# Patient Record
Sex: FEMALE | Race: BLACK OR AFRICAN AMERICAN | NOT HISPANIC OR LATINO | Employment: FULL TIME | URBAN - METROPOLITAN AREA
[De-identification: names, ages, dates, MRNs, and addresses within clinical notes are randomized per-mention and may not be internally consistent; named-entity substitution may affect disease eponyms.]

---

## 2017-01-09 ENCOUNTER — OFFICE VISIT (OUTPATIENT)
Dept: INTERNAL MEDICINE | Facility: CLINIC | Age: 57
End: 2017-01-09
Payer: COMMERCIAL

## 2017-01-09 VITALS
HEIGHT: 66 IN | TEMPERATURE: 98 F | BODY MASS INDEX: 33.24 KG/M2 | HEART RATE: 90 BPM | DIASTOLIC BLOOD PRESSURE: 84 MMHG | SYSTOLIC BLOOD PRESSURE: 140 MMHG | OXYGEN SATURATION: 99 % | WEIGHT: 206.81 LBS

## 2017-01-09 DIAGNOSIS — H10.9 CONJUNCTIVITIS OF RIGHT EYE, UNSPECIFIED CONJUNCTIVITIS TYPE: Primary | ICD-10-CM

## 2017-01-09 DIAGNOSIS — J06.9 UPPER RESPIRATORY TRACT INFECTION, UNSPECIFIED TYPE: ICD-10-CM

## 2017-01-09 PROCEDURE — 99999 PR PBB SHADOW E&M-EST. PATIENT-LVL III: CPT | Mod: PBBFAC,,, | Performed by: FAMILY MEDICINE

## 2017-01-09 PROCEDURE — 96372 THER/PROPH/DIAG INJ SC/IM: CPT | Mod: S$GLB,,, | Performed by: FAMILY MEDICINE

## 2017-01-09 PROCEDURE — 99214 OFFICE O/P EST MOD 30 MIN: CPT | Mod: 25,S$GLB,, | Performed by: FAMILY MEDICINE

## 2017-01-09 RX ORDER — FLUOROMETHOLONE 1 MG/ML
1 SUSPENSION/ DROPS OPHTHALMIC 4 TIMES DAILY
Qty: 5 ML | Refills: 1 | Status: SHIPPED | OUTPATIENT
Start: 2017-01-09 | End: 2017-01-19

## 2017-01-09 RX ORDER — METHYLPREDNISOLONE ACETATE 40 MG/ML
40 INJECTION, SUSPENSION INTRA-ARTICULAR; INTRALESIONAL; INTRAMUSCULAR; SOFT TISSUE
Status: COMPLETED | OUTPATIENT
Start: 2017-01-09 | End: 2017-01-09

## 2017-01-09 RX ORDER — MONTELUKAST SODIUM 10 MG/1
10 TABLET ORAL NIGHTLY
Qty: 30 TABLET | Refills: 3 | Status: SHIPPED | OUTPATIENT
Start: 2017-01-09 | End: 2017-02-09

## 2017-01-09 RX ADMIN — METHYLPREDNISOLONE ACETATE 40 MG: 40 INJECTION, SUSPENSION INTRA-ARTICULAR; INTRALESIONAL; INTRAMUSCULAR; SOFT TISSUE at 12:01

## 2017-01-09 NOTE — PROGRESS NOTES
Subjective:       Patient ID: Karla Arzola is a 56 y.o. female.    Chief Complaint: URI and Eye Problem    HPI Mrs. Arzola presents today for URI symptoms and possible pink eye.  Right eye was a little red on Friday. Itching on yesterday. Stuck together this morning. Pain/ache periodic. This has occurred before and she was given drops. She wears contacts.     URI symptoms off and on for months. Cough productive yellow mucus, congested, irritated throat. Headaches and sinus pressure.   Takes singulair. Tylenol severe cold.     Review of Systems   Constitutional: Negative for chills, fatigue and fever.   HENT: Positive for congestion, rhinorrhea and sneezing. Negative for ear discharge, ear pain, sinus pressure and sore throat.    Eyes: Positive for pain, redness and itching. Negative for photophobia and visual disturbance.   Neurological: Negative for dizziness and headaches.           Past Medical History   Diagnosis Date    Allergy     Hypertension     Obesity     Vitamin B 12 deficiency      Past Surgical History   Procedure Laterality Date    Tubal ligation      Right knee scope       Family History   Problem Relation Age of Onset    Diabetes Mother     Hypertension Mother     Stroke Mother     Hypertension Father     Hyperlipidemia Father     Kidney disease Father      Social History     Social History    Marital status:      Spouse name: N/A    Number of children: N/A    Years of education: N/A     Occupational History     [employer]     Social History Main Topics    Smoking status: Never Smoker    Smokeless tobacco: None    Alcohol use 0.0 oz/week     0 Standard drinks or equivalent per week      Comment: socially     Drug use: No    Sexual activity: Yes     Other Topics Concern    None     Social History Narrative       Objective:        Physical Exam   Constitutional: She appears well-nourished.   HENT:   Head: Normocephalic and atraumatic.   Eyes:        Cardiovascular: Normal rate, regular rhythm and normal heart sounds.    Pulmonary/Chest: Effort normal and breath sounds normal.   Nursing note and vitals reviewed.        Assessment/Plan:     Conjunctivitis of right eye, unspecified conjunctivitis type  -     fluorometholone 0.1% (FML) 0.1 % DrpS; Place 1 drop into the left eye 4 (four) times daily.  Dispense: 5 mL; Refill: 1    Upper respiratory tract infection, unspecified type  -     montelukast (SINGULAIR) 10 mg tablet; Take 1 tablet (10 mg total) by mouth every evening.  Dispense: 30 tablet; Refill: 3  -     methylPREDNISolone acetate injection 40 mg; Inject 1 mL (40 mg total) into the muscle one time.      Reviewed previous records and found drops that worked in the past.   Also refilled singulair.    Return if symptoms worsen or fail to improve.    Robina Momin MD  [facility]   Family Medicine

## 2017-01-09 NOTE — MR AVS SNAPSHOT
[facility]  [address]  [place] [address]  Phone: [phone]  Fax: [phone]                  Karla Arzola   2017 11:40 AM   Office Visit    Description:  Female : 1960   Provider:  Robina Momin MD   Department:  [facility]           Reason for Visit     URI     Eye Problem           Diagnoses this Visit        Comments    Conjunctivitis of right eye, unspecified conjunctivitis type    -  Primary     Upper respiratory tract infection, unspecified type                To Do List           Future Appointments        Provider Department Dept Phone    2017 2:00 PM Sai Homer, [facility] - Ophthalmology [phone]      Goals (5 Years of Data)     None       These Medications        Disp Refills Start End    fluorometholone 0.1% (FML) 0.1 % DrpS 5 mL 1 2017    Place 1 drop into the left eye 4 (four) times daily. - Left Eye    Pharmacy: [facility] [address] [place] [address] [place] - [address] [address] Ph #: [phone]       montelukast (SINGULAIR) 10 mg tablet 30 tablet 3 2017    Take 1 tablet (10 mg total) by mouth every evening. - Oral    Pharmacy: [address] [place] - [address] [address] Ph #: [phone]         [facility]     [facility] On Call Nurse Care Line -  Assistance  Registered nurses in the [facility] provide clinical advisement, health education, appointment booking, and other advisory services.  Call for this free service at [phone].             Medications           Message regarding Medications     Verify the changes and/or additions to your medication regime listed below are the same as discussed with your clinician today.  If any of these changes or additions are incorrect, please notify your healthcare provider.        START taking these NEW medications        Refills    fluorometholone 0.1% (FML) 0.1 % DrpS 1    Sig: Place 1 drop  "into the left eye 4 (four) times daily.    Class: Normal    Route: Left Eye    montelukast (SINGULAIR) 10 mg tablet 3    Sig: Take 1 tablet (10 mg total) by mouth every evening.    Class: Normal    Route: Oral      These medications were administered today        Dose Freq    methylPREDNISolone acetate injection 40 mg 40 mg [facility] 1 time    Sig: Inject 1 mL (40 mg total) into the muscle one time.    Class: Normal    Route: Intramuscular           Verify that the below list of medications is an accurate representation of the medications you are currently taking.  If none reported, the list may be blank. If incorrect, please contact your healthcare provider. Carry this list with you in case of emergency.           Current Medications     albuterol 90 mcg/actuation inhaler Inhale 2 puffs into the lungs every 6 (six) hours as needed for Wheezing.    amlodipine (NORVASC) 5 MG tablet Take 1 tablet (5 mg total) by mouth once daily.    montelukast (SINGULAIR) 10 mg tablet Take 1 tablet (10 mg total) by mouth every evening.    acyclovir (ZOVIRAX) 200 MG capsule TAKE ONE CAPSULE BY MOUTH ONCE DAILY    cetirizine (ZYRTEC) 10 MG tablet Take 10 mg by mouth once daily.    fluorometholone 0.1% (FML) 0.1 % DrpS Place 1 drop into the left eye 4 (four) times daily.    meloxicam (MOBIC) 15 MG tablet Take 1 tablet (15 mg total) by mouth once daily.           Clinical Reference Information           Vital Signs - Last Recorded  Most recent update: 1/9/2017 12:01 PM by Nichole Lora LPN    BP Pulse Temp Ht Wt SpO2    (!) 140/84 90 97.9 °F (36.6 °C) (Tympanic) 5' 6" (1.676 m) 93.8 kg (206 lb 12.7 oz) 99%    BMI                33.38 kg/m2          Blood Pressure          Most Recent Value    BP  (!)  140/84      Allergies as of 1/9/2017     No Known Allergies      Immunizations Administered on Date of Encounter - 1/9/2017     None      "

## 2017-01-13 ENCOUNTER — TELEPHONE (OUTPATIENT)
Dept: INTERNAL MEDICINE | Facility: CLINIC | Age: 57
End: 2017-01-13

## 2017-01-13 DIAGNOSIS — R05.9 COUGH: Primary | ICD-10-CM

## 2017-01-13 RX ORDER — HYDROCODONE POLISTIREX AND CHLORPHENIRAMINE POLISTIREX 10; 8 MG/5ML; MG/5ML
5 SUSPENSION, EXTENDED RELEASE ORAL EVERY 12 HOURS PRN
Qty: 115 ML | Refills: 0 | Status: SHIPPED | OUTPATIENT
Start: 2017-01-13 | End: 2017-02-09 | Stop reason: ALTCHOICE

## 2017-01-13 NOTE — TELEPHONE ENCOUNTER
Pt reports she is still coughing and she is using her inhaler. no fever. She wants to see if you will call her in something for the cough. She has taken in the past Tussionex, which seemed to help her but reports the tessalon pearles did not.   Will you send her in something?

## 2017-01-13 NOTE — TELEPHONE ENCOUNTER
----- Message from Angelina Gibbs sent at 1/13/2017  7:50 AM CST -----  Please call pt back at [phone] in regards to pt would like to get something for a cough.pt uses [facility] [address]pt does not want tussin fawad????

## 2017-01-16 ENCOUNTER — PATIENT MESSAGE (OUTPATIENT)
Dept: INTERNAL MEDICINE | Facility: CLINIC | Age: 57
End: 2017-01-16

## 2017-01-16 ENCOUNTER — HOSPITAL ENCOUNTER (OUTPATIENT)
Dept: RADIOLOGY | Facility: HOSPITAL | Age: 57
Discharge: HOME | End: 2017-01-16
Attending: FAMILY MEDICINE
Payer: COMMERCIAL

## 2017-01-16 DIAGNOSIS — R05.9 COUGH: ICD-10-CM

## 2017-01-16 DIAGNOSIS — R05.9 COUGH: Primary | ICD-10-CM

## 2017-01-16 PROCEDURE — 71020 XR CHEST PA AND LATERAL: CPT | Mod: TC

## 2017-01-16 PROCEDURE — 71020 XR CHEST PA AND LATERAL: CPT | Mod: 26,,, | Performed by: RADIOLOGY

## 2017-01-16 RX ORDER — AMOXICILLIN 500 MG/1
500 TABLET, FILM COATED ORAL EVERY 12 HOURS
Qty: 14 TABLET | Refills: 0 | Status: SHIPPED | OUTPATIENT
Start: 2017-01-16 | End: 2017-01-26

## 2017-01-17 ENCOUNTER — TELEPHONE (OUTPATIENT)
Dept: INTERNAL MEDICINE | Facility: CLINIC | Age: 57
End: 2017-01-17

## 2017-01-17 NOTE — TELEPHONE ENCOUNTER
----- Message from Zoya Phelps sent at 1/17/2017  9:14 AM CST -----  called  chest xray...[phone] or [phone]

## 2017-01-23 ENCOUNTER — PATIENT MESSAGE (OUTPATIENT)
Dept: INTERNAL MEDICINE | Facility: CLINIC | Age: 57
End: 2017-01-23

## 2017-01-23 DIAGNOSIS — E53.8 VITAMIN B 12 DEFICIENCY: Primary | ICD-10-CM

## 2017-01-25 ENCOUNTER — PATIENT MESSAGE (OUTPATIENT)
Dept: INTERNAL MEDICINE | Facility: CLINIC | Age: 57
End: 2017-01-25

## 2017-01-26 ENCOUNTER — LAB VISIT (OUTPATIENT)
Dept: LAB | Facility: HOSPITAL | Age: 57
End: 2017-01-26
Attending: INTERNAL MEDICINE
Payer: COMMERCIAL

## 2017-01-26 DIAGNOSIS — E53.8 VITAMIN B 12 DEFICIENCY: ICD-10-CM

## 2017-01-26 LAB — VIT B12 SERPL-MCNC: 333 PG/ML

## 2017-01-26 PROCEDURE — 82607 VITAMIN B-12: CPT

## 2017-01-26 PROCEDURE — 36415 COLL VENOUS BLD VENIPUNCTURE: CPT

## 2017-02-09 ENCOUNTER — HOSPITAL ENCOUNTER (OUTPATIENT)
Dept: RADIOLOGY | Facility: HOSPITAL | Age: 57
Discharge: HOME | End: 2017-02-09
Attending: FAMILY MEDICINE
Payer: COMMERCIAL

## 2017-02-09 ENCOUNTER — OFFICE VISIT (OUTPATIENT)
Dept: INTERNAL MEDICINE | Facility: CLINIC | Age: 57
End: 2017-02-09
Payer: COMMERCIAL

## 2017-02-09 VITALS
WEIGHT: 204.13 LBS | DIASTOLIC BLOOD PRESSURE: 78 MMHG | SYSTOLIC BLOOD PRESSURE: 134 MMHG | HEART RATE: 100 BPM | BODY MASS INDEX: 32.81 KG/M2 | HEIGHT: 66 IN | TEMPERATURE: 98 F | OXYGEN SATURATION: 99 %

## 2017-02-09 DIAGNOSIS — J40 BRONCHITIS: ICD-10-CM

## 2017-02-09 DIAGNOSIS — R05.9 COUGH: ICD-10-CM

## 2017-02-09 DIAGNOSIS — R05.9 COUGH: Primary | ICD-10-CM

## 2017-02-09 PROCEDURE — 71020 XR CHEST PA AND LATERAL: CPT | Mod: TC

## 2017-02-09 PROCEDURE — 99213 OFFICE O/P EST LOW 20 MIN: CPT | Mod: 25,S$GLB,, | Performed by: FAMILY MEDICINE

## 2017-02-09 PROCEDURE — 71020 XR CHEST PA AND LATERAL: CPT | Mod: 26,,, | Performed by: RADIOLOGY

## 2017-02-09 PROCEDURE — 99999 PR PBB SHADOW E&M-EST. PATIENT-LVL III: CPT | Mod: PBBFAC,,, | Performed by: FAMILY MEDICINE

## 2017-02-09 PROCEDURE — 96372 THER/PROPH/DIAG INJ SC/IM: CPT | Mod: S$GLB,,, | Performed by: FAMILY MEDICINE

## 2017-02-09 RX ORDER — METHYLPREDNISOLONE 4 MG/1
TABLET ORAL
Qty: 1 PACKAGE | Refills: 0 | Status: SHIPPED | OUTPATIENT
Start: 2017-02-09 | End: 2017-03-02

## 2017-02-09 RX ORDER — METHYLPREDNISOLONE ACETATE 40 MG/ML
40 INJECTION, SUSPENSION INTRA-ARTICULAR; INTRALESIONAL; INTRAMUSCULAR; SOFT TISSUE
Status: COMPLETED | OUTPATIENT
Start: 2017-02-09 | End: 2017-02-09

## 2017-02-09 RX ORDER — HYDROCODONE POLISTIREX AND CHLORPHENIRAMINE POLISTIREX 10; 8 MG/5ML; MG/5ML
5 SUSPENSION, EXTENDED RELEASE ORAL EVERY 12 HOURS PRN
Qty: 115 ML | Refills: 0 | Status: SHIPPED | OUTPATIENT
Start: 2017-02-09 | End: 2017-09-26

## 2017-02-09 RX ADMIN — METHYLPREDNISOLONE ACETATE 40 MG: 40 INJECTION, SUSPENSION INTRA-ARTICULAR; INTRALESIONAL; INTRAMUSCULAR; SOFT TISSUE at 01:02

## 2017-02-09 NOTE — PROGRESS NOTES
Subjective:       Patient ID: Karla Arzola is a 56 y.o. female.    Chief Complaint: URI and Cough    HPI Mrs. Arzola presents today for URI symptoms she was seen about a month ago for similar symptoms and felt that she got better. Her cough was almost gone and then this weekend coughing became productive with shortness of breath. She doesn't feel tessalon perles help and she has used both 100 and 200 mg. She takes singulair nightly. She has an inhaler but doesn't feel that that helps either.   Denies fever, chest pain, burning in the chest or mid abdomen.       Review of Systems   Constitutional: Positive for chills and fatigue. Negative for fever.   HENT: Positive for sore throat.    Respiratory: Positive for cough. Negative for wheezing.    Cardiovascular: Negative for chest pain and palpitations.   Gastrointestinal: Negative for constipation, diarrhea, nausea and vomiting.   Musculoskeletal: Negative for back pain.   Neurological: Negative for dizziness and headaches.         Objective:        Physical Exam   Constitutional: She is oriented to person, place, and time. She appears well-developed and well-nourished.   HENT:   Head: Normocephalic and atraumatic.   Right Ear: External ear normal.   Left Ear: External ear normal.   Nose: Nose normal.   Mouth/Throat: Oropharynx is clear and moist.   Eyes: EOM are normal. Pupils are equal, round, and reactive to light.   Cardiovascular: Normal rate, regular rhythm and normal heart sounds.    Pulmonary/Chest: Effort normal and breath sounds normal. No respiratory distress. She has no wheezes.   Neurological: She is alert and oriented to person, place, and time.   Skin: Skin is warm. No rash noted.   Psychiatric: She has a normal mood and affect. Her behavior is normal.   Nursing note and vitals reviewed.        Assessment/Plan:     Cough  -     X-Ray Chest PA And Lateral; Future; Expected date: 2/9/17  -     hydrocodone-chlorpheniramine (TUSSIONEX) 10-8 mg/5 mL  suspension; Take 5 mLs by mouth every 12 (twelve) hours as needed for Cough.  Dispense: 115 mL; Refill: 0    Bronchitis  -     methylPREDNISolone (MEDROL DOSEPACK) 4 mg tablet; use as directed  Dispense: 1 Package; Refill: 0  -     methylPREDNISolone acetate injection 40 mg; Inject 1 mL (40 mg total) into the muscle one time.    Previous chest xray negative. Repeating today.   Steroid injection helped 1 month ago will add a taper to start tomorrow.   Cough syrup given today.       No Follow-up on file.    Robina Momin MD  [facility]   Family Medicine

## 2017-02-09 NOTE — MR AVS SNAPSHOT
[facility]  [address]  [place] [address]  Phone: [phone]  Fax: [phone]                  Karla Arzola   2017 1:00 PM   Office Visit    Description:  Female : 1960   Provider:  Robina Momin MD   Department:  [facility]           Reason for Visit     URI     Cough           Diagnoses this Visit        Comments    Cough    -  Primary     Bronchitis                To Do List           Goals (5 Years of Data)     None       These Medications        Disp Refills Start End    hydrocodone-chlorpheniramine (TUSSIONEX) 10-8 mg/5 mL suspension 115 mL 0 2017     Take 5 mLs by mouth every 12 (twelve) hours as needed for Cough. - Oral    Pharmacy: [facility] [address] [place] [address] [place] - [address] [address] Ph #: [phone]       methylPREDNISolone (MEDROL DOSEPACK) 4 mg tablet 1 Package 0 2017 3/2/2017    use as directed    Pharmacy: [facility] [address] [place], [place] [address] - [address] [address] Ph #: [phone]         [facility]     [facility] On Call Nurse Care Line -  Assistance  Registered nurses in the [facility] provide clinical advisement, health education, appointment booking, and other advisory services.  Call for this free service at [phone].             Medications           Message regarding Medications     Verify the changes and/or additions to your medication regime listed below are the same as discussed with your clinician today.  If any of these changes or additions are incorrect, please notify your healthcare provider.        START taking these NEW medications        Refills    methylPREDNISolone (MEDROL DOSEPACK) 4 mg tablet 0    Sig: use as directed    Class: Normal      These medications were administered today        Dose Freq    methylPREDNISolone acetate injection 40 mg 40 mg [facility] 1 time    Sig: Inject 1 mL (40 mg total) into the muscle one time.    Class: Normal     "Route: Intramuscular      STOP taking these medications     meloxicam (MOBIC) 15 MG tablet Take 1 tablet (15 mg total) by mouth once daily.           Verify that the below list of medications is an accurate representation of the medications you are currently taking.  If none reported, the list may be blank. If incorrect, please contact your healthcare provider. Carry this list with you in case of emergency.           Current Medications     acyclovir (ZOVIRAX) 200 MG capsule TAKE ONE CAPSULE BY MOUTH ONCE DAILY    amlodipine (NORVASC) 5 MG tablet Take 1 tablet (5 mg total) by mouth once daily.    montelukast (SINGULAIR) 10 mg tablet Take 1 tablet (10 mg total) by mouth every evening.    cetirizine (ZYRTEC) 10 MG tablet Take 10 mg by mouth once daily.    hydrocodone-chlorpheniramine (TUSSIONEX) 10-8 mg/5 mL suspension Take 5 mLs by mouth every 12 (twelve) hours as needed for Cough.    methylPREDNISolone (MEDROL DOSEPACK) 4 mg tablet use as directed           Clinical Reference Information           Your Vitals Were     BP Pulse Temp Height    134/78 (BP Location: Left arm, Patient Position: Sitting, BP Method: Manual) 100 97.7 °F (36.5 °C) (Tympanic) 5' 6" (1.676 m)    Weight SpO2 BMI    92.6 kg (204 lb 2.3 oz) 99% 32.95 kg/m2      Blood Pressure          Most Recent Value    BP  134/78      Allergies as of 2/9/2017     No Known Allergies      Immunizations Administered on Date of Encounter - 2/9/2017     None      Orders Placed During Today's Visit     Future Labs/Procedures Expected by Expires    X-Ray Chest PA And Lateral  2/9/2017 2/9/2018      Language Assistance Services     ATTENTION: Language assistance services are available, free of charge. Please call [phone].      ATENCIÓN: Si habla español, tiene a khan disposición servicios gratuitos de asistencia lingüística. Llame al [phone].     CHÚ Ý: N?u b?n nói Ti?ng Vi?t, có các d?ch v? h? tr? ngôn ng? mi?n phí dành cho b?n. G?i s? [phone].   "       [facility] complies with applicable Federal civil rights laws and does not discriminate on the basis of race, color, national origin, age, disability, or sex.

## 2017-06-23 DIAGNOSIS — Z12.31 OTHER SCREENING MAMMOGRAM: ICD-10-CM

## 2017-09-17 ENCOUNTER — OFFICE VISIT (OUTPATIENT)
Dept: URGENT CARE | Facility: CLINIC | Age: 57
End: 2017-09-17
Payer: COMMERCIAL

## 2017-09-17 VITALS
OXYGEN SATURATION: 99 % | SYSTOLIC BLOOD PRESSURE: 169 MMHG | HEART RATE: 79 BPM | HEIGHT: 65 IN | DIASTOLIC BLOOD PRESSURE: 80 MMHG | TEMPERATURE: 98 F | BODY MASS INDEX: 36.26 KG/M2 | WEIGHT: 217.63 LBS

## 2017-09-17 DIAGNOSIS — H10.231 SEROUS CONJUNCTIVITIS OF RIGHT EYE: Primary | ICD-10-CM

## 2017-09-17 PROCEDURE — 3077F SYST BP >= 140 MM HG: CPT | Mod: S$GLB,,, | Performed by: NURSE PRACTITIONER

## 2017-09-17 PROCEDURE — 99213 OFFICE O/P EST LOW 20 MIN: CPT | Mod: S$GLB,,, | Performed by: NURSE PRACTITIONER

## 2017-09-17 PROCEDURE — 3008F BODY MASS INDEX DOCD: CPT | Mod: S$GLB,,, | Performed by: NURSE PRACTITIONER

## 2017-09-17 PROCEDURE — 3079F DIAST BP 80-89 MM HG: CPT | Mod: S$GLB,,, | Performed by: NURSE PRACTITIONER

## 2017-09-17 PROCEDURE — 99999 PR PBB SHADOW E&M-EST. PATIENT-LVL III: CPT | Mod: PBBFAC,,,

## 2017-09-17 RX ORDER — ERYTHROMYCIN 5 MG/G
OINTMENT OPHTHALMIC EVERY 8 HOURS
Qty: 3.5 G | Refills: 0 | Status: SHIPPED | OUTPATIENT
Start: 2017-09-17 | End: 2017-09-20 | Stop reason: ALTCHOICE

## 2017-09-17 NOTE — PATIENT INSTRUCTIONS
Conjunctivitis Caused by Infection     Wash hands often to help prevent spreading infection.     Infections are caused by viruses or germs (bacteria). Treatment includes keeping your eyes and hands clean. Your healthcare provider may prescribe eye drops, and tell you to stay home from work or school if youre contagious. Untreated infections can be serious. It's important to see your provider for a diagnosis.  Viral infections  A cold, flu, or other virus can spread to your eyes. This causes a watery discharge. Your eyes may burn or itch and get red. Your eyelids may also be puffy and sore.  Treatment  Most viral infections go away on their own. Artificial tears and warm compresses can relieve symptoms. Your provider may also prescribe eye drops. A viral infection can be very contagious and spreads quickly. To prevent this, wash your hands often. Use a separate tissue to wipe each eye. Dont touch your eyes or share bedding or towels.   Bacterial infections  Bacterial infections often occur in one eye. There may be a watery or a thick discharge from the eye. These infections can cause serious damage to your eye if not treated promptly.  Treatment  Your provider may prescribe eye drops or ointment to kill the bacteria. Warm compresses can help keep the eyelids clean. To keep the bacteria from spreading, wash your hands often. Use a separate tissue to wipe each eye. Dont touch your eyes or share bedding or towels.  Date Last Reviewed: 6/11/2015  © 5490-8468 [employer]. [address] [place] [address], [place] [address]. All rights reserved. This information is not intended as a substitute for professional medical care. Always follow your healthcare professional's instructions.

## 2017-09-17 NOTE — PROGRESS NOTES
"Subjective:       Patient ID: Karla Arzola is a 57 y.o. female.    Chief Complaint: No chief complaint on file.    Patient is presenting with c/o " possible pink eye". Patient reports 2 days of right eye pain, purulent drainage and red eye.        Review of Systems   Constitutional: Negative for fatigue and fever.   HENT: Positive for congestion. Negative for postnasal drip, rhinorrhea, sinus pain, sinus pressure, sneezing and sore throat.    Eyes: Positive for pain, discharge, redness and itching. Negative for photophobia and visual disturbance.   Respiratory: Negative.    Cardiovascular: Negative.    Gastrointestinal: Negative.    Endocrine: Negative.    Musculoskeletal: Negative.    Neurological: Negative.    Psychiatric/Behavioral: Negative.        Objective:      BP (!) 169/80   Pulse 79   Temp 98.2 °F (36.8 °C) (Tympanic)   Ht 5' 5" (1.651 m)   Wt 98.7 kg (217 lb 9.5 oz)   SpO2 99%   BMI 36.21 kg/m²   Physical Exam   Constitutional: She appears well-developed and well-nourished. She appears distressed.   HENT:   Head: Normocephalic.   Right Ear: External ear normal.   Left Ear: External ear normal.   Nose: Nose normal.   Mouth/Throat: Oropharynx is clear and moist.   Right eye erythema consistent with conjunctivitis.     Eyes: Right eye exhibits discharge. Left eye exhibits no discharge. No scleral icterus.   Right eye with scant purulent drainage.   Neck: Normal range of motion.   Cardiovascular: Normal rate, regular rhythm, normal heart sounds and intact distal pulses.    Pulmonary/Chest: Effort normal and breath sounds normal. No respiratory distress. She has no wheezes.   Musculoskeletal: Normal range of motion.   Skin: Capillary refill takes less than 2 seconds.   Psychiatric: She has a normal mood and affect. Her behavior is normal.       Assessment:       1. Serous conjunctivitis of right eye        Plan:       Serous conjunctivitis of right eye  -     erythromycin (ROMYCIN) ophthalmic " ointment; Place into the right eye every 8 (eight) hours. Apply 0.5 inch to affected eye  Dispense: 3.5 g; Refill: 0    Patient given work excuse for 2 days

## 2017-09-17 NOTE — LETTER
Endocrine/General Surgery  [address] [place] [address]  Phone: [phone]  Fax: [phone] September 17, 2017     Patient: Karla Arzola   YOB: 1960   Date of Visit: 9/17/2017       To whom it may concern,     Karla HARRIS has been under my care since 9/17/17.    She is clear to return to school/work on 9/20/17    If you have any questions or concerns, please don't hesitate to contact my office. Thank you for accomodating Karla HARRIS during this time of treatment.            Homa Mahoney NP    URGENT CARE, [facility]

## 2017-09-20 ENCOUNTER — OFFICE VISIT (OUTPATIENT)
Dept: OPHTHALMOLOGY | Facility: CLINIC | Age: 57
End: 2017-09-20
Payer: COMMERCIAL

## 2017-09-20 DIAGNOSIS — H20.00 ACUTE IRITIS, RIGHT EYE: Primary | ICD-10-CM

## 2017-09-20 DIAGNOSIS — H16.201 KERATOCONJUNCTIVITIS, RIGHT: ICD-10-CM

## 2017-09-20 PROCEDURE — 99999 PR PBB SHADOW E&M-EST. PATIENT-LVL I: CPT | Mod: PBBFAC,,, | Performed by: OPTOMETRIST

## 2017-09-20 PROCEDURE — 92002 INTRM OPH EXAM NEW PATIENT: CPT | Mod: S$GLB,,, | Performed by: OPTOMETRIST

## 2017-09-20 RX ORDER — PREDNISOLONE ACETATE 10 MG/ML
1 SUSPENSION/ DROPS OPHTHALMIC 3 TIMES DAILY
Qty: 1 BOTTLE | Refills: 0 | Status: SHIPPED | OUTPATIENT
Start: 2017-09-20 | End: 2017-10-02

## 2017-09-20 RX ORDER — POLYMYXIN B SULFATE AND TRIMETHOPRIM 1; 10000 MG/ML; [USP'U]/ML
1 SOLUTION OPHTHALMIC EVERY 4 HOURS
Qty: 1 BOTTLE | Refills: 0 | Status: SHIPPED | OUTPATIENT
Start: 2017-09-20 | End: 2017-10-02

## 2017-09-20 NOTE — PROGRESS NOTES
HPI     Eye Problem    Additional comments: Red, irritated OD           Comments   NP to DNL. Patient states she went to urgent care on Saturday and was   diagnosed with pink eye.   Pain Scale:  4  Onset:   Saturday  OD, OS, OU:   OD*  Discharge:   Yes  A.M. Matting:  Yes  Itch:   Yes  Redness:   Yes  Photophobia:   No  Foreign body sensation:   Yes  Deep pain:   No  Previous occurrence:   No  Drops:   Erythromycin TID OD       Last edited by Madeline Donahue, PCT on 9/20/2017  9:44 AM. (History)              Assessment /Plan     For exam results, see Encounter Report.    Acute iritis, right eye    Keratoconjunctivitis, right      -     polymyxin B sulf-trimethoprim (POLYTRIM) 10,000 unit- 1 mg/mL Drop; Place 1 drop into the right eye every 4 (four) hours.  Dispense: 1 Bottle; Refill: 0  -     prednisoLONE acetate (PRED FORTE) 1 % DrpS; Place 1 drop into the right eye 3 (three) times daily.  Dispense: 1 Bottle; Refill: 0    Likely due to cls overwear, changes lenses q 6 mo with EW  Infiltrate, no epi defect with iritis  D/c cls wear until completely healed  Start gtts as directed  RTC 2 days for f/u, PRN sooner if symptoms worsen  Discussed above and all questions were answered.

## 2017-09-26 ENCOUNTER — OFFICE VISIT (OUTPATIENT)
Dept: OPHTHALMOLOGY | Facility: CLINIC | Age: 57
End: 2017-09-26
Payer: COMMERCIAL

## 2017-09-26 DIAGNOSIS — H16.201 KERATOCONJUNCTIVITIS, RIGHT: ICD-10-CM

## 2017-09-26 DIAGNOSIS — H20.00 ACUTE IRITIS, RIGHT EYE: Primary | ICD-10-CM

## 2017-09-26 PROCEDURE — 99999 PR PBB SHADOW E&M-EST. PATIENT-LVL I: CPT | Mod: PBBFAC,,, | Performed by: OPTOMETRIST

## 2017-09-26 PROCEDURE — 92012 INTRM OPH EXAM EST PATIENT: CPT | Mod: S$GLB,,, | Performed by: OPTOMETRIST

## 2017-09-27 NOTE — PROGRESS NOTES
HPI     Follow-up    Additional comments: Iritis OD           Comments   Last seen by DNL on 9/20/17 for Acute Iritis OD. Patient here today for a   2 day follow up. Patient states since her last visit her eye is doing much   better. Patient denies any pain at this time. Patient using drops as   directed.       Last edited by Madeline Donahue, PCT on 9/26/2017  8:44 AM. (History)              Assessment /Plan     For exam results, see Encounter Report.    Acute iritis, right eye    Keratoconjunctivitis, right      Resolved nicely with treatment  No iritis, no conj injection and no K staining today  D/c polytrim  Continue pred forte bid x 1 week, then d/c    RTC for full eye exam at pt's convenience   No EW, change cls q 1 mo   Discussed above and all questions were answered.

## 2017-10-01 PROBLEM — Z12.31 SCREENING MAMMOGRAM, ENCOUNTER FOR: Status: ACTIVE | Noted: 2017-10-01

## 2017-10-01 PROBLEM — Z00.00 ANNUAL PHYSICAL EXAM: Status: ACTIVE | Noted: 2017-10-01

## 2017-10-01 PROBLEM — Z12.11 SCREEN FOR COLON CANCER: Status: ACTIVE | Noted: 2017-10-01

## 2017-10-02 ENCOUNTER — OFFICE VISIT (OUTPATIENT)
Dept: INTERNAL MEDICINE | Facility: CLINIC | Age: 57
End: 2017-10-02
Payer: COMMERCIAL

## 2017-10-02 VITALS
SYSTOLIC BLOOD PRESSURE: 130 MMHG | WEIGHT: 211 LBS | OXYGEN SATURATION: 97 % | HEART RATE: 68 BPM | DIASTOLIC BLOOD PRESSURE: 80 MMHG | TEMPERATURE: 97 F | HEIGHT: 65 IN | BODY MASS INDEX: 35.16 KG/M2

## 2017-10-02 DIAGNOSIS — A60.00 RECURRENT GENITAL HSV (HERPES SIMPLEX VIRUS) INFECTION: ICD-10-CM

## 2017-10-02 DIAGNOSIS — Z12.31 SCREENING MAMMOGRAM, ENCOUNTER FOR: ICD-10-CM

## 2017-10-02 DIAGNOSIS — Z00.00 ANNUAL PHYSICAL EXAM: ICD-10-CM

## 2017-10-02 DIAGNOSIS — Z12.11 SCREEN FOR COLON CANCER: ICD-10-CM

## 2017-10-02 DIAGNOSIS — I10 ESSENTIAL HYPERTENSION: ICD-10-CM

## 2017-10-02 PROCEDURE — 99213 OFFICE O/P EST LOW 20 MIN: CPT | Mod: 25,S$GLB,, | Performed by: FAMILY MEDICINE

## 2017-10-02 PROCEDURE — 3079F DIAST BP 80-89 MM HG: CPT | Mod: S$GLB,,, | Performed by: FAMILY MEDICINE

## 2017-10-02 PROCEDURE — 99999 PR PBB SHADOW E&M-EST. PATIENT-LVL III: CPT | Mod: PBBFAC,,, | Performed by: FAMILY MEDICINE

## 2017-10-02 PROCEDURE — 99396 PREV VISIT EST AGE 40-64: CPT | Mod: S$GLB,,, | Performed by: FAMILY MEDICINE

## 2017-10-02 PROCEDURE — 3008F BODY MASS INDEX DOCD: CPT | Mod: S$GLB,,, | Performed by: FAMILY MEDICINE

## 2017-10-02 PROCEDURE — 3075F SYST BP GE 130 - 139MM HG: CPT | Mod: S$GLB,,, | Performed by: FAMILY MEDICINE

## 2017-10-02 RX ORDER — AMLODIPINE BESYLATE 10 MG/1
10 TABLET ORAL DAILY
Qty: 30 TABLET | Refills: 5 | Status: SHIPPED | OUTPATIENT
Start: 2017-10-02 | End: 2018-10-17 | Stop reason: SDUPTHER

## 2017-10-02 RX ORDER — ACYCLOVIR 200 MG/1
200 CAPSULE ORAL DAILY
Qty: 30 CAPSULE | Refills: 5 | Status: SHIPPED | OUTPATIENT
Start: 2017-10-02 | End: 2018-11-15

## 2017-10-02 NOTE — ASSESSMENT & PLAN NOTE
Increase Norvasc 10  Digital HTN referral  Refilled medication  Screen for DM and CMP ordered  Lipid checked last year  Diet and weight loss discussed

## 2017-10-02 NOTE — PATIENT INSTRUCTIONS

## 2017-10-02 NOTE — PROGRESS NOTES
Karla Arzola  10/04/2017  [id number]    Sayda Perales DO  Patient Care Team:  Sayda Perales DO as PCP - General (Internal Medicine)  Has the patient seen any provider outside of the [facility] network since the last visit? (no). If yes, HIPPA forms completed and records requested.        Visit Type:Annual check up    Chief Complaint:  Chief Complaint   Patient presents with    Annual Exam     Blood Pressure Elevated    Medication Refill     Amlodipine & Acyclovir       History of Present Illness:  57 year old here for annual check up.    She has history of HTN, which is treated with Norvasc 5 mg. BP readings on previous visits have been marginal, not at goal. She denies CP or SOB.  She does check her BP at work, reports can get in 150 range.  She has been on Norvasc for a couple of years.     She has recently been seen by Optho with Iritis and has been on steroid drops.     She has history of recurrent genital HSV and is on daily Acyclovir for prevention/suppression.    She is due for MMG. Last Pap >3 years, done with [facility]. She is now going outside [facility] for GYN.    She has never had colon screen on file.            History:  Past Medical History:   Diagnosis Date    Allergy     Hypertension     Obesity     Vitamin B 12 deficiency      Past Surgical History:   Procedure Laterality Date    right knee scope      TUBAL LIGATION       Family History   Problem Relation Age of Onset    Diabetes Mother     Hypertension Mother     Stroke Mother     Hypertension Father     Hyperlipidemia Father     Kidney disease Father      Social History     Social History    Marital status:      Spouse name: N/A    Number of children: N/A    Years of education: N/A     Occupational History     [facility] [employer]     Social History Main Topics    Smoking status: Never Smoker    Smokeless tobacco: Never Used    Alcohol use 0.0 oz/week      Comment: socially     Drug use: No    Sexual  activity: Yes     Other Topics Concern    Not on file     Social History Narrative    No narrative on file     Patient Active Problem List   Diagnosis    HTN (hypertension)    Obesity    Recurrent genital HSV (herpes simplex virus) infection    Internal derangement of right knee    Chondromalacia of right knee    Annual physical exam    Screen for colon cancer    Screening mammogram, encounter for     Review of patient's allergies indicates:  No Known Allergies    The following were reviewed at this visit: active problem list, medication list, allergies, family history, social history, and health maintenance.    Medications:  Current Outpatient Prescriptions on File Prior to Visit   Medication Sig Dispense Refill    cetirizine (ZYRTEC) 10 MG tablet Take 10 mg by mouth once daily.       No current facility-administered medications on file prior to visit.        Medications have been reviewed and reconciled with patient at this visit.  Barriers to medications present (no)    Adverse reactions to current medications (no)    Over the counter medications reviewed (Yes ), and if needed added to active Medication list at this visit.     Exam:  Wt Readings from Last 3 Encounters:   10/02/17 95.7 kg (211 lb)   09/17/17 98.7 kg (217 lb 9.5 oz)   02/09/17 92.6 kg (204 lb 2.3 oz)     Temp Readings from Last 3 Encounters:   10/02/17 97 °F (36.1 °C) (Tympanic)   09/17/17 98.2 °F (36.8 °C) (Tympanic)   02/09/17 97.7 °F (36.5 °C) (Tympanic)     BP Readings from Last 3 Encounters:   10/02/17 130/80   09/17/17 (!) 169/80   02/09/17 134/78     Pulse Readings from Last 3 Encounters:   10/02/17 68   09/17/17 79   02/09/17 100     Body mass index is 35.11 kg/m².      Review of Systems   Constitutional: Negative.  Negative for chills and fever.   HENT: Negative.  Negative for congestion, sinus pain and sore throat.    Eyes: Negative for blurred vision and double vision.   Respiratory: Negative for cough, sputum production,  shortness of breath and wheezing.    Cardiovascular: Negative for chest pain, palpitations and leg swelling.   Gastrointestinal: Negative for abdominal pain, constipation, diarrhea, heartburn, nausea and vomiting.   Genitourinary: Negative.    Musculoskeletal: Negative.    Skin: Negative.  Negative for rash.   Neurological: Negative.  Negative for dizziness and headaches.   Endo/Heme/Allergies: Negative.  Negative for polydipsia. Does not bruise/bleed easily.   Psychiatric/Behavioral: Negative for depression and substance abuse.       Physical Exam   Constitutional: She is oriented to person, place, and time. She appears well-developed and well-nourished. No distress.   HENT:   Head: Normocephalic and atraumatic.   Right Ear: External ear normal.   Left Ear: External ear normal.   Nose: Nose normal.   Mouth/Throat: Oropharynx is clear and moist. No oropharyngeal exudate.   Eyes: Conjunctivae and EOM are normal. Pupils are equal, round, and reactive to light. Right eye exhibits no discharge. Left eye exhibits no discharge.   Neck: Normal range of motion. Neck supple. No thyromegaly present.   Cardiovascular: Normal rate, regular rhythm, normal heart sounds and intact distal pulses.    No murmur heard.  Pulmonary/Chest: Effort normal and breath sounds normal. No respiratory distress. She has no wheezes.   Abdominal: Soft. Bowel sounds are normal. She exhibits no distension and no mass. There is no tenderness.   Musculoskeletal: Normal range of motion. She exhibits no edema.   Lymphadenopathy:     She has no cervical adenopathy.   Neurological: She is alert and oriented to person, place, and time. No cranial nerve deficit.   Skin: Capillary refill takes less than 2 seconds. She is not diaphoretic.   Psychiatric: She has a normal mood and affect. Her behavior is normal. Judgment and thought content normal.   Nursing note and vitals reviewed.      Laboratory Reviewed ({Yes)  Lab Results   Component Value Date    WBC 6.73  10/04/2017    HGB 14.7 10/04/2017    HCT 46.2 10/04/2017     10/04/2017    CHOL 143 10/04/2017    TRIG 103 10/04/2017    HDL 49 10/04/2017    ALT 10 10/04/2017    AST 14 10/04/2017     10/04/2017    K 4.3 10/04/2017     10/04/2017    CREATININE 0.9 10/04/2017    BUN 11 10/04/2017    CO2 26 10/04/2017    TSH 0.774 02/01/2016    GLUF 99 08/08/2008    HGBA1C 5.1 04/19/2011       Assessment:  Diagnoses of Essential hypertension, Screening mammogram, encounter for, Recurrent genital HSV (herpes simplex virus) infection, Screen for colon cancer, and Annual physical exam were pertinent to this visit.     Plan  HTN (hypertension)  Increase Norvasc 10  Digital HTN referral  Refilled medication  Screen for DM and CMP ordered  Lipid checked last year  Diet and weight loss discussed       Screening mammogram, encounter for  Mammogram ordered       Recurrent genital HSV (herpes simplex virus) infection  On Acyclovir daily  No active issues  Pap due       Screen for colon cancer  Colon screen ordered    Annual physical exam  Check CBC  Check CMP  Lipid up to date  Flu vaccine recommended      Care Plan/Goals: Reviewed  (Yes)  Goals     None          Follow up: Return in about 4 weeks (around 10/30/2017) for Hypertension Recheck.    After visit summary was printed and given to patient upon discharge today.  Patient goals and care plan are included in After Visit Summary.

## 2017-10-04 ENCOUNTER — LAB VISIT (OUTPATIENT)
Dept: LAB | Facility: HOSPITAL | Age: 57
End: 2017-10-04
Attending: FAMILY MEDICINE
Payer: COMMERCIAL

## 2017-10-04 DIAGNOSIS — Z00.00 ANNUAL PHYSICAL EXAM: ICD-10-CM

## 2017-10-04 LAB
ALBUMIN SERPL BCP-MCNC: 3.3 G/DL
ALP SERPL-CCNC: 93 U/L
ALT SERPL W/O P-5'-P-CCNC: 10 U/L
ANION GAP SERPL CALC-SCNC: 9 MMOL/L
AST SERPL-CCNC: 14 U/L
BASOPHILS # BLD AUTO: 0.01 K/UL
BASOPHILS NFR BLD: 0.1 %
BILIRUB SERPL-MCNC: 0.7 MG/DL
BUN SERPL-MCNC: 11 MG/DL
CALCIUM SERPL-MCNC: 8.8 MG/DL
CHLORIDE SERPL-SCNC: 109 MMOL/L
CHOLEST SERPL-MCNC: 143 MG/DL
CHOLEST/HDLC SERPL: 2.9 {RATIO}
CO2 SERPL-SCNC: 26 MMOL/L
CREAT SERPL-MCNC: 0.9 MG/DL
DIFFERENTIAL METHOD: ABNORMAL
EOSINOPHIL # BLD AUTO: 0.2 K/UL
EOSINOPHIL NFR BLD: 2.7 %
ERYTHROCYTE [DISTWIDTH] IN BLOOD BY AUTOMATED COUNT: 13.8 %
EST. GFR  (AFRICAN AMERICAN): >60 ML/MIN/1.73 M^2
EST. GFR  (NON AFRICAN AMERICAN): >60 ML/MIN/1.73 M^2
GLUCOSE SERPL-MCNC: 80 MG/DL
HCT VFR BLD AUTO: 46.2 %
HDLC SERPL-MCNC: 49 MG/DL
HDLC SERPL: 34.3 %
HGB BLD-MCNC: 14.7 G/DL
LDLC SERPL CALC-MCNC: 73.4 MG/DL
LYMPHOCYTES # BLD AUTO: 2.5 K/UL
LYMPHOCYTES NFR BLD: 37.3 %
MCH RBC QN AUTO: 28.3 PG
MCHC RBC AUTO-ENTMCNC: 31.8 G/DL
MCV RBC AUTO: 89 FL
MONOCYTES # BLD AUTO: 0.5 K/UL
MONOCYTES NFR BLD: 7.6 %
NEUTROPHILS # BLD AUTO: 3.5 K/UL
NEUTROPHILS NFR BLD: 52.3 %
NONHDLC SERPL-MCNC: 94 MG/DL
PLATELET # BLD AUTO: 253 K/UL
PMV BLD AUTO: 12 FL
POTASSIUM SERPL-SCNC: 4.3 MMOL/L
PROT SERPL-MCNC: 6.8 G/DL
RBC # BLD AUTO: 5.2 M/UL
SODIUM SERPL-SCNC: 144 MMOL/L
TRIGL SERPL-MCNC: 103 MG/DL
WBC # BLD AUTO: 6.73 K/UL

## 2017-10-04 PROCEDURE — 36415 COLL VENOUS BLD VENIPUNCTURE: CPT | Mod: PO

## 2017-10-04 PROCEDURE — 80053 COMPREHEN METABOLIC PANEL: CPT

## 2017-10-04 PROCEDURE — 85025 COMPLETE CBC W/AUTO DIFF WBC: CPT

## 2017-10-04 PROCEDURE — 80061 LIPID PANEL: CPT

## 2017-11-02 ENCOUNTER — OFFICE VISIT (OUTPATIENT)
Dept: INTERNAL MEDICINE | Facility: CLINIC | Age: 57
End: 2017-11-02
Payer: COMMERCIAL

## 2017-11-02 VITALS
TEMPERATURE: 97 F | OXYGEN SATURATION: 99 % | BODY MASS INDEX: 35.16 KG/M2 | HEART RATE: 71 BPM | HEIGHT: 65 IN | WEIGHT: 211 LBS | DIASTOLIC BLOOD PRESSURE: 82 MMHG | SYSTOLIC BLOOD PRESSURE: 137 MMHG

## 2017-11-02 DIAGNOSIS — I10 ESSENTIAL HYPERTENSION: Primary | ICD-10-CM

## 2017-11-02 DIAGNOSIS — Z12.11 SCREEN FOR COLON CANCER: ICD-10-CM

## 2017-11-02 PROBLEM — Z00.00 ANNUAL PHYSICAL EXAM: Status: RESOLVED | Noted: 2017-10-01 | Resolved: 2017-11-02

## 2017-11-02 PROBLEM — Z12.31 SCREENING MAMMOGRAM, ENCOUNTER FOR: Status: RESOLVED | Noted: 2017-10-01 | Resolved: 2017-11-02

## 2017-11-02 PROCEDURE — 99213 OFFICE O/P EST LOW 20 MIN: CPT | Mod: S$GLB,,, | Performed by: FAMILY MEDICINE

## 2017-11-02 PROCEDURE — 99999 PR PBB SHADOW E&M-EST. PATIENT-LVL III: CPT | Mod: PBBFAC,,, | Performed by: FAMILY MEDICINE

## 2017-11-02 RX ORDER — MELOXICAM 7.5 MG/1
7.5 TABLET ORAL 2 TIMES DAILY
Qty: 60 TABLET | Refills: 2 | Status: SHIPPED | OUTPATIENT
Start: 2017-11-02 | End: 2018-05-02

## 2017-11-02 NOTE — PATIENT INSTRUCTIONS
Step-by-Step  Checking Your Blood Pressure    Date Last Reviewed: 4/27/2016  © 7740-7614 [employer] [place]. [address] [place] [address], [place] [address]. All rights reserved. This information is not intended as a substitute for professional medical care. Always follow your healthcare professional's instructions.

## 2017-11-02 NOTE — PROGRESS NOTES
Karla Arzola  11/02/2017  [id number]    Sayda Perales DO  Patient Care Team:  Sayda Perales DO as PCP - General (Internal Medicine)  Has the patient seen any provider outside of the [facility] network since the last visit? (no). If yes, HIPPA forms completed and records requested.        Visit Type:a scheduled routine follow-up visit    Chief Complaint:  Chief Complaint   Patient presents with    Follow-up     Blood Pressure    Cold     Since Sunday,Taking OTC Meds,Cough Is Worse At Night    Medication Refill     Meloxicam       History of Present Illness:  She has history of HTN, which is treated with Norvasc 5 mg. BP readings on previous visits have been marginal, not at goal. She denies CP or SOB.  She does check her BP at work, reports can get in 150 range.  She has been on Norvasc for a couple of years. We increased her Norvasc to 10 mg on last visit for better BP control. Since increase BP range 126-130/80. She is pleased.    She reports going to outside GYN for pap.     Last visit we referred for colon screen. It was not scheduled.    MMG schedule for today at [facility].          History:  Past Medical History:   Diagnosis Date    Allergy     Hypertension     Obesity     Vitamin B 12 deficiency      Past Surgical History:   Procedure Laterality Date    right knee scope      TUBAL LIGATION       Family History   Problem Relation Age of Onset    Diabetes Mother     Hypertension Mother     Stroke Mother     Hypertension Father     Hyperlipidemia Father     Kidney disease Father      Social History     Social History    Marital status:      Spouse name: N/A    Number of children: N/A    Years of education: N/A     Occupational History     [facility] [employer]     Social History Main Topics    Smoking status: Never Smoker    Smokeless tobacco: Never Used    Alcohol use 0.0 oz/week      Comment: socially     Drug use: No    Sexual activity: Yes     Other Topics Concern    Not  on file     Social History Narrative    No narrative on file     Patient Active Problem List   Diagnosis    HTN (hypertension)    Obesity    Recurrent genital HSV (herpes simplex virus) infection    Internal derangement of right knee    Chondromalacia of right knee    Screen for colon cancer     Review of patient's allergies indicates:  No Known Allergies    The following were reviewed at this visit: active problem list, medication list, allergies, family history, social history, and health maintenance.    Medications:  Current Outpatient Prescriptions on File Prior to Visit   Medication Sig Dispense Refill    acyclovir (ZOVIRAX) 200 MG capsule Take 1 capsule (200 mg total) by mouth once daily. 30 capsule 5    amlodipine (NORVASC) 10 MG tablet Take 1 tablet (10 mg total) by mouth once daily. 30 tablet 5    cetirizine (ZYRTEC) 10 MG tablet Take 10 mg by mouth once daily.       No current facility-administered medications on file prior to visit.        Medications have been reviewed and reconciled with patient at this visit.  Barriers to medications present (no)    Adverse reactions to current medications (no)    Over the counter medications reviewed (No ), and if needed added to active Medication list at this visit.     Exam:  Wt Readings from Last 3 Encounters:   11/02/17 95.7 kg (211 lb)   10/02/17 95.7 kg (211 lb)   09/17/17 98.7 kg (217 lb 9.5 oz)     Temp Readings from Last 3 Encounters:   11/02/17 96.8 °F (36 °C) (Tympanic)   10/02/17 97 °F (36.1 °C) (Tympanic)   09/17/17 98.2 °F (36.8 °C) (Tympanic)     BP Readings from Last 3 Encounters:   11/02/17 137/82   10/02/17 130/80   09/17/17 (!) 169/80     Pulse Readings from Last 3 Encounters:   11/02/17 71   10/02/17 68   09/17/17 79     Body mass index is 35.11 kg/m².      Review of Systems   Constitutional: Negative.  Negative for chills and fever.   HENT: Negative.  Negative for congestion, sinus pain and sore throat.    Eyes: Negative for blurred  vision and double vision.   Respiratory: Negative for cough, sputum production, shortness of breath and wheezing.    Cardiovascular: Negative for chest pain, palpitations and leg swelling.   Gastrointestinal: Negative for abdominal pain, constipation, diarrhea, heartburn, nausea and vomiting.   Genitourinary: Negative.    Musculoskeletal: Negative.    Skin: Negative.  Negative for rash.   Neurological: Negative.    Endo/Heme/Allergies: Negative.  Negative for polydipsia. Does not bruise/bleed easily.   Psychiatric/Behavioral: Negative for depression and substance abuse.       Physical Exam   Constitutional: She is oriented to person, place, and time. She appears well-developed and well-nourished. No distress.   HENT:   Head: Normocephalic and atraumatic.   Eyes: Conjunctivae and EOM are normal. Pupils are equal, round, and reactive to light. Right eye exhibits no discharge. Left eye exhibits no discharge.   Neck: Normal range of motion. Neck supple. No thyromegaly present.   Cardiovascular: Normal rate, regular rhythm, normal heart sounds and intact distal pulses.    No murmur heard.  Pulmonary/Chest: Effort normal and breath sounds normal. No respiratory distress. She has no wheezes.   Musculoskeletal: Normal range of motion. She exhibits no edema.   Neurological: She is alert and oriented to person, place, and time. No cranial nerve deficit.   Skin: Capillary refill takes less than 2 seconds. She is not diaphoretic.   Psychiatric: She has a normal mood and affect. Her behavior is normal. Judgment and thought content normal.   Nursing note and vitals reviewed.      Laboratory Reviewed ({Yes)  Lab Results   Component Value Date    WBC 6.73 10/04/2017    HGB 14.7 10/04/2017    HCT 46.2 10/04/2017     10/04/2017    CHOL 143 10/04/2017    TRIG 103 10/04/2017    HDL 49 10/04/2017    ALT 10 10/04/2017    AST 14 10/04/2017     10/04/2017    K 4.3 10/04/2017     10/04/2017    CREATININE 0.9 10/04/2017     BUN 11 10/04/2017    CO2 26 10/04/2017    TSH 0.774 02/01/2016    GLUF 99 08/08/2008    HGBA1C 5.1 04/19/2011       Assessment:  The primary encounter diagnosis was Essential hypertension. A diagnosis of Screen for colon cancer was also pertinent to this visit.     Plan     Essential hypertension    Bp improved with increase   Continue ambulatory readings.    Screen for colon cancer  -     Case request GI: COLONOSCOPY    Other orders  -     meloxicam (MOBIC) 7.5 MG tablet; Take 1 tablet (7.5 mg total) by mouth 2 (two) times daily.  Dispense: 60 tablet; Refill: 2        Care Plan/Goals: Reviewed  (Yes)  Goals     None          Follow up: No Follow-up on file.    After visit summary was printed and given to patient upon discharge today.  Patient goals and care plan are included in After Visit Summary.

## 2017-12-02 ENCOUNTER — HOSPITAL ENCOUNTER (OUTPATIENT)
Dept: RADIOLOGY | Facility: HOSPITAL | Age: 57
Discharge: HOME | End: 2017-12-02
Attending: FAMILY MEDICINE
Payer: COMMERCIAL

## 2017-12-02 DIAGNOSIS — Z12.31 SCREENING MAMMOGRAM, ENCOUNTER FOR: ICD-10-CM

## 2017-12-02 PROCEDURE — 77067 SCR MAMMO BI INCL CAD: CPT | Mod: 26,,, | Performed by: RADIOLOGY

## 2017-12-02 PROCEDURE — 77063 BREAST TOMOSYNTHESIS BI: CPT | Mod: 26,,, | Performed by: RADIOLOGY

## 2017-12-02 PROCEDURE — 77067 SCR MAMMO BI INCL CAD: CPT | Mod: TC,PO

## 2017-12-15 RX ORDER — SODIUM, POTASSIUM,MAG SULFATES 17.5-3.13G
SOLUTION, RECONSTITUTED, ORAL ORAL
Qty: 354 ML | Refills: 0 | Status: SHIPPED | OUTPATIENT
Start: 2017-12-15 | End: 2021-12-03

## 2018-01-08 ENCOUNTER — OFFICE VISIT (OUTPATIENT)
Dept: INTERNAL MEDICINE | Facility: CLINIC | Age: 58
End: 2018-01-08
Payer: COMMERCIAL

## 2018-01-08 VITALS
BODY MASS INDEX: 36.29 KG/M2 | DIASTOLIC BLOOD PRESSURE: 84 MMHG | HEART RATE: 88 BPM | RESPIRATION RATE: 20 BRPM | HEIGHT: 65 IN | SYSTOLIC BLOOD PRESSURE: 142 MMHG | WEIGHT: 217.81 LBS | OXYGEN SATURATION: 98 % | TEMPERATURE: 98 F

## 2018-01-08 DIAGNOSIS — F32.A ANXIETY AND DEPRESSION: ICD-10-CM

## 2018-01-08 DIAGNOSIS — H10.32 ACUTE CONJUNCTIVITIS OF LEFT EYE, UNSPECIFIED ACUTE CONJUNCTIVITIS TYPE: Primary | ICD-10-CM

## 2018-01-08 DIAGNOSIS — F41.9 ANXIETY AND DEPRESSION: ICD-10-CM

## 2018-01-08 PROCEDURE — 99999 PR PBB SHADOW E&M-EST. PATIENT-LVL III: CPT | Mod: PBBFAC,,, | Performed by: FAMILY MEDICINE

## 2018-01-08 PROCEDURE — 99213 OFFICE O/P EST LOW 20 MIN: CPT | Mod: S$GLB,,, | Performed by: FAMILY MEDICINE

## 2018-01-08 RX ORDER — OFLOXACIN 3 MG/ML
1 SOLUTION/ DROPS OPHTHALMIC 4 TIMES DAILY
Qty: 5 ML | Refills: 0 | Status: SHIPPED | OUTPATIENT
Start: 2018-01-08 | End: 2018-05-02

## 2018-01-08 RX ORDER — BUSPIRONE HYDROCHLORIDE 5 MG/1
5 TABLET ORAL 2 TIMES DAILY PRN
Qty: 60 TABLET | Refills: 1 | Status: SHIPPED | OUTPATIENT
Start: 2018-01-08 | End: 2019-04-12 | Stop reason: SDUPTHER

## 2018-01-08 RX ORDER — BUSPIRONE HYDROCHLORIDE 5 MG/1
5 TABLET ORAL 2 TIMES DAILY PRN
Qty: 60 TABLET | Refills: 1 | Status: SHIPPED | OUTPATIENT
Start: 2018-01-08 | End: 2018-01-08 | Stop reason: SDUPTHER

## 2018-01-08 NOTE — PROGRESS NOTES
Subjective:       Patient ID: Karla Arzola is a 57 y.o. female.    Chief Complaint: Conjunctivitis (To L eye. C/O redness, swelling and drainage starting yesterday. Friend was dx a few days ago. )    HPI Ms. Arzola presents today for pink eye and anxiety.   This experience with her eye is a different type of pain than when she had iritis and keratoconjunctivitis.   Exposed on Friday woke up this morning with right eye red, itching and discharge.     Feels stressed/overwhelmed   Has a lot going on right now.   Homeless since June and was staying with a friend then they both had to find a place to live she is staying in a hotel.   Car broke down  Had a house to move into today but they pushed it back to Friday.     Review of Systems   Constitutional: Negative for activity change, appetite change, fatigue and fever.   HENT: Negative for congestion.    Eyes: Positive for pain, discharge, redness and itching. Negative for photophobia.   Skin: Negative for color change and rash.   Psychiatric/Behavioral: Positive for agitation, decreased concentration, dysphoric mood and sleep disturbance. Negative for self-injury and suicidal ideas. The patient is nervous/anxious.         Objective:        Physical Exam   Constitutional: She appears well-developed and well-nourished.   HENT:   Head: Normocephalic and atraumatic.   Eyes:       Vitals reviewed.        Assessment/Plan:   Acute conjunctivitis of left eye, unspecified acute conjunctivitis type  -     ofloxacin (OCUFLOX) 0.3 % ophthalmic solution; Place 1 drop into the left eye 4 (four) times daily.  Dispense: 5 mL; Refill: 0  Patient to follow up via phone if not clearing in 24 hours will consider steroid drop     Anxiety and depression  -     busPIRone (BUSPAR) 5 MG Tab; Take 1 tablet (5 mg total) by mouth 2 (two) times daily as needed.  Dispense: 60 tablet; Refill: 1    Taking as needed may increase to 10 mg if needed please let provider know if make this  increase    Return if symptoms worsen or fail to improve.    Robina Momin MD  [facility]

## 2018-02-08 ENCOUNTER — OFFICE VISIT (OUTPATIENT)
Dept: OPHTHALMOLOGY | Facility: CLINIC | Age: 58
End: 2018-02-08
Payer: COMMERCIAL

## 2018-02-08 DIAGNOSIS — H52.4 PRESBYOPIA: ICD-10-CM

## 2018-02-08 DIAGNOSIS — H52.13 MYOPIA, BILATERAL: Primary | ICD-10-CM

## 2018-02-08 PROCEDURE — 92015 DETERMINE REFRACTIVE STATE: CPT | Mod: S$GLB,,, | Performed by: OPTOMETRIST

## 2018-02-08 PROCEDURE — 92014 COMPRE OPH EXAM EST PT 1/>: CPT | Mod: S$GLB,,, | Performed by: OPTOMETRIST

## 2018-02-08 PROCEDURE — 92310 CONTACT LENS FITTING OU: CPT | Mod: ,,, | Performed by: OPTOMETRIST

## 2018-02-08 NOTE — PROGRESS NOTES
HPI     Eye Exam    Additional comments: Yearly           Comments   Last seen by DNL on 9/26/17 for iritis OD. Patient here today for yearly   eye exam.  HPI    Any vision changes since last exam: No  Eye pain: No  Other ocular symptoms: No    Do you wear currently wear glasses or contacts? Glasses (broken) and CTL    Interested in contacts today? Yes    Do you plan on getting new glasses today? Yes         Last edited by Madeline Donahue, PCT on 2/8/2018  1:40 PM. (History)            Assessment /Plan     For exam results, see Encounter Report.    Myopia, bilateral    Presbyopia      Eyeglass Final Rx     Eyeglass Final Rx       Sphere Add    Right -5.25 +2.25    Left -5.00 +2.25    Type:  PAL    Expiration Date:  2/9/2019              Contact Lens Prescription (2/8/2018)        Brand Base Curve Diameter Sphere    Right Air Optix Colors 8.60 14.0 -5.00    Left Air Optix Colors 8.60 14.0 -2.75    Expiration Date:  2/9/2019    Replacement:  Monthly    Solutions:  OptiFree PureMoist    Wearing Schedule:  Daily wear        Dispensed trial contact lenses (clear) today. Patient is to wear lenses for 1 week. Ok to order supply if no problems. RTC PRN if any problems arise.    RTC 1 yr for undilated eye exam or PRN if any problems.   Discussed above and answered questions.

## 2018-04-19 ENCOUNTER — PATIENT OUTREACH (OUTPATIENT)
Dept: ADMINISTRATIVE | Facility: HOSPITAL | Age: 58
End: 2018-04-19

## 2018-05-02 ENCOUNTER — OFFICE VISIT (OUTPATIENT)
Dept: INTERNAL MEDICINE | Facility: CLINIC | Age: 58
End: 2018-05-02
Payer: COMMERCIAL

## 2018-05-02 VITALS
DIASTOLIC BLOOD PRESSURE: 78 MMHG | OXYGEN SATURATION: 98 % | BODY MASS INDEX: 35.96 KG/M2 | HEART RATE: 70 BPM | SYSTOLIC BLOOD PRESSURE: 114 MMHG | TEMPERATURE: 97 F | WEIGHT: 215.81 LBS | HEIGHT: 65 IN

## 2018-05-02 DIAGNOSIS — M25.562 ACUTE PAIN OF LEFT KNEE: ICD-10-CM

## 2018-05-02 DIAGNOSIS — M54.50 ACUTE RIGHT-SIDED LOW BACK PAIN WITHOUT SCIATICA: Primary | ICD-10-CM

## 2018-05-02 PROCEDURE — 99999 PR PBB SHADOW E&M-EST. PATIENT-LVL III: CPT | Mod: PBBFAC,,, | Performed by: FAMILY MEDICINE

## 2018-05-02 PROCEDURE — 96372 THER/PROPH/DIAG INJ SC/IM: CPT | Mod: S$GLB,,, | Performed by: FAMILY MEDICINE

## 2018-05-02 PROCEDURE — 3078F DIAST BP <80 MM HG: CPT | Mod: CPTII,S$GLB,, | Performed by: FAMILY MEDICINE

## 2018-05-02 PROCEDURE — 3074F SYST BP LT 130 MM HG: CPT | Mod: CPTII,S$GLB,, | Performed by: FAMILY MEDICINE

## 2018-05-02 PROCEDURE — 99213 OFFICE O/P EST LOW 20 MIN: CPT | Mod: 25,S$GLB,, | Performed by: FAMILY MEDICINE

## 2018-05-02 RX ORDER — PNV NO.95/FERROUS FUM/FOLIC AC 28MG-0.8MG
100 TABLET ORAL DAILY
COMMUNITY
End: 2023-03-02

## 2018-05-02 RX ORDER — NAPROXEN 500 MG/1
500 TABLET ORAL
COMMUNITY
Start: 2018-04-29 | End: 2018-05-02

## 2018-05-02 RX ORDER — KETOROLAC TROMETHAMINE 30 MG/ML
60 INJECTION, SOLUTION INTRAMUSCULAR; INTRAVENOUS
Status: COMPLETED | OUTPATIENT
Start: 2018-05-02 | End: 2018-05-02

## 2018-05-02 RX ORDER — CYCLOBENZAPRINE HCL 5 MG
5 TABLET ORAL NIGHTLY PRN
Qty: 20 TABLET | Refills: 0 | Status: SHIPPED | OUTPATIENT
Start: 2018-05-02 | End: 2018-06-10

## 2018-05-02 RX ORDER — BIOTIN 1 MG
1000 TABLET ORAL 3 TIMES DAILY
COMMUNITY
End: 2019-04-12

## 2018-05-02 RX ADMIN — KETOROLAC TROMETHAMINE 60 MG: 30 INJECTION, SOLUTION INTRAMUSCULAR; INTRAVENOUS at 09:05

## 2018-05-02 NOTE — LETTER
May 2, 2018      [facility]  [id number] [place] [id number]  Phone: [phone]  Fax: [phone]       Patient: Karla Arzola   YOB: 1960  Date of Visit: 05/02/2018      To Whom It May Concern:      DANIELLE Arzola  was at [facility] on 05/02/2018. She may return to work on 05/02/2018 with restrictions. 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 If you have any questions or concerns, or if I can be of further assistance, please do not hesitate to contact me.        Sincerely,      Robina Momin MD

## 2018-05-02 NOTE — PROGRESS NOTES
Subjective:       Patient ID: Karla Arzola is a 58 y.o. female.    Chief Complaint: Knee Pain (fall//urgent care follow up) and Back Pain    HPI Ms. Arzola presents today for urgent care follow up. She is having knee pain and back pain after a fall. She fell on this past Sunday. Off Monday and Tuesday.   She would like to go back to work today.     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 No LOC     HPI Comments: L knee pain and lower back pain after slip and fall. Denies saddle anesthesia, fecal/urinary incontinence, radicular pain, neck pain, head trauma, or LOC. Did not take am Norvasc, normally takes it after lunch.  Apply knee immobilizer to L knee; please dispense crutches   ketorolac (TORADOL) injection 60 mg (60 mg Intramuscular Given 4/29/18 0944)  NAPROXEN (NAPROSYN) 500 MG TABLET Take 1 tablet by mouth 2 (two) times daily with meals for 20 doses.     No acute findings on Xray of knee or back      Today she feels it is a little better.   She has been taking 800 mg of ibuprofen and this has been helping more with pain than the naproxen.    2/10 pain with the medication.   Back mainly aches at night on the right lower side and her bottom on both sides.   She has taken the immobilizer off to shower. The pain has not been worsened with bending to get in and out of the tub.     Swelling has gone down mostly with the knee. She would like to return to work.     Review of Systems   Constitutional: Negative.    Musculoskeletal: Positive for back pain, gait problem and joint swelling.        Objective:        Physical Exam   Constitutional: She is oriented to person, place, and time. She appears well-developed and well-nourished.   HENT:   Head: Normocephalic and atraumatic.   Musculoskeletal:        Back:    L Knee imobilizer in place  Patient ambulating with crutch  Standing a  lot during appointment due to back   Neurological: She is alert and oriented to person, place, and time.   Vitals reviewed.        Assessment/Plan:     Acute right-sided low back pain without sciatica  -     cyclobenzaprine (FLEXERIL) 5 MG tablet; Take 1 tablet (5 mg total) by mouth nightly as needed for Muscle spasms (back pain).  Dispense: 20 tablet; Refill: 0  -     ketorolac injection 60 mg; Inject 2 mLs (60 mg total) into the muscle one time.    Acute pain of left knee  -     ketorolac injection 60 mg; Inject 2 mLs (60 mg total) into the muscle one time.      Will send to orthopedics if not resolving in 2 weeks   Patient to wear brace or compression sleeve after taking immobilizer off.     Follow-up if symptoms worsen or fail to improve.    Robina Momin MD  [facility]   Family Medicine

## 2018-08-27 ENCOUNTER — OFFICE VISIT (OUTPATIENT)
Dept: INTERNAL MEDICINE | Facility: CLINIC | Age: 58
End: 2018-08-27
Payer: COMMERCIAL

## 2018-08-27 VITALS
WEIGHT: 215.38 LBS | BODY MASS INDEX: 35.88 KG/M2 | OXYGEN SATURATION: 99 % | TEMPERATURE: 97 F | HEIGHT: 65 IN | SYSTOLIC BLOOD PRESSURE: 142 MMHG | DIASTOLIC BLOOD PRESSURE: 82 MMHG | HEART RATE: 80 BPM

## 2018-08-27 DIAGNOSIS — R05.9 COUGH: ICD-10-CM

## 2018-08-27 DIAGNOSIS — R60.9 MUCOSAL EDEMA: ICD-10-CM

## 2018-08-27 DIAGNOSIS — J01.40 ACUTE NON-RECURRENT PANSINUSITIS: Primary | ICD-10-CM

## 2018-08-27 PROCEDURE — 3077F SYST BP >= 140 MM HG: CPT | Mod: CPTII,S$GLB,, | Performed by: FAMILY MEDICINE

## 2018-08-27 PROCEDURE — 99999 PR PBB SHADOW E&M-EST. PATIENT-LVL III: CPT | Mod: PBBFAC,,, | Performed by: FAMILY MEDICINE

## 2018-08-27 PROCEDURE — 3008F BODY MASS INDEX DOCD: CPT | Mod: CPTII,S$GLB,, | Performed by: FAMILY MEDICINE

## 2018-08-27 PROCEDURE — 3079F DIAST BP 80-89 MM HG: CPT | Mod: CPTII,S$GLB,, | Performed by: FAMILY MEDICINE

## 2018-08-27 PROCEDURE — 99214 OFFICE O/P EST MOD 30 MIN: CPT | Mod: 25,S$GLB,, | Performed by: FAMILY MEDICINE

## 2018-08-27 PROCEDURE — 96372 THER/PROPH/DIAG INJ SC/IM: CPT | Mod: S$GLB,,, | Performed by: FAMILY MEDICINE

## 2018-08-27 RX ORDER — METHYLPREDNISOLONE ACETATE 40 MG/ML
40 INJECTION, SUSPENSION INTRA-ARTICULAR; INTRALESIONAL; INTRAMUSCULAR; SOFT TISSUE
Status: COMPLETED | OUTPATIENT
Start: 2018-08-27 | End: 2018-08-27

## 2018-08-27 RX ORDER — HYDROCODONE POLISTIREX AND CHLORPHENIRAMINE POLISTIREX 10; 8 MG/5ML; MG/5ML
5 SUSPENSION, EXTENDED RELEASE ORAL NIGHTLY PRN
Qty: 50 ML | Refills: 0 | Status: SHIPPED | OUTPATIENT
Start: 2018-08-27 | End: 2020-03-23 | Stop reason: ALTCHOICE

## 2018-08-27 RX ORDER — AMOXICILLIN 500 MG/1
500 CAPSULE ORAL EVERY 12 HOURS
Qty: 14 CAPSULE | Refills: 0 | Status: SHIPPED | OUTPATIENT
Start: 2018-08-27 | End: 2018-09-03

## 2018-08-27 RX ADMIN — METHYLPREDNISOLONE ACETATE 40 MG: 40 INJECTION, SUSPENSION INTRA-ARTICULAR; INTRALESIONAL; INTRAMUSCULAR; SOFT TISSUE at 03:08

## 2018-08-27 NOTE — PROGRESS NOTES
Subjective:       Patient ID: Karla Arzola is a 58 y.o. female.    Chief Complaint: URI (back pain//4/10); Cough; and Sore Throat (6/10  head ache 4/10)    HPI Ms. Arzola presents today for URI symptoms with back pain, cough and sore throat. Symptoms started last Friday.  Sore throat has improved over the weekend.   Coughing  all night, back hurts the back pain has been 4/10.   Headache 6/10    Tylenol severe cold and sinus  She has Singulair but didn't take it this past week.     Review of Systems   Constitutional: Positive for fatigue.   HENT: Positive for congestion and sore throat.    Respiratory: Positive for cough.    Cardiovascular: Negative.    Gastrointestinal: Negative.    Musculoskeletal: Positive for back pain and myalgias.   Neurological: Positive for headaches.           Past Medical History:   Diagnosis Date    Allergy     Hypertension     Obesity     Vitamin B 12 deficiency      Objective:        Physical Exam   Constitutional: She is oriented to person, place, and time. She appears well-developed.   Ill appearing   HENT:   Head: Normocephalic and atraumatic.   Right Ear: External ear normal.   Left Ear: External ear normal.   Nose: Mucosal edema present. Right sinus exhibits maxillary sinus tenderness and frontal sinus tenderness. Left sinus exhibits maxillary sinus tenderness and frontal sinus tenderness.   Mouth/Throat: Posterior oropharyngeal erythema present.   Eyes: EOM are normal. Pupils are equal, round, and reactive to light.   Cardiovascular: Normal heart sounds.   Pulmonary/Chest: Breath sounds normal.   Neurological: She is alert and oriented to person, place, and time.   Vitals reviewed.      Assessment/Plan:     Acute non-recurrent pansinusitis  -     methylPREDNISolone acetate injection 40 mg; Inject 1 mL (40 mg total) into the muscle one time.  -     amoxicillin (AMOXIL) 500 MG capsule; Take 1 capsule (500 mg total) by mouth every 12 (twelve) hours. for 7 days  Dispense: 14  capsule; Refill: 0    Cough  -     hydrocodone-chlorpheniramine (TUSSIONEX) 10-8 mg/5 mL suspension; Take 5 mLs by mouth nightly as needed for Cough.  Dispense: 50 mL; Refill: 0    Mucosal edema  -     methylPREDNISolone acetate injection 40 mg; Inject 1 mL (40 mg total) into the muscle one time.          Follow-up if symptoms worsen or fail to improve.    Robina Momin MD  [facility]

## 2018-08-28 ENCOUNTER — DOCUMENTATION ONLY (OUTPATIENT)
Dept: ENDOSCOPY | Facility: HOSPITAL | Age: 58
End: 2018-08-28

## 2018-09-25 ENCOUNTER — PATIENT OUTREACH (OUTPATIENT)
Dept: ADMINISTRATIVE | Facility: HOSPITAL | Age: 58
End: 2018-09-25

## 2018-09-25 NOTE — PROGRESS NOTES
Called patient with reminder to schedule pap smear. Patient will schedule pap smear at [facility]. I will send MARIA G to the patient to acquire the results. Patient in agreement and vocalize understanding.

## 2018-10-17 DIAGNOSIS — I10 ESSENTIAL HYPERTENSION: ICD-10-CM

## 2018-10-17 DIAGNOSIS — A60.00 RECURRENT GENITAL HSV (HERPES SIMPLEX VIRUS) INFECTION: ICD-10-CM

## 2018-10-17 RX ORDER — AMLODIPINE BESYLATE 10 MG/1
10 TABLET ORAL DAILY
Qty: 30 TABLET | Refills: 5 | Status: SHIPPED | OUTPATIENT
Start: 2018-10-17 | End: 2019-10-28 | Stop reason: SDUPTHER

## 2018-10-17 RX ORDER — ACYCLOVIR 200 MG/1
200 CAPSULE ORAL DAILY
Qty: 30 CAPSULE | Refills: 5 | OUTPATIENT
Start: 2018-10-17

## 2018-11-15 DIAGNOSIS — A60.00 RECURRENT GENITAL HSV (HERPES SIMPLEX VIRUS) INFECTION: ICD-10-CM

## 2018-11-16 RX ORDER — ACYCLOVIR 200 MG/1
200 CAPSULE ORAL DAILY
Qty: 30 CAPSULE | Refills: 5 | Status: SHIPPED | OUTPATIENT
Start: 2018-11-16 | End: 2019-10-28 | Stop reason: SDUPTHER

## 2019-01-29 ENCOUNTER — OFFICE VISIT (OUTPATIENT)
Dept: INTERNAL MEDICINE | Facility: CLINIC | Age: 59
End: 2019-01-29
Payer: COMMERCIAL

## 2019-01-29 VITALS
HEART RATE: 92 BPM | WEIGHT: 216.5 LBS | TEMPERATURE: 97 F | SYSTOLIC BLOOD PRESSURE: 134 MMHG | HEIGHT: 65 IN | OXYGEN SATURATION: 90 % | BODY MASS INDEX: 36.07 KG/M2 | DIASTOLIC BLOOD PRESSURE: 86 MMHG

## 2019-01-29 DIAGNOSIS — J01.80 OTHER ACUTE SINUSITIS, RECURRENCE NOT SPECIFIED: Primary | ICD-10-CM

## 2019-01-29 DIAGNOSIS — H10.32 ACUTE BACTERIAL CONJUNCTIVITIS OF LEFT EYE: ICD-10-CM

## 2019-01-29 PROCEDURE — 3008F PR BODY MASS INDEX (BMI) DOCUMENTED: ICD-10-PCS | Mod: CPTII,S$GLB,, | Performed by: PHYSICIAN ASSISTANT

## 2019-01-29 PROCEDURE — 96372 PR INJECTION,THERAP/PROPH/DIAG2ST, IM OR SUBCUT: ICD-10-PCS | Mod: S$GLB,,, | Performed by: PHYSICIAN ASSISTANT

## 2019-01-29 PROCEDURE — 99999 PR PBB SHADOW E&M-EST. PATIENT-LVL IV: ICD-10-PCS | Mod: PBBFAC,,, | Performed by: PHYSICIAN ASSISTANT

## 2019-01-29 PROCEDURE — 3079F PR MOST RECENT DIASTOLIC BLOOD PRESSURE 80-89 MM HG: ICD-10-PCS | Mod: CPTII,S$GLB,, | Performed by: PHYSICIAN ASSISTANT

## 2019-01-29 PROCEDURE — 3008F BODY MASS INDEX DOCD: CPT | Mod: CPTII,S$GLB,, | Performed by: PHYSICIAN ASSISTANT

## 2019-01-29 PROCEDURE — 3079F DIAST BP 80-89 MM HG: CPT | Mod: CPTII,S$GLB,, | Performed by: PHYSICIAN ASSISTANT

## 2019-01-29 PROCEDURE — 99214 PR OFFICE/OUTPT VISIT, EST, LEVL IV, 30-39 MIN: ICD-10-PCS | Mod: 25,S$GLB,, | Performed by: PHYSICIAN ASSISTANT

## 2019-01-29 PROCEDURE — 3075F PR MOST RECENT SYSTOLIC BLOOD PRESS GE 130-139MM HG: ICD-10-PCS | Mod: CPTII,S$GLB,, | Performed by: PHYSICIAN ASSISTANT

## 2019-01-29 PROCEDURE — 99999 PR PBB SHADOW E&M-EST. PATIENT-LVL IV: CPT | Mod: PBBFAC,,, | Performed by: PHYSICIAN ASSISTANT

## 2019-01-29 PROCEDURE — 99214 OFFICE O/P EST MOD 30 MIN: CPT | Mod: 25,S$GLB,, | Performed by: PHYSICIAN ASSISTANT

## 2019-01-29 PROCEDURE — 96372 THER/PROPH/DIAG INJ SC/IM: CPT | Mod: S$GLB,,, | Performed by: PHYSICIAN ASSISTANT

## 2019-01-29 PROCEDURE — 3075F SYST BP GE 130 - 139MM HG: CPT | Mod: CPTII,S$GLB,, | Performed by: PHYSICIAN ASSISTANT

## 2019-01-29 RX ORDER — METHYLPREDNISOLONE ACETATE 80 MG/ML
80 INJECTION, SUSPENSION INTRA-ARTICULAR; INTRALESIONAL; INTRAMUSCULAR; SOFT TISSUE
Status: COMPLETED | OUTPATIENT
Start: 2019-01-29 | End: 2019-01-29

## 2019-01-29 RX ORDER — AMOXICILLIN AND CLAVULANATE POTASSIUM 875; 125 MG/1; MG/1
1 TABLET, FILM COATED ORAL 2 TIMES DAILY
Qty: 20 TABLET | Refills: 0 | Status: SHIPPED | OUTPATIENT
Start: 2019-01-29 | End: 2019-02-08

## 2019-01-29 RX ORDER — PROMETHAZINE HYDROCHLORIDE AND DEXTROMETHORPHAN HYDROBROMIDE 6.25; 15 MG/5ML; MG/5ML
5 SYRUP ORAL 3 TIMES DAILY PRN
Qty: 240 ML | Refills: 0 | Status: SHIPPED | OUTPATIENT
Start: 2019-01-29 | End: 2019-02-14

## 2019-01-29 RX ORDER — SULFACETAMIDE SODIUM 100 MG/ML
1 SOLUTION/ DROPS OPHTHALMIC
Qty: 15 ML | Refills: 0 | Status: SHIPPED | OUTPATIENT
Start: 2019-01-29 | End: 2019-02-18

## 2019-01-29 RX ADMIN — METHYLPREDNISOLONE ACETATE 80 MG: 80 INJECTION, SUSPENSION INTRA-ARTICULAR; INTRALESIONAL; INTRAMUSCULAR; SOFT TISSUE at 01:01

## 2019-01-29 NOTE — PROGRESS NOTES
Subjective:       Patient ID: Karla Arzola is a 58 y.o. female.    Chief Complaint: URI    Sinus Problem   This is a new problem. The current episode started 1 to 4 weeks ago. The problem has been gradually worsening since onset. There has been no fever. The pain is moderate. Associated symptoms include chills and congestion. Pertinent negatives include no shortness of breath. Past treatments include nothing.     Past Medical History:   Diagnosis Date    Allergy     Hypertension     Obesity     Vitamin B 12 deficiency        Review of Systems   Constitutional: Positive for activity change, chills and fatigue.   HENT: Positive for congestion.    Respiratory: Negative for chest tightness and shortness of breath.    Cardiovascular: Negative for chest pain.       Objective:      Physical Exam   Constitutional: She appears well-developed and well-nourished. No distress.   HENT:   Head: Normocephalic and atraumatic.   Right Ear: Tympanic membrane and ear canal normal.   Left Ear: Tympanic membrane and ear canal normal.   Nose: Mucosal edema and rhinorrhea present. Right sinus exhibits maxillary sinus tenderness. Left sinus exhibits maxillary sinus tenderness.   Mouth/Throat: Uvula is midline and mucous membranes are normal. Posterior oropharyngeal edema and posterior oropharyngeal erythema present. No tonsillar exudate.   Eyes: Left conjunctiva is injected.   Neck: Neck supple.   Cardiovascular: Normal rate and regular rhythm. Exam reveals no gallop and no friction rub.   No murmur heard.  Pulmonary/Chest: Effort normal and breath sounds normal. No stridor. No respiratory distress. She has no wheezes. She has no rales. She exhibits no tenderness.   Abdominal: Soft. She exhibits no distension.   Lymphadenopathy:     She has no cervical adenopathy.   Skin: She is not diaphoretic.   Nursing note and vitals reviewed.      Assessment:       1. Other acute sinusitis, recurrence not specified    2. Acute bacterial  conjunctivitis of left eye        Plan:       Other acute sinusitis, recurrence not specified  -     methylPREDNISolone acetate injection 80 mg  -     amoxicillin-clavulanate 875-125mg (AUGMENTIN) 875-125 mg per tablet; Take 1 tablet by mouth 2 (two) times daily. for 10 days  Dispense: 20 tablet; Refill: 0  -     promethazine-dextromethorphan (PROMETHAZINE-DM) 6.25-15 mg/5 mL Syrp; Take 5 mLs by mouth 3 (three) times daily as needed.  Dispense: 240 mL; Refill: 0    Acute bacterial conjunctivitis of left eye  -     sulfacetamide sodium 10% (BLEPH-10) 10 % ophthalmic solution; Place 1 drop into both eyes 6 (six) times daily. for 10 days  Dispense: 15 mL; Refill: 0

## 2019-01-30 ENCOUNTER — TELEPHONE (OUTPATIENT)
Dept: INTERNAL MEDICINE | Facility: CLINIC | Age: 59
End: 2019-01-30

## 2019-01-30 NOTE — TELEPHONE ENCOUNTER
----- Message from Lolly Ramirez sent at 1/30/2019  1:06 PM CST -----  Contact: self   Requesting a call back regarding extended excuse until 1/30/19.pt will stop by tomorrow to pickup.Please call back at [phone].      Thanks,  Lolly Ramirez

## 2019-02-01 ENCOUNTER — TELEPHONE (OUTPATIENT)
Dept: INTERNAL MEDICINE | Facility: CLINIC | Age: 59
End: 2019-02-01

## 2019-02-01 RX ORDER — ALBUTEROL SULFATE 90 UG/1
2 AEROSOL, METERED RESPIRATORY (INHALATION) EVERY 6 HOURS PRN
Qty: 18 G | Refills: 0 | Status: SHIPPED | OUTPATIENT
Start: 2019-02-01 | End: 2020-05-18 | Stop reason: SDUPTHER

## 2019-02-01 NOTE — TELEPHONE ENCOUNTER
----- Message from Linwood Adkins sent at 2/1/2019  7:31 AM CST -----  Contact: [phone]   Pt would like to consult with the nurse, personal.  Please call back at [phone].  Thx-AH

## 2019-02-01 NOTE — TELEPHONE ENCOUNTER
----- Message from Yue Bautista MA sent at 2/1/2019  1:51 PM CST -----  Can you send in an inhaler to help with her breathing. If it doesn't help she will go to the ER this weekend for follow up.    See orders

## 2019-02-07 ENCOUNTER — TELEPHONE (OUTPATIENT)
Dept: INTERNAL MEDICINE | Facility: CLINIC | Age: 59
End: 2019-02-07

## 2019-02-07 RX ORDER — FLUCONAZOLE 150 MG/1
150 TABLET ORAL ONCE
Qty: 1 TABLET | Refills: 1 | Status: SHIPPED | OUTPATIENT
Start: 2019-02-07 | End: 2019-03-09

## 2019-02-07 NOTE — TELEPHONE ENCOUNTER
----- Message from Lesia Chiu sent at 2/7/2019  8:51 AM CST -----  Contact: pt  1. What is the name of the medication you are requesting? diflucan  2. What is the dose?    3. How do you take the medication? Orally, topically, etc?   4. How often do you take this medication?   5. Do you need a 30 day or 90 day supply?    6. How many refills are you requesting?   7. What is your preferred pharmacy and location of the pharmacy?   [facility]  [place]        8. Who can we contact with further questions? [phone] (home)

## 2019-03-01 ENCOUNTER — OFFICE VISIT (OUTPATIENT)
Dept: OPHTHALMOLOGY | Facility: CLINIC | Age: 59
End: 2019-03-01
Payer: COMMERCIAL

## 2019-03-01 DIAGNOSIS — H52.13 MYOPIA, BILATERAL: Primary | ICD-10-CM

## 2019-03-01 DIAGNOSIS — H52.4 PRESBYOPIA: ICD-10-CM

## 2019-03-01 PROCEDURE — 92014 COMPRE OPH EXAM EST PT 1/>: CPT | Mod: S$GLB,,, | Performed by: OPTOMETRIST

## 2019-03-01 PROCEDURE — 99999 PR PBB SHADOW E&M-EST. PATIENT-LVL I: CPT | Mod: PBBFAC,,, | Performed by: OPTOMETRIST

## 2019-03-01 PROCEDURE — 92015 PR REFRACTION: ICD-10-PCS | Mod: S$GLB,,, | Performed by: OPTOMETRIST

## 2019-03-01 PROCEDURE — 92015 DETERMINE REFRACTIVE STATE: CPT | Mod: S$GLB,,, | Performed by: OPTOMETRIST

## 2019-03-01 PROCEDURE — 99999 PR PBB SHADOW E&M-EST. PATIENT-LVL I: ICD-10-PCS | Mod: PBBFAC,,, | Performed by: OPTOMETRIST

## 2019-03-01 PROCEDURE — 92014 PR EYE EXAM, EST PATIENT,COMPREHESV: ICD-10-PCS | Mod: S$GLB,,, | Performed by: OPTOMETRIST

## 2019-03-01 NOTE — PROGRESS NOTES
HPI     Pts last examw as 2/8/18 with DNL. 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   HPI    Any vision changes since last exam: no  Eye pain: no  Other ocular symptoms: no    Do you wear currently wear glasses or contacts? both    Interested in contacts today? yes    Do you plan on getting new glasses today? yes      Last edited by Farideh De La Paz MA on 3/1/2019  3:11 PM. (History)            Assessment /Plan     For exam results, see Encounter Report.    Myopia, bilateral    Presbyopia      Eyeglass Final Rx     Eyeglass Final Rx       Sphere Add    Right -5.00 +2.25    Left -5.00 +2.25    Expiration Date:  3/1/2020              Contact Lens Prescription (3/1/2019)        Brand Base Curve Diameter Sphere    Right Air Optix Colors 8.60 14.0 -4.75    Left Air Optix Colors 8.60 14.0 -2.75    Expiration Date:  3/1/2020    Replacement:  Monthly    Wearing Schedule:  Daily wear        Dispensed trial contact lenses today. Patient is to wear lenses for 1 week. Ok to order supply if no problems. RTC PRN if any problems arise.    RTC 1 yr for undilated eye exam or PRN if any problems.   Discussed above and answered questions.

## 2019-03-06 ENCOUNTER — TELEPHONE (OUTPATIENT)
Dept: INTERNAL MEDICINE | Facility: CLINIC | Age: 59
End: 2019-03-06

## 2019-03-06 NOTE — TELEPHONE ENCOUNTER
----- Message from Jaskaran Shabazz sent at 3/6/2019 11:55 AM CST -----  Contact: Pt  .Type:  Needs Medical Advice    Who Called: Pt  Symptoms (please be specific): Chills/ Coughing/ Shortness of Breath  How long has patient had these symptoms:  Several Days  Pharmacy name and phone #:    Would the patient rather a call back or a response via [facility]? Call back  Best Call Back Number: .[phone] (home)     Additional Information:  Pt would like to see if she can be squeezed in today

## 2019-03-06 NOTE — TELEPHONE ENCOUNTER
Returned call to patient. Patient requesting same day appointment with Dr Momin, patient informed there are no available appointments with Dr Momin for same day. Patient offered same day appointment with another provider or next day appointment with Dr Momin, patient declined and will keep scheduled appoint on 03/08/19. Patient advised to go to urgent care if symptoms get worse. Patient declined and stated she only wanted to see Dr Momin.

## 2019-03-13 ENCOUNTER — OFFICE VISIT (OUTPATIENT)
Dept: INTERNAL MEDICINE | Facility: CLINIC | Age: 59
End: 2019-03-13
Payer: COMMERCIAL

## 2019-03-13 ENCOUNTER — HOSPITAL ENCOUNTER (OUTPATIENT)
Dept: RADIOLOGY | Facility: HOSPITAL | Age: 59
Discharge: HOME | End: 2019-03-13
Attending: FAMILY MEDICINE
Payer: COMMERCIAL

## 2019-03-13 VITALS
HEART RATE: 86 BPM | TEMPERATURE: 98 F | OXYGEN SATURATION: 97 % | BODY MASS INDEX: 34.15 KG/M2 | SYSTOLIC BLOOD PRESSURE: 132 MMHG | WEIGHT: 212.5 LBS | DIASTOLIC BLOOD PRESSURE: 74 MMHG | HEIGHT: 66 IN

## 2019-03-13 DIAGNOSIS — R52 PAIN: Primary | ICD-10-CM

## 2019-03-13 DIAGNOSIS — R52 PAIN: ICD-10-CM

## 2019-03-13 DIAGNOSIS — M25.562 ACUTE PAIN OF LEFT KNEE: Primary | ICD-10-CM

## 2019-03-13 DIAGNOSIS — M79.672 LEFT FOOT PAIN: ICD-10-CM
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
## 2019-03-13 PROCEDURE — 73564 XR KNEE ORTHO LEFT WITH FLEXION: ICD-10-PCS | Mod: 26,LT,, | Performed by: RADIOLOGY

## 2019-03-13 PROCEDURE — 73562 XR KNEE ORTHO LEFT WITH FLEXION: ICD-10-PCS | Mod: 26,59,LT, | Performed by: RADIOLOGY

## 2019-03-13 PROCEDURE — 3008F BODY MASS INDEX DOCD: CPT | Mod: CPTII,S$GLB,, | Performed by: FAMILY MEDICINE

## 2019-03-13 PROCEDURE — 3078F PR MOST RECENT DIASTOLIC BLOOD PRESSURE < 80 MM HG: ICD-10-PCS | Mod: CPTII,S$GLB,, | Performed by: FAMILY MEDICINE

## 2019-03-13 PROCEDURE — 3075F SYST BP GE 130 - 139MM HG: CPT | Mod: CPTII,S$GLB,, | Performed by: FAMILY MEDICINE

## 2019-03-13 PROCEDURE — 99999 PR PBB SHADOW E&M-EST. PATIENT-LVL III: ICD-10-PCS | Mod: PBBFAC,,, | Performed by: FAMILY MEDICINE

## 2019-03-13 PROCEDURE — 3075F PR MOST RECENT SYSTOLIC BLOOD PRESS GE 130-139MM HG: ICD-10-PCS | Mod: CPTII,S$GLB,, | Performed by: FAMILY MEDICINE

## 2019-03-13 PROCEDURE — 3008F PR BODY MASS INDEX (BMI) DOCUMENTED: ICD-10-PCS | Mod: CPTII,S$GLB,, | Performed by: FAMILY MEDICINE

## 2019-03-13 PROCEDURE — 99214 OFFICE O/P EST MOD 30 MIN: CPT | Mod: S$GLB,,, | Performed by: FAMILY MEDICINE

## 2019-03-13 PROCEDURE — 73630 X-RAY EXAM OF FOOT: CPT | Mod: 26,LT,, | Performed by: RADIOLOGY

## 2019-03-13 PROCEDURE — 73630 XR FOOT COMPLETE 3 VIEW LEFT: ICD-10-PCS | Mod: 26,LT,, | Performed by: RADIOLOGY

## 2019-03-13 PROCEDURE — 73630 X-RAY EXAM OF FOOT: CPT | Mod: TC,LT

## 2019-03-13 PROCEDURE — 73562 X-RAY EXAM OF KNEE 3: CPT | Mod: TC,LT

## 2019-03-13 PROCEDURE — 99214 PR OFFICE/OUTPT VISIT, EST, LEVL IV, 30-39 MIN: ICD-10-PCS | Mod: S$GLB,,, | Performed by: FAMILY MEDICINE

## 2019-03-13 PROCEDURE — 73564 X-RAY EXAM KNEE 4 OR MORE: CPT | Mod: 26,LT,, | Performed by: RADIOLOGY

## 2019-03-13 PROCEDURE — 3078F DIAST BP <80 MM HG: CPT | Mod: CPTII,S$GLB,, | Performed by: FAMILY MEDICINE

## 2019-03-13 PROCEDURE — 99999 PR PBB SHADOW E&M-EST. PATIENT-LVL III: CPT | Mod: PBBFAC,,, | Performed by: FAMILY MEDICINE

## 2019-03-13 PROCEDURE — 73562 X-RAY EXAM OF KNEE 3: CPT | Mod: 26,59,LT, | Performed by: RADIOLOGY

## 2019-03-13 RX ORDER — MELOXICAM 7.5 MG/1
7.5 TABLET ORAL DAILY
Qty: 30 TABLET | Refills: 0 | Status: SHIPPED | OUTPATIENT
Start: 2019-03-13 | End: 2020-05-18 | Stop reason: SDUPTHER

## 2019-03-13 NOTE — PROGRESS NOTES
Subjective:     Patient ID: Karla Arzola is a 58 y.o. female.    Chief Complaint: Knee Pain (left); Foot Injury (left); and Fall    Patient is a 58-year-old female who presents clinic today after a fall yesterday complaining of left knee and left foot pain.    Fall:  -occurred yesterday  -tripped over toy wagon  -fell onto left knee and foot  -knee and foot were slightly swollen  -took Advil; not sure if it helped  -had surgery on right knee, but no prior surgery, injuries, or physical therapy        Past Medical History:   Diagnosis Date    Allergy     Hypertension     Obesity     Vitamin B 12 deficiency      Past Surgical History:   Procedure Laterality Date    right knee scope      TUBAL LIGATION       Family History   Problem Relation Age of Onset    Diabetes Mother     Hypertension Mother     Stroke Mother     Hypertension Father     Hyperlipidemia Father     Kidney disease Father      Social History     Socioeconomic History    Marital status:      Spouse name: Not on file    Number of children: Not on file    Years of education: Not on file    Highest education level: Not on file   Social Needs    Financial resource strain: Not on file    Food insecurity - worry: Not on file    Food insecurity - inability: Not on file    Transportation needs - medical: Not on file    Transportation needs - non-medical: Not on file   Occupational History     Employer: [employer]   Tobacco Use    Smoking status: Never Smoker    Smokeless tobacco: Never Used   Substance and Sexual Activity    Alcohol use: Yes     Alcohol/week: 0.0 oz     Comment: socially     Drug use: No    Sexual activity: Yes   Other Topics Concern    Not on file   Social History Narrative    Not on file     Medication List with Changes/Refills   New Medications    MELOXICAM (MOBIC) 7.5 MG TABLET    Take 1 tablet (7.5 mg total) by mouth once daily.   Current Medications    ACYCLOVIR (ZOVIRAX) 200 MG  "CAPSULE    Take 1 capsule (200 mg total) by mouth once daily.    ALBUTEROL (VENTOLIN HFA) 90 MCG/ACTUATION INHALER    Inhale 2 puffs into the lungs every 6 (six) hours as needed for Wheezing. Rescue    AMLODIPINE (NORVASC) 10 MG TABLET    Take 1 tablet (10 mg total) by mouth once daily.    BIOTIN 1 MG TABLET    Take 1,000 mcg by mouth 3 (three) times daily.    BUSPIRONE (BUSPAR) 5 MG TAB    Take 1 tablet (5 mg total) by mouth 2 (two) times daily as needed.    CYANOCOBALAMIN (VITAMIN B-12) 100 MCG TABLET    Take 100 mcg by mouth once daily.    HYDROCODONE-CHLORPHENIRAMINE (TUSSIONEX) 10-8 MG/5 ML SUSPENSION    Take 5 mLs by mouth nightly as needed for Cough.    MULTIVITAMIN CAPSULE    Take 1 capsule by mouth once daily.    SODIUM,POTASSIUM,MAG SULFATES (SUPREP BOWEL PREP KIT) 17.5-3.13-1.6 GRAM SOLR    As directed     Review of patient's allergies indicates:  No Known Allergies  Review of Systems   Constitutional: Negative for chills and fever.   Cardiovascular: Negative for leg swelling.   Gastrointestinal: Negative for nausea and vomiting.   Musculoskeletal: Positive for falls, joint pain and myalgias. Negative for back pain.   Skin: Negative for rash.   Neurological: Negative for tingling, sensory change, focal weakness and weakness.        Objective:   Body mass index is 34.83 kg/m².  Vitals:    03/13/19 0916   BP: 132/74   Pulse: 86   Temp: 98.1 °F (36.7 °C)   TempSrc: Tympanic   SpO2: 97%   Weight: 96.4 kg (212 lb 8.4 oz)   Height: 5' 5.5" (1.664 m)   PainSc:   7   PainLoc: Knee           General    Nursing note and vitals reviewed.  Constitutional: She is oriented to person, place, and time. She appears well-developed and well-nourished. No distress.   Eyes: Conjunctivae are normal. No scleral icterus.   Pulmonary/Chest: Effort normal.   Neurological: She is alert and oriented to person, place, and time.   Psychiatric: She has a normal mood and affect. Her behavior is normal. Judgment and thought content " normal.     General Musculoskeletal Exam   Gait: normal     Left Ankle/Foot Exam     Inspection  Erythema: present ( great toe PIP joint)  Bruising: Foot - present ( great toe PIP joint)  Effusion: Ankle - absent Foot - absent  Atrophy: Ankle - absent Foot - absent    Tenderness   The patient is tender to palpation of the great toe interphalangeal joint.    Range of Motion   The patient has normal left ankle ROM.   Ankle Joint  Dorsiflexion: normal Left ankle dorsiflexion: Pain with dorsiflexion.   Plantar flexion: normal     Subtalar Joint   Inversion: normal   Eversion: normal   Donahue Test:  normal  First MTP Joint: normal    Muscle Strength   The patient has normal left ankle strength.    Tests   Anterior drawer: negative  Varus tilt: negative    Other   Sensation: normal      Left Knee Exam     Inspection   Erythema: absent  Swelling: present (Anterior)  Effusion: absent  Deformity: absent  Bruising: absent    Tenderness   The patient tender to palpation of the patella, medial joint line and lateral joint line.    Range of Motion   The patient has normal left knee ROM.    Tests   Meniscus   Paulino:  Medial - negative Lateral - negative  Stability Lachman: normal (-1 to 2mm) PCL-Posterior Drawer: normal (0 to 2mm)  MCL - Valgus: normal (0 to 2mm)  LCL - Varus: normal (0 to 2mm)  Patella   Patellar apprehension: negative  Passive Patellar Tilt: neutral  Patellar Tracking: normal  Patellar Glide (Quadrants): Lateral - 1 Medial - 1    Other   Sensation: normal    Muscle Strength   Left Lower Extremity   Quadriceps:  5/5     EXAMINATION:  XR KNEE ORTHO LEFT WITH FLEXION    CLINICAL HISTORY:  . Pain, unspecified    TECHNIQUE:  AP standing of both knees, PA flexion standing views of both knees, and Merchant views of both knees were performed. A lateral of the left knee was also performed.    COMPARISON:  07/28/2015    FINDINGS:  There is no radiographic evidence of acute osseous, articular, or soft tissue  abnormality.  Mild joint space narrowing in the right medial tibiofemoral compartment is again noted and unchanged from prior.Joint spaces on the left appear preserved.      Impression       As above.  No acute findings.      Electronically signed by: Noah Jackson MD  Date: 03/13/2019  Time: 10:26          EXAMINATION:  XR FOOT COMPLETE 3 VIEW LEFT    CLINICAL HISTORY:  .  Pain, unspecified    TECHNIQUE:  AP, lateral and oblique views of the left foot were performed.    COMPARISON:  09/18/2013    FINDINGS:  There is no radiographic evidence of acute osseous, articular, or soft tissue abnormality.  Joint spaces are well preserved.  No erosive changes demonstrated. There are prominent dorsal and plantar surface calcaneal enthesophytes present.      Impression       As above.  No acute findings.      Electronically signed by: Noah Jackson MD  Date: 03/13/2019  Time: 10:24           Karla was seen today for knee pain, foot injury and fall.    Diagnoses and all orders for this visit:    Acute pain of left knee  -     meloxicam (MOBIC) 7.5 MG tablet; Take 1 tablet (7.5 mg total) by mouth once daily.    Left foot pain  -     meloxicam (MOBIC) 7.5 MG tablet; Take 1 tablet (7.5 mg total) by mouth once daily.    Fall, initial encounter  -     meloxicam (MOBIC) 7.5 MG tablet; Take 1 tablet (7.5 mg total) by mouth once daily.    -no obvious fractures or dislocation on imaging.  Likely suspect the patient suffered a contusion of her knee and left great toe.  Recommend Mobic and over-the-counter Tylenol as needed.  Advised patient to continue her normal daily activities.  Likely suspect that it will take 2-3 weeks for symptoms resolved.  At the patient follow up as needed if symptoms persist or worsen.  Recommended heat, elevation, and Ace wrap for compression.  -left foot and left knee Xray images were independently viewed and read by me showing no acute fractures or dislocations.  Minimal osteophyte formation and joint  space loss in left knee.  -Formal read by radiologist is as described above  -Discussed findings with patient  -Treatment options and alternatives were discussed with the patient. Patient expressed understanding. Patient was given the opportunity to ask questions and be an active participant in their medical care. Patient had no further questions or concerns at this time.   -Patient is an overall moderate risk for health complications from their medical conditions.

## 2019-04-12 ENCOUNTER — OFFICE VISIT (OUTPATIENT)
Dept: INTERNAL MEDICINE | Facility: CLINIC | Age: 59
End: 2019-04-12
Payer: COMMERCIAL

## 2019-04-12 ENCOUNTER — HOSPITAL ENCOUNTER (OUTPATIENT)
Dept: RADIOLOGY | Facility: HOSPITAL | Age: 59
Discharge: HOME | End: 2019-04-12
Attending: FAMILY MEDICINE
Payer: COMMERCIAL

## 2019-04-12 VITALS
DIASTOLIC BLOOD PRESSURE: 82 MMHG | HEIGHT: 66 IN | BODY MASS INDEX: 35.04 KG/M2 | HEART RATE: 53 BPM | WEIGHT: 218.06 LBS | RESPIRATION RATE: 18 BRPM | SYSTOLIC BLOOD PRESSURE: 124 MMHG | OXYGEN SATURATION: 98 % | TEMPERATURE: 98 F

## 2019-04-12 DIAGNOSIS — R05.3 PERSISTENT COUGH FOR 3 WEEKS OR LONGER: ICD-10-CM

## 2019-04-12 DIAGNOSIS — F41.9 ANXIETY AND DEPRESSION: ICD-10-CM

## 2019-04-12 DIAGNOSIS — F32.A ANXIETY AND DEPRESSION: ICD-10-CM

## 2019-04-12 DIAGNOSIS — J30.2 SEASONAL ALLERGIES: Primary | ICD-10-CM

## 2019-04-12 PROCEDURE — 99999 PR PBB SHADOW E&M-EST. PATIENT-LVL III: CPT | Mod: PBBFAC,,, | Performed by: FAMILY MEDICINE

## 2019-04-12 PROCEDURE — 99213 OFFICE O/P EST LOW 20 MIN: CPT | Mod: S$GLB,,, | Performed by: FAMILY MEDICINE

## 2019-04-12 PROCEDURE — 3079F DIAST BP 80-89 MM HG: CPT | Mod: CPTII,S$GLB,, | Performed by: FAMILY MEDICINE

## 2019-04-12 PROCEDURE — 3079F PR MOST RECENT DIASTOLIC BLOOD PRESSURE 80-89 MM HG: ICD-10-PCS | Mod: CPTII,S$GLB,, | Performed by: FAMILY MEDICINE

## 2019-04-12 PROCEDURE — 99213 PR OFFICE/OUTPT VISIT, EST, LEVL III, 20-29 MIN: ICD-10-PCS | Mod: S$GLB,,, | Performed by: FAMILY MEDICINE

## 2019-04-12 PROCEDURE — 3008F PR BODY MASS INDEX (BMI) DOCUMENTED: ICD-10-PCS | Mod: CPTII,S$GLB,, | Performed by: FAMILY MEDICINE

## 2019-04-12 PROCEDURE — 3074F PR MOST RECENT SYSTOLIC BLOOD PRESSURE < 130 MM HG: ICD-10-PCS | Mod: CPTII,S$GLB,, | Performed by: FAMILY MEDICINE

## 2019-04-12 PROCEDURE — 3074F SYST BP LT 130 MM HG: CPT | Mod: CPTII,S$GLB,, | Performed by: FAMILY MEDICINE

## 2019-04-12 PROCEDURE — 3008F BODY MASS INDEX DOCD: CPT | Mod: CPTII,S$GLB,, | Performed by: FAMILY MEDICINE

## 2019-04-12 PROCEDURE — 99999 PR PBB SHADOW E&M-EST. PATIENT-LVL III: ICD-10-PCS | Mod: PBBFAC,,, | Performed by: FAMILY MEDICINE

## 2019-04-12 PROCEDURE — 71046 X-RAY EXAM CHEST 2 VIEWS: CPT | Mod: TC

## 2019-04-12 PROCEDURE — 71046 XR CHEST PA AND LATERAL: ICD-10-PCS | Mod: 26,,, | Performed by: RADIOLOGY

## 2019-04-12 PROCEDURE — 71046 X-RAY EXAM CHEST 2 VIEWS: CPT | Mod: 26,,, | Performed by: RADIOLOGY

## 2019-04-12 RX ORDER — FLUTICASONE PROPIONATE 50 MCG
1 SPRAY, SUSPENSION (ML) NASAL DAILY
Qty: 16 G | Refills: 0 | Status: SHIPPED | OUTPATIENT
Start: 2019-04-12 | End: 2023-03-02 | Stop reason: SDUPTHER

## 2019-04-12 RX ORDER — MONTELUKAST SODIUM 10 MG/1
10 TABLET ORAL NIGHTLY
Qty: 30 TABLET | Refills: 0 | Status: SHIPPED | OUTPATIENT
Start: 2019-04-12 | End: 2019-05-12

## 2019-04-12 RX ORDER — BUSPIRONE HYDROCHLORIDE 5 MG/1
5 TABLET ORAL 2 TIMES DAILY PRN
Qty: 60 TABLET | Refills: 1 | Status: SHIPPED | OUTPATIENT
Start: 2019-04-12 | End: 2020-03-23 | Stop reason: ALTCHOICE

## 2019-04-12 RX ORDER — PROMETHAZINE HYDROCHLORIDE AND DEXTROMETHORPHAN HYDROBROMIDE 6.25; 15 MG/5ML; MG/5ML
5 SYRUP ORAL 3 TIMES DAILY PRN
Qty: 118 ML | Refills: 0 | Status: SHIPPED | OUTPATIENT
Start: 2019-04-12 | End: 2019-04-22

## 2019-04-12 NOTE — PROGRESS NOTES
Subjective:       Patient ID: Karla Arzola is a 58 y.o. female.    Chief Complaint: Cough    HPI Ms. Arzola presents today for cough.     Persistent productive cough  Seen in January treated for sinusitis. She was treated but cough did not entirely go away.   Productive Yellow and white mucus.   Keeps her up at night.     Review of Systems   Constitutional: Negative for activity change, appetite change, fatigue and fever.   HENT: Negative for congestion, ear pain and sinus pressure.    Eyes: Negative for pain and visual disturbance.   Respiratory: Positive for cough. Negative for chest tightness and wheezing.    Cardiovascular: Negative for chest pain, palpitations and leg swelling.   Gastrointestinal: Negative for abdominal distention, abdominal pain, constipation, diarrhea, nausea and vomiting.   Endocrine: Negative for polydipsia and polyuria.   Genitourinary: Negative for difficulty urinating, dyspareunia, dysuria, flank pain and hematuria.   Musculoskeletal: Negative for arthralgias and back pain.   Skin: Negative for rash.   Neurological: Negative for dizziness, tremors, syncope, weakness, numbness and headaches.   Psychiatric/Behavioral: Negative for agitation and confusion.        Past Medical History:   Diagnosis Date    Allergy     Hypertension     Obesity     Vitamin B 12 deficiency      Past Surgical History:   Procedure Laterality Date    right knee scope      TUBAL LIGATION       Objective:        Physical Exam   Constitutional: She is oriented to person, place, and time. She appears well-developed and well-nourished.   HENT:   Head: Normocephalic and atraumatic.   Right Ear: External ear normal.   Left Ear: External ear normal.   Nose: Mucosal edema present.   Mouth/Throat: Posterior oropharyngeal erythema present.   Pulmonary/Chest:   cough   Neurological: She is alert and oriented to person, place, and time.   Skin: Skin is warm.   Vitals reviewed.        Assessment/Plan:     Seasonal  allergies  -     montelukast (SINGULAIR) 10 mg tablet; Take 1 tablet (10 mg total) by mouth every evening.  Dispense: 30 tablet; Refill: 0  -     fluticasone (FLONASE) 50 mcg/actuation nasal spray; 1 spray (50 mcg total) by Each Nare route once daily.  Dispense: 16 g; Refill: 0    Persistent cough for 3 weeks or longer  -     X-Ray Chest PA And Lateral; Future; Expected date: 04/12/2019    Anxiety and depression  -     busPIRone (BUSPAR) 5 MG Tab; Take 1 tablet (5 mg total) by mouth 2 (two) times daily as needed.  Dispense: 60 tablet; Refill: 1    Other orders  -     promethazine-dextromethorphan (PROMETHAZINE-DM) 6.25-15 mg/5 mL Syrp; Take 5 mLs by mouth 3 (three) times daily as needed (cough).  Dispense: 118 mL; Refill: 0      Follow up in about 1 month (around 5/10/2019), or if symptoms worsen or fail to improve.    Robina Momin MD  [facility]   Family Medicine

## 2019-04-16 ENCOUNTER — TELEPHONE (OUTPATIENT)
Dept: INTERNAL MEDICINE | Facility: CLINIC | Age: 59
End: 2019-04-16

## 2019-04-16 ENCOUNTER — OFFICE VISIT (OUTPATIENT)
Dept: INTERNAL MEDICINE | Facility: CLINIC | Age: 59
End: 2019-04-16
Payer: COMMERCIAL

## 2019-04-16 VITALS
HEIGHT: 66 IN | BODY MASS INDEX: 34.72 KG/M2 | HEART RATE: 74 BPM | OXYGEN SATURATION: 99 % | WEIGHT: 216.06 LBS | TEMPERATURE: 98 F | SYSTOLIC BLOOD PRESSURE: 130 MMHG | DIASTOLIC BLOOD PRESSURE: 78 MMHG

## 2019-04-16 DIAGNOSIS — H10.32 ACUTE BACTERIAL CONJUNCTIVITIS OF LEFT EYE: Primary | ICD-10-CM

## 2019-04-16 DIAGNOSIS — Z12.11 SCREENING FOR MALIGNANT NEOPLASM OF COLON: ICD-10-CM

## 2019-04-16 PROCEDURE — 99999 PR PBB SHADOW E&M-EST. PATIENT-LVL IV: CPT | Mod: PBBFAC,,, | Performed by: PHYSICIAN ASSISTANT

## 2019-04-16 PROCEDURE — 3078F DIAST BP <80 MM HG: CPT | Mod: CPTII,S$GLB,, | Performed by: PHYSICIAN ASSISTANT

## 2019-04-16 PROCEDURE — 3008F BODY MASS INDEX DOCD: CPT | Mod: CPTII,S$GLB,, | Performed by: PHYSICIAN ASSISTANT

## 2019-04-16 PROCEDURE — 3075F SYST BP GE 130 - 139MM HG: CPT | Mod: CPTII,S$GLB,, | Performed by: PHYSICIAN ASSISTANT

## 2019-04-16 PROCEDURE — 3075F PR MOST RECENT SYSTOLIC BLOOD PRESS GE 130-139MM HG: ICD-10-PCS | Mod: CPTII,S$GLB,, | Performed by: PHYSICIAN ASSISTANT

## 2019-04-16 PROCEDURE — 99214 OFFICE O/P EST MOD 30 MIN: CPT | Mod: S$GLB,,, | Performed by: PHYSICIAN ASSISTANT

## 2019-04-16 PROCEDURE — 3078F PR MOST RECENT DIASTOLIC BLOOD PRESSURE < 80 MM HG: ICD-10-PCS | Mod: CPTII,S$GLB,, | Performed by: PHYSICIAN ASSISTANT

## 2019-04-16 PROCEDURE — 99999 PR PBB SHADOW E&M-EST. PATIENT-LVL IV: ICD-10-PCS | Mod: PBBFAC,,, | Performed by: PHYSICIAN ASSISTANT

## 2019-04-16 PROCEDURE — 3008F PR BODY MASS INDEX (BMI) DOCUMENTED: ICD-10-PCS | Mod: CPTII,S$GLB,, | Performed by: PHYSICIAN ASSISTANT

## 2019-04-16 PROCEDURE — 99214 PR OFFICE/OUTPT VISIT, EST, LEVL IV, 30-39 MIN: ICD-10-PCS | Mod: S$GLB,,, | Performed by: PHYSICIAN ASSISTANT

## 2019-04-16 RX ORDER — TOBRAMYCIN 3 MG/ML
1 SOLUTION/ DROPS OPHTHALMIC EVERY 4 HOURS
Qty: 5 ML | Refills: 0 | Status: SHIPPED | OUTPATIENT
Start: 2019-04-16 | End: 2019-05-02

## 2019-04-16 NOTE — TELEPHONE ENCOUNTER
Returned call to patient. Patient states she thinks she has pink eye and would like something sent to the pharmacy. Patient advised she needs to be seen to ensure she is being treated correctly. Patient assisted with scheduling same day appointment with Robinson Drake.

## 2019-04-16 NOTE — TELEPHONE ENCOUNTER
----- Message from Tracie Trivedi sent at 4/16/2019  7:21 AM CDT -----  Contact: pt  Please call pt @ [phone], pt states she will discuss with nurse.

## 2019-04-16 NOTE — PROGRESS NOTES
Subjective:       Patient ID: Karla Arzola is a 58 y.o. female.    Chief Complaint: Conjunctivitis (PCP - Momin)    Conjunctivitis   This is a new problem. The current episode started yesterday. The problem occurs constantly. The problem has been unchanged. Pertinent negatives include no abdominal pain, chest pain, chills, congestion, fatigue or fever. Exacerbated by: contact lens wearing. She has tried nothing for the symptoms.     Past Medical History:   Diagnosis Date    Allergy     Hypertension     Obesity     Vitamin B 12 deficiency        Review of Systems   Constitutional: Negative for chills, fatigue and fever.   HENT: Negative for congestion.    Eyes: Positive for pain, discharge, redness and itching. Negative for photophobia and visual disturbance.   Respiratory: Negative for chest tightness and shortness of breath.    Cardiovascular: Negative for chest pain.   Gastrointestinal: Negative for abdominal pain.       Objective:      Physical Exam   Constitutional: She appears well-developed and well-nourished. No distress.   HENT:   Head: Normocephalic and atraumatic.   Eyes: EOM and lids are normal. Right eye exhibits no chemosis, no discharge, no exudate and no hordeolum. No foreign body present in the right eye. Left eye exhibits discharge and exudate. Left eye exhibits no chemosis and no hordeolum. No foreign body present in the left eye. Right conjunctiva is not injected. Right conjunctiva has no hemorrhage. Left conjunctiva is injected. Left conjunctiva has no hemorrhage. No scleral icterus.   Cardiovascular: Normal rate and regular rhythm.   Pulmonary/Chest: Effort normal and breath sounds normal.   Skin: She is not diaphoretic.   Nursing note and vitals reviewed.      Assessment:       1. Acute bacterial conjunctivitis of left eye    2. Screening for malignant neoplasm of colon        Plan:       Acute bacterial conjunctivitis of left eye    Screening for malignant neoplasm of colon  -      Fecal Immunochemical Test (iFOBT); Future; Expected date: 04/16/2019    Other orders  -     tobramycin sulfate 0.3% (TOBREX) 0.3 % ophthalmic solution; Place 1 drop into the left eye every 4 (four) hours. for 10 days  Dispense: 1 Bottle; Refill: 0

## 2019-05-07 ENCOUNTER — TELEPHONE (OUTPATIENT)
Dept: OBSTETRICS AND GYNECOLOGY | Facility: CLINIC | Age: 59
End: 2019-05-07

## 2019-05-07 DIAGNOSIS — Z12.31 BREAST CANCER SCREENING BY MAMMOGRAM: Primary | ICD-10-CM

## 2019-09-04 ENCOUNTER — PATIENT OUTREACH (OUTPATIENT)
Dept: ADMINISTRATIVE | Facility: HOSPITAL | Age: 59
End: 2019-09-04

## 2019-10-08 ENCOUNTER — PATIENT OUTREACH (OUTPATIENT)
Dept: ADMINISTRATIVE | Facility: HOSPITAL | Age: 59
End: 2019-10-08

## 2019-10-28 ENCOUNTER — OFFICE VISIT (OUTPATIENT)
Dept: INTERNAL MEDICINE | Facility: CLINIC | Age: 59
End: 2019-10-28
Payer: COMMERCIAL

## 2019-10-28 ENCOUNTER — TELEPHONE (OUTPATIENT)
Dept: INTERNAL MEDICINE | Facility: CLINIC | Age: 59
End: 2019-10-28

## 2019-10-28 VITALS
SYSTOLIC BLOOD PRESSURE: 154 MMHG | DIASTOLIC BLOOD PRESSURE: 82 MMHG | HEIGHT: 66 IN | WEIGHT: 214.06 LBS | RESPIRATION RATE: 18 BRPM | BODY MASS INDEX: 34.4 KG/M2 | TEMPERATURE: 96 F | HEART RATE: 96 BPM | OXYGEN SATURATION: 100 %

## 2019-10-28 DIAGNOSIS — H10.31 ACUTE BACTERIAL CONJUNCTIVITIS OF RIGHT EYE: Primary | ICD-10-CM

## 2019-10-28 DIAGNOSIS — I10 ESSENTIAL HYPERTENSION: ICD-10-CM

## 2019-10-28 DIAGNOSIS — A60.00 RECURRENT GENITAL HSV (HERPES SIMPLEX VIRUS) INFECTION: ICD-10-CM

## 2019-10-28 DIAGNOSIS — E53.8 LOW SERUM VITAMIN B12: ICD-10-CM

## 2019-10-28 PROCEDURE — 99213 OFFICE O/P EST LOW 20 MIN: CPT | Mod: S$GLB,,, | Performed by: FAMILY MEDICINE

## 2019-10-28 PROCEDURE — 3008F BODY MASS INDEX DOCD: CPT | Mod: CPTII,S$GLB,, | Performed by: FAMILY MEDICINE

## 2019-10-28 PROCEDURE — 3079F PR MOST RECENT DIASTOLIC BLOOD PRESSURE 80-89 MM HG: ICD-10-PCS | Mod: CPTII,S$GLB,, | Performed by: FAMILY MEDICINE

## 2019-10-28 PROCEDURE — 99999 PR PBB SHADOW E&M-EST. PATIENT-LVL IV: CPT | Mod: PBBFAC,,, | Performed by: FAMILY MEDICINE

## 2019-10-28 PROCEDURE — 3077F PR MOST RECENT SYSTOLIC BLOOD PRESSURE >= 140 MM HG: ICD-10-PCS | Mod: CPTII,S$GLB,, | Performed by: FAMILY MEDICINE

## 2019-10-28 PROCEDURE — 99999 PR PBB SHADOW E&M-EST. PATIENT-LVL IV: ICD-10-PCS | Mod: PBBFAC,,, | Performed by: FAMILY MEDICINE

## 2019-10-28 PROCEDURE — 3077F SYST BP >= 140 MM HG: CPT | Mod: CPTII,S$GLB,, | Performed by: FAMILY MEDICINE

## 2019-10-28 PROCEDURE — 3008F PR BODY MASS INDEX (BMI) DOCUMENTED: ICD-10-PCS | Mod: CPTII,S$GLB,, | Performed by: FAMILY MEDICINE

## 2019-10-28 PROCEDURE — 3079F DIAST BP 80-89 MM HG: CPT | Mod: CPTII,S$GLB,, | Performed by: FAMILY MEDICINE

## 2019-10-28 PROCEDURE — 99213 PR OFFICE/OUTPT VISIT, EST, LEVL III, 20-29 MIN: ICD-10-PCS | Mod: S$GLB,,, | Performed by: FAMILY MEDICINE

## 2019-10-28 RX ORDER — AMLODIPINE BESYLATE 10 MG/1
10 TABLET ORAL DAILY
Qty: 30 TABLET | Refills: 5 | Status: SHIPPED | OUTPATIENT
Start: 2019-10-28 | End: 2020-05-18 | Stop reason: SDUPTHER

## 2019-10-28 RX ORDER — ACYCLOVIR 200 MG/1
200 CAPSULE ORAL DAILY
Qty: 30 CAPSULE | Refills: 5 | Status: SHIPPED | OUTPATIENT
Start: 2019-10-28 | End: 2020-05-18 | Stop reason: SDUPTHER

## 2019-10-28 NOTE — LETTER
October 28, 2019      [facility]  [id number] [place] [id number]  Phone: [phone]  Fax: [phone]       Patient: Karla Arzola   YOB: 1960  Date of Visit: 10/28/2019    To Whom It May Concern:    Huma Arzola  was at [facility] on 10/28/2019. She may return to work on 10/30/2019 pending resolution of symptoms with no restrictions. If you have any questions or concerns, or if I can be of further assistance, please do not hesitate to contact me.        Sincerely,    Robina Momin MD

## 2019-10-28 NOTE — PROGRESS NOTES
Subjective:       Patient ID: Karla Arzola is a 59 y.o. female.    Chief Complaint: Conjunctivitis    HPI Ms. Arzola presents today with pink eye.   Woke up this morning with gunk in eyes.   Itchy red eye (right)   Draining   She has tobramycin drops and at the beginning of the year was given Bleph-10     Review of Systems   Constitutional: Negative.    Eyes: Positive for discharge, redness and itching.   Respiratory: Negative.    Genitourinary: Negative.    Musculoskeletal: Negative.    Neurological: Negative.    Psychiatric/Behavioral: Negative.          Past Medical History:   Diagnosis Date    Allergy     Hypertension     Obesity     Vitamin B 12 deficiency        Objective:        Physical Exam   Constitutional: She appears well-developed and well-nourished.   HENT:   Head: Normocephalic and atraumatic.   Right Ear: External ear normal.   Left Ear: External ear normal.   Eyes:       Vitals reviewed.        Results for orders placed or performed in visit on 10/04/17   CBC auto differential   Result Value Ref Range    WBC 6.73 3.90 - 12.70 K/uL    RBC 5.20 4.00 - 5.40 M/uL    Hemoglobin 14.7 12.0 - 16.0 g/dL    Hematocrit 46.2 37.0 - 48.5 %    Mean Corpuscular Volume 89 82 - 98 fL    Mean Corpuscular Hemoglobin 28.3 27.0 - 31.0 pg    Mean Corpuscular Hemoglobin Conc 31.8 (L) 32.0 - 36.0 g/dL    RDW 13.8 11.5 - 14.5 %    Platelets 253 150 - 350 K/uL    MPV 12.0 9.2 - 12.9 fL    Gran # (ANC) 3.5 1.8 - 7.7 K/uL    Lymph # 2.5 1.0 - 4.8 K/uL    Mono # 0.5 0.3 - 1.0 K/uL    Eos # 0.2 0.0 - 0.5 K/uL    Baso # 0.01 0.00 - 0.20 K/uL    Gran% 52.3 38.0 - 73.0 %    Lymph% 37.3 18.0 - 48.0 %    Mono% 7.6 4.0 - 15.0 %    Eosinophil% 2.7 0.0 - 8.0 %    Basophil% 0.1 0.0 - 1.9 %    Differential Method Automated    Comprehensive metabolic panel   Result Value Ref Range    Sodium 144 136 - 145 mmol/L    Potassium 4.3 3.5 - 5.1 mmol/L    Chloride 109 95 - 110 mmol/L    CO2 26 23 - 29 mmol/L    Glucose 80 70 - 110 mg/dL     BUN, Bld 11 6 - 20 mg/dL    Creatinine 0.9 0.5 - 1.4 mg/dL    Calcium 8.8 8.7 - 10.5 mg/dL    Total Protein 6.8 6.0 - 8.4 g/dL    Albumin 3.3 (L) 3.5 - 5.2 g/dL    Total Bilirubin 0.7 0.1 - 1.0 mg/dL    Alkaline Phosphatase 93 55 - 135 U/L    AST 14 10 - 40 U/L    ALT 10 10 - 44 U/L    Anion Gap 9 8 - 16 mmol/L    eGFR if African American >60.0 >60 mL/min/1.73 m^2    eGFR if non African American >60.0 >60 mL/min/1.73 m^2   Lipid panel   Result Value Ref Range    Cholesterol 143 120 - 199 mg/dL    Triglycerides 103 30 - 150 mg/dL    HDL 49 40 - 75 mg/dL    LDL Cholesterol 73.4 63.0 - 159.0 mg/dL    Hdl/Cholesterol Ratio 34.3 20.0 - 50.0 %    Total Cholesterol/HDL Ratio 2.9 2.0 - 5.0    Non-HDL Cholesterol 94 mg/dL       Assessment/Plan:     Acute bacterial conjunctivitis of right eye  Restart Tobramycin drops previously prescribed. They  next month   Wash hands frequently   Off work for 48 hours     Essential hypertension-elevated today   -     amLODIPine (NORVASC) 10 MG tablet; Take 1 tablet (10 mg total) by mouth once daily.  Dispense: 30 tablet; Refill: 5  Follow up 2 weeks nurse visit     Recurrent genital HSV (herpes simplex virus) infection  -     acyclovir (ZOVIRAX) 200 MG capsule; Take 1 capsule (200 mg total) by mouth once daily.  Dispense: 30 capsule; Refill: 5    Low serum vitamin B12  -     Vitamin B12; Future; Expected date: 10/28/2019    Follow up in about 4 weeks (around 2019).    Robina Momin MD  [facility]   Family Medicine

## 2020-01-16 ENCOUNTER — PATIENT OUTREACH (OUTPATIENT)
Dept: ADMINISTRATIVE | Facility: HOSPITAL | Age: 60
End: 2020-01-16

## 2020-02-25 ENCOUNTER — TELEPHONE (OUTPATIENT)
Dept: INTERNAL MEDICINE | Facility: CLINIC | Age: 60
End: 2020-02-25

## 2020-02-25 NOTE — TELEPHONE ENCOUNTER
----- Message from Buster Castillo sent at 2/25/2020  8:31 AM CST -----  Contact: self [phone]  .Type:  Sooner Apoointment Request    Caller is requesting a sooner appointment.  Caller declined first available appointment listed below.  Caller will not accept being placed on the waitlist and is requesting a message be sent to doctor.  Name of Caller:Karla Arzola  When is the first available appointment?03/09/2020  Symptoms:anxiety  Would the patient rather a call back or a response via [facility]? Call back  Best Call Back Number:[phone]  Additional Information:

## 2020-03-04 ENCOUNTER — OFFICE VISIT (OUTPATIENT)
Dept: INTERNAL MEDICINE | Facility: CLINIC | Age: 60
End: 2020-03-04
Payer: COMMERCIAL

## 2020-03-04 VITALS
HEART RATE: 61 BPM | BODY MASS INDEX: 36.56 KG/M2 | OXYGEN SATURATION: 97 % | DIASTOLIC BLOOD PRESSURE: 92 MMHG | SYSTOLIC BLOOD PRESSURE: 150 MMHG | TEMPERATURE: 96 F | WEIGHT: 223.13 LBS

## 2020-03-04 DIAGNOSIS — F41.9 ANXIETY: ICD-10-CM

## 2020-03-04 DIAGNOSIS — F51.02 ADJUSTMENT INSOMNIA: ICD-10-CM

## 2020-03-04 DIAGNOSIS — F43.21 COMPLICATED GRIEVING: Primary | ICD-10-CM

## 2020-03-04 PROCEDURE — 99999 PR PBB SHADOW E&M-EST. PATIENT-LVL III: ICD-10-PCS | Mod: PBBFAC,,, | Performed by: FAMILY MEDICINE

## 2020-03-04 PROCEDURE — 3008F PR BODY MASS INDEX (BMI) DOCUMENTED: ICD-10-PCS | Mod: CPTII,S$GLB,, | Performed by: FAMILY MEDICINE

## 2020-03-04 PROCEDURE — 99999 PR PBB SHADOW E&M-EST. PATIENT-LVL III: CPT | Mod: PBBFAC,,, | Performed by: FAMILY MEDICINE

## 2020-03-04 PROCEDURE — 3008F BODY MASS INDEX DOCD: CPT | Mod: CPTII,S$GLB,, | Performed by: FAMILY MEDICINE

## 2020-03-04 PROCEDURE — 3080F PR MOST RECENT DIASTOLIC BLOOD PRESSURE >= 90 MM HG: ICD-10-PCS | Mod: CPTII,S$GLB,, | Performed by: FAMILY MEDICINE

## 2020-03-04 PROCEDURE — 99214 PR OFFICE/OUTPT VISIT, EST, LEVL IV, 30-39 MIN: ICD-10-PCS | Mod: S$GLB,,, | Performed by: FAMILY MEDICINE

## 2020-03-04 PROCEDURE — 3077F SYST BP >= 140 MM HG: CPT | Mod: CPTII,S$GLB,, | Performed by: FAMILY MEDICINE

## 2020-03-04 PROCEDURE — 3080F DIAST BP >= 90 MM HG: CPT | Mod: CPTII,S$GLB,, | Performed by: FAMILY MEDICINE

## 2020-03-04 PROCEDURE — 99214 OFFICE O/P EST MOD 30 MIN: CPT | Mod: S$GLB,,, | Performed by: FAMILY MEDICINE

## 2020-03-04 PROCEDURE — 3077F PR MOST RECENT SYSTOLIC BLOOD PRESSURE >= 140 MM HG: ICD-10-PCS | Mod: CPTII,S$GLB,, | Performed by: FAMILY MEDICINE

## 2020-03-04 RX ORDER — ALPRAZOLAM 0.25 MG/1
0.25 TABLET ORAL 3 TIMES DAILY PRN
Qty: 60 TABLET | Refills: 0 | Status: SHIPPED | OUTPATIENT
Start: 2020-03-04 | End: 2023-03-02

## 2020-03-04 RX ORDER — CITALOPRAM 10 MG/1
10 TABLET ORAL DAILY
Qty: 90 TABLET | Refills: 1 | Status: SHIPPED | OUTPATIENT
Start: 2020-03-04 | End: 2020-05-18 | Stop reason: SDUPTHER

## 2020-03-04 NOTE — PROGRESS NOTES
Subjective:       Patient ID: Karla Arzola is a 59 y.o. female.    Chief Complaint: Anxiety    HPI Ms. Arzola presents today for follow up anxiety.   Mom passed away.   She had a hip replacement and aspirated prior to being discharged.     Buspar she has doubled and it hasn't helped.       Review of Systems   Constitutional: Positive for activity change and appetite change. Negative for fatigue and fever.   HENT: Negative for congestion, ear pain and sinus pressure.    Eyes: Negative for pain and visual disturbance.   Respiratory: Negative for cough, chest tightness and wheezing.    Cardiovascular: Negative for chest pain, palpitations and leg swelling.   Gastrointestinal: Negative for abdominal distention, abdominal pain, constipation, diarrhea, nausea and vomiting.   Endocrine: Negative for polydipsia and polyuria.   Genitourinary: Negative for difficulty urinating, dyspareunia, dysuria, flank pain and hematuria.   Musculoskeletal: Negative for arthralgias and back pain.   Skin: Negative for rash.   Neurological: Negative for dizziness, tremors, syncope, weakness, numbness and headaches.   Psychiatric/Behavioral: Positive for decreased concentration, dysphoric mood and sleep disturbance. Negative for agitation and confusion. The patient is nervous/anxious.          Past Medical History:   Diagnosis Date    Allergy     Hypertension     Obesity     Vitamin B 12 deficiency      Past Surgical History:   Procedure Laterality Date    right knee scope      TUBAL LIGATION       Family History   Problem Relation Age of Onset    Diabetes Mother     Hypertension Mother     Stroke Mother     Hypertension Father     Hyperlipidemia Father     Kidney disease Father      Social History     Socioeconomic History    Marital status: Single     Spouse name: Not on file    Number of children: Not on file    Years of education: Not on file    Highest education level: Not on file   Occupational History      Employer: [employer]   Social Needs    Financial resource strain: Not on file    Food insecurity:     Worry: Not on file     Inability: Not on file    Transportation needs:     Medical: Not on file     Non-medical: Not on file   Tobacco Use    Smoking status: Never Smoker    Smokeless tobacco: Never Used   Substance and Sexual Activity    Alcohol use: Yes     Alcohol/week: 0.0 standard drinks     Comment: socially     Drug use: No    Sexual activity: Yes   Lifestyle    Physical activity:     Days per week: Not on file     Minutes per session: Not on file    Stress: Not on file   Relationships    Social connections:     Talks on phone: Not on file     Gets together: Not on file     Attends Protestant service: Not on file     Active member of club or organization: Not on file     Attends meetings of clubs or organizations: Not on file     Relationship status: Not on file   Other Topics Concern    Not on file   Social History Narrative    Not on file       Objective:        Physical Exam   Constitutional: She is oriented to person, place, and time. She appears well-developed and well-nourished.   HENT:   Head: Normocephalic and atraumatic.   Right Ear: External ear normal.   Left Ear: External ear normal.   Neurological: She is alert and oriented to person, place, and time.   Skin: Skin is warm.   Psychiatric: She has a normal mood and affect. Her behavior is normal.   Tearful today    Vitals reviewed.        Results for orders placed or performed in visit on 10/04/17   CBC auto differential   Result Value Ref Range    WBC 6.73 3.90 - 12.70 K/uL    RBC 5.20 4.00 - 5.40 M/uL    Hemoglobin 14.7 12.0 - 16.0 g/dL    Hematocrit 46.2 37.0 - 48.5 %    Mean Corpuscular Volume 89 82 - 98 fL    Mean Corpuscular Hemoglobin 28.3 27.0 - 31.0 pg    Mean Corpuscular Hemoglobin Conc 31.8 (L) 32.0 - 36.0 g/dL    RDW 13.8 11.5 - 14.5 %    Platelets 253 150 - 350 K/uL    MPV 12.0 9.2 - 12.9 fL    Gran # (ANC)  3.5 1.8 - 7.7 K/uL    Lymph # 2.5 1.0 - 4.8 K/uL    Mono # 0.5 0.3 - 1.0 K/uL    Eos # 0.2 0.0 - 0.5 K/uL    Baso # 0.01 0.00 - 0.20 K/uL    Gran% 52.3 38.0 - 73.0 %    Lymph% 37.3 18.0 - 48.0 %    Mono% 7.6 4.0 - 15.0 %    Eosinophil% 2.7 0.0 - 8.0 %    Basophil% 0.1 0.0 - 1.9 %    Differential Method Automated    Comprehensive metabolic panel   Result Value Ref Range    Sodium 144 136 - 145 mmol/L    Potassium 4.3 3.5 - 5.1 mmol/L    Chloride 109 95 - 110 mmol/L    CO2 26 23 - 29 mmol/L    Glucose 80 70 - 110 mg/dL    BUN, Bld 11 6 - 20 mg/dL    Creatinine 0.9 0.5 - 1.4 mg/dL    Calcium 8.8 8.7 - 10.5 mg/dL    Total Protein 6.8 6.0 - 8.4 g/dL    Albumin 3.3 (L) 3.5 - 5.2 g/dL    Total Bilirubin 0.7 0.1 - 1.0 mg/dL    Alkaline Phosphatase 93 55 - 135 U/L    AST 14 10 - 40 U/L    ALT 10 10 - 44 U/L    Anion Gap 9 8 - 16 mmol/L    eGFR if African American >60.0 >60 mL/min/1.73 m^2    eGFR if non African American >60.0 >60 mL/min/1.73 m^2   Lipid panel   Result Value Ref Range    Cholesterol 143 120 - 199 mg/dL    Triglycerides 103 30 - 150 mg/dL    HDL 49 40 - 75 mg/dL    LDL Cholesterol 73.4 63.0 - 159.0 mg/dL    Hdl/Cholesterol Ratio 34.3 20.0 - 50.0 %    Total Cholesterol/HDL Ratio 2.9 2.0 - 5.0    Non-HDL Cholesterol 94 mg/dL       Assessment/Plan:     Complicated grieving  -     citalopram (CELEXA) 10 MG tablet; Take 1 tablet (10 mg total) by mouth once daily.  Dispense: 90 tablet; Refill: 1  -     ALPRAZolam (XANAX) 0.25 MG tablet; Take 1 tablet (0.25 mg total) by mouth 3 (three) times daily as needed for Insomnia or Anxiety.  Dispense: 60 tablet; Refill: 0    Adjustment insomnia  -     citalopram (CELEXA) 10 MG tablet; Take 1 tablet (10 mg total) by mouth once daily.  Dispense: 90 tablet; Refill: 1  -     ALPRAZolam (XANAX) 0.25 MG tablet; Take 1 tablet (0.25 mg total) by mouth 3 (three) times daily as needed for Insomnia or Anxiety.  Dispense: 60 tablet; Refill: 0    Anxiety  -     citalopram (CELEXA) 10 MG  tablet; Take 1 tablet (10 mg total) by mouth once daily.  Dispense: 90 tablet; Refill: 1  -     ALPRAZolam (XANAX) 0.25 MG tablet; Take 1 tablet (0.25 mg total) by mouth 3 (three) times daily as needed for Insomnia or Anxiety.  Dispense: 60 tablet; Refill: 0      Will request 2 weeks short term disability     Follow up 2-3 weeks BP check    Robina Momin MD  [facility]   Family Medicine

## 2020-03-18 ENCOUNTER — TELEPHONE (OUTPATIENT)
Dept: INTERNAL MEDICINE | Facility: CLINIC | Age: 60
End: 2020-03-18

## 2020-03-18 NOTE — TELEPHONE ENCOUNTER
Patient states she is due to return to work on 03/25/2020. Patient is asking if she needs to be seen in the office before then for clearance to return.

## 2020-03-20 NOTE — TELEPHONE ENCOUNTER
Patient notified and has agreed to a video visit. Patient needs the appointment for Monday and only same day appointments are available. Advised the patient that  nurse will contact her on Monday to schedule appt.

## 2020-03-23 ENCOUNTER — OFFICE VISIT (OUTPATIENT)
Dept: INTERNAL MEDICINE | Facility: CLINIC | Age: 60
End: 2020-03-23
Payer: COMMERCIAL

## 2020-03-23 ENCOUNTER — PATIENT MESSAGE (OUTPATIENT)
Dept: INTERNAL MEDICINE | Facility: CLINIC | Age: 60
End: 2020-03-23

## 2020-03-23 ENCOUNTER — TELEPHONE (OUTPATIENT)
Dept: INTERNAL MEDICINE | Facility: CLINIC | Age: 60
End: 2020-03-23

## 2020-03-23 DIAGNOSIS — F51.02 ADJUSTMENT INSOMNIA: ICD-10-CM

## 2020-03-23 DIAGNOSIS — F43.21 COMPLICATED GRIEVING: Primary | ICD-10-CM

## 2020-03-23 PROCEDURE — 99213 PR OFFICE/OUTPT VISIT, EST, LEVL III, 20-29 MIN: ICD-10-PCS | Mod: 95,,, | Performed by: FAMILY MEDICINE

## 2020-03-23 PROCEDURE — 99213 OFFICE O/P EST LOW 20 MIN: CPT | Mod: 95,,, | Performed by: FAMILY MEDICINE

## 2020-03-23 NOTE — PROGRESS NOTES
The patient location is: [place] (home)  The chief complaint leading to consultation is: return to work (depression/insomnia)  Visit type: Virtual visit with synchronous audio and video  Time start 353  Time ended 357  Total time spent with patient: 4 minutes  Each patient to whom he or she provides medical services by telemedicine is:  (1) informed of the relationship between the physician and patient and the respective role of any other health care provider with respect to management of the patient; and (2) notified that he or she may decline to receive medical services by telemedicine and may withdraw from such care at any time.    Subjective:       Patient ID: Karla Arzola is a 59 y.o. female.    Chief Complaint: No chief complaint on file.    HPI Ms. Arzola presents to return to work. We completed short term FMLA after her mother  after a procedure. Her mother had done well with procedure but aspirated a day before she was to be discharged and passed a couple days later.   She was started on an SSRI and Xanax    The medication she feel is helping some    She feels like she is ready to go back to work.     Leave through 3/25th     Xanax she uses once a day sometimes she will take in the morning.     Review of Systems   Constitutional: Negative.    HENT: Negative.    Respiratory: Negative.    Neurological: Negative.    Psychiatric/Behavioral: Positive for decreased concentration, dysphoric mood and sleep disturbance (medication helps).           Past Medical History:   Diagnosis Date    Allergy     Hypertension     Obesity     Vitamin B 12 deficiency        Objective:        Physical Exam   Constitutional: She appears well-developed and well-nourished.   HENT:   Head: Atraumatic.   Vitals reviewed.        Assessment/Plan:     Complicated grieving    Adjustment insomnia      Lexapro helping some  Xanax as needed helping with insomnia   She feels she is ready to return to work     Follow up as  needed     Robina Momin MD  [facility]

## 2020-03-23 NOTE — TELEPHONE ENCOUNTER
----- Message from Cesar Iniguez sent at 3/23/2020 11:09 AM CDT -----  Contact: self  Requesting call back regarding questions about appt. Please call back at [phone].    Thanks,  Cesar Iniguez

## 2020-03-23 NOTE — LETTER
March 23, 2020      [facility]  [id number] [place] [id number]  Phone: [phone]  Fax: [phone]       Patient: Karla Arzola   YOB: 1960  Date of Visit: 03/23/2020    To Whom It May Concern:    Huma Arzola  was at [facility] on 03/23/2020 follow up from [facility] short leave. She may return to work on 03/23/2020 with no restrictions. If you have any questions or concerns, or if I can be of further assistance, please do not hesitate to contact me.        Sincerely,      Robina Momin MD

## 2020-04-21 DIAGNOSIS — Z01.84 ANTIBODY RESPONSE EXAMINATION: ICD-10-CM

## 2020-04-22 ENCOUNTER — LAB VISIT (OUTPATIENT)
Dept: LAB | Facility: HOSPITAL | Age: 60
End: 2020-04-22
Attending: INTERNAL MEDICINE
Payer: COMMERCIAL

## 2020-04-22 ENCOUNTER — PATIENT MESSAGE (OUTPATIENT)
Dept: ADMINISTRATIVE | Facility: OTHER | Age: 60
End: 2020-04-22

## 2020-04-22 DIAGNOSIS — Z01.84 ANTIBODY RESPONSE EXAMINATION: ICD-10-CM

## 2020-04-22 LAB — SARS-COV-2 IGG SERPL QL IA: NEGATIVE

## 2020-04-22 PROCEDURE — 36415 COLL VENOUS BLD VENIPUNCTURE: CPT

## 2020-04-22 PROCEDURE — 86769 SARS-COV-2 COVID-19 ANTIBODY: CPT

## 2020-05-18 ENCOUNTER — OFFICE VISIT (OUTPATIENT)
Dept: INTERNAL MEDICINE | Facility: CLINIC | Age: 60
End: 2020-05-18
Payer: COMMERCIAL

## 2020-05-18 ENCOUNTER — DOCUMENTATION ONLY (OUTPATIENT)
Dept: INTERNAL MEDICINE | Facility: CLINIC | Age: 60
End: 2020-05-18

## 2020-05-18 VITALS
WEIGHT: 227.06 LBS | DIASTOLIC BLOOD PRESSURE: 80 MMHG | RESPIRATION RATE: 18 BRPM | HEIGHT: 66 IN | HEART RATE: 75 BPM | OXYGEN SATURATION: 97 % | BODY MASS INDEX: 36.49 KG/M2 | TEMPERATURE: 98 F | SYSTOLIC BLOOD PRESSURE: 128 MMHG

## 2020-05-18 DIAGNOSIS — A60.00 RECURRENT GENITAL HSV (HERPES SIMPLEX VIRUS) INFECTION: ICD-10-CM

## 2020-05-18 DIAGNOSIS — F43.21 COMPLICATED GRIEVING: ICD-10-CM

## 2020-05-18 DIAGNOSIS — I10 ESSENTIAL HYPERTENSION: ICD-10-CM

## 2020-05-18 DIAGNOSIS — F51.02 ADJUSTMENT INSOMNIA: ICD-10-CM

## 2020-05-18 DIAGNOSIS — F41.9 ANXIETY: ICD-10-CM

## 2020-05-18 DIAGNOSIS — H10.33 ACUTE BACTERIAL CONJUNCTIVITIS OF BOTH EYES: Primary | ICD-10-CM

## 2020-05-18 DIAGNOSIS — M79.672 LEFT FOOT PAIN: ICD-10-CM

## 2020-05-18 DIAGNOSIS — M25.562 ACUTE PAIN OF LEFT KNEE: ICD-10-CM

## 2020-05-18 DIAGNOSIS — Z12.11 COLON CANCER SCREENING: ICD-10-CM

## 2020-05-18 PROCEDURE — 3008F PR BODY MASS INDEX (BMI) DOCUMENTED: ICD-10-PCS | Mod: CPTII,S$GLB,, | Performed by: FAMILY MEDICINE

## 2020-05-18 PROCEDURE — 3079F DIAST BP 80-89 MM HG: CPT | Mod: CPTII,S$GLB,, | Performed by: FAMILY MEDICINE

## 2020-05-18 PROCEDURE — 99214 OFFICE O/P EST MOD 30 MIN: CPT | Mod: S$GLB,,, | Performed by: FAMILY MEDICINE

## 2020-05-18 PROCEDURE — 99214 PR OFFICE/OUTPT VISIT, EST, LEVL IV, 30-39 MIN: ICD-10-PCS | Mod: S$GLB,,, | Performed by: FAMILY MEDICINE

## 2020-05-18 PROCEDURE — 3008F BODY MASS INDEX DOCD: CPT | Mod: CPTII,S$GLB,, | Performed by: FAMILY MEDICINE

## 2020-05-18 PROCEDURE — 3074F PR MOST RECENT SYSTOLIC BLOOD PRESSURE < 130 MM HG: ICD-10-PCS | Mod: CPTII,S$GLB,, | Performed by: FAMILY MEDICINE

## 2020-05-18 PROCEDURE — 99999 PR PBB SHADOW E&M-EST. PATIENT-LVL III: ICD-10-PCS | Mod: PBBFAC,,, | Performed by: FAMILY MEDICINE

## 2020-05-18 PROCEDURE — 99999 PR PBB SHADOW E&M-EST. PATIENT-LVL III: CPT | Mod: PBBFAC,,, | Performed by: FAMILY MEDICINE

## 2020-05-18 PROCEDURE — 3079F PR MOST RECENT DIASTOLIC BLOOD PRESSURE 80-89 MM HG: ICD-10-PCS | Mod: CPTII,S$GLB,, | Performed by: FAMILY MEDICINE

## 2020-05-18 PROCEDURE — 3074F SYST BP LT 130 MM HG: CPT | Mod: CPTII,S$GLB,, | Performed by: FAMILY MEDICINE

## 2020-05-18 RX ORDER — TOBRAMYCIN 3 MG/ML
1 SOLUTION/ DROPS OPHTHALMIC EVERY 4 HOURS
Qty: 5 ML | Refills: 0 | Status: SHIPPED | OUTPATIENT
Start: 2020-05-18 | End: 2020-06-01

## 2020-05-18 RX ORDER — ACYCLOVIR 200 MG/1
200 CAPSULE ORAL DAILY
Qty: 90 CAPSULE | Refills: 1 | Status: SHIPPED | OUTPATIENT
Start: 2020-05-18 | End: 2021-10-21 | Stop reason: SDUPTHER

## 2020-05-18 RX ORDER — ALBUTEROL SULFATE 90 UG/1
2 AEROSOL, METERED RESPIRATORY (INHALATION) EVERY 6 HOURS PRN
Qty: 18 G | Refills: 0 | Status: SHIPPED | OUTPATIENT
Start: 2020-05-18 | End: 2022-01-11 | Stop reason: SDUPTHER

## 2020-05-18 RX ORDER — AMLODIPINE BESYLATE 10 MG/1
10 TABLET ORAL DAILY
Qty: 90 TABLET | Refills: 1 | Status: SHIPPED | OUTPATIENT
Start: 2020-05-18 | End: 2021-06-22 | Stop reason: SDUPTHER

## 2020-05-18 RX ORDER — MELOXICAM 7.5 MG/1
7.5 TABLET ORAL DAILY
Qty: 90 TABLET | Refills: 0 | Status: SHIPPED | OUTPATIENT
Start: 2020-05-18 | End: 2021-12-01 | Stop reason: SDUPTHER

## 2020-05-18 RX ORDER — CITALOPRAM 10 MG/1
10 TABLET ORAL DAILY
Qty: 90 TABLET | Refills: 1 | Status: SHIPPED | OUTPATIENT
Start: 2020-05-18 | End: 2021-06-04 | Stop reason: SDUPTHER

## 2020-05-18 NOTE — PROGRESS NOTES
Subjective:       Patient ID: Karla Arzola is a 59 y.o. female.     Chief Complaint: Conjunctivitis     HPI Ms. Arzola presents today with pink eye. Last episode 10/2019     Woke up this morning with gunk in eyes.   Itchy red eye bilateral  Draining   Over night the right itched and the left started hurting and still is hurting  She has tobramycin drops and at the beginning of the year was given Bleph-10      Answers for HPI/ROS submitted by the patient on 5/18/2020   activity change: No  unexpected weight change: No  neck pain: No  hearing loss: No  rhinorrhea: No  trouble swallowing: No  eye discharge: Yes  visual disturbance: No  chest tightness: No  wheezing: No  chest pain: No  palpitations: No  blood in stool: No  constipation: No  vomiting: No  diarrhea: No  polyuria: No  difficulty urinating: No  hematuria: No  menstrual problem: No  dysuria: No  joint swelling: Yes  arthralgias: Yes  headaches: Yes  weakness: No  confusion: No  dysphoric mood: No      Review of Systems   Constitutional: Negative.    Eyes: Positive for discharge, redness and itching.   Respiratory: Negative.    Genitourinary: Negative.    Musculoskeletal: Negative.    Neurological: Negative.    Psychiatric/Behavioral: Negative.                Past Medical History:   Diagnosis Date    Allergy      Hypertension      Obesity      Vitamin B 12 deficiency           Objective:    Physical Exam   Constitutional: She appears well-developed and well-nourished.   HENT:   Head: Normocephalic and atraumatic.   Eyes:                Assessment/Plan:     Colon cancer screening  -     Fecal Immunochemical Test (iFOBT); Future; Expected date: 05/18/2020    Complicated grieving  -     citalopram (CELEXA) 10 MG tablet; Take 1 tablet (10 mg total) by mouth once daily.  Dispense: 90 tablet; Refill: 1    Adjustment insomnia  -     citalopram (CELEXA) 10 MG tablet; Take 1 tablet (10 mg total) by mouth once daily.  Dispense: 90 tablet; Refill:  1    Anxiety  -     citalopram (CELEXA) 10 MG tablet; Take 1 tablet (10 mg total) by mouth once daily.  Dispense: 90 tablet; Refill: 1    Essential hypertension  -     amLODIPine (NORVASC) 10 MG tablet; Take 1 tablet (10 mg total) by mouth once daily.  Dispense: 90 tablet; Refill: 1    Recurrent genital HSV (herpes simplex virus) infection  -     acyclovir (ZOVIRAX) 200 MG capsule; Take 1 capsule (200 mg total) by mouth once daily.  Dispense: 90 capsule; Refill: 1    Acute pain of left knee  -     meloxicam (MOBIC) 7.5 MG tablet; Take 1 tablet (7.5 mg total) by mouth once daily.  Dispense: 90 tablet; Refill: 0    Left foot pain  -     meloxicam (MOBIC) 7.5 MG tablet; Take 1 tablet (7.5 mg total) by mouth once daily.  Dispense: 90 tablet; Refill: 0    Fall, initial encounter  -     meloxicam (MOBIC) 7.5 MG tablet; Take 1 tablet (7.5 mg total) by mouth once daily.  Dispense: 90 tablet; Refill: 0    Acute bacterial conjunctivitis of both eyes  -     tobramycin sulfate 0.3% (TOBREX) 0.3 % ophthalmic solution; Place 1 drop into both eyes every 4 (four) hours.  Dispense: 5 mL; Refill: 0    Other orders  -     albuterol (VENTOLIN HFA) 90 mcg/actuation inhaler; Inhale 2 puffs into the lungs every 6 (six) hours as needed for Wheezing. Rescue  Dispense: 18 g; Refill: 0        Refilled medications today this is what the other diagnosis outside of conjunctivitis are from       Follow up as needed

## 2020-05-18 NOTE — LETTER
May 18, 2020      [facility]  [id number] [place] [id number]  Phone: [phone]  Fax: [phone]       Patient: Karla Arzola   YOB: 1960  Date of Visit: 05/18/2020    To Whom It May Concern:    Huma Arzola  was at [facility] on 05/18/2020. She may return to work on 05/21/2020 with no restrictions. If you have any questions or concerns, or if I can be of further assistance, please do not hesitate to contact me.    Sincerely,      Robina Momin MD

## 2020-06-01 ENCOUNTER — OFFICE VISIT (OUTPATIENT)
Dept: OPHTHALMOLOGY | Facility: CLINIC | Age: 60
End: 2020-06-01
Payer: COMMERCIAL

## 2020-06-01 DIAGNOSIS — H16.9 KERATITIS: Primary | ICD-10-CM

## 2020-06-01 PROCEDURE — 99999 PR PBB SHADOW E&M-EST. PATIENT-LVL I: CPT | Mod: PBBFAC,,, | Performed by: OPTOMETRIST

## 2020-06-01 PROCEDURE — 99999 PR PBB SHADOW E&M-EST. PATIENT-LVL I: ICD-10-PCS | Mod: PBBFAC,,, | Performed by: OPTOMETRIST

## 2020-06-01 PROCEDURE — 92012 INTRM OPH EXAM EST PATIENT: CPT | Mod: S$GLB,,, | Performed by: OPTOMETRIST

## 2020-06-01 PROCEDURE — 92012 PR EYE EXAM, EST PATIENT,INTERMED: ICD-10-PCS | Mod: S$GLB,,, | Performed by: OPTOMETRIST

## 2020-06-01 RX ORDER — NEOMYCIN SULFATE, POLYMYXIN B SULFATE AND DEXAMETHASONE 3.5; 10000; 1 MG/ML; [USP'U]/ML; MG/ML
1 SUSPENSION/ DROPS OPHTHALMIC 4 TIMES DAILY
Qty: 10 ML | Refills: 0 | Status: SHIPPED | OUTPATIENT
Start: 2020-06-01 | End: 2020-06-25

## 2020-06-01 NOTE — PROGRESS NOTES
HPI     Left eye red, draining, stuck together x 2 weeks.  Patient saw doctor Merrill x 2 weeks ago.  Left eye hurting, pain scale 3.  Was sensitive to light, itching and painful x 2 weeks.  Patient started back on the Tobramycin on Friday.    Last eye exam 03/01/2019 [facility].  Update glasses and contact lenses.  HX of Iritis right eye.    Last edited by Indira Fox on 6/1/2020  2:40 PM. (History)            Assessment /Plan     For exam results, see Encounter Report.    Keratitis  -     neomycin-polymyxin-dexamethasone (MAXITROL) 3.5mg/mL-10,000 unit/mL-0.1 % DrpS; Place 1 drop into both eyes 4 (four) times daily. for 10 days  Dispense: 10 mL; Refill: 0      RTC prn worsening symptoms or no resolution.

## 2020-07-31 ENCOUNTER — TELEPHONE (OUTPATIENT)
Dept: ADMINISTRATIVE | Facility: HOSPITAL | Age: 60
End: 2020-07-31

## 2020-07-31 DIAGNOSIS — Z12.39 BREAST CANCER SCREENING: ICD-10-CM

## 2020-08-26 ENCOUNTER — PATIENT OUTREACH (OUTPATIENT)
Dept: ADMINISTRATIVE | Facility: OTHER | Age: 60
End: 2020-08-26

## 2020-08-26 NOTE — PROGRESS NOTES
Health Maintenance Due   Topic Date Due    Colorectal Cancer Screening  04/18/1980    Shingles Vaccine (1 of 2) 04/18/2012    Cervical Cancer Screening  07/19/2016     Updates were requested from care everywhere.  Chart was reviewed for overdue Proactive [facility] Encounters (MYRA) topics (CRS, Breast Cancer Screening, Eye exam)  Health Maintenance has been updated.  LINKS immunization registry triggered.  Immunizations were reconciled.

## 2020-08-27 ENCOUNTER — HOSPITAL ENCOUNTER (OUTPATIENT)
Dept: RADIOLOGY | Facility: HOSPITAL | Age: 60
Discharge: HOME | End: 2020-08-27
Attending: FAMILY MEDICINE
Payer: COMMERCIAL

## 2020-08-27 VITALS — HEIGHT: 65 IN | WEIGHT: 227.06 LBS | BODY MASS INDEX: 37.83 KG/M2

## 2020-08-27 DIAGNOSIS — Z12.39 BREAST CANCER SCREENING: ICD-10-CM

## 2020-08-27 PROCEDURE — 77067 SCR MAMMO BI INCL CAD: CPT | Mod: 26,,, | Performed by: RADIOLOGY

## 2020-08-27 PROCEDURE — 77063 BREAST TOMOSYNTHESIS BI: CPT | Mod: 26,,, | Performed by: RADIOLOGY

## 2020-08-27 PROCEDURE — 77067 MAMMO DIGITAL SCREENING BILAT WITH TOMOSYNTHESIS_CAD: ICD-10-PCS | Mod: 26,,, | Performed by: RADIOLOGY

## 2020-08-27 PROCEDURE — 77067 SCR MAMMO BI INCL CAD: CPT | Mod: TC

## 2020-08-27 PROCEDURE — 77063 MAMMO DIGITAL SCREENING BILAT WITH TOMOSYNTHESIS_CAD: ICD-10-PCS | Mod: 26,,, | Performed by: RADIOLOGY

## 2020-08-28 ENCOUNTER — HOSPITAL ENCOUNTER (OUTPATIENT)
Dept: RADIOLOGY | Facility: HOSPITAL | Age: 60
Discharge: HOME | End: 2020-08-28
Attending: FAMILY MEDICINE
Payer: COMMERCIAL

## 2020-08-28 ENCOUNTER — OFFICE VISIT (OUTPATIENT)
Dept: OPHTHALMOLOGY | Facility: CLINIC | Age: 60
End: 2020-08-28
Payer: COMMERCIAL

## 2020-08-28 DIAGNOSIS — R92.8 ABNORMAL MAMMOGRAM: ICD-10-CM

## 2020-08-28 DIAGNOSIS — H52.4 MYOPIA WITH PRESBYOPIA, BILATERAL: Primary | ICD-10-CM

## 2020-08-28 DIAGNOSIS — H52.13 MYOPIA WITH PRESBYOPIA, BILATERAL: Primary | ICD-10-CM

## 2020-08-28 PROCEDURE — G0279 TOMOSYNTHESIS, MAMMO: HCPCS | Mod: 26,,, | Performed by: RADIOLOGY

## 2020-08-28 PROCEDURE — 92014 PR EYE EXAM, EST PATIENT,COMPREHESV: ICD-10-PCS | Mod: S$GLB,,, | Performed by: OPTOMETRIST

## 2020-08-28 PROCEDURE — G0279 MAMMO DIGITAL DIAGNOSTIC RIGHT WITH TOMOSYNTHESIS_CAD: ICD-10-PCS | Mod: 26,,, | Performed by: RADIOLOGY

## 2020-08-28 PROCEDURE — 76642 ULTRASOUND BREAST LIMITED: CPT | Mod: 26,RT,, | Performed by: RADIOLOGY

## 2020-08-28 PROCEDURE — 76642 US BREAST RIGHT LIMITED: ICD-10-PCS | Mod: 26,RT,, | Performed by: RADIOLOGY

## 2020-08-28 PROCEDURE — 77065 DX MAMMO INCL CAD UNI: CPT | Mod: TC

## 2020-08-28 PROCEDURE — 77065 MAMMO DIGITAL DIAGNOSTIC RIGHT WITH TOMOSYNTHESIS_CAD: ICD-10-PCS | Mod: 26,,, | Performed by: RADIOLOGY

## 2020-08-28 PROCEDURE — 92015 PR REFRACTION: ICD-10-PCS | Mod: S$GLB,,, | Performed by: OPTOMETRIST

## 2020-08-28 PROCEDURE — 92015 DETERMINE REFRACTIVE STATE: CPT | Mod: S$GLB,,, | Performed by: OPTOMETRIST

## 2020-08-28 PROCEDURE — 92014 COMPRE OPH EXAM EST PT 1/>: CPT | Mod: S$GLB,,, | Performed by: OPTOMETRIST

## 2020-08-28 PROCEDURE — 76642 ULTRASOUND BREAST LIMITED: CPT | Mod: TC,RT

## 2020-08-28 PROCEDURE — 99999 PR PBB SHADOW E&M-EST. PATIENT-LVL III: CPT | Mod: PBBFAC,,, | Performed by: OPTOMETRIST

## 2020-08-28 PROCEDURE — 77065 DX MAMMO INCL CAD UNI: CPT | Mod: 26,,, | Performed by: RADIOLOGY

## 2020-08-28 PROCEDURE — 99999 PR PBB SHADOW E&M-EST. PATIENT-LVL III: ICD-10-PCS | Mod: PBBFAC,,, | Performed by: OPTOMETRIST

## 2020-08-28 NOTE — PROGRESS NOTES
HPI     Eye Exam     Comments: Yearly              Comments     Last seen by TRF on 6/1/2020 for Keratitis   Previously seen by DNL on 3/1/19   Patient here today for yearly eye exam  HPI    Any vision changes since last exam: Yes c/o blurred vision   Eye pain: No  Other ocular symptoms: No    Do you wear currently wear glasses or contacts? CTL or Glasses    Interested in contacts today? Yes    Do you plan on getting new glasses today? Yes                Last edited by Madeline Donahue, PCT on 8/28/2020  8:53 AM. (History)              Assessment /Plan     For exam results, see Encounter Report.    Myopia with presbyopia, bilateral  Eyeglass Final Rx     Eyeglass Final Rx       Sphere Add    Right -4.50 +2.25    Left -5.00 +2.25    Expiration Date: 8/29/2021              Contact Lens Prescription (8/28/2020)        Brand Base Curve Diameter Sphere    Right Air Optix Colors 8.60 14.0 -4.25    Left Air Optix Colors 8.60 14.0 -2.75    Expiration Date: 8/29/2021    Replacement: Monthly    Wearing Schedule: Daily wear          Dispensed trial contact lenses today. Patient is to wear lenses for 1 week.   Ok to order supply if no problems. RTC PRN if any problems arise.    Otherwise, RTC 1 yr for dilated eye exam.  Discussed above and answered questions.

## 2020-09-15 ENCOUNTER — OFFICE VISIT (OUTPATIENT)
Dept: INTERNAL MEDICINE | Facility: CLINIC | Age: 60
End: 2020-09-15
Payer: COMMERCIAL

## 2020-09-15 VITALS
HEART RATE: 82 BPM | DIASTOLIC BLOOD PRESSURE: 88 MMHG | OXYGEN SATURATION: 100 % | TEMPERATURE: 99 F | BODY MASS INDEX: 37.06 KG/M2 | HEIGHT: 65 IN | SYSTOLIC BLOOD PRESSURE: 122 MMHG | WEIGHT: 222.44 LBS

## 2020-09-15 DIAGNOSIS — M79.662 PAIN AND SWELLING OF LOWER LEG, LEFT: Primary | ICD-10-CM

## 2020-09-15 DIAGNOSIS — M79.89 PAIN AND SWELLING OF LOWER LEG, LEFT: Primary | ICD-10-CM

## 2020-09-15 PROCEDURE — 3008F BODY MASS INDEX DOCD: CPT | Mod: CPTII,S$GLB,, | Performed by: FAMILY MEDICINE

## 2020-09-15 PROCEDURE — 3008F PR BODY MASS INDEX (BMI) DOCUMENTED: ICD-10-PCS | Mod: CPTII,S$GLB,, | Performed by: FAMILY MEDICINE

## 2020-09-15 PROCEDURE — 99999 PR PBB SHADOW E&M-EST. PATIENT-LVL III: ICD-10-PCS | Mod: PBBFAC,,, | Performed by: FAMILY MEDICINE

## 2020-09-15 PROCEDURE — 99999 PR PBB SHADOW E&M-EST. PATIENT-LVL III: CPT | Mod: PBBFAC,,, | Performed by: FAMILY MEDICINE

## 2020-09-15 PROCEDURE — 3079F PR MOST RECENT DIASTOLIC BLOOD PRESSURE 80-89 MM HG: ICD-10-PCS | Mod: CPTII,S$GLB,, | Performed by: FAMILY MEDICINE

## 2020-09-15 PROCEDURE — 99213 OFFICE O/P EST LOW 20 MIN: CPT | Mod: S$GLB,,, | Performed by: FAMILY MEDICINE

## 2020-09-15 PROCEDURE — 3079F DIAST BP 80-89 MM HG: CPT | Mod: CPTII,S$GLB,, | Performed by: FAMILY MEDICINE

## 2020-09-15 PROCEDURE — 3074F PR MOST RECENT SYSTOLIC BLOOD PRESSURE < 130 MM HG: ICD-10-PCS | Mod: CPTII,S$GLB,, | Performed by: FAMILY MEDICINE

## 2020-09-15 PROCEDURE — 99213 PR OFFICE/OUTPT VISIT, EST, LEVL III, 20-29 MIN: ICD-10-PCS | Mod: S$GLB,,, | Performed by: FAMILY MEDICINE

## 2020-09-15 PROCEDURE — 3074F SYST BP LT 130 MM HG: CPT | Mod: CPTII,S$GLB,, | Performed by: FAMILY MEDICINE

## 2020-09-15 NOTE — PROGRESS NOTES
Subjective:       Patient ID: Karla Arzola is a 60 y.o. female.    Chief Complaint: Edema (behind right knee)    HPI Ms. Arzola presents today for swelling and pain behind right knee.   A month or so  Pain mainly at the end of the day.     Notes an aching pain  Tylenol arthritis which helps a little     Review of Systems   Constitutional: Positive for activity change.   HENT: Negative.    Respiratory: Negative.    Cardiovascular: Negative.    Genitourinary: Negative.    Musculoskeletal: Positive for gait problem. Negative for myalgias and neck pain.   Psychiatric/Behavioral: Negative.            Past Medical History:   Diagnosis Date    Allergy     Hypertension     Obesity     Vitamin B 12 deficiency        Objective:        Physical Exam  Constitutional:       Appearance: Normal appearance.   Musculoskeletal:      Right lower leg: Edema present.      Comments: Swelling of the right popliteal fossa   Neurological:      Mental Status: She is alert and oriented to person, place, and time.   Psychiatric:         Mood and Affect: Mood normal.         Behavior: Behavior normal.           Results for orders placed or performed in visit on 04/22/20   SARS CoV-2 IgG   Result Value Ref Range    Antibody SARS CoV-2 Negative Negative       Assessment/Plan:     Pain and swelling of lower leg, left  -     US Extremity Non Vascular Limited Right; Future; Expected date: 09/15/2020    take Tylenol twice a day  Considering Dimas's cyst  Discussed possible treatment of glucocorticoid injection      Follow up as needed     Robina Momin MD  [facility]   Family Medicine

## 2020-09-24 ENCOUNTER — TELEPHONE (OUTPATIENT)
Dept: RADIOLOGY | Facility: HOSPITAL | Age: 60
End: 2020-09-24

## 2020-09-25 ENCOUNTER — HOSPITAL ENCOUNTER (OUTPATIENT)
Dept: RADIOLOGY | Facility: HOSPITAL | Age: 60
Discharge: HOME | End: 2020-09-25
Attending: FAMILY MEDICINE
Payer: COMMERCIAL

## 2020-09-25 DIAGNOSIS — M79.89 PAIN AND SWELLING OF LOWER LEG, LEFT: ICD-10-CM

## 2020-09-25 DIAGNOSIS — M79.662 PAIN AND SWELLING OF LOWER LEG, LEFT: ICD-10-CM

## 2020-09-25 PROCEDURE — 76882 US LMTD JT/FCL EVL NVASC XTR: CPT | Mod: TC,RT

## 2020-09-25 PROCEDURE — 76882 US LMTD JT/FCL EVL NVASC XTR: CPT | Mod: 26,RT,, | Performed by: RADIOLOGY

## 2020-09-25 PROCEDURE — 76882 US EXTREMITY NON VASCULAR LIMITED RIGHT: ICD-10-PCS | Mod: 26,RT,, | Performed by: RADIOLOGY

## 2020-09-28 ENCOUNTER — TELEPHONE (OUTPATIENT)
Dept: ORTHOPEDICS | Facility: CLINIC | Age: 60
End: 2020-09-28

## 2020-09-28 ENCOUNTER — PATIENT MESSAGE (OUTPATIENT)
Dept: INTERNAL MEDICINE | Facility: CLINIC | Age: 60
End: 2020-09-28

## 2020-09-28 DIAGNOSIS — M79.89 PAIN AND SWELLING OF LOWER LEG, LEFT: ICD-10-CM

## 2020-09-28 DIAGNOSIS — M79.662 PAIN AND SWELLING OF LOWER LEG, LEFT: ICD-10-CM

## 2020-09-28 DIAGNOSIS — M71.20 SYNOVIAL CYST OF POPLITEAL SPACE, UNSPECIFIED LATERALITY: Primary | ICD-10-CM

## 2020-09-29 ENCOUNTER — TELEPHONE (OUTPATIENT)
Dept: ORTHOPEDICS | Facility: CLINIC | Age: 60
End: 2020-09-29

## 2020-09-30 ENCOUNTER — TELEPHONE (OUTPATIENT)
Dept: ORTHOPEDICS | Facility: CLINIC | Age: 60
End: 2020-09-30

## 2020-10-01 DIAGNOSIS — M25.562 LEFT KNEE PAIN, UNSPECIFIED CHRONICITY: Primary | ICD-10-CM

## 2020-10-02 ENCOUNTER — OFFICE VISIT (OUTPATIENT)
Dept: ORTHOPEDICS | Facility: CLINIC | Age: 60
End: 2020-10-02
Payer: COMMERCIAL

## 2020-10-02 ENCOUNTER — HOSPITAL ENCOUNTER (OUTPATIENT)
Dept: RADIOLOGY | Facility: HOSPITAL | Age: 60
Discharge: HOME | End: 2020-10-02
Attending: STUDENT IN AN ORGANIZED HEALTH CARE EDUCATION/TRAINING PROGRAM
Payer: COMMERCIAL

## 2020-10-02 VITALS
WEIGHT: 222 LBS | BODY MASS INDEX: 36.99 KG/M2 | HEIGHT: 65 IN | SYSTOLIC BLOOD PRESSURE: 143 MMHG | HEART RATE: 65 BPM | DIASTOLIC BLOOD PRESSURE: 84 MMHG

## 2020-10-02 DIAGNOSIS — M25.562 LEFT KNEE PAIN, UNSPECIFIED CHRONICITY: ICD-10-CM

## 2020-10-02 DIAGNOSIS — M71.20 SYNOVIAL CYST OF POPLITEAL SPACE, UNSPECIFIED LATERALITY: ICD-10-CM

## 2020-10-02 DIAGNOSIS — M17.11 PRIMARY OSTEOARTHRITIS OF RIGHT KNEE: Primary | ICD-10-CM

## 2020-10-02 PROCEDURE — 73564 XR KNEE ORTHO RIGHT WITH FLEXION: ICD-10-PCS | Mod: 26,RT,, | Performed by: RADIOLOGY

## 2020-10-02 PROCEDURE — 3077F PR MOST RECENT SYSTOLIC BLOOD PRESSURE >= 140 MM HG: ICD-10-PCS | Mod: CPTII,S$GLB,, | Performed by: STUDENT IN AN ORGANIZED HEALTH CARE EDUCATION/TRAINING PROGRAM

## 2020-10-02 PROCEDURE — 3079F PR MOST RECENT DIASTOLIC BLOOD PRESSURE 80-89 MM HG: ICD-10-PCS | Mod: CPTII,S$GLB,, | Performed by: STUDENT IN AN ORGANIZED HEALTH CARE EDUCATION/TRAINING PROGRAM

## 2020-10-02 PROCEDURE — 3008F BODY MASS INDEX DOCD: CPT | Mod: CPTII,S$GLB,, | Performed by: STUDENT IN AN ORGANIZED HEALTH CARE EDUCATION/TRAINING PROGRAM

## 2020-10-02 PROCEDURE — 99999 PR PBB SHADOW E&M-EST. PATIENT-LVL IV: CPT | Mod: PBBFAC,,, | Performed by: STUDENT IN AN ORGANIZED HEALTH CARE EDUCATION/TRAINING PROGRAM

## 2020-10-02 PROCEDURE — 99203 OFFICE O/P NEW LOW 30 MIN: CPT | Mod: S$GLB,,, | Performed by: STUDENT IN AN ORGANIZED HEALTH CARE EDUCATION/TRAINING PROGRAM

## 2020-10-02 PROCEDURE — 73562 XR KNEE ORTHO RIGHT WITH FLEXION: ICD-10-PCS | Mod: 26,LT,, | Performed by: RADIOLOGY

## 2020-10-02 PROCEDURE — 73562 X-RAY EXAM OF KNEE 3: CPT | Mod: 26,LT,, | Performed by: RADIOLOGY

## 2020-10-02 PROCEDURE — 73562 X-RAY EXAM OF KNEE 3: CPT | Mod: TC,LT

## 2020-10-02 PROCEDURE — 99203 PR OFFICE/OUTPT VISIT, NEW, LEVL III, 30-44 MIN: ICD-10-PCS | Mod: S$GLB,,, | Performed by: STUDENT IN AN ORGANIZED HEALTH CARE EDUCATION/TRAINING PROGRAM

## 2020-10-02 PROCEDURE — 3008F PR BODY MASS INDEX (BMI) DOCUMENTED: ICD-10-PCS | Mod: CPTII,S$GLB,, | Performed by: STUDENT IN AN ORGANIZED HEALTH CARE EDUCATION/TRAINING PROGRAM

## 2020-10-02 PROCEDURE — 3077F SYST BP >= 140 MM HG: CPT | Mod: CPTII,S$GLB,, | Performed by: STUDENT IN AN ORGANIZED HEALTH CARE EDUCATION/TRAINING PROGRAM

## 2020-10-02 PROCEDURE — 73564 X-RAY EXAM KNEE 4 OR MORE: CPT | Mod: 26,RT,, | Performed by: RADIOLOGY

## 2020-10-02 PROCEDURE — 3079F DIAST BP 80-89 MM HG: CPT | Mod: CPTII,S$GLB,, | Performed by: STUDENT IN AN ORGANIZED HEALTH CARE EDUCATION/TRAINING PROGRAM

## 2020-10-02 PROCEDURE — 99999 PR PBB SHADOW E&M-EST. PATIENT-LVL IV: ICD-10-PCS | Mod: PBBFAC,,, | Performed by: STUDENT IN AN ORGANIZED HEALTH CARE EDUCATION/TRAINING PROGRAM

## 2020-10-02 RX ORDER — MELOXICAM 7.5 MG/1
7.5 TABLET ORAL DAILY
Qty: 30 TABLET | Refills: 0 | Status: SHIPPED | OUTPATIENT
Start: 2020-10-02 | End: 2020-11-11

## 2020-10-02 NOTE — PROGRESS NOTES
Orthopaedics Sports Medicine     Knee Initial Visit         10/2/2020  12:21 PM    Referring MD: Robina Momin MD    Chief Complaint   Patient presents with    Right Knee - Pain, Swelling    Left Knee - Pain, Swelling       History of Present Illness:   Karla Arzola is a 60 y.o. year old female patient presents with pain and dysfunction involving the RIGHT knee. Onset of the symptoms was 3 months ago. Inciting event:gradual onset. Current symptoms include pain at end of day or with impact activities. Pain is aggravated by prolonged standing, squatting, prolonged walking.  Evaluation to date: XR. Treatment to date: rest, activity modification, ice, NSAIDs, acetaminophen.     Briefly this is a pleasant 60-year-old woman who is here today with several months of right knee pain.  It is worse at the end of the day and after prolonged standing or walking.  She also has significant pain with squatting.  She reports that the pain is mostly posterior in the knee and she can feel a soft tissue bulge back there.  He is here today to discuss treatment options.      Past Medical History:   Past Medical History:   Diagnosis Date    Allergy     Hypertension     Obesity     Vitamin B 12 deficiency        Past Surgical History:   Past Surgical History:   Procedure Laterality Date    right knee scope      TUBAL LIGATION         Medications:  Patient's Medications   New Prescriptions    MELOXICAM (MOBIC) 7.5 MG TABLET    Take 1 tablet (7.5 mg total) by mouth once daily.   Previous Medications    ACYCLOVIR (ZOVIRAX) 200 MG CAPSULE    Take 1 capsule (200 mg total) by mouth once daily.    ALBUTEROL (VENTOLIN HFA) 90 MCG/ACTUATION INHALER    Inhale 2 puffs into the lungs every 6 (six) hours as needed for Wheezing. Rescue    ALPRAZOLAM (XANAX) 0.25 MG TABLET    Take 1 tablet (0.25 mg total) by mouth 3 (three) times daily as needed for Insomnia or Anxiety.    AMLODIPINE (NORVASC) 10 MG TABLET    Take 1 tablet (10 mg  "total) by mouth once daily.    CITALOPRAM (CELEXA) 10 MG TABLET    Take 1 tablet (10 mg total) by mouth once daily.    CYANOCOBALAMIN (VITAMIN B-12) 100 MCG TABLET    Take 100 mcg by mouth once daily.    FLUTICASONE (FLONASE) 50 MCG/ACTUATION NASAL SPRAY    1 spray (50 mcg total) by Each Nare route once daily.    MELOXICAM (MOBIC) 7.5 MG TABLET    Take 1 tablet (7.5 mg total) by mouth once daily.    MULTIVITAMIN CAPSULE    Take 1 capsule by mouth once daily.    SODIUM,POTASSIUM,MAG SULFATES (SUPREP BOWEL PREP KIT) 17.5-3.13-1.6 GRAM SOLR    As directed   Modified Medications    No medications on file   Discontinued Medications    No medications on file        Allergies: Review of patient's allergies indicates:  No Known Allergies    Social History:   [place]: [place]  occupation: nurse in GI at [employer]  alcohol use: She reports current alcohol use.  tobacco use: She reports that she has never smoked. She has never used smokeless tobacco.    Review of systems:  history of recent illness, fevers, shakes, or chills: no  history of cardiac problems or chest pain: no  history of of pulmonary problems or asthma: no  history of diabetes: no  history of prior DVT or clotting problems: no  history of sleep apnea: no    Physical Examination:  Estimated body mass index is 36.94 kg/m² as calculated from the following:    Height as of this encounter: 5' 5" (1.651 m).    Weight as of this encounter: 100.7 kg (222 lb).    Standing exam  stance: slight valgus alignment, no significant leg-length discrepancy  gait: antalgic      Knee      RIGHT  LEFT  Skin:     Intact   Intact  ROM:     0-100  0-130  Effusion:    trace   Neg  Medial joint line tenderness:  Neg   Neg  Lateral joint line tenderness:  +   Neg  Paulino:     +   Neg  Patella crepitus:   Neg   Neg  Patella tenderness:   +   Neg  Patella grind:      Neg   Neg  Lachman:    Neg   Neg  Pivot shift:    Neg   Neg  Valgus stress:    Neg   Neg  Varus stress:    Neg   " Neg  Posterior drawer:   Neg   Neg  N-V               intact  intact  Hip:    nml    nml   Lower extremity edema: Negative negative    Neurovascular exam  - motor function grossly intact bilaterally to hip flexion, knee extension and flexion, ankle dorsiflexion and plantarflexion  - sensation intact to light touch bilaterally to femoral, tibial, tibial and peroneal distributions  - symmetrical pedal pulses    Imaging:  X-ray Knee Ortho Right with Flexion  Narrative: EXAMINATION:  XR KNEE ORTHO RIGHT WITH FLEXION    CLINICAL HISTORY:  Pain in left knee    TECHNIQUE:  AP standing as well as PA flexion standing and Merchant views of both knees were performed.  A lateral view of the right knee is also performed.    COMPARISON:  Knee radiographs March 13, 2019    FINDINGS:  Mild right knee medial compartment joint space narrowing is noted.  Small osteophytes are seen along the margin of the right knee lateral compartment.  Patellofemoral compartment of the right knee is unremarkable.  No right knee fracture.  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  There is no right knee effusion.    Limited evaluation of the left knee is unremarkable.  Impression: No acute abnormality.  Mild right knee medial and lateral compartment osteoarthritis.    Electronically signed by: Atif Ribeiro  Date:    10/02/2020  Time:    11:56       Impression:  60 y.o. female with     ICD-10-CM ICD-9-CM    1. Synovial cyst of popliteal space, unspecified laterality  M71.20 727.51 Ambulatory referral/consult to Orthopedics   2. Pain and swelling of lower leg, left  M79.662 729.5 Ambulatory referral/consult to Orthopedics    M79.89 729.81        Plan:  Discussed treatment options with her today and I outlined a treatment plan.  She does have some medial joint space narrowing on the right as well as some small lateral osteophytes.  She has a history of previous  right knee meniscectomy.  She is starting to get some early degenerative changes in the knee and her posterior swelling is most likely a result of knee joint swelling in general.  The swelling is most likely a result of a flare of her progressing knee arthritis.  We decided today that she will do a trial of meloxicam to be taken once daily for 1 month.  If this is not alleviate her symptoms and we will proceed with MRI of the knee as she does have some findings consistent with posterior root tear.  Follow up 1 month        Larry Adams MD

## 2020-10-02 NOTE — LETTER
October 2, 2020      Robina Momin MD  [id number] [place] [address] [address] [place] [address]           [facility]  [address] [facility]  [place] [address]  Phone: [phone]  Fax: [phone]          Patient: Karla Arzola   MR Number: [id number]   YOB: 1960   Date of Visit: 10/2/2020       Dear Dr. Robina Momin:    Thank you for referring Karla Arzola to me for evaluation. Attached you will find relevant portions of my assessment and plan of care.    If you have questions, please do not hesitate to call me. I look forward to following Karla Arzola along with you.    Sincerely,    Larry Adams MD    Enclosure  CC:  No Recipients    If you would like to receive this communication electronically, please contact [email or web address] or [phone] to request more information on [facility] access.    For providers and/or their staff who would like to refer a patient to [facility], please contact us through our one-stop-shop provider referral line, [facility], at [phone].    If you feel you have received this communication in error or would no longer like to receive these types of communications, please e-mail [email or web address]

## 2020-10-03 DIAGNOSIS — Z12.11 SPECIAL SCREENING FOR MALIGNANT NEOPLASM OF COLON: Primary | ICD-10-CM

## 2020-10-05 ENCOUNTER — PATIENT MESSAGE (OUTPATIENT)
Dept: ADMINISTRATIVE | Facility: HOSPITAL | Age: 60
End: 2020-10-05

## 2020-10-05 ENCOUNTER — PATIENT OUTREACH (OUTPATIENT)
Dept: ADMINISTRATIVE | Facility: HOSPITAL | Age: 60
End: 2020-10-05

## 2020-10-06 ENCOUNTER — PATIENT MESSAGE (OUTPATIENT)
Dept: ADMINISTRATIVE | Facility: HOSPITAL | Age: 60
End: 2020-10-06

## 2020-11-04 ENCOUNTER — PATIENT OUTREACH (OUTPATIENT)
Dept: ADMINISTRATIVE | Facility: OTHER | Age: 60
End: 2020-11-04

## 2020-11-06 ENCOUNTER — TELEPHONE (OUTPATIENT)
Dept: ORTHOPEDICS | Facility: CLINIC | Age: 60
End: 2020-11-06

## 2020-11-06 ENCOUNTER — OFFICE VISIT (OUTPATIENT)
Dept: ORTHOPEDICS | Facility: CLINIC | Age: 60
End: 2020-11-06
Payer: COMMERCIAL

## 2020-11-06 VITALS
BODY MASS INDEX: 36.99 KG/M2 | HEIGHT: 65 IN | WEIGHT: 222 LBS | SYSTOLIC BLOOD PRESSURE: 136 MMHG | DIASTOLIC BLOOD PRESSURE: 77 MMHG | HEART RATE: 92 BPM

## 2020-11-06 DIAGNOSIS — S83.241A ACUTE MEDIAL MENISCUS TEAR, RIGHT, INITIAL ENCOUNTER: ICD-10-CM

## 2020-11-06 DIAGNOSIS — S83.241D ACUTE MEDIAL MENISCUS TEAR OF RIGHT KNEE, SUBSEQUENT ENCOUNTER: Primary | ICD-10-CM

## 2020-11-06 DIAGNOSIS — M17.11 PRIMARY OSTEOARTHRITIS OF RIGHT KNEE: Primary | ICD-10-CM

## 2020-11-06 PROCEDURE — 3075F SYST BP GE 130 - 139MM HG: CPT | Mod: CPTII,S$GLB,, | Performed by: STUDENT IN AN ORGANIZED HEALTH CARE EDUCATION/TRAINING PROGRAM

## 2020-11-06 PROCEDURE — 99213 PR OFFICE/OUTPT VISIT, EST, LEVL III, 20-29 MIN: ICD-10-PCS | Mod: S$GLB,,, | Performed by: STUDENT IN AN ORGANIZED HEALTH CARE EDUCATION/TRAINING PROGRAM

## 2020-11-06 PROCEDURE — 99999 PR PBB SHADOW E&M-EST. PATIENT-LVL IV: ICD-10-PCS | Mod: PBBFAC,,, | Performed by: STUDENT IN AN ORGANIZED HEALTH CARE EDUCATION/TRAINING PROGRAM

## 2020-11-06 PROCEDURE — 3078F PR MOST RECENT DIASTOLIC BLOOD PRESSURE < 80 MM HG: ICD-10-PCS | Mod: CPTII,S$GLB,, | Performed by: STUDENT IN AN ORGANIZED HEALTH CARE EDUCATION/TRAINING PROGRAM

## 2020-11-06 PROCEDURE — 3078F DIAST BP <80 MM HG: CPT | Mod: CPTII,S$GLB,, | Performed by: STUDENT IN AN ORGANIZED HEALTH CARE EDUCATION/TRAINING PROGRAM

## 2020-11-06 PROCEDURE — 3008F PR BODY MASS INDEX (BMI) DOCUMENTED: ICD-10-PCS | Mod: CPTII,S$GLB,, | Performed by: STUDENT IN AN ORGANIZED HEALTH CARE EDUCATION/TRAINING PROGRAM

## 2020-11-06 PROCEDURE — 99213 OFFICE O/P EST LOW 20 MIN: CPT | Mod: S$GLB,,, | Performed by: STUDENT IN AN ORGANIZED HEALTH CARE EDUCATION/TRAINING PROGRAM

## 2020-11-06 PROCEDURE — 99999 PR PBB SHADOW E&M-EST. PATIENT-LVL IV: CPT | Mod: PBBFAC,,, | Performed by: STUDENT IN AN ORGANIZED HEALTH CARE EDUCATION/TRAINING PROGRAM

## 2020-11-06 PROCEDURE — 3075F PR MOST RECENT SYSTOLIC BLOOD PRESS GE 130-139MM HG: ICD-10-PCS | Mod: CPTII,S$GLB,, | Performed by: STUDENT IN AN ORGANIZED HEALTH CARE EDUCATION/TRAINING PROGRAM

## 2020-11-06 PROCEDURE — 3008F BODY MASS INDEX DOCD: CPT | Mod: CPTII,S$GLB,, | Performed by: STUDENT IN AN ORGANIZED HEALTH CARE EDUCATION/TRAINING PROGRAM

## 2020-11-06 NOTE — PROGRESS NOTES
Orthopaedic Follow-Up Visit    Last Appointment: 10/2/2020  Diagnosis: Right knee pain  Prior Procedure: none    Karla Arzola is a 60 y.o. female who is here for f/u evaluation of right knee pain. The patient was last seen here by me on 10/2 at which point we decided to do a trial of meloxicam prior to considering further treatment options. The patient returns today reporting that the symptoms have persisted despite the daily anti-inflammatories and is interested in proceeding with additional treatment options.     To review her history, this is a pleasant 60-year-old woman who has had several months of right knee pain.  It is worse at the end of the day and after prolonged standing or walking.  She also has significant pain with squatting.  She reports that the pain is mostly posterior in the knee and she can feel a soft tissue bulge back there.      Patient's medications, allergies, past medical, surgical, social and family histories were reviewed and updated as appropriate.    Review of Systems   All systems reviewed were negative.  Specifically, the patient denies fever, chills, weight loss, chest pain, shortness of breath, or dyspnea on exertion.      Past Medical History:   Diagnosis Date    Allergy     Hypertension     Obesity     Vitamin B 12 deficiency        Past Surgical History:   Procedure Laterality Date    right knee scope      TUBAL LIGATION         Patient's Medications   New Prescriptions    No medications on file   Previous Medications    ACYCLOVIR (ZOVIRAX) 200 MG CAPSULE    Take 1 capsule (200 mg total) by mouth once daily.    ALBUTEROL (VENTOLIN HFA) 90 MCG/ACTUATION INHALER    Inhale 2 puffs into the lungs every 6 (six) hours as needed for Wheezing. Rescue    ALPRAZOLAM (XANAX) 0.25 MG TABLET    Take 1 tablet (0.25 mg total) by mouth 3 (three) times daily as needed for Insomnia or Anxiety.    AMLODIPINE (NORVASC) 10 MG TABLET    Take 1 tablet (10 mg total) by mouth once daily.     CITALOPRAM (CELEXA) 10 MG TABLET    Take 1 tablet (10 mg total) by mouth once daily.    CYANOCOBALAMIN (VITAMIN B-12) 100 MCG TABLET    Take 100 mcg by mouth once daily.    FLUTICASONE (FLONASE) 50 MCG/ACTUATION NASAL SPRAY    1 spray (50 mcg total) by Each Nare route once daily.    MELOXICAM (MOBIC) 7.5 MG TABLET    Take 1 tablet (7.5 mg total) by mouth once daily.    MELOXICAM (MOBIC) 7.5 MG TABLET    Take 1 tablet (7.5 mg total) by mouth once daily.    MULTIVITAMIN CAPSULE    Take 1 capsule by mouth once daily.    SODIUM,POTASSIUM,MAG SULFATES (SUPREP BOWEL PREP KIT) 17.5-3.13-1.6 GRAM SOLR    As directed   Modified Medications    No medications on file   Discontinued Medications    No medications on file       Family History   Problem Relation Age of Onset    Diabetes Mother     Hypertension Mother     Stroke Mother     Hypertension Father     Hyperlipidemia Father     Kidney disease Father        Review of patient's allergies indicates:  No Known Allergies      Objective:      Physical Exam  Patient is alert and oriented, no distress. Skin is intact. Neuro is normal with no focal motor or sensory findings.    Knee      RIGHT  LEFT  Skin:     Intact   Intact  ROM:     0-100  0-130  Effusion:    ++   Neg  Medial joint line tenderness:  ++   Neg  Lateral joint line tenderness:  Neg   Neg  Paulino:     ++   Neg  Patella crepitus:   Neg   Neg  Patella tenderness:   +   Neg  Patella grind:      Neg   Neg  Lachman:    Neg   Neg  Pivot shift:    Neg   Neg  Valgus stress:    Neg   Neg  Varus stress:    Neg   Neg  Posterior drawer:   Neg   Neg  N-V               intact  intact  Hip:    nml    nml   Lower extremity edema: Negative negative      Imaging:    X-ray Knee Ortho Right with Flexion  Narrative: EXAMINATION:  XR KNEE ORTHO RIGHT WITH FLEXION    CLINICAL HISTORY:  Pain in left knee    TECHNIQUE:  AP standing as well as PA flexion standing and Merchant views of both knees were performed.  A lateral view of  the right knee is also performed.    COMPARISON:  Knee radiographs March 13, 2019    FINDINGS:  Mild right knee medial compartment joint space narrowing is noted.  Small osteophytes are seen along the margin of the right knee lateral compartment.  Patellofemoral compartment of the right knee is unremarkable.  No right knee fracture.  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  There is no right knee effusion.    Limited evaluation of the left knee is unremarkable.  Impression: No acute abnormality.  Mild right knee medial and lateral compartment osteoarthritis.    Electronically signed by: Atif Ribeiro  Date:    10/02/2020  Time:    11:56       Physician read: I agree with the above impression.    Assessment/Plan:   Karla Arzola is a 60 y.o. female with right knee pain and swelling, highly suspicious for meniscus tear    Plan:    1. Discussed treatment options with her today including operative and nonoperative treatments.  I suspect she has a meniscus tear based on her history, clinical exam findings, and x-ray images.   2. I recommended MRI to further evaluate intra-articular pathology, specifically her meniscus.  3. She has a more degenerative type tear that we can discuss corticosteroid injection versus debridement.  If she has a posterior root tear that will discuss operative fixation.  4. She will follow up after MRI.        Larry Adams MD

## 2020-11-18 ENCOUNTER — HOSPITAL ENCOUNTER (OUTPATIENT)
Dept: RADIOLOGY | Facility: HOSPITAL | Age: 60
Discharge: HOME | End: 2020-11-18
Attending: STUDENT IN AN ORGANIZED HEALTH CARE EDUCATION/TRAINING PROGRAM
Payer: COMMERCIAL

## 2020-11-18 DIAGNOSIS — S83.241A ACUTE MEDIAL MENISCUS TEAR, RIGHT, INITIAL ENCOUNTER: ICD-10-CM

## 2020-11-18 PROCEDURE — 73721 MRI KNEE WITHOUT CONTRAST RIGHT: ICD-10-PCS | Mod: 26,RT,, | Performed by: RADIOLOGY

## 2020-11-18 PROCEDURE — 73721 MRI JNT OF LWR EXTRE W/O DYE: CPT | Mod: TC,RT

## 2020-11-18 PROCEDURE — 73721 MRI JNT OF LWR EXTRE W/O DYE: CPT | Mod: 26,RT,, | Performed by: RADIOLOGY

## 2020-11-27 ENCOUNTER — OFFICE VISIT (OUTPATIENT)
Dept: ORTHOPEDICS | Facility: CLINIC | Age: 60
End: 2020-11-27
Payer: COMMERCIAL

## 2020-11-27 VITALS
HEART RATE: 81 BPM | WEIGHT: 222 LBS | BODY MASS INDEX: 36.99 KG/M2 | DIASTOLIC BLOOD PRESSURE: 74 MMHG | SYSTOLIC BLOOD PRESSURE: 138 MMHG | HEIGHT: 65 IN

## 2020-11-27 DIAGNOSIS — M17.11 PRIMARY OSTEOARTHRITIS OF RIGHT KNEE: Primary | ICD-10-CM

## 2020-11-27 PROCEDURE — 1125F PR PAIN SEVERITY QUANTIFIED, PAIN PRESENT: ICD-10-PCS | Mod: S$GLB,,, | Performed by: STUDENT IN AN ORGANIZED HEALTH CARE EDUCATION/TRAINING PROGRAM

## 2020-11-27 PROCEDURE — 99213 OFFICE O/P EST LOW 20 MIN: CPT | Mod: S$GLB,,, | Performed by: STUDENT IN AN ORGANIZED HEALTH CARE EDUCATION/TRAINING PROGRAM

## 2020-11-27 PROCEDURE — 3008F PR BODY MASS INDEX (BMI) DOCUMENTED: ICD-10-PCS | Mod: CPTII,S$GLB,, | Performed by: STUDENT IN AN ORGANIZED HEALTH CARE EDUCATION/TRAINING PROGRAM

## 2020-11-27 PROCEDURE — 3075F PR MOST RECENT SYSTOLIC BLOOD PRESS GE 130-139MM HG: ICD-10-PCS | Mod: CPTII,S$GLB,, | Performed by: STUDENT IN AN ORGANIZED HEALTH CARE EDUCATION/TRAINING PROGRAM

## 2020-11-27 PROCEDURE — 3075F SYST BP GE 130 - 139MM HG: CPT | Mod: CPTII,S$GLB,, | Performed by: STUDENT IN AN ORGANIZED HEALTH CARE EDUCATION/TRAINING PROGRAM

## 2020-11-27 PROCEDURE — 3078F PR MOST RECENT DIASTOLIC BLOOD PRESSURE < 80 MM HG: ICD-10-PCS | Mod: CPTII,S$GLB,, | Performed by: STUDENT IN AN ORGANIZED HEALTH CARE EDUCATION/TRAINING PROGRAM

## 2020-11-27 PROCEDURE — 99213 PR OFFICE/OUTPT VISIT, EST, LEVL III, 20-29 MIN: ICD-10-PCS | Mod: S$GLB,,, | Performed by: STUDENT IN AN ORGANIZED HEALTH CARE EDUCATION/TRAINING PROGRAM

## 2020-11-27 PROCEDURE — 99999 PR PBB SHADOW E&M-EST. PATIENT-LVL III: CPT | Mod: PBBFAC,,, | Performed by: STUDENT IN AN ORGANIZED HEALTH CARE EDUCATION/TRAINING PROGRAM

## 2020-11-27 PROCEDURE — 1125F AMNT PAIN NOTED PAIN PRSNT: CPT | Mod: S$GLB,,, | Performed by: STUDENT IN AN ORGANIZED HEALTH CARE EDUCATION/TRAINING PROGRAM

## 2020-11-27 PROCEDURE — 3078F DIAST BP <80 MM HG: CPT | Mod: CPTII,S$GLB,, | Performed by: STUDENT IN AN ORGANIZED HEALTH CARE EDUCATION/TRAINING PROGRAM

## 2020-11-27 PROCEDURE — 3008F BODY MASS INDEX DOCD: CPT | Mod: CPTII,S$GLB,, | Performed by: STUDENT IN AN ORGANIZED HEALTH CARE EDUCATION/TRAINING PROGRAM

## 2020-11-27 PROCEDURE — 99999 PR PBB SHADOW E&M-EST. PATIENT-LVL III: ICD-10-PCS | Mod: PBBFAC,,, | Performed by: STUDENT IN AN ORGANIZED HEALTH CARE EDUCATION/TRAINING PROGRAM

## 2020-11-27 NOTE — PROGRESS NOTES
Orthopaedic Follow-Up Visit    Last Appointment: 11/6/2020  Diagnosis: right knee chondromalacia, possible meniscus tear  Prior Procedure: none    Karla Arzola is a 60 y.o. female who is here for f/u evaluation of right knee pain. The patient was last seen here by me on 11/6/2020 at which point we decided to send her for MRI prior to considering further treatment options. The patient returns today reporting that the symptoms have persisted and is interested in proceeding with additional treatment options.     To review her history, this is a pleasant 60-year-old woman who has had several months of right knee pain.  It is worse at the end of the day and after prolonged standing or walking.  She also has significant pain with squatting.  She reports that the pain is mostly posterior in the knee and she can feel a soft tissue bulge back there.     Patient's medications, allergies, past medical, surgical, social and family histories were reviewed and updated as appropriate.    Review of Systems   All systems reviewed were negative.  Specifically, the patient denies fever, chills, weight loss, chest pain, shortness of breath, or dyspnea on exertion.      Past Medical History:   Diagnosis Date    Allergy     Hypertension     Obesity     Vitamin B 12 deficiency        Past Surgical History:   Procedure Laterality Date    right knee scope      TUBAL LIGATION         Patient's Medications   New Prescriptions    No medications on file   Previous Medications    ACYCLOVIR (ZOVIRAX) 200 MG CAPSULE    Take 1 capsule (200 mg total) by mouth once daily.    ALBUTEROL (VENTOLIN HFA) 90 MCG/ACTUATION INHALER    Inhale 2 puffs into the lungs every 6 (six) hours as needed for Wheezing. Rescue    ALPRAZOLAM (XANAX) 0.25 MG TABLET    Take 1 tablet (0.25 mg total) by mouth 3 (three) times daily as needed for Insomnia or Anxiety.    AMLODIPINE (NORVASC) 10 MG TABLET    Take 1 tablet (10 mg total) by mouth once daily.     CITALOPRAM (CELEXA) 10 MG TABLET    Take 1 tablet (10 mg total) by mouth once daily.    CYANOCOBALAMIN (VITAMIN B-12) 100 MCG TABLET    Take 100 mcg by mouth once daily.    FLUTICASONE (FLONASE) 50 MCG/ACTUATION NASAL SPRAY    1 spray (50 mcg total) by Each Nare route once daily.    MELOXICAM (MOBIC) 7.5 MG TABLET    Take 1 tablet (7.5 mg total) by mouth once daily.    MULTIVITAMIN CAPSULE    Take 1 capsule by mouth once daily.    SODIUM,POTASSIUM,MAG SULFATES (SUPREP BOWEL PREP KIT) 17.5-3.13-1.6 GRAM SOLR    As directed   Modified Medications    No medications on file   Discontinued Medications    No medications on file       Family History   Problem Relation Age of Onset    Diabetes Mother     Hypertension Mother     Stroke Mother     Hypertension Father     Hyperlipidemia Father     Kidney disease Father        Review of patient's allergies indicates:  No Known Allergies      Objective:      Physical Exam  Patient is alert and oriented, no distress. Skin is intact. Neuro is normal with no focal motor or sensory findings.    Knee      RIGHT  LEFT  Skin:     Intact   Intact  ROM:     0-130  0-130  Effusion:    Neg   Neg  Medial joint line tenderness:  +   Neg  Lateral joint line tenderness:  Neg   Neg  Paulino:     Neg   Neg  Patella crepitus:   Neg   Neg  Patella tenderness:   +   Neg  Patella grind:      Neg   Neg  Lachman:    Neg   Neg  Pivot shift:    Neg   Neg  Valgus stress:    Neg   Neg  Varus stress:    Neg   Neg  Posterior drawer:   Neg   Neg  N-V               intact  intact  Hip:    nml    nml   Lower extremity edema: Negative negative      Imaging:    MRI Knee Without Contrast Right  Narrative: EXAMINATION:  MRI KNEE WITHOUT CONTRAST RIGHT    CLINICAL HISTORY:  right knee pain;Other tear of medial meniscus, current injury, right knee, initial encounter    TECHNIQUE:  Multiplanar, multisequence images were preformed of the right knee.    COMPARISON:  10/02/2020, MRI dated  02/13/2008    FINDINGS:  Menisci:  There is mild surface irregularity seen at the free edge of the posterior horn of the medial meniscus, concerning for small free edge tear.  The lateral meniscus appears intact.    Ligaments:  ACL, PCL, MCL, and LCL complex are intact.    Tendons:  Extensor mechanism is maintained.    Cartilage:    Patellofemoral: There is high-grade partial-thickness fissuring seen throughout the patellar cartilage as well as the medial trochlear cartilage.    Medial tibiofemoral: Partial-thickness fissuring noted throughout the central and posterior weight-bearing portions of the medial femoral condyle, likely high-grade.  There is also some full-thickness fissuring in the far posterior portion of the medial femoral condyle.    Lateral tibiofemoral: There is some low-grade partial-thickness fissuring seen in the central weight-bearing portion of the lateral femoral condyle with more high-grade fissuring in the underlying lateral tibial plateau.    Bone: There is a predominately T2 hyperintense well-circumscribed lesion in the posterior aspect of the proximal tibial metaphysis near midline which measures approximately 2.2 x 1.9 x 1.4 cm.  Lesion correlates with an area of suspected chondroid matrix seen on prior radiographs and is favored to represent a benign chondroid lesion such as enchondroma.  Lesion has minimally increased in size as compared to prior MRI performed February 2008 previously measuring 1.9 x 1.6 x 1.1 cm.  No appreciable cortical thinning, endosteal scalloping, or cortical breakthrough.  No evidence of fracture or marrow replacement process.    Miscellaneous: There is a small joint effusion present.  There is fluid distention of the semimembranosus/tibial collateral ligament bursa, in keeping with bursitis.  Impression: 1. Suspect small free edge tear at the posterior horn of the medial meniscus.  2. Tricompartmental articular cartilage loss as above.  3. Findings suggesting  semimembranosus/tibial collateral ligament bursitis  4. T2 hyperintense lesion in the proximal tibia measuring up to 2.2 cm, favored to represent a benign chondroid lesion such as enchondroma.  Finding demonstrates minimal increased in size as compared to prior MRI performed in 2008    Electronically signed by: Noah Jackson MD  Date:    11/19/2020  Time:    09:08       Physician read: I agree with the above impression.    Assessment/Plan:   Karla Arzola is a 60 y.o. female with right knee tricompartmental cartilage loss    Plan:    1. S diagnosis and treatment options with her today.  2. She has tricompartmental cartilage loss with intermittent areas of fissuring.  I do not see any definite meniscus tear with displaced fragment.  There is a subtle free edge irregularity.  3. Discussed that overall this MRI is consistent with degenerative changes.  I recommend exhausting all nonoperative treatments.  I do not think that arthroscopy with debridement would give her predictable results.  4. Offered her a corticosteroid injection in the knee today.  She would like to hold off on this for now.  She will call us in the future if she decides to move forward with this.  5. Follow up as needed        Larry Adams MD

## 2020-12-10 ENCOUNTER — PATIENT OUTREACH (OUTPATIENT)
Dept: ADMINISTRATIVE | Facility: HOSPITAL | Age: 60
End: 2020-12-10

## 2020-12-11 ENCOUNTER — PATIENT MESSAGE (OUTPATIENT)
Dept: OTHER | Facility: OTHER | Age: 60
End: 2020-12-11

## 2020-12-29 ENCOUNTER — PATIENT MESSAGE (OUTPATIENT)
Dept: INTERNAL MEDICINE | Facility: CLINIC | Age: 60
End: 2020-12-29

## 2020-12-29 ENCOUNTER — OFFICE VISIT (OUTPATIENT)
Dept: INTERNAL MEDICINE | Facility: CLINIC | Age: 60
End: 2020-12-29
Payer: COMMERCIAL

## 2020-12-29 DIAGNOSIS — J40 BRONCHITIS: Primary | ICD-10-CM

## 2020-12-29 DIAGNOSIS — R06.2 WHEEZING: ICD-10-CM

## 2020-12-29 PROCEDURE — 99442 PR PHYSICIAN TELEPHONE EVALUATION 11-20 MIN: CPT | Mod: 95,,, | Performed by: FAMILY MEDICINE

## 2020-12-29 PROCEDURE — 99442 PR PHYSICIAN TELEPHONE EVALUATION 11-20 MIN: ICD-10-PCS | Mod: 95,,, | Performed by: FAMILY MEDICINE

## 2020-12-29 RX ORDER — PREDNISONE 20 MG/1
TABLET ORAL
Qty: 14 TABLET | Refills: 0 | Status: SHIPPED | OUTPATIENT
Start: 2020-12-29 | End: 2021-12-03

## 2020-12-29 RX ORDER — DOXYCYCLINE 100 MG/1
100 CAPSULE ORAL 2 TIMES DAILY
Qty: 14 CAPSULE | Refills: 0 | Status: SHIPPED | OUTPATIENT
Start: 2020-12-29 | End: 2021-01-05

## 2020-12-29 NOTE — LETTER
December 29, 2020      [facility]  [address] [place] [address]  Phone: [phone]  Fax: [phone]       Patient: Karla Arzola   YOB: 1960  Date of Visit: 12/29/2020    To Whom It May Concern:    Huma Arzola  was at [facility] on 12/29/2020. She may return to work on 1/4/2021 with no restrictions. If you have any questions or concerns, or if I can be of further assistance, please do not hesitate to contact me.        Sincerely,      Robina Momin MD

## 2020-12-29 NOTE — PROGRESS NOTES
Established Patient - Audio Only Telehealth Visit     The patient location is: [place]   The chief complaint leading to consultation is: cough, wheezing, shortness of breath  Visit type: Virtual visit with audio only (telephone)  Total time spent with patient: 12 minutes       The reason for the audio only service rather than synchronous audio and video virtual visit was related to technical difficulties or patient preference/necessity.     Each patient to whom I provide medical services by telemedicine is:  (1) informed of the relationship between the physician and patient and the respective role of any other health care provider with respect to management of the patient; and (2) notified that they may decline to receive medical services by telemedicine and may withdraw from such care at any time. Patient verbally consented to receive this service via voice-only telephone call.       HPI: Ms. Arzola presents today via audio only visit for bronchitis symptoms.    feeling bad since Tuesday of last week  Stuffy nose  Coughing   Can't get a good breath and inhaler is not helping  No energy to get up  Coughing up mucus  No appetite  No fever     Making self drink     Symptoms worse since Thursday    She gets bronchitis but it hasn't been this bad in a few years     Stay at home-no exposure to covid per patient      Assessment and plan:  Pt sounds congested   Coughing   Based on her history will treat for bronchitis  Discussed Xray will wait a few days   Discussed COVID will quarantine and wait on swab    Sent Combivant inhaler to replace albuterol for now  Prednisone titrated over 7 days  Doxycycline     Follow up next week or sooner if needed                         This service was not originating from a related E/M service provided within the previous 7 days nor will  to an E/M service or procedure within the next 24 hours or my soonest available appointment.  Prevailing standard of care was able to be met in  this audio-only visit.

## 2021-01-04 ENCOUNTER — HOSPITAL ENCOUNTER (OUTPATIENT)
Dept: RADIOLOGY | Facility: HOSPITAL | Age: 61
Discharge: HOME | End: 2021-01-04
Attending: FAMILY MEDICINE
Payer: COMMERCIAL

## 2021-01-04 ENCOUNTER — TELEPHONE (OUTPATIENT)
Dept: INTERNAL MEDICINE | Facility: CLINIC | Age: 61
End: 2021-01-04

## 2021-01-04 ENCOUNTER — PATIENT MESSAGE (OUTPATIENT)
Dept: INTERNAL MEDICINE | Facility: CLINIC | Age: 61
End: 2021-01-04

## 2021-01-04 DIAGNOSIS — R06.02 SHORTNESS OF BREATH: ICD-10-CM

## 2021-01-04 DIAGNOSIS — R06.02 SHORTNESS OF BREATH: Primary | ICD-10-CM

## 2021-01-04 DIAGNOSIS — R05.9 COUGH: ICD-10-CM

## 2021-01-04 PROCEDURE — 71046 XR CHEST PA AND LATERAL: ICD-10-PCS | Mod: 26,,, | Performed by: RADIOLOGY

## 2021-01-04 PROCEDURE — 71046 X-RAY EXAM CHEST 2 VIEWS: CPT | Mod: TC

## 2021-01-04 PROCEDURE — 71046 X-RAY EXAM CHEST 2 VIEWS: CPT | Mod: 26,,, | Performed by: RADIOLOGY

## 2021-01-05 ENCOUNTER — PATIENT MESSAGE (OUTPATIENT)
Dept: INTERNAL MEDICINE | Facility: CLINIC | Age: 61
End: 2021-01-05

## 2021-01-05 DIAGNOSIS — Z01.84 ANTIBODY RESPONSE EXAM: Primary | ICD-10-CM

## 2021-01-06 ENCOUNTER — LAB VISIT (OUTPATIENT)
Dept: LAB | Facility: HOSPITAL | Age: 61
End: 2021-01-06
Attending: FAMILY MEDICINE
Payer: COMMERCIAL

## 2021-01-06 DIAGNOSIS — Z01.84 ANTIBODY RESPONSE EXAM: ICD-10-CM

## 2021-01-06 PROCEDURE — 86769 SARS-COV-2 COVID-19 ANTIBODY: CPT

## 2021-01-06 PROCEDURE — 36415 COLL VENOUS BLD VENIPUNCTURE: CPT

## 2021-01-07 LAB — SARS-COV-2 IGG SERPLBLD QL IA.RAPID: POSITIVE

## 2021-01-08 ENCOUNTER — PATIENT MESSAGE (OUTPATIENT)
Dept: INTERNAL MEDICINE | Facility: CLINIC | Age: 61
End: 2021-01-08

## 2021-01-11 ENCOUNTER — IMMUNIZATION (OUTPATIENT)
Dept: INTERNAL MEDICINE | Facility: CLINIC | Age: 61
End: 2021-01-11
Payer: COMMERCIAL

## 2021-01-11 DIAGNOSIS — Z23 NEED FOR VACCINATION: ICD-10-CM

## 2021-01-11 PROCEDURE — 0001A COVID-19, MRNA, LNP-S, PF, 30 MCG/0.3 ML DOSE VACCINE: CPT | Mod: CV19,S$GLB,, | Performed by: FAMILY MEDICINE

## 2021-01-11 PROCEDURE — 0001A COVID-19, MRNA, LNP-S, PF, 30 MCG/0.3 ML DOSE VACCINE: ICD-10-PCS | Mod: CV19,S$GLB,, | Performed by: FAMILY MEDICINE

## 2021-01-11 PROCEDURE — 91300 COVID-19, MRNA, LNP-S, PF, 30 MCG/0.3 ML DOSE VACCINE: CPT | Mod: S$GLB,,, | Performed by: FAMILY MEDICINE

## 2021-01-11 PROCEDURE — 91300 COVID-19, MRNA, LNP-S, PF, 30 MCG/0.3 ML DOSE VACCINE: ICD-10-PCS | Mod: S$GLB,,, | Performed by: FAMILY MEDICINE

## 2021-01-12 ENCOUNTER — PATIENT MESSAGE (OUTPATIENT)
Dept: INTERNAL MEDICINE | Facility: CLINIC | Age: 61
End: 2021-01-12

## 2021-01-12 DIAGNOSIS — R06.02 SHORTNESS OF BREATH: Primary | ICD-10-CM

## 2021-01-12 DIAGNOSIS — R76.8 COVID-19 VIRUS IGG ANTIBODY DETECTED: ICD-10-CM

## 2021-01-13 ENCOUNTER — PATIENT MESSAGE (OUTPATIENT)
Dept: INTERNAL MEDICINE | Facility: CLINIC | Age: 61
End: 2021-01-13

## 2021-02-01 ENCOUNTER — IMMUNIZATION (OUTPATIENT)
Dept: INTERNAL MEDICINE | Facility: CLINIC | Age: 61
End: 2021-02-01
Payer: COMMERCIAL

## 2021-02-01 DIAGNOSIS — Z23 NEED FOR VACCINATION: Primary | ICD-10-CM

## 2021-02-01 PROCEDURE — 91300 COVID-19, MRNA, LNP-S, PF, 30 MCG/0.3 ML DOSE VACCINE: CPT | Mod: PBBFAC | Performed by: FAMILY MEDICINE

## 2021-02-01 PROCEDURE — 0002A COVID-19, MRNA, LNP-S, PF, 30 MCG/0.3 ML DOSE VACCINE: CPT | Mod: PBBFAC | Performed by: FAMILY MEDICINE

## 2021-02-03 ENCOUNTER — OFFICE VISIT (OUTPATIENT)
Dept: DERMATOLOGY | Facility: CLINIC | Age: 61
End: 2021-02-03
Payer: COMMERCIAL

## 2021-02-03 DIAGNOSIS — L81.1 MELASMA: Primary | ICD-10-CM

## 2021-02-03 DIAGNOSIS — L81.8 IDIOPATHIC GUTTATE HYPOMELANOSIS: ICD-10-CM

## 2021-02-03 PROCEDURE — 99999 PR PBB SHADOW E&M-EST. PATIENT-LVL III: CPT | Mod: PBBFAC,,, | Performed by: STUDENT IN AN ORGANIZED HEALTH CARE EDUCATION/TRAINING PROGRAM

## 2021-02-03 PROCEDURE — 99203 PR OFFICE/OUTPT VISIT, NEW, LEVL III, 30-44 MIN: ICD-10-PCS | Mod: S$GLB,,, | Performed by: STUDENT IN AN ORGANIZED HEALTH CARE EDUCATION/TRAINING PROGRAM

## 2021-02-03 PROCEDURE — 1126F PR PAIN SEVERITY QUANTIFIED, NO PAIN PRESENT: ICD-10-PCS | Mod: S$GLB,,, | Performed by: STUDENT IN AN ORGANIZED HEALTH CARE EDUCATION/TRAINING PROGRAM

## 2021-02-03 PROCEDURE — 99203 OFFICE O/P NEW LOW 30 MIN: CPT | Mod: S$GLB,,, | Performed by: STUDENT IN AN ORGANIZED HEALTH CARE EDUCATION/TRAINING PROGRAM

## 2021-02-03 PROCEDURE — 1126F AMNT PAIN NOTED NONE PRSNT: CPT | Mod: S$GLB,,, | Performed by: STUDENT IN AN ORGANIZED HEALTH CARE EDUCATION/TRAINING PROGRAM

## 2021-02-03 PROCEDURE — 99999 PR PBB SHADOW E&M-EST. PATIENT-LVL III: ICD-10-PCS | Mod: PBBFAC,,, | Performed by: STUDENT IN AN ORGANIZED HEALTH CARE EDUCATION/TRAINING PROGRAM

## 2021-02-26 ENCOUNTER — TELEPHONE (OUTPATIENT)
Dept: RADIOLOGY | Facility: HOSPITAL | Age: 61
End: 2021-02-26

## 2021-03-01 ENCOUNTER — HOSPITAL ENCOUNTER (OUTPATIENT)
Dept: RADIOLOGY | Facility: HOSPITAL | Age: 61
Discharge: HOME | End: 2021-03-01
Attending: FAMILY MEDICINE
Payer: COMMERCIAL

## 2021-03-01 DIAGNOSIS — R92.8 ABNORMAL MAMMOGRAM: ICD-10-CM

## 2021-03-01 PROCEDURE — G0279 TOMOSYNTHESIS, MAMMO: HCPCS | Mod: 26,,, | Performed by: RADIOLOGY

## 2021-03-01 PROCEDURE — G0279 MAMMO DIGITAL DIAGNOSTIC RIGHT WITH TOMOSYNTHESIS_CAD: ICD-10-PCS | Mod: 26,,, | Performed by: RADIOLOGY

## 2021-03-01 PROCEDURE — 77065 DX MAMMO INCL CAD UNI: CPT | Mod: 26,RT,, | Performed by: RADIOLOGY

## 2021-03-01 PROCEDURE — 77061 BREAST TOMOSYNTHESIS UNI: CPT | Mod: TC,RT

## 2021-03-01 PROCEDURE — 77065 MAMMO DIGITAL DIAGNOSTIC RIGHT WITH TOMOSYNTHESIS_CAD: ICD-10-PCS | Mod: 26,RT,, | Performed by: RADIOLOGY

## 2021-03-15 ENCOUNTER — PATIENT MESSAGE (OUTPATIENT)
Dept: INTERNAL MEDICINE | Facility: CLINIC | Age: 61
End: 2021-03-15

## 2021-03-16 ENCOUNTER — PATIENT MESSAGE (OUTPATIENT)
Dept: INTERNAL MEDICINE | Facility: CLINIC | Age: 61
End: 2021-03-16

## 2021-04-05 ENCOUNTER — PATIENT MESSAGE (OUTPATIENT)
Dept: DERMATOLOGY | Facility: CLINIC | Age: 61
End: 2021-04-05

## 2021-04-06 ENCOUNTER — PATIENT OUTREACH (OUTPATIENT)
Dept: ADMINISTRATIVE | Facility: OTHER | Age: 61
End: 2021-04-06

## 2021-04-06 ENCOUNTER — PATIENT MESSAGE (OUTPATIENT)
Dept: DERMATOLOGY | Facility: CLINIC | Age: 61
End: 2021-04-06

## 2021-05-24 ENCOUNTER — PATIENT OUTREACH (OUTPATIENT)
Dept: ADMINISTRATIVE | Facility: OTHER | Age: 61
End: 2021-05-24

## 2021-06-04 DIAGNOSIS — F41.9 ANXIETY: ICD-10-CM

## 2021-06-04 DIAGNOSIS — F43.21 COMPLICATED GRIEVING: ICD-10-CM

## 2021-06-04 DIAGNOSIS — F51.02 ADJUSTMENT INSOMNIA: ICD-10-CM

## 2021-06-05 RX ORDER — CITALOPRAM 10 MG/1
10 TABLET ORAL DAILY
Qty: 90 TABLET | Refills: 1 | Status: SHIPPED | OUTPATIENT
Start: 2021-06-05 | End: 2021-12-01

## 2021-06-22 DIAGNOSIS — I10 ESSENTIAL HYPERTENSION: ICD-10-CM

## 2021-06-22 RX ORDER — AMLODIPINE BESYLATE 10 MG/1
10 TABLET ORAL DAILY
Qty: 90 TABLET | Refills: 1 | Status: SHIPPED | OUTPATIENT
Start: 2021-06-22 | End: 2022-04-01 | Stop reason: SDUPTHER

## 2021-09-09 ENCOUNTER — PATIENT MESSAGE (OUTPATIENT)
Dept: INTERNAL MEDICINE | Facility: CLINIC | Age: 61
End: 2021-09-09

## 2021-09-09 RX ORDER — BUSPIRONE HYDROCHLORIDE 7.5 MG/1
7.5 TABLET ORAL 3 TIMES DAILY PRN
Qty: 90 TABLET | Refills: 0 | Status: SHIPPED | OUTPATIENT
Start: 2021-09-09 | End: 2022-07-29

## 2021-09-21 ENCOUNTER — PATIENT MESSAGE (OUTPATIENT)
Dept: PHARMACY | Facility: CLINIC | Age: 61
End: 2021-09-21

## 2021-09-22 ENCOUNTER — HOSPITAL ENCOUNTER (OUTPATIENT)
Dept: RADIOLOGY | Facility: HOSPITAL | Age: 61
Discharge: HOME | End: 2021-09-22
Attending: FAMILY MEDICINE
Payer: COMMERCIAL

## 2021-09-22 DIAGNOSIS — R92.8 ABNORMAL MAMMOGRAM: ICD-10-CM

## 2021-09-29 ENCOUNTER — HOSPITAL ENCOUNTER (OUTPATIENT)
Dept: RADIOLOGY | Facility: HOSPITAL | Age: 61
Discharge: HOME | End: 2021-09-29
Attending: FAMILY MEDICINE
Payer: COMMERCIAL

## 2021-09-29 PROCEDURE — 76642 US BREAST RIGHT LIMITED: ICD-10-PCS | Mod: 26,RT,, | Performed by: RADIOLOGY

## 2021-09-29 PROCEDURE — 77066 MAMMO DIGITAL DIAGNOSTIC BILAT WITH TOMO: ICD-10-PCS | Mod: 26,,, | Performed by: RADIOLOGY

## 2021-09-29 PROCEDURE — 77062 BREAST TOMOSYNTHESIS BI: CPT | Mod: TC

## 2021-09-29 PROCEDURE — 76642 ULTRASOUND BREAST LIMITED: CPT | Mod: 26,RT,, | Performed by: RADIOLOGY

## 2021-09-29 PROCEDURE — 77066 DX MAMMO INCL CAD BI: CPT | Mod: 26,,, | Performed by: RADIOLOGY

## 2021-09-29 PROCEDURE — 76642 ULTRASOUND BREAST LIMITED: CPT | Mod: TC,RT

## 2021-09-29 PROCEDURE — G0279 MAMMO DIGITAL DIAGNOSTIC BILAT WITH TOMO: ICD-10-PCS | Mod: 26,,, | Performed by: RADIOLOGY

## 2021-09-29 PROCEDURE — G0279 TOMOSYNTHESIS, MAMMO: HCPCS | Mod: 26,,, | Performed by: RADIOLOGY

## 2021-09-30 ENCOUNTER — TELEPHONE (OUTPATIENT)
Dept: RADIOLOGY | Facility: HOSPITAL | Age: 61
End: 2021-09-30

## 2021-09-30 ENCOUNTER — PATIENT MESSAGE (OUTPATIENT)
Dept: RADIOLOGY | Facility: HOSPITAL | Age: 61
End: 2021-09-30

## 2021-10-08 ENCOUNTER — HOSPITAL ENCOUNTER (OUTPATIENT)
Dept: RADIOLOGY | Facility: HOSPITAL | Age: 61
Discharge: HOME | End: 2021-10-08
Attending: FAMILY MEDICINE
Payer: COMMERCIAL

## 2021-10-08 DIAGNOSIS — R92.8 ABNORMAL MAMMOGRAM: Primary | ICD-10-CM

## 2021-10-08 PROCEDURE — 19081 MAMMO BREAST STEREOTACTIC BREAST BIOPSY RIGHT: ICD-10-PCS | Mod: RT,,, | Performed by: RADIOLOGY

## 2021-10-08 PROCEDURE — 88305 TISSUE EXAM BY PATHOLOGIST: CPT | Performed by: PATHOLOGY

## 2021-10-08 PROCEDURE — 25000003 PHARM REV CODE 250: Performed by: FAMILY MEDICINE

## 2021-10-08 PROCEDURE — 77065 DX MAMMO INCL CAD UNI: CPT | Mod: 26,RT,, | Performed by: RADIOLOGY

## 2021-10-08 PROCEDURE — 27200939 MAMMO BREAST STEREOTACTIC BREAST BIOPSY RIGHT

## 2021-10-08 PROCEDURE — 77065 MAMMO DIGITAL DIAGNOSTIC RIGHT: ICD-10-PCS | Mod: 26,RT,, | Performed by: RADIOLOGY

## 2021-10-08 PROCEDURE — 88305 TISSUE EXAM BY PATHOLOGIST: CPT | Mod: 26,,, | Performed by: PATHOLOGY

## 2021-10-08 PROCEDURE — 19081 BX BREAST 1ST LESION STRTCTC: CPT | Mod: RT

## 2021-10-08 PROCEDURE — 77065 DX MAMMO INCL CAD UNI: CPT | Mod: TC,RT

## 2021-10-08 PROCEDURE — 88305 TISSUE EXAM BY PATHOLOGIST: ICD-10-PCS | Mod: 26,,, | Performed by: PATHOLOGY

## 2021-10-08 PROCEDURE — 19081 BX BREAST 1ST LESION STRTCTC: CPT | Mod: RT,,, | Performed by: RADIOLOGY

## 2021-10-08 RX ORDER — LIDOCAINE HYDROCHLORIDE AND EPINEPHRINE 10; 10 MG/ML; UG/ML
20 INJECTION, SOLUTION INFILTRATION; PERINEURAL ONCE
Status: COMPLETED | OUTPATIENT
Start: 2021-10-08 | End: 2021-10-08

## 2021-10-08 RX ORDER — LIDOCAINE HYDROCHLORIDE 10 MG/ML
2 INJECTION, SOLUTION EPIDURAL; INFILTRATION; INTRACAUDAL; PERINEURAL ONCE
Status: COMPLETED | OUTPATIENT
Start: 2021-10-08 | End: 2021-10-08

## 2021-10-08 RX ADMIN — LIDOCAINE HYDROCHLORIDE 2 ML: 10 INJECTION, SOLUTION EPIDURAL; INFILTRATION; INTRACAUDAL; PERINEURAL at 01:10

## 2021-10-08 RX ADMIN — LIDOCAINE HYDROCHLORIDE AND EPINEPHRINE 20 ML: 10; 10 INJECTION, SOLUTION INFILTRATION; PERINEURAL at 01:10

## 2021-10-12 ENCOUNTER — TELEPHONE (OUTPATIENT)
Dept: SURGERY | Facility: CLINIC | Age: 61
End: 2021-10-12

## 2021-10-12 LAB
FINAL PATHOLOGIC DIAGNOSIS: NORMAL
GROSS: NORMAL
Lab: NORMAL

## 2021-10-13 ENCOUNTER — TELEPHONE (OUTPATIENT)
Dept: SURGERY | Facility: CLINIC | Age: 61
End: 2021-10-13

## 2021-10-14 ENCOUNTER — PATIENT MESSAGE (OUTPATIENT)
Dept: INTERNAL MEDICINE | Facility: CLINIC | Age: 61
End: 2021-10-14
Payer: COMMERCIAL

## 2021-10-21 DIAGNOSIS — A60.00 RECURRENT GENITAL HSV (HERPES SIMPLEX VIRUS) INFECTION: ICD-10-CM

## 2021-10-21 RX ORDER — ACYCLOVIR 200 MG/1
200 CAPSULE ORAL DAILY
Qty: 90 CAPSULE | Refills: 1 | Status: SHIPPED | OUTPATIENT
Start: 2021-10-21 | End: 2022-06-30 | Stop reason: SDUPTHER

## 2021-10-27 ENCOUNTER — PATIENT OUTREACH (OUTPATIENT)
Dept: ADMINISTRATIVE | Facility: OTHER | Age: 61
End: 2021-10-27
Payer: COMMERCIAL

## 2021-10-29 ENCOUNTER — OFFICE VISIT (OUTPATIENT)
Dept: DERMATOLOGY | Facility: CLINIC | Age: 61
End: 2021-10-29
Payer: COMMERCIAL

## 2021-10-29 DIAGNOSIS — L70.0 ACNE VULGARIS: Primary | ICD-10-CM

## 2021-10-29 DIAGNOSIS — L81.1 MELASMA: ICD-10-CM

## 2021-10-29 PROCEDURE — 99999 PR PBB SHADOW E&M-EST. PATIENT-LVL I: ICD-10-PCS | Mod: PBBFAC,,, | Performed by: STUDENT IN AN ORGANIZED HEALTH CARE EDUCATION/TRAINING PROGRAM

## 2021-10-29 PROCEDURE — 99214 PR OFFICE/OUTPT VISIT, EST, LEVL IV, 30-39 MIN: ICD-10-PCS | Mod: S$GLB,,, | Performed by: STUDENT IN AN ORGANIZED HEALTH CARE EDUCATION/TRAINING PROGRAM

## 2021-10-29 PROCEDURE — 1159F MED LIST DOCD IN RCRD: CPT | Mod: CPTII,S$GLB,, | Performed by: STUDENT IN AN ORGANIZED HEALTH CARE EDUCATION/TRAINING PROGRAM

## 2021-10-29 PROCEDURE — 1160F RVW MEDS BY RX/DR IN RCRD: CPT | Mod: CPTII,S$GLB,, | Performed by: STUDENT IN AN ORGANIZED HEALTH CARE EDUCATION/TRAINING PROGRAM

## 2021-10-29 PROCEDURE — 1159F PR MEDICATION LIST DOCUMENTED IN MEDICAL RECORD: ICD-10-PCS | Mod: CPTII,S$GLB,, | Performed by: STUDENT IN AN ORGANIZED HEALTH CARE EDUCATION/TRAINING PROGRAM

## 2021-10-29 PROCEDURE — 99999 PR PBB SHADOW E&M-EST. PATIENT-LVL I: CPT | Mod: PBBFAC,,, | Performed by: STUDENT IN AN ORGANIZED HEALTH CARE EDUCATION/TRAINING PROGRAM

## 2021-10-29 PROCEDURE — 1160F PR REVIEW ALL MEDS BY PRESCRIBER/CLIN PHARMACIST DOCUMENTED: ICD-10-PCS | Mod: CPTII,S$GLB,, | Performed by: STUDENT IN AN ORGANIZED HEALTH CARE EDUCATION/TRAINING PROGRAM

## 2021-10-29 PROCEDURE — 99214 OFFICE O/P EST MOD 30 MIN: CPT | Mod: S$GLB,,, | Performed by: STUDENT IN AN ORGANIZED HEALTH CARE EDUCATION/TRAINING PROGRAM

## 2021-10-29 RX ORDER — SPIRONOLACTONE 100 MG/1
100 TABLET, FILM COATED ORAL DAILY
Qty: 30 TABLET | Refills: 2 | Status: SHIPPED | OUTPATIENT
Start: 2021-10-29 | End: 2022-03-16 | Stop reason: SDUPTHER

## 2021-10-31 ENCOUNTER — CLINICAL SUPPORT (OUTPATIENT)
Dept: URGENT CARE | Facility: CLINIC | Age: 61
End: 2021-10-31
Payer: COMMERCIAL

## 2021-10-31 DIAGNOSIS — Z91.89 AT INCREASED RISK OF EXPOSURE TO COVID-19 VIRUS: Primary | ICD-10-CM

## 2021-10-31 LAB
CTP QC/QA: YES
SARS-COV-2 RDRP RESP QL NAA+PROBE: NEGATIVE

## 2021-10-31 PROCEDURE — U0002 COVID-19 LAB TEST NON-CDC: HCPCS | Mod: QW,S$GLB,, | Performed by: EMERGENCY MEDICINE

## 2021-10-31 PROCEDURE — U0002: ICD-10-PCS | Mod: QW,S$GLB,, | Performed by: EMERGENCY MEDICINE

## 2021-11-15 ENCOUNTER — TELEPHONE (OUTPATIENT)
Dept: OPHTHALMOLOGY | Facility: CLINIC | Age: 61
End: 2021-11-15
Payer: COMMERCIAL

## 2021-11-18 ENCOUNTER — PATIENT OUTREACH (OUTPATIENT)
Dept: ADMINISTRATIVE | Facility: HOSPITAL | Age: 61
End: 2021-11-18
Payer: COMMERCIAL

## 2021-11-23 ENCOUNTER — PATIENT MESSAGE (OUTPATIENT)
Dept: INTERNAL MEDICINE | Facility: CLINIC | Age: 61
End: 2021-11-23
Payer: COMMERCIAL

## 2021-12-01 ENCOUNTER — HOSPITAL ENCOUNTER (OUTPATIENT)
Dept: RADIOLOGY | Facility: HOSPITAL | Age: 61
Discharge: HOME | End: 2021-12-01
Attending: FAMILY MEDICINE
Payer: COMMERCIAL

## 2021-12-01 ENCOUNTER — OFFICE VISIT (OUTPATIENT)
Dept: INTERNAL MEDICINE | Facility: CLINIC | Age: 61
End: 2021-12-01
Payer: COMMERCIAL

## 2021-12-01 VITALS
RESPIRATION RATE: 18 BRPM | SYSTOLIC BLOOD PRESSURE: 124 MMHG | HEART RATE: 108 BPM | TEMPERATURE: 98 F | HEIGHT: 65 IN | BODY MASS INDEX: 40.26 KG/M2 | WEIGHT: 241.63 LBS | OXYGEN SATURATION: 97 % | DIASTOLIC BLOOD PRESSURE: 76 MMHG

## 2021-12-01 DIAGNOSIS — E53.8 LOW SERUM VITAMIN B12: ICD-10-CM

## 2021-12-01 DIAGNOSIS — M25.531 RIGHT WRIST PAIN: ICD-10-CM

## 2021-12-01 DIAGNOSIS — M25.562 ACUTE PAIN OF LEFT KNEE: ICD-10-CM

## 2021-12-01 DIAGNOSIS — F41.8 DEPRESSION WITH ANXIETY: ICD-10-CM

## 2021-12-01 DIAGNOSIS — Z12.11 COLON CANCER SCREENING: Primary | ICD-10-CM

## 2021-12-01 DIAGNOSIS — Z23 NEED FOR SHINGLES VACCINE: ICD-10-CM

## 2021-12-01 DIAGNOSIS — F41.9 ANXIETY: ICD-10-CM

## 2021-12-01 DIAGNOSIS — R53.83 FATIGUE, UNSPECIFIED TYPE: ICD-10-CM

## 2021-12-01 DIAGNOSIS — Z00.00 ROUTINE PHYSICAL EXAMINATION: ICD-10-CM

## 2021-12-01 DIAGNOSIS — M79.672 LEFT FOOT PAIN: ICD-10-CM

## 2021-12-01 PROCEDURE — 90750 ZOSTER RECOMBINANT VACCINE: ICD-10-PCS | Mod: S$GLB,,, | Performed by: FAMILY MEDICINE

## 2021-12-01 PROCEDURE — 73110 X-RAY EXAM OF WRIST: CPT | Mod: 26,RT,, | Performed by: RADIOLOGY

## 2021-12-01 PROCEDURE — 90750 HZV VACC RECOMBINANT IM: CPT | Mod: S$GLB,,, | Performed by: FAMILY MEDICINE

## 2021-12-01 PROCEDURE — 99999 PR PBB SHADOW E&M-EST. PATIENT-LVL IV: ICD-10-PCS | Mod: PBBFAC,,, | Performed by: FAMILY MEDICINE

## 2021-12-01 PROCEDURE — 99999 PR PBB SHADOW E&M-EST. PATIENT-LVL IV: CPT | Mod: PBBFAC,,, | Performed by: FAMILY MEDICINE

## 2021-12-01 PROCEDURE — 90471 IMMUNIZATION ADMIN: CPT | Mod: S$GLB,,, | Performed by: FAMILY MEDICINE

## 2021-12-01 PROCEDURE — 99396 PR PREVENTIVE VISIT,EST,40-64: ICD-10-PCS | Mod: 25,S$GLB,, | Performed by: FAMILY MEDICINE

## 2021-12-01 PROCEDURE — 73110 XR WRIST COMPLETE 3 VIEWS RIGHT: ICD-10-PCS | Mod: 26,RT,, | Performed by: RADIOLOGY

## 2021-12-01 PROCEDURE — 73110 X-RAY EXAM OF WRIST: CPT | Mod: TC,RT

## 2021-12-01 PROCEDURE — 90471 ZOSTER RECOMBINANT VACCINE: ICD-10-PCS | Mod: S$GLB,,, | Performed by: FAMILY MEDICINE

## 2021-12-01 PROCEDURE — 99396 PREV VISIT EST AGE 40-64: CPT | Mod: 25,S$GLB,, | Performed by: FAMILY MEDICINE

## 2021-12-01 RX ORDER — PHENTERMINE HYDROCHLORIDE 37.5 MG/1
37.5 TABLET ORAL
Qty: 30 TABLET | Refills: 1 | Status: SHIPPED | OUTPATIENT
Start: 2021-12-01 | End: 2022-02-09

## 2021-12-01 RX ORDER — FUROSEMIDE 20 MG/1
20 TABLET ORAL DAILY PRN
Qty: 60 TABLET | Refills: 1 | Status: SHIPPED | OUTPATIENT
Start: 2021-12-01 | End: 2022-10-27 | Stop reason: SDUPTHER

## 2021-12-01 RX ORDER — MELOXICAM 7.5 MG/1
7.5 TABLET ORAL DAILY
Qty: 90 TABLET | Refills: 0 | Status: SHIPPED | OUTPATIENT
Start: 2021-12-01 | End: 2022-04-01 | Stop reason: SDUPTHER

## 2021-12-01 RX ORDER — CITALOPRAM 20 MG/1
20 TABLET, FILM COATED ORAL DAILY
Qty: 90 TABLET | Refills: 1 | Status: SHIPPED | OUTPATIENT
Start: 2021-12-01 | End: 2022-09-20 | Stop reason: SDUPTHER

## 2021-12-02 ENCOUNTER — LAB VISIT (OUTPATIENT)
Dept: LAB | Facility: HOSPITAL | Age: 61
End: 2021-12-02
Attending: FAMILY MEDICINE
Payer: COMMERCIAL

## 2021-12-02 ENCOUNTER — PATIENT OUTREACH (OUTPATIENT)
Dept: ADMINISTRATIVE | Facility: OTHER | Age: 61
End: 2021-12-02
Payer: COMMERCIAL

## 2021-12-02 DIAGNOSIS — E53.8 LOW SERUM VITAMIN B12: ICD-10-CM

## 2021-12-02 DIAGNOSIS — M25.531 RIGHT WRIST PAIN: ICD-10-CM

## 2021-12-02 DIAGNOSIS — Z00.00 ROUTINE PHYSICAL EXAMINATION: ICD-10-CM

## 2021-12-02 DIAGNOSIS — R53.83 FATIGUE, UNSPECIFIED TYPE: ICD-10-CM

## 2021-12-02 LAB
25(OH)D3+25(OH)D2 SERPL-MCNC: 17 NG/ML (ref 30–96)
ALBUMIN SERPL BCP-MCNC: 3.6 G/DL (ref 3.5–5.2)
ALP SERPL-CCNC: 92 U/L (ref 55–135)
ALT SERPL W/O P-5'-P-CCNC: 12 U/L (ref 10–44)
ANION GAP SERPL CALC-SCNC: 8 MMOL/L (ref 8–16)
AST SERPL-CCNC: 14 U/L (ref 10–40)
BASOPHILS # BLD AUTO: 0.02 K/UL (ref 0–0.2)
BASOPHILS NFR BLD: 0.2 % (ref 0–1.9)
BILIRUB SERPL-MCNC: 0.8 MG/DL (ref 0.1–1)
BUN SERPL-MCNC: 14 MG/DL (ref 6–20)
CALCIUM SERPL-MCNC: 9 MG/DL (ref 8.7–10.5)
CHLORIDE SERPL-SCNC: 106 MMOL/L (ref 95–110)
CHOLEST SERPL-MCNC: 177 MG/DL (ref 120–199)
CHOLEST/HDLC SERPL: 3.8 {RATIO} (ref 2–5)
CO2 SERPL-SCNC: 26 MMOL/L (ref 23–29)
CREAT SERPL-MCNC: 0.9 MG/DL (ref 0.5–1.4)
DIFFERENTIAL METHOD: ABNORMAL
EOSINOPHIL # BLD AUTO: 0.1 K/UL (ref 0–0.5)
EOSINOPHIL NFR BLD: 0.7 % (ref 0–8)
ERYTHROCYTE [DISTWIDTH] IN BLOOD BY AUTOMATED COUNT: 14.5 % (ref 11.5–14.5)
EST. GFR  (AFRICAN AMERICAN): >60 ML/MIN/1.73 M^2
EST. GFR  (NON AFRICAN AMERICAN): >60 ML/MIN/1.73 M^2
GLUCOSE SERPL-MCNC: 101 MG/DL (ref 70–110)
HCT VFR BLD AUTO: 44.9 % (ref 37–48.5)
HDLC SERPL-MCNC: 46 MG/DL (ref 40–75)
HDLC SERPL: 26 % (ref 20–50)
HGB BLD-MCNC: 13.9 G/DL (ref 12–16)
IMM GRANULOCYTES # BLD AUTO: 0.03 K/UL (ref 0–0.04)
IMM GRANULOCYTES NFR BLD AUTO: 0.3 % (ref 0–0.5)
IRON SERPL-MCNC: 48 UG/DL (ref 30–160)
LDLC SERPL CALC-MCNC: 104.2 MG/DL (ref 63–159)
LYMPHOCYTES # BLD AUTO: 2 K/UL (ref 1–4.8)
LYMPHOCYTES NFR BLD: 22.6 % (ref 18–48)
MCH RBC QN AUTO: 28.7 PG (ref 27–31)
MCHC RBC AUTO-ENTMCNC: 31 G/DL (ref 32–36)
MCV RBC AUTO: 93 FL (ref 82–98)
MONOCYTES # BLD AUTO: 0.6 K/UL (ref 0.3–1)
MONOCYTES NFR BLD: 6.4 % (ref 4–15)
NEUTROPHILS # BLD AUTO: 6.1 K/UL (ref 1.8–7.7)
NEUTROPHILS NFR BLD: 69.8 % (ref 38–73)
NONHDLC SERPL-MCNC: 131 MG/DL
NRBC BLD-RTO: 0 /100 WBC
PLATELET # BLD AUTO: 257 K/UL (ref 150–450)
PMV BLD AUTO: 11.7 FL (ref 9.2–12.9)
POTASSIUM SERPL-SCNC: 4.3 MMOL/L (ref 3.5–5.1)
PROT SERPL-MCNC: 6.7 G/DL (ref 6–8.4)
RBC # BLD AUTO: 4.84 M/UL (ref 4–5.4)
SATURATED IRON: 15 % (ref 20–50)
SODIUM SERPL-SCNC: 140 MMOL/L (ref 136–145)
TOTAL IRON BINDING CAPACITY: 323 UG/DL (ref 250–450)
TRANSFERRIN SERPL-MCNC: 218 MG/DL (ref 200–375)
TRIGL SERPL-MCNC: 134 MG/DL (ref 30–150)
TSH SERPL DL<=0.005 MIU/L-ACNC: 1.16 UIU/ML (ref 0.4–4)
URATE SERPL-MCNC: 6.7 MG/DL (ref 2.4–5.7)
VIT B12 SERPL-MCNC: 259 PG/ML (ref 210–950)
WBC # BLD AUTO: 8.73 K/UL (ref 3.9–12.7)

## 2021-12-02 PROCEDURE — 84466 ASSAY OF TRANSFERRIN: CPT | Performed by: FAMILY MEDICINE

## 2021-12-02 PROCEDURE — 80053 COMPREHEN METABOLIC PANEL: CPT | Performed by: FAMILY MEDICINE

## 2021-12-02 PROCEDURE — 82306 VITAMIN D 25 HYDROXY: CPT | Performed by: FAMILY MEDICINE

## 2021-12-02 PROCEDURE — 80061 LIPID PANEL: CPT | Performed by: FAMILY MEDICINE

## 2021-12-02 PROCEDURE — 82607 VITAMIN B-12: CPT | Performed by: FAMILY MEDICINE

## 2021-12-02 PROCEDURE — 84550 ASSAY OF BLOOD/URIC ACID: CPT | Performed by: FAMILY MEDICINE

## 2021-12-02 PROCEDURE — 36415 COLL VENOUS BLD VENIPUNCTURE: CPT | Performed by: FAMILY MEDICINE

## 2021-12-02 PROCEDURE — 84443 ASSAY THYROID STIM HORMONE: CPT | Performed by: FAMILY MEDICINE

## 2021-12-02 PROCEDURE — 85025 COMPLETE CBC W/AUTO DIFF WBC: CPT | Performed by: FAMILY MEDICINE

## 2021-12-03 ENCOUNTER — PATIENT MESSAGE (OUTPATIENT)
Dept: INTERNAL MEDICINE | Facility: CLINIC | Age: 61
End: 2021-12-03
Payer: COMMERCIAL

## 2021-12-03 RX ORDER — INDOMETHACIN 50 MG/1
50 CAPSULE ORAL 3 TIMES DAILY
Qty: 30 CAPSULE | Refills: 0 | Status: SHIPPED | OUTPATIENT
Start: 2021-12-03 | End: 2022-12-04

## 2021-12-07 ENCOUNTER — OFFICE VISIT (OUTPATIENT)
Dept: OPHTHALMOLOGY | Facility: CLINIC | Age: 61
End: 2021-12-07
Payer: COMMERCIAL

## 2021-12-07 DIAGNOSIS — H52.13 MYOPIA WITH PRESBYOPIA, BILATERAL: Primary | ICD-10-CM

## 2021-12-07 DIAGNOSIS — H52.4 MYOPIA WITH PRESBYOPIA, BILATERAL: Primary | ICD-10-CM

## 2021-12-07 PROCEDURE — 99999 PR PBB SHADOW E&M-EST. PATIENT-LVL II: CPT | Mod: PBBFAC,,, | Performed by: OPTOMETRIST

## 2021-12-07 PROCEDURE — 92014 PR EYE EXAM, EST PATIENT,COMPREHESV: ICD-10-PCS | Mod: S$GLB,,, | Performed by: OPTOMETRIST

## 2021-12-07 PROCEDURE — 99999 PR PBB SHADOW E&M-EST. PATIENT-LVL II: ICD-10-PCS | Mod: PBBFAC,,, | Performed by: OPTOMETRIST

## 2021-12-07 PROCEDURE — 92014 COMPRE OPH EXAM EST PT 1/>: CPT | Mod: S$GLB,,, | Performed by: OPTOMETRIST

## 2021-12-07 PROCEDURE — 92015 DETERMINE REFRACTIVE STATE: CPT | Mod: S$GLB,,, | Performed by: OPTOMETRIST

## 2021-12-07 PROCEDURE — 92015 PR REFRACTION: ICD-10-PCS | Mod: S$GLB,,, | Performed by: OPTOMETRIST

## 2021-12-13 ENCOUNTER — PATIENT MESSAGE (OUTPATIENT)
Dept: PHARMACY | Facility: CLINIC | Age: 61
End: 2021-12-13
Payer: COMMERCIAL

## 2021-12-27 DIAGNOSIS — R05.9 COUGH: Primary | ICD-10-CM

## 2022-01-11 ENCOUNTER — PATIENT MESSAGE (OUTPATIENT)
Dept: INTERNAL MEDICINE | Facility: CLINIC | Age: 62
End: 2022-01-11
Payer: COMMERCIAL

## 2022-01-11 NOTE — TELEPHONE ENCOUNTER
No new care gaps identified.  Powered by [employer]. Reference number: [id number].   1/11/2022 3:16:09 PM CST

## 2022-01-12 RX ORDER — ALBUTEROL SULFATE 90 UG/1
2 AEROSOL, METERED RESPIRATORY (INHALATION) EVERY 6 HOURS PRN
Qty: 18 G | Refills: 0 | Status: SHIPPED | OUTPATIENT
Start: 2022-01-12 | End: 2023-06-22 | Stop reason: SDUPTHER

## 2022-01-14 ENCOUNTER — IMMUNIZATION (OUTPATIENT)
Dept: PRIMARY CARE CLINIC | Facility: CLINIC | Age: 62
End: 2022-01-14
Payer: COMMERCIAL

## 2022-01-14 DIAGNOSIS — Z23 NEED FOR VACCINATION: Primary | ICD-10-CM

## 2022-01-14 PROCEDURE — 0004A COVID-19, MRNA, LNP-S, PF, 30 MCG/0.3 ML DOSE VACCINE: CPT | Mod: CV19,PBBFAC | Performed by: FAMILY MEDICINE

## 2022-02-02 ENCOUNTER — PATIENT OUTREACH (OUTPATIENT)
Dept: ADMINISTRATIVE | Facility: OTHER | Age: 62
End: 2022-02-02
Payer: COMMERCIAL

## 2022-02-03 NOTE — PROGRESS NOTES
Health Maintenance Due   Topic Date Due    Colorectal Cancer Screening  Never done    Cervical Cancer Screening  07/19/2016    TETANUS VACCINE  04/14/2021    Shingles Vaccine (2 of 2) 01/26/2022     Updates were requested from care everywhere.  Chart was reviewed for overdue Proactive [facility] Encounters (MYRA) topics (CRS, Breast Cancer Screening, Eye exam)  Health Maintenance has been updated.  LINKS immunization registry triggered.  Immunizations were reconciled.

## 2022-02-04 ENCOUNTER — OFFICE VISIT (OUTPATIENT)
Dept: ORTHOPEDICS | Facility: CLINIC | Age: 62
End: 2022-02-04
Payer: COMMERCIAL

## 2022-02-04 VITALS — SYSTOLIC BLOOD PRESSURE: 150 MMHG | HEART RATE: 114 BPM | DIASTOLIC BLOOD PRESSURE: 89 MMHG

## 2022-02-04 DIAGNOSIS — M65.4 RADIAL STYLOID TENOSYNOVITIS (DE QUERVAIN): Primary | ICD-10-CM

## 2022-02-04 PROCEDURE — 3077F SYST BP >= 140 MM HG: CPT | Mod: CPTII,S$GLB,, | Performed by: ORTHOPAEDIC SURGERY

## 2022-02-04 PROCEDURE — 20550 TENDON SHEATH: ICD-10-PCS | Mod: RT,S$GLB,, | Performed by: ORTHOPAEDIC SURGERY

## 2022-02-04 PROCEDURE — 99999 PR PBB SHADOW E&M-EST. PATIENT-LVL III: CPT | Mod: PBBFAC,,, | Performed by: ORTHOPAEDIC SURGERY

## 2022-02-04 PROCEDURE — 3079F PR MOST RECENT DIASTOLIC BLOOD PRESSURE 80-89 MM HG: ICD-10-PCS | Mod: CPTII,S$GLB,, | Performed by: ORTHOPAEDIC SURGERY

## 2022-02-04 PROCEDURE — 99203 OFFICE O/P NEW LOW 30 MIN: CPT | Mod: 25,S$GLB,, | Performed by: ORTHOPAEDIC SURGERY

## 2022-02-04 PROCEDURE — 1160F PR REVIEW ALL MEDS BY PRESCRIBER/CLIN PHARMACIST DOCUMENTED: ICD-10-PCS | Mod: CPTII,S$GLB,, | Performed by: ORTHOPAEDIC SURGERY

## 2022-02-04 PROCEDURE — 1159F MED LIST DOCD IN RCRD: CPT | Mod: CPTII,S$GLB,, | Performed by: ORTHOPAEDIC SURGERY

## 2022-02-04 PROCEDURE — 3077F PR MOST RECENT SYSTOLIC BLOOD PRESSURE >= 140 MM HG: ICD-10-PCS | Mod: CPTII,S$GLB,, | Performed by: ORTHOPAEDIC SURGERY

## 2022-02-04 PROCEDURE — 3079F DIAST BP 80-89 MM HG: CPT | Mod: CPTII,S$GLB,, | Performed by: ORTHOPAEDIC SURGERY

## 2022-02-04 PROCEDURE — 99203 PR OFFICE/OUTPT VISIT, NEW, LEVL III, 30-44 MIN: ICD-10-PCS | Mod: 25,S$GLB,, | Performed by: ORTHOPAEDIC SURGERY

## 2022-02-04 PROCEDURE — 20550 NJX 1 TENDON SHEATH/LIGAMENT: CPT | Mod: RT,S$GLB,, | Performed by: ORTHOPAEDIC SURGERY

## 2022-02-04 PROCEDURE — 1160F RVW MEDS BY RX/DR IN RCRD: CPT | Mod: CPTII,S$GLB,, | Performed by: ORTHOPAEDIC SURGERY

## 2022-02-04 PROCEDURE — 1159F PR MEDICATION LIST DOCUMENTED IN MEDICAL RECORD: ICD-10-PCS | Mod: CPTII,S$GLB,, | Performed by: ORTHOPAEDIC SURGERY

## 2022-02-04 PROCEDURE — 99999 PR PBB SHADOW E&M-EST. PATIENT-LVL III: ICD-10-PCS | Mod: PBBFAC,,, | Performed by: ORTHOPAEDIC SURGERY

## 2022-02-04 RX ORDER — TRIAMCINOLONE ACETONIDE 40 MG/ML
40 INJECTION, SUSPENSION INTRA-ARTICULAR; INTRAMUSCULAR
Status: DISCONTINUED | OUTPATIENT
Start: 2022-02-04 | End: 2022-02-04 | Stop reason: HOSPADM

## 2022-02-04 RX ADMIN — TRIAMCINOLONE ACETONIDE 40 MG: 40 INJECTION, SUSPENSION INTRA-ARTICULAR; INTRAMUSCULAR at 07:02

## 2022-02-04 NOTE — PROCEDURES
Tendon Sheath    Date/Time: 2/4/2022 7:45 AM  Performed by: Stan Alexis MD  Authorized by: Stan Aleixs MD     Consent Done?:  Yes (Verbal)  Indications:  Pain  Timeout: prior to procedure the correct patient, procedure, and site was verified    Prep: patient was prepped and draped in usual sterile fashion      Local anesthesia used?: Yes    Local anesthetic:  Lidocaine 2% without epinephrine  Anesthetic total (ml):  0.5    Location:  Wrist  Site:  R first doral compartment  Ultrasonic guidance for needle placement?: No    Needle size:  25 G  Approach:  Radial  Medications:  40 mg triamcinolone acetonide 40 mg/mL

## 2022-02-04 NOTE — PROGRESS NOTES
Subjective:     Patient ID: Karla Arzola is a 61 y.o. female.    Chief Complaint: Pain of the Right Hand and Pain of the Right Wrist      HPI:  The patient is a 61-year-old female who has a several month history of pain about the right wrist 1st dorsal extensor tendon compartment of insidious onset.  She has not tried splinting or injection.    Past Medical History:   Diagnosis Date    Allergy     Anxiety     Hypertension     Obesity     Vitamin B 12 deficiency      Past Surgical History:   Procedure Laterality Date    right knee scope      TUBAL LIGATION       Family History   Problem Relation Age of Onset    Diabetes Mother     Hypertension Mother     Stroke Mother     Hypertension Father     Hyperlipidemia Father     Kidney disease Father      Social History     Socioeconomic History    Marital status: Single   Occupational History     Employer: [employer]   Tobacco Use    Smoking status: Never Smoker    Smokeless tobacco: Never Used   Substance and Sexual Activity    Alcohol use: Yes     Alcohol/week: 0.0 standard drinks     Comment: socially     Drug use: No    Sexual activity: Yes   Other Topics Concern    Are you pregnant or think you may be? No    Breast-feeding No     Medication List with Changes/Refills   Current Medications    ACYCLOVIR (ZOVIRAX) 200 MG CAPSULE    Take 1 capsule (200 mg total) by mouth once daily.    ALBUTEROL (VENTOLIN HFA) 90 MCG/ACTUATION INHALER    Inhale 2 puffs into the lungs every 6 (six) hours as needed for Wheezing. Rescue    ALPRAZOLAM (XANAX) 0.25 MG TABLET    Take 1 tablet (0.25 mg total) by mouth 3 (three) times daily as needed for Insomnia or Anxiety.    AMLODIPINE (NORVASC) 10 MG TABLET    Take 1 tablet (10 mg total) by mouth once daily.    BUSPIRONE (BUSPAR) 7.5 MG TABLET    Take 1 tablet (7.5 mg total) by mouth 3 (three) times daily as needed (anxiety).    CITALOPRAM (CELEXA) 20 MG TABLET    Take 1 tablet (20 mg total) by mouth  once daily.    CYANOCOBALAMIN (VITAMIN B-12) 100 MCG TABLET    Take 100 mcg by mouth once daily.    FLUTICASONE (FLONASE) 50 MCG/ACTUATION NASAL SPRAY    1 spray (50 mcg total) by Each Nare route once daily.    FUROSEMIDE (LASIX) 20 MG TABLET    Take 1 tablet (20 mg total) by mouth daily as needed (swelling).    IBUPROFEN (ADVIL,MOTRIN) 800 MG TABLET    Take 1 tablet (800 mg total) by mouth every 6 to 8 hours as needed for pain    INDOMETHACIN (INDOCIN) 50 MG CAPSULE    Take 1 capsule (50 mg total) by mouth 3 (three) times daily.    IPRATROPIUM-ALBUTEROL (COMBIVENT)  MCG/ACTUATION INHALER    Inhale 1 puff into the lungs every 6 (six) hours as needed for Wheezing or Shortness of Breath. Rescue    MELOXICAM (MOBIC) 7.5 MG TABLET    Take 1 tablet (7.5 mg total) by mouth once daily.    MULTIVITAMIN CAPSULE    Take 1 capsule by mouth once daily.    PHENTERMINE (ADIPEX-P) 37.5 MG TABLET    Take 1 tablet (37.5 mg total) by mouth before breakfast.    SPIRONOLACTONE (ALDACTONE) 100 MG TABLET    Take 1 tablet (100 mg total) by mouth once daily.     Review of patient's allergies indicates:  No Known Allergies  Review of Systems   Constitutional: Negative for malaise/fatigue.   HENT: Negative for hearing loss.    Eyes: Negative for double vision and visual disturbance.   Cardiovascular: Negative for chest pain.   Respiratory: Negative for shortness of breath.    Endocrine: Negative for cold intolerance.   Hematologic/Lymphatic: Does not bruise/bleed easily.   Skin: Negative for poor wound healing and suspicious lesions.   Musculoskeletal: Positive for joint pain. Negative for gout and joint swelling.   Gastrointestinal: Negative for nausea and vomiting.   Genitourinary: Negative for dysuria.   Neurological: Negative for numbness, paresthesias and sensory change.   Psychiatric/Behavioral: Negative for depression, memory loss and substance abuse. The patient is not nervous/anxious.    Allergic/Immunologic: Negative for  persistent infections.       Objective:   There is no height or weight on file to calculate BMI.  Vitals:    02/04/22 0757   BP: (!) 150/89   Pulse: (!) 114                General    Constitutional: She is oriented to person, place, and time. She appears well-developed and well-nourished. No distress.   HENT:   Head: Normocephalic.   Eyes: EOM are normal.   Pulmonary/Chest: Effort normal.   Neurological: She is oriented to person, place, and time.   Psychiatric: She has a normal mood and affect.             Right Hand/Wrist Exam     Inspection   Scars: Wrist - absent Hand -  absent  Effusion: Wrist - absent Hand -  absent    Pain   Wrist - The patient exhibits pain of the extensory musculature.    Tests   Finkelstein's test: positive      Other     Neuorologic Exam    Median Distribution: normal  Ulnar Distribution: normal  Radial Distribution: normal    Comments:  The patient has tenderness 1st dorsal extensor tendon compartment and a positive Finkelstein test.  There are no motor or sensory deficits.          Vascular Exam       Capillary Refill  Right Hand: normal capillary refill      Relevant imaging results reviewed and interpreted by me, discussed with the patient and / or family today radiographs right wrist were normal  Assessment:     Encounter Diagnosis   Name Primary?    Radial styloid tenosynovitis (de quervain) Yes    Right wrist    Plan:       The patient injected right 1st dorsal extensor tendon compartment with 0.5 cc of Kenalog and 0.5 cc of 2% plain lidocaine.  She was given a thumb spica splint.  She will return in 3 weeks if not improved.              Disclaimer: This note was prepared using a voice recognition system and is likely to have sound alike errors within the text.

## 2022-02-07 ENCOUNTER — OFFICE VISIT (OUTPATIENT)
Dept: OPHTHALMOLOGY | Facility: CLINIC | Age: 62
End: 2022-02-07
Payer: COMMERCIAL

## 2022-02-07 DIAGNOSIS — H20.00 ACUTE ANTERIOR UVEITIS OF LEFT EYE: Primary | ICD-10-CM

## 2022-02-07 PROCEDURE — 1160F PR REVIEW ALL MEDS BY PRESCRIBER/CLIN PHARMACIST DOCUMENTED: ICD-10-PCS | Mod: CPTII,S$GLB,, | Performed by: OPTOMETRIST

## 2022-02-07 PROCEDURE — 92014 PR EYE EXAM, EST PATIENT,COMPREHESV: ICD-10-PCS | Mod: S$GLB,,, | Performed by: OPTOMETRIST

## 2022-02-07 PROCEDURE — 1160F RVW MEDS BY RX/DR IN RCRD: CPT | Mod: CPTII,S$GLB,, | Performed by: OPTOMETRIST

## 2022-02-07 PROCEDURE — 99999 PR PBB SHADOW E&M-EST. PATIENT-LVL III: ICD-10-PCS | Mod: PBBFAC,,, | Performed by: OPTOMETRIST

## 2022-02-07 PROCEDURE — 99999 PR PBB SHADOW E&M-EST. PATIENT-LVL III: CPT | Mod: PBBFAC,,, | Performed by: OPTOMETRIST

## 2022-02-07 PROCEDURE — 92014 COMPRE OPH EXAM EST PT 1/>: CPT | Mod: S$GLB,,, | Performed by: OPTOMETRIST

## 2022-02-07 PROCEDURE — 1159F MED LIST DOCD IN RCRD: CPT | Mod: CPTII,S$GLB,, | Performed by: OPTOMETRIST

## 2022-02-07 PROCEDURE — 1159F PR MEDICATION LIST DOCUMENTED IN MEDICAL RECORD: ICD-10-PCS | Mod: CPTII,S$GLB,, | Performed by: OPTOMETRIST

## 2022-02-07 RX ORDER — PREDNISOLONE ACETATE 10 MG/ML
1 SUSPENSION/ DROPS OPHTHALMIC 4 TIMES DAILY
Qty: 10 ML | Refills: 0 | Status: SHIPPED | OUTPATIENT
Start: 2022-02-07 | End: 2022-02-08

## 2022-02-08 ENCOUNTER — PATIENT MESSAGE (OUTPATIENT)
Dept: OPHTHALMOLOGY | Facility: CLINIC | Age: 62
End: 2022-02-08
Payer: COMMERCIAL

## 2022-02-08 RX ORDER — DIFLUPREDNATE OPHTHALMIC 0.5 MG/ML
1 EMULSION OPHTHALMIC 4 TIMES DAILY
Qty: 5 ML | Refills: 0 | Status: SHIPPED | OUTPATIENT
Start: 2022-02-08 | End: 2022-03-12

## 2022-02-10 ENCOUNTER — HOSPITAL ENCOUNTER (OUTPATIENT)
Dept: RADIOLOGY | Facility: HOSPITAL | Age: 62
Discharge: HOME | End: 2022-02-10
Attending: PODIATRIST
Payer: COMMERCIAL

## 2022-02-10 ENCOUNTER — OFFICE VISIT (OUTPATIENT)
Dept: PODIATRY | Facility: CLINIC | Age: 62
End: 2022-02-10
Payer: COMMERCIAL

## 2022-02-10 VITALS
SYSTOLIC BLOOD PRESSURE: 125 MMHG | WEIGHT: 241 LBS | HEART RATE: 85 BPM | DIASTOLIC BLOOD PRESSURE: 81 MMHG | HEIGHT: 65 IN | BODY MASS INDEX: 40.15 KG/M2

## 2022-02-10 DIAGNOSIS — M79.674 TOE PAIN, BILATERAL: Primary | ICD-10-CM

## 2022-02-10 DIAGNOSIS — M79.675 TOE PAIN, BILATERAL: ICD-10-CM

## 2022-02-10 DIAGNOSIS — L60.9 DISEASE OF NAIL: ICD-10-CM

## 2022-02-10 DIAGNOSIS — M79.674 TOE PAIN, BILATERAL: ICD-10-CM

## 2022-02-10 DIAGNOSIS — Z12.12 SCREENING FOR COLORECTAL CANCER: Primary | ICD-10-CM

## 2022-02-10 DIAGNOSIS — M79.675 TOE PAIN, BILATERAL: Primary | ICD-10-CM

## 2022-02-10 DIAGNOSIS — Z12.11 SCREENING FOR COLORECTAL CANCER: Primary | ICD-10-CM

## 2022-02-10 PROCEDURE — 1159F MED LIST DOCD IN RCRD: CPT | Mod: CPTII,S$GLB,, | Performed by: PODIATRIST

## 2022-02-10 PROCEDURE — 99999 PR PBB SHADOW E&M-EST. PATIENT-LVL IV: CPT | Mod: PBBFAC,,, | Performed by: PODIATRIST

## 2022-02-10 PROCEDURE — 1160F PR REVIEW ALL MEDS BY PRESCRIBER/CLIN PHARMACIST DOCUMENTED: ICD-10-PCS | Mod: CPTII,S$GLB,, | Performed by: PODIATRIST

## 2022-02-10 PROCEDURE — 73660 XR TOE 2 OR MORE VIEWS LEFT: ICD-10-PCS | Mod: 26,LT,, | Performed by: RADIOLOGY

## 2022-02-10 PROCEDURE — 73660 X-RAY EXAM OF TOE(S): CPT | Mod: 26,RT,, | Performed by: RADIOLOGY

## 2022-02-10 PROCEDURE — 3008F PR BODY MASS INDEX (BMI) DOCUMENTED: ICD-10-PCS | Mod: CPTII,S$GLB,, | Performed by: PODIATRIST

## 2022-02-10 PROCEDURE — 3074F SYST BP LT 130 MM HG: CPT | Mod: CPTII,S$GLB,, | Performed by: PODIATRIST

## 2022-02-10 PROCEDURE — 73660 X-RAY EXAM OF TOE(S): CPT | Mod: 26,LT,, | Performed by: RADIOLOGY

## 2022-02-10 PROCEDURE — 1159F PR MEDICATION LIST DOCUMENTED IN MEDICAL RECORD: ICD-10-PCS | Mod: CPTII,S$GLB,, | Performed by: PODIATRIST

## 2022-02-10 PROCEDURE — 3079F PR MOST RECENT DIASTOLIC BLOOD PRESSURE 80-89 MM HG: ICD-10-PCS | Mod: CPTII,S$GLB,, | Performed by: PODIATRIST

## 2022-02-10 PROCEDURE — 3008F BODY MASS INDEX DOCD: CPT | Mod: CPTII,S$GLB,, | Performed by: PODIATRIST

## 2022-02-10 PROCEDURE — 87106 FUNGI IDENTIFICATION YEAST: CPT | Performed by: PODIATRIST

## 2022-02-10 PROCEDURE — 87101 SKIN FUNGI CULTURE: CPT | Performed by: PODIATRIST

## 2022-02-10 PROCEDURE — 73660 X-RAY EXAM OF TOE(S): CPT | Mod: TC,LT

## 2022-02-10 PROCEDURE — 99203 PR OFFICE/OUTPT VISIT, NEW, LEVL III, 30-44 MIN: ICD-10-PCS | Mod: S$GLB,,, | Performed by: PODIATRIST

## 2022-02-10 PROCEDURE — 99999 PR PBB SHADOW E&M-EST. PATIENT-LVL IV: ICD-10-PCS | Mod: PBBFAC,,, | Performed by: PODIATRIST

## 2022-02-10 PROCEDURE — 3079F DIAST BP 80-89 MM HG: CPT | Mod: CPTII,S$GLB,, | Performed by: PODIATRIST

## 2022-02-10 PROCEDURE — 1160F RVW MEDS BY RX/DR IN RCRD: CPT | Mod: CPTII,S$GLB,, | Performed by: PODIATRIST

## 2022-02-10 PROCEDURE — 3074F PR MOST RECENT SYSTOLIC BLOOD PRESSURE < 130 MM HG: ICD-10-PCS | Mod: CPTII,S$GLB,, | Performed by: PODIATRIST

## 2022-02-10 PROCEDURE — 73660 X-RAY EXAM OF TOE(S): CPT | Mod: TC,RT

## 2022-02-10 PROCEDURE — 99203 OFFICE O/P NEW LOW 30 MIN: CPT | Mod: S$GLB,,, | Performed by: PODIATRIST

## 2022-02-10 NOTE — PROGRESS NOTES
Subjective:     Patient ID: Karla Arzola is a 61 y.o. female.    Chief Complaint: Toe Pain (C/o bilateral hallux soreness at touch, wears tennis shoe with socks, non-diabetic pt, last seen on 12/01/21 with PCP Dr. Robina Momin)    Karla is a 61 y.o. female who presents to the clinic complaining of thick and discolored toenails on both feet. Patient admits pain to the toenail arare when pressed on, L>R. Patient denies any injury to the toes. Karla is inquiring about treatment options. Patient has no other pedal complaints at this time.     Patient Active Problem List   Diagnosis    HTN (hypertension)    Obesity    Recurrent genital HSV (herpes simplex virus) infection    Internal derangement of right knee    Chondromalacia of right knee    Screen for colon cancer       Medication List with Changes/Refills   Current Medications    ACYCLOVIR (ZOVIRAX) 200 MG CAPSULE    Take 1 capsule (200 mg total) by mouth once daily.    ALBUTEROL (VENTOLIN HFA) 90 MCG/ACTUATION INHALER    Inhale 2 puffs into the lungs every 6 (six) hours as needed for Wheezing. Rescue    ALPRAZOLAM (XANAX) 0.25 MG TABLET    Take 1 tablet (0.25 mg total) by mouth 3 (three) times daily as needed for Insomnia or Anxiety.    AMLODIPINE (NORVASC) 10 MG TABLET    Take 1 tablet (10 mg total) by mouth once daily.    BUSPIRONE (BUSPAR) 7.5 MG TABLET    Take 1 tablet (7.5 mg total) by mouth 3 (three) times daily as needed (anxiety).    CITALOPRAM (CELEXA) 20 MG TABLET    Take 1 tablet (20 mg total) by mouth once daily.    CYANOCOBALAMIN (VITAMIN B-12) 100 MCG TABLET    Take 100 mcg by mouth once daily.    DIFLUPREDNATE (DUREZOL) 0.05 % DROP OPHTHALMIC SOLUTION    Place 1 drop into the left eye 4 (four) times daily. for 10 days    FLUTICASONE (FLONASE) 50 MCG/ACTUATION NASAL SPRAY    1 spray (50 mcg total) by Each Nare route once daily.    FUROSEMIDE (LASIX) 20 MG TABLET    Take 1 tablet (20 mg total) by mouth daily as needed (swelling).     "IBUPROFEN (ADVIL,MOTRIN) 800 MG TABLET    Take 1 tablet (800 mg total) by mouth every 6 to 8 hours as needed for pain    INDOMETHACIN (INDOCIN) 50 MG CAPSULE    Take 1 capsule (50 mg total) by mouth 3 (three) times daily.    IPRATROPIUM-ALBUTEROL (COMBIVENT)  MCG/ACTUATION INHALER    Inhale 1 puff into the lungs every 6 (six) hours as needed for Wheezing or Shortness of Breath. Rescue    MELOXICAM (MOBIC) 7.5 MG TABLET    Take 1 tablet (7.5 mg total) by mouth once daily.    MULTIVITAMIN CAPSULE    Take 1 capsule by mouth once daily.    SPIRONOLACTONE (ALDACTONE) 100 MG TABLET    Take 1 tablet (100 mg total) by mouth once daily.       Review of patient's allergies indicates:  No Known Allergies    Past Surgical History:   Procedure Laterality Date    MOUTH SURGERY      right knee scope      TUBAL LIGATION         Family History   Problem Relation Age of Onset    Diabetes Mother     Hypertension Mother     Stroke Mother     Hypertension Father     Hyperlipidemia Father     Kidney disease Father        Social History     Socioeconomic History    Marital status: Single   Occupational History     Employer: [employer]   Tobacco Use    Smoking status: Never Smoker    Smokeless tobacco: Never Used   Substance and Sexual Activity    Alcohol use: Yes     Alcohol/week: 0.0 standard drinks     Comment: socially     Drug use: No    Sexual activity: Yes   Other Topics Concern    Are you pregnant or think you may be? No    Breast-feeding No       Vitals:    02/10/22 0942   BP: 125/81   Pulse: 85   Weight: 109.3 kg (241 lb)   Height: 5' 5" (1.651 m)       Review of Systems   Constitutional: Negative for chills and fever.   Respiratory: Negative for shortness of breath.    Cardiovascular: Negative for chest pain, palpitations, orthopnea, claudication and leg swelling.   Gastrointestinal: Negative for diarrhea, nausea and vomiting.   Musculoskeletal: Negative for joint pain.   Skin: Negative for " rash.   Neurological: Negative for dizziness, tingling, sensory change, focal weakness and weakness.   Psychiatric/Behavioral: Negative.              Objective:      PHYSICAL EXAM: Apperance: Alert and orient in no distress,well developed, and with good attention to grooming and body habits.   Patient presents ambulating in  LOWER EXTREMITY EXAM:  VASCULAR: Dorsalis pedis pulses 2/4 bilateral and Posterior Tibial pulses 2/4 bilateral. Capillary fill time <4 seconds bilateral. No edema observed bilateral. Varicosities absent bilateral. Skin temperature of the lower extremities is warm to warm, proximal to distal. Hair growth WNL bilateral.  DERMATOLOGICAL: No skin rashes, subcutaneous nodules, lesions, or ulcers observed bilateral. Nails 1 bilateral thickened, and discolored with subungual debris. Webspaces 1,2,3,4 bilateral clean, dry and without evidence of break in skin integrity.  NEUROLOGICAL: Light touch, sharp-dull, proprioception all present and equal bilaterally.     MUSCULOSKELETAL: Muscle strength is 5/5 for foot inverters, everters, plantarflexors, and dorsiflexors. Muscle tone is normal. Positive pain on palpation of dorsal left hallux nail.         Assessment:       Encounter Diagnoses   Name Primary?    Toe pain, bilateral Yes    Disease of nail          Plan:   Toe pain, bilateral  -     X-Ray Toe 2 or More Views Right; Future; Expected date: 02/10/2022  -     X-Ray Toe 2 or More Views Left; Future; Expected date: 02/10/2022    Disease of nail  -     Fungal culture , skin, hair, or nails      I counseled the patient on her conditions, regarding findings of my examination, my impressions, and usual treatment plan.   Patient instructed to spray all shoes with Lysol disinfectant spray and let dry before wearing. Patient instructed to wash all socks in hot water and bleach.  Discuss treatment options for nail fungus.  I explained that fungus lives in a warm dark moist environment and therefore patient  should make every attempt to keep feet clean and dry.  We discussed drying feet thoroughly after shower particularly between the toes and then applying powder between the toes and in the shoes.    With patient's permission, nail clippings obtained for fungal nail culture.   For fungal toenails I prescribed topical medication to be used daily for up to a year.  We also discussed oral Lamisil but I did not recommend it as a first line of treatment since it is an internal medicine that may potentially have side effects, including liver problems.   Ordered bilateral toe x-rays to rule out bony exostosis.   Patient to return pending nail culture results.             Jimbo Montes DPM  [facility]

## 2022-03-15 LAB — FUNGUS BLD CULT: ABNORMAL

## 2022-03-16 ENCOUNTER — PATIENT MESSAGE (OUTPATIENT)
Dept: INTERNAL MEDICINE | Facility: CLINIC | Age: 62
End: 2022-03-16
Payer: COMMERCIAL

## 2022-03-16 ENCOUNTER — PATIENT MESSAGE (OUTPATIENT)
Dept: PODIATRY | Facility: CLINIC | Age: 62
End: 2022-03-16
Payer: COMMERCIAL

## 2022-03-16 DIAGNOSIS — B35.1 ONYCHOMYCOSIS OF TOENAIL: Primary | ICD-10-CM

## 2022-03-16 DIAGNOSIS — L70.0 ACNE VULGARIS: ICD-10-CM

## 2022-03-16 RX ORDER — CICLOPIROX 80 MG/ML
SOLUTION TOPICAL
Qty: 6.6 ML | Refills: 2 | Status: SHIPPED | OUTPATIENT
Start: 2022-03-16

## 2022-03-16 RX ORDER — SPIRONOLACTONE 100 MG/1
100 TABLET, FILM COATED ORAL DAILY
Qty: 30 TABLET | Refills: 2 | Status: SHIPPED | OUTPATIENT
Start: 2022-03-16 | End: 2023-04-21

## 2022-03-30 ENCOUNTER — PATIENT OUTREACH (OUTPATIENT)
Dept: ADMINISTRATIVE | Facility: OTHER | Age: 62
End: 2022-03-30
Payer: COMMERCIAL

## 2022-04-12 ENCOUNTER — PATIENT MESSAGE (OUTPATIENT)
Dept: INTERNAL MEDICINE | Facility: CLINIC | Age: 62
End: 2022-04-12
Payer: COMMERCIAL

## 2022-04-18 ENCOUNTER — TELEPHONE (OUTPATIENT)
Dept: ADMINISTRATIVE | Facility: HOSPITAL | Age: 62
End: 2022-04-18
Payer: COMMERCIAL

## 2022-04-18 DIAGNOSIS — Z12.11 COLON CANCER SCREENING: Primary | ICD-10-CM

## 2022-05-02 ENCOUNTER — PATIENT MESSAGE (OUTPATIENT)
Dept: ADMINISTRATIVE | Facility: HOSPITAL | Age: 62
End: 2022-05-02
Payer: COMMERCIAL

## 2022-05-02 ENCOUNTER — OFFICE VISIT (OUTPATIENT)
Dept: DERMATOLOGY | Facility: CLINIC | Age: 62
End: 2022-05-02
Payer: COMMERCIAL

## 2022-05-02 DIAGNOSIS — L71.0 PERIORAL DERMATITIS: Primary | ICD-10-CM

## 2022-05-02 PROCEDURE — 1160F RVW MEDS BY RX/DR IN RCRD: CPT | Mod: CPTII,95,, | Performed by: STUDENT IN AN ORGANIZED HEALTH CARE EDUCATION/TRAINING PROGRAM

## 2022-05-02 PROCEDURE — 1159F MED LIST DOCD IN RCRD: CPT | Mod: CPTII,95,, | Performed by: STUDENT IN AN ORGANIZED HEALTH CARE EDUCATION/TRAINING PROGRAM

## 2022-05-02 PROCEDURE — 99213 PR OFFICE/OUTPT VISIT, EST, LEVL III, 20-29 MIN: ICD-10-PCS | Mod: 95,,, | Performed by: STUDENT IN AN ORGANIZED HEALTH CARE EDUCATION/TRAINING PROGRAM

## 2022-05-02 PROCEDURE — 99213 OFFICE O/P EST LOW 20 MIN: CPT | Mod: 95,,, | Performed by: STUDENT IN AN ORGANIZED HEALTH CARE EDUCATION/TRAINING PROGRAM

## 2022-05-02 PROCEDURE — 1160F PR REVIEW ALL MEDS BY PRESCRIBER/CLIN PHARMACIST DOCUMENTED: ICD-10-PCS | Mod: CPTII,95,, | Performed by: STUDENT IN AN ORGANIZED HEALTH CARE EDUCATION/TRAINING PROGRAM

## 2022-05-02 PROCEDURE — 1159F PR MEDICATION LIST DOCUMENTED IN MEDICAL RECORD: ICD-10-PCS | Mod: CPTII,95,, | Performed by: STUDENT IN AN ORGANIZED HEALTH CARE EDUCATION/TRAINING PROGRAM

## 2022-05-02 RX ORDER — AZELAIC ACID 0.15 G/G
1 AEROSOL, FOAM TOPICAL 2 TIMES DAILY
Qty: 50 G | Refills: 2 | Status: SHIPPED | OUTPATIENT
Start: 2022-05-02 | End: 2024-02-02

## 2022-05-02 RX ORDER — DOXYCYCLINE 100 MG/1
TABLET ORAL
Qty: 30 TABLET | Refills: 2 | Status: SHIPPED | OUTPATIENT
Start: 2022-05-02 | End: 2022-11-16 | Stop reason: SDUPTHER

## 2022-05-02 NOTE — PROGRESS NOTES
Patient Information  Name: Karla Arzola  : 1960  MRN: [id number]     Referring Physician:  Dr. Resendiz ref. provider found   Primary Care Physician:  Dr. Robina Momin MD   Date of Visit: 2022      Subjective:       Karla Arzola is a 62 y.o. female who presents for rash    HPI  The patient location is: [place], [place]  The chief complaint leading to consultation is: rash    Visit type: audiovisual    Face to Face time with patient: 10 min  12 minutes of total time spent on the encounter, which includes face to face time and non-face to face time preparing to see the patient (eg, review of tests), Obtaining and/or reviewing separately obtained history, Documenting clinical information in the electronic or other health record, Independently interpreting results (not separately reported) and communicating results to the patient/family/caregiver, or Care coordination (not separately reported).     Each patient to whom he or she provides medical services by telemedicine is:  (1) informed of the relationship between the physician and patient and the respective role of any other health care provider with respect to management of the patient; and (2) notified that he or she may decline to receive medical services by telemedicine and may withdraw from such care at any time.    Notes:   Patient with new complaint of lesion(s)  Location: face  Duration: few weeks  Symptoms: raised tiny bumps  Relieving factors/Previous treatments: none    Patient previously used triple lightening cream but has skin discontinued it 4 weeks ago. Rash is slightly itchy as well.    Patient was last seen:Visit date not found     Prior notes by myself reviewed.   Clinical documentation obtained by nursing staff reviewed.    Review of Systems   Skin: Positive for itching and rash.        Objective:    Physical Exam   Constitutional: She appears well-developed and well-nourished. No distress.   Neurological: She is alert and  oriented to person, place, and time. She is not disoriented.   Psychiatric: She has a normal mood and affect.   Skin:   Areas Examined (abnormalities noted in diagram):   Head / Face Inspection Performed              Diagram Legend     Erythematous scaling macule/papule c/w actinic keratosis       Vascular papule c/w angioma      Pigmented verrucoid papule/plaque c/w seborrheic keratosis      Yellow umbilicated papule c/w sebaceous hyperplasia      Irregularly shaped tan macule c/w lentigo     1-2 mm smooth white papules consistent with Milia      Movable subcutaneous cyst with punctum c/w epidermal inclusion cyst      Subcutaneous movable cyst c/w pilar cyst      Firm pink to brown papule c/w dermatofibroma      Pedunculated fleshy papule(s) c/w skin tag(s)      Evenly pigmented macule c/w junctional nevus     Mildly variegated pigmented, slightly irregular-bordered macule c/w mildly atypical nevus      Flesh colored to evenly pigmented papule c/w intradermal nevus       Pink pearly papule/plaque c/w basal cell carcinoma      Erythematous hyperkeratotic cursted plaque c/w SCC      Surgical scar with no sign of skin cancer recurrence      Open and closed comedones      Inflammatory papules and pustules      Verrucoid papule consistent consistent with wart     Erythematous eczematous patches and plaques     Dystrophic onycholytic nail with subungual debris c/w onychomycosis     Umbilicated papule    Erythematous-base heme-crusted tan verrucoid plaque consistent with inflamed seborrheic keratosis     Erythematous Silvery Scaling Plaque c/w Psoriasis     See annotation          [] Data reviewed  [] Independent review of test  [] Management discussed with another provider    Assessment / Plan:        Perioral dermatitis  -     doxycycline monohydrate 100 mg Tab; Take 1 po qday with food and not within 1 hour prior to lying down  Dispense: 30 tablet; Refill: 2  -     azelaic acid (FINACEA) 15 % Foam; Apply 1 application  topically 2 (two) times daily.  Dispense: 50 g; Refill: 2  Discussed benefits and risks of doxycyline therapy including but not limited to GI discomfort, esophageal irritation/ulceration, and increased sun sensitivity. Patient was counseled to take medicine with meals and at least 1 hour before lying down.              LOS NUMBER AND COMPLEXITY OF PROBLEMS    COMPLEXITY OF DATA RISK TOTAL TIME (m)   93750  [id number] [] 1 self-limited or minor problem [x] Minimal to none [] No treatment recommended or patient to monitor 15-29  10-19   30402  36927 Low  [] 2 or > self limited or minor problems  [] 1 stable chronic illness  [x] 1 acute, uncomplicated illness or injury Limited (2)  [] Prior external notes from each unique source  [] Review result of each unique test  [] Order each unique test []  Low  OTC medications, minor skin biopsy 30-44  20-29   95906  56936 Moderate  []  1 or > chronic illness with progression, exacerbation or SE of treatment  []  2 or more stable chronic illnesses  []  1 acute illness with systemic symptoms  []  1 acute complicated injury  []  1 undiagnosed new problem with uncertain prognosis Moderate (1/3 below)  []  3 or more data items        *Now includes assessment requiring independent historian  []  Independent interpretation of a test  []  Discuss management/test with another provider Moderate  [x]  Prescription drug mgmt  []  Minor surgery with risk discussed  []  Mgmt limited by social determinates 45-59  30-39   89558  54798 High  []  1 or more chronic illness with severe exacerbation, progression or SE of treatment  []  1 acute or chronic illness/injury that poses a threat to life or bodily function Extensive (2/3 below)  []  3 or more data items        *Now includes assessment requiring independent historian.  []  Independent interpretation of a test  []  Discuss management/test with another provider High  []  Major surgery with risk discussed  []  Drug therapy requiring intensive  monitoring for toxicity  []  Hospitalization  []  Decision for DNR 60-74  40-54      No follow-ups on file.    Elayne Garcia MD, FAAD  [facility]

## 2022-05-05 ENCOUNTER — PATIENT MESSAGE (OUTPATIENT)
Dept: OPHTHALMOLOGY | Facility: CLINIC | Age: 62
End: 2022-05-05

## 2022-05-05 ENCOUNTER — PATIENT OUTREACH (OUTPATIENT)
Dept: ADMINISTRATIVE | Facility: OTHER | Age: 62
End: 2022-05-05
Payer: COMMERCIAL

## 2022-05-05 NOTE — PROGRESS NOTES
Health Maintenance Due   Topic Date Due    Colorectal Cancer Screening  Never done    Cervical Cancer Screening  07/19/2016    TETANUS VACCINE  04/14/2021    Shingles Vaccine (2 of 2) 01/26/2022    COVID-19 Vaccine (4 - Booster for Pfizer series) 05/14/2022     Updates were requested from care everywhere.  Chart was reviewed for overdue Proactive [facility] ([facility]) topics (CRS, Breast Cancer Screening, Eye exam)  Health Maintenance has been updated.  LINKS immunization registry triggered.  Immunizations were reconciled.

## 2022-05-06 ENCOUNTER — OFFICE VISIT (OUTPATIENT)
Dept: OPHTHALMOLOGY | Facility: CLINIC | Age: 62
End: 2022-05-06
Payer: COMMERCIAL

## 2022-05-06 DIAGNOSIS — H20.00 ACUTE ANTERIOR UVEITIS OF BOTH EYES: Primary | ICD-10-CM

## 2022-05-06 PROCEDURE — 92012 INTRM OPH EXAM EST PATIENT: CPT | Mod: S$GLB,,, | Performed by: OPTOMETRIST

## 2022-05-06 PROCEDURE — 99999 PR PBB SHADOW E&M-EST. PATIENT-LVL III: ICD-10-PCS | Mod: PBBFAC,,, | Performed by: OPTOMETRIST

## 2022-05-06 PROCEDURE — 99999 PR PBB SHADOW E&M-EST. PATIENT-LVL III: CPT | Mod: PBBFAC,,, | Performed by: OPTOMETRIST

## 2022-05-06 PROCEDURE — 1159F MED LIST DOCD IN RCRD: CPT | Mod: CPTII,S$GLB,, | Performed by: OPTOMETRIST

## 2022-05-06 PROCEDURE — 92012 PR EYE EXAM, EST PATIENT,INTERMED: ICD-10-PCS | Mod: S$GLB,,, | Performed by: OPTOMETRIST

## 2022-05-06 PROCEDURE — 1159F PR MEDICATION LIST DOCUMENTED IN MEDICAL RECORD: ICD-10-PCS | Mod: CPTII,S$GLB,, | Performed by: OPTOMETRIST

## 2022-05-06 PROCEDURE — 1160F PR REVIEW ALL MEDS BY PRESCRIBER/CLIN PHARMACIST DOCUMENTED: ICD-10-PCS | Mod: CPTII,S$GLB,, | Performed by: OPTOMETRIST

## 2022-05-06 PROCEDURE — 1160F RVW MEDS BY RX/DR IN RCRD: CPT | Mod: CPTII,S$GLB,, | Performed by: OPTOMETRIST

## 2022-05-06 RX ORDER — PREDNISOLONE ACETATE 10 MG/ML
1 SUSPENSION/ DROPS OPHTHALMIC 4 TIMES DAILY
Qty: 5 ML | Refills: 0 | Status: SHIPPED | OUTPATIENT
Start: 2022-05-06 | End: 2022-06-20

## 2022-05-06 NOTE — PROGRESS NOTES
HPI     Eye Problem     Comments: Pain Scale:  4  Onset:   OD 5/2/22 OS 5/6/22  OD, OS, OU:   OU  Discharge:   yes, yellow OD only  A.M. Matting:  yes first couple of days   Itch:   yes  Redness:   yes  Photophobia:   yes, OD only   Foreign body sensation:   no  Deep pain:   no  Previous occurrence:   yes, saw [facility] for it  Drops:   Last RX from [facility]   OD started draining Monday, draining with yellow stuff in corners, itching   and ache at first but if she turned a certain way she can feel pain           Last edited by Loretta Adkins MA on 5/6/2022  8:31 AM. (History)            Assessment /Plan     For exam results, see Encounter Report.    Acute anterior uveitis of both eyes  -     Ambulatory referral/consult to Rheumatology; Future; Expected date: 05/13/2022  -     prednisoLONE acetate (PRED FORTE) 1 % DrpS; Place 1 drop into both eyes 4 (four) times daily.  Dispense: 5 mL; Refill: 0    This is her second known episode of iritis, this time bilateral  Will get Rheumatology consult    Start Pred forte qid OU x 1 week until next visit as instructed      RTC 1 week for follow up or PRN with any worsening  Discussed above and all questions were answered.

## 2022-05-13 ENCOUNTER — OFFICE VISIT (OUTPATIENT)
Dept: OPHTHALMOLOGY | Facility: CLINIC | Age: 62
End: 2022-05-13
Payer: COMMERCIAL

## 2022-05-13 DIAGNOSIS — H20.00 ACUTE ANTERIOR UVEITIS OF BOTH EYES: Primary | ICD-10-CM

## 2022-05-13 PROCEDURE — 92012 PR EYE EXAM, EST PATIENT,INTERMED: ICD-10-PCS | Mod: S$GLB,,, | Performed by: OPTOMETRIST

## 2022-05-13 PROCEDURE — 1159F MED LIST DOCD IN RCRD: CPT | Mod: CPTII,S$GLB,, | Performed by: OPTOMETRIST

## 2022-05-13 PROCEDURE — 1160F PR REVIEW ALL MEDS BY PRESCRIBER/CLIN PHARMACIST DOCUMENTED: ICD-10-PCS | Mod: CPTII,S$GLB,, | Performed by: OPTOMETRIST

## 2022-05-13 PROCEDURE — 99999 PR PBB SHADOW E&M-EST. PATIENT-LVL II: ICD-10-PCS | Mod: PBBFAC,,, | Performed by: OPTOMETRIST

## 2022-05-13 PROCEDURE — 92012 INTRM OPH EXAM EST PATIENT: CPT | Mod: S$GLB,,, | Performed by: OPTOMETRIST

## 2022-05-13 PROCEDURE — 1160F RVW MEDS BY RX/DR IN RCRD: CPT | Mod: CPTII,S$GLB,, | Performed by: OPTOMETRIST

## 2022-05-13 PROCEDURE — 1159F PR MEDICATION LIST DOCUMENTED IN MEDICAL RECORD: ICD-10-PCS | Mod: CPTII,S$GLB,, | Performed by: OPTOMETRIST

## 2022-05-13 PROCEDURE — 99999 PR PBB SHADOW E&M-EST. PATIENT-LVL II: CPT | Mod: PBBFAC,,, | Performed by: OPTOMETRIST

## 2022-05-13 NOTE — PROGRESS NOTES
HPI     Eye Problem     Comments: Pt states VA is stable, no pain or occular issues. Pt compliant   with pred forte qid OU, pt states symptoms have improved.          Last edited by Loretta Adkins MA on 5/13/2022  8:03 AM. (History)            Assessment /Plan     For exam results, see Encounter Report.    Acute anterior uveitis of both eyes    A/c clear today OU  Taper Pred bid OU x 1 week, then qd OU x 1 week, then stop  Pt scheduled to see rheumatology next month      RTC PRN if symptoms worsen or not resolved x 1 week  Discussed above and all questions were answered.

## 2022-06-07 ENCOUNTER — OFFICE VISIT (OUTPATIENT)
Dept: ORTHOPEDICS | Facility: CLINIC | Age: 62
End: 2022-06-07
Payer: COMMERCIAL

## 2022-06-07 VITALS — HEIGHT: 65 IN | BODY MASS INDEX: 40.15 KG/M2 | WEIGHT: 241 LBS

## 2022-06-07 DIAGNOSIS — M65.4 RADIAL STYLOID TENOSYNOVITIS (DE QUERVAIN): Primary | ICD-10-CM

## 2022-06-07 PROCEDURE — 20550 NJX 1 TENDON SHEATH/LIGAMENT: CPT | Mod: RT,S$GLB,,

## 2022-06-07 PROCEDURE — 3008F PR BODY MASS INDEX (BMI) DOCUMENTED: ICD-10-PCS | Mod: CPTII,S$GLB,, | Performed by: ORTHOPAEDIC SURGERY

## 2022-06-07 PROCEDURE — 99213 OFFICE O/P EST LOW 20 MIN: CPT | Mod: 25,S$GLB,, | Performed by: ORTHOPAEDIC SURGERY

## 2022-06-07 PROCEDURE — 99999 PR PBB SHADOW E&M-EST. PATIENT-LVL III: CPT | Mod: PBBFAC,,, | Performed by: ORTHOPAEDIC SURGERY

## 2022-06-07 PROCEDURE — 99213 PR OFFICE/OUTPT VISIT, EST, LEVL III, 20-29 MIN: ICD-10-PCS | Mod: 25,S$GLB,, | Performed by: ORTHOPAEDIC SURGERY

## 2022-06-07 PROCEDURE — 1159F PR MEDICATION LIST DOCUMENTED IN MEDICAL RECORD: ICD-10-PCS | Mod: CPTII,S$GLB,, | Performed by: ORTHOPAEDIC SURGERY

## 2022-06-07 PROCEDURE — 99999 PR PBB SHADOW E&M-EST. PATIENT-LVL III: ICD-10-PCS | Mod: PBBFAC,,, | Performed by: ORTHOPAEDIC SURGERY

## 2022-06-07 PROCEDURE — 1159F MED LIST DOCD IN RCRD: CPT | Mod: CPTII,S$GLB,, | Performed by: ORTHOPAEDIC SURGERY

## 2022-06-07 PROCEDURE — 20550 PR INJECT TENDON SHEATH/LIGAMENT: ICD-10-PCS | Mod: RT,S$GLB,,

## 2022-06-07 PROCEDURE — 3008F BODY MASS INDEX DOCD: CPT | Mod: CPTII,S$GLB,, | Performed by: ORTHOPAEDIC SURGERY

## 2022-06-07 RX ORDER — TRIAMCINOLONE ACETONIDE 40 MG/ML
40 INJECTION, SUSPENSION INTRA-ARTICULAR; INTRAMUSCULAR
Status: DISCONTINUED | OUTPATIENT
Start: 2022-06-07 | End: 2022-06-07 | Stop reason: HOSPADM

## 2022-06-07 RX ADMIN — TRIAMCINOLONE ACETONIDE 40 MG: 40 INJECTION, SUSPENSION INTRA-ARTICULAR; INTRAMUSCULAR at 10:06

## 2022-06-07 NOTE — PROCEDURES
Tendon Sheath    Date/Time: 6/7/2022 10:30 AM  Performed by: Nicole Arnett PA-C  Authorized by: Nicole Arnett PA-C     Consent Done?:  Yes (Verbal)  Indications:  Pain  Timeout: prior to procedure the correct patient, procedure, and site was verified    Prep: patient was prepped and draped in usual sterile fashion      Local anesthesia used?: Yes    Local anesthetic:  Lidocaine 2% without epinephrine  Anesthetic total (ml):  0.5    Location: R first dorsal compartment.  Ultrasonic guidance for needle placement?: No    Needle size:  25 G  Approach:  Radial  Medications:  40 mg triamcinolone acetonide 40 mg/mL  Patient tolerance:  Patient tolerated the procedure well with no immediate complications

## 2022-06-07 NOTE — PROGRESS NOTES
Subjective:     Patient ID: Karla Arzola is a 62 y.o. female.    Chief Complaint: Pain of the Right Wrist    HPI: Ms. Arzola is a 62 year old female with a history of right wrist de quervain's returning today with complaints of right wrist tenderness. She received injection at her last visit on 2/4/22 and that has given her relief until about 2-3 weeks ago. She requests reinjection today. Her pain is at a 5/10. She has been compliant with her thumb spica brace. Denies fever, chills, n/v/d/c.    2/4/22: The patient is a 61-year-old female who has a several month history of pain about the right wrist 1st dorsal extensor tendon compartment of insidious onset.  She has not tried splinting or injection.    Past Medical History:   Diagnosis Date    Allergy     Anxiety     Hypertension     Obesity     Vitamin B 12 deficiency      Past Surgical History:   Procedure Laterality Date    MOUTH SURGERY      right knee scope      TUBAL LIGATION       Family History   Problem Relation Age of Onset    Diabetes Mother     Hypertension Mother     Stroke Mother     Hypertension Father     Hyperlipidemia Father     Kidney disease Father      Social History     Socioeconomic History    Marital status: Single   Occupational History     Employer: [employer]   Tobacco Use    Smoking status: Never Smoker    Smokeless tobacco: Never Used   Substance and Sexual Activity    Alcohol use: Yes     Alcohol/week: 0.0 standard drinks     Comment: socially     Drug use: No    Sexual activity: Yes   Other Topics Concern    Are you pregnant or think you may be? No    Breast-feeding No     Social Determinants of Health     Financial Resource Strain: Unknown    Difficulty of Paying Living Expenses: Patient refused   Food Insecurity: Unknown    Worried About Running Out of Food in the Last Year: Patient refused    Ran Out of Food in the Last Year: Patient refused   Transportation Needs: Unknown    Lack of  Transportation (Medical): Patient refused    Lack of Transportation (Non-Medical): Patient refused   Physical Activity: Unknown    Days of Exercise per Week: Patient refused   Stress: Unknown    Feeling of Stress : Patient refused   Housing Stability: High Risk    Unable to Pay for Housing in the Last Year: Yes    Unstable Housing in the Last Year: Patient refused     Medication List with Changes/Refills   Current Medications    ACYCLOVIR (ZOVIRAX) 200 MG CAPSULE    Take 1 capsule (200 mg total) by mouth once daily.    ALBUTEROL (VENTOLIN HFA) 90 MCG/ACTUATION INHALER    Inhale 2 puffs into the lungs every 6 (six) hours as needed for Wheezing. Rescue    ALPRAZOLAM (XANAX) 0.25 MG TABLET    Take 1 tablet (0.25 mg total) by mouth 3 (three) times daily as needed for Insomnia or Anxiety.    AMLODIPINE (NORVASC) 10 MG TABLET    Take 1 tablet (10 mg total) by mouth once daily.    AZELAIC ACID (FINACEA) 15 % FOAM    Apply 1 application topically 2 (two) times daily.    BUSPIRONE (BUSPAR) 7.5 MG TABLET    Take 1 tablet (7.5 mg total) by mouth 3 (three) times daily as needed (anxiety).    CICLOPIROX (PENLAC) 8 % SOLN    Apply to nails once daily    CITALOPRAM (CELEXA) 20 MG TABLET    Take 1 tablet (20 mg total) by mouth once daily.    CYANOCOBALAMIN (VITAMIN B-12) 100 MCG TABLET    Take 100 mcg by mouth once daily.    DOXYCYCLINE MONOHYDRATE 100 MG TAB    Take 1 tablet by mouth every day with food and not within 1 hour prior to lying down    FLUTICASONE (FLONASE) 50 MCG/ACTUATION NASAL SPRAY    1 spray (50 mcg total) by Each Nare route once daily.    FUROSEMIDE (LASIX) 20 MG TABLET    Take 1 tablet (20 mg total) by mouth daily as needed (swelling).    IBUPROFEN (ADVIL,MOTRIN) 800 MG TABLET    Take 1 tablet (800 mg total) by mouth every 6 to 8 hours as needed for pain    INDOMETHACIN (INDOCIN) 50 MG CAPSULE    Take 1 capsule (50 mg total) by mouth 3 (three) times daily.    IPRATROPIUM-ALBUTEROL (COMBIVENT)   MCG/ACTUATION INHALER    Inhale 1 puff into the lungs every 6 (six) hours as needed for Wheezing or Shortness of Breath. Rescue    MELOXICAM (MOBIC) 7.5 MG TABLET    Take 1 tablet (7.5 mg total) by mouth once daily.    MULTIVITAMIN CAPSULE    Take 1 capsule by mouth once daily.    PREDNISOLONE ACETATE (PRED FORTE) 1 % DRPS    Place 1 drop into both eyes 4 (four) times daily.    SPIRONOLACTONE (ALDACTONE) 100 MG TABLET    Take 1 tablet (100 mg total) by mouth once daily.     Review of patient's allergies indicates:  No Known Allergies  Review of Systems   Constitutional: Negative for malaise/fatigue.   HENT: Negative for hearing loss.    Eyes: Negative for double vision and visual disturbance.   Cardiovascular: Negative for chest pain.   Respiratory: Negative for shortness of breath.    Endocrine: Negative for cold intolerance.   Hematologic/Lymphatic: Does not bruise/bleed easily.   Skin: Negative for poor wound healing and suspicious lesions.   Musculoskeletal: Positive for joint pain. Negative for gout and joint swelling.   Gastrointestinal: Negative for nausea and vomiting.   Genitourinary: Negative for dysuria.   Neurological: Negative for numbness, paresthesias and sensory change.   Psychiatric/Behavioral: Negative for depression, memory loss and substance abuse. The patient is not nervous/anxious.    Allergic/Immunologic: Negative for persistent infections.       Objective:   Body mass index is 40.1 kg/m².  There were no vitals filed for this visit.     General    Constitutional: She is oriented to person, place, and time. She appears well-developed and well-nourished. No distress.   HENT:   Head: Normocephalic.   Eyes: EOM are normal.   Pulmonary/Chest: Effort normal.   Neurological: She is oriented to person, place, and time.   Psychiatric: She has a normal mood and affect.             Right Hand/Wrist Exam     Inspection   Scars: Wrist - absent Hand -  absent  Effusion: Wrist - absent Hand -  absent    Pain    Wrist - The patient exhibits pain of the extensory musculature.    Tests   Finkelstein's test: positive      Other     Neuorologic Exam    Median Distribution: normal  Ulnar Distribution: normal  Radial Distribution: normal    Comments:  The patient has tenderness 1st dorsal extensor tendon compartment and a positive Finkelstein test.  There are no motor or sensory deficits.          Vascular Exam       Capillary Refill  Right Hand: normal capillary refill            No radiographs obtained today.  Assessment:     Encounter Diagnosis   Name Primary?    Radial styloid tenosynovitis (de quervain) Yes    Right wrist    Plan:     -Right wrist 1st dorsal compartment injection given today.  -Instructed to continue thumb spica brace as needed.  -RTC PRN.    PROCEDURE:  I have explained the risks, benefits, and alternatives of the procedure in detail.  The patient voices understanding, gives consent, and all questions have been answered.  Pause for timeout. A sterile prep of the skin performed, then the right 1st dorsal compartment is injected from the radial approach using a 25 gauge needle with a combination of 0.5 cc 2% plain lidocaine and 0.5 cc kenalog.  The patient is cautioned and immediate relief of pain is secondary to the local anesthetic and will be temporary.  After the anesthetic wears off there may be a increase in pain that may last for a few hours or a few days and they should use ice to help alleviate this flare up of pain. Patient tolerated the procedure well.       Nicole Arnett PA-C   [facility]         Disclaimer: This note was prepared using a voice recognition system and is likely to have sound alike errors within the text.

## 2022-06-16 ENCOUNTER — HOSPITAL ENCOUNTER (OUTPATIENT)
Dept: RADIOLOGY | Facility: HOSPITAL | Age: 62
Discharge: HOME | End: 2022-06-16
Attending: PHYSICIAN ASSISTANT
Payer: COMMERCIAL

## 2022-06-16 ENCOUNTER — OFFICE VISIT (OUTPATIENT)
Dept: RHEUMATOLOGY | Facility: CLINIC | Age: 62
End: 2022-06-16
Payer: COMMERCIAL

## 2022-06-16 VITALS
SYSTOLIC BLOOD PRESSURE: 133 MMHG | WEIGHT: 227.31 LBS | BODY MASS INDEX: 37.87 KG/M2 | DIASTOLIC BLOOD PRESSURE: 84 MMHG | HEIGHT: 65 IN | HEART RATE: 75 BPM

## 2022-06-16 DIAGNOSIS — M54.50 LUMBOSACRAL PAIN: ICD-10-CM

## 2022-06-16 DIAGNOSIS — H20.00 ACUTE ANTERIOR UVEITIS OF BOTH EYES: ICD-10-CM

## 2022-06-16 DIAGNOSIS — R06.09 DYSPNEA ON EXERTION: ICD-10-CM

## 2022-06-16 DIAGNOSIS — R06.09 DYSPNEA ON EXERTION: Primary | ICD-10-CM

## 2022-06-16 PROCEDURE — 1160F RVW MEDS BY RX/DR IN RCRD: CPT | Mod: CPTII,S$GLB,, | Performed by: PHYSICIAN ASSISTANT

## 2022-06-16 PROCEDURE — 72202 XR SACROILIAC JOINTS COMPLETE: ICD-10-PCS | Mod: 26,,, | Performed by: RADIOLOGY

## 2022-06-16 PROCEDURE — 71046 XR CHEST PA AND LATERAL: ICD-10-PCS | Mod: 26,,, | Performed by: RADIOLOGY

## 2022-06-16 PROCEDURE — 3075F SYST BP GE 130 - 139MM HG: CPT | Mod: CPTII,S$GLB,, | Performed by: PHYSICIAN ASSISTANT

## 2022-06-16 PROCEDURE — 3079F PR MOST RECENT DIASTOLIC BLOOD PRESSURE 80-89 MM HG: ICD-10-PCS | Mod: CPTII,S$GLB,, | Performed by: PHYSICIAN ASSISTANT

## 2022-06-16 PROCEDURE — 99204 PR OFFICE/OUTPT VISIT, NEW, LEVL IV, 45-59 MIN: ICD-10-PCS | Mod: S$GLB,,, | Performed by: PHYSICIAN ASSISTANT

## 2022-06-16 PROCEDURE — 72202 X-RAY EXAM SI JOINTS 3/> VWS: CPT | Mod: 26,,, | Performed by: RADIOLOGY

## 2022-06-16 PROCEDURE — 71046 X-RAY EXAM CHEST 2 VIEWS: CPT | Mod: 26,,, | Performed by: RADIOLOGY

## 2022-06-16 PROCEDURE — 1160F PR REVIEW ALL MEDS BY PRESCRIBER/CLIN PHARMACIST DOCUMENTED: ICD-10-PCS | Mod: CPTII,S$GLB,, | Performed by: PHYSICIAN ASSISTANT

## 2022-06-16 PROCEDURE — 3008F PR BODY MASS INDEX (BMI) DOCUMENTED: ICD-10-PCS | Mod: CPTII,S$GLB,, | Performed by: PHYSICIAN ASSISTANT

## 2022-06-16 PROCEDURE — 99204 OFFICE O/P NEW MOD 45 MIN: CPT | Mod: S$GLB,,, | Performed by: PHYSICIAN ASSISTANT

## 2022-06-16 PROCEDURE — 3075F PR MOST RECENT SYSTOLIC BLOOD PRESS GE 130-139MM HG: ICD-10-PCS | Mod: CPTII,S$GLB,, | Performed by: PHYSICIAN ASSISTANT

## 2022-06-16 PROCEDURE — 3008F BODY MASS INDEX DOCD: CPT | Mod: CPTII,S$GLB,, | Performed by: PHYSICIAN ASSISTANT

## 2022-06-16 PROCEDURE — 3079F DIAST BP 80-89 MM HG: CPT | Mod: CPTII,S$GLB,, | Performed by: PHYSICIAN ASSISTANT

## 2022-06-16 PROCEDURE — 72202 X-RAY EXAM SI JOINTS 3/> VWS: CPT | Mod: TC

## 2022-06-16 PROCEDURE — 1159F PR MEDICATION LIST DOCUMENTED IN MEDICAL RECORD: ICD-10-PCS | Mod: CPTII,S$GLB,, | Performed by: PHYSICIAN ASSISTANT

## 2022-06-16 PROCEDURE — 71046 X-RAY EXAM CHEST 2 VIEWS: CPT | Mod: TC

## 2022-06-16 PROCEDURE — 99999 PR PBB SHADOW E&M-EST. PATIENT-LVL V: ICD-10-PCS | Mod: PBBFAC,,, | Performed by: PHYSICIAN ASSISTANT

## 2022-06-16 PROCEDURE — 1159F MED LIST DOCD IN RCRD: CPT | Mod: CPTII,S$GLB,, | Performed by: PHYSICIAN ASSISTANT

## 2022-06-16 PROCEDURE — 99999 PR PBB SHADOW E&M-EST. PATIENT-LVL V: CPT | Mod: PBBFAC,,, | Performed by: PHYSICIAN ASSISTANT

## 2022-06-16 NOTE — PROGRESS NOTES
Subjective:      Patient ID: Karla Arzola is a 62 y.o. female.    Chief Complaint: Uveitis      HPI   Karla Arzola  is a 62 y.o. female who is new to the department.  She was referred by Ophthalmology.  She has history of uveitis.  Apparently in the last 6-12 months she has had 3 episodes of uveitis.  She had 1 in 2021. That involved 1 eye.  She had another episode in February 2022 involving 1 eye.  Most recent episode involved both eyes.  She is followed by Ophthalmology here.  She has successfully tapered off her prednisone Forte drops.  Complains of minimal eye pain.    She denies recent history of infections, unexplained fevers.  She complains of perioral dermatitis which is currently treated by Dr. Garcia.  She also has history of sciatica.  She has no pain today.  She gets pain in the lumbosacral region.  She describes mostly mechanical back pain although she does have mild inflammatory component as well.  Recently treated for de Quervain tenosynovitis.  She also has some pain in both shoulders right greater than left.    She endorses morning stiffness lasting 30 minutes or more.  She denies migratory pain as well as tender swollen joints.  She has dyspnea on exertion of walking more than about 5-10 minutes.  She has discussed this with primary care.  That symptoms started post COVID.  She had COVID 2020. She was referred to pulmonology.  Unfortunately she was not able to keep that appointment but has not been able to reschedule, either.  Sxs stable.    Patient denies fevers, chills, photophobia, eye pain, chest pain, hematuria, blood in the stool, rash, sicca symptoms, raynauds, finger ulcerations.  Rheumatologic systems otherwise negative.    Previous Treatment:   · Pred Forte drops eyes      Current Outpatient Medications:     acyclovir (ZOVIRAX) 200 MG capsule, Take 1 capsule (200 mg total) by mouth once daily., Disp: 90 capsule, Rfl: 1    albuterol (VENTOLIN HFA) 90 mcg/actuation inhaler,  Inhale 2 puffs into the lungs every 6 (six) hours as needed for Wheezing. Rescue, Disp: 18 g, Rfl: 0    amLODIPine (NORVASC) 10 MG tablet, Take 1 tablet (10 mg total) by mouth once daily., Disp: 90 tablet, Rfl: 1    azelaic acid (FINACEA) 15 % Foam, Apply 1 application topically 2 (two) times daily., Disp: 50 g, Rfl: 2    busPIRone (BUSPAR) 7.5 MG tablet, Take 1 tablet (7.5 mg total) by mouth 3 (three) times daily as needed (anxiety)., Disp: 90 tablet, Rfl: 0    ciclopirox (PENLAC) 8 % Soln, Apply to nails once daily, Disp: 6.6 mL, Rfl: 2    citalopram (CELEXA) 20 MG tablet, Take 1 tablet (20 mg total) by mouth once daily., Disp: 90 tablet, Rfl: 1    cyanocobalamin (VITAMIN B-12) 100 MCG tablet, Take 100 mcg by mouth once daily., Disp: , Rfl:     doxycycline monohydrate 100 mg Tab, Take 1 tablet by mouth every day with food and not within 1 hour prior to lying down, Disp: 30 tablet, Rfl: 2    fluticasone (FLONASE) 50 mcg/actuation nasal spray, 1 spray (50 mcg total) by Each Nare route once daily., Disp: 16 g, Rfl: 0    furosemide (LASIX) 20 MG tablet, Take 1 tablet (20 mg total) by mouth daily as needed (swelling)., Disp: 60 tablet, Rfl: 1    ibuprofen (ADVIL,MOTRIN) 800 MG tablet, Take 1 tablet (800 mg total) by mouth every 6 to 8 hours as needed for pain, Disp: 20 tablet, Rfl: 0    indomethacin (INDOCIN) 50 MG capsule, Take 1 capsule (50 mg total) by mouth 3 (three) times daily., Disp: 30 capsule, Rfl: 0    ipratropium-albuteroL (COMBIVENT)  mcg/actuation inhaler, Inhale 1 puff into the lungs every 6 (six) hours as needed for Wheezing or Shortness of Breath. Rescue, Disp: 4 g, Rfl: 0    meloxicam (MOBIC) 7.5 MG tablet, Take 1 tablet (7.5 mg total) by mouth once daily., Disp: 90 tablet, Rfl: 0    multivitamin capsule, Take 1 capsule by mouth once daily., Disp: , Rfl:     prednisoLONE acetate (PRED FORTE) 1 % DrpS, Place 1 drop into both eyes 4 (four) times daily., Disp: 5 mL, Rfl: 0     ALPRAZolam (XANAX) 0.25 MG tablet, Take 1 tablet (0.25 mg total) by mouth 3 (three) times daily as needed for Insomnia or Anxiety., Disp: 60 tablet, Rfl: 0    spironolactone (ALDACTONE) 100 MG tablet, Take 1 tablet (100 mg total) by mouth once daily., Disp: 30 tablet, Rfl: 2    Past Medical History:   Diagnosis Date    Allergy     Anxiety     Hypertension     Obesity     Vitamin B 12 deficiency      Family History   Problem Relation Age of Onset    Diabetes Mother     Hypertension Mother     Stroke Mother     Hypertension Father     Hyperlipidemia Father     Kidney disease Father      Social History     Socioeconomic History    Marital status: Single   Occupational History     Employer: [employer]   Tobacco Use    Smoking status: Never Smoker    Smokeless tobacco: Never Used   Substance and Sexual Activity    Alcohol use: Yes     Alcohol/week: 0.0 standard drinks     Comment: socially     Drug use: No    Sexual activity: Yes   Other Topics Concern    Are you pregnant or think you may be? No    Breast-feeding No     Social Determinants of Health     Financial Resource Strain: Unknown    Difficulty of Paying Living Expenses: Patient refused   Food Insecurity: Unknown    Worried About Running Out of Food in the Last Year: Patient refused    Ran Out of Food in the Last Year: Patient refused   Transportation Needs: Unknown    Lack of Transportation (Medical): Patient refused    Lack of Transportation (Non-Medical): Patient refused   Physical Activity: Unknown    Days of Exercise per Week: Patient refused   Stress: Unknown    Feeling of Stress : Patient refused   Housing Stability: High Risk    Unable to Pay for Housing in the Last Year: Yes    Unstable Housing in the Last Year: Patient refused     Review of patient's allergies indicates:  No Known Allergies    Objective:   /84 (BP Location: Left arm, Patient Position: Sitting, BP Method: Medium (Automatic))   Pulse 75    "Ht 5' 5" (1.651 m)   Wt 103.1 kg (227 lb 4.7 oz)   LMP 06/11/2015   BMI 37.82 kg/m²   Immunization History   Administered Date(s) Administered    COVID-19, MRNA, LN-S, PF (Pfizer) (Purple Cap) 01/11/2021, 02/01/2021, 01/14/2022    Influenza 10/12/2007, 10/10/2008, 09/24/2009, 10/29/2010    Influenza - Quadrivalent 10/07/2015, 09/30/2016    Influenza - Quadrivalent - PF *Preferred* (6 months and older) 09/28/2017, 09/27/2018, 09/25/2019, 11/17/2020, 09/23/2021    Influenza - Trivalent (ADULT) 10/12/2007, 10/10/2008, 09/24/2009, 10/29/2010    Influenza A (H1N1) 2009 Monovalent - IM 10/27/2009    Tdap 11/01/2007, 04/14/2011    Zoster Recombinant 12/01/2021       Physical Exam   Constitutional: She is oriented to person, place, and time. No distress.   HENT:   Head: Normocephalic and atraumatic.   Pulmonary/Chest: Effort normal.   Abdominal: She exhibits no distension.   Musculoskeletal:         General: No swelling or tenderness. Normal range of motion.      Cervical back: Normal range of motion.      Right knee: No effusion.      Left knee: No effusion.   Lymphadenopathy:     She has no cervical adenopathy.   Neurological: She is alert and oriented to person, place, and time.   Skin: Skin is warm and dry. No rash noted.   Psychiatric: Mood normal.   Nursing note and vitals reviewed.      Right Side Rheumatological Exam     Examination finds the 1st PIP, 1st MCP, 2nd PIP, 2nd MCP, 3rd PIP, 3rd MCP, 4th PIP, 4th MCP, 5th PIP and 5th MCP normal.    Shoulder Exam   Tenderness Location: no tenderness    Range of Motion   The patient has normal right shoulder ROM.    Knee Exam   Patellofemoral Crepitus: negative  Effusion: negative  Warmth: negative    Elbow/Wrist Exam   Tenderness Location: no elbow tenderness and no wrist tenderness    Range of Motion   Elbow   The patient has normal right elbow ROM.    Range of Motion   Wrist   The patient has normal right wrist ROM.    Left Side Rheumatological Exam "     Examination finds the 1st PIP, 1st MCP, 2nd PIP, 2nd MCP, 3rd PIP, 3rd MCP, 4th PIP, 4th MCP, 5th PIP and 5th MCP normal.    Shoulder Exam   Tenderness Location: no tenderness    Range of Motion   The patient has normal left shoulder ROM.    Knee Exam     Patellofemoral Crepitus: negative  Effusion: negative  Warmth: negative    Elbow/Wrist Exam   Tenderness Location: no elbow tenderness and no wrist tenderness    Range of Motion   Elbow   The patient has normal left elbow ROM.    Range of Motion   Wrist   The patient has normal left wrist ROM.      No synovitis/enthesitis  TTP Bilat SI joints    No results found for this or any previous visit (from the past 672 hour(s)).      No results found for: TBGOLDPLUS   Lab Results   Component Value Date    HEPAIGM Negative 10/27/2009    HEPBIGM Negative 10/27/2009    HEPCAB Negative 10/27/2009        Assessment:     1. Dyspnea on exertion    2. Acute anterior uveitis of both eyes    3. Lumbosacral pain            Plan:     Karla was seen today for uveitis.    Diagnoses and all orders for this visit:    Dyspnea on exertion  -     X-Ray Chest PA And Lateral; Future  -     Ambulatory referral/consult to Pulmonology; Future    Acute anterior uveitis of both eyes  -     Ambulatory referral/consult to Rheumatology  -     CBC Auto Differential; Future  -     Comprehensive Metabolic Panel; Future  -     DALI Screen w/Reflex; Future  -     Rheumatoid Factor; Future  -     Cyclic Citrullinated Peptide Antibody, IgG; Future  -     Sedimentation rate; Future  -     C-Reactive Protein; Future  -     HLA B27 Antigen; Future  -     X-Ray Chest PA And Lateral; Future  -     Ambulatory referral/consult to Pulmonology; Future  -     X-Ray Sacroiliac Joints Complete; Future    Lumbosacral pain  -     X-Ray Sacroiliac Joints Complete; Future         · Anterior uveitis - has had multiple episodes  · Labs today to include RF, DALI, CCP, HLA B27, ESR, CRP  · Chest x-ray  · SI joint  x-rays  · Shortness of breath -   · recommend pulmonology referral  · Consider CT scan chest  · Return to clinic:  1 week to review labs, and imaging    Follow up in about 1 week (around 6/23/2022).    The patient understands, chooses and consents to this plan and accepts all   the risks which include but are not limited to the risks mentioned above.     Disclaimer: This note was prepared using a voice recognition system and is likely to have sound alike errors within the text.

## 2022-06-20 ENCOUNTER — OFFICE VISIT (OUTPATIENT)
Dept: OPHTHALMOLOGY | Facility: CLINIC | Age: 62
End: 2022-06-20
Payer: COMMERCIAL

## 2022-06-20 ENCOUNTER — PATIENT MESSAGE (OUTPATIENT)
Dept: OPHTHALMOLOGY | Facility: CLINIC | Age: 62
End: 2022-06-20

## 2022-06-20 ENCOUNTER — OFFICE VISIT (OUTPATIENT)
Dept: RHEUMATOLOGY | Facility: CLINIC | Age: 62
End: 2022-06-20
Payer: COMMERCIAL

## 2022-06-20 VITALS
TEMPERATURE: 98 F | HEART RATE: 74 BPM | HEIGHT: 65 IN | BODY MASS INDEX: 38.24 KG/M2 | RESPIRATION RATE: 18 BRPM | DIASTOLIC BLOOD PRESSURE: 81 MMHG | WEIGHT: 229.5 LBS | SYSTOLIC BLOOD PRESSURE: 146 MMHG

## 2022-06-20 DIAGNOSIS — M54.50 LUMBOSACRAL PAIN: ICD-10-CM

## 2022-06-20 DIAGNOSIS — H20.00 ACUTE ANTERIOR UVEITIS OF BOTH EYES: Primary | ICD-10-CM

## 2022-06-20 DIAGNOSIS — H10.32 ACUTE BACTERIAL CONJUNCTIVITIS OF LEFT EYE: Primary | ICD-10-CM

## 2022-06-20 DIAGNOSIS — R76.8 POSITIVE ANA (ANTINUCLEAR ANTIBODY): ICD-10-CM

## 2022-06-20 PROCEDURE — 1160F PR REVIEW ALL MEDS BY PRESCRIBER/CLIN PHARMACIST DOCUMENTED: ICD-10-PCS | Mod: CPTII,S$GLB,, | Performed by: OPTOMETRIST

## 2022-06-20 PROCEDURE — 3077F PR MOST RECENT SYSTOLIC BLOOD PRESSURE >= 140 MM HG: ICD-10-PCS | Mod: CPTII,S$GLB,, | Performed by: PHYSICIAN ASSISTANT

## 2022-06-20 PROCEDURE — 1159F PR MEDICATION LIST DOCUMENTED IN MEDICAL RECORD: ICD-10-PCS | Mod: CPTII,S$GLB,, | Performed by: OPTOMETRIST

## 2022-06-20 PROCEDURE — 1160F RVW MEDS BY RX/DR IN RCRD: CPT | Mod: CPTII,S$GLB,, | Performed by: PHYSICIAN ASSISTANT

## 2022-06-20 PROCEDURE — 99999 PR PBB SHADOW E&M-EST. PATIENT-LVL III: CPT | Mod: PBBFAC,,, | Performed by: OPTOMETRIST

## 2022-06-20 PROCEDURE — 1160F PR REVIEW ALL MEDS BY PRESCRIBER/CLIN PHARMACIST DOCUMENTED: ICD-10-PCS | Mod: CPTII,S$GLB,, | Performed by: PHYSICIAN ASSISTANT

## 2022-06-20 PROCEDURE — 1160F RVW MEDS BY RX/DR IN RCRD: CPT | Mod: CPTII,S$GLB,, | Performed by: OPTOMETRIST

## 2022-06-20 PROCEDURE — 99214 PR OFFICE/OUTPT VISIT, EST, LEVL IV, 30-39 MIN: ICD-10-PCS | Mod: S$GLB,,, | Performed by: PHYSICIAN ASSISTANT

## 2022-06-20 PROCEDURE — 3077F SYST BP >= 140 MM HG: CPT | Mod: CPTII,S$GLB,, | Performed by: PHYSICIAN ASSISTANT

## 2022-06-20 PROCEDURE — 99999 PR PBB SHADOW E&M-EST. PATIENT-LVL IV: ICD-10-PCS | Mod: PBBFAC,,, | Performed by: PHYSICIAN ASSISTANT

## 2022-06-20 PROCEDURE — 92012 PR EYE EXAM, EST PATIENT,INTERMED: ICD-10-PCS | Mod: S$GLB,,, | Performed by: OPTOMETRIST

## 2022-06-20 PROCEDURE — 1159F MED LIST DOCD IN RCRD: CPT | Mod: CPTII,S$GLB,, | Performed by: PHYSICIAN ASSISTANT

## 2022-06-20 PROCEDURE — 3079F DIAST BP 80-89 MM HG: CPT | Mod: CPTII,S$GLB,, | Performed by: PHYSICIAN ASSISTANT

## 2022-06-20 PROCEDURE — 3008F PR BODY MASS INDEX (BMI) DOCUMENTED: ICD-10-PCS | Mod: CPTII,S$GLB,, | Performed by: PHYSICIAN ASSISTANT

## 2022-06-20 PROCEDURE — 92012 INTRM OPH EXAM EST PATIENT: CPT | Mod: S$GLB,,, | Performed by: OPTOMETRIST

## 2022-06-20 PROCEDURE — 99214 OFFICE O/P EST MOD 30 MIN: CPT | Mod: S$GLB,,, | Performed by: PHYSICIAN ASSISTANT

## 2022-06-20 PROCEDURE — 3079F PR MOST RECENT DIASTOLIC BLOOD PRESSURE 80-89 MM HG: ICD-10-PCS | Mod: CPTII,S$GLB,, | Performed by: PHYSICIAN ASSISTANT

## 2022-06-20 PROCEDURE — 1159F MED LIST DOCD IN RCRD: CPT | Mod: CPTII,S$GLB,, | Performed by: OPTOMETRIST

## 2022-06-20 PROCEDURE — 3008F BODY MASS INDEX DOCD: CPT | Mod: CPTII,S$GLB,, | Performed by: PHYSICIAN ASSISTANT

## 2022-06-20 PROCEDURE — 99999 PR PBB SHADOW E&M-EST. PATIENT-LVL III: ICD-10-PCS | Mod: PBBFAC,,, | Performed by: OPTOMETRIST

## 2022-06-20 PROCEDURE — 1159F PR MEDICATION LIST DOCUMENTED IN MEDICAL RECORD: ICD-10-PCS | Mod: CPTII,S$GLB,, | Performed by: PHYSICIAN ASSISTANT

## 2022-06-20 PROCEDURE — 99999 PR PBB SHADOW E&M-EST. PATIENT-LVL IV: CPT | Mod: PBBFAC,,, | Performed by: PHYSICIAN ASSISTANT

## 2022-06-20 RX ORDER — NEOMYCIN SULFATE, POLYMYXIN B SULFATE AND DEXAMETHASONE 3.5; 10000; 1 MG/ML; [USP'U]/ML; MG/ML
1 SUSPENSION/ DROPS OPHTHALMIC 4 TIMES DAILY
Qty: 5 ML | Refills: 0 | Status: SHIPPED | OUTPATIENT
Start: 2022-06-20 | End: 2022-09-29

## 2022-06-20 RX ORDER — PREDNISONE 5 MG/1
TABLET ORAL
Qty: 40 TABLET | Refills: 0 | Status: SHIPPED | OUTPATIENT
Start: 2022-06-20 | End: 2022-07-07

## 2022-06-20 NOTE — PROGRESS NOTES
HPI     Conjunctivitis     Comments: Pain Scale:  6  Onset:   Yesterday  OD, OS, OU:   OS  Discharge:   yes yellow  A.M. Matting:  yes  Itch:   yes  Redness:   yes  Photophobia:   yes  Foreign body sensation:   no  Deep pain:   no  Previous occurrence:   yes  Drops:   none            Last edited by Loretta Adkins MA on 6/20/2022  1:13 PM. (History)            Assessment /Plan     For exam results, see Encounter Report.    Acute bacterial conjunctivitis of left eye    Other orders  -     neomycin-polymyxin-dexamethasone (MAXITROL) 3.5mg/mL-10,000 unit/mL-0.1 % DrpS; Place 1 drop into the left eye 4 (four) times daily.  Dispense: 5 mL; Refill: 0    Start Maxitrol OS QID    Discussed today's findings with patient and/or patient's family. Start drops as directed. RTC as scheduled for next appointment or PRN if symptoms worsen or persist x 1 week. All questions were answered.        RTC 1 week for follow up and IOP check or PRN for worsening symptoms  Discussed above and all questions were answered.

## 2022-06-20 NOTE — PROGRESS NOTES
Subjective:      Patient ID: Karla Arzola is a 62 y.o. female.    Chief Complaint: uveitis      HPI   Karla Arzola  is a 62 y.o. female who is here for f/u. She was initially referred by Ophthalmology c h/o recurrent uveitis.  Had labs and comes to discuss.  Now w eye pain left eye and associated erythema.      She denies recent history of infections, unexplained fevers.  She complains of perioral dermatitis which is currently treated by Dr. Garcia.  She also has history of sciatica.  She has no pain today.  She gets pain in the lumbosacral region.  She describes mostly mechanical back pain although she does have mild inflammatory component as well.  Recently treated for de Quervain tenosynovitis.  She also has some pain in both shoulders right greater than left.    She endorses morning stiffness lasting 30 minutes or more.  She denies migratory pain as well as tender swollen joints.  She has dyspnea on exertion of walking more than about 5-10 minutes.  She has discussed this with primary care.  That symptoms started post COVID.  She had COVID 2020. She was referred to pulmonology.  Unfortunately she was not able to keep that appointment but has not been able to reschedule, either.  Sxs stable.    Patient denies fevers, chills, photophobia, eye pain, chest pain, hematuria, blood in the stool, rash, sicca symptoms, raynauds, finger ulcerations.  Rheumatologic systems otherwise negative.    Serologies/Labs:  · (+) DALI 1:160 homogenous, profile pending  · Neg RF, CCP  · HLA B 27 pending  Previous Treatment:   · Pred Forte drops eyes      Current Outpatient Medications:     acyclovir (ZOVIRAX) 200 MG capsule, Take 1 capsule (200 mg total) by mouth once daily., Disp: 90 capsule, Rfl: 1    albuterol (VENTOLIN HFA) 90 mcg/actuation inhaler, Inhale 2 puffs into the lungs every 6 (six) hours as needed for Wheezing. Rescue, Disp: 18 g, Rfl: 0    amLODIPine (NORVASC) 10 MG tablet, Take 1 tablet (10 mg total)  by mouth once daily., Disp: 90 tablet, Rfl: 1    azelaic acid (FINACEA) 15 % Foam, Apply 1 application topically 2 (two) times daily., Disp: 50 g, Rfl: 2    busPIRone (BUSPAR) 7.5 MG tablet, Take 1 tablet (7.5 mg total) by mouth 3 (three) times daily as needed (anxiety)., Disp: 90 tablet, Rfl: 0    ciclopirox (PENLAC) 8 % Soln, Apply to nails once daily, Disp: 6.6 mL, Rfl: 2    citalopram (CELEXA) 20 MG tablet, Take 1 tablet (20 mg total) by mouth once daily., Disp: 90 tablet, Rfl: 1    cyanocobalamin (VITAMIN B-12) 100 MCG tablet, Take 100 mcg by mouth once daily., Disp: , Rfl:     doxycycline monohydrate 100 mg Tab, Take 1 tablet by mouth every day with food and not within 1 hour prior to lying down, Disp: 30 tablet, Rfl: 2    fluticasone (FLONASE) 50 mcg/actuation nasal spray, 1 spray (50 mcg total) by Each Nare route once daily., Disp: 16 g, Rfl: 0    furosemide (LASIX) 20 MG tablet, Take 1 tablet (20 mg total) by mouth daily as needed (swelling)., Disp: 60 tablet, Rfl: 1    ibuprofen (ADVIL,MOTRIN) 800 MG tablet, Take 1 tablet (800 mg total) by mouth every 6 to 8 hours as needed for pain, Disp: 20 tablet, Rfl: 0    indomethacin (INDOCIN) 50 MG capsule, Take 1 capsule (50 mg total) by mouth 3 (three) times daily., Disp: 30 capsule, Rfl: 0    ipratropium-albuteroL (COMBIVENT)  mcg/actuation inhaler, Inhale 1 puff into the lungs every 6 (six) hours as needed for Wheezing or Shortness of Breath. Rescue, Disp: 4 g, Rfl: 0    meloxicam (MOBIC) 7.5 MG tablet, Take 1 tablet (7.5 mg total) by mouth once daily., Disp: 90 tablet, Rfl: 0    multivitamin capsule, Take 1 capsule by mouth once daily., Disp: , Rfl:     prednisoLONE acetate (PRED FORTE) 1 % DrpS, Place 1 drop into both eyes 4 (four) times daily., Disp: 5 mL, Rfl: 0    ALPRAZolam (XANAX) 0.25 MG tablet, Take 1 tablet (0.25 mg total) by mouth 3 (three) times daily as needed for Insomnia or Anxiety., Disp: 60 tablet, Rfl: 0    predniSONE  (DELTASONE) 5 MG tablet, Take 4 tablets (20 mg total) by mouth once daily for 4 days, THEN 3 tablets (15 mg total) once daily for 4 days, THEN 2 tablets (10 mg total) once daily for 4 days, THEN 1 tablet (5 mg total) once daily for 4 days., Disp: 40 tablet, Rfl: 0    spironolactone (ALDACTONE) 100 MG tablet, Take 1 tablet (100 mg total) by mouth once daily., Disp: 30 tablet, Rfl: 2    Past Medical History:   Diagnosis Date    Allergy     Anxiety     Hypertension     Obesity     Vitamin B 12 deficiency      Family History   Problem Relation Age of Onset    Diabetes Mother     Hypertension Mother     Stroke Mother     Hypertension Father     Hyperlipidemia Father     Kidney disease Father      Social History     Socioeconomic History    Marital status: Single   Occupational History     Employer: [employer]   Tobacco Use    Smoking status: Never Smoker    Smokeless tobacco: Never Used   Substance and Sexual Activity    Alcohol use: Yes     Alcohol/week: 0.0 standard drinks     Comment: socially     Drug use: No    Sexual activity: Yes   Other Topics Concern    Are you pregnant or think you may be? No    Breast-feeding No     Social Determinants of Health     Financial Resource Strain: Unknown    Difficulty of Paying Living Expenses: Patient refused   Food Insecurity: Unknown    Worried About Running Out of Food in the Last Year: Patient refused    Ran Out of Food in the Last Year: Patient refused   Transportation Needs: Unknown    Lack of Transportation (Medical): Patient refused    Lack of Transportation (Non-Medical): Patient refused   Physical Activity: Unknown    Days of Exercise per Week: Patient refused   Stress: Unknown    Feeling of Stress : Patient refused   Housing Stability: High Risk    Unable to Pay for Housing in the Last Year: Yes    Unstable Housing in the Last Year: Patient refused     Review of patient's allergies indicates:  No Known Allergies    Objective:  "  BP (!) 146/81 (BP Location: Left arm, Patient Position: Sitting, BP Method: Medium (Automatic))   Pulse 74   Temp 98.3 °F (36.8 °C) (Temporal)   Resp 18   Ht 5' 5" (1.651 m)   Wt 104.1 kg (229 lb 8 oz)   LMP 06/11/2015   BMI 38.19 kg/m²   Immunization History   Administered Date(s) Administered    COVID-19, MRNA, LN-S, PF (Pfizer) (Purple Cap) 01/11/2021, 02/01/2021, 01/14/2022    Influenza 10/12/2007, 10/10/2008, 09/24/2009, 10/29/2010    Influenza - Quadrivalent 10/07/2015, 09/30/2016    Influenza - Quadrivalent - PF *Preferred* (6 months and older) 09/28/2017, 09/27/2018, 09/25/2019, 11/17/2020, 09/23/2021    Influenza - Trivalent (ADULT) 10/12/2007, 10/10/2008, 09/24/2009, 10/29/2010    Influenza A (H1N1) 2009 Monovalent - IM 10/27/2009    Tdap 11/01/2007, 04/14/2011    Zoster Recombinant 12/01/2021       Physical Exam   Constitutional: She is oriented to person, place, and time. No distress.   HENT:   Head: Normocephalic and atraumatic.   Pulmonary/Chest: Effort normal.   Abdominal: She exhibits no distension.   Musculoskeletal:         General: No swelling or tenderness. Normal range of motion.      Cervical back: Normal range of motion.      Right knee: No effusion.      Left knee: No effusion.   Lymphadenopathy:     She has no cervical adenopathy.   Neurological: She is alert and oriented to person, place, and time.   Skin: Skin is warm and dry. No rash noted.   Psychiatric: Mood normal.   Nursing note and vitals reviewed.      Right Side Rheumatological Exam     Examination finds the 1st PIP, 1st MCP, 2nd PIP, 2nd MCP, 3rd PIP, 3rd MCP, 4th PIP, 4th MCP, 5th PIP and 5th MCP normal.    Shoulder Exam   Tenderness Location: no tenderness    Range of Motion   The patient has normal right shoulder ROM.    Knee Exam   Patellofemoral Crepitus: negative  Effusion: negative  Warmth: negative    Elbow/Wrist Exam   Tenderness Location: no elbow tenderness and no wrist tenderness    Range of Motion "   Elbow   The patient has normal right elbow ROM.    Range of Motion   Wrist   The patient has normal right wrist ROM.    Left Side Rheumatological Exam     Examination finds the 1st PIP, 1st MCP, 2nd PIP, 2nd MCP, 3rd PIP, 3rd MCP, 4th PIP, 4th MCP, 5th PIP and 5th MCP normal.    Shoulder Exam   Tenderness Location: no tenderness    Range of Motion   The patient has normal left shoulder ROM.    Knee Exam     Patellofemoral Crepitus: negative  Effusion: negative  Warmth: negative    Elbow/Wrist Exam   Tenderness Location: no elbow tenderness and no wrist tenderness    Range of Motion   Elbow   The patient has normal left elbow ROM.    Range of Motion   Wrist   The patient has normal left wrist ROM.      No synovitis/enthesitis  TTP Bilat SI joints  Schlera erythema left eye      Recent Results (from the past 672 hour(s))   CBC Auto Differential    Collection Time: 06/16/22 10:00 AM   Result Value Ref Range    WBC 7.59 3.90 - 12.70 K/uL    RBC 4.99 4.00 - 5.40 M/uL    Hemoglobin 14.5 12.0 - 16.0 g/dL    Hematocrit 46.7 37.0 - 48.5 %    MCV 94 82 - 98 fL    MCH 29.1 27.0 - 31.0 pg    MCHC 31.0 (L) 32.0 - 36.0 g/dL    RDW 15.2 (H) 11.5 - 14.5 %    Platelets 273 150 - 450 K/uL    MPV 10.5 9.2 - 12.9 fL    Immature Granulocytes 0.3 0.0 - 0.5 %    Gran # (ANC) 4.4 1.8 - 7.7 K/uL    Immature Grans (Abs) 0.02 0.00 - 0.04 K/uL    Lymph # 2.4 1.0 - 4.8 K/uL    Mono # 0.6 0.3 - 1.0 K/uL    Eos # 0.1 0.0 - 0.5 K/uL    Baso # 0.02 0.00 - 0.20 K/uL    nRBC 0 0 /100 WBC    Gran % 58.4 38.0 - 73.0 %    Lymph % 31.6 18.0 - 48.0 %    Mono % 7.6 4.0 - 15.0 %    Eosinophil % 1.8 0.0 - 8.0 %    Basophil % 0.3 0.0 - 1.9 %    Differential Method Automated    Comprehensive Metabolic Panel    Collection Time: 06/16/22 10:00 AM   Result Value Ref Range    Sodium 141 136 - 145 mmol/L    Potassium 4.1 3.5 - 5.1 mmol/L    Chloride 104 95 - 110 mmol/L    CO2 29 23 - 29 mmol/L    Glucose 73 70 - 110 mg/dL    BUN 19 6 - 20 mg/dL    Creatinine 1.0  0.5 - 1.4 mg/dL    Calcium 9.3 8.7 - 10.5 mg/dL    Total Protein 6.7 6.0 - 8.4 g/dL    Albumin 3.6 3.5 - 5.2 g/dL    Total Bilirubin 0.7 0.1 - 1.0 mg/dL    Alkaline Phosphatase 96 55 - 135 U/L    AST 12 10 - 40 U/L    ALT 15 10 - 44 U/L    Anion Gap 8 8 - 16 mmol/L    eGFR if African American >60 >60 mL/min/1.73 m^2    eGFR if non African American >60 >60 mL/min/1.73 m^2   DALI Screen w/Reflex    Collection Time: 06/16/22 10:00 AM   Result Value Ref Range    DALI Screen Positive (A) Negative <1:80   Rheumatoid Factor    Collection Time: 06/16/22 10:00 AM   Result Value Ref Range    Rheumatoid Factor <13.0 0.0 - 15.0 IU/mL   Cyclic Citrullinated Peptide Antibody, IgG    Collection Time: 06/16/22 10:00 AM   Result Value Ref Range    CCP Antibodies <0.5 <5.0 U/mL   Sedimentation rate    Collection Time: 06/16/22 10:00 AM   Result Value Ref Range    Sed Rate 14 0 - 36 mm/Hr   C-Reactive Protein    Collection Time: 06/16/22 10:00 AM   Result Value Ref Range    CRP 12.7 (H) 0.0 - 8.2 mg/L   DALI Pattern 1    Collection Time: 06/16/22 10:00 AM   Result Value Ref Range    DALI PATTERN 1 Homogeneous    DALI Titer 1    Collection Time: 06/16/22 10:00 AM   Result Value Ref Range    DALI Titer 1 1:160          No results found for: TBGOLDPLUS   Lab Results   Component Value Date    HEPAIGM Negative 10/27/2009    HEPBIGM Negative 10/27/2009    HEPCAB Negative 10/27/2009        Assessment:     1. Acute anterior uveitis of both eyes    2. Positive DALI (antinuclear antibody)    3. Lumbosacral pain            Plan:     Karla was seen today for uveitis.    Diagnoses and all orders for this visit:    Acute anterior uveitis of both eyes  -     predniSONE (DELTASONE) 5 MG tablet; Take 4 tablets (20 mg total) by mouth once daily for 4 days, THEN 3 tablets (15 mg total) once daily for 4 days, THEN 2 tablets (10 mg total) once daily for 4 days, THEN 1 tablet (5 mg total) once daily for 4 days.    Positive DALI (antinuclear antibody)  -      predniSONE (DELTASONE) 5 MG tablet; Take 4 tablets (20 mg total) by mouth once daily for 4 days, THEN 3 tablets (15 mg total) once daily for 4 days, THEN 2 tablets (10 mg total) once daily for 4 days, THEN 1 tablet (5 mg total) once daily for 4 days.    Lumbosacral pain  -     predniSONE (DELTASONE) 5 MG tablet; Take 4 tablets (20 mg total) by mouth once daily for 4 days, THEN 3 tablets (15 mg total) once daily for 4 days, THEN 2 tablets (10 mg total) once daily for 4 days, THEN 1 tablet (5 mg total) once daily for 4 days.         · Anterior uveitis - has had multiple episodes  · HLA B27 pending  · (+) DALI 1:160  · Prednisone taper  · Consider MTX pending final labs and response to steroid taper  · Shortness of breath -   · Again recommend pulmonology eval - ref submitted by PCP  · Return to clinic:     Follow up in about 3 months (around 9/20/2022).    The patient understands, chooses and consents to this plan and accepts all   the risks which include but are not limited to the risks mentioned above.     Disclaimer: This note was prepared using a voice recognition system and is likely to have sound alike errors within the text.

## 2022-06-21 ENCOUNTER — TELEPHONE (OUTPATIENT)
Dept: PREADMISSION TESTING | Facility: HOSPITAL | Age: 62
End: 2022-06-21
Payer: COMMERCIAL

## 2022-06-21 ENCOUNTER — HOSPITAL ENCOUNTER (OUTPATIENT)
Dept: PREADMISSION TESTING | Facility: HOSPITAL | Age: 62
Discharge: HOME | End: 2022-06-21
Attending: FAMILY MEDICINE
Payer: COMMERCIAL

## 2022-06-21 DIAGNOSIS — Z12.11 COLON CANCER SCREENING: ICD-10-CM

## 2022-06-21 NOTE — TELEPHONE ENCOUNTER
Patient refused to schedule colonoscopy, stated she was going to do the FIT test. Advised pt to contact Dr. Conway office, pt verbalized understanding. PAT call appt canceled.  
This document is complete and the patient is ready for discharge.

## 2022-06-23 ENCOUNTER — TELEPHONE (OUTPATIENT)
Dept: RHEUMATOLOGY | Facility: CLINIC | Age: 62
End: 2022-06-23
Payer: COMMERCIAL

## 2022-06-23 ENCOUNTER — PATIENT MESSAGE (OUTPATIENT)
Dept: RHEUMATOLOGY | Facility: CLINIC | Age: 62
End: 2022-06-23
Payer: COMMERCIAL

## 2022-06-23 DIAGNOSIS — R76.8 POSITIVE ANA (ANTINUCLEAR ANTIBODY): Primary | ICD-10-CM

## 2022-06-23 NOTE — TELEPHONE ENCOUNTER
----- Message from Taylor Russo PA-C sent at 6/22/2022  3:10 PM CDT -----  Reg 4 in one mos and appt to f/u w me cpl days later.  VV ok if pt prefers.

## 2022-06-30 ENCOUNTER — OFFICE VISIT (OUTPATIENT)
Dept: INTERNAL MEDICINE | Facility: CLINIC | Age: 62
End: 2022-06-30
Payer: COMMERCIAL

## 2022-06-30 ENCOUNTER — PATIENT MESSAGE (OUTPATIENT)
Dept: INTERNAL MEDICINE | Facility: CLINIC | Age: 62
End: 2022-06-30

## 2022-06-30 VITALS — SYSTOLIC BLOOD PRESSURE: 139 MMHG | DIASTOLIC BLOOD PRESSURE: 88 MMHG

## 2022-06-30 DIAGNOSIS — I10 PRIMARY HYPERTENSION: Primary | ICD-10-CM

## 2022-06-30 DIAGNOSIS — A60.00 RECURRENT GENITAL HSV (HERPES SIMPLEX VIRUS) INFECTION: ICD-10-CM

## 2022-06-30 PROCEDURE — 3075F PR MOST RECENT SYSTOLIC BLOOD PRESS GE 130-139MM HG: ICD-10-PCS | Mod: CPTII,95,, | Performed by: FAMILY MEDICINE

## 2022-06-30 PROCEDURE — 3079F DIAST BP 80-89 MM HG: CPT | Mod: CPTII,95,, | Performed by: FAMILY MEDICINE

## 2022-06-30 PROCEDURE — 3075F SYST BP GE 130 - 139MM HG: CPT | Mod: CPTII,95,, | Performed by: FAMILY MEDICINE

## 2022-06-30 PROCEDURE — 99213 PR OFFICE/OUTPT VISIT, EST, LEVL III, 20-29 MIN: ICD-10-PCS | Mod: 95,,, | Performed by: FAMILY MEDICINE

## 2022-06-30 PROCEDURE — 3079F PR MOST RECENT DIASTOLIC BLOOD PRESSURE 80-89 MM HG: ICD-10-PCS | Mod: CPTII,95,, | Performed by: FAMILY MEDICINE

## 2022-06-30 PROCEDURE — 99213 OFFICE O/P EST LOW 20 MIN: CPT | Mod: 95,,, | Performed by: FAMILY MEDICINE

## 2022-06-30 RX ORDER — ACYCLOVIR 200 MG/1
200 CAPSULE ORAL DAILY
Qty: 90 CAPSULE | Refills: 1 | Status: SHIPPED | OUTPATIENT
Start: 2022-06-30 | End: 2023-05-06 | Stop reason: SDUPTHER

## 2022-06-30 RX ORDER — PHENTERMINE HYDROCHLORIDE 37.5 MG/1
37.5 TABLET ORAL
Qty: 30 TABLET | Refills: 0 | Status: SHIPPED | OUTPATIENT
Start: 2022-06-30 | End: 2022-08-28

## 2022-06-30 NOTE — PROGRESS NOTES
The patient location is: [place] ([facility])  The chief complaint leading to consultation is: follow up     Visit type: audiovisual    Face to Face time with patient: 17 minutes  17 minutes of total time spent on the encounter, which includes face to face time and non-face to face time preparing to see the patient (eg, review of tests), Obtaining and/or reviewing separately obtained history, Documenting clinical information in the electronic or other health record, Independently interpreting results (not separately reported) and communicating results to the patient/family/caregiver, or Care coordination (not separately reported).       Each patient to whom he or she provides medical services by telemedicine is:  (1) informed of the relationship between the physician and patient and the respective role of any other health care provider with respect to management of the patient; and (2) notified that he or she may decline to receive medical services by telemedicine and may withdraw from such care at any time.    Subjective:       Patient ID: Karla Arzola is a 62 y.o. female.    Chief Complaint: No chief complaint on file.    HPI Ms. Arzola presents today via telemedicine to review labs and she had concerns for lupus    Had fungus on toe  Rash on face   She got an eye appt for uveitis     Referred to Rheumatology by the eye doctor   Currently on steroids     Getting steroid injection for wrist     Back pain   Xray normal    Review of Systems   Constitutional: Negative for activity change and unexpected weight change.   HENT: Negative for hearing loss, rhinorrhea and trouble swallowing.    Eyes: Positive for discharge and visual disturbance.   Respiratory: Negative for chest tightness and wheezing.    Cardiovascular: Positive for palpitations. Negative for chest pain.   Gastrointestinal: Negative for blood in stool, constipation, diarrhea and vomiting.   Endocrine: Negative for polydipsia and polyuria.    Genitourinary: Negative for difficulty urinating, dysuria, hematuria and menstrual problem.   Musculoskeletal: Positive for arthralgias and joint swelling. Negative for neck pain.   Neurological: Negative for weakness and headaches.   Psychiatric/Behavioral: Positive for dysphoric mood. Negative for confusion.           Past Medical History:   Diagnosis Date    Allergy     Anxiety     Hypertension     Obesity     Vitamin B 12 deficiency      Past Surgical History:   Procedure Laterality Date    MOUTH SURGERY      right knee scope      TUBAL LIGATION       Family History   Problem Relation Age of Onset    Diabetes Mother     Hypertension Mother     Stroke Mother     Hypertension Father     Hyperlipidemia Father     Kidney disease Father      Social History     Socioeconomic History    Marital status: Single   Occupational History     Employer: [employer]   Tobacco Use    Smoking status: Never Smoker    Smokeless tobacco: Never Used   Substance and Sexual Activity    Alcohol use: Yes     Alcohol/week: 0.0 standard drinks     Comment: socially     Drug use: No    Sexual activity: Yes   Other Topics Concern    Are you pregnant or think you may be? No    Breast-feeding No     Social Determinants of Health     Financial Resource Strain: Unknown    Difficulty of Paying Living Expenses: Patient refused   Food Insecurity: Unknown    Worried About Running Out of Food in the Last Year: Patient refused    Ran Out of Food in the Last Year: Patient refused   Transportation Needs: Unknown    Lack of Transportation (Medical): Patient refused    Lack of Transportation (Non-Medical): Patient refused   Physical Activity: Inactive    Days of Exercise per Week: 0 days    Minutes of Exercise per Session: 0 min   Stress: Stress Concern Present    Feeling of Stress : To some extent   Housing Stability: High Risk    Unable to Pay for Housing in the Last Year: Yes    Unstable Housing in the  Last Year: Patient refused       Objective:        Physical Exam  Vitals reviewed.   Constitutional:       Appearance: Normal appearance.   HENT:      Head: Normocephalic and atraumatic.   Neurological:      Mental Status: She is alert and oriented to person, place, and time.   Psychiatric:         Behavior: Behavior normal.           Results for orders placed or performed in visit on 06/16/22   CBC Auto Differential   Result Value Ref Range    WBC 7.59 3.90 - 12.70 K/uL    RBC 4.99 4.00 - 5.40 M/uL    Hemoglobin 14.5 12.0 - 16.0 g/dL    Hematocrit 46.7 37.0 - 48.5 %    MCV 94 82 - 98 fL    MCH 29.1 27.0 - 31.0 pg    MCHC 31.0 (L) 32.0 - 36.0 g/dL    RDW 15.2 (H) 11.5 - 14.5 %    Platelets 273 150 - 450 K/uL    MPV 10.5 9.2 - 12.9 fL    Immature Granulocytes 0.3 0.0 - 0.5 %    Gran # (ANC) 4.4 1.8 - 7.7 K/uL    Immature Grans (Abs) 0.02 0.00 - 0.04 K/uL    Lymph # 2.4 1.0 - 4.8 K/uL    Mono # 0.6 0.3 - 1.0 K/uL    Eos # 0.1 0.0 - 0.5 K/uL    Baso # 0.02 0.00 - 0.20 K/uL    nRBC 0 0 /100 WBC    Gran % 58.4 38.0 - 73.0 %    Lymph % 31.6 18.0 - 48.0 %    Mono % 7.6 4.0 - 15.0 %    Eosinophil % 1.8 0.0 - 8.0 %    Basophil % 0.3 0.0 - 1.9 %    Differential Method Automated    Comprehensive Metabolic Panel   Result Value Ref Range    Sodium 141 136 - 145 mmol/L    Potassium 4.1 3.5 - 5.1 mmol/L    Chloride 104 95 - 110 mmol/L    CO2 29 23 - 29 mmol/L    Glucose 73 70 - 110 mg/dL    BUN 19 6 - 20 mg/dL    Creatinine 1.0 0.5 - 1.4 mg/dL    Calcium 9.3 8.7 - 10.5 mg/dL    Total Protein 6.7 6.0 - 8.4 g/dL    Albumin 3.6 3.5 - 5.2 g/dL    Total Bilirubin 0.7 0.1 - 1.0 mg/dL    Alkaline Phosphatase 96 55 - 135 U/L    AST 12 10 - 40 U/L    ALT 15 10 - 44 U/L    Anion Gap 8 8 - 16 mmol/L    eGFR if African American >60 >60 mL/min/1.73 m^2    eGFR if non African American >60 >60 mL/min/1.73 m^2   DALI Screen w/Reflex   Result Value Ref Range    DALI Screen Positive (A) Negative <1:80   Rheumatoid Factor   Result Value Ref Range     Rheumatoid Factor <13.0 0.0 - 15.0 IU/mL   Cyclic Citrullinated Peptide Antibody, IgG   Result Value Ref Range    CCP Antibodies <0.5 <5.0 U/mL   Sedimentation rate   Result Value Ref Range    Sed Rate 14 0 - 36 mm/Hr   C-Reactive Protein   Result Value Ref Range    CRP 12.7 (H) 0.0 - 8.2 mg/L   DALI Pattern 1   Result Value Ref Range    DALI PATTERN 1 Homogeneous    DALI Profile   Result Value Ref Range    Anti Sm Antibody 0.06 0.00 - 0.99 Ratio    Anti-Sm Interpretation Negative Negative    Anti-SSA Antibody 0.05 0.00 - 0.99 Ratio    Anti-SSA Interpretation Negative Negative    Anti-SSB Antibody 0.05 0.00 - 0.99 Ratio    Anti-SSB Interpretation Negative Negative    ds DNA Ab Negative 1:10 Negative 1:10    Anti Sm/RNP Antibody 0.08 0.00 - 0.99 Ratio    Anti-Sm/RNP Interpretation Negative Negative   DALI Titer 1   Result Value Ref Range    DALI Titer 1 1:160        Assessment/Plan:       Primary hypertension-recent normal blood pressure     Recurrent genital HSV (herpes simplex virus) infection  -     acyclovir (ZOVIRAX) 200 MG capsule; Take 1 capsule (200 mg total) by mouth once daily.  Dispense: 90 capsule; Refill: 1    Other orders  -     phentermine (ADIPEX-P) 37.5 mg tablet; Take 1 tablet (37.5 mg total) by mouth before breakfast.  Dispense: 30 tablet; Refill: 0    Pt to schedule colonoscopy  I mentioned small concern to rule out crohn's         Follow up as needed    Robina Momin MD  [facility]

## 2022-07-05 ENCOUNTER — PATIENT MESSAGE (OUTPATIENT)
Dept: RHEUMATOLOGY | Facility: CLINIC | Age: 62
End: 2022-07-05
Payer: COMMERCIAL

## 2022-07-06 ENCOUNTER — PATIENT MESSAGE (OUTPATIENT)
Dept: DERMATOLOGY | Facility: CLINIC | Age: 62
End: 2022-07-06
Payer: COMMERCIAL

## 2022-07-11 ENCOUNTER — PATIENT OUTREACH (OUTPATIENT)
Dept: ADMINISTRATIVE | Facility: HOSPITAL | Age: 62
End: 2022-07-11
Payer: COMMERCIAL

## 2022-07-11 ENCOUNTER — PATIENT MESSAGE (OUTPATIENT)
Dept: INTERNAL MEDICINE | Facility: CLINIC | Age: 62
End: 2022-07-11
Payer: COMMERCIAL

## 2022-07-11 DIAGNOSIS — S99.922A INJURY OF LEFT FOOT, INITIAL ENCOUNTER: Primary | ICD-10-CM

## 2022-07-11 NOTE — PROGRESS NOTES
Cervical No outreach in June: Called and spoke with pt in regards to overdue pap smear. Offered appointment on 07/19/2022 at [facility] with Hayes Garza at 8:15 am.

## 2022-07-12 ENCOUNTER — PATIENT MESSAGE (OUTPATIENT)
Dept: INTERNAL MEDICINE | Facility: CLINIC | Age: 62
End: 2022-07-12
Payer: COMMERCIAL

## 2022-07-12 ENCOUNTER — TELEPHONE (OUTPATIENT)
Dept: INTERNAL MEDICINE | Facility: CLINIC | Age: 62
End: 2022-07-12
Payer: COMMERCIAL

## 2022-07-12 NOTE — TELEPHONE ENCOUNTER
Called and spoke with patient. Advised that message has been sent to provider. We are waiting on a response. I told her we would try to call her back since her mychart is acting up.

## 2022-07-12 NOTE — TELEPHONE ENCOUNTER
----- Message from Ninfa Rudd sent at 7/12/2022  9:59 AM CDT -----  Regarding: xray  Contact: patient  Patient states that her patient portal is not working, please call her back at [phone]  or ext [phone]

## 2022-07-14 ENCOUNTER — HOSPITAL ENCOUNTER (OUTPATIENT)
Dept: RADIOLOGY | Facility: HOSPITAL | Age: 62
Discharge: HOME | End: 2022-07-14
Attending: FAMILY MEDICINE
Payer: COMMERCIAL

## 2022-07-14 ENCOUNTER — PATIENT MESSAGE (OUTPATIENT)
Dept: INTERNAL MEDICINE | Facility: CLINIC | Age: 62
End: 2022-07-14
Payer: COMMERCIAL

## 2022-07-14 DIAGNOSIS — S99.922A INJURY OF LEFT FOOT, INITIAL ENCOUNTER: ICD-10-CM

## 2022-07-14 PROCEDURE — 73630 XR FOOT COMPLETE 3 VIEW LEFT: ICD-10-PCS | Mod: 26,LT,, | Performed by: RADIOLOGY

## 2022-07-14 PROCEDURE — 73630 X-RAY EXAM OF FOOT: CPT | Mod: TC,LT

## 2022-07-14 PROCEDURE — 73630 X-RAY EXAM OF FOOT: CPT | Mod: 26,LT,, | Performed by: RADIOLOGY

## 2022-07-21 ENCOUNTER — LAB VISIT (OUTPATIENT)
Dept: LAB | Facility: HOSPITAL | Age: 62
End: 2022-07-21
Attending: PHYSICIAN ASSISTANT
Payer: COMMERCIAL

## 2022-07-21 DIAGNOSIS — R76.8 POSITIVE ANA (ANTINUCLEAR ANTIBODY): ICD-10-CM

## 2022-07-21 LAB
ALBUMIN SERPL BCP-MCNC: 3.5 G/DL (ref 3.5–5.2)
ALP SERPL-CCNC: 85 U/L (ref 55–135)
ALT SERPL W/O P-5'-P-CCNC: 15 U/L (ref 10–44)
ANION GAP SERPL CALC-SCNC: 8 MMOL/L (ref 8–16)
AST SERPL-CCNC: 13 U/L (ref 10–40)
BASOPHILS # BLD AUTO: 0.01 K/UL (ref 0–0.2)
BASOPHILS NFR BLD: 0.1 % (ref 0–1.9)
BILIRUB SERPL-MCNC: 0.7 MG/DL (ref 0.1–1)
BUN SERPL-MCNC: 14 MG/DL (ref 6–20)
CALCIUM SERPL-MCNC: 9.3 MG/DL (ref 8.7–10.5)
CHLORIDE SERPL-SCNC: 103 MMOL/L (ref 95–110)
CO2 SERPL-SCNC: 28 MMOL/L (ref 23–29)
CREAT SERPL-MCNC: 1.2 MG/DL (ref 0.5–1.4)
CRP SERPL-MCNC: 16.6 MG/L (ref 0–8.2)
DIFFERENTIAL METHOD: ABNORMAL
EOSINOPHIL # BLD AUTO: 0.1 K/UL (ref 0–0.5)
EOSINOPHIL NFR BLD: 1.1 % (ref 0–8)
ERYTHROCYTE [DISTWIDTH] IN BLOOD BY AUTOMATED COUNT: 14.8 % (ref 11.5–14.5)
ERYTHROCYTE [SEDIMENTATION RATE] IN BLOOD BY PHOTOMETRIC METHOD: 27 MM/HR (ref 0–36)
EST. GFR  (AFRICAN AMERICAN): 57 ML/MIN/1.73 M^2
EST. GFR  (NON AFRICAN AMERICAN): 49 ML/MIN/1.73 M^2
GLUCOSE SERPL-MCNC: 123 MG/DL (ref 70–110)
HCT VFR BLD AUTO: 44.7 % (ref 37–48.5)
HGB BLD-MCNC: 14.1 G/DL (ref 12–16)
IMM GRANULOCYTES # BLD AUTO: 0.01 K/UL (ref 0–0.04)
IMM GRANULOCYTES NFR BLD AUTO: 0.1 % (ref 0–0.5)
LYMPHOCYTES # BLD AUTO: 2.7 K/UL (ref 1–4.8)
LYMPHOCYTES NFR BLD: 39 % (ref 18–48)
MCH RBC QN AUTO: 29.1 PG (ref 27–31)
MCHC RBC AUTO-ENTMCNC: 31.5 G/DL (ref 32–36)
MCV RBC AUTO: 92 FL (ref 82–98)
MONOCYTES # BLD AUTO: 0.5 K/UL (ref 0.3–1)
MONOCYTES NFR BLD: 6.7 % (ref 4–15)
NEUTROPHILS # BLD AUTO: 3.7 K/UL (ref 1.8–7.7)
NEUTROPHILS NFR BLD: 53 % (ref 38–73)
NRBC BLD-RTO: 0 /100 WBC
PLATELET # BLD AUTO: 249 K/UL (ref 150–450)
PMV BLD AUTO: 10.3 FL (ref 9.2–12.9)
POTASSIUM SERPL-SCNC: 4.2 MMOL/L (ref 3.5–5.1)
PROT SERPL-MCNC: 6.8 G/DL (ref 6–8.4)
RBC # BLD AUTO: 4.85 M/UL (ref 4–5.4)
SODIUM SERPL-SCNC: 139 MMOL/L (ref 136–145)
WBC # BLD AUTO: 6.97 K/UL (ref 3.9–12.7)

## 2022-07-21 PROCEDURE — 85652 RBC SED RATE AUTOMATED: CPT | Performed by: PHYSICIAN ASSISTANT

## 2022-07-21 PROCEDURE — 80053 COMPREHEN METABOLIC PANEL: CPT | Performed by: PHYSICIAN ASSISTANT

## 2022-07-21 PROCEDURE — 85025 COMPLETE CBC W/AUTO DIFF WBC: CPT | Performed by: PHYSICIAN ASSISTANT

## 2022-07-21 PROCEDURE — 36415 COLL VENOUS BLD VENIPUNCTURE: CPT | Performed by: PHYSICIAN ASSISTANT

## 2022-07-21 PROCEDURE — 86140 C-REACTIVE PROTEIN: CPT | Performed by: PHYSICIAN ASSISTANT

## 2022-07-28 ENCOUNTER — PATIENT MESSAGE (OUTPATIENT)
Dept: INTERNAL MEDICINE | Facility: CLINIC | Age: 62
End: 2022-07-28
Payer: COMMERCIAL

## 2022-07-29 ENCOUNTER — HOSPITAL ENCOUNTER (OUTPATIENT)
Dept: RADIOLOGY | Facility: HOSPITAL | Age: 62
Discharge: HOME | End: 2022-07-29
Attending: NURSE PRACTITIONER
Payer: COMMERCIAL

## 2022-07-29 ENCOUNTER — OFFICE VISIT (OUTPATIENT)
Dept: PULMONOLOGY | Facility: CLINIC | Age: 62
End: 2022-07-29
Payer: COMMERCIAL

## 2022-07-29 VITALS
DIASTOLIC BLOOD PRESSURE: 82 MMHG | BODY MASS INDEX: 37.63 KG/M2 | OXYGEN SATURATION: 94 % | HEART RATE: 108 BPM | RESPIRATION RATE: 19 BRPM | HEIGHT: 66 IN | WEIGHT: 234.13 LBS | SYSTOLIC BLOOD PRESSURE: 126 MMHG

## 2022-07-29 DIAGNOSIS — R40.0 DAYTIME SLEEPINESS: ICD-10-CM

## 2022-07-29 DIAGNOSIS — R53.83 FEELING TIRED: ICD-10-CM

## 2022-07-29 DIAGNOSIS — G47.30 SLEEP-DISORDERED BREATHING: ICD-10-CM

## 2022-07-29 DIAGNOSIS — R06.83 SNORING: ICD-10-CM

## 2022-07-29 DIAGNOSIS — R06.09 DYSPNEA ON EXERTION: ICD-10-CM

## 2022-07-29 DIAGNOSIS — R06.81 WITNESSED EPISODE OF APNEA: ICD-10-CM

## 2022-07-29 DIAGNOSIS — R06.09 DYSPNEA ON EXERTION: Primary | ICD-10-CM

## 2022-07-29 PROCEDURE — 71046 X-RAY EXAM CHEST 2 VIEWS: CPT | Mod: TC

## 2022-07-29 PROCEDURE — 3074F PR MOST RECENT SYSTOLIC BLOOD PRESSURE < 130 MM HG: ICD-10-PCS | Mod: CPTII,S$GLB,, | Performed by: NURSE PRACTITIONER

## 2022-07-29 PROCEDURE — 3079F PR MOST RECENT DIASTOLIC BLOOD PRESSURE 80-89 MM HG: ICD-10-PCS | Mod: CPTII,S$GLB,, | Performed by: NURSE PRACTITIONER

## 2022-07-29 PROCEDURE — 99203 OFFICE O/P NEW LOW 30 MIN: CPT | Mod: S$GLB,,, | Performed by: NURSE PRACTITIONER

## 2022-07-29 PROCEDURE — 1160F RVW MEDS BY RX/DR IN RCRD: CPT | Mod: CPTII,S$GLB,, | Performed by: NURSE PRACTITIONER

## 2022-07-29 PROCEDURE — 71046 XR CHEST PA AND LATERAL: ICD-10-PCS | Mod: 26,,, | Performed by: RADIOLOGY

## 2022-07-29 PROCEDURE — 3008F BODY MASS INDEX DOCD: CPT | Mod: CPTII,S$GLB,, | Performed by: NURSE PRACTITIONER

## 2022-07-29 PROCEDURE — 99999 PR PBB SHADOW E&M-EST. PATIENT-LVL V: ICD-10-PCS | Mod: PBBFAC,,, | Performed by: NURSE PRACTITIONER

## 2022-07-29 PROCEDURE — 1159F MED LIST DOCD IN RCRD: CPT | Mod: CPTII,S$GLB,, | Performed by: NURSE PRACTITIONER

## 2022-07-29 PROCEDURE — 71046 X-RAY EXAM CHEST 2 VIEWS: CPT | Mod: 26,,, | Performed by: RADIOLOGY

## 2022-07-29 PROCEDURE — 3008F PR BODY MASS INDEX (BMI) DOCUMENTED: ICD-10-PCS | Mod: CPTII,S$GLB,, | Performed by: NURSE PRACTITIONER

## 2022-07-29 PROCEDURE — 1160F PR REVIEW ALL MEDS BY PRESCRIBER/CLIN PHARMACIST DOCUMENTED: ICD-10-PCS | Mod: CPTII,S$GLB,, | Performed by: NURSE PRACTITIONER

## 2022-07-29 PROCEDURE — 1159F PR MEDICATION LIST DOCUMENTED IN MEDICAL RECORD: ICD-10-PCS | Mod: CPTII,S$GLB,, | Performed by: NURSE PRACTITIONER

## 2022-07-29 PROCEDURE — 99999 PR PBB SHADOW E&M-EST. PATIENT-LVL V: CPT | Mod: PBBFAC,,, | Performed by: NURSE PRACTITIONER

## 2022-07-29 PROCEDURE — 99203 PR OFFICE/OUTPT VISIT, NEW, LEVL III, 30-44 MIN: ICD-10-PCS | Mod: S$GLB,,, | Performed by: NURSE PRACTITIONER

## 2022-07-29 PROCEDURE — 3074F SYST BP LT 130 MM HG: CPT | Mod: CPTII,S$GLB,, | Performed by: NURSE PRACTITIONER

## 2022-07-29 PROCEDURE — 3079F DIAST BP 80-89 MM HG: CPT | Mod: CPTII,S$GLB,, | Performed by: NURSE PRACTITIONER

## 2022-07-29 NOTE — PROGRESS NOTES
Subjective:      Patient ID: Karla Arzola is a 62 y.o. female.    Patient Active Problem List   Diagnosis    HTN (hypertension)    Obesity    Recurrent genital HSV (herpes simplex virus) infection    Internal derangement of right knee    Chondromalacia of right knee    Screen for colon cancer       she has been referred by Taylor Russo,* for evaluation and management for   Chief Complaint   Patient presents with    Shortness of Breath       Chief Complaint: Shortness of Breath      HPI:  Karla Arzola is a 62 y.o. female presents to pulmonary clinic initial evaluation related to shortness of breath     History of Bronchitis once a year since about 2012.     December 2020 she feels she had Covid, did not test but had symptoms, lost taste, extreme fatigue, horrible cough, fever, chills never tested for Covid at time, but had antibodies when testing before taking Covid vaccination.     She was told bronchitis flare in dec with covid, recover from bronchitis within 3 months. Used Albuterol inhaler and combivent inhalers at time. Has not used since. Early months of 2021. No inhaler use in 2022.     Since December 2020 after Covid she has had shortness of breath with exertion.    Dyspnea Characteristics:   Exertional Dyspnea, walking from parking lot, walking from her car to her house. No shortness of breath at rest.    Dyspnea Duration:  Chronic  Dyspnea Severity:  ERNESTO 3  Dyspnea Timing:  exertional only   Dyspnea Contributing Factors:  severe obesity , gained 19 lbs from Nov 2020 to Dec 2021, has lost back from 241 to present weight 234 lbs  Dyspnea Associated Symptoms:  none. There is no cough, no wheezing, no hemoptysis, no sputum production, no weight loss, noted TB exposure, no asbestos exposure, no chest pain.         Modified Ernesto Dyspnea Scale      0  Nothing at all    0.5  Very, very slight (just noticeable)    1  Very slight    2  Slight    3  Moderate    4 Somewhat severe     5 Severe      6    7  Very severe    8    9   Very, very severe (almost maximal)  10  Maximal      Sleep Apnea  Patient has been observed snoring with apnea witnessed by boyfriend, frequent awakening 2-3 times a night with nocturia.   Patient reports non restful' sleep.  She reports morning headache about 1 once or twice a week   She denies day time napping.  She denies recent weight gain.  Cardiovascular risk factors: hypertension and obesity  Bed time is 0900    Wake time is 0500  Sleep onset is within 15 - 30 Minutes.  Sleep maintenance difficulties related to frequent night time awakening and non-restful sleep  Wake after sleep onset occurs two - three times a night.  Nocturia occurs two - three times a night,   Sleep aids : No  Dry mouth : Yes  Sleep walking: No  Sleep talking : No  Sleep eating:No  Vivid Dreams : No  Cataplexy : No    Greeley Sleepiness Scale   EPWORTH SLEEPINESS SCALE 7/29/2022   Sitting and reading 1   Watching TV 3   Sitting, inactive in a public place (e.g. a theatre or a meeting) 2   As a passenger in a car for an hour without a break 0   Lying down to rest in the afternoon when circumstances permit 2   Sitting and talking to someone 0   Sitting quietly after a lunch without alcohol 1   In a car, while stopped for a few minutes in traffic 0   Total score 9       Neck circumference is 14.8  Mallampati score 4    STOP - BANG Questionnaire:   1. Snoring : Do you snore loudly ?     YES  2. Tired : Do you often feel tired, fatigued, or sleepy during daytime?   YES  3. Observed: Has anyone observed you stop breathing during your sleep?     YES  4. Blood pressure : Do you have or are you being treated for high blood pressure?    YES  5. BMI :BMI more than 35 kg/m2?    YES  6. Age : Age over 50 yr old?    YES  7. Neck circumference: Neck circumference greater than 40 cm?   NO  8. Gender: Gender male?   NO    SCORE: 6    High risk of MIKEY: Yes 5 - 8  Intermediate risk of MIKEY: Yes 3 - 4  Low risk of  MIKEY: Yes 0 - 2      Previous Report Reviewed: lab reports, office notes and radiology reports     Past Medical History: The following portions of the patient's history were reviewed and updated as appropriate:   She  has a past surgical history that includes Tubal ligation; right knee scope; and Mouth surgery.  Her family history includes Diabetes in her mother; Hyperlipidemia in her father; Hypertension in her father and mother; Kidney disease in her father; Stroke in her mother.  She  reports that she has never smoked. She has never used smokeless tobacco. She reports current alcohol use. She reports that she does not use drugs.  She has a current medication list which includes the following prescription(s): acyclovir, amlodipine, finacea, ciclopirox, citalopram, cyanocobalamin, doxycycline monohydrate, fluticasone propionate, furosemide, ibuprofen, meloxicam, multivitamin, spironolactone, albuterol, alprazolam, buspirone, indomethacin, ipratropium-albuterol, neomycin-polymyxin-dexamethasone, and phentermine.  She has No Known Allergies..    Review of Systems   Constitutional: Negative for fever, chills, weight loss, weight gain, activity change, appetite change, fatigue and night sweats.   HENT: Negative for postnasal drip, rhinorrhea, sinus pressure, voice change and congestion.    Eyes: Negative for redness and itching.   Respiratory: Positive for apnea, snoring, shortness of breath and dyspnea on extertion. Negative for cough, sputum production, chest tightness, wheezing, orthopnea, asthma nighttime symptoms, use of rescue inhaler and somnolence.    Cardiovascular: Negative.  Negative for chest pain, palpitations and leg swelling.   Genitourinary: Negative for difficulty urinating and hematuria.   Endocrine: Negative for cold intolerance and heat intolerance.    Musculoskeletal: Negative for arthralgias, gait problem, joint swelling and myalgias.   Skin: Negative.    Gastrointestinal: Negative for nausea,  "vomiting, abdominal pain and acid reflux.   Neurological: Negative for dizziness, weakness, light-headedness and headaches.   Hematological: Negative for adenopathy. No excessive bruising.   All other systems reviewed and are negative.       Objective:   /82   Pulse 108   Resp 19   Ht 5' 6" (1.676 m)   Wt 106.2 kg (234 lb 2.1 oz)   LMP 06/11/2015   SpO2 (!) 94%   BMI 37.79 kg/m²   Physical Exam  Vitals and nursing note reviewed.   Constitutional:       General: She is awake. She is not in acute distress.     Appearance: Normal appearance. She is well-developed and well-groomed. She is obese. She is not ill-appearing or toxic-appearing.   HENT:      Head: Normocephalic.      Right Ear: External ear normal.      Left Ear: External ear normal.      Nose: Nose normal.      Mouth/Throat:      Pharynx: No oropharyngeal exudate.   Eyes:      Conjunctiva/sclera: Conjunctivae normal.   Cardiovascular:      Rate and Rhythm: Normal rate and regular rhythm.      Heart sounds: Normal heart sounds.   Pulmonary:      Effort: Pulmonary effort is normal.      Breath sounds: Normal breath sounds. No stridor.   Abdominal:      Palpations: Abdomen is soft.   Musculoskeletal:         General: Normal range of motion.      Cervical back: Normal range of motion and neck supple.   Lymphadenopathy:      Cervical: No cervical adenopathy.   Skin:     General: Skin is warm and dry.   Neurological:      Mental Status: She is alert and oriented to person, place, and time.   Psychiatric:         Behavior: Behavior normal. Behavior is cooperative.         Thought Content: Thought content normal.         Judgment: Judgment normal.       Personal Diagnostic Review    EXAMINATION:  XR CHEST PA AND LATERAL     CLINICAL HISTORY:  eval for sarcoidosis; Unspecified acute and subacute iridocyclitis (patient had lariat and name tag on for xray).      TECHNIQUE:  PA and lateral views of the chest were performed.     COMPARISON:  Chest radiograph " January 4, 2021     FINDINGS:  Multiple oval opacities overlie the neck soft tissues and right lung apex which are favored to represent objects external to the patient.  Lungs are clear without focal consolidation, edema, or mass.  No pneumothorax or pleural effusion.  Cardiomediastinal silhouette is within normal limits.  No acute osseous abnormality identified.     Impression:     As above.        Electronically signed by: Atif Ribeiro  Date:                                            06/16/2022  Time:                                           10:04    X-Ray Chest PA And Lateral  Narrative: EXAMINATION:  XR CHEST PA AND LATERAL    CLINICAL HISTORY:  Dyspnea, unspecified    TECHNIQUE:  PA and lateral views of the chest were performed.    COMPARISON:  Chest x-ray from June of this year    FINDINGS:  Lungs are main clear.  There is no effusion.  Cardiomediastinal silhouette is not enlarged.  Descending thoracic aorta is tortuous.  Trachea is midline.  Hilar structures appear normal.  Degenerative changes of the spine are noted.  Impression: As above    Electronically signed by: Evan Og MD  Date:    07/29/2022  Time:    11:24        Assessment:     1. Dyspnea on exertion    2. Sleep-disordered breathing    3. Snoring    4. Feeling tired    5. Daytime sleepiness    6. Witnessed episode of apnea      Orders Placed This Encounter   Procedures    X-Ray Chest PA And Lateral     Standing Status:   Future     Number of Occurrences:   1     Standing Expiration Date:   7/29/2023     Order Specific Question:   May the Radiologist modify the order per protocol to meet the clinical needs of the patient?     Answer:   Yes     Order Specific Question:   Release to patient     Answer:   Immediate    Complete PFT with bronchodilator     Standing Status:   Future     Standing Expiration Date:   7/29/2023     Order Specific Question:   Release to patient     Answer:   Immediate    Stress test, pulmonary     Standing  Status:   Future     Standing Expiration Date:   7/29/2023     Order Specific Question:   Reason for study     Answer:   Functional status     Order Specific Question:   Release to patient     Answer:   Immediate    PULM - Arterial Blood Gases--in addition to PFT only     Standing Status:   Future     Standing Expiration Date:   7/29/2023     Order Specific Question:   Release to patient     Answer:   Immediate    Polysomnogram (CPAP will be added if patient meets diagnostic criteria.)     Standing Status:   Future     Standing Expiration Date:   7/29/2023     Medication List with Changes/Refills   Current Medications    ACYCLOVIR (ZOVIRAX) 200 MG CAPSULE    Take 1 capsule (200 mg total) by mouth once daily.    ALBUTEROL (VENTOLIN HFA) 90 MCG/ACTUATION INHALER    Inhale 2 puffs into the lungs every 6 (six) hours as needed for Wheezing. Rescue    ALPRAZOLAM (XANAX) 0.25 MG TABLET    Take 1 tablet (0.25 mg total) by mouth 3 (three) times daily as needed for Insomnia or Anxiety.    AMLODIPINE (NORVASC) 10 MG TABLET    Take 1 tablet (10 mg total) by mouth once daily.    AZELAIC ACID (FINACEA) 15 % FOAM    Apply 1 application topically 2 (two) times daily.    BUSPIRONE (BUSPAR) 7.5 MG TABLET    Take 1 tablet (7.5 mg total) by mouth 3 (three) times daily as needed (anxiety).    CICLOPIROX (PENLAC) 8 % SOLN    Apply to nails once daily    CITALOPRAM (CELEXA) 20 MG TABLET    Take 1 tablet (20 mg total) by mouth once daily.    CYANOCOBALAMIN (VITAMIN B-12) 100 MCG TABLET    Take 100 mcg by mouth once daily.    DOXYCYCLINE MONOHYDRATE 100 MG TAB    Take 1 tablet by mouth every day with food and not within 1 hour prior to lying down    FLUTICASONE (FLONASE) 50 MCG/ACTUATION NASAL SPRAY    1 spray (50 mcg total) by Each Nare route once daily.    FUROSEMIDE (LASIX) 20 MG TABLET    Take 1 tablet (20 mg total) by mouth daily as needed (swelling).    IBUPROFEN (ADVIL,MOTRIN) 800 MG TABLET    Take 1 tablet (800 mg total) by mouth  every 6 to 8 hours as needed for pain    INDOMETHACIN (INDOCIN) 50 MG CAPSULE    Take 1 capsule (50 mg total) by mouth 3 (three) times daily.    IPRATROPIUM-ALBUTEROL (COMBIVENT)  MCG/ACTUATION INHALER    Inhale 1 puff into the lungs every 6 (six) hours as needed for Wheezing or Shortness of Breath. Rescue    MELOXICAM (MOBIC) 7.5 MG TABLET    Take 1 tablet (7.5 mg total) by mouth once daily.    MULTIVITAMIN CAPSULE    Take 1 capsule by mouth once daily.    NEOMYCIN-POLYMYXIN-DEXAMETHASONE (MAXITROL) 3.5MG/ML-10,000 UNIT/ML-0.1 % DRPS    Place 1 drop into the left eye 4 (four) times daily.    PHENTERMINE (ADIPEX-P) 37.5 MG TABLET    Take 1 tablet (37.5 mg total) by mouth before breakfast.    SPIRONOLACTONE (ALDACTONE) 100 MG TABLET    Take 1 tablet (100 mg total) by mouth once daily.     Plan:   Discussed diagnosis, its evaluation, treatment and usual course. All questions answered.  Problem List Items Addressed This Visit    None     Visit Diagnoses     Dyspnea on exertion    -  Primary    Relevant Orders    Complete PFT with bronchodilator    Stress test, pulmonary    PULM - Arterial Blood Gases--in addition to PFT only    X-Ray Chest PA And Lateral (Completed)    Sleep-disordered breathing        Relevant Orders    Polysomnogram (CPAP will be added if patient meets diagnostic criteria.)    Snoring        Relevant Orders    Polysomnogram (CPAP will be added if patient meets diagnostic criteria.)    Feeling tired        Relevant Orders    Polysomnogram (CPAP will be added if patient meets diagnostic criteria.)    Daytime sleepiness        Relevant Orders    Polysomnogram (CPAP will be added if patient meets diagnostic criteria.)    Witnessed episode of apnea        Relevant Orders    Polysomnogram (CPAP will be added if patient meets diagnostic criteria.)        Presents stable  C/o chronic shortness of breath exertional  Multifactoral, severe obesity, no regular exercise. Under work up Rheumatology  connective tissue disorder with +DALI, elevated CRP   At high risk obstructive sleep apnea proceed with PSG.    Has Albuterol inhaler and combivent inhalers on hand, trial of Albuterol inhaler or combivent 15 minutes before planned exertion.  Return for CPFT/ABG/6MWD futher evaluate shortness of breath on exertion.     I spent a total of 44 minutes on the day of the visit.  This includes face to face time and non-face to face time preparing to see the patient (eg, review of tests), obtaining and/or reviewing separately obtained history, documenting clinical information in the electronic or other health record, independently interpreting results and communicating results to the patient/family/caregiver, or care coordinator.      Follow up for Shortness of Breath, w/review cpft/pul stress/abg. , if MIKEY, begin PAP therapy then fu IDL.     Thank you for the opportunity to participate in the care of this patient.

## 2022-08-02 DIAGNOSIS — R76.8 POSITIVE ANA (ANTINUCLEAR ANTIBODY): Primary | ICD-10-CM

## 2022-08-02 RX ORDER — PREDNISONE 5 MG/1
TABLET ORAL
Qty: 40 TABLET | Refills: 0 | Status: SHIPPED | OUTPATIENT
Start: 2022-08-02 | End: 2022-08-19

## 2022-08-04 ENCOUNTER — PATIENT MESSAGE (OUTPATIENT)
Dept: OPTOMETRY | Facility: CLINIC | Age: 62
End: 2022-08-04
Payer: COMMERCIAL

## 2022-08-05 ENCOUNTER — PATIENT MESSAGE (OUTPATIENT)
Dept: RHEUMATOLOGY | Facility: CLINIC | Age: 62
End: 2022-08-05
Payer: COMMERCIAL

## 2022-08-11 DIAGNOSIS — M25.562 LEFT KNEE PAIN, UNSPECIFIED CHRONICITY: Primary | ICD-10-CM

## 2022-08-12 ENCOUNTER — OFFICE VISIT (OUTPATIENT)
Dept: ORTHOPEDICS | Facility: CLINIC | Age: 62
End: 2022-08-12
Payer: COMMERCIAL

## 2022-08-12 ENCOUNTER — HOSPITAL ENCOUNTER (OUTPATIENT)
Dept: RADIOLOGY | Facility: HOSPITAL | Age: 62
Discharge: HOME | End: 2022-08-12
Attending: STUDENT IN AN ORGANIZED HEALTH CARE EDUCATION/TRAINING PROGRAM
Payer: COMMERCIAL

## 2022-08-12 ENCOUNTER — PATIENT MESSAGE (OUTPATIENT)
Dept: ORTHOPEDICS | Facility: CLINIC | Age: 62
End: 2022-08-12

## 2022-08-12 VITALS — WEIGHT: 234 LBS | HEIGHT: 66 IN | BODY MASS INDEX: 37.61 KG/M2

## 2022-08-12 DIAGNOSIS — M22.2X2 PATELLOFEMORAL PAIN SYNDROME OF LEFT KNEE: Primary | ICD-10-CM

## 2022-08-12 DIAGNOSIS — M25.562 LEFT KNEE PAIN, UNSPECIFIED CHRONICITY: ICD-10-CM

## 2022-08-12 PROCEDURE — 20610 LARGE JOINT ASPIRATION/INJECTION: L KNEE: ICD-10-PCS | Mod: LT,S$GLB,, | Performed by: STUDENT IN AN ORGANIZED HEALTH CARE EDUCATION/TRAINING PROGRAM

## 2022-08-12 PROCEDURE — 99213 PR OFFICE/OUTPT VISIT, EST, LEVL III, 20-29 MIN: ICD-10-PCS | Mod: 25,S$GLB,, | Performed by: STUDENT IN AN ORGANIZED HEALTH CARE EDUCATION/TRAINING PROGRAM

## 2022-08-12 PROCEDURE — 73564 X-RAY EXAM KNEE 4 OR MORE: CPT | Mod: 26,LT,, | Performed by: RADIOLOGY

## 2022-08-12 PROCEDURE — 20610 DRAIN/INJ JOINT/BURSA W/O US: CPT | Mod: LT,S$GLB,, | Performed by: STUDENT IN AN ORGANIZED HEALTH CARE EDUCATION/TRAINING PROGRAM

## 2022-08-12 PROCEDURE — 3008F PR BODY MASS INDEX (BMI) DOCUMENTED: ICD-10-PCS | Mod: CPTII,S$GLB,, | Performed by: STUDENT IN AN ORGANIZED HEALTH CARE EDUCATION/TRAINING PROGRAM

## 2022-08-12 PROCEDURE — 73564 XR KNEE ORTHO LEFT WITH FLEXION: ICD-10-PCS | Mod: 26,LT,, | Performed by: RADIOLOGY

## 2022-08-12 PROCEDURE — 1159F MED LIST DOCD IN RCRD: CPT | Mod: CPTII,S$GLB,, | Performed by: STUDENT IN AN ORGANIZED HEALTH CARE EDUCATION/TRAINING PROGRAM

## 2022-08-12 PROCEDURE — 73562 X-RAY EXAM OF KNEE 3: CPT | Mod: TC,RT

## 2022-08-12 PROCEDURE — 1160F PR REVIEW ALL MEDS BY PRESCRIBER/CLIN PHARMACIST DOCUMENTED: ICD-10-PCS | Mod: CPTII,S$GLB,, | Performed by: STUDENT IN AN ORGANIZED HEALTH CARE EDUCATION/TRAINING PROGRAM

## 2022-08-12 PROCEDURE — 99213 OFFICE O/P EST LOW 20 MIN: CPT | Mod: 25,S$GLB,, | Performed by: STUDENT IN AN ORGANIZED HEALTH CARE EDUCATION/TRAINING PROGRAM

## 2022-08-12 PROCEDURE — 1159F PR MEDICATION LIST DOCUMENTED IN MEDICAL RECORD: ICD-10-PCS | Mod: CPTII,S$GLB,, | Performed by: STUDENT IN AN ORGANIZED HEALTH CARE EDUCATION/TRAINING PROGRAM

## 2022-08-12 PROCEDURE — 73562 XR KNEE ORTHO LEFT WITH FLEXION: ICD-10-PCS | Mod: 26,RT,, | Performed by: RADIOLOGY

## 2022-08-12 PROCEDURE — 73562 X-RAY EXAM OF KNEE 3: CPT | Mod: 26,RT,, | Performed by: RADIOLOGY

## 2022-08-12 PROCEDURE — 99999 PR PBB SHADOW E&M-EST. PATIENT-LVL IV: CPT | Mod: PBBFAC,,, | Performed by: STUDENT IN AN ORGANIZED HEALTH CARE EDUCATION/TRAINING PROGRAM

## 2022-08-12 PROCEDURE — 99999 PR PBB SHADOW E&M-EST. PATIENT-LVL IV: ICD-10-PCS | Mod: PBBFAC,,, | Performed by: STUDENT IN AN ORGANIZED HEALTH CARE EDUCATION/TRAINING PROGRAM

## 2022-08-12 PROCEDURE — 1160F RVW MEDS BY RX/DR IN RCRD: CPT | Mod: CPTII,S$GLB,, | Performed by: STUDENT IN AN ORGANIZED HEALTH CARE EDUCATION/TRAINING PROGRAM

## 2022-08-12 PROCEDURE — 3008F BODY MASS INDEX DOCD: CPT | Mod: CPTII,S$GLB,, | Performed by: STUDENT IN AN ORGANIZED HEALTH CARE EDUCATION/TRAINING PROGRAM

## 2022-08-12 RX ORDER — TRIAMCINOLONE ACETONIDE 40 MG/ML
40 INJECTION, SUSPENSION INTRA-ARTICULAR; INTRAMUSCULAR
Status: DISCONTINUED | OUTPATIENT
Start: 2022-08-12 | End: 2022-08-12 | Stop reason: HOSPADM

## 2022-08-12 RX ADMIN — TRIAMCINOLONE ACETONIDE 40 MG: 40 INJECTION, SUSPENSION INTRA-ARTICULAR; INTRAMUSCULAR at 04:08

## 2022-08-12 NOTE — PROGRESS NOTES
[facility]ed[facility] Sports [facility]     Knee Initial Visit         8/12/2022    Referring MD: Self    Chief Complaint   Patient presents with    Left Knee - Pain       History of Present Illness:   Karla Arzola is a 62 y.o. year old female patient presents with pain and dysfunction involving the left knee.     Onset of the symptoms was 1-2 months ago.     Inciting event: no specific mechanism of injury just a gradual worsening of symptoms.     Current symptoms include intermittent sharp pain localized anterolaterally to the knee, intermittent swelling, and difficulty with straightening the knee .     Pain is aggravated by walking long distances, sitting for long periods of time, and going from sitting to standing. .      Evaluation to date: XR.     Treatment to date: rest, acitivity modification, nsaids.       Past Medical History:   Past Medical History:   Diagnosis Date    Allergy     Anxiety     Hypertension     Obesity     Vitamin B 12 deficiency        Past Surgical History:   Past Surgical History:   Procedure Laterality Date    MOUTH SURGERY      right knee scope      TUBAL LIGATION         Medications:  Patient's Medications   New Prescriptions    No medications on file   Previous Medications    ACYCLOVIR (ZOVIRAX) 200 MG CAPSULE    Take 1 capsule (200 mg total) by mouth once daily.    ALBUTEROL (VENTOLIN HFA) 90 MCG/ACTUATION INHALER    Inhale 2 puffs into the lungs every 6 (six) hours as needed for Wheezing. Rescue    ALPRAZOLAM (XANAX) 0.25 MG TABLET    Take 1 tablet (0.25 mg total) by mouth 3 (three) times daily as needed for Insomnia or Anxiety.    AMLODIPINE (NORVASC) 10 MG TABLET    Take 1 tablet (10 mg total) by mouth once daily.    AZELAIC ACID (FINACEA) 15 % FOAM    Apply 1 application topically 2 (two) times daily.    CICLOPIROX (PENLAC) 8 % SOLN    Apply to nails once daily    CITALOPRAM (CELEXA) 20 MG TABLET    Take 1 tablet (20 mg total) by mouth once daily.    CYANOCOBALAMIN (VITAMIN  B-12) 100 MCG TABLET    Take 100 mcg by mouth once daily.    DOXYCYCLINE MONOHYDRATE 100 MG TAB    Take 1 tablet by mouth every day with food and not within 1 hour prior to lying down    FLUTICASONE (FLONASE) 50 MCG/ACTUATION NASAL SPRAY    1 spray (50 mcg total) by Each Nare route once daily.    FUROSEMIDE (LASIX) 20 MG TABLET    Take 1 tablet (20 mg total) by mouth daily as needed (swelling).    IBUPROFEN (ADVIL,MOTRIN) 800 MG TABLET    Take 1 tablet (800 mg total) by mouth every 6 to 8 hours as needed for pain    INDOMETHACIN (INDOCIN) 50 MG CAPSULE    Take 1 capsule (50 mg total) by mouth 3 (three) times daily.    IPRATROPIUM-ALBUTEROL (COMBIVENT)  MCG/ACTUATION INHALER    Inhale 1 puff into the lungs every 6 (six) hours as needed for Wheezing or Shortness of Breath. Rescue    MELOXICAM (MOBIC) 7.5 MG TABLET    Take 1 tablet (7.5 mg total) by mouth once daily.    MULTIVITAMIN CAPSULE    Take 1 capsule by mouth once daily.    NEOMYCIN-POLYMYXIN-DEXAMETHASONE (MAXITROL) 3.5MG/ML-10,000 UNIT/ML-0.1 % DRPS    Place 1 drop into the left eye 4 (four) times daily.    PHENTERMINE (ADIPEX-P) 37.5 MG TABLET    Take 1 tablet (37.5 mg total) by mouth before breakfast.    PREDNISONE (DELTASONE) 5 MG TABLET    Take 4 tablets (20 mg total) by mouth once daily for 4 days, THEN 3 tablets (15 mg total) once daily for 4 days, THEN 2 tablets (10 mg total) once daily for 4 days, THEN 1 tablet (5 mg total) once daily for 4 days.    SPIRONOLACTONE (ALDACTONE) 100 MG TABLET    Take 1 tablet (100 mg total) by mouth once daily.   Modified Medications    No medications on file   Discontinued Medications    No medications on file        Allergies: Review of patient's allergies indicates:  No Known Allergies    Social History:   [place]: AMRIK Eason  occupation: General surgery at [employer]  alcohol use: She reports current alcohol use.  tobacco use: She reports that she has never smoked. She has never used smokeless  "tobacco.    Review of systems:  history of recent illness, fevers, shakes, or chills: no  history of cardiac problems or chest pain: no  history of of pulmonary problems or asthma: no  history of diabetes: no  history of prior DVT or clotting problems: no  history of sleep apnea: no    Physical Examination:  Estimated body mass index is 37.77 kg/m² as calculated from the following:    Height as of this encounter: 5' 6" (1.676 m).    Weight as of this encounter: 106.1 kg (234 lb).    Standing exam  stance: normal alignment, no significant leg-length discrepancy  gait: antalgic gait      Knee      RIGHT  LEFT  Skin:     Intact   Intact  ROM:     0-130  5-120  Effusion:    Neg   Moderate  Medial joint line tenderness:  Neg   Neg  Lateral joint line tenderness:  Neg   +  Paulino:     Neg   +  Patella crepitus:   Neg   Neg  Patella tenderness:   Neg   +  Patella grind:      Neg   Neg  Lachman:    Neg   Neg  Valgus stress:    Neg   Neg  Varus stress:    Neg   Neg  Posterior drawer:   Neg   Neg  N-V               intact  intact  Hip:    nml    nml   Lower extremity edema: Negative negative    Neurovascular exam  - motor function grossly intact bilaterally to hip flexion, knee extension and flexion, ankle dorsiflexion and plantarflexion  - sensation intact to light touch bilaterally to femoral, tibial, tibial and peroneal distributions  - symmetrical pedal pulses    Imaging:  X-ray Knee Ortho Left with Flexion  Narrative: EXAMINATION:  XR KNEE ORTHO LEFT WITH FLEXION    CLINICAL HISTORY:  Pain in left knee    TECHNIQUE:  AP standing views of both knees, AP flexion views of both knees, lateral view of the left knee and Merchant views of both knees    COMPARISON:  10/02/2020    FINDINGS:  The joint spaces of all 3 compartments of the left knee appear to be relatively well maintained.  Mild-to-moderate joint space narrowing seen involving the medial compartment of the right knee with minimal marginal osteophyte formation " noted.  No left-sided joint effusion.  No acute fracture or dislocation.  Impression: 1.  As above    Electronically signed by: Nba Doe   Date:    08/12/2022  Time:    14:43       Physician Read: I agree with the above impression.    Impression:  62 y.o. female with left knee patellofemoral pain.     Plan:  1. Discussed diagnosis and treatment options with the patient today. Her physical exam and imaging are most consistent with left knee patellofemoral pain.  Also included in the differential is lateral meniscus pathology.  2. Discussed with her that first-line treatment would be nonoperative the form of oral anti-inflammatories, bracing, physical therapy, corticosteroid injection.  3. I recommend proceeding with corticosteroid injection today.  This was administered and she tolerated well with no immediate complications.  4. If she continues to be symptomatic, then will likely proceed with MRI of the knee.  5. Follow-up as needed.         Larry Adams MD    I, Roberth Couch, acted as a scribe for Larry Adams MD for the duration of this office visit.

## 2022-08-13 NOTE — PROCEDURES
Large Joint Aspiration/Injection: L knee    Date/Time: 8/12/2022 4:20 PM  Performed by: Larry Adams MD  Authorized by: Larry Adams MD     Consent Done?:  Yes (Verbal)  Indications:  Pain  Site marked: the procedure site was marked    Timeout: prior to procedure the correct patient, procedure, and site was verified    Prep: patient was prepped and draped in usual sterile fashion      Local anesthesia used?: Yes    Anesthesia:  Local infiltration  Local anesthetic:  Lidocaine 1% without epinephrine    Details:  Needle Size:  22 G  Ultrasonic Guidance for needle placement?: No    Approach:  Anterolateral  Location:  Knee  Site:  L knee  Medications:  40 mg triamcinolone acetonide 40 mg/mL  Patient tolerance:  Patient tolerated the procedure well with no immediate complications

## 2022-08-15 ENCOUNTER — LAB VISIT (OUTPATIENT)
Dept: LAB | Facility: HOSPITAL | Age: 62
End: 2022-08-15
Attending: FAMILY MEDICINE
Payer: COMMERCIAL

## 2022-08-15 DIAGNOSIS — R76.8 POSITIVE ANA (ANTINUCLEAR ANTIBODY): ICD-10-CM

## 2022-08-15 LAB
ALBUMIN SERPL BCP-MCNC: 3.8 G/DL (ref 3.5–5.2)
ALP SERPL-CCNC: 81 U/L (ref 55–135)
ALT SERPL W/O P-5'-P-CCNC: 20 U/L (ref 10–44)
ANION GAP SERPL CALC-SCNC: 14 MMOL/L (ref 8–16)
AST SERPL-CCNC: 15 U/L (ref 10–40)
BASOPHILS # BLD AUTO: 0.02 K/UL (ref 0–0.2)
BASOPHILS NFR BLD: 0.2 % (ref 0–1.9)
BILIRUB SERPL-MCNC: 0.9 MG/DL (ref 0.1–1)
BUN SERPL-MCNC: 20 MG/DL (ref 6–20)
CALCIUM SERPL-MCNC: 9.6 MG/DL (ref 8.7–10.5)
CHLORIDE SERPL-SCNC: 101 MMOL/L (ref 95–110)
CO2 SERPL-SCNC: 26 MMOL/L (ref 23–29)
CREAT SERPL-MCNC: 1.1 MG/DL (ref 0.5–1.4)
CRP SERPL-MCNC: 2.5 MG/L (ref 0–8.2)
DIFFERENTIAL METHOD: ABNORMAL
EOSINOPHIL # BLD AUTO: 0 K/UL (ref 0–0.5)
EOSINOPHIL NFR BLD: 0.1 % (ref 0–8)
ERYTHROCYTE [DISTWIDTH] IN BLOOD BY AUTOMATED COUNT: 14.6 % (ref 11.5–14.5)
ERYTHROCYTE [SEDIMENTATION RATE] IN BLOOD BY PHOTOMETRIC METHOD: 26 MM/HR (ref 0–36)
EST. GFR  (NO RACE VARIABLE): 58 ML/MIN/1.73 M^2
GLUCOSE SERPL-MCNC: 117 MG/DL (ref 70–110)
HCT VFR BLD AUTO: 49.4 % (ref 37–48.5)
HGB BLD-MCNC: 15.6 G/DL (ref 12–16)
IMM GRANULOCYTES # BLD AUTO: 0.05 K/UL (ref 0–0.04)
IMM GRANULOCYTES NFR BLD AUTO: 0.4 % (ref 0–0.5)
LYMPHOCYTES # BLD AUTO: 2.1 K/UL (ref 1–4.8)
LYMPHOCYTES NFR BLD: 16.3 % (ref 18–48)
MCH RBC QN AUTO: 29 PG (ref 27–31)
MCHC RBC AUTO-ENTMCNC: 31.6 G/DL (ref 32–36)
MCV RBC AUTO: 92 FL (ref 82–98)
MONOCYTES # BLD AUTO: 0.7 K/UL (ref 0.3–1)
MONOCYTES NFR BLD: 5.4 % (ref 4–15)
NEUTROPHILS # BLD AUTO: 10.1 K/UL (ref 1.8–7.7)
NEUTROPHILS NFR BLD: 77.6 % (ref 38–73)
NRBC BLD-RTO: 0 /100 WBC
PLATELET # BLD AUTO: 319 K/UL (ref 150–450)
PMV BLD AUTO: 10.5 FL (ref 9.2–12.9)
POTASSIUM SERPL-SCNC: 3.6 MMOL/L (ref 3.5–5.1)
PROT SERPL-MCNC: 7.4 G/DL (ref 6–8.4)
RBC # BLD AUTO: 5.38 M/UL (ref 4–5.4)
SODIUM SERPL-SCNC: 141 MMOL/L (ref 136–145)
WBC # BLD AUTO: 12.98 K/UL (ref 3.9–12.7)

## 2022-08-15 PROCEDURE — 86140 C-REACTIVE PROTEIN: CPT | Performed by: PHYSICIAN ASSISTANT

## 2022-08-15 PROCEDURE — 85652 RBC SED RATE AUTOMATED: CPT | Performed by: PHYSICIAN ASSISTANT

## 2022-08-15 PROCEDURE — 85025 COMPLETE CBC W/AUTO DIFF WBC: CPT | Performed by: PHYSICIAN ASSISTANT

## 2022-08-15 PROCEDURE — 36415 COLL VENOUS BLD VENIPUNCTURE: CPT | Performed by: PHYSICIAN ASSISTANT

## 2022-08-15 PROCEDURE — 80053 COMPREHEN METABOLIC PANEL: CPT | Performed by: PHYSICIAN ASSISTANT

## 2022-08-16 ENCOUNTER — OFFICE VISIT (OUTPATIENT)
Dept: RHEUMATOLOGY | Facility: CLINIC | Age: 62
End: 2022-08-16
Payer: COMMERCIAL

## 2022-08-16 ENCOUNTER — LAB VISIT (OUTPATIENT)
Dept: LAB | Facility: HOSPITAL | Age: 62
End: 2022-08-16
Payer: COMMERCIAL

## 2022-08-16 VITALS
BODY MASS INDEX: 36.46 KG/M2 | HEIGHT: 66 IN | HEART RATE: 84 BPM | SYSTOLIC BLOOD PRESSURE: 147 MMHG | DIASTOLIC BLOOD PRESSURE: 78 MMHG | WEIGHT: 226.88 LBS

## 2022-08-16 DIAGNOSIS — R76.8 POSITIVE ANA (ANTINUCLEAR ANTIBODY): ICD-10-CM

## 2022-08-16 DIAGNOSIS — D72.829 LEUKOCYTOSIS, UNSPECIFIED TYPE: ICD-10-CM

## 2022-08-16 DIAGNOSIS — H20.00 ACUTE ANTERIOR UVEITIS OF BOTH EYES: Primary | ICD-10-CM

## 2022-08-16 DIAGNOSIS — D72.829 LEUKOCYTOSIS, UNSPECIFIED TYPE: Primary | ICD-10-CM

## 2022-08-16 LAB
BILIRUB UR QL STRIP: NEGATIVE
CLARITY UR: CLEAR
COLOR UR: YELLOW
GLUCOSE UR QL STRIP: NEGATIVE
HGB UR QL STRIP: NEGATIVE
KETONES UR QL STRIP: NEGATIVE
LEUKOCYTE ESTERASE UR QL STRIP: NEGATIVE
NITRITE UR QL STRIP: NEGATIVE
PH UR STRIP: 6 [PH] (ref 5–8)
PROT UR QL STRIP: NEGATIVE
SP GR UR STRIP: 1.02 (ref 1–1.03)
URN SPEC COLLECT METH UR: NORMAL

## 2022-08-16 PROCEDURE — 99999 PR PBB SHADOW E&M-EST. PATIENT-LVL IV: ICD-10-PCS | Mod: PBBFAC,,, | Performed by: PHYSICIAN ASSISTANT

## 2022-08-16 PROCEDURE — 3008F BODY MASS INDEX DOCD: CPT | Mod: CPTII,S$GLB,, | Performed by: PHYSICIAN ASSISTANT

## 2022-08-16 PROCEDURE — 99214 PR OFFICE/OUTPT VISIT, EST, LEVL IV, 30-39 MIN: ICD-10-PCS | Mod: S$GLB,,, | Performed by: PHYSICIAN ASSISTANT

## 2022-08-16 PROCEDURE — 81003 URINALYSIS AUTO W/O SCOPE: CPT | Performed by: PHYSICIAN ASSISTANT

## 2022-08-16 PROCEDURE — 3077F PR MOST RECENT SYSTOLIC BLOOD PRESSURE >= 140 MM HG: ICD-10-PCS | Mod: CPTII,S$GLB,, | Performed by: PHYSICIAN ASSISTANT

## 2022-08-16 PROCEDURE — 1160F RVW MEDS BY RX/DR IN RCRD: CPT | Mod: CPTII,S$GLB,, | Performed by: PHYSICIAN ASSISTANT

## 2022-08-16 PROCEDURE — 1159F MED LIST DOCD IN RCRD: CPT | Mod: CPTII,S$GLB,, | Performed by: PHYSICIAN ASSISTANT

## 2022-08-16 PROCEDURE — 99214 OFFICE O/P EST MOD 30 MIN: CPT | Mod: S$GLB,,, | Performed by: PHYSICIAN ASSISTANT

## 2022-08-16 PROCEDURE — 3078F DIAST BP <80 MM HG: CPT | Mod: CPTII,S$GLB,, | Performed by: PHYSICIAN ASSISTANT

## 2022-08-16 PROCEDURE — 1159F PR MEDICATION LIST DOCUMENTED IN MEDICAL RECORD: ICD-10-PCS | Mod: CPTII,S$GLB,, | Performed by: PHYSICIAN ASSISTANT

## 2022-08-16 PROCEDURE — 3078F PR MOST RECENT DIASTOLIC BLOOD PRESSURE < 80 MM HG: ICD-10-PCS | Mod: CPTII,S$GLB,, | Performed by: PHYSICIAN ASSISTANT

## 2022-08-16 PROCEDURE — 3008F PR BODY MASS INDEX (BMI) DOCUMENTED: ICD-10-PCS | Mod: CPTII,S$GLB,, | Performed by: PHYSICIAN ASSISTANT

## 2022-08-16 PROCEDURE — 1160F PR REVIEW ALL MEDS BY PRESCRIBER/CLIN PHARMACIST DOCUMENTED: ICD-10-PCS | Mod: CPTII,S$GLB,, | Performed by: PHYSICIAN ASSISTANT

## 2022-08-16 PROCEDURE — 3077F SYST BP >= 140 MM HG: CPT | Mod: CPTII,S$GLB,, | Performed by: PHYSICIAN ASSISTANT

## 2022-08-16 PROCEDURE — 99999 PR PBB SHADOW E&M-EST. PATIENT-LVL IV: CPT | Mod: PBBFAC,,, | Performed by: PHYSICIAN ASSISTANT

## 2022-08-16 NOTE — PROGRESS NOTES
Subjective:      Patient ID: Karla Arzola is a 62 y.o. female.    Chief Complaint: Uveitis      HPI   Karla Arzola  is a 62 y.o. female who is here for f/u on (+) harjinder and anterior uveitis, treated by Dr. Lafluer.  She will complete her steroid taper in about 3 days.  Overall the eye is feeling much better.  She states the redness has resolved.  She has no eye pain no visual disturbances.    She denies recent history of infections, unexplained fevers.  No obvbious sxs of infection.  She complains of perioral dermatitis which is currently treated by Dr. Garcia.  She also has history of sciatica.  She has no pain today.  She gets pain in the lumbosacral region.  Overall the back is feeling good today.    She endorses morning stiffness lasting 30 minutes or more.  She denies migratory pain as well as tender swollen joints.  She has dyspnea on exertion of walking more than about 5-10 minutes.  Started after she had covid in 2020.  Seeing pulmonlogy.     Patient denies fevers, chills, photosensitivity, eye pain, chest pain, hematuria, blood in the stool, rash, sicca symptoms, raynauds, finger ulcerations.  Rheumatologic systems otherwise negative.    Serologies/Labs:  · (+) HARJINDER 1:160 homogenous, profile pending  · Neg RF, CCP  · HLA B 27 negative  Current Treatment:  · Prednisone taper  Previous Treatment:   · Pred forte drops eyes      Current Outpatient Medications:     acyclovir (ZOVIRAX) 200 MG capsule, Take 1 capsule (200 mg total) by mouth once daily., Disp: 90 capsule, Rfl: 1    albuterol (VENTOLIN HFA) 90 mcg/actuation inhaler, Inhale 2 puffs into the lungs every 6 (six) hours as needed for Wheezing. Rescue, Disp: 18 g, Rfl: 0    amLODIPine (NORVASC) 10 MG tablet, Take 1 tablet (10 mg total) by mouth once daily., Disp: 90 tablet, Rfl: 1    azelaic acid (FINACEA) 15 % Foam, Apply 1 application topically 2 (two) times daily., Disp: 50 g, Rfl: 2    ciclopirox (PENLAC) 8 % Soln, Apply to nails once  daily, Disp: 6.6 mL, Rfl: 2    citalopram (CELEXA) 20 MG tablet, Take 1 tablet (20 mg total) by mouth once daily., Disp: 90 tablet, Rfl: 1    cyanocobalamin (VITAMIN B-12) 100 MCG tablet, Take 100 mcg by mouth once daily., Disp: , Rfl:     doxycycline monohydrate 100 mg Tab, Take 1 tablet by mouth every day with food and not within 1 hour prior to lying down, Disp: 30 tablet, Rfl: 2    fluticasone (FLONASE) 50 mcg/actuation nasal spray, 1 spray (50 mcg total) by Each Nare route once daily., Disp: 16 g, Rfl: 0    furosemide (LASIX) 20 MG tablet, Take 1 tablet (20 mg total) by mouth daily as needed (swelling)., Disp: 60 tablet, Rfl: 1    ibuprofen (ADVIL,MOTRIN) 800 MG tablet, Take 1 tablet (800 mg total) by mouth every 6 to 8 hours as needed for pain, Disp: 20 tablet, Rfl: 0    indomethacin (INDOCIN) 50 MG capsule, Take 1 capsule (50 mg total) by mouth 3 (three) times daily., Disp: 30 capsule, Rfl: 0    ipratropium-albuteroL (COMBIVENT)  mcg/actuation inhaler, Inhale 1 puff into the lungs every 6 (six) hours as needed for Wheezing or Shortness of Breath. Rescue, Disp: 4 g, Rfl: 0    meloxicam (MOBIC) 7.5 MG tablet, Take 1 tablet (7.5 mg total) by mouth once daily., Disp: 90 tablet, Rfl: 0    multivitamin capsule, Take 1 capsule by mouth once daily., Disp: , Rfl:     neomycin-polymyxin-dexamethasone (MAXITROL) 3.5mg/mL-10,000 unit/mL-0.1 % DrpS, Place 1 drop into the left eye 4 (four) times daily., Disp: 5 mL, Rfl: 0    phentermine (ADIPEX-P) 37.5 mg tablet, Take 1 tablet (37.5 mg total) by mouth before breakfast., Disp: 30 tablet, Rfl: 0    predniSONE (DELTASONE) 5 MG tablet, Take 4 tablets (20 mg total) by mouth once daily for 4 days, THEN 3 tablets (15 mg total) once daily for 4 days, THEN 2 tablets (10 mg total) once daily for 4 days, THEN 1 tablet (5 mg total) once daily for 4 days., Disp: 40 tablet, Rfl: 0    ALPRAZolam (XANAX) 0.25 MG tablet, Take 1 tablet (0.25 mg total) by mouth 3 (three)  "times daily as needed for Insomnia or Anxiety., Disp: 60 tablet, Rfl: 0    spironolactone (ALDACTONE) 100 MG tablet, Take 1 tablet (100 mg total) by mouth once daily., Disp: 30 tablet, Rfl: 2    Past Medical History:   Diagnosis Date    Allergy     Anxiety     Hypertension     Obesity     Vitamin B 12 deficiency      Family History   Problem Relation Age of Onset    Diabetes Mother     Hypertension Mother     Stroke Mother     Hypertension Father     Hyperlipidemia Father     Kidney disease Father      Social History     Socioeconomic History    Marital status: Single   Occupational History     Employer: [employer]   Tobacco Use    Smoking status: Never Smoker    Smokeless tobacco: Never Used   Substance and Sexual Activity    Alcohol use: Yes     Alcohol/week: 0.0 standard drinks     Comment: socially     Drug use: No    Sexual activity: Yes   Other Topics Concern    Are you pregnant or think you may be? No    Breast-feeding No     Social Determinants of Health     Financial Resource Strain: Unknown    Difficulty of Paying Living Expenses: Patient refused   Food Insecurity: Unknown    Worried About Running Out of Food in the Last Year: Patient refused    Ran Out of Food in the Last Year: Patient refused   Transportation Needs: Unknown    Lack of Transportation (Medical): Patient refused    Lack of Transportation (Non-Medical): Patient refused   Physical Activity: Inactive    Days of Exercise per Week: 0 days    Minutes of Exercise per Session: 0 min   Stress: Stress Concern Present    Feeling of Stress : To some extent   Housing Stability: High Risk    Unable to Pay for Housing in the Last Year: Yes    Unstable Housing in the Last Year: Patient refused     Review of patient's allergies indicates:  No Known Allergies    Objective:   BP (!) 147/78 (BP Location: Right arm, Patient Position: Sitting, BP Method: Large (Automatic))   Pulse 84   Ht 5' 6" (1.676 m)   Wt 102.9 " kg (226 lb 13.7 oz)   LMP 06/11/2015   BMI 36.62 kg/m²   Immunization History   Administered Date(s) Administered    COVID-19, MRNA, LN-S, PF (Pfizer) (Purple Cap) 01/11/2021, 02/01/2021, 01/14/2022    Influenza 10/12/2007, 10/10/2008, 09/24/2009, 10/29/2010    Influenza - Quadrivalent 10/07/2015, 09/30/2016    Influenza - Quadrivalent - PF *Preferred* (6 months and older) 09/28/2017, 09/27/2018, 09/25/2019, 11/17/2020, 09/23/2021    Influenza - Trivalent (ADULT) 10/12/2007, 10/10/2008, 09/24/2009, 10/29/2010    Influenza A (H1N1) 2009 Monovalent - IM 10/27/2009    Tdap 11/01/2007, 04/14/2011    Zoster Recombinant 12/01/2021       Physical Exam   Constitutional: She is oriented to person, place, and time. No distress.   HENT:   Head: Normocephalic and atraumatic.   Pulmonary/Chest: Effort normal.   Abdominal: She exhibits no distension.   Musculoskeletal:         General: No swelling or tenderness. Normal range of motion.      Cervical back: Normal range of motion.   Lymphadenopathy:     She has no cervical adenopathy.   Neurological: She is alert and oriented to person, place, and time.   Skin: Skin is warm and dry. No rash noted.   Psychiatric: Mood normal.   Nursing note and vitals reviewed.     No synovitis, dactylitis, enthesitis       Recent Results (from the past 672 hour(s))   Comprehensive Metabolic Panel    Collection Time: 07/21/22 10:50 AM   Result Value Ref Range    Sodium 139 136 - 145 mmol/L    Potassium 4.2 3.5 - 5.1 mmol/L    Chloride 103 95 - 110 mmol/L    CO2 28 23 - 29 mmol/L    Glucose 123 (H) 70 - 110 mg/dL    BUN 14 6 - 20 mg/dL    Creatinine 1.2 0.5 - 1.4 mg/dL    Calcium 9.3 8.7 - 10.5 mg/dL    Total Protein 6.8 6.0 - 8.4 g/dL    Albumin 3.5 3.5 - 5.2 g/dL    Total Bilirubin 0.7 0.1 - 1.0 mg/dL    Alkaline Phosphatase 85 55 - 135 U/L    AST 13 10 - 40 U/L    ALT 15 10 - 44 U/L    Anion Gap 8 8 - 16 mmol/L    eGFR if African American 57 (A) >60 mL/min/1.73 m^2    eGFR if non African  American 49 (A) >60 mL/min/1.73 m^2   CBC Auto Differential    Collection Time: 07/21/22 10:50 AM   Result Value Ref Range    WBC 6.97 3.90 - 12.70 K/uL    RBC 4.85 4.00 - 5.40 M/uL    Hemoglobin 14.1 12.0 - 16.0 g/dL    Hematocrit 44.7 37.0 - 48.5 %    MCV 92 82 - 98 fL    MCH 29.1 27.0 - 31.0 pg    MCHC 31.5 (L) 32.0 - 36.0 g/dL    RDW 14.8 (H) 11.5 - 14.5 %    Platelets 249 150 - 450 K/uL    MPV 10.3 9.2 - 12.9 fL    Immature Granulocytes 0.1 0.0 - 0.5 %    Gran # (ANC) 3.7 1.8 - 7.7 K/uL    Immature Grans (Abs) 0.01 0.00 - 0.04 K/uL    Lymph # 2.7 1.0 - 4.8 K/uL    Mono # 0.5 0.3 - 1.0 K/uL    Eos # 0.1 0.0 - 0.5 K/uL    Baso # 0.01 0.00 - 0.20 K/uL    nRBC 0 0 /100 WBC    Gran % 53.0 38.0 - 73.0 %    Lymph % 39.0 18.0 - 48.0 %    Mono % 6.7 4.0 - 15.0 %    Eosinophil % 1.1 0.0 - 8.0 %    Basophil % 0.1 0.0 - 1.9 %    Differential Method Automated    Sedimentation rate    Collection Time: 07/21/22 10:50 AM   Result Value Ref Range    Sed Rate 27 0 - 36 mm/Hr   C-REACTIVE PROTEIN    Collection Time: 07/21/22 10:50 AM   Result Value Ref Range    CRP 16.6 (H) 0.0 - 8.2 mg/L   CBC Auto Differential    Collection Time: 08/15/22  9:59 AM   Result Value Ref Range    WBC 12.98 (H) 3.90 - 12.70 K/uL    RBC 5.38 4.00 - 5.40 M/uL    Hemoglobin 15.6 12.0 - 16.0 g/dL    Hematocrit 49.4 (H) 37.0 - 48.5 %    MCV 92 82 - 98 fL    MCH 29.0 27.0 - 31.0 pg    MCHC 31.6 (L) 32.0 - 36.0 g/dL    RDW 14.6 (H) 11.5 - 14.5 %    Platelets 319 150 - 450 K/uL    MPV 10.5 9.2 - 12.9 fL    Immature Granulocytes 0.4 0.0 - 0.5 %    Gran # (ANC) 10.1 (H) 1.8 - 7.7 K/uL    Immature Grans (Abs) 0.05 (H) 0.00 - 0.04 K/uL    Lymph # 2.1 1.0 - 4.8 K/uL    Mono # 0.7 0.3 - 1.0 K/uL    Eos # 0.0 0.0 - 0.5 K/uL    Baso # 0.02 0.00 - 0.20 K/uL    nRBC 0 0 /100 WBC    Gran % 77.6 (H) 38.0 - 73.0 %    Lymph % 16.3 (L) 18.0 - 48.0 %    Mono % 5.4 4.0 - 15.0 %    Eosinophil % 0.1 0.0 - 8.0 %    Basophil % 0.2 0.0 - 1.9 %    Differential Method Automated     Comprehensive Metabolic Panel    Collection Time: 08/15/22  9:59 AM   Result Value Ref Range    Sodium 141 136 - 145 mmol/L    Potassium 3.6 3.5 - 5.1 mmol/L    Chloride 101 95 - 110 mmol/L    CO2 26 23 - 29 mmol/L    Glucose 117 (H) 70 - 110 mg/dL    BUN 20 6 - 20 mg/dL    Creatinine 1.1 0.5 - 1.4 mg/dL    Calcium 9.6 8.7 - 10.5 mg/dL    Total Protein 7.4 6.0 - 8.4 g/dL    Albumin 3.8 3.5 - 5.2 g/dL    Total Bilirubin 0.9 0.1 - 1.0 mg/dL    Alkaline Phosphatase 81 55 - 135 U/L    AST 15 10 - 40 U/L    ALT 20 10 - 44 U/L    Anion Gap 14 8 - 16 mmol/L    eGFR 58 (A) >60 mL/min/1.73 m^2   Sedimentation rate    Collection Time: 08/15/22  9:59 AM   Result Value Ref Range    Sed Rate 26 0 - 36 mm/Hr   C-Reactive Protein    Collection Time: 08/15/22  9:59 AM   Result Value Ref Range    CRP 2.5 0.0 - 8.2 mg/L         No results found for: TBGOLDPLUS   Lab Results   Component Value Date    HEPAIGM Negative 10/27/2009    HEPBIGM Negative 10/27/2009    HEPCAB Negative 10/27/2009          Assessment:     1. Acute anterior uveitis of both eyes    2. Positive DALI (antinuclear antibody)    3. Leukocytosis, unspecified type            Plan:     Karla was seen today for uveitis.    Diagnoses and all orders for this visit:    Acute anterior uveitis of both eyes    Positive DALI (antinuclear antibody)    Leukocytosis, unspecified type  -     Cancel: Urinalysis, Reflex to Urine Culture Urine, Clean Catch        · Anterior uveitis - has had multiple episodes  ? HLA B27 negative  ? (+) DALI 1:160  ? Will complete prednisone taper soon  ? Pt to monitor for worsening sxs  ? D/t f/u w ophtho  · Will message Dr. Lafluer re whether this pt needs immunosuppressive therapy for uveitis.  · Will hold addition of immunosuppresive therapy for now, d/t leukocytosis  · Leukocytosis without obvious s/sx infxn  · UA reflex to culture today  · abx if positive  · Repeat CBC one week if neg (followed by PCP ref if still has leukocytosis)  · Consider SI  jt MRI in future if LS pain worsens  · Return to clinic: 2mos w labs    Follow up in about 2 months (around 10/16/2022).    The patient understands, chooses and consents to this plan and accepts all   the risks which include but are not limited to the risks mentioned above.     Disclaimer: This note was prepared using a voice recognition system and is likely to have sound alike errors within the text.

## 2022-08-16 NOTE — Clinical Note
Looks like the most recent visit had bacterial conjunctivitis.  Does not appear you had concern for anterior uveitis like you have had in the past.  Do you think she needs immunosuppressive therapy such as methotrexate at this point?  Has a high white count, so i'm looking in to that right now as well, starting w UA.  She will finish prednisone taper with me in about 2-3 days.  I will recheck inflammatory marker in 1-2 months

## 2022-08-18 ENCOUNTER — PATIENT OUTREACH (OUTPATIENT)
Dept: ADMINISTRATIVE | Facility: HOSPITAL | Age: 62
End: 2022-08-18
Payer: COMMERCIAL

## 2022-08-18 DIAGNOSIS — Z12.11 SCREENING FOR COLON CANCER: Primary | ICD-10-CM

## 2022-08-18 NOTE — PROGRESS NOTES
Health Maintenance Due   Topic Date Due    Colorectal Cancer Screening  Never done    Cervical Cancer Screening  07/19/2016    TETANUS VACCINE  04/14/2021    Shingles Vaccine (2 of 2) 01/26/2022    COVID-19 Vaccine (4 - Booster for Pfizer series) 05/14/2022    Mammogram  10/08/2022     Referral entered to endo  for colonoscopy

## 2022-08-23 ENCOUNTER — LAB VISIT (OUTPATIENT)
Dept: LAB | Facility: HOSPITAL | Age: 62
End: 2022-08-23
Attending: FAMILY MEDICINE
Payer: COMMERCIAL

## 2022-08-23 DIAGNOSIS — R76.8 POSITIVE ANA (ANTINUCLEAR ANTIBODY): ICD-10-CM

## 2022-08-23 LAB
BASOPHILS # BLD AUTO: 0.02 K/UL (ref 0–0.2)
BASOPHILS NFR BLD: 0.2 % (ref 0–1.9)
DIFFERENTIAL METHOD: ABNORMAL
EOSINOPHIL # BLD AUTO: 0.1 K/UL (ref 0–0.5)
EOSINOPHIL NFR BLD: 1.4 % (ref 0–8)
ERYTHROCYTE [DISTWIDTH] IN BLOOD BY AUTOMATED COUNT: 14.7 % (ref 11.5–14.5)
HCT VFR BLD AUTO: 46.6 % (ref 37–48.5)
HGB BLD-MCNC: 14.9 G/DL (ref 12–16)
IMM GRANULOCYTES # BLD AUTO: 0.03 K/UL (ref 0–0.04)
IMM GRANULOCYTES NFR BLD AUTO: 0.3 % (ref 0–0.5)
LYMPHOCYTES # BLD AUTO: 2.8 K/UL (ref 1–4.8)
LYMPHOCYTES NFR BLD: 28.6 % (ref 18–48)
MCH RBC QN AUTO: 29.6 PG (ref 27–31)
MCHC RBC AUTO-ENTMCNC: 32 G/DL (ref 32–36)
MCV RBC AUTO: 93 FL (ref 82–98)
MONOCYTES # BLD AUTO: 0.7 K/UL (ref 0.3–1)
MONOCYTES NFR BLD: 7.1 % (ref 4–15)
NEUTROPHILS # BLD AUTO: 6 K/UL (ref 1.8–7.7)
NEUTROPHILS NFR BLD: 62.4 % (ref 38–73)
NRBC BLD-RTO: 0 /100 WBC
PLATELET # BLD AUTO: 225 K/UL (ref 150–450)
PMV BLD AUTO: 10.5 FL (ref 9.2–12.9)
RBC # BLD AUTO: 5.03 M/UL (ref 4–5.4)
WBC # BLD AUTO: 9.61 K/UL (ref 3.9–12.7)

## 2022-08-23 PROCEDURE — 85025 COMPLETE CBC W/AUTO DIFF WBC: CPT | Performed by: PHYSICIAN ASSISTANT

## 2022-08-23 PROCEDURE — 36415 COLL VENOUS BLD VENIPUNCTURE: CPT | Performed by: PHYSICIAN ASSISTANT

## 2022-08-28 ENCOUNTER — PATIENT MESSAGE (OUTPATIENT)
Dept: RHEUMATOLOGY | Facility: CLINIC | Age: 62
End: 2022-08-28
Payer: COMMERCIAL

## 2022-08-29 ENCOUNTER — TELEPHONE (OUTPATIENT)
Dept: OBSTETRICS AND GYNECOLOGY | Facility: CLINIC | Age: 62
End: 2022-08-29
Payer: COMMERCIAL

## 2022-08-29 NOTE — TELEPHONE ENCOUNTER
Called pt to notify that Madeline Garza will not be in clinic on the day of her appt and to help reschedule. No answer. Number I called is not in service.

## 2022-09-08 ENCOUNTER — PATIENT MESSAGE (OUTPATIENT)
Dept: ADMINISTRATIVE | Facility: OTHER | Age: 62
End: 2022-09-08
Payer: COMMERCIAL

## 2022-09-20 DIAGNOSIS — F41.8 DEPRESSION WITH ANXIETY: ICD-10-CM

## 2022-09-20 RX ORDER — CITALOPRAM 20 MG/1
20 TABLET, FILM COATED ORAL DAILY
Qty: 90 TABLET | Refills: 3 | Status: SHIPPED | OUTPATIENT
Start: 2022-09-20 | End: 2023-06-22 | Stop reason: SDUPTHER

## 2022-09-21 NOTE — TELEPHONE ENCOUNTER
Refill Decision Note   Karla Razola  is requesting a refill authorization.  Brief Assessment and Rationale for Refill:  Approve     Medication Therapy Plan:       Medication Reconciliation Completed: No   Comments:     No Care Gaps recommended.     Note composed:10:02 PM 09/20/2022

## 2022-09-21 NOTE — TELEPHONE ENCOUNTER
No new care gaps identified.  [facility] Embedded Care Gaps. Reference number: [id number]. 9/20/2022   7:54:02 PM CDT

## 2022-09-29 ENCOUNTER — OFFICE VISIT (OUTPATIENT)
Dept: INTERNAL MEDICINE | Facility: CLINIC | Age: 62
End: 2022-09-29
Payer: COMMERCIAL

## 2022-09-29 ENCOUNTER — PATIENT MESSAGE (OUTPATIENT)
Dept: INTERNAL MEDICINE | Facility: CLINIC | Age: 62
End: 2022-09-29

## 2022-09-29 VITALS — DIASTOLIC BLOOD PRESSURE: 80 MMHG | SYSTOLIC BLOOD PRESSURE: 140 MMHG

## 2022-09-29 DIAGNOSIS — R05.9 COUGH: Primary | ICD-10-CM

## 2022-09-29 DIAGNOSIS — R09.81 SINUS CONGESTION: ICD-10-CM

## 2022-09-29 PROCEDURE — 3077F SYST BP >= 140 MM HG: CPT | Mod: CPTII,95,, | Performed by: FAMILY MEDICINE

## 2022-09-29 PROCEDURE — 99213 PR OFFICE/OUTPT VISIT, EST, LEVL III, 20-29 MIN: ICD-10-PCS | Mod: 95,,, | Performed by: FAMILY MEDICINE

## 2022-09-29 PROCEDURE — 3079F PR MOST RECENT DIASTOLIC BLOOD PRESSURE 80-89 MM HG: ICD-10-PCS | Mod: CPTII,95,, | Performed by: FAMILY MEDICINE

## 2022-09-29 PROCEDURE — 3077F PR MOST RECENT SYSTOLIC BLOOD PRESSURE >= 140 MM HG: ICD-10-PCS | Mod: CPTII,95,, | Performed by: FAMILY MEDICINE

## 2022-09-29 PROCEDURE — 99213 OFFICE O/P EST LOW 20 MIN: CPT | Mod: 95,,, | Performed by: FAMILY MEDICINE

## 2022-09-29 PROCEDURE — 3079F DIAST BP 80-89 MM HG: CPT | Mod: CPTII,95,, | Performed by: FAMILY MEDICINE

## 2022-09-29 RX ORDER — PROMETHAZINE HYDROCHLORIDE AND CODEINE PHOSPHATE 6.25; 1 MG/5ML; MG/5ML
5 SOLUTION ORAL EVERY 4 HOURS PRN
Qty: 118 ML | Refills: 0 | Status: SHIPPED | OUTPATIENT
Start: 2022-09-29 | End: 2022-10-09

## 2022-09-29 RX ORDER — METHYLPREDNISOLONE 4 MG/1
TABLET ORAL
Qty: 21 TABLET | Refills: 0 | Status: SHIPPED | OUTPATIENT
Start: 2022-09-29 | End: 2022-10-20

## 2022-09-29 NOTE — LETTER
September 29, 2022      [facility]  [id number] [place] [id number]  Phone: [phone]  Fax: [phone]       Patient: Karla Arzola   YOB: 1960  Date of Visit: 09/29/2022      To Whom It May Concern:      Huma Arzola  was at [facility] on 09/29/2022. The patient may return to work/school on 10/3/2022 with no restrictions. If you have any questions or concerns, or if I can be of further assistance, please do not hesitate to contact me.      Sincerely,      Robina Momin MD

## 2022-09-29 NOTE — PROGRESS NOTES
The patient location is: [place] ([place])  The chief complaint leading to consultation is: cough and congestion    Visit type: audiovisual    Face to Face time with patient: 9 minutes  9 minutes of total time spent on the encounter, which includes face to face time and non-face to face time preparing to see the patient (eg, review of tests), Obtaining and/or reviewing separately obtained history, Documenting clinical information in the electronic or other health record, Independently interpreting results (not separately reported) and communicating results to the patient/family/caregiver, or Care coordination (not separately reported).         Each patient to whom he or she provides medical services by telemedicine is:  (1) informed of the relationship between the physician and patient and the respective role of any other health care provider with respect to management of the patient; and (2) notified that he or she may decline to receive medical services by telemedicine and may withdraw from such care at any time.    Subjective:       Patient ID: Karla Arzola is a 62 y.o. female.    Chief Complaint: follow up   Cough  Associated symptoms include headaches.  Ms. Arzola presents via telemedicine for cold symptoms    Monday woke up with sore throat  Coughing yellow mucus   Headaches     Took over the counter tylenol cold and sinus and nyquil at night     No fever   Worked all week  Off today     Home test for COVID Tuesday and this morning both negative     Sinus pressure and pain behind the eyes     Review of Systems   Constitutional:  Positive for fatigue.   HENT:  Positive for congestion.    Respiratory: Negative.     Genitourinary: Negative.    Neurological:  Positive for headaches.         Past Medical History:   Diagnosis Date    Allergy     Anxiety     Hypertension     Obesity     Vitamin B 12 deficiency      Past Surgical History:   Procedure Laterality Date    MOUTH SURGERY      right knee scope       TUBAL LIGATION       Family History   Problem Relation Age of Onset    Diabetes Mother     Hypertension Mother     Stroke Mother     Hypertension Father     Hyperlipidemia Father     Kidney disease Father            Objective:        Physical Exam  Vitals reviewed.   Constitutional:       Appearance: She is ill-appearing.   HENT:      Head: Atraumatic.      Nose: Congestion present.   Pulmonary:      Effort: Pulmonary effort is normal.   Neurological:      Mental Status: She is alert and oriented to person, place, and time.             Assessment/Plan:     Cough  -     promethazine-codeine 6.25-10 mg/5 ml (PHENERGAN WITH CODEINE) 6.25-10 mg/5 mL syrup; Take 5 mLs by mouth every 4 (four) hours as needed for Cough.  Dispense: 118 mL; Refill: 0    Sinus congestion  -     methylPREDNISolone (MEDROL DOSEPACK) 4 mg tablet; use as directed  Dispense: 21 tablet; Refill: 0        Follow up as needed     Robina Momin MD  [facility]   Family Medicine

## 2022-10-04 ENCOUNTER — TELEPHONE (OUTPATIENT)
Dept: PULMONOLOGY | Facility: CLINIC | Age: 62
End: 2022-10-04
Payer: COMMERCIAL

## 2022-10-04 ENCOUNTER — PATIENT MESSAGE (OUTPATIENT)
Dept: PULMONOLOGY | Facility: CLINIC | Age: 62
End: 2022-10-04

## 2022-10-07 ENCOUNTER — LAB VISIT (OUTPATIENT)
Dept: LAB | Facility: HOSPITAL | Age: 62
End: 2022-10-07
Attending: PHYSICIAN ASSISTANT
Payer: COMMERCIAL

## 2022-10-07 ENCOUNTER — TELEPHONE (OUTPATIENT)
Dept: RHEUMATOLOGY | Facility: CLINIC | Age: 62
End: 2022-10-07
Payer: COMMERCIAL

## 2022-10-07 DIAGNOSIS — R76.8 POSITIVE ANA (ANTINUCLEAR ANTIBODY): ICD-10-CM

## 2022-10-07 DIAGNOSIS — M25.50 POLYARTHRALGIA: ICD-10-CM

## 2022-10-07 DIAGNOSIS — H20.9 UVEITIS: ICD-10-CM

## 2022-10-07 DIAGNOSIS — M25.50 POLYARTHRALGIA: Primary | ICD-10-CM

## 2022-10-07 LAB
ALBUMIN SERPL BCP-MCNC: 3.6 G/DL (ref 3.5–5.2)
ALP SERPL-CCNC: 104 U/L (ref 55–135)
ALT SERPL W/O P-5'-P-CCNC: 15 U/L (ref 10–44)
ANION GAP SERPL CALC-SCNC: 12 MMOL/L (ref 8–16)
AST SERPL-CCNC: 15 U/L (ref 10–40)
BASOPHILS # BLD AUTO: 0.02 K/UL (ref 0–0.2)
BASOPHILS NFR BLD: 0.2 % (ref 0–1.9)
BILIRUB SERPL-MCNC: 0.7 MG/DL (ref 0.1–1)
BUN SERPL-MCNC: 20 MG/DL (ref 6–20)
CALCIUM SERPL-MCNC: 9.2 MG/DL (ref 8.7–10.5)
CHLORIDE SERPL-SCNC: 101 MMOL/L (ref 95–110)
CO2 SERPL-SCNC: 27 MMOL/L (ref 23–29)
CREAT SERPL-MCNC: 1.1 MG/DL (ref 0.5–1.4)
CRP SERPL-MCNC: 34.3 MG/L (ref 0–8.2)
DIFFERENTIAL METHOD: ABNORMAL
EOSINOPHIL # BLD AUTO: 0.2 K/UL (ref 0–0.5)
EOSINOPHIL NFR BLD: 1.8 % (ref 0–8)
ERYTHROCYTE [DISTWIDTH] IN BLOOD BY AUTOMATED COUNT: 14 % (ref 11.5–14.5)
ERYTHROCYTE [SEDIMENTATION RATE] IN BLOOD BY PHOTOMETRIC METHOD: 13 MM/HR (ref 0–36)
EST. GFR  (NO RACE VARIABLE): 58 ML/MIN/1.73 M^2
GLUCOSE SERPL-MCNC: 97 MG/DL (ref 70–110)
HCT VFR BLD AUTO: 45.6 % (ref 37–48.5)
HGB BLD-MCNC: 14.5 G/DL (ref 12–16)
IMM GRANULOCYTES # BLD AUTO: 0.02 K/UL (ref 0–0.04)
IMM GRANULOCYTES NFR BLD AUTO: 0.2 % (ref 0–0.5)
LYMPHOCYTES # BLD AUTO: 3.1 K/UL (ref 1–4.8)
LYMPHOCYTES NFR BLD: 32.2 % (ref 18–48)
MCH RBC QN AUTO: 29.9 PG (ref 27–31)
MCHC RBC AUTO-ENTMCNC: 31.8 G/DL (ref 32–36)
MCV RBC AUTO: 94 FL (ref 82–98)
MONOCYTES # BLD AUTO: 0.6 K/UL (ref 0.3–1)
MONOCYTES NFR BLD: 6.3 % (ref 4–15)
NEUTROPHILS # BLD AUTO: 5.6 K/UL (ref 1.8–7.7)
NEUTROPHILS NFR BLD: 59.3 % (ref 38–73)
NRBC BLD-RTO: 0 /100 WBC
PLATELET # BLD AUTO: 265 K/UL (ref 150–450)
PMV BLD AUTO: 10.1 FL (ref 9.2–12.9)
POTASSIUM SERPL-SCNC: 3.8 MMOL/L (ref 3.5–5.1)
PROT SERPL-MCNC: 6.8 G/DL (ref 6–8.4)
RBC # BLD AUTO: 4.85 M/UL (ref 4–5.4)
SODIUM SERPL-SCNC: 140 MMOL/L (ref 136–145)
WBC # BLD AUTO: 9.48 K/UL (ref 3.9–12.7)

## 2022-10-07 PROCEDURE — 80053 COMPREHEN METABOLIC PANEL: CPT | Performed by: PHYSICIAN ASSISTANT

## 2022-10-07 PROCEDURE — 86039 ANTINUCLEAR ANTIBODIES (ANA): CPT | Performed by: PHYSICIAN ASSISTANT

## 2022-10-07 PROCEDURE — 86140 C-REACTIVE PROTEIN: CPT | Performed by: PHYSICIAN ASSISTANT

## 2022-10-07 PROCEDURE — 85025 COMPLETE CBC W/AUTO DIFF WBC: CPT | Performed by: PHYSICIAN ASSISTANT

## 2022-10-07 PROCEDURE — 36415 COLL VENOUS BLD VENIPUNCTURE: CPT | Performed by: PHYSICIAN ASSISTANT

## 2022-10-07 PROCEDURE — 86431 RHEUMATOID FACTOR QUANT: CPT | Performed by: PHYSICIAN ASSISTANT

## 2022-10-07 PROCEDURE — 86200 CCP ANTIBODY: CPT | Performed by: PHYSICIAN ASSISTANT

## 2022-10-07 PROCEDURE — 86038 ANTINUCLEAR ANTIBODIES: CPT | Performed by: PHYSICIAN ASSISTANT

## 2022-10-07 PROCEDURE — 85652 RBC SED RATE AUTOMATED: CPT | Performed by: PHYSICIAN ASSISTANT

## 2022-10-07 PROCEDURE — 86235 NUCLEAR ANTIGEN ANTIBODY: CPT | Mod: 59 | Performed by: PHYSICIAN ASSISTANT

## 2022-10-07 NOTE — TELEPHONE ENCOUNTER
"Patient came into the clinic stating that she is having increased pain in bilateral hands, bilateral knees, and her right shoulder. Pain level is a 6/10. She has tried Ibuprofen 400mg twice daily with little to no relief. She is scheduled for appointment with Mrs. Vazquez "Annie Russo PA-C on 10/18/2022. She would like to know if Mrs. Vazquez "Annie Russo PA-C would like to get updated lab work on her now since she is having pain or what she needs to do? Advised patient that I will send this message to Mrs. Vazquez"Annie Russo PA-C for further instruction and give her a call back once I hear something. Patient verbalized understanding. All questions answered.     Aleida Cabrera (Allye), [facility]  Rheumatology Department   "

## 2022-10-08 LAB
CCP AB SER IA-ACNC: <0.5 U/ML
RHEUMATOID FACT SERPL-ACNC: <13 IU/ML (ref 0–15)

## 2022-10-10 ENCOUNTER — PATIENT MESSAGE (OUTPATIENT)
Dept: RHEUMATOLOGY | Facility: CLINIC | Age: 62
End: 2022-10-10
Payer: COMMERCIAL

## 2022-10-10 DIAGNOSIS — M25.50 POLYARTHRALGIA: Primary | ICD-10-CM

## 2022-10-10 DIAGNOSIS — R76.8 POSITIVE ANA (ANTINUCLEAR ANTIBODY): ICD-10-CM

## 2022-10-10 LAB
ANA PATTERN 1: NORMAL
ANA PATTERN 2: NORMAL
ANA SER QL IF: POSITIVE
ANA TITER 2: NORMAL
ANA TITR SER IF: NORMAL {TITER}

## 2022-10-10 RX ORDER — PREDNISONE 5 MG/1
TABLET ORAL
Qty: 40 TABLET | Refills: 0 | Status: SHIPPED | OUTPATIENT
Start: 2022-10-10 | End: 2022-10-28

## 2022-10-12 LAB
ANTI SM ANTIBODY: 0.07 RATIO (ref 0–0.99)
ANTI SM/RNP ANTIBODY: 0.05 RATIO (ref 0–0.99)
ANTI-SM INTERPRETATION: NEGATIVE
ANTI-SM/RNP INTERPRETATION: NEGATIVE
ANTI-SSA ANTIBODY: 0.05 RATIO (ref 0–0.99)
ANTI-SSA INTERPRETATION: NEGATIVE
ANTI-SSB ANTIBODY: 0.05 RATIO (ref 0–0.99)
ANTI-SSB INTERPRETATION: NEGATIVE
DSDNA AB SER-ACNC: NORMAL [IU]/ML

## 2022-10-19 ENCOUNTER — PATIENT MESSAGE (OUTPATIENT)
Dept: RHEUMATOLOGY | Facility: CLINIC | Age: 62
End: 2022-10-19
Payer: COMMERCIAL

## 2022-10-21 ENCOUNTER — HOSPITAL ENCOUNTER (OUTPATIENT)
Dept: RADIOLOGY | Facility: HOSPITAL | Age: 62
Discharge: HOME | End: 2022-10-21
Attending: FAMILY MEDICINE
Payer: COMMERCIAL

## 2022-10-21 DIAGNOSIS — R92.8 ABNORMAL FINDING ON BREAST IMAGING: ICD-10-CM

## 2022-10-21 PROCEDURE — 77062 BREAST TOMOSYNTHESIS BI: CPT | Mod: TC

## 2022-10-21 PROCEDURE — 77062 MAMMO DIGITAL DIAGNOSTIC BILAT WITH TOMO: ICD-10-PCS | Mod: 26,,, | Performed by: RADIOLOGY

## 2022-10-21 PROCEDURE — 77066 DX MAMMO INCL CAD BI: CPT | Mod: 26,,, | Performed by: RADIOLOGY

## 2022-10-21 PROCEDURE — 77062 BREAST TOMOSYNTHESIS BI: CPT | Mod: 26,,, | Performed by: RADIOLOGY

## 2022-10-21 PROCEDURE — 77066 MAMMO DIGITAL DIAGNOSTIC BILAT WITH TOMO: ICD-10-PCS | Mod: 26,,, | Performed by: RADIOLOGY

## 2022-10-24 ENCOUNTER — TELEPHONE (OUTPATIENT)
Dept: PULMONOLOGY | Facility: CLINIC | Age: 62
End: 2022-10-24
Payer: COMMERCIAL

## 2022-10-27 NOTE — TELEPHONE ENCOUNTER
No new care gaps identified.  [facility] Embedded Care Gaps. Reference number: [id number]. 10/27/2022   12:24:12 PM CDT

## 2022-10-27 NOTE — TELEPHONE ENCOUNTER
Refill Routing Note   Medication(s) are not appropriate for processing by [facility] for the following reason(s):      - Required vitals are abnormal    [facility] action(s):  Defer       Medication Therapy Plan: Last BP 09/29/22 (!) 140/80  Medication reconciliation completed: No     Appointments  past 12m or future 3m with PCP    Date Provider   Last Visit   9/29/2022 Robina Momin MD   Next Visit   Visit date not found Robina Momin MD   ED visits in past 90 days: 0        Note composed:2:30 PM 10/27/2022

## 2022-10-28 RX ORDER — FUROSEMIDE 20 MG/1
20 TABLET ORAL DAILY PRN
Qty: 60 TABLET | Refills: 1 | Status: SHIPPED | OUTPATIENT
Start: 2022-10-28 | End: 2023-06-22 | Stop reason: SDUPTHER

## 2022-11-02 ENCOUNTER — PATIENT MESSAGE (OUTPATIENT)
Dept: ORTHOPEDICS | Facility: CLINIC | Age: 62
End: 2022-11-02
Payer: COMMERCIAL

## 2022-11-06 DIAGNOSIS — M79.672 LEFT FOOT PAIN: ICD-10-CM

## 2022-11-06 DIAGNOSIS — M25.562 ACUTE PAIN OF LEFT KNEE: ICD-10-CM

## 2022-11-07 RX ORDER — MELOXICAM 7.5 MG/1
7.5 TABLET ORAL DAILY
Qty: 90 TABLET | Refills: 0 | Status: SHIPPED | OUTPATIENT
Start: 2022-11-07 | End: 2023-01-10 | Stop reason: ALTCHOICE

## 2022-11-07 NOTE — TELEPHONE ENCOUNTER
Refill Routing Note   Medication(s) are not appropriate for processing by [facility] for the following reason(s):      - Outside of protocol    [facility] action(s):  Route          Medication reconciliation completed: No     Appointments  past 12m or future 3m with PCP    Date Provider   Last Visit   9/29/2022 Robina Momin MD   Next Visit   Visit date not found Robina Momin MD   ED visits in past 90 days: 0        Note composed:9:55 AM 11/07/2022

## 2022-11-08 ENCOUNTER — PATIENT OUTREACH (OUTPATIENT)
Dept: ADMINISTRATIVE | Facility: HOSPITAL | Age: 62
End: 2022-11-08
Payer: COMMERCIAL

## 2022-11-16 ENCOUNTER — OFFICE VISIT (OUTPATIENT)
Dept: DERMATOLOGY | Facility: CLINIC | Age: 62
End: 2022-11-16
Payer: COMMERCIAL

## 2022-11-16 DIAGNOSIS — L71.0 PERIORAL DERMATITIS: ICD-10-CM

## 2022-11-16 PROCEDURE — 1159F PR MEDICATION LIST DOCUMENTED IN MEDICAL RECORD: ICD-10-PCS | Mod: CPTII,95,, | Performed by: STUDENT IN AN ORGANIZED HEALTH CARE EDUCATION/TRAINING PROGRAM

## 2022-11-16 PROCEDURE — 1160F PR REVIEW ALL MEDS BY PRESCRIBER/CLIN PHARMACIST DOCUMENTED: ICD-10-PCS | Mod: CPTII,95,, | Performed by: STUDENT IN AN ORGANIZED HEALTH CARE EDUCATION/TRAINING PROGRAM

## 2022-11-16 PROCEDURE — 99213 OFFICE O/P EST LOW 20 MIN: CPT | Mod: 95,,, | Performed by: STUDENT IN AN ORGANIZED HEALTH CARE EDUCATION/TRAINING PROGRAM

## 2022-11-16 PROCEDURE — 99213 PR OFFICE/OUTPT VISIT, EST, LEVL III, 20-29 MIN: ICD-10-PCS | Mod: 95,,, | Performed by: STUDENT IN AN ORGANIZED HEALTH CARE EDUCATION/TRAINING PROGRAM

## 2022-11-16 PROCEDURE — 1160F RVW MEDS BY RX/DR IN RCRD: CPT | Mod: CPTII,95,, | Performed by: STUDENT IN AN ORGANIZED HEALTH CARE EDUCATION/TRAINING PROGRAM

## 2022-11-16 PROCEDURE — 1159F MED LIST DOCD IN RCRD: CPT | Mod: CPTII,95,, | Performed by: STUDENT IN AN ORGANIZED HEALTH CARE EDUCATION/TRAINING PROGRAM

## 2022-11-16 RX ORDER — DOXYCYCLINE 100 MG/1
TABLET ORAL
Qty: 30 TABLET | Refills: 2 | Status: SHIPPED | OUTPATIENT
Start: 2022-11-16 | End: 2023-03-02

## 2022-11-16 NOTE — PROGRESS NOTES
Patient Information  Name: Karla rAzola  : 1960  MRN: [id number]     Referring Physician:  Dr. Resendiz ref. provider found   Primary Care Physician:  Dr. Robina Momin MD   Date of Visit: 2022      Subjective:       Karla Arzola is a 62 y.o. female who presents for rash    HPI  The patient location is: [place], [place]  The chief complaint leading to consultation is: rash    Visit type: audiovisual    Face to Face time with patient: 8 min  10 minutes of total time spent on the encounter, which includes face to face time and non-face to face time preparing to see the patient (eg, review of tests), Obtaining and/or reviewing separately obtained history, Documenting clinical information in the electronic or other health record, Independently interpreting results (not separately reported) and communicating results to the patient/family/caregiver, or Care coordination (not separately reported).         Each patient to whom he or she provides medical services by telemedicine is:  (1) informed of the relationship between the physician and patient and the respective role of any other health care provider with respect to management of the patient; and (2) notified that he or she may decline to receive medical services by telemedicine and may withdraw from such care at any time.    Notes:   Patient with new complaint of lesion(s)  Location: around mouth and nose  Duration: 1 week  Symptoms: red patches  Relieving factors/Previous treatments: none    Patient came back from cruise last week    Patient was last seen:Visit date not found     Prior notes by myself reviewed.   Clinical documentation obtained by nursing staff reviewed.    Review of Systems   Skin:  Positive for itching and rash.      Objective:    Physical Exam   Constitutional: She appears well-developed and well-nourished. No distress.   Neurological: She is alert and oriented to person, place, and time. She is not disoriented.    Psychiatric: She has a normal mood and affect.   Skin:   Areas Examined (abnormalities noted in diagram):   Head / Face Inspection Performed            Diagram Legend     Erythematous scaling macule/papule c/w actinic keratosis       Vascular papule c/w angioma      Pigmented verrucoid papule/plaque c/w seborrheic keratosis      Yellow umbilicated papule c/w sebaceous hyperplasia      Irregularly shaped tan macule c/w lentigo     1-2 mm smooth white papules consistent with Milia      Movable subcutaneous cyst with punctum c/w epidermal inclusion cyst      Subcutaneous movable cyst c/w pilar cyst      Firm pink to brown papule c/w dermatofibroma      Pedunculated fleshy papule(s) c/w skin tag(s)      Evenly pigmented macule c/w junctional nevus     Mildly variegated pigmented, slightly irregular-bordered macule c/w mildly atypical nevus      Flesh colored to evenly pigmented papule c/w intradermal nevus       Pink pearly papule/plaque c/w basal cell carcinoma      Erythematous hyperkeratotic cursted plaque c/w SCC      Surgical scar with no sign of skin cancer recurrence      Open and closed comedones      Inflammatory papules and pustules      Verrucoid papule consistent consistent with wart     Erythematous eczematous patches and plaques     Dystrophic onycholytic nail with subungual debris c/w onychomycosis     Umbilicated papule    Erythematous-base heme-crusted tan verrucoid plaque consistent with inflamed seborrheic keratosis     Erythematous Silvery Scaling Plaque c/w Psoriasis     See annotation          [] Data reviewed  [] Independent review of test  [] Management discussed with another provider    Assessment / Plan:        Perioral dermatitis  -     doxycycline monohydrate 100 mg Tab; Take 1 tablet by mouth every day with food and not within 1 hour prior to lying down  Dispense: 30 tablet; Refill: 2  - Recommend resuming azelaic acid  Discussed benefits and risks of doxycyline therapy including but not  limited to GI discomfort, esophageal irritation/ulceration, and increased sun sensitivity. Patient was counseled to take medicine with meals and at least 1 hour before lying down.              LOS NUMBER AND COMPLEXITY OF PROBLEMS    COMPLEXITY OF DATA RISK TOTAL TIME (m)   96162  18701 [] 1 self-limited or minor problem [x] Minimal to none [] No treatment recommended or patient to monitor 15-29  10-19   84097  19325 Low  [] 2 or > self limited or minor problems  [] 1 stable chronic illness  [x] 1 acute, uncomplicated illness or injury Limited (2)  [] Prior external notes from each unique source  [] Review result of each unique test  [] Order each unique test []  Low  OTC medications, minor skin biopsy 30-44  20-29   75103  53761 Moderate  []  1 or > chronic illness with progression, exacerbation or SE of treatment  []  2 or more stable chronic illnesses  []  1 acute illness with systemic symptoms  []  1 acute complicated injury  []  1 undiagnosed new problem with uncertain prognosis Moderate (1/3 below)  []  3 or more data items        *Now includes assessment requiring independent historian  []  Independent interpretation of a test  []  Discuss management/test with another provider Moderate  [x]  Prescription drug mgmt  []  Minor surgery with risk discussed  []  Mgmt limited by social determinates 45-59  30-39   67204  35083 High  []  1 or more chronic illness with severe exacerbation, progression or SE of treatment  []  1 acute or chronic illness/injury that poses a threat to life or bodily function Extensive (2/3 below)  []  3 or more data items        *Now includes assessment requiring independent historian.  []  Independent interpretation of a test  []  Discuss management/test with another provider High  []  Major surgery with risk discussed  []  Drug therapy requiring intensive monitoring for toxicity  []  Hospitalization  []  Decision for DNR 60-74  40-54      No follow-ups on file.    Elayne Garcia MD,  [facility]

## 2022-11-29 ENCOUNTER — OFFICE VISIT (OUTPATIENT)
Dept: ORTHOPEDICS | Facility: CLINIC | Age: 62
End: 2022-11-29
Payer: COMMERCIAL

## 2022-11-29 ENCOUNTER — PATIENT MESSAGE (OUTPATIENT)
Dept: ORTHOPEDICS | Facility: CLINIC | Age: 62
End: 2022-11-29

## 2022-11-29 VITALS — WEIGHT: 226 LBS | BODY MASS INDEX: 36.32 KG/M2 | HEIGHT: 66 IN

## 2022-11-29 DIAGNOSIS — S83.242A TEAR OF MEDIAL MENISCUS OF LEFT KNEE, CURRENT, UNSPECIFIED TEAR TYPE, INITIAL ENCOUNTER: Primary | ICD-10-CM

## 2022-11-29 PROCEDURE — 1159F PR MEDICATION LIST DOCUMENTED IN MEDICAL RECORD: ICD-10-PCS | Mod: CPTII,S$GLB,, | Performed by: STUDENT IN AN ORGANIZED HEALTH CARE EDUCATION/TRAINING PROGRAM

## 2022-11-29 PROCEDURE — 1159F MED LIST DOCD IN RCRD: CPT | Mod: CPTII,S$GLB,, | Performed by: STUDENT IN AN ORGANIZED HEALTH CARE EDUCATION/TRAINING PROGRAM

## 2022-11-29 PROCEDURE — 3008F BODY MASS INDEX DOCD: CPT | Mod: CPTII,S$GLB,, | Performed by: STUDENT IN AN ORGANIZED HEALTH CARE EDUCATION/TRAINING PROGRAM

## 2022-11-29 PROCEDURE — 1160F RVW MEDS BY RX/DR IN RCRD: CPT | Mod: CPTII,S$GLB,, | Performed by: STUDENT IN AN ORGANIZED HEALTH CARE EDUCATION/TRAINING PROGRAM

## 2022-11-29 PROCEDURE — 3008F PR BODY MASS INDEX (BMI) DOCUMENTED: ICD-10-PCS | Mod: CPTII,S$GLB,, | Performed by: STUDENT IN AN ORGANIZED HEALTH CARE EDUCATION/TRAINING PROGRAM

## 2022-11-29 PROCEDURE — 99214 PR OFFICE/OUTPT VISIT, EST, LEVL IV, 30-39 MIN: ICD-10-PCS | Mod: S$GLB,,, | Performed by: STUDENT IN AN ORGANIZED HEALTH CARE EDUCATION/TRAINING PROGRAM

## 2022-11-29 PROCEDURE — 1160F PR REVIEW ALL MEDS BY PRESCRIBER/CLIN PHARMACIST DOCUMENTED: ICD-10-PCS | Mod: CPTII,S$GLB,, | Performed by: STUDENT IN AN ORGANIZED HEALTH CARE EDUCATION/TRAINING PROGRAM

## 2022-11-29 PROCEDURE — 99214 OFFICE O/P EST MOD 30 MIN: CPT | Mod: S$GLB,,, | Performed by: STUDENT IN AN ORGANIZED HEALTH CARE EDUCATION/TRAINING PROGRAM

## 2022-11-29 PROCEDURE — 99999 PR PBB SHADOW E&M-EST. PATIENT-LVL IV: ICD-10-PCS | Mod: PBBFAC,,, | Performed by: STUDENT IN AN ORGANIZED HEALTH CARE EDUCATION/TRAINING PROGRAM

## 2022-11-29 PROCEDURE — 99999 PR PBB SHADOW E&M-EST. PATIENT-LVL IV: CPT | Mod: PBBFAC,,, | Performed by: STUDENT IN AN ORGANIZED HEALTH CARE EDUCATION/TRAINING PROGRAM

## 2022-11-29 NOTE — PROGRESS NOTES
Orthopaedic Follow-Up Visit    Last Appointment: 8/12/22  Diagnosis: Left knee patellofemoral pain.   Prior Procedure: L knee CSI    Karla Arzola is a 62 y.o. female who is here for f/u evaluation of her left knee. The patient was last seen here by me on 8/12/22 at which point we decided to proceed with left knee corticosteroid injection prior to considering further treatment options. The patient returns today reporting that the symptoms initially improved after CSI but have worsened recently and is interested in proceeding with further treatment options.     To review her history, Karla Arzola is a 62 y.o. year old female patient who presented with pain and dysfunction involving the left knee that began approximately 4-5 months ago with no specific mechanism of injury just a gradual worsening of symptoms. Her symptoms included intermittent sharp pain localized anterolaterally to the knee, intermittent swelling, and difficulty with straightening the knee. Her pain was aggravated by walking long distances, sitting for long periods of time, and going from sitting to standing. She has tried rest, acitivity modification, bracing, and NSAIDs with no improvement of her pain.     Patient's medications, allergies, past medical, surgical, social and family histories were reviewed and updated as appropriate.    Review of Systems   All systems reviewed were negative.  Specifically, the patient denies fever, chills, weight loss, chest pain, shortness of breath, or dyspnea on exertion.      Past Medical History:   Diagnosis Date    Allergy     Anxiety     Hypertension     Obesity     Vitamin B 12 deficiency        Objective:      Physical Exam  Patient is alert and oriented, no distress. Skin is intact. Neuro is normal with no focal motor or sensory findings.    Standing exam  stance: normal alignment, no significant leg-length discrepancy  gait: antalgic      Knee      RIGHT  LEFT  Skin:     Intact    Intact  ROM:     0-130  5-110  Effusion:    Neg   + +  Medial joint line tenderness:  Neg   +  Lateral joint line tenderness:  Neg   Neg  Paulino:     Neg   +  Patella crepitus:   Neg   Neg  Patella tenderness:   Neg   +  Patella grind:      Neg   Neg  Lachman:    Neg   Neg  Valgus stress:    Neg   Neg  Varus stress:    Neg   Neg  Posterior drawer:   Neg   Neg  N-V               intact  intact  Hip:    nml    nml   Lower extremity edema: Negative negative    Neurovascular exam  - motor function grossly intact bilaterally to hip flexion, knee extension and flexion, ankle dorsiflexion and plantarflexion  - sensation intact to light touch bilaterally to femoral, tibial, tibial and peroneal distributions  - symmetrical pedal pulses    Imaging:   XR Results:  Results for orders placed during the hospital encounter of 08/12/22    X-ray Knee Ortho Left with Flexion    Narrative  EXAMINATION:  XR KNEE ORTHO LEFT WITH FLEXION    CLINICAL HISTORY:  Pain in left knee    TECHNIQUE:  AP standing views of both knees, AP flexion views of both knees, lateral view of the left knee and Merchant views of both knees    COMPARISON:  10/02/2020    FINDINGS:  The joint spaces of all 3 compartments of the left knee appear to be relatively well maintained.  Mild-to-moderate joint space narrowing seen involving the medial compartment of the right knee with minimal marginal osteophyte formation noted.  No left-sided joint effusion.  No acute fracture or dislocation.    Impression  1.  As above      Electronically signed by: Nba Doe DO  Date:    08/12/2022  Time:    14:43      MRI Results:  No results found for this or any previous visit.    CT Results:  No results found for this or any previous visit.      Physician Read: I agree with the above impression.    Assessment/Plan:   Assessment:  Karla Arzola is a 62 y.o. female with left knee pain and swelling, suspected medial meniscus tear     Plan:    Discussed diagnosis and  treatment options with the patient today. Her history and physical exam are most consistent with left knee medial meniscus tear.  She has significant swelling in the knee now and has had increase in pain over last few weeks.  Pain is localized over the medial part of the knee.  Exam is consistent with root tear or large radial tear.  She has tried rest, activity modification, oral anti-inflammatories, and corticosteroid injection.  She continues to be symptomatic despite these interventions.    I recommend proceeding with MRI of the left knee. Also recommend continuing with anti-inflammatories as well as utilizing ice and compression to aid in her swelling. Patient is in agreement with this plan.   Follow-up after MRI.         Larry Adams MD    I, Roberth Couch, acted as a scribe for Larry Adams MD for the duration of this office visit.

## 2022-12-02 ENCOUNTER — OFFICE VISIT (OUTPATIENT)
Dept: INTERNAL MEDICINE | Facility: CLINIC | Age: 62
End: 2022-12-02
Payer: COMMERCIAL

## 2022-12-02 DIAGNOSIS — R05.9 COUGH, UNSPECIFIED TYPE: Primary | ICD-10-CM

## 2022-12-02 PROCEDURE — 99213 OFFICE O/P EST LOW 20 MIN: CPT | Mod: 95,,, | Performed by: FAMILY MEDICINE

## 2022-12-02 PROCEDURE — 99213 PR OFFICE/OUTPT VISIT, EST, LEVL III, 20-29 MIN: ICD-10-PCS | Mod: 95,,, | Performed by: FAMILY MEDICINE

## 2022-12-02 RX ORDER — PROMETHAZINE HYDROCHLORIDE AND CODEINE PHOSPHATE 6.25; 1 MG/5ML; MG/5ML
5 SOLUTION ORAL EVERY 6 HOURS PRN
Qty: 118 ML | Refills: 0 | Status: SHIPPED | OUTPATIENT
Start: 2022-12-02 | End: 2022-12-25

## 2022-12-02 RX ORDER — METHYLPREDNISOLONE 4 MG/1
TABLET ORAL
Qty: 21 TABLET | Refills: 0 | Status: SHIPPED | OUTPATIENT
Start: 2022-12-02 | End: 2022-12-25

## 2022-12-02 NOTE — PROGRESS NOTES
The patient location is:   The chief complaint leading to consultation is: congestion    Visit type: audiovisual    Face to Face time with patient: 8 minutes  8 minutes of total time spent on the encounter, which includes face to face time and non-face to face time preparing to see the patient (eg, review of tests), Obtaining and/or reviewing separately obtained history, Documenting clinical information in the electronic or other health record, Independently interpreting results (not separately reported) and communicating results to the patient/family/caregiver, or Care coordination (not separately reported).         Each patient to whom he or she provides medical services by telemedicine is:  (1) informed of the relationship between the physician and patient and the respective role of any other health care provider with respect to management of the patient; and (2) notified that he or she may decline to receive medical services by telemedicine and may withdraw from such care at any time.    Notes:   Karla Nicholas Arzola  62 y.o. Black or  female    Here for  scratchy throat, congestion and headache since last Wednesday    Scratchy throat resolved   Headache better   Still coughing although it is getting better as well   Productive   Yellow mucus   No measured fever  Night sweats   Chills     Pressure in the sinus region    Answers submitted by the patient for this visit:  Cough Questionnaire (Submitted on 12/2/2022)  Chief Complaint: Cough  Chronicity: chronic  Onset: in the past 7 days  Progression since onset: waxing and waning  Frequency: hourly  Cough characteristics: productive of purulent sputum  chest pain: No  chills: Yes  ear congestion: Yes  ear pain: No  headaches: Yes  heartburn: Yes  hemoptysis: No  myalgias: Yes  nasal congestion: Yes  postnasal drip: Yes  rash: No  rhinorrhea: Yes  shortness of breath: No  sore throat: No  sweats: Yes  weight loss: No  wheezing: No  Aggravated by:  nothing  asthma: No  bronchiectasis: No  bronchitis: Yes  COPD: No  emphysema: No  environmental allergies: Yes  pneumonia: No    Reports taking medications as instructed.  Not taking any OTC meds.    Past Medical History:   Diagnosis Date    Allergy     Anxiety     Hypertension     Obesity     Vitamin B 12 deficiency          Current Outpatient Medications:     acyclovir (ZOVIRAX) 200 MG capsule, Take 1 capsule (200 mg total) by mouth once daily., Disp: 90 capsule, Rfl: 1    albuterol (VENTOLIN HFA) 90 mcg/actuation inhaler, Inhale 2 puffs into the lungs every 6 (six) hours as needed for Wheezing. Rescue, Disp: 18 g, Rfl: 0    ALPRAZolam (XANAX) 0.25 MG tablet, Take 1 tablet (0.25 mg total) by mouth 3 (three) times daily as needed for Insomnia or Anxiety., Disp: 60 tablet, Rfl: 0    amLODIPine (NORVASC) 10 MG tablet, Take 1 tablet (10 mg total) by mouth once daily., Disp: 90 tablet, Rfl: 1    azelaic acid (FINACEA) 15 % Foam, Apply 1 application topically 2 (two) times daily., Disp: 50 g, Rfl: 2    ciclopirox (PENLAC) 8 % Soln, Apply to nails once daily, Disp: 6.6 mL, Rfl: 2    citalopram (CELEXA) 20 MG tablet, Take 1 tablet (20 mg total) by mouth once daily., Disp: 90 tablet, Rfl: 3    cyanocobalamin (VITAMIN B-12) 100 MCG tablet, Take 100 mcg by mouth once daily., Disp: , Rfl:     doxycycline monohydrate 100 mg Tab, Take 1 tablet by mouth every day with food and not within 1 hour prior to lying down, Disp: 30 tablet, Rfl: 2    fluticasone (FLONASE) 50 mcg/actuation nasal spray, 1 spray (50 mcg total) by Each Nare route once daily., Disp: 16 g, Rfl: 0    furosemide (LASIX) 20 MG tablet, Take 1 tablet (20 mg total) by mouth daily as needed (swelling)., Disp: 60 tablet, Rfl: 1    ibuprofen (ADVIL,MOTRIN) 800 MG tablet, Take 1 tablet (800 mg total) by mouth every 6 to 8 hours as needed for pain, Disp: 20 tablet, Rfl: 0    indomethacin (INDOCIN) 50 MG capsule, Take 1 capsule (50 mg total) by mouth 3 (three) times  daily., Disp: 30 capsule, Rfl: 0    ipratropium-albuteroL (COMBIVENT)  mcg/actuation inhaler, Inhale 1 puff into the lungs every 6 (six) hours as needed for Wheezing or Shortness of Breath. Rescue, Disp: 4 g, Rfl: 0    meloxicam (MOBIC) 7.5 MG tablet, Take 1 tablet (7.5 mg total) by mouth once daily., Disp: 90 tablet, Rfl: 0    multivitamin capsule, Take 1 capsule by mouth once daily., Disp: , Rfl:     spironolactone (ALDACTONE) 100 MG tablet, Take 1 tablet (100 mg total) by mouth once daily., Disp: 30 tablet, Rfl: 2    Review of patient's allergies indicates:  No Known Allergies    ROS: Denies dizziness, headache, chest pain, shortness of breath or edema    PE:  GEN: Alert and oriented, no acute distress  LUNGS: Respirations unlabored    ASSESSMENT/PLAN:  Cough, unspecified type  -     promethazine-codeine 6.25-10 mg/5 ml (PHENERGAN WITH CODEINE) 6.25-10 mg/5 mL syrup; Take 5 mLs by mouth every 6 (six) hours as needed for Cough.  Dispense: 118 mL; Refill: 0    Other orders  -     methylPREDNISolone (MEDROL DOSEPACK) 4 mg tablet; use as directed  Dispense: 21 tablet; Refill: 0

## 2022-12-13 ENCOUNTER — HOSPITAL ENCOUNTER (OUTPATIENT)
Dept: RADIOLOGY | Facility: HOSPITAL | Age: 62
Discharge: HOME | End: 2022-12-13
Attending: STUDENT IN AN ORGANIZED HEALTH CARE EDUCATION/TRAINING PROGRAM
Payer: COMMERCIAL

## 2022-12-13 DIAGNOSIS — S83.242A TEAR OF MEDIAL MENISCUS OF LEFT KNEE, CURRENT, UNSPECIFIED TEAR TYPE, INITIAL ENCOUNTER: ICD-10-CM

## 2022-12-13 PROCEDURE — 73721 MRI JNT OF LWR EXTRE W/O DYE: CPT | Mod: 26,LT,, | Performed by: RADIOLOGY

## 2022-12-13 PROCEDURE — 73721 MRI KNEE WITHOUT CONTRAST LEFT: ICD-10-PCS | Mod: 26,LT,, | Performed by: RADIOLOGY

## 2022-12-13 PROCEDURE — 73721 MRI JNT OF LWR EXTRE W/O DYE: CPT | Mod: TC,LT

## 2022-12-14 ENCOUNTER — OFFICE VISIT (OUTPATIENT)
Dept: ORTHOPEDICS | Facility: CLINIC | Age: 62
End: 2022-12-14
Payer: COMMERCIAL

## 2022-12-14 ENCOUNTER — LAB VISIT (OUTPATIENT)
Dept: LAB | Facility: HOSPITAL | Age: 62
End: 2022-12-14
Attending: FAMILY MEDICINE
Payer: COMMERCIAL

## 2022-12-14 VITALS — BODY MASS INDEX: 36.32 KG/M2 | HEIGHT: 66 IN | WEIGHT: 226 LBS

## 2022-12-14 DIAGNOSIS — M17.12 PRIMARY OSTEOARTHRITIS OF LEFT KNEE: ICD-10-CM

## 2022-12-14 DIAGNOSIS — M23.201 OLD COMPLEX TEAR OF LATERAL MENISCUS OF LEFT KNEE: Primary | ICD-10-CM

## 2022-12-14 DIAGNOSIS — M84.469A INSUFFICIENCY FRACTURE OF TIBIA, INITIAL ENCOUNTER: ICD-10-CM

## 2022-12-14 DIAGNOSIS — R76.8 POSITIVE ANA (ANTINUCLEAR ANTIBODY): ICD-10-CM

## 2022-12-14 LAB
ALBUMIN SERPL BCP-MCNC: 3.7 G/DL (ref 3.5–5.2)
ALP SERPL-CCNC: 98 U/L (ref 55–135)
ALT SERPL W/O P-5'-P-CCNC: 13 U/L (ref 10–44)
ANION GAP SERPL CALC-SCNC: 10 MMOL/L (ref 8–16)
AST SERPL-CCNC: 16 U/L (ref 10–40)
BASOPHILS # BLD AUTO: 0.02 K/UL (ref 0–0.2)
BASOPHILS NFR BLD: 0.2 % (ref 0–1.9)
BILIRUB SERPL-MCNC: 0.6 MG/DL (ref 0.1–1)
BUN SERPL-MCNC: 17 MG/DL (ref 6–20)
CALCIUM SERPL-MCNC: 9.2 MG/DL (ref 8.7–10.5)
CHLORIDE SERPL-SCNC: 107 MMOL/L (ref 95–110)
CO2 SERPL-SCNC: 26 MMOL/L (ref 23–29)
CREAT SERPL-MCNC: 1.2 MG/DL (ref 0.5–1.4)
CRP SERPL-MCNC: 11.9 MG/L (ref 0–8.2)
DIFFERENTIAL METHOD: ABNORMAL
EOSINOPHIL # BLD AUTO: 0.2 K/UL (ref 0–0.5)
EOSINOPHIL NFR BLD: 1.8 % (ref 0–8)
ERYTHROCYTE [DISTWIDTH] IN BLOOD BY AUTOMATED COUNT: 13.8 % (ref 11.5–14.5)
ERYTHROCYTE [SEDIMENTATION RATE] IN BLOOD BY PHOTOMETRIC METHOD: 14 MM/HR (ref 0–36)
EST. GFR  (NO RACE VARIABLE): 52 ML/MIN/1.73 M^2
GLUCOSE SERPL-MCNC: 94 MG/DL (ref 70–110)
HCT VFR BLD AUTO: 45.5 % (ref 37–48.5)
HGB BLD-MCNC: 14.5 G/DL (ref 12–16)
IMM GRANULOCYTES # BLD AUTO: 0.02 K/UL (ref 0–0.04)
IMM GRANULOCYTES NFR BLD AUTO: 0.2 % (ref 0–0.5)
LYMPHOCYTES # BLD AUTO: 3.1 K/UL (ref 1–4.8)
LYMPHOCYTES NFR BLD: 34.1 % (ref 18–48)
MCH RBC QN AUTO: 29.7 PG (ref 27–31)
MCHC RBC AUTO-ENTMCNC: 31.9 G/DL (ref 32–36)
MCV RBC AUTO: 93 FL (ref 82–98)
MONOCYTES # BLD AUTO: 0.6 K/UL (ref 0.3–1)
MONOCYTES NFR BLD: 7.1 % (ref 4–15)
NEUTROPHILS # BLD AUTO: 5.1 K/UL (ref 1.8–7.7)
NEUTROPHILS NFR BLD: 56.6 % (ref 38–73)
NRBC BLD-RTO: 0 /100 WBC
PLATELET # BLD AUTO: 276 K/UL (ref 150–450)
PMV BLD AUTO: 10.3 FL (ref 9.2–12.9)
POTASSIUM SERPL-SCNC: 3.9 MMOL/L (ref 3.5–5.1)
PROT SERPL-MCNC: 6.9 G/DL (ref 6–8.4)
RBC # BLD AUTO: 4.88 M/UL (ref 4–5.4)
SODIUM SERPL-SCNC: 143 MMOL/L (ref 136–145)
WBC # BLD AUTO: 9.03 K/UL (ref 3.9–12.7)

## 2022-12-14 PROCEDURE — 99214 OFFICE O/P EST MOD 30 MIN: CPT | Mod: S$GLB,,, | Performed by: STUDENT IN AN ORGANIZED HEALTH CARE EDUCATION/TRAINING PROGRAM

## 2022-12-14 PROCEDURE — 3008F PR BODY MASS INDEX (BMI) DOCUMENTED: ICD-10-PCS | Mod: CPTII,S$GLB,, | Performed by: STUDENT IN AN ORGANIZED HEALTH CARE EDUCATION/TRAINING PROGRAM

## 2022-12-14 PROCEDURE — 99214 PR OFFICE/OUTPT VISIT, EST, LEVL IV, 30-39 MIN: ICD-10-PCS | Mod: S$GLB,,, | Performed by: STUDENT IN AN ORGANIZED HEALTH CARE EDUCATION/TRAINING PROGRAM

## 2022-12-14 PROCEDURE — 1160F RVW MEDS BY RX/DR IN RCRD: CPT | Mod: CPTII,S$GLB,, | Performed by: STUDENT IN AN ORGANIZED HEALTH CARE EDUCATION/TRAINING PROGRAM

## 2022-12-14 PROCEDURE — 1159F MED LIST DOCD IN RCRD: CPT | Mod: CPTII,S$GLB,, | Performed by: STUDENT IN AN ORGANIZED HEALTH CARE EDUCATION/TRAINING PROGRAM

## 2022-12-14 PROCEDURE — 1160F PR REVIEW ALL MEDS BY PRESCRIBER/CLIN PHARMACIST DOCUMENTED: ICD-10-PCS | Mod: CPTII,S$GLB,, | Performed by: STUDENT IN AN ORGANIZED HEALTH CARE EDUCATION/TRAINING PROGRAM

## 2022-12-14 PROCEDURE — 99999 PR PBB SHADOW E&M-EST. PATIENT-LVL III: ICD-10-PCS | Mod: PBBFAC,,, | Performed by: STUDENT IN AN ORGANIZED HEALTH CARE EDUCATION/TRAINING PROGRAM

## 2022-12-14 PROCEDURE — 36415 COLL VENOUS BLD VENIPUNCTURE: CPT | Performed by: PHYSICIAN ASSISTANT

## 2022-12-14 PROCEDURE — 1159F PR MEDICATION LIST DOCUMENTED IN MEDICAL RECORD: ICD-10-PCS | Mod: CPTII,S$GLB,, | Performed by: STUDENT IN AN ORGANIZED HEALTH CARE EDUCATION/TRAINING PROGRAM

## 2022-12-14 PROCEDURE — 99999 PR PBB SHADOW E&M-EST. PATIENT-LVL III: CPT | Mod: PBBFAC,,, | Performed by: STUDENT IN AN ORGANIZED HEALTH CARE EDUCATION/TRAINING PROGRAM

## 2022-12-14 PROCEDURE — 85652 RBC SED RATE AUTOMATED: CPT | Performed by: PHYSICIAN ASSISTANT

## 2022-12-14 PROCEDURE — 3008F BODY MASS INDEX DOCD: CPT | Mod: CPTII,S$GLB,, | Performed by: STUDENT IN AN ORGANIZED HEALTH CARE EDUCATION/TRAINING PROGRAM

## 2022-12-14 PROCEDURE — 80053 COMPREHEN METABOLIC PANEL: CPT | Performed by: PHYSICIAN ASSISTANT

## 2022-12-14 PROCEDURE — 85025 COMPLETE CBC W/AUTO DIFF WBC: CPT | Performed by: PHYSICIAN ASSISTANT

## 2022-12-14 PROCEDURE — 86140 C-REACTIVE PROTEIN: CPT | Performed by: PHYSICIAN ASSISTANT

## 2022-12-14 NOTE — PROGRESS NOTES
Orthopaedic Follow-Up Visit    Last Appointment: 11/29/22  Diagnosis: Left knee pain and swelling, suspected medial meniscus tear   Prior Procedure: MRI    Karla Arzola is a 62 y.o. female who is here for f/u evaluation of her left knee. The patient was last seen here by me on 11/29/22 at which point we decided to send her an MRI of the left knee to evaluate for suspected meniscus tear.  The patient returns today to review her MRI and discuss further treatment options.     To review her history, To review her history, Karla Arzola is a 62 y.o. year old female patient who presented with pain and dysfunction involving the left knee that began approximately 4-5 months ago with no specific mechanism of injury just a gradual worsening of symptoms. Her symptoms included intermittent sharp pain localized anterolaterally to the knee, intermittent swelling, and difficulty with straightening the knee. Her pain was aggravated by walking long distances, sitting for long periods of time, and going from sitting to standing. She has tried rest, acitivity modification, bracing, and NSAIDs with no improvement of her pain. An MRI was ordered to evaluate for suspected meniscus tear.     Patient's medications, allergies, past medical, surgical, social and family histories were reviewed and updated as appropriate.    Review of Systems   All systems reviewed were negative.  Specifically, the patient denies fever, chills, weight loss, chest pain, shortness of breath, or dyspnea on exertion.      Past Medical History:   Diagnosis Date    Allergy     Anxiety     Hypertension     Obesity     Vitamin B 12 deficiency        Objective:      Physical Exam  Patient is alert and oriented, no distress. Skin is intact. Neuro is normal with no focal motor or sensory findings.    Standing exam  stance: normal alignment, no significant leg-length discrepancy  gait: antalgic      Knee                                                  RIGHT              LEFT  Skin:                                         Intact               Intact  ROM:                                        0-130              5-110  Effusion:                                   Neg                  +   Medial joint line tenderness:    Neg                  +  Lateral joint line tenderness:    Neg                  +  Paulino:                                Neg                  +  Patella crepitus:                       Neg                  Neg  Patella tenderness:                  Neg                  +  Patella grind:                            Neg                  Neg  Lachman:                                 Neg                  Neg  Valgus stress:                          Neg                  Neg  Varus stress:                            Neg                  Neg  Posterior drawer:                     Neg                  Neg  N-V                                          intact               intact  Hip:                                          nml                    nml              Lower extremity edema:         Negative          negative    Neurovascular exam  - motor function grossly intact bilaterally to hip flexion, knee extension and flexion, ankle dorsiflexion and plantarflexion  - sensation intact to light touch bilaterally to femoral, tibial, tibial and peroneal distributions  - symmetrical pedal pulses    Imaging:   XR Results:  Results for orders placed during the hospital encounter of 08/12/22    X-ray Knee Ortho Left with Flexion    Narrative  EXAMINATION:  XR KNEE ORTHO LEFT WITH FLEXION    CLINICAL HISTORY:  Pain in left knee    TECHNIQUE:  AP standing views of both knees, AP flexion views of both knees, lateral view of the left knee and Merchant views of both knees    COMPARISON:  10/02/2020    FINDINGS:  The joint spaces of all 3 compartments of the left knee appear to be relatively well maintained.  Mild-to-moderate joint space narrowing seen involving the medial  compartment of the right knee with minimal marginal osteophyte formation noted.  No left-sided joint effusion.  No acute fracture or dislocation.    Impression  1.  As above      Electronically signed by: Nba Doe DO  Date:    08/12/2022  Time:    14:43      MRI Knee Without Contrast Left  Narrative: EXAMINATION:  MRI KNEE WITHOUT CONTRAST LEFT    CLINICAL HISTORY:  Meniscal tear, untreated, new symptoms; Other tear of medial meniscus, current injury, left knee, initial encounter    TECHNIQUE:  Left knee MRI without IV contrast.    COMPARISON:  Left knee radiographs 08/12/2022    FINDINGS:  ACL, PCL, MCL, and LCL complex are intact.    Medial meniscus is normal.    Somewhat complex horizontal tear courses through anterior horn and anterior body of lateral meniscus.  No displaced meniscal fragment.    Subcortical moderate increased T2 and low T1 bone marrow signal alteration affects anterior aspect of lateral tibial plateau.    Moderate cartilage thinning occurs diffusely in the lateral compartment.  Medial and patellofemoral compartment articular cartilage is normal.  Bone marrow signal is otherwise normal.    The extensor mechanism is intact.  Moderate left knee joint effusion is present.  Popliteal fossa is unremarkable.    Mild subcutaneous edema is noted about the left knee, most prominent anteriorly.  Impression: 1. Complex horizontal tear of anterior horn and body of lateral meniscus.  2. Moderate cartilage thinning throughout the lateral compartment.  3. Focal bone marrow edema along anterior aspect of lateral tibial plateau, either related to altered mechanics/chronic repetitive stress or reactive/degenerative bone marrow edema.    Electronically signed by: Doug Hensley MD  Date:    12/13/2022  Time:    19:25        CT Results:  No results found for this or any previous visit.      Physician Read: I agree with the above impression.    Assessment/Plan:   Assessment:  Karla Arzola is a 62 y.o.  female with left knee complex lateral meniscus tear, proximal tibia bone bruise/insufficiency fracture    Plan:    Reviewed MRI results with the patient today. Her MRI shows right knee complex lateral meniscus tear and insufficiency fracture. These findings are consistent with knee arthritis.  We discussed nonoperative treatment of osteoarthritis.  This consists of supportive care in the form of activity modification, bracing, NSAIDs, and intermittent injections.  Injection types include corticosteroid or viscosupplementation injections. I recommend proceeding with viscosupplementation as she has failed treatment with corticosteroid injections.  MEDICAL NECESSITY FOR VISCOSUPPLEMENTATION: After thorough evaluation of the patient, I have determined that visco-supplementation is medically necessary. The patient has painful DJD of the knee with failure of conservative therapy including lifestyle modifications and rehabilitation exercises. Oral analgesis/NSAIDs have not adequately controlled symptoms and there is radiographic evidence of joint space narrowing, subchondral sclerosis, and some early osteophytic changes Kellgren- Gamal grade 2 or greater, or in lack of radiographic evidence, there is arthroscopic or other evidence of chondrosis.  Discussed operative treatment of a right knee arthroscopy if symptoms persist or failed treatment with injections.   She would like to proceed non operative treatment with viscosupplementation of her right knee.  Follow-up with Mara for viscosupplementation        Larry Adams MD    I, Maggie France, acted as a scribe for Larry Adams MD for the duration of this office visit.

## 2022-12-16 ENCOUNTER — OFFICE VISIT (OUTPATIENT)
Dept: RHEUMATOLOGY | Facility: CLINIC | Age: 62
End: 2022-12-16
Payer: COMMERCIAL

## 2022-12-16 VITALS
BODY MASS INDEX: 36.32 KG/M2 | HEART RATE: 90 BPM | WEIGHT: 226 LBS | DIASTOLIC BLOOD PRESSURE: 78 MMHG | SYSTOLIC BLOOD PRESSURE: 154 MMHG | HEIGHT: 66 IN

## 2022-12-16 DIAGNOSIS — R76.8 POSITIVE ANA (ANTINUCLEAR ANTIBODY): Primary | ICD-10-CM

## 2022-12-16 PROCEDURE — 99999 PR PBB SHADOW E&M-EST. PATIENT-LVL IV: CPT | Mod: PBBFAC,,, | Performed by: PHYSICIAN ASSISTANT

## 2022-12-16 PROCEDURE — 3008F PR BODY MASS INDEX (BMI) DOCUMENTED: ICD-10-PCS | Mod: CPTII,S$GLB,, | Performed by: PHYSICIAN ASSISTANT

## 2022-12-16 PROCEDURE — 1159F PR MEDICATION LIST DOCUMENTED IN MEDICAL RECORD: ICD-10-PCS | Mod: CPTII,S$GLB,, | Performed by: PHYSICIAN ASSISTANT

## 2022-12-16 PROCEDURE — 99213 PR OFFICE/OUTPT VISIT, EST, LEVL III, 20-29 MIN: ICD-10-PCS | Mod: S$GLB,,, | Performed by: PHYSICIAN ASSISTANT

## 2022-12-16 PROCEDURE — 99999 PR PBB SHADOW E&M-EST. PATIENT-LVL IV: ICD-10-PCS | Mod: PBBFAC,,, | Performed by: PHYSICIAN ASSISTANT

## 2022-12-16 PROCEDURE — 3008F BODY MASS INDEX DOCD: CPT | Mod: CPTII,S$GLB,, | Performed by: PHYSICIAN ASSISTANT

## 2022-12-16 PROCEDURE — 3077F PR MOST RECENT SYSTOLIC BLOOD PRESSURE >= 140 MM HG: ICD-10-PCS | Mod: CPTII,S$GLB,, | Performed by: PHYSICIAN ASSISTANT

## 2022-12-16 PROCEDURE — 3078F DIAST BP <80 MM HG: CPT | Mod: CPTII,S$GLB,, | Performed by: PHYSICIAN ASSISTANT

## 2022-12-16 PROCEDURE — 3078F PR MOST RECENT DIASTOLIC BLOOD PRESSURE < 80 MM HG: ICD-10-PCS | Mod: CPTII,S$GLB,, | Performed by: PHYSICIAN ASSISTANT

## 2022-12-16 PROCEDURE — 3077F SYST BP >= 140 MM HG: CPT | Mod: CPTII,S$GLB,, | Performed by: PHYSICIAN ASSISTANT

## 2022-12-16 PROCEDURE — 1159F MED LIST DOCD IN RCRD: CPT | Mod: CPTII,S$GLB,, | Performed by: PHYSICIAN ASSISTANT

## 2022-12-16 PROCEDURE — 99213 OFFICE O/P EST LOW 20 MIN: CPT | Mod: S$GLB,,, | Performed by: PHYSICIAN ASSISTANT

## 2022-12-16 NOTE — PROGRESS NOTES
Subjective:      Patient ID: Karla Arzola is a 62 y.o. female.    Chief Complaint: Acute anterior uveitis of both eyes      HPI   Karla Arzola  is a 62 y.o. female who is here for f/u on (+) harjinder and anterior uveitis, treated by Dr. Lafluer.  Overall the eye is feeling much better.  She states the redness has resolved.  She has no eye pain no visual disturbances.  Whenever initiated immunosuppressive therapy for the anterior uveitis.    She denies recent history of infections, unexplained fevers.  No obvbious sxs of infection.  She complains of perioral dermatitis which is currently treated by Dr. Garcia.  She also has history of sciatica.  She has no pain today.  She gets pain in the lumbosacral region.  Overall the back is feeling good today.    She endorses morning stiffness lasting 15-20 min. Has meniscus tear left knee.  Saw ortho.  Doing visco.  May consider knee ATS if no improvement.  She denies migratory pain as well as tender swollen joints.  She has dyspnea on exertion of walking more than about 5-10 minutes.  Started after she had covid in 2020.  Seeing pulmonlogy.     Patient denies fevers, chills, photosensitivity, eye pain, chest pain, hematuria, blood in the stool, rash, sicca symptoms, raynauds, finger ulcerations.  Rheumatologic systems otherwise negative.    Serologies/Labs:  (+) HARJINDER 1:320 homogenous, neg profile  Neg RF, CCP  HLA B 27 negative  Current Treatment:  Prednisone taper  Previous Treatment:   Pred forte drops eyes      Current Outpatient Medications:     acyclovir (ZOVIRAX) 200 MG capsule, Take 1 capsule (200 mg total) by mouth once daily., Disp: 90 capsule, Rfl: 1    albuterol (VENTOLIN HFA) 90 mcg/actuation inhaler, Inhale 2 puffs into the lungs every 6 (six) hours as needed for Wheezing. Rescue, Disp: 18 g, Rfl: 0    amLODIPine (NORVASC) 10 MG tablet, Take 1 tablet (10 mg total) by mouth once daily., Disp: 90 tablet, Rfl: 1    azelaic acid (FINACEA) 15 % Foam, Apply 1  application topically 2 (two) times daily., Disp: 50 g, Rfl: 2    ciclopirox (PENLAC) 8 % Soln, Apply to nails once daily, Disp: 6.6 mL, Rfl: 2    citalopram (CELEXA) 20 MG tablet, Take 1 tablet (20 mg total) by mouth once daily., Disp: 90 tablet, Rfl: 3    cyanocobalamin (VITAMIN B-12) 100 MCG tablet, Take 100 mcg by mouth once daily., Disp: , Rfl:     doxycycline monohydrate 100 mg Tab, Take 1 tablet by mouth every day with food and not within 1 hour prior to lying down, Disp: 30 tablet, Rfl: 2    fluticasone (FLONASE) 50 mcg/actuation nasal spray, 1 spray (50 mcg total) by Each Nare route once daily., Disp: 16 g, Rfl: 0    furosemide (LASIX) 20 MG tablet, Take 1 tablet (20 mg total) by mouth daily as needed (swelling)., Disp: 60 tablet, Rfl: 1    ibuprofen (ADVIL,MOTRIN) 800 MG tablet, Take 1 tablet (800 mg total) by mouth every 6 to 8 hours as needed for pain, Disp: 20 tablet, Rfl: 0    ipratropium-albuteroL (COMBIVENT)  mcg/actuation inhaler, Inhale 1 puff into the lungs every 6 (six) hours as needed for Wheezing or Shortness of Breath. Rescue, Disp: 4 g, Rfl: 0    meloxicam (MOBIC) 7.5 MG tablet, Take 1 tablet (7.5 mg total) by mouth once daily., Disp: 90 tablet, Rfl: 0    methylPREDNISolone (MEDROL DOSEPACK) 4 mg tablet, use as directed, Disp: 21 tablet, Rfl: 0    multivitamin capsule, Take 1 capsule by mouth once daily., Disp: , Rfl:     ALPRAZolam (XANAX) 0.25 MG tablet, Take 1 tablet (0.25 mg total) by mouth 3 (three) times daily as needed for Insomnia or Anxiety., Disp: 60 tablet, Rfl: 0    spironolactone (ALDACTONE) 100 MG tablet, Take 1 tablet (100 mg total) by mouth once daily., Disp: 30 tablet, Rfl: 2    Past Medical History:   Diagnosis Date    Allergy     Anxiety     Hypertension     Obesity     Vitamin B 12 deficiency      Family History   Problem Relation Age of Onset    Diabetes Mother     Hypertension Mother     Stroke Mother     Hypertension Father     Hyperlipidemia Father     Kidney  "disease Father      Social History     Socioeconomic History    Marital status: Single   Occupational History     Employer: [employer]   Tobacco Use    Smoking status: Never    Smokeless tobacco: Never   Substance and Sexual Activity    Alcohol use: Yes     Alcohol/week: 0.0 standard drinks     Comment: socially     Drug use: No    Sexual activity: Yes   Other Topics Concern    Are you pregnant or think you may be? No    Breast-feeding No     Social Determinants of Health     Financial Resource Strain: Medium Risk    Difficulty of Paying Living Expenses: Somewhat hard   Food Insecurity: Food Insecurity Present    Worried About Running Out of Food in the Last Year: Sometimes true    Ran Out of Food in the Last Year: Sometimes true   Transportation Needs: Unmet Transportation Needs    Lack of Transportation (Medical): No    Lack of Transportation (Non-Medical): Yes   Physical Activity: Inactive    Days of Exercise per Week: 0 days    Minutes of Exercise per Session: 0 min   Stress: Stress Concern Present    Feeling of Stress : To some extent   Social Connections: Unknown    Frequency of Communication with Friends and Family: Once a week    Frequency of Social Gatherings with Friends and Family: Once a week    Active Member of Clubs or Organizations: No    Attends Club or Organization Meetings: More than 4 times per year    Marital Status:    Housing Stability: High Risk    Unable to Pay for Housing in the Last Year: Yes    Number of Places Lived in the Last Year: 1    Unstable Housing in the Last Year: No     Review of patient's allergies indicates:  No Known Allergies    Objective:   BP (!) 154/78   Pulse 90   Ht 5' 6" (1.676 m)   Wt 102.5 kg (225 lb 15.5 oz)   LMP 06/11/2015   BMI 36.47 kg/m²   Immunization History   Administered Date(s) Administered    COVID-19, MRNA, LN-S, PF (Pfizer) (Purple Cap) 01/11/2021, 02/01/2021, 01/14/2022    Influenza 10/12/2007, 10/10/2008, 09/24/2009, " 10/29/2010    Influenza - Quadrivalent 10/07/2015, 09/30/2016    Influenza - Quadrivalent - PF *Preferred* (6 months and older) 09/28/2017, 09/27/2018, 09/25/2019, 11/17/2020, 09/23/2021    Influenza - Trivalent (ADULT) 10/12/2007, 10/10/2008, 09/24/2009, 10/29/2010    Influenza A (H1N1) 2009 Monovalent - IM 10/27/2009    Tdap 11/01/2007, 04/14/2011    Zoster Recombinant 12/01/2021       Physical Exam   Constitutional: She is oriented to person, place, and time. No distress.   HENT:   Head: Normocephalic and atraumatic.   Pulmonary/Chest: Effort normal.   Abdominal: She exhibits no distension.   Musculoskeletal:         General: No swelling or tenderness. Normal range of motion.      Cervical back: Normal range of motion.   Lymphadenopathy:     She has no cervical adenopathy.   Neurological: She is alert and oriented to person, place, and time.   Skin: Skin is warm and dry. No rash noted.   Psychiatric: Mood normal.   Nursing note and vitals reviewed.   No synovitis, dactylitis, enthesitis       Recent Results (from the past 672 hour(s))   CBC Auto Differential    Collection Time: 12/14/22  2:16 PM   Result Value Ref Range    WBC 9.03 3.90 - 12.70 K/uL    RBC 4.88 4.00 - 5.40 M/uL    Hemoglobin 14.5 12.0 - 16.0 g/dL    Hematocrit 45.5 37.0 - 48.5 %    MCV 93 82 - 98 fL    MCH 29.7 27.0 - 31.0 pg    MCHC 31.9 (L) 32.0 - 36.0 g/dL    RDW 13.8 11.5 - 14.5 %    Platelets 276 150 - 450 K/uL    MPV 10.3 9.2 - 12.9 fL    Immature Granulocytes 0.2 0.0 - 0.5 %    Gran # (ANC) 5.1 1.8 - 7.7 K/uL    Immature Grans (Abs) 0.02 0.00 - 0.04 K/uL    Lymph # 3.1 1.0 - 4.8 K/uL    Mono # 0.6 0.3 - 1.0 K/uL    Eos # 0.2 0.0 - 0.5 K/uL    Baso # 0.02 0.00 - 0.20 K/uL    nRBC 0 0 /100 WBC    Gran % 56.6 38.0 - 73.0 %    Lymph % 34.1 18.0 - 48.0 %    Mono % 7.1 4.0 - 15.0 %    Eosinophil % 1.8 0.0 - 8.0 %    Basophil % 0.2 0.0 - 1.9 %    Differential Method Automated    Comprehensive Metabolic Panel    Collection Time: 12/14/22  2:16 PM    Result Value Ref Range    Sodium 143 136 - 145 mmol/L    Potassium 3.9 3.5 - 5.1 mmol/L    Chloride 107 95 - 110 mmol/L    CO2 26 23 - 29 mmol/L    Glucose 94 70 - 110 mg/dL    BUN 17 6 - 20 mg/dL    Creatinine 1.2 0.5 - 1.4 mg/dL    Calcium 9.2 8.7 - 10.5 mg/dL    Total Protein 6.9 6.0 - 8.4 g/dL    Albumin 3.7 3.5 - 5.2 g/dL    Total Bilirubin 0.6 0.1 - 1.0 mg/dL    Alkaline Phosphatase 98 55 - 135 U/L    AST 16 10 - 40 U/L    ALT 13 10 - 44 U/L    Anion Gap 10 8 - 16 mmol/L    eGFR 52 (A) >60 mL/min/1.73 m^2   Sedimentation rate    Collection Time: 12/14/22  2:16 PM   Result Value Ref Range    Sed Rate 14 0 - 36 mm/Hr   C-Reactive Protein    Collection Time: 12/14/22  2:16 PM   Result Value Ref Range    CRP 11.9 (H) 0.0 - 8.2 mg/L         No results found for: TBGOLDPLUS   Lab Results   Component Value Date    HEPAIGM Negative 10/27/2009    HEPBIGM Negative 10/27/2009    HEPCAB Negative 10/27/2009          Assessment:     No diagnosis found.          Plan:     There are no diagnoses linked to this encounter.        Anterior uveitis - has had multiple episodes  HLA B27 negative  (+) DALI 1:320, neg profile  Will complete prednisone taper soon  Pt to monitor for worsening sxs  D/t f/u w ophtho  Slight elevation in CRP, but no other concerning findings/sys  Consider SI jt MRI in future if LS pain worsens  Return to clinic: 6 mos w labs    No follow-ups on file.    The patient understands, chooses and consents to this plan and accepts all the risks which include but are not limited to the risks mentioned above.     Disclaimer: This note was prepared using a voice recognition system and is likely to have sound alike errors within the text.

## 2022-12-16 NOTE — Clinical Note
Dr. Jackson Saw her today.  No eye pain.  You last saw her in June.  No erythema.  Chuy if she missed any follow up.  The only thing off right now is slight elevation in CRP.  We never brought her into systemic tx for the eyes.  She sees you for ref annual exam in march.   Taylor Russo PA-C [facility]

## 2022-12-19 DIAGNOSIS — I10 ESSENTIAL HYPERTENSION: ICD-10-CM

## 2022-12-20 RX ORDER — AMLODIPINE BESYLATE 10 MG/1
10 TABLET ORAL DAILY
Qty: 90 TABLET | Refills: 1 | Status: SHIPPED | OUTPATIENT
Start: 2022-12-20 | End: 2023-06-22 | Stop reason: SDUPTHER

## 2022-12-20 NOTE — TELEPHONE ENCOUNTER
Refill Routing Note   Medication(s) are not appropriate for processing by [facility] for the following reason(s):      - Required vitals are abnormal    [facility] action(s):  Defer          Medication reconciliation completed: No     Appointments  past 12m or future 3m with PCP    Date Provider   Last Visit   12/2/2022 Robina Momin MD   Next Visit   Visit date not found Robina Momin MD   ED visits in past 90 days: 0        Note composed:9:14 AM 12/20/2022

## 2022-12-20 NOTE — TELEPHONE ENCOUNTER
No new care gaps identified.  [facility] Embedded Care Gaps. Reference number: [id number]. 12/20/2022   9:14:44 AM CST

## 2022-12-22 ENCOUNTER — PATIENT MESSAGE (OUTPATIENT)
Dept: INTERNAL MEDICINE | Facility: CLINIC | Age: 62
End: 2022-12-22
Payer: COMMERCIAL

## 2022-12-22 DIAGNOSIS — R05.9 COUGH, UNSPECIFIED TYPE: Primary | ICD-10-CM

## 2022-12-22 RX ORDER — PROMETHAZINE HYDROCHLORIDE AND DEXTROMETHORPHAN HYDROBROMIDE 6.25; 15 MG/5ML; MG/5ML
5 SYRUP ORAL EVERY 4 HOURS PRN
Qty: 118 ML | Refills: 0 | Status: SHIPPED | OUTPATIENT
Start: 2022-12-22 | End: 2023-01-01

## 2022-12-22 RX ORDER — BENZONATATE 200 MG/1
200 CAPSULE ORAL 3 TIMES DAILY PRN
Qty: 30 CAPSULE | Refills: 0 | Status: SHIPPED | OUTPATIENT
Start: 2022-12-22 | End: 2023-01-02

## 2023-01-03 ENCOUNTER — PROCEDURE VISIT (OUTPATIENT)
Dept: ORTHOPEDICS | Facility: CLINIC | Age: 63
End: 2023-01-03
Payer: COMMERCIAL

## 2023-01-03 VITALS — WEIGHT: 225 LBS | BODY MASS INDEX: 36.16 KG/M2 | HEIGHT: 66 IN

## 2023-01-03 DIAGNOSIS — M17.12 PRIMARY OSTEOARTHRITIS OF LEFT KNEE: Primary | ICD-10-CM

## 2023-01-03 DIAGNOSIS — M23.201 OLD COMPLEX TEAR OF LATERAL MENISCUS OF LEFT KNEE: ICD-10-CM

## 2023-01-03 PROCEDURE — 20610 DRAIN/INJ JOINT/BURSA W/O US: CPT | Mod: LT,S$GLB,, | Performed by: PHYSICIAN ASSISTANT

## 2023-01-03 PROCEDURE — 20610 LARGE JOINT ASPIRATION/INJECTION: L KNEE: ICD-10-PCS | Mod: LT,S$GLB,, | Performed by: PHYSICIAN ASSISTANT

## 2023-01-03 PROCEDURE — 99499 NO LOS: ICD-10-PCS | Mod: S$GLB,,, | Performed by: PHYSICIAN ASSISTANT

## 2023-01-03 PROCEDURE — 99499 UNLISTED E&M SERVICE: CPT | Mod: S$GLB,,, | Performed by: PHYSICIAN ASSISTANT

## 2023-01-03 NOTE — PROCEDURES
Large Joint Aspiration/Injection: L knee    Date/Time: 1/3/2023 2:00 PM  Performed by: Mara Akbar PA-C  Authorized by: Mara Akbar PA-C     Consent Done?:  Yes (Verbal)  Indications:  Joint swelling and pain  Site marked: the procedure site was marked    Timeout: prior to procedure the correct patient, procedure, and site was verified    Prep: patient was prepped and draped in usual sterile fashion      Local anesthesia used?: Yes    Local anesthetic:  Topical anesthetic    Details:  Needle Size:  22 G  Ultrasonic Guidance for needle placement?: No    Approach:  Anterolateral  Location:  Knee  Site:  L knee  Medications:  20 mg sodium hyaluronate (EUFLEXXA) 10 mg/mL(mw 2.4 -3.6 million)  Aspirate amount (mL):  0  Patient tolerance:  Patient tolerated the procedure well with no immediate complications    Left Euflexxa 1/3

## 2023-01-10 ENCOUNTER — PROCEDURE VISIT (OUTPATIENT)
Dept: ORTHOPEDICS | Facility: CLINIC | Age: 63
End: 2023-01-10
Payer: COMMERCIAL

## 2023-01-10 VITALS — BODY MASS INDEX: 36.17 KG/M2 | HEIGHT: 66 IN | WEIGHT: 225.06 LBS

## 2023-01-10 DIAGNOSIS — M17.12 PRIMARY OSTEOARTHRITIS OF LEFT KNEE: Primary | ICD-10-CM

## 2023-01-10 PROCEDURE — 99499 NO LOS: ICD-10-PCS | Mod: S$GLB,,, | Performed by: PHYSICIAN ASSISTANT

## 2023-01-10 PROCEDURE — 99499 UNLISTED E&M SERVICE: CPT | Mod: S$GLB,,, | Performed by: PHYSICIAN ASSISTANT

## 2023-01-10 PROCEDURE — 20610 LARGE JOINT ASPIRATION/INJECTION: L KNEE: ICD-10-PCS | Mod: LT,S$GLB,, | Performed by: PHYSICIAN ASSISTANT

## 2023-01-10 PROCEDURE — 20610 DRAIN/INJ JOINT/BURSA W/O US: CPT | Mod: LT,S$GLB,, | Performed by: PHYSICIAN ASSISTANT

## 2023-01-10 RX ORDER — NAPROXEN 500 MG/1
500 TABLET ORAL 2 TIMES DAILY
Qty: 60 TABLET | Refills: 0 | Status: SHIPPED | OUTPATIENT
Start: 2023-01-10 | End: 2023-03-02

## 2023-01-10 NOTE — PROCEDURES
Large Joint Aspiration/Injection: L knee    Date/Time: 1/10/2023 2:30 PM  Performed by: Mara Akbar PA-C  Authorized by: Mara Akbar PA-C     Consent Done?:  Yes (Verbal)  Indications:  Joint swelling and pain  Site marked: the procedure site was marked    Timeout: prior to procedure the correct patient, procedure, and site was verified    Prep: patient was prepped and draped in usual sterile fashion      Local anesthesia used?: Yes    Local anesthetic:  Topical anesthetic    Details:  Needle Size:  22 G  Ultrasonic Guidance for needle placement?: No    Approach:  Anterolateral  Location:  Knee  Site:  L knee  Medications:  20 mg sodium hyaluronate (EUFLEXXA) 10 mg/mL(mw 2.4 -3.6 million)  Aspirate amount (mL):  0  Patient tolerance:  Patient tolerated the procedure well with no immediate complications    Left Euflexxa 2/3

## 2023-01-18 ENCOUNTER — PROCEDURE VISIT (OUTPATIENT)
Dept: ORTHOPEDICS | Facility: CLINIC | Age: 63
End: 2023-01-18
Payer: COMMERCIAL

## 2023-01-18 ENCOUNTER — PATIENT MESSAGE (OUTPATIENT)
Dept: DERMATOLOGY | Facility: CLINIC | Age: 63
End: 2023-01-18
Payer: COMMERCIAL

## 2023-01-18 VITALS — HEIGHT: 66 IN | BODY MASS INDEX: 36.16 KG/M2 | WEIGHT: 225 LBS

## 2023-01-18 DIAGNOSIS — M23.201 OLD COMPLEX TEAR OF LATERAL MENISCUS OF LEFT KNEE: ICD-10-CM

## 2023-01-18 DIAGNOSIS — M17.12 PRIMARY OSTEOARTHRITIS OF LEFT KNEE: Primary | ICD-10-CM

## 2023-01-18 PROCEDURE — 99499 UNLISTED E&M SERVICE: CPT | Mod: S$GLB,,, | Performed by: PHYSICIAN ASSISTANT

## 2023-01-18 PROCEDURE — 20610 DRAIN/INJ JOINT/BURSA W/O US: CPT | Mod: LT,S$GLB,, | Performed by: PHYSICIAN ASSISTANT

## 2023-01-18 PROCEDURE — 20610 LARGE JOINT ASPIRATION/INJECTION: L KNEE: ICD-10-PCS | Mod: LT,S$GLB,, | Performed by: PHYSICIAN ASSISTANT

## 2023-01-18 PROCEDURE — 99499 NO LOS: ICD-10-PCS | Mod: S$GLB,,, | Performed by: PHYSICIAN ASSISTANT

## 2023-01-18 NOTE — PROCEDURES
Large Joint Aspiration/Injection: L knee    Date/Time: 1/18/2023 4:00 PM  Performed by: Mara Akbar PA-C  Authorized by: Mara Akbar PA-C     Consent Done?:  Yes (Verbal)  Indications:  Joint swelling and pain  Site marked: the procedure site was marked    Timeout: prior to procedure the correct patient, procedure, and site was verified    Prep: patient was prepped and draped in usual sterile fashion      Local anesthesia used?: Yes    Local anesthetic:  Topical anesthetic    Details:  Needle Size:  22 G  Ultrasonic Guidance for needle placement?: No    Approach:  Anterolateral  Location:  Knee  Site:  L knee  Medications:  20 mg sodium hyaluronate (EUFLEXXA) 10 mg/mL(mw 2.4 -3.6 million)  Aspirate amount (mL):  0  Patient tolerance:  Patient tolerated the procedure well with no immediate complications    Left knee Euflexxa 3/3

## 2023-01-24 ENCOUNTER — PATIENT MESSAGE (OUTPATIENT)
Dept: INTERNAL MEDICINE | Facility: CLINIC | Age: 63
End: 2023-01-24
Payer: COMMERCIAL

## 2023-01-24 DIAGNOSIS — R05.9 COUGH, UNSPECIFIED TYPE: Primary | ICD-10-CM

## 2023-01-25 ENCOUNTER — HOSPITAL ENCOUNTER (OUTPATIENT)
Dept: RADIOLOGY | Facility: HOSPITAL | Age: 63
Discharge: HOME | End: 2023-01-25
Attending: FAMILY MEDICINE
Payer: COMMERCIAL

## 2023-01-25 ENCOUNTER — PATIENT MESSAGE (OUTPATIENT)
Dept: INTERNAL MEDICINE | Facility: CLINIC | Age: 63
End: 2023-01-25
Payer: COMMERCIAL

## 2023-01-25 ENCOUNTER — PATIENT MESSAGE (OUTPATIENT)
Dept: ADMINISTRATIVE | Facility: HOSPITAL | Age: 63
End: 2023-01-25
Payer: COMMERCIAL

## 2023-01-25 DIAGNOSIS — R05.9 COUGH, UNSPECIFIED TYPE: ICD-10-CM

## 2023-01-25 PROCEDURE — 71046 XR CHEST PA AND LATERAL: ICD-10-PCS | Mod: 26,,, | Performed by: RADIOLOGY

## 2023-01-25 PROCEDURE — 71046 X-RAY EXAM CHEST 2 VIEWS: CPT | Mod: 26,,, | Performed by: RADIOLOGY

## 2023-01-25 PROCEDURE — 71046 X-RAY EXAM CHEST 2 VIEWS: CPT | Mod: TC

## 2023-02-02 ENCOUNTER — PATIENT OUTREACH (OUTPATIENT)
Dept: ADMINISTRATIVE | Facility: HOSPITAL | Age: 63
End: 2023-02-02
Payer: COMMERCIAL

## 2023-02-02 NOTE — PROGRESS NOTES
Working HTN Report.    Requested updated bp reading. States she does not have machine.  Assisted to schedule nurse bp check 2/02/23.    Advised due for annual exam. Appt scheduled, 3/02/23.

## 2023-02-04 ENCOUNTER — PATIENT MESSAGE (OUTPATIENT)
Dept: INTERNAL MEDICINE | Facility: CLINIC | Age: 63
End: 2023-02-04
Payer: COMMERCIAL

## 2023-02-09 ENCOUNTER — CLINICAL SUPPORT (OUTPATIENT)
Dept: INTERNAL MEDICINE | Facility: CLINIC | Age: 63
End: 2023-02-09
Payer: COMMERCIAL

## 2023-02-09 ENCOUNTER — PATIENT MESSAGE (OUTPATIENT)
Dept: INTERNAL MEDICINE | Facility: CLINIC | Age: 63
End: 2023-02-09

## 2023-02-09 VITALS — DIASTOLIC BLOOD PRESSURE: 74 MMHG | SYSTOLIC BLOOD PRESSURE: 130 MMHG

## 2023-02-09 NOTE — PROGRESS NOTES
Patient came in for nurse visit today. She states she is feeling fine today.   BP Check:  130/74

## 2023-02-23 ENCOUNTER — PATIENT MESSAGE (OUTPATIENT)
Dept: DERMATOLOGY | Facility: CLINIC | Age: 63
End: 2023-02-23
Payer: COMMERCIAL

## 2023-03-01 DIAGNOSIS — E66.01 SEVERE OBESITY WITH BODY MASS INDEX (BMI) OF 35.0 TO 35.9 AND COMORBIDITY: Primary | ICD-10-CM

## 2023-03-01 DIAGNOSIS — K21.9 GASTROESOPHAGEAL REFLUX DISEASE, UNSPECIFIED WHETHER ESOPHAGITIS PRESENT: ICD-10-CM

## 2023-03-01 DIAGNOSIS — M19.90 OSTEOARTHRITIS, UNSPECIFIED OSTEOARTHRITIS TYPE, UNSPECIFIED SITE: ICD-10-CM

## 2023-03-01 DIAGNOSIS — I10 PRIMARY HYPERTENSION: ICD-10-CM

## 2023-03-02 ENCOUNTER — OFFICE VISIT (OUTPATIENT)
Dept: INTERNAL MEDICINE | Facility: CLINIC | Age: 63
End: 2023-03-02
Payer: COMMERCIAL

## 2023-03-02 ENCOUNTER — LAB VISIT (OUTPATIENT)
Dept: LAB | Facility: HOSPITAL | Age: 63
End: 2023-03-02
Attending: SURGERY
Payer: COMMERCIAL

## 2023-03-02 VITALS
TEMPERATURE: 97 F | WEIGHT: 238.31 LBS | OXYGEN SATURATION: 99 % | RESPIRATION RATE: 18 BRPM | HEIGHT: 66 IN | HEART RATE: 80 BPM | BODY MASS INDEX: 38.3 KG/M2 | DIASTOLIC BLOOD PRESSURE: 74 MMHG | SYSTOLIC BLOOD PRESSURE: 120 MMHG

## 2023-03-02 DIAGNOSIS — E66.01 SEVERE OBESITY WITH BODY MASS INDEX (BMI) OF 35.0 TO 35.9 AND COMORBIDITY: ICD-10-CM

## 2023-03-02 DIAGNOSIS — J30.2 SEASONAL ALLERGIES: ICD-10-CM

## 2023-03-02 DIAGNOSIS — M19.90 OSTEOARTHRITIS, UNSPECIFIED OSTEOARTHRITIS TYPE, UNSPECIFIED SITE: ICD-10-CM

## 2023-03-02 DIAGNOSIS — J32.9 SINUSITIS, UNSPECIFIED CHRONICITY, UNSPECIFIED LOCATION: ICD-10-CM

## 2023-03-02 DIAGNOSIS — Z13.1 SCREENING FOR DIABETES MELLITUS: ICD-10-CM

## 2023-03-02 DIAGNOSIS — Z00.00 ROUTINE PHYSICAL EXAMINATION: ICD-10-CM

## 2023-03-02 DIAGNOSIS — Z00.00 ROUTINE PHYSICAL EXAMINATION: Primary | ICD-10-CM

## 2023-03-02 DIAGNOSIS — Z12.11 COLON CANCER SCREENING: ICD-10-CM

## 2023-03-02 DIAGNOSIS — K21.9 GASTROESOPHAGEAL REFLUX DISEASE, UNSPECIFIED WHETHER ESOPHAGITIS PRESENT: ICD-10-CM

## 2023-03-02 DIAGNOSIS — I10 PRIMARY HYPERTENSION: ICD-10-CM

## 2023-03-02 LAB
25(OH)D3+25(OH)D2 SERPL-MCNC: 28 NG/ML (ref 30–96)
ALBUMIN SERPL BCP-MCNC: 3.5 G/DL (ref 3.5–5.2)
ALP SERPL-CCNC: 97 U/L (ref 55–135)
ALT SERPL W/O P-5'-P-CCNC: 11 U/L (ref 10–44)
ANION GAP SERPL CALC-SCNC: 8 MMOL/L (ref 8–16)
AST SERPL-CCNC: 15 U/L (ref 10–40)
BASOPHILS # BLD AUTO: 0.02 K/UL (ref 0–0.2)
BASOPHILS NFR BLD: 0.2 % (ref 0–1.9)
BILIRUB SERPL-MCNC: 0.5 MG/DL (ref 0.1–1)
BUN SERPL-MCNC: 15 MG/DL (ref 6–20)
CALCIUM SERPL-MCNC: 9.3 MG/DL (ref 8.7–10.5)
CHLORIDE SERPL-SCNC: 107 MMOL/L (ref 95–110)
CHOLEST SERPL-MCNC: 162 MG/DL (ref 120–199)
CHOLEST/HDLC SERPL: 3.2 {RATIO} (ref 2–5)
CO2 SERPL-SCNC: 28 MMOL/L (ref 23–29)
CREAT SERPL-MCNC: 0.9 MG/DL (ref 0.5–1.4)
DIFFERENTIAL METHOD: ABNORMAL
EOSINOPHIL # BLD AUTO: 0.1 K/UL (ref 0–0.5)
EOSINOPHIL NFR BLD: 1.4 % (ref 0–8)
ERYTHROCYTE [DISTWIDTH] IN BLOOD BY AUTOMATED COUNT: 13.8 % (ref 11.5–14.5)
EST. GFR  (NO RACE VARIABLE): >60 ML/MIN/1.73 M^2
GLUCOSE SERPL-MCNC: 88 MG/DL (ref 70–110)
HCT VFR BLD AUTO: 47.1 % (ref 37–48.5)
HDLC SERPL-MCNC: 51 MG/DL (ref 40–75)
HDLC SERPL: 31.5 % (ref 20–50)
HGB BLD-MCNC: 13.9 G/DL (ref 12–16)
IMM GRANULOCYTES # BLD AUTO: 0.01 K/UL (ref 0–0.04)
IMM GRANULOCYTES NFR BLD AUTO: 0.1 % (ref 0–0.5)
IRON SERPL-MCNC: 75 UG/DL (ref 30–160)
LDLC SERPL CALC-MCNC: 87.6 MG/DL (ref 63–159)
LYMPHOCYTES # BLD AUTO: 2.6 K/UL (ref 1–4.8)
LYMPHOCYTES NFR BLD: 30.3 % (ref 18–48)
MCH RBC QN AUTO: 28.3 PG (ref 27–31)
MCHC RBC AUTO-ENTMCNC: 29.5 G/DL (ref 32–36)
MCV RBC AUTO: 96 FL (ref 82–98)
MONOCYTES # BLD AUTO: 0.6 K/UL (ref 0.3–1)
MONOCYTES NFR BLD: 7.1 % (ref 4–15)
NEUTROPHILS # BLD AUTO: 5.1 K/UL (ref 1.8–7.7)
NEUTROPHILS NFR BLD: 60.9 % (ref 38–73)
NONHDLC SERPL-MCNC: 111 MG/DL
NRBC BLD-RTO: 0 /100 WBC
PLATELET # BLD AUTO: 354 K/UL (ref 150–450)
PMV BLD AUTO: 11.4 FL (ref 9.2–12.9)
POTASSIUM SERPL-SCNC: 4.9 MMOL/L (ref 3.5–5.1)
PREALB SERPL-MCNC: 19 MG/DL (ref 20–43)
PROT SERPL-MCNC: 6.7 G/DL (ref 6–8.4)
RBC # BLD AUTO: 4.91 M/UL (ref 4–5.4)
SATURATED IRON: 23 % (ref 20–50)
SODIUM SERPL-SCNC: 143 MMOL/L (ref 136–145)
T4 FREE SERPL-MCNC: 0.93 NG/DL (ref 0.71–1.51)
TOTAL IRON BINDING CAPACITY: 330 UG/DL (ref 250–450)
TRANSFERRIN SERPL-MCNC: 223 MG/DL (ref 200–375)
TRIGL SERPL-MCNC: 117 MG/DL (ref 30–150)
TSH SERPL DL<=0.005 MIU/L-ACNC: 1.72 UIU/ML (ref 0.4–4)
VIT B12 SERPL-MCNC: 378 PG/ML (ref 210–950)
WBC # BLD AUTO: 8.45 K/UL (ref 3.9–12.7)

## 2023-03-02 PROCEDURE — 82306 VITAMIN D 25 HYDROXY: CPT | Performed by: SURGERY

## 2023-03-02 PROCEDURE — 84443 ASSAY THYROID STIM HORMONE: CPT | Performed by: SURGERY

## 2023-03-02 PROCEDURE — 85025 COMPLETE CBC W/AUTO DIFF WBC: CPT | Performed by: SURGERY

## 2023-03-02 PROCEDURE — 82607 VITAMIN B-12: CPT | Performed by: SURGERY

## 2023-03-02 PROCEDURE — 3078F DIAST BP <80 MM HG: CPT | Mod: CPTII,S$GLB,, | Performed by: FAMILY MEDICINE

## 2023-03-02 PROCEDURE — 99999 PR PBB SHADOW E&M-EST. PATIENT-LVL IV: ICD-10-PCS | Mod: PBBFAC,,, | Performed by: FAMILY MEDICINE

## 2023-03-02 PROCEDURE — 83036 HEMOGLOBIN GLYCOSYLATED A1C: CPT | Performed by: SURGERY

## 2023-03-02 PROCEDURE — 99396 PREV VISIT EST AGE 40-64: CPT | Mod: S$GLB,,, | Performed by: FAMILY MEDICINE

## 2023-03-02 PROCEDURE — 99396 PR PREVENTIVE VISIT,EST,40-64: ICD-10-PCS | Mod: S$GLB,,, | Performed by: FAMILY MEDICINE

## 2023-03-02 PROCEDURE — 3044F HG A1C LEVEL LT 7.0%: CPT | Mod: CPTII,S$GLB,, | Performed by: FAMILY MEDICINE

## 2023-03-02 PROCEDURE — 84466 ASSAY OF TRANSFERRIN: CPT | Performed by: SURGERY

## 2023-03-02 PROCEDURE — 3044F PR MOST RECENT HEMOGLOBIN A1C LEVEL <7.0%: ICD-10-PCS | Mod: CPTII,S$GLB,, | Performed by: FAMILY MEDICINE

## 2023-03-02 PROCEDURE — 84134 ASSAY OF PREALBUMIN: CPT | Performed by: SURGERY

## 2023-03-02 PROCEDURE — 3008F PR BODY MASS INDEX (BMI) DOCUMENTED: ICD-10-PCS | Mod: CPTII,S$GLB,, | Performed by: FAMILY MEDICINE

## 2023-03-02 PROCEDURE — 84425 ASSAY OF VITAMIN B-1: CPT | Performed by: SURGERY

## 2023-03-02 PROCEDURE — 3074F PR MOST RECENT SYSTOLIC BLOOD PRESSURE < 130 MM HG: ICD-10-PCS | Mod: CPTII,S$GLB,, | Performed by: FAMILY MEDICINE

## 2023-03-02 PROCEDURE — 99999 PR PBB SHADOW E&M-EST. PATIENT-LVL IV: CPT | Mod: PBBFAC,,, | Performed by: FAMILY MEDICINE

## 2023-03-02 PROCEDURE — 3074F SYST BP LT 130 MM HG: CPT | Mod: CPTII,S$GLB,, | Performed by: FAMILY MEDICINE

## 2023-03-02 PROCEDURE — 80061 LIPID PANEL: CPT | Performed by: FAMILY MEDICINE

## 2023-03-02 PROCEDURE — 36415 COLL VENOUS BLD VENIPUNCTURE: CPT | Performed by: SURGERY

## 2023-03-02 PROCEDURE — 80053 COMPREHEN METABOLIC PANEL: CPT | Performed by: SURGERY

## 2023-03-02 PROCEDURE — 3078F PR MOST RECENT DIASTOLIC BLOOD PRESSURE < 80 MM HG: ICD-10-PCS | Mod: CPTII,S$GLB,, | Performed by: FAMILY MEDICINE

## 2023-03-02 PROCEDURE — 84439 ASSAY OF FREE THYROXINE: CPT | Performed by: SURGERY

## 2023-03-02 PROCEDURE — 3008F BODY MASS INDEX DOCD: CPT | Mod: CPTII,S$GLB,, | Performed by: FAMILY MEDICINE

## 2023-03-02 RX ORDER — ERGOCALCIFEROL 1.25 MG/1
50000 CAPSULE ORAL
Qty: 12 CAPSULE | Refills: 1 | Status: SHIPPED | OUTPATIENT
Start: 2023-03-02 | End: 2023-08-29 | Stop reason: SDUPTHER

## 2023-03-02 RX ORDER — AMOXICILLIN 500 MG/1
500 TABLET, FILM COATED ORAL EVERY 12 HOURS
Qty: 20 TABLET | Refills: 0 | Status: SHIPPED | OUTPATIENT
Start: 2023-03-02 | End: 2023-03-12

## 2023-03-02 RX ORDER — FLUTICASONE PROPIONATE 50 MCG
1 SPRAY, SUSPENSION (ML) NASAL DAILY
Qty: 16 G | Refills: 0 | Status: SHIPPED | OUTPATIENT
Start: 2023-03-02 | End: 2023-06-22 | Stop reason: SDUPTHER

## 2023-03-02 NOTE — PROGRESS NOTES
Karla Arzola  03/05/2023  [id number]    Robina Momin MD  Patient Care Team:  Robina Momin MD as PCP - General (Internal Medicine)    Has the patient seen any provider outside of the network since the last visit ? (no). If yes, HIPPA forms completed and records requested.      Visit Type:a scheduled routine follow-up visit    Chief Complaint:  Chief Complaint   Patient presents with    Annual Exam       History of Present Illness:  HPI Ms Arzola presents today for her Annual   No complaint today     Being seen for gastric sleeve   Inquires about if this would be beneficial or not    Chronic conditions addressed   HM reviewed and updated     Review of Systems   Constitutional:  Negative for chills and fever.   HENT:  Negative for congestion and tinnitus.    Eyes:  Negative for blurred vision, pain and discharge.   Respiratory:  Negative for cough and wheezing.    Cardiovascular:  Negative for chest pain, palpitations, orthopnea and leg swelling.   Gastrointestinal:  Negative for abdominal pain, blood in stool, constipation, diarrhea, heartburn, nausea and vomiting.   Genitourinary:  Negative for dysuria, flank pain, frequency, hematuria and urgency.   Skin:  Negative for itching and rash.   Neurological:  Negative for dizziness, tingling and headaches.   Psychiatric/Behavioral:  Negative for depression.        Screening Questionnaires:    In the last two weeks how often have you felt down, depressed, or hopeless ( intermittent )    In the last two weeks how often have you had little interest or pleasure in doing  (no )    In the last two weeks how often have you been bothered by the following problems:  1. Feeling nervous, anxious, or on edge ( intermittent )    2. Not being able to stop or control worrying ( no)    3. Worrying too much about different things ( intermittent)    4. Trouble relaxing ( no )    5. Being so restless that it is hard to sit still  (intermittent )    6. Becoming easily annoyed or  irritable (frequent)    7. Feeling afraid as if something awful might happen (intermittent )    How often do you have a drink containing Alcohol? minimal use     Do you exercise  (no ) not active    Do you take a baby Aspirin daily ( no)    Do you have an advance directive ( no ) The patient was given information regarding Living Will/Durable Power-of- if requested.     The following were reviewed: Active problem list, medication list, allergies, family history, social history, and Health Maintenance.     History:  Past Medical History:   Diagnosis Date    Allergy     Anxiety     Hypertension     Obesity     Vitamin B 12 deficiency      Past Surgical History:   Procedure Laterality Date    BREAST BIOPSY Right 10/08/2021    MOUTH SURGERY      right knee scope      TUBAL LIGATION       Family History   Problem Relation Age of Onset    Diabetes Mother     Hypertension Mother     Stroke Mother     Hypertension Father     Hyperlipidemia Father     Kidney disease Father      Social History     Socioeconomic History    Marital status: Single   Occupational History     Employer: [employer]   Tobacco Use    Smoking status: Never    Smokeless tobacco: Never   Substance and Sexual Activity    Alcohol use: Yes     Alcohol/week: 0.0 standard drinks     Comment: socially     Drug use: No    Sexual activity: Yes   Other Topics Concern    Are you pregnant or think you may be? No    Breast-feeding No     Social Determinants of Health     Financial Resource Strain: Medium Risk    Difficulty of Paying Living Expenses: Somewhat hard   Food Insecurity: Food Insecurity Present    Worried About Running Out of Food in the Last Year: Sometimes true    Ran Out of Food in the Last Year: Never true   Transportation Needs: No Transportation Needs    Lack of Transportation (Medical): No    Lack of Transportation (Non-Medical): No   Physical Activity: Inactive    Days of Exercise per Week: 0 days    Minutes of Exercise per  Session: 0 min   Stress: Stress Concern Present    Feeling of Stress : To some extent   Social Connections: Unknown    Frequency of Communication with Friends and Family: Once a week    Frequency of Social Gatherings with Friends and Family: Patient refused    Active Member of Clubs or Organizations: Yes    Attends Club or Organization Meetings: 1 to 4 times per year    Marital Status:    Housing Stability: High Risk    Unable to Pay for Housing in the Last Year: Yes    Number of Places Lived in the Last Year: 1    Unstable Housing in the Last Year: No     Patient Active Problem List   Diagnosis    HTN (hypertension)    Obesity    Recurrent genital HSV (herpes simplex virus) infection    Internal derangement of right knee    Chondromalacia of right knee    Screen for colon cancer     Review of patient's allergies indicates:  No Known Allergies    Health Maintenance  Health Maintenance Topics with due status: Not Due       Topic Last Completion Date    Mammogram 10/21/2022    Hemoglobin A1c (Diabetic Prevention Screening) 03/02/2023    Lipid Panel 03/02/2023     Health Maintenance Due   Topic Date Due    Colorectal Cancer Screening  Never done    Cervical Cancer Screening  07/19/2016    TETANUS VACCINE  04/14/2021    Shingles Vaccine (2 of 2) 01/26/2022    COVID-19 Vaccine (4 - Booster for Pfizer series) 03/11/2022       Medications:  Current Outpatient Medications on File Prior to Visit   Medication Sig Dispense Refill    acyclovir (ZOVIRAX) 200 MG capsule Take 1 capsule (200 mg total) by mouth once daily. 90 capsule 1    albuterol (VENTOLIN HFA) 90 mcg/actuation inhaler Inhale 2 puffs into the lungs every 6 (six) hours as needed for Wheezing. Rescue 18 g 0    amLODIPine (NORVASC) 10 MG tablet Take 1 tablet (10 mg total) by mouth once daily. 90 tablet 1    azelaic acid (FINACEA) 15 % Foam Apply 1 application topically 2 (two) times daily. 50 g 2    ciclopirox (PENLAC) 8 % Soln Apply to nails once daily 6.6  mL 2    citalopram (CELEXA) 20 MG tablet Take 1 tablet (20 mg total) by mouth once daily. 90 tablet 3    furosemide (LASIX) 20 MG tablet Take 1 tablet (20 mg total) by mouth daily as needed (swelling). 60 tablet 1    ipratropium-albuteroL (COMBIVENT)  mcg/actuation inhaler Inhale 1 puff into the lungs every 6 (six) hours as needed for Wheezing or Shortness of Breath. Rescue 4 g 0    multivitamin capsule Take 1 capsule by mouth once daily.      spironolactone (ALDACTONE) 100 MG tablet Take 1 tablet (100 mg total) by mouth once daily. 30 tablet 2     No current facility-administered medications on file prior to visit.       Medications have been reviewed and reconciled with patient at visit today.    Barriers to medications present (no )    Adverse reactions to current medications (no)    Over the counter medications reviewed (Yes) and if needed added to active Medication list.    Exam:  Vitals:    03/02/23 0814   BP: 120/74   Pulse: 80   Resp: 18   Temp: 97.1 °F (36.2 °C)     Weight: 108.1 kg (238 lb 5.1 oz)   Body mass index is 38.47 kg/m².      Physical Exam  Vitals reviewed.   Constitutional:       General: She is not in acute distress.     Appearance: Normal appearance.   HENT:      Head: Normocephalic and atraumatic.      Right Ear: External ear normal.      Left Ear: External ear normal.      Nose: Nose normal.   Eyes:      General:         Right eye: No discharge.         Left eye: No discharge.      Pupils: Pupils are equal, round, and reactive to light.   Neck:      Thyroid: No thyromegaly.   Cardiovascular:      Rate and Rhythm: Normal rate and regular rhythm.      Heart sounds: Normal heart sounds. No murmur heard.  Pulmonary:      Effort: Pulmonary effort is normal. No respiratory distress.      Breath sounds: Normal breath sounds. No wheezing.   Abdominal:      General: Bowel sounds are normal. There is no distension.      Palpations: Abdomen is soft.      Tenderness: There is no abdominal  tenderness.   Musculoskeletal:         General: Normal range of motion.      Cervical back: Normal range of motion and neck supple.   Skin:     General: Skin is warm and dry.      Findings: No rash.   Neurological:      Mental Status: She is alert and oriented to person, place, and time.      Coordination: Coordination normal.   Psychiatric:         Behavior: Behavior normal.       Laboratory Reviewed: (Yes)  Lab Results   Component Value Date    WBC 8.45 03/02/2023    HGB 13.9 03/02/2023    HCT 47.1 03/02/2023     03/02/2023    CHOL 162 03/02/2023    TRIG 117 03/02/2023    HDL 51 03/02/2023    ALT 11 03/02/2023    AST 15 03/02/2023     03/02/2023    K 4.9 03/02/2023     03/02/2023    CREATININE 0.9 03/02/2023    BUN 15 03/02/2023    CO2 28 03/02/2023    TSH 1.720 03/02/2023    GLUF 99 08/08/2008    HGBA1C 5.2 03/02/2023       Assessment:  The primary encounter diagnosis was Routine physical examination. Diagnoses of Colon cancer screening, Screening for diabetes mellitus, Seasonal allergies, and Sinusitis, unspecified chronicity, unspecified location were also pertinent to this visit.    Plan:  Routine physical examination  -     Lipid Panel; Future; Expected date: 03/02/2023    Colon cancer screening  -     Fecal Immunochemical Test (iFOBT); Future; Expected date: 03/02/2023    Screening for diabetes mellitus    Seasonal allergies  -     fluticasone propionate (FLONASE) 50 mcg/actuation nasal spray; 1 spray (50 mcg total) by Each Nostril route once daily.  Dispense: 16 g; Refill: 0    Sinusitis, unspecified chronicity, unspecified location  -     amoxicillin (AMOXIL) 500 MG Tab; Take 1 tablet (500 mg total) by mouth every 12 (twelve) hours. for 10 days  Dispense: 20 tablet; Refill: 0    Other orders  -     ergocalciferol (ERGOCALCIFEROL) 50,000 unit Cap; Take 1 capsule (50,000 Units total) by mouth every 7 days.  Dispense: 12 capsule; Refill: 1      -Patient's lab results were reviewed and  discussed with patient  -Treatment options and alternatives were discussed with the patient. Patient expressed understanding. Patient was given the opportunity to ask questions and be an active participant in their medical care. Patient had no further questions or concerns at this time.   -Documentation of patient's health and condition was obtained from family member who was present during visit.  -Patient is an overall moderate risk for health complications from their medical conditions.     Follow up: follow up 6 months       Care Plan/Goals: Reviewed Yes   Goals          Patient Stated      Blood Pressure < 140/90 (pt-stated)                 After visit summary printed and given to patient upon discharge.  Patient goals and care plan are included in After visit summary.

## 2023-03-02 NOTE — PROGRESS NOTES
BARIATRIC NEW PATIENT EVALUATION    CHIEF COMPLAINT:   morbid obesity with a BMI of 36 and inability to lose weight.    HISTORY OF PRESENT ILLNESS:  Karla Arzola is a 62 y.o.-year-old female presenting for morbid obesity with a BMI of 36 and inability to lose weight. The patient has tried diets and medications including adipex. She reports renea loss while on adipex but regain after stopping. Unable to exercise due to knee osteoarthritis    HPI    CO-MORBIDITIES:  GERD, hypertension, and osteoarthritis    PAST MEDICAL HISTORY:  Past Medical History:   Diagnosis Date    Allergy     Anxiety     Hypertension     Obesity     Vitamin B 12 deficiency         PAST SURGICAL HISTORY:  Past Surgical History:   Procedure Laterality Date    BREAST BIOPSY Right 10/08/2021    MOUTH SURGERY      right knee scope      TUBAL LIGATION         FAMILY HISTORY:  Family History   Problem Relation Age of Onset    Diabetes Mother     Hypertension Mother     Stroke Mother     Hypertension Father     Hyperlipidemia Father     Kidney disease Father         SOCIAL HISTORY:   reports that she has never smoked. She has never used smokeless tobacco. She reports current alcohol use. She reports that she does not use drugs.     MEDICATIONS:  Current Outpatient Medications on File Prior to Visit   Medication Sig Dispense Refill    acyclovir (ZOVIRAX) 200 MG capsule Take 1 capsule (200 mg total) by mouth once daily. 90 capsule 1    albuterol (VENTOLIN HFA) 90 mcg/actuation inhaler Inhale 2 puffs into the lungs every 6 (six) hours as needed for Wheezing. Rescue 18 g 0    ALPRAZolam (XANAX) 0.25 MG tablet Take 1 tablet (0.25 mg total) by mouth 3 (three) times daily as needed for Insomnia or Anxiety. 60 tablet 0    amLODIPine (NORVASC) 10 MG tablet Take 1 tablet (10 mg total) by mouth once daily. 90 tablet 1    azelaic acid (FINACEA) 15 % Foam Apply 1 application topically 2 (two) times daily. 50 g 2    ciclopirox (PENLAC) 8 % Soln Apply to nails  once daily 6.6 mL 2    citalopram (CELEXA) 20 MG tablet Take 1 tablet (20 mg total) by mouth once daily. 90 tablet 3    cyanocobalamin (VITAMIN B-12) 100 MCG tablet Take 100 mcg by mouth once daily.      doxycycline monohydrate 100 mg Tab Take 1 tablet by mouth every day with food and not within 1 hour prior to lying down 30 tablet 2    fluticasone (FLONASE) 50 mcg/actuation nasal spray 1 spray (50 mcg total) by Each Nare route once daily. 16 g 0    furosemide (LASIX) 20 MG tablet Take 1 tablet (20 mg total) by mouth daily as needed (swelling). 60 tablet 1    ibuprofen (ADVIL,MOTRIN) 800 MG tablet Take 1 tablet (800 mg total) by mouth every 6 to 8 hours as needed for pain 20 tablet 0    ipratropium-albuteroL (COMBIVENT)  mcg/actuation inhaler Inhale 1 puff into the lungs every 6 (six) hours as needed for Wheezing or Shortness of Breath. Rescue 4 g 0    multivitamin capsule Take 1 capsule by mouth once daily.      naproxen (NAPROSYN) 500 MG tablet Take 1 tablet (500 mg total) by mouth 2 (two) times daily. 60 tablet 0    spironolactone (ALDACTONE) 100 MG tablet Take 1 tablet (100 mg total) by mouth once daily. 30 tablet 2     No current facility-administered medications on file prior to visit.       Medications have been reviewed.    ALLERGIES:  Review of patient's allergies indicates:  No Known Allergies    Allergies have been reviewed.    ROS:  Review of Systems   Constitutional:  Negative for chills, fatigue, fever and unexpected weight change.   Respiratory:  Negative for cough, shortness of breath, wheezing and stridor.    Cardiovascular:  Negative for chest pain, palpitations and leg swelling.   Gastrointestinal:  Negative for abdominal distention, abdominal pain, constipation, diarrhea, nausea and vomiting.   Genitourinary:  Negative for difficulty urinating, dysuria, frequency, hematuria and urgency.   Skin:  Negative for color change, pallor, rash and wound.   Hematological:  Does not bruise/bleed  easily.     PE:  Physical Exam  Vitals reviewed.   Constitutional:       Appearance: She is well-developed.   HENT:      Head: Normocephalic and atraumatic.   Cardiovascular:      Rate and Rhythm: Normal rate and regular rhythm.   Pulmonary:      Effort: Pulmonary effort is normal.      Breath sounds: Normal breath sounds.   Abdominal:      General: Bowel sounds are normal. There is no distension.      Palpations: Abdomen is soft.      Tenderness: There is no abdominal tenderness.   Musculoskeletal:      Cervical back: Neck supple.   Skin:     General: Skin is warm and dry.   Neurological:      Mental Status: She is alert and oriented to person, place, and time.       DIAGNOSIS:  1. Morbid obesity with a BMI of 36 and inability to lose weight.  2. Co-morbidities: GERD, hypertension, and osteoarthritis    PLAN:  The patient is a good candidate for Bariatric Surgery. She is interested in sleeve gastrectomy. The surgery and post-op care was discussed in detail with the patient. All questions were answered.    She understands that bariatric surgery is a tool to aid in weight loss and that she needs to be committed to the diet and exercise post-operatively for successful weight loss. Discussed with patient that bariatric surgery is not the easy way out and that it will take plenty of dedication on the patient's part to be successful. Also discussed the possibility of weight regain if the patient strays from the diet guidelines or exercise requirements. Patient verbalized understanding and wishes to proceed with the work-up.      Referring Physician: No ref. provider found  RTC: As scheduled.

## 2023-03-03 ENCOUNTER — OFFICE VISIT (OUTPATIENT)
Dept: OPHTHALMOLOGY | Facility: CLINIC | Age: 63
End: 2023-03-03
Payer: COMMERCIAL

## 2023-03-03 ENCOUNTER — PATIENT MESSAGE (OUTPATIENT)
Dept: OPHTHALMOLOGY | Facility: CLINIC | Age: 63
End: 2023-03-03

## 2023-03-03 DIAGNOSIS — H25.13 NUCLEAR SCLEROSIS, BILATERAL: Primary | ICD-10-CM

## 2023-03-03 DIAGNOSIS — H52.4 MYOPIA WITH PRESBYOPIA, BILATERAL: ICD-10-CM

## 2023-03-03 DIAGNOSIS — H18.513 CORNEAL GUTTATA OF BOTH EYES: ICD-10-CM

## 2023-03-03 DIAGNOSIS — H52.13 MYOPIA WITH PRESBYOPIA, BILATERAL: ICD-10-CM

## 2023-03-03 LAB
ESTIMATED AVG GLUCOSE: 103 MG/DL (ref 68–131)
HBA1C MFR BLD: 5.2 % (ref 4–5.6)

## 2023-03-03 PROCEDURE — 99999 PR PBB SHADOW E&M-EST. PATIENT-LVL III: CPT | Mod: PBBFAC,,, | Performed by: OPTOMETRIST

## 2023-03-03 PROCEDURE — 92015 DETERMINE REFRACTIVE STATE: CPT | Mod: S$GLB,,, | Performed by: OPTOMETRIST

## 2023-03-03 PROCEDURE — 92310 PR CONTACT LENS FITTING (NO CHANGE): ICD-10-PCS | Mod: S$GLB,,, | Performed by: OPTOMETRIST

## 2023-03-03 PROCEDURE — 92014 COMPRE OPH EXAM EST PT 1/>: CPT | Mod: S$GLB,,, | Performed by: OPTOMETRIST

## 2023-03-03 PROCEDURE — 92014 PR EYE EXAM, EST PATIENT,COMPREHESV: ICD-10-PCS | Mod: S$GLB,,, | Performed by: OPTOMETRIST

## 2023-03-03 PROCEDURE — 99999 PR PBB SHADOW E&M-EST. PATIENT-LVL III: ICD-10-PCS | Mod: PBBFAC,,, | Performed by: OPTOMETRIST

## 2023-03-03 PROCEDURE — 92310 CONTACT LENS FITTING OU: CPT | Mod: S$GLB,,, | Performed by: OPTOMETRIST

## 2023-03-03 PROCEDURE — 92015 PR REFRACTION: ICD-10-PCS | Mod: S$GLB,,, | Performed by: OPTOMETRIST

## 2023-03-03 NOTE — PROGRESS NOTES
HPI     Annual Exam            Comments: Vision changes since last eye exam?: 2022     Any eye pain today: None    Other ocular symptoms: vision changes at distance and near     Interested in contact lens fitting today? Yes                    Last edited by Mayo Han on 3/3/2023  2:36 PM.            Assessment /Plan     For exam results, see Encounter Report.    Nuclear sclerosis, bilateral  Cataracts not significantly affecting activities of daily living and therefore surgery is not indicated at this time.   Will continue to monitor over the next 12 months. Pt to call or RTC with any significant change in vision prior to next visit.     Corneal guttata of both eyes  Discussed Fuch's dystrophy and Ronnie if worsens  Monitor for now    Myopia with presbyopia, bilateral  Eyeglass Final Rx       Eyeglass Final Rx         Sphere Add    Right -5.00 +2.25    Left -5.75 +2.25      Expiration Date: 3/3/2024                  Contact Lens Prescription (3/3/2023)          Brand Base Curve Diameter Sphere    Right Air Optix Colors 8.60 14.0 -4.75    Left Air Optix Colors 8.60 14.0 -3.50      Expiration Date: 3/3/2024    Replacement: Monthly    Wearing Schedule: Daily Wear          Dispensed trial contact lenses today. Patient is to wear lenses for 1 week.   Ok to order supply if no problems. RTC PRN if any problems arise.    Otherwise, RTC 1 yr for dilated eye exam.  Discussed above and answered questions.

## 2023-03-06 ENCOUNTER — PATIENT MESSAGE (OUTPATIENT)
Dept: DERMATOLOGY | Facility: CLINIC | Age: 63
End: 2023-03-06
Payer: COMMERCIAL

## 2023-03-06 ENCOUNTER — PATIENT MESSAGE (OUTPATIENT)
Dept: OPTOMETRY | Facility: CLINIC | Age: 63
End: 2023-03-06
Payer: COMMERCIAL

## 2023-03-09 LAB — VIT B1 BLD-MCNC: 81 UG/L (ref 38–122)

## 2023-03-10 ENCOUNTER — HOSPITAL ENCOUNTER (OUTPATIENT)
Dept: PREADMISSION TESTING | Facility: HOSPITAL | Age: 63
Discharge: HOME | End: 2023-03-10
Attending: SURGERY
Payer: COMMERCIAL

## 2023-03-10 DIAGNOSIS — M19.90 OSTEOARTHRITIS, UNSPECIFIED OSTEOARTHRITIS TYPE, UNSPECIFIED SITE: ICD-10-CM

## 2023-03-10 DIAGNOSIS — I10 PRIMARY HYPERTENSION: ICD-10-CM

## 2023-03-10 DIAGNOSIS — E66.01 SEVERE OBESITY WITH BODY MASS INDEX (BMI) OF 35.0 TO 35.9 AND COMORBIDITY: Primary | ICD-10-CM

## 2023-03-28 ENCOUNTER — OFFICE VISIT (OUTPATIENT)
Dept: ORTHOPEDICS | Facility: CLINIC | Age: 63
End: 2023-03-28
Payer: COMMERCIAL

## 2023-03-28 VITALS — BODY MASS INDEX: 38.25 KG/M2 | WEIGHT: 238 LBS | HEIGHT: 66 IN

## 2023-03-28 DIAGNOSIS — R20.0 NUMBNESS AND TINGLING: Primary | ICD-10-CM

## 2023-03-28 DIAGNOSIS — M65.4 RADIAL STYLOID TENOSYNOVITIS (DE QUERVAIN): Primary | ICD-10-CM

## 2023-03-28 DIAGNOSIS — G56.01 CARPAL TUNNEL SYNDROME OF RIGHT WRIST: ICD-10-CM

## 2023-03-28 DIAGNOSIS — R20.2 NUMBNESS AND TINGLING: Primary | ICD-10-CM

## 2023-03-28 PROCEDURE — 99999 PR PBB SHADOW E&M-EST. PATIENT-LVL III: CPT | Mod: PBBFAC,,, | Performed by: ORTHOPAEDIC SURGERY

## 2023-03-28 PROCEDURE — 1160F PR REVIEW ALL MEDS BY PRESCRIBER/CLIN PHARMACIST DOCUMENTED: ICD-10-PCS | Mod: CPTII,S$GLB,, | Performed by: ORTHOPAEDIC SURGERY

## 2023-03-28 PROCEDURE — 20550 NJX 1 TENDON SHEATH/LIGAMENT: CPT | Mod: RT,S$GLB,, | Performed by: ORTHOPAEDIC SURGERY

## 2023-03-28 PROCEDURE — 20550 TENDON SHEATH: ICD-10-PCS | Mod: RT,S$GLB,, | Performed by: ORTHOPAEDIC SURGERY

## 2023-03-28 PROCEDURE — 99213 PR OFFICE/OUTPT VISIT, EST, LEVL III, 20-29 MIN: ICD-10-PCS | Mod: 25,S$GLB,, | Performed by: ORTHOPAEDIC SURGERY

## 2023-03-28 PROCEDURE — 99213 OFFICE O/P EST LOW 20 MIN: CPT | Mod: 25,S$GLB,, | Performed by: ORTHOPAEDIC SURGERY

## 2023-03-28 PROCEDURE — 1160F RVW MEDS BY RX/DR IN RCRD: CPT | Mod: CPTII,S$GLB,, | Performed by: ORTHOPAEDIC SURGERY

## 2023-03-28 PROCEDURE — 99999 PR PBB SHADOW E&M-EST. PATIENT-LVL III: ICD-10-PCS | Mod: PBBFAC,,, | Performed by: ORTHOPAEDIC SURGERY

## 2023-03-28 PROCEDURE — 3008F PR BODY MASS INDEX (BMI) DOCUMENTED: ICD-10-PCS | Mod: CPTII,S$GLB,, | Performed by: ORTHOPAEDIC SURGERY

## 2023-03-28 PROCEDURE — 3044F PR MOST RECENT HEMOGLOBIN A1C LEVEL <7.0%: ICD-10-PCS | Mod: CPTII,S$GLB,, | Performed by: ORTHOPAEDIC SURGERY

## 2023-03-28 PROCEDURE — 3044F HG A1C LEVEL LT 7.0%: CPT | Mod: CPTII,S$GLB,, | Performed by: ORTHOPAEDIC SURGERY

## 2023-03-28 PROCEDURE — 1159F PR MEDICATION LIST DOCUMENTED IN MEDICAL RECORD: ICD-10-PCS | Mod: CPTII,S$GLB,, | Performed by: ORTHOPAEDIC SURGERY

## 2023-03-28 PROCEDURE — 1159F MED LIST DOCD IN RCRD: CPT | Mod: CPTII,S$GLB,, | Performed by: ORTHOPAEDIC SURGERY

## 2023-03-28 PROCEDURE — 3008F BODY MASS INDEX DOCD: CPT | Mod: CPTII,S$GLB,, | Performed by: ORTHOPAEDIC SURGERY

## 2023-03-28 RX ORDER — TRIAMCINOLONE ACETONIDE 40 MG/ML
40 INJECTION, SUSPENSION INTRA-ARTICULAR; INTRAMUSCULAR
Status: DISCONTINUED | OUTPATIENT
Start: 2023-03-28 | End: 2023-03-28 | Stop reason: HOSPADM

## 2023-03-28 RX ADMIN — TRIAMCINOLONE ACETONIDE 40 MG: 40 INJECTION, SUSPENSION INTRA-ARTICULAR; INTRAMUSCULAR at 08:03

## 2023-03-28 NOTE — PROGRESS NOTES
Subjective:     Patient ID: Karla Arzola is a 62 y.o. female.    Chief Complaint: Pain of the Right Hand      HPI:  The patient is a 62-year-old female with right wrist de Quervain tendonitis last injected 06/07/2022 with good results until recently.  She is developed carpal tunnel symptoms with numbness and radicular symptoms up to her upper arm.  She is not had nerve conduction studies.  She has used a thumb spica splint.  She requests nerve conduction studies as well as injection right wrist de Quervain tendonitis    Past Medical History:   Diagnosis Date    Allergy     Anxiety     Hypertension     Obesity     Vitamin B 12 deficiency      Past Surgical History:   Procedure Laterality Date    BREAST BIOPSY Right 10/08/2021    MOUTH SURGERY      right knee scope      TUBAL LIGATION       Family History   Problem Relation Age of Onset    Diabetes Mother     Hypertension Mother     Stroke Mother     Hypertension Father     Hyperlipidemia Father     Kidney disease Father      Social History     Socioeconomic History    Marital status: Single   Occupational History     Employer: [employer]   Tobacco Use    Smoking status: Never    Smokeless tobacco: Never   Substance and Sexual Activity    Alcohol use: Yes     Alcohol/week: 0.0 standard drinks     Comment: socially     Drug use: No    Sexual activity: Yes   Other Topics Concern    Are you pregnant or think you may be? No    Breast-feeding No     Social Determinants of Health     Financial Resource Strain: Medium Risk    Difficulty of Paying Living Expenses: Somewhat hard   Food Insecurity: Food Insecurity Present    Worried About Running Out of Food in the Last Year: Sometimes true    Ran Out of Food in the Last Year: Never true   Transportation Needs: No Transportation Needs    Lack of Transportation (Medical): No    Lack of Transportation (Non-Medical): No   Physical Activity: Inactive    Days of Exercise per Week: 0 days    Minutes of Exercise  per Session: 0 min   Stress: Stress Concern Present    Feeling of Stress : To some extent   Social Connections: Unknown    Frequency of Communication with Friends and Family: Once a week    Frequency of Social Gatherings with Friends and Family: Patient refused    Active Member of Clubs or Organizations: Yes    Attends Club or Organization Meetings: 1 to 4 times per year    Marital Status:    Housing Stability: High Risk    Unable to Pay for Housing in the Last Year: Yes    Number of Places Lived in the Last Year: 1    Unstable Housing in the Last Year: No     Medication List with Changes/Refills   Current Medications    ACYCLOVIR (ZOVIRAX) 200 MG CAPSULE    Take 1 capsule (200 mg total) by mouth once daily.    ALBUTEROL (VENTOLIN HFA) 90 MCG/ACTUATION INHALER    Inhale 2 puffs into the lungs every 6 (six) hours as needed for Wheezing. Rescue    AMLODIPINE (NORVASC) 10 MG TABLET    Take 1 tablet (10 mg total) by mouth once daily.    AZELAIC ACID (FINACEA) 15 % FOAM    Apply 1 application topically 2 (two) times daily.    CICLOPIROX (PENLAC) 8 % SOLN    Apply to nails once daily    CITALOPRAM (CELEXA) 20 MG TABLET    Take 1 tablet (20 mg total) by mouth once daily.    ERGOCALCIFEROL (ERGOCALCIFEROL) 50,000 UNIT CAP    Take 1 capsule (50,000 Units total) by mouth every 7 days.    FLUTICASONE PROPIONATE (FLONASE) 50 MCG/ACTUATION NASAL SPRAY    1 spray (50 mcg total) by Each Nostril route once daily.    FUROSEMIDE (LASIX) 20 MG TABLET    Take 1 tablet (20 mg total) by mouth daily as needed (swelling).    IPRATROPIUM-ALBUTEROL (COMBIVENT)  MCG/ACTUATION INHALER    Inhale 1 puff into the lungs every 6 (six) hours as needed for Wheezing or Shortness of Breath. Rescue    MULTIVITAMIN CAPSULE    Take 1 capsule by mouth once daily.    SPIRONOLACTONE (ALDACTONE) 100 MG TABLET    Take 1 tablet (100 mg total) by mouth once daily.     Review of patient's allergies indicates:  No Known Allergies  Review of Systems    Constitutional: Negative for malaise/fatigue.   HENT:  Negative for hearing loss.    Eyes:  Negative for double vision and visual disturbance.   Cardiovascular:  Negative for chest pain.   Respiratory:  Negative for shortness of breath.    Endocrine: Negative for cold intolerance.   Hematologic/Lymphatic: Does not bruise/bleed easily.   Skin:  Negative for poor wound healing and suspicious lesions.   Musculoskeletal:  Negative for gout, joint pain and joint swelling.   Gastrointestinal:  Negative for nausea and vomiting.   Genitourinary:  Negative for dysuria.   Neurological:  Negative for numbness, paresthesias and sensory change.   Psychiatric/Behavioral:  Negative for depression, memory loss and substance abuse. The patient is not nervous/anxious.    Allergic/Immunologic: Negative for persistent infections.     Objective:   Body mass index is 38.41 kg/m².  There were no vitals filed for this visit.             General    Constitutional: She is oriented to person, place, and time. She appears well-developed and well-nourished. No distress.   HENT:   Head: Normocephalic.   Eyes: EOM are normal.   Pulmonary/Chest: Effort normal.   Neurological: She is oriented to person, place, and time.   Psychiatric: She has a normal mood and affect.             Right Hand/Wrist Exam     Inspection   Scars: Wrist - absent Hand -  absent  Effusion: Wrist - absent Hand -  absent    Pain   Wrist - The patient exhibits pain of the flexor/pronator group and extensory musculature.    Tests   Phalens sign: positive  Tinel's sign (median nerve): positive  Finkelstein's test: positive  Carpal Tunnel Compression Test: positive    Atrophy   Thenar:  negative  Intrinsic:  negative    Other     Neuorologic Exam    Median Distribution: abnormal  Ulnar Distribution: normal  Radial Distribution: normal    Comments:  The patient has a positive Tinel and positive Phalen sign.  There is no thenar atrophy noted.  She is tender about the 1st dorsal  extensor tendon compartment with a positive Finkelstein test          Vascular Exam       Capillary Refill  Right Hand: normal capillary refill        Relevant imaging results reviewed and interpreted by me, discussed with the patient and / or family today radiographs were not obtained today  Assessment:     Encounter Diagnoses   Name Primary?    Radial styloid tenosynovitis (de quervain) Yes    Carpal tunnel syndrome of right wrist         Plan:     The patient injected right wrist 1st dorsal extensor tendon compartment under sterile technique with 0.5 cc of Kenalog and 0.5 cc of 2% plain lidocaine.  She has a thumb spica splint.  She was sent for nerve conduction studies and will return with that study                Disclaimer: This note was prepared using a voice recognition system and is likely to have sound alike errors within the text.

## 2023-03-28 NOTE — PROCEDURES
Tendon Sheath    Date/Time: 3/28/2023 8:00 AM  Performed by: Stan Alexis MD  Authorized by: Stan Alexis MD     Consent Done?:  Yes (Verbal)  Indications:  Pain  Timeout: prior to procedure the correct patient, procedure, and site was verified    Local anesthesia used?: Yes    Local anesthetic:  Lidocaine 2% without epinephrine  Anesthetic total (ml):  0.5    Location:  Wrist  Site:  R first doral compartment  Ultrasonic guidance for needle placement?: No    Needle size:  25 G  Approach:  Radial  Medications:  40 mg triamcinolone acetonide 40 mg/mL

## 2023-04-03 ENCOUNTER — PATIENT MESSAGE (OUTPATIENT)
Dept: INTERNAL MEDICINE | Facility: CLINIC | Age: 63
End: 2023-04-03
Payer: COMMERCIAL

## 2023-04-03 DIAGNOSIS — E66.9 OBESITY, UNSPECIFIED CLASSIFICATION, UNSPECIFIED OBESITY TYPE, UNSPECIFIED WHETHER SERIOUS COMORBIDITY PRESENT: Primary | ICD-10-CM

## 2023-04-04 RX ORDER — DIETHYLPROPION HYDROCHLORIDE 25 MG/1
1 TABLET ORAL DAILY
Qty: 30 EACH | Refills: 0 | Status: SHIPPED | OUTPATIENT
Start: 2023-04-04 | End: 2023-06-22

## 2023-04-05 ENCOUNTER — PATIENT MESSAGE (OUTPATIENT)
Dept: INTERNAL MEDICINE | Facility: CLINIC | Age: 63
End: 2023-04-05

## 2023-04-06 ENCOUNTER — PATIENT MESSAGE (OUTPATIENT)
Dept: RHEUMATOLOGY | Facility: CLINIC | Age: 63
End: 2023-04-06
Payer: COMMERCIAL

## 2023-04-10 ENCOUNTER — HOSPITAL ENCOUNTER (OUTPATIENT)
Dept: RADIOLOGY | Facility: HOSPITAL | Age: 63
Discharge: HOME | End: 2023-04-10
Attending: PHYSICIAN ASSISTANT
Payer: COMMERCIAL

## 2023-04-10 ENCOUNTER — OFFICE VISIT (OUTPATIENT)
Dept: RHEUMATOLOGY | Facility: CLINIC | Age: 63
End: 2023-04-10
Payer: COMMERCIAL

## 2023-04-10 VITALS
HEIGHT: 66 IN | BODY MASS INDEX: 38.51 KG/M2 | HEART RATE: 91 BPM | SYSTOLIC BLOOD PRESSURE: 132 MMHG | WEIGHT: 239.63 LBS | DIASTOLIC BLOOD PRESSURE: 79 MMHG

## 2023-04-10 DIAGNOSIS — M25.50 POLYARTHRALGIA: ICD-10-CM

## 2023-04-10 DIAGNOSIS — H20.9 UVEITIS: ICD-10-CM

## 2023-04-10 DIAGNOSIS — M25.551 RIGHT HIP PAIN: ICD-10-CM

## 2023-04-10 DIAGNOSIS — R76.8 POSITIVE ANA (ANTINUCLEAR ANTIBODY): Primary | ICD-10-CM

## 2023-04-10 DIAGNOSIS — M54.50 LUMBOSACRAL PAIN: ICD-10-CM

## 2023-04-10 DIAGNOSIS — R79.82 ELEVATED C-REACTIVE PROTEIN (CRP): ICD-10-CM

## 2023-04-10 DIAGNOSIS — R70.0 ELEVATED SED RATE: ICD-10-CM

## 2023-04-10 PROCEDURE — 1159F MED LIST DOCD IN RCRD: CPT | Mod: CPTII,S$GLB,, | Performed by: PHYSICIAN ASSISTANT

## 2023-04-10 PROCEDURE — 72100 X-RAY EXAM L-S SPINE 2/3 VWS: CPT | Mod: TC

## 2023-04-10 PROCEDURE — 99214 PR OFFICE/OUTPT VISIT, EST, LEVL IV, 30-39 MIN: ICD-10-PCS | Mod: S$GLB,,, | Performed by: PHYSICIAN ASSISTANT

## 2023-04-10 PROCEDURE — 3008F BODY MASS INDEX DOCD: CPT | Mod: CPTII,S$GLB,, | Performed by: PHYSICIAN ASSISTANT

## 2023-04-10 PROCEDURE — 99214 OFFICE O/P EST MOD 30 MIN: CPT | Mod: S$GLB,,, | Performed by: PHYSICIAN ASSISTANT

## 2023-04-10 PROCEDURE — 73502 X-RAY EXAM HIP UNI 2-3 VIEWS: CPT | Mod: TC,RT

## 2023-04-10 PROCEDURE — 72100 XR LUMBAR SPINE 2 OR 3 VIEWS: ICD-10-PCS | Mod: 26,,, | Performed by: RADIOLOGY

## 2023-04-10 PROCEDURE — 99999 PR PBB SHADOW E&M-EST. PATIENT-LVL IV: CPT | Mod: PBBFAC,,, | Performed by: PHYSICIAN ASSISTANT

## 2023-04-10 PROCEDURE — 3078F DIAST BP <80 MM HG: CPT | Mod: CPTII,S$GLB,, | Performed by: PHYSICIAN ASSISTANT

## 2023-04-10 PROCEDURE — 99999 PR PBB SHADOW E&M-EST. PATIENT-LVL IV: ICD-10-PCS | Mod: PBBFAC,,, | Performed by: PHYSICIAN ASSISTANT

## 2023-04-10 PROCEDURE — 1160F RVW MEDS BY RX/DR IN RCRD: CPT | Mod: CPTII,S$GLB,, | Performed by: PHYSICIAN ASSISTANT

## 2023-04-10 PROCEDURE — 3008F PR BODY MASS INDEX (BMI) DOCUMENTED: ICD-10-PCS | Mod: CPTII,S$GLB,, | Performed by: PHYSICIAN ASSISTANT

## 2023-04-10 PROCEDURE — 1160F PR REVIEW ALL MEDS BY PRESCRIBER/CLIN PHARMACIST DOCUMENTED: ICD-10-PCS | Mod: CPTII,S$GLB,, | Performed by: PHYSICIAN ASSISTANT

## 2023-04-10 PROCEDURE — 3044F HG A1C LEVEL LT 7.0%: CPT | Mod: CPTII,S$GLB,, | Performed by: PHYSICIAN ASSISTANT

## 2023-04-10 PROCEDURE — 3075F SYST BP GE 130 - 139MM HG: CPT | Mod: CPTII,S$GLB,, | Performed by: PHYSICIAN ASSISTANT

## 2023-04-10 PROCEDURE — 3078F PR MOST RECENT DIASTOLIC BLOOD PRESSURE < 80 MM HG: ICD-10-PCS | Mod: CPTII,S$GLB,, | Performed by: PHYSICIAN ASSISTANT

## 2023-04-10 PROCEDURE — 73502 X-RAY EXAM HIP UNI 2-3 VIEWS: CPT | Mod: 26,RT,, | Performed by: RADIOLOGY

## 2023-04-10 PROCEDURE — 3075F PR MOST RECENT SYSTOLIC BLOOD PRESS GE 130-139MM HG: ICD-10-PCS | Mod: CPTII,S$GLB,, | Performed by: PHYSICIAN ASSISTANT

## 2023-04-10 PROCEDURE — 3044F PR MOST RECENT HEMOGLOBIN A1C LEVEL <7.0%: ICD-10-PCS | Mod: CPTII,S$GLB,, | Performed by: PHYSICIAN ASSISTANT

## 2023-04-10 PROCEDURE — 72100 X-RAY EXAM L-S SPINE 2/3 VWS: CPT | Mod: 26,,, | Performed by: RADIOLOGY

## 2023-04-10 PROCEDURE — 1159F PR MEDICATION LIST DOCUMENTED IN MEDICAL RECORD: ICD-10-PCS | Mod: CPTII,S$GLB,, | Performed by: PHYSICIAN ASSISTANT

## 2023-04-10 PROCEDURE — 73502 XR HIP WITH PELVIS WHEN PERFORMED, 2 OR 3  VIEWS RIGHT: ICD-10-PCS | Mod: 26,RT,, | Performed by: RADIOLOGY

## 2023-04-10 RX ORDER — PREDNISONE 5 MG/1
TABLET ORAL
Qty: 73 TABLET | Refills: 0 | Status: SHIPPED | OUTPATIENT
Start: 2023-04-10 | End: 2023-05-16

## 2023-04-13 ENCOUNTER — TELEPHONE (OUTPATIENT)
Dept: RHEUMATOLOGY | Facility: CLINIC | Age: 63
End: 2023-04-13
Payer: COMMERCIAL

## 2023-04-13 NOTE — TELEPHONE ENCOUNTER
----- Message from Taylor Russo PA-C sent at 4/10/2023  3:19 PM CDT -----  Starting a steroid trial.  Pls get her an appt in 1 mos w reg 4 a few days before.  Thanks.  Danni

## 2023-04-19 ENCOUNTER — PATIENT MESSAGE (OUTPATIENT)
Dept: ADMINISTRATIVE | Facility: HOSPITAL | Age: 63
End: 2023-04-19
Payer: COMMERCIAL

## 2023-04-27 ENCOUNTER — TELEPHONE (OUTPATIENT)
Dept: PSYCHIATRY | Facility: CLINIC | Age: 63
End: 2023-04-27
Payer: COMMERCIAL

## 2023-04-27 NOTE — TELEPHONE ENCOUNTER
----- Message from Sherry Tejeda sent at 4/27/2023  8:49 AM CDT -----  Contact: Karla  Patient is calling to speak with the nurse regarding appt. Reports needing to reschedule appt. Please give patient a call back at [phone].

## 2023-05-01 NOTE — PROGRESS NOTES
"NUTRITIONAL CONSULT    Referring Physician: Dr. Lezama  Reason for MNT Referral: Initial assessment for sleeve gastrectomy work-up    PAST MEDICAL HISTORY:   63 y.o. female.  [employer] employee    Weight history includes: currently at highest weight. Weight slowly started increasing around age 52, 20-30 lbs were gained after mother passed away in .     Dieting attempts include: medication (Adipex), intermittent fasting (successful for 3 months, lost 15-20 lbs, mom passed away and got off track), Weight Watchers (successful when following, 15 lbs lost). Recently prescribed diethylpropion and reports improvements to portion sizes being consumed.       Reports liking to set small weight loss goals to work towards, current goal is set at losing 15 lbs.       Past Medical History:   Diagnosis Date    Allergy     Anxiety     Hypertension     Obesity     Vitamin B 12 deficiency        CLINICAL DATA:  63 y.o.-year-old Black or  female.  Height: 66"  Weight: 241 lbs  IBW: 174 lbs +/-10%  BMI: 39  EBW: 86 lbs  Personal goal weight: 180 lbs    Goal for Bariatric Surgery: to improve quality of life and to lose weight    NUTRITION & HEALTH HISTORY:  Greatest challenge: portion control    Current diet recall:   24 hour recall  B: coffee  L: leftovers (macaroni and cheese + ribs)  S: little bag of oatmeal raisin cookie   D: ribs + green beans   Drinks: water       Current Diet:  Meal pattern: 2 (skips breakfast)  Protein supplements: 0 currently// in the past drank Premier Protein  Snackin-1 / day// not daily// chips, pb crackers  Vegetables: Likes a variety. Eats daily.  Fruits: Likes a variety. Eats 2-3 times per week.  Beverages: coffee (1 cup daily, creamer + splenda), water, Coke (once a week at most, previously was drinking 2 daily), tea (caffeine, sweetened with splenda)  Dining out: intake varies. At most once per week.   Cooking at home: Daily.  Grilled meats. Salads. Potatoes. Roast. Fajitas or " tacos. Beans. Fried foods (once per week). Not much rice in diet.     Exercise:  Past exercise: Adequate  Walked 3-5 miles per day.     Current exercise: Fair. Recently began.   Reports being unable to participate in strenuous activity due to left knee pain. Current exercise consists of dancing in house 3x/week for 30-60 minute sessions.     Vitamins / Minerals / Herbs:   Multivitamin, B12, Vitamin D, collagen with biotin (nail and hair health)    Food Allergies:   NKFA.   Reports intolerance to dairy products, but still consumes. Side effect of diarrhea.     Social:  Works regular daytime shifts.  Lives with: daughter + son .  Grocery shopping and food prep: daughter  Patient believes the household (mostly daughter) will be supportive after surgery.  Alcohol: 1 glass of wine 5 nights weekly.  Smoking: none     ASSESSMENT:  Patient reports attempts at weight loss, only to regain lost weight.  Patient demonstrated knowledge of healthy eating behaviors and exercise patterns; admits to not eating healthy and not exercising at this point.  Patient states willingness to change lifestyle and make behavior modifications.  Expect good  compliance after surgery at this time.    Insurance requires medically supervised diet prior to consideration for bariatric surgery.    BARIATRIC DIET DISCUSSION:  Discussed diet after surgery and related to patients food record.  Reviewed diet progression before and after surgery.  Reinforced that surgery is not a magic bullet and importance of low fat foods and no snacking.  Stressed importance of exercise and its role in achieving weight loss goals.  Answered all questions.    RECOMMENDATIONS:  Patient is a good candidate for bariatric surgery.    Needs 5 additional visit(s) with RD    PLAN:  Continue to review Bariatric Nutrition Guidebook at home and call with any questions.  Work on Bariatric Nutrition Checklist.  Consistency with exercise regimen.  Consistency with plate method at  meals that focuses on half plate portion size of non-starchy vegetables.     Return to clinic in 1 month.       SESSION TIME:  60 minutes

## 2023-05-02 ENCOUNTER — NUTRITION (OUTPATIENT)
Dept: INTERNAL MEDICINE | Facility: CLINIC | Age: 63
End: 2023-05-02
Payer: COMMERCIAL

## 2023-05-02 DIAGNOSIS — Z71.3 DIETARY COUNSELING: Primary | ICD-10-CM

## 2023-05-02 DIAGNOSIS — I10 PRIMARY HYPERTENSION: ICD-10-CM

## 2023-05-02 DIAGNOSIS — M19.90 OSTEOARTHRITIS, UNSPECIFIED OSTEOARTHRITIS TYPE, UNSPECIFIED SITE: ICD-10-CM

## 2023-05-02 DIAGNOSIS — E66.01 SEVERE OBESITY WITH BODY MASS INDEX (BMI) OF 35.0 TO 35.9 AND COMORBIDITY: ICD-10-CM

## 2023-05-02 DIAGNOSIS — Z71.3 PRE-BARIATRIC SURGERY NUTRITION EVALUATION: ICD-10-CM

## 2023-05-02 PROCEDURE — 97802 PR MED NUTR THER, 1ST, INDIV, EA 15 MIN: ICD-10-PCS | Mod: S$GLB,,, | Performed by: DIETITIAN, REGISTERED

## 2023-05-02 PROCEDURE — 97802 MEDICAL NUTRITION INDIV IN: CPT | Mod: S$GLB,,, | Performed by: DIETITIAN, REGISTERED

## 2023-05-03 ENCOUNTER — TELEPHONE (OUTPATIENT)
Dept: BARIATRICS | Facility: CLINIC | Age: 63
End: 2023-05-03
Payer: COMMERCIAL

## 2023-05-03 NOTE — TELEPHONE ENCOUNTER
Pt returned call.   Discussed program and insurance requirements including need for surgery at accredited and approved center and 6 month nutrition/diet program (including our policy of seeing the [facility] dietitian at first visit and at least last visit to review program specific guidelines) patient verbalized understanding .  She stated she is  not interested in coming to [place] (waiting for [place] to be added to insurance if/when get accreditation).  Patient to continue to follow up with surgeon and dietitian in [place]. Number to [facility] bariatric clinic given to patient if she decides to move forward with proceeding at [facility]

## 2023-05-03 NOTE — TELEPHONE ENCOUNTER
Received call from oRsita Long, [facility] , that pt was interested in Bariatric surgery and was required to come to main campus due to insurance.  Called pt to discuss program and insurance requirements, and to provide seminar instructions- pt answered; however, was at work in clinic and could not talk.  Direct call back number provided to pt to call back at her convenience

## 2023-05-06 DIAGNOSIS — A60.00 RECURRENT GENITAL HSV (HERPES SIMPLEX VIRUS) INFECTION: ICD-10-CM

## 2023-05-06 RX ORDER — ACYCLOVIR 200 MG/1
200 CAPSULE ORAL DAILY
Qty: 90 CAPSULE | Refills: 3 | Status: SHIPPED | OUTPATIENT
Start: 2023-05-06

## 2023-05-06 NOTE — TELEPHONE ENCOUNTER
No care due was identified.  Health [facility] Embedded Care Due Messages. Reference number: [id number].   5/06/2023 7:58:17 AM CDT

## 2023-05-07 NOTE — TELEPHONE ENCOUNTER
Refill Decision Note   Karla Arzola  is requesting a refill authorization.  Brief Assessment and Rationale for Refill:  Approve     Medication Therapy Plan:       Medication Reconciliation Completed: No   Comments:     No Care Gaps recommended.     Note composed:11:46 PM 05/06/2023

## 2023-05-12 ENCOUNTER — PATIENT MESSAGE (OUTPATIENT)
Dept: RHEUMATOLOGY | Facility: CLINIC | Age: 63
End: 2023-05-12
Payer: COMMERCIAL

## 2023-05-15 ENCOUNTER — LAB VISIT (OUTPATIENT)
Dept: LAB | Facility: HOSPITAL | Age: 63
End: 2023-05-15
Payer: COMMERCIAL

## 2023-05-15 DIAGNOSIS — R76.8 POSITIVE ANA (ANTINUCLEAR ANTIBODY): ICD-10-CM

## 2023-05-15 LAB
ALBUMIN SERPL BCP-MCNC: 3.7 G/DL (ref 3.5–5.2)
ALBUMIN SERPL BCP-MCNC: 3.7 G/DL (ref 3.5–5.2)
ALP SERPL-CCNC: 103 U/L (ref 55–135)
ALP SERPL-CCNC: 103 U/L (ref 55–135)
ALT SERPL W/O P-5'-P-CCNC: 15 U/L (ref 10–44)
ALT SERPL W/O P-5'-P-CCNC: 15 U/L (ref 10–44)
ANION GAP SERPL CALC-SCNC: 12 MMOL/L (ref 8–16)
ANION GAP SERPL CALC-SCNC: 12 MMOL/L (ref 8–16)
AST SERPL-CCNC: 16 U/L (ref 10–40)
AST SERPL-CCNC: 16 U/L (ref 10–40)
BASOPHILS # BLD AUTO: 0.01 K/UL (ref 0–0.2)
BASOPHILS # BLD AUTO: 0.01 K/UL (ref 0–0.2)
BASOPHILS NFR BLD: 0.1 % (ref 0–1.9)
BASOPHILS NFR BLD: 0.1 % (ref 0–1.9)
BILIRUB SERPL-MCNC: 0.7 MG/DL (ref 0.1–1)
BILIRUB SERPL-MCNC: 0.7 MG/DL (ref 0.1–1)
BUN SERPL-MCNC: 15 MG/DL (ref 8–23)
BUN SERPL-MCNC: 15 MG/DL (ref 8–23)
CALCIUM SERPL-MCNC: 9.2 MG/DL (ref 8.7–10.5)
CALCIUM SERPL-MCNC: 9.2 MG/DL (ref 8.7–10.5)
CHLORIDE SERPL-SCNC: 102 MMOL/L (ref 95–110)
CHLORIDE SERPL-SCNC: 102 MMOL/L (ref 95–110)
CO2 SERPL-SCNC: 28 MMOL/L (ref 23–29)
CO2 SERPL-SCNC: 28 MMOL/L (ref 23–29)
CREAT SERPL-MCNC: 1 MG/DL (ref 0.5–1.4)
CREAT SERPL-MCNC: 1 MG/DL (ref 0.5–1.4)
CRP SERPL-MCNC: 16.3 MG/L (ref 0–8.2)
DIFFERENTIAL METHOD: ABNORMAL
DIFFERENTIAL METHOD: ABNORMAL
EOSINOPHIL # BLD AUTO: 0.1 K/UL (ref 0–0.5)
EOSINOPHIL # BLD AUTO: 0.1 K/UL (ref 0–0.5)
EOSINOPHIL NFR BLD: 1.5 % (ref 0–8)
EOSINOPHIL NFR BLD: 1.5 % (ref 0–8)
ERYTHROCYTE [DISTWIDTH] IN BLOOD BY AUTOMATED COUNT: 14.7 % (ref 11.5–14.5)
ERYTHROCYTE [DISTWIDTH] IN BLOOD BY AUTOMATED COUNT: 14.7 % (ref 11.5–14.5)
ERYTHROCYTE [SEDIMENTATION RATE] IN BLOOD BY WESTERGREN METHOD: 11 MM/HR (ref 0–20)
EST. GFR  (NO RACE VARIABLE): >60 ML/MIN/1.73 M^2
EST. GFR  (NO RACE VARIABLE): >60 ML/MIN/1.73 M^2
GLUCOSE SERPL-MCNC: 89 MG/DL (ref 70–110)
GLUCOSE SERPL-MCNC: 89 MG/DL (ref 70–110)
HCT VFR BLD AUTO: 45.1 % (ref 37–48.5)
HCT VFR BLD AUTO: 45.1 % (ref 37–48.5)
HGB BLD-MCNC: 14.2 G/DL (ref 12–16)
HGB BLD-MCNC: 14.2 G/DL (ref 12–16)
IMM GRANULOCYTES # BLD AUTO: 0.01 K/UL (ref 0–0.04)
IMM GRANULOCYTES # BLD AUTO: 0.01 K/UL (ref 0–0.04)
IMM GRANULOCYTES NFR BLD AUTO: 0.1 % (ref 0–0.5)
IMM GRANULOCYTES NFR BLD AUTO: 0.1 % (ref 0–0.5)
LYMPHOCYTES # BLD AUTO: 2.7 K/UL (ref 1–4.8)
LYMPHOCYTES # BLD AUTO: 2.7 K/UL (ref 1–4.8)
LYMPHOCYTES NFR BLD: 36.7 % (ref 18–48)
LYMPHOCYTES NFR BLD: 36.7 % (ref 18–48)
MCH RBC QN AUTO: 28.7 PG (ref 27–31)
MCH RBC QN AUTO: 28.7 PG (ref 27–31)
MCHC RBC AUTO-ENTMCNC: 31.5 G/DL (ref 32–36)
MCHC RBC AUTO-ENTMCNC: 31.5 G/DL (ref 32–36)
MCV RBC AUTO: 91 FL (ref 82–98)
MCV RBC AUTO: 91 FL (ref 82–98)
MONOCYTES # BLD AUTO: 0.6 K/UL (ref 0.3–1)
MONOCYTES # BLD AUTO: 0.6 K/UL (ref 0.3–1)
MONOCYTES NFR BLD: 7.8 % (ref 4–15)
MONOCYTES NFR BLD: 7.8 % (ref 4–15)
NEUTROPHILS # BLD AUTO: 4 K/UL (ref 1.8–7.7)
NEUTROPHILS # BLD AUTO: 4 K/UL (ref 1.8–7.7)
NEUTROPHILS NFR BLD: 53.8 % (ref 38–73)
NEUTROPHILS NFR BLD: 53.8 % (ref 38–73)
NRBC BLD-RTO: 0 /100 WBC
NRBC BLD-RTO: 0 /100 WBC
PLATELET # BLD AUTO: 243 K/UL (ref 150–450)
PLATELET # BLD AUTO: 243 K/UL (ref 150–450)
PMV BLD AUTO: 10.5 FL (ref 9.2–12.9)
PMV BLD AUTO: 10.5 FL (ref 9.2–12.9)
POTASSIUM SERPL-SCNC: 4.1 MMOL/L (ref 3.5–5.1)
POTASSIUM SERPL-SCNC: 4.1 MMOL/L (ref 3.5–5.1)
PROT SERPL-MCNC: 6.7 G/DL (ref 6–8.4)
PROT SERPL-MCNC: 6.7 G/DL (ref 6–8.4)
RBC # BLD AUTO: 4.94 M/UL (ref 4–5.4)
RBC # BLD AUTO: 4.94 M/UL (ref 4–5.4)
SODIUM SERPL-SCNC: 142 MMOL/L (ref 136–145)
SODIUM SERPL-SCNC: 142 MMOL/L (ref 136–145)
WBC # BLD AUTO: 7.35 K/UL (ref 3.9–12.7)
WBC # BLD AUTO: 7.35 K/UL (ref 3.9–12.7)

## 2023-05-15 PROCEDURE — 86039 ANTINUCLEAR ANTIBODIES (ANA): CPT | Performed by: PHYSICIAN ASSISTANT

## 2023-05-15 PROCEDURE — 85025 COMPLETE CBC W/AUTO DIFF WBC: CPT | Performed by: PHYSICIAN ASSISTANT

## 2023-05-15 PROCEDURE — 86038 ANTINUCLEAR ANTIBODIES: CPT | Performed by: PHYSICIAN ASSISTANT

## 2023-05-15 PROCEDURE — 85651 RBC SED RATE NONAUTOMATED: CPT | Performed by: PHYSICIAN ASSISTANT

## 2023-05-15 PROCEDURE — 80053 COMPREHEN METABOLIC PANEL: CPT | Performed by: PHYSICIAN ASSISTANT

## 2023-05-15 PROCEDURE — 36415 COLL VENOUS BLD VENIPUNCTURE: CPT | Performed by: PHYSICIAN ASSISTANT

## 2023-05-15 PROCEDURE — 86140 C-REACTIVE PROTEIN: CPT | Performed by: PHYSICIAN ASSISTANT

## 2023-05-15 PROCEDURE — 86235 NUCLEAR ANTIGEN ANTIBODY: CPT | Mod: 59 | Performed by: PHYSICIAN ASSISTANT

## 2023-05-16 ENCOUNTER — OFFICE VISIT (OUTPATIENT)
Dept: PHYSICAL MEDICINE AND REHAB | Facility: CLINIC | Age: 63
End: 2023-05-16
Payer: COMMERCIAL

## 2023-05-16 ENCOUNTER — OFFICE VISIT (OUTPATIENT)
Dept: RHEUMATOLOGY | Facility: CLINIC | Age: 63
End: 2023-05-16
Payer: COMMERCIAL

## 2023-05-16 VITALS
BODY MASS INDEX: 39.21 KG/M2 | WEIGHT: 244.5 LBS | SYSTOLIC BLOOD PRESSURE: 135 MMHG | HEIGHT: 66 IN | SYSTOLIC BLOOD PRESSURE: 131 MMHG | HEIGHT: 66 IN | HEART RATE: 81 BPM | DIASTOLIC BLOOD PRESSURE: 85 MMHG | WEIGHT: 244 LBS | BODY MASS INDEX: 39.29 KG/M2 | RESPIRATION RATE: 13 BRPM | DIASTOLIC BLOOD PRESSURE: 74 MMHG | HEART RATE: 78 BPM

## 2023-05-16 DIAGNOSIS — R20.0 NUMBNESS AND TINGLING IN RIGHT HAND: ICD-10-CM

## 2023-05-16 DIAGNOSIS — G56.03 BILATERAL CARPAL TUNNEL SYNDROME: ICD-10-CM

## 2023-05-16 DIAGNOSIS — R76.8 POSITIVE ANA (ANTINUCLEAR ANTIBODY): Primary | ICD-10-CM

## 2023-05-16 DIAGNOSIS — M94.262 CHONDROMALACIA, LEFT KNEE: ICD-10-CM

## 2023-05-16 DIAGNOSIS — R20.2 NUMBNESS AND TINGLING IN RIGHT HAND: ICD-10-CM

## 2023-05-16 DIAGNOSIS — M65.4 DE QUERVAIN'S TENOSYNOVITIS, RIGHT: ICD-10-CM

## 2023-05-16 DIAGNOSIS — S83.282A TEAR OF LATERAL MENISCUS OF LEFT KNEE, CURRENT, UNSPECIFIED TEAR TYPE, INITIAL ENCOUNTER: ICD-10-CM

## 2023-05-16 DIAGNOSIS — M79.641 PAIN OF RIGHT HAND: ICD-10-CM

## 2023-05-16 PROCEDURE — 99999 PR PBB SHADOW E&M-EST. PATIENT-LVL IV: ICD-10-PCS | Mod: PBBFAC,,, | Performed by: PHYSICIAN ASSISTANT

## 2023-05-16 PROCEDURE — 3075F PR MOST RECENT SYSTOLIC BLOOD PRESS GE 130-139MM HG: ICD-10-PCS | Mod: CPTII,S$GLB,, | Performed by: PHYSICIAN ASSISTANT

## 2023-05-16 PROCEDURE — 3008F BODY MASS INDEX DOCD: CPT | Mod: CPTII,S$GLB,, | Performed by: PHYSICIAN ASSISTANT

## 2023-05-16 PROCEDURE — 3008F PR BODY MASS INDEX (BMI) DOCUMENTED: ICD-10-PCS | Mod: CPTII,S$GLB,, | Performed by: PHYSICIAN ASSISTANT

## 2023-05-16 PROCEDURE — 3044F PR MOST RECENT HEMOGLOBIN A1C LEVEL <7.0%: ICD-10-PCS | Mod: CPTII,S$GLB,, | Performed by: PHYSICIAN ASSISTANT

## 2023-05-16 PROCEDURE — 99213 PR OFFICE/OUTPT VISIT, EST, LEVL III, 20-29 MIN: ICD-10-PCS | Mod: S$GLB,,, | Performed by: PHYSICIAN ASSISTANT

## 2023-05-16 PROCEDURE — 99999 PR PBB SHADOW E&M-EST. PATIENT-LVL IV: CPT | Mod: PBBFAC,,, | Performed by: PHYSICIAN ASSISTANT

## 2023-05-16 PROCEDURE — 95911 PR NERVE CONDUCTION STUDY; 9-10 STUDIES: ICD-10-PCS | Mod: S$GLB,,, | Performed by: PHYSICAL MEDICINE & REHABILITATION

## 2023-05-16 PROCEDURE — 3075F SYST BP GE 130 - 139MM HG: CPT | Mod: CPTII,S$GLB,, | Performed by: PHYSICIAN ASSISTANT

## 2023-05-16 PROCEDURE — 3044F HG A1C LEVEL LT 7.0%: CPT | Mod: CPTII,S$GLB,, | Performed by: PHYSICIAN ASSISTANT

## 2023-05-16 PROCEDURE — 3078F PR MOST RECENT DIASTOLIC BLOOD PRESSURE < 80 MM HG: ICD-10-PCS | Mod: CPTII,S$GLB,, | Performed by: PHYSICIAN ASSISTANT

## 2023-05-16 PROCEDURE — 99999 PR PBB SHADOW E&M-EST. PATIENT-LVL III: CPT | Mod: PBBFAC,,, | Performed by: PHYSICAL MEDICINE & REHABILITATION

## 2023-05-16 PROCEDURE — 95911 NRV CNDJ TEST 9-10 STUDIES: CPT | Mod: S$GLB,,, | Performed by: PHYSICAL MEDICINE & REHABILITATION

## 2023-05-16 PROCEDURE — 99999 PR PBB SHADOW E&M-EST. PATIENT-LVL III: ICD-10-PCS | Mod: PBBFAC,,, | Performed by: PHYSICAL MEDICINE & REHABILITATION

## 2023-05-16 PROCEDURE — 99213 OFFICE O/P EST LOW 20 MIN: CPT | Mod: S$GLB,,, | Performed by: PHYSICIAN ASSISTANT

## 2023-05-16 PROCEDURE — 99499 UNLISTED E&M SERVICE: CPT | Mod: S$GLB,,, | Performed by: PHYSICAL MEDICINE & REHABILITATION

## 2023-05-16 PROCEDURE — 99499 NO LOS: ICD-10-PCS | Mod: S$GLB,,, | Performed by: PHYSICAL MEDICINE & REHABILITATION

## 2023-05-16 PROCEDURE — 3078F DIAST BP <80 MM HG: CPT | Mod: CPTII,S$GLB,, | Performed by: PHYSICIAN ASSISTANT

## 2023-05-16 RX ORDER — NAPROXEN 500 MG/1
500 TABLET ORAL 2 TIMES DAILY
Qty: 180 TABLET | Refills: 1 | Status: SHIPPED | OUTPATIENT
Start: 2023-05-16 | End: 2023-06-22 | Stop reason: SDUPTHER

## 2023-05-16 NOTE — PROGRESS NOTES
Subjective:      Patient ID: Karla Arzola is a 63 y.o. female.    Chief Complaint: Positive DALI      HPI   Karla Arzola  is a 63 y.o. female here for f/u after PDN trial for +DALI, joint pain and elevated inflammatory markers.  Pain did not improve significantly w PDN trial.  Now also c/o left knee pain and right hand pain.     As per the left knee, she has had corticosteroid injections as well as Euflexxa injections.  MRI with orthopedic shows an anterior horn lateral meniscus tear with chondral thinning in the lateral compartment.  X-rays show relatively well-maintained joint spaces.  She follows up with Dr. Jesi valente next week.  They are considering knee arthroscopy.    Also with right hand pain.  She has EMG nerve conduction study later today to evaluate for carpal tunnel syndrome.  She does have numbness and tingling in her fingers.  She also has had wrist pain from de Quervain tenosynovitis.  This improved with a corticosteroid injection with Dr. Alexis.    Pain is more mechanical in nature.  CRP increased slightly after PDN 20 mg taper over 4 weeks.    Previously I have seen her in the past for positive DALI with anterior uveitis, treated by Dr. Lafluer.  Overall the eye is feeling much better.  She states the redness has resolved.  She has no eye pain no visual disturbances.  We never initiated immunosuppressive therapy for the anterior uveitis.    She denies recent history of infections, unexplained fevers.  No obvious sxs of infection. No rash, oral ulcerations.  She also has history of sciatica.  She gets pain in the lumbosacral region.  In the past autoimmune workup has been largely unrevealing.     She endorses morning stiffness lasting 15-20 min. Has meniscus tear left knee.  Recently did eu\flexxa w ortho.  May consider knee ATS if no improvement.  She denies migratory pain as well as tender swollen joints.  She has dyspnea on exertion of walking more than about 5-10 minutes.  Started  after she had covid in 2020.  Seeing pulmonlogy.     Patient denies fevers, chills, photosensitivity, eye pain, chest pain, hematuria, blood in the stool, rash, sicca symptoms, raynauds, finger ulcerations.  Rheumatologic systems otherwise negative.    Serologies/Labs:  (+) DALI 1:320 homogenous, neg profile  Neg RF, CCP  HLA B 27 negative  Current Treatment:  Prednisone taper  Previous Treatment:   Pred forte drops eyes      Current Outpatient Medications:     acyclovir (ZOVIRAX) 200 MG capsule, Take 1 capsule (200 mg total) by mouth once daily., Disp: 90 capsule, Rfl: 3    amLODIPine (NORVASC) 10 MG tablet, Take 1 tablet (10 mg total) by mouth once daily., Disp: 90 tablet, Rfl: 1    azelaic acid (FINACEA) 15 % Foam, Apply 1 application topically 2 (two) times daily., Disp: 50 g, Rfl: 2    ciclopirox (PENLAC) 8 % Soln, Apply to nails once daily, Disp: 6.6 mL, Rfl: 2    citalopram (CELEXA) 20 MG tablet, Take 1 tablet (20 mg total) by mouth once daily., Disp: 90 tablet, Rfl: 3    diethylpropion 25 mg Tab, Take 1 tablet by mouth once daily., Disp: 30 each, Rfl: 0    ergocalciferol (ERGOCALCIFEROL) 50,000 unit Cap, Take 1 capsule (50,000 Units total) by mouth every 7 days., Disp: 12 capsule, Rfl: 1    fluticasone propionate (FLONASE) 50 mcg/actuation nasal spray, 1 spray (50 mcg total) by Each Nostril route once daily., Disp: 16 g, Rfl: 0    furosemide (LASIX) 20 MG tablet, Take 1 tablet (20 mg total) by mouth daily as needed (swelling)., Disp: 60 tablet, Rfl: 1    ipratropium-albuteroL (COMBIVENT)  mcg/actuation inhaler, Inhale 1 puff into the lungs every 6 (six) hours as needed for Wheezing or Shortness of Breath. Rescue, Disp: 4 g, Rfl: 0    multivitamin capsule, Take 1 capsule by mouth once daily., Disp: , Rfl:     albuterol (VENTOLIN HFA) 90 mcg/actuation inhaler, Inhale 2 puffs into the lungs every 6 (six) hours as needed for Wheezing. Rescue, Disp: 18 g, Rfl: 0    naproxen (NAPROSYN) 500 MG tablet, Take 1  tablet (500 mg total) by mouth 2 (two) times daily., Disp: 180 tablet, Rfl: 1    Past Medical History:   Diagnosis Date    Allergy     Anxiety     Hypertension     Obesity     Vitamin B 12 deficiency      Family History   Problem Relation Age of Onset    Diabetes Mother     Hypertension Mother     Stroke Mother     Hypertension Father     Hyperlipidemia Father     Kidney disease Father      Social History     Socioeconomic History    Marital status: Single   Occupational History     Employer: [employer]   Tobacco Use    Smoking status: Never    Smokeless tobacco: Never   Substance and Sexual Activity    Alcohol use: Yes     Alcohol/week: 0.0 standard drinks     Comment: socially     Drug use: No    Sexual activity: Yes   Other Topics Concern    Are you pregnant or think you may be? No    Breast-feeding No     Social Determinants of Health     Financial Resource Strain: Medium Risk    Difficulty of Paying Living Expenses: Somewhat hard   Food Insecurity: Food Insecurity Present    Worried About Running Out of Food in the Last Year: Sometimes true    Ran Out of Food in the Last Year: Never true   Transportation Needs: No Transportation Needs    Lack of Transportation (Medical): No    Lack of Transportation (Non-Medical): No   Physical Activity: Inactive    Days of Exercise per Week: 0 days    Minutes of Exercise per Session: 0 min   Stress: Stress Concern Present    Feeling of Stress : To some extent   Social Connections: Unknown    Frequency of Communication with Friends and Family: Once a week    Frequency of Social Gatherings with Friends and Family: Patient refused    Active Member of Clubs or Organizations: Yes    Attends Club or Organization Meetings: 1 to 4 times per year    Marital Status:    Housing Stability: High Risk    Unable to Pay for Housing in the Last Year: Yes    Number of Places Lived in the Last Year: 1    Unstable Housing in the Last Year: No     Review of patient's  "allergies indicates:  No Known Allergies    Objective:   /74   Pulse 78   Ht 5' 6" (1.676 m)   Wt 110.9 kg (244 lb 7.8 oz)   LMP 06/11/2015   BMI 39.46 kg/m²   Immunization History   Administered Date(s) Administered    COVID-19, MRNA, LN-S, PF (Pfizer) (Purple Cap) 01/11/2021, 02/01/2021, 01/14/2022    Influenza 10/12/2007, 10/10/2008, 09/24/2009, 10/29/2010    Influenza (FLUAD) - Quadrivalent - Adjuvanted - PF *Preferred* (65+) 09/26/2022    Influenza - Quadrivalent 10/07/2015, 09/30/2016    Influenza - Quadrivalent - PF *Preferred* (6 months and older) 09/28/2017, 09/27/2018, 09/25/2019, 11/17/2020, 09/23/2021    Influenza - Trivalent (ADULT) 10/12/2007, 10/10/2008, 09/24/2009, 10/29/2010    Influenza A (H1N1) 2009 Monovalent - IM 10/27/2009    Tdap 11/01/2007, 04/14/2011    Zoster Recombinant 12/01/2021       Physical Exam   Constitutional: She is oriented to person, place, and time. No distress.   HENT:   Head: Normocephalic and atraumatic.   Pulmonary/Chest: Effort normal.   Abdominal: She exhibits no distension.   Musculoskeletal:         General: No swelling or tenderness. Normal range of motion.      Cervical back: Normal range of motion.   Lymphadenopathy:     She has no cervical adenopathy.   Neurological: She is alert and oriented to person, place, and time.   Skin: Skin is warm and dry. No rash noted.   Psychiatric: Mood normal.   Nursing note and vitals reviewed.   No synovitis, dactylitis, enthesitis    Left knee  LUIS  No effusion  TTP MJL, LJL, MFC  Right hand  Neg finklesteins  min ttp 1st dorsl comp  NVI        Recent Results (from the past 672 hour(s))   Comprehensive Metabolic Panel    Collection Time: 05/15/23 11:49 AM   Result Value Ref Range    Sodium 142 136 - 145 mmol/L    Potassium 4.1 3.5 - 5.1 mmol/L    Chloride 102 95 - 110 mmol/L    CO2 28 23 - 29 mmol/L    Glucose 89 70 - 110 mg/dL    BUN 15 8 - 23 mg/dL    Creatinine 1.0 0.5 - 1.4 mg/dL    Calcium 9.2 8.7 - 10.5 mg/dL    " Total Protein 6.7 6.0 - 8.4 g/dL    Albumin 3.7 3.5 - 5.2 g/dL    Total Bilirubin 0.7 0.1 - 1.0 mg/dL    Alkaline Phosphatase 103 55 - 135 U/L    AST 16 10 - 40 U/L    ALT 15 10 - 44 U/L    Anion Gap 12 8 - 16 mmol/L    eGFR >60 >60 mL/min/1.73 m^2   CBC Auto Differential    Collection Time: 05/15/23 11:49 AM   Result Value Ref Range    WBC 7.35 3.90 - 12.70 K/uL    RBC 4.94 4.00 - 5.40 M/uL    Hemoglobin 14.2 12.0 - 16.0 g/dL    Hematocrit 45.1 37.0 - 48.5 %    MCV 91 82 - 98 fL    MCH 28.7 27.0 - 31.0 pg    MCHC 31.5 (L) 32.0 - 36.0 g/dL    RDW 14.7 (H) 11.5 - 14.5 %    Platelets 243 150 - 450 K/uL    MPV 10.5 9.2 - 12.9 fL    Immature Granulocytes 0.1 0.0 - 0.5 %    Gran # (ANC) 4.0 1.8 - 7.7 K/uL    Immature Grans (Abs) 0.01 0.00 - 0.04 K/uL    Lymph # 2.7 1.0 - 4.8 K/uL    Mono # 0.6 0.3 - 1.0 K/uL    Eos # 0.1 0.0 - 0.5 K/uL    Baso # 0.01 0.00 - 0.20 K/uL    nRBC 0 0 /100 WBC    Gran % 53.8 38.0 - 73.0 %    Lymph % 36.7 18.0 - 48.0 %    Mono % 7.8 4.0 - 15.0 %    Eosinophil % 1.5 0.0 - 8.0 %    Basophil % 0.1 0.0 - 1.9 %    Differential Method Automated    CBC Auto Differential    Collection Time: 05/15/23 11:49 AM   Result Value Ref Range    WBC 7.35 3.90 - 12.70 K/uL    RBC 4.94 4.00 - 5.40 M/uL    Hemoglobin 14.2 12.0 - 16.0 g/dL    Hematocrit 45.1 37.0 - 48.5 %    MCV 91 82 - 98 fL    MCH 28.7 27.0 - 31.0 pg    MCHC 31.5 (L) 32.0 - 36.0 g/dL    RDW 14.7 (H) 11.5 - 14.5 %    Platelets 243 150 - 450 K/uL    MPV 10.5 9.2 - 12.9 fL    Immature Granulocytes 0.1 0.0 - 0.5 %    Gran # (ANC) 4.0 1.8 - 7.7 K/uL    Immature Grans (Abs) 0.01 0.00 - 0.04 K/uL    Lymph # 2.7 1.0 - 4.8 K/uL    Mono # 0.6 0.3 - 1.0 K/uL    Eos # 0.1 0.0 - 0.5 K/uL    Baso # 0.01 0.00 - 0.20 K/uL    nRBC 0 0 /100 WBC    Gran % 53.8 38.0 - 73.0 %    Lymph % 36.7 18.0 - 48.0 %    Mono % 7.8 4.0 - 15.0 %    Eosinophil % 1.5 0.0 - 8.0 %    Basophil % 0.1 0.0 - 1.9 %    Differential Method Automated    Comprehensive Metabolic Panel     Collection Time: 05/15/23 11:49 AM   Result Value Ref Range    Sodium 142 136 - 145 mmol/L    Potassium 4.1 3.5 - 5.1 mmol/L    Chloride 102 95 - 110 mmol/L    CO2 28 23 - 29 mmol/L    Glucose 89 70 - 110 mg/dL    BUN 15 8 - 23 mg/dL    Creatinine 1.0 0.5 - 1.4 mg/dL    Calcium 9.2 8.7 - 10.5 mg/dL    Total Protein 6.7 6.0 - 8.4 g/dL    Albumin 3.7 3.5 - 5.2 g/dL    Total Bilirubin 0.7 0.1 - 1.0 mg/dL    Alkaline Phosphatase 103 55 - 135 U/L    AST 16 10 - 40 U/L    ALT 15 10 - 44 U/L    Anion Gap 12 8 - 16 mmol/L    eGFR >60 >60 mL/min/1.73 m^2   Sedimentation rate    Collection Time: 05/15/23 11:49 AM   Result Value Ref Range    Sed Rate 11 0 - 20 mm/Hr   C-Reactive Protein    Collection Time: 05/15/23 11:49 AM   Result Value Ref Range    CRP 16.3 (H) 0.0 - 8.2 mg/L     Recent Results (from the past 1008 hour(s))   CBC Auto Differential    Collection Time: 04/10/23  9:51 AM   Result Value Ref Range    WBC 7.82 3.90 - 12.70 K/uL    RBC 5.06 4.00 - 5.40 M/uL    Hemoglobin 14.4 12.0 - 16.0 g/dL    Hematocrit 46.2 37.0 - 48.5 %    MCV 91 82 - 98 fL    MCH 28.5 27.0 - 31.0 pg    MCHC 31.2 (L) 32.0 - 36.0 g/dL    RDW 14.8 (H) 11.5 - 14.5 %    Platelets 262 150 - 450 K/uL    MPV 10.4 9.2 - 12.9 fL    Immature Granulocytes 0.3 0.0 - 0.5 %    Gran # (ANC) 4.6 1.8 - 7.7 K/uL    Immature Grans (Abs) 0.02 0.00 - 0.04 K/uL    Lymph # 2.5 1.0 - 4.8 K/uL    Mono # 0.6 0.3 - 1.0 K/uL    Eos # 0.1 0.0 - 0.5 K/uL    Baso # 0.02 0.00 - 0.20 K/uL    nRBC 0 0 /100 WBC    Gran % 59.2 38.0 - 73.0 %    Lymph % 31.5 18.0 - 48.0 %    Mono % 7.7 4.0 - 15.0 %    Eosinophil % 1.0 0.0 - 8.0 %    Basophil % 0.3 0.0 - 1.9 %    Differential Method Automated    Comprehensive Metabolic Panel    Collection Time: 04/10/23  9:51 AM   Result Value Ref Range    Sodium 142 136 - 145 mmol/L    Potassium 4.0 3.5 - 5.1 mmol/L    Chloride 107 95 - 110 mmol/L    CO2 27 23 - 29 mmol/L    Glucose 80 70 - 110 mg/dL    BUN 16 6 - 20 mg/dL    Creatinine 1.0 0.5 -  1.4 mg/dL    Calcium 9.3 8.7 - 10.5 mg/dL    Total Protein 6.8 6.0 - 8.4 g/dL    Albumin 3.6 3.5 - 5.2 g/dL    Total Bilirubin 0.5 0.1 - 1.0 mg/dL    Alkaline Phosphatase 94 55 - 135 U/L    AST 13 10 - 40 U/L    ALT 14 10 - 44 U/L    Anion Gap 8 8 - 16 mmol/L    eGFR >60 >60 mL/min/1.73 m^2   Sedimentation rate    Collection Time: 04/10/23  9:51 AM   Result Value Ref Range    Sed Rate 30 0 - 36 mm/Hr   C-Reactive Protein    Collection Time: 04/10/23  9:51 AM   Result Value Ref Range    CRP 13.6 (H) 0.0 - 8.2 mg/L   DALI Screen w/Reflex    Collection Time: 04/10/23  9:51 AM   Result Value Ref Range    DALI Screen Positive (A) Negative <1:80   DALI Pattern 2    Collection Time: 04/10/23  9:51 AM   Result Value Ref Range    DALI Pattern 2 Speckled    DALI Profile    Collection Time: 04/10/23  9:51 AM   Result Value Ref Range    Anti Sm Antibody 0.06 0.00 - 0.99 Ratio    Anti-Sm Interpretation Negative Negative    Anti-SSA Antibody 0.06 0.00 - 0.99 Ratio    Anti-SSA Interpretation Negative Negative    Anti-SSB Antibody 0.05 0.00 - 0.99 Ratio    Anti-SSB Interpretation Negative Negative    ds DNA Ab Negative 1:10 Negative 1:10    Anti Sm/RNP Antibody 0.07 0.00 - 0.99 Ratio    Anti-Sm/RNP Interpretation Negative Negative   DALI Titer 1    Collection Time: 04/10/23  9:51 AM   Result Value Ref Range    DALI Titer 1 1:320    DALI Pattern 1    Collection Time: 04/10/23  9:51 AM   Result Value Ref Range    DALI PATTERN 1 Homogeneous    DALI Titer 2    Collection Time: 04/10/23  9:51 AM   Result Value Ref Range    DALI Titer 2 1:320    Comprehensive Metabolic Panel    Collection Time: 05/15/23 11:49 AM   Result Value Ref Range    Sodium 142 136 - 145 mmol/L    Potassium 4.1 3.5 - 5.1 mmol/L    Chloride 102 95 - 110 mmol/L    CO2 28 23 - 29 mmol/L    Glucose 89 70 - 110 mg/dL    BUN 15 8 - 23 mg/dL    Creatinine 1.0 0.5 - 1.4 mg/dL    Calcium 9.2 8.7 - 10.5 mg/dL    Total Protein 6.7 6.0 - 8.4 g/dL    Albumin 3.7 3.5 - 5.2 g/dL    Total  Bilirubin 0.7 0.1 - 1.0 mg/dL    Alkaline Phosphatase 103 55 - 135 U/L    AST 16 10 - 40 U/L    ALT 15 10 - 44 U/L    Anion Gap 12 8 - 16 mmol/L    eGFR >60 >60 mL/min/1.73 m^2   CBC Auto Differential    Collection Time: 05/15/23 11:49 AM   Result Value Ref Range    WBC 7.35 3.90 - 12.70 K/uL    RBC 4.94 4.00 - 5.40 M/uL    Hemoglobin 14.2 12.0 - 16.0 g/dL    Hematocrit 45.1 37.0 - 48.5 %    MCV 91 82 - 98 fL    MCH 28.7 27.0 - 31.0 pg    MCHC 31.5 (L) 32.0 - 36.0 g/dL    RDW 14.7 (H) 11.5 - 14.5 %    Platelets 243 150 - 450 K/uL    MPV 10.5 9.2 - 12.9 fL    Immature Granulocytes 0.1 0.0 - 0.5 %    Gran # (ANC) 4.0 1.8 - 7.7 K/uL    Immature Grans (Abs) 0.01 0.00 - 0.04 K/uL    Lymph # 2.7 1.0 - 4.8 K/uL    Mono # 0.6 0.3 - 1.0 K/uL    Eos # 0.1 0.0 - 0.5 K/uL    Baso # 0.01 0.00 - 0.20 K/uL    nRBC 0 0 /100 WBC    Gran % 53.8 38.0 - 73.0 %    Lymph % 36.7 18.0 - 48.0 %    Mono % 7.8 4.0 - 15.0 %    Eosinophil % 1.5 0.0 - 8.0 %    Basophil % 0.1 0.0 - 1.9 %    Differential Method Automated    CBC Auto Differential    Collection Time: 05/15/23 11:49 AM   Result Value Ref Range    WBC 7.35 3.90 - 12.70 K/uL    RBC 4.94 4.00 - 5.40 M/uL    Hemoglobin 14.2 12.0 - 16.0 g/dL    Hematocrit 45.1 37.0 - 48.5 %    MCV 91 82 - 98 fL    MCH 28.7 27.0 - 31.0 pg    MCHC 31.5 (L) 32.0 - 36.0 g/dL    RDW 14.7 (H) 11.5 - 14.5 %    Platelets 243 150 - 450 K/uL    MPV 10.5 9.2 - 12.9 fL    Immature Granulocytes 0.1 0.0 - 0.5 %    Gran # (ANC) 4.0 1.8 - 7.7 K/uL    Immature Grans (Abs) 0.01 0.00 - 0.04 K/uL    Lymph # 2.7 1.0 - 4.8 K/uL    Mono # 0.6 0.3 - 1.0 K/uL    Eos # 0.1 0.0 - 0.5 K/uL    Baso # 0.01 0.00 - 0.20 K/uL    nRBC 0 0 /100 WBC    Gran % 53.8 38.0 - 73.0 %    Lymph % 36.7 18.0 - 48.0 %    Mono % 7.8 4.0 - 15.0 %    Eosinophil % 1.5 0.0 - 8.0 %    Basophil % 0.1 0.0 - 1.9 %    Differential Method Automated    Comprehensive Metabolic Panel    Collection Time: 05/15/23 11:49 AM   Result Value Ref Range    Sodium 142 136 -  145 mmol/L    Potassium 4.1 3.5 - 5.1 mmol/L    Chloride 102 95 - 110 mmol/L    CO2 28 23 - 29 mmol/L    Glucose 89 70 - 110 mg/dL    BUN 15 8 - 23 mg/dL    Creatinine 1.0 0.5 - 1.4 mg/dL    Calcium 9.2 8.7 - 10.5 mg/dL    Total Protein 6.7 6.0 - 8.4 g/dL    Albumin 3.7 3.5 - 5.2 g/dL    Total Bilirubin 0.7 0.1 - 1.0 mg/dL    Alkaline Phosphatase 103 55 - 135 U/L    AST 16 10 - 40 U/L    ALT 15 10 - 44 U/L    Anion Gap 12 8 - 16 mmol/L    eGFR >60 >60 mL/min/1.73 m^2   Sedimentation rate    Collection Time: 05/15/23 11:49 AM   Result Value Ref Range    Sed Rate 11 0 - 20 mm/Hr   C-Reactive Protein    Collection Time: 05/15/23 11:49 AM   Result Value Ref Range    CRP 16.3 (H) 0.0 - 8.2 mg/L          No results found for: TBGOLDPLUS   Lab Results   Component Value Date    HEPAIGM Negative 10/27/2009    HEPBIGM Negative 10/27/2009    HEPCAB Negative 10/27/2009          Assessment:     1. Positive HARJINDER (antinuclear antibody)    2. Pain of right hand    3. Chondromalacia, left knee    4. Tear of lateral meniscus of left knee, current, unspecified tear type, initial encounter    5. Numbness and tingling in right hand    6. De Quervain's tenosynovitis, right              Plan:     Karla was seen today for positive harjinder.    Diagnoses and all orders for this visit:    Positive HARJINDER (antinuclear antibody)  -     X-Ray Hand 3 View Bilateral; Future    Pain of right hand  -     X-Ray Hand 3 View Bilateral; Future    Chondromalacia, left knee  -     naproxen (NAPROSYN) 500 MG tablet; Take 1 tablet (500 mg total) by mouth 2 (two) times daily.    Tear of lateral meniscus of left knee, current, unspecified tear type, initial encounter  -     naproxen (NAPROSYN) 500 MG tablet; Take 1 tablet (500 mg total) by mouth 2 (two) times daily.    Numbness and tingling in right hand  -     naproxen (NAPROSYN) 500 MG tablet; Take 1 tablet (500 mg total) by mouth 2 (two) times daily.    De Quervain's tenosynovitis, right  -     naproxen (NAPROSYN)  500 MG tablet; Take 1 tablet (500 mg total) by mouth 2 (two) times daily.        + DALI w history of anterior uveitis  Previous slight elevation in CRP, but no signs of connective tissue disease  No evidence end organ damage  Polyarthralgias  No improvement w PDN trial  Defer DMARD for now  F/u ortho for Lat meniscus tear left knee  EMG today as scheduled w f/u w Dr. Alexis  Reconsider addition of DMARD if patient develops inflammatory pain, migratory polyarthritis with effusions or connective tissue symptoms  Return to clinic: 6 mos w Reg 4, DALI prior    Follow up in about 6 months (around 11/16/2023).    The patient understands, chooses and consents to this plan and accepts all the risks which include but are not limited to the risks mentioned above.     Disclaimer: This note was prepared using a voice recognition system and is likely to have sound alike errors within the text.

## 2023-05-16 NOTE — PROGRESS NOTES
Full Name: Karla Arzola Gender: Female  Patient ID: [id number] YOB: 1960      Visit Date: 5/16/2023 08:52  Age: 63 Years  Examining Physician: Dr Lynn  Referring Physician: Dr Alexis  Conclusion: hands  Chief Complaint   Patient presents with    Hand Pain     right       HPI: This is a 63 y.o.  female being seen in clinic today for evaluation of chronic hand/wrist achy pain with numbness/tingling. Increased hand usage can worsen her symptoms. Rest provides some relief.      History obtained from patient    Past family, medical, social, and surgical history reviewed in chart    Review of Systems:     General- denies lethargy, weight change, fever, chills  Head/neck- denies swallowing difficulties  ENT- denies hearing changes  Cardiovascular-denies chest pain  Pulmonary- denies shortness of breath  GI- denies constipation or bowel incontinence  - denies bladder incontinence  Skin- denies wounds or rashes  Musculoskeletal- denies weakness, + pain  Neurologic- + numbness and tingling  Psychiatric- denies depressive or psychotic features, denies anxiety  Lymphatic-denies swelling  Endocrine- denies hypoglycemic symptoms/DM history  All other pertinent systems negative     Physical Examination:  General: Well developed, well nourished female, NAD  HEENT:NCAT EOMI bilaterally   Pulmonary:Normal respirations    Spinal Examination: CERVICAL  Active ROM is within normal limits.  Inspection: No deformity of spinal alignment.  Palpation: No vertebral tenderness to percussion.        Musculoskeletal Tests:  Phalen: +on right  Elbow compression (ulnar): neg  Tinels at wrist: neg    Bilateral Upper and Lower Extremities:  Pulses are 2+ at radial bilaterally.  Shoulder/Elbow/Wrist/Hand ROM wnl  Hip/Knee/Ankle ROM   Bilateral Extremities show normal capillary refill.  No signs of cyanosis, rubor, edema, skin changes, or dysvascular changes of appendages.  Nails appear intact.    Neurological Exam:  Cranial  Nerves:  II-XII grossly intact    Manual Muscle Testing: (Motor 5=normal)  5/5 strength bilateral upper extremities    No focal atrophy is noted of either upper extremity.    Bilateral Reflexes:  Payan's response is absent bilaterally.  Sensation: tested to light touch  - intact in arms    Gait: Narrow base and good arm swing.      Entire procedure explained to patient prior to proceeding.  Verbal consent obtained      Sensory NCS      Nerve / Sites Rec. Site Onset Lat Peak Lat NP Amp PP Amp Segments Distance Velocity     ms ms µV µV  mm m/s   R Median - Digit II (Antidromic)      Wrist Dig II 4.65 6.46 13.7 21.1 Wrist - Dig  30   L Median - Digit II (Antidromic)      Wrist Dig II 4.81 6.19 29.6 26.0 Wrist - Dig  29   R Ulnar - Digit V (Antidromic)      Wrist Dig V 2.92 3.88 34.0 28.5 Wrist - Dig V 140 48   L Ulnar - Digit V (Antidromic)      Wrist Dig V 3.02 3.71 16.4 23.9 Wrist - Dig V 140 46   R Radial - Anatomical snuff box (Forearm)      Forearm Wrist 1.79 2.67 21.9 19.4 Forearm - Wrist 100 56   L Radial - Anatomical snuff box (Forearm)      Forearm Wrist 1.79 2.44 10.7 30.9 Forearm - Wrist 100 56                   Motor NCS      Nerve / Sites Muscle Latency Amplitude Amp % Duration Segments Distance Lat Diff Velocity     ms mV % ms  mm ms m/s   R Median - APB      Wrist APB 5.44 9.4 100 7.88 Wrist - APB 80        Elbow APB 10.38 8.4 89.3 7.90 Elbow - Wrist 230 4.94 47   L Median - APB      Wrist APB 4.90 11.6 100 8.15 Wrist - APB 80        Elbow APB 9.21 10.1 86.9 8.31 Elbow - Wrist 230 4.31 53   R Ulnar - ADM      Wrist ADM 3.04 8.0 100 6.65 Wrist - ADM 80        B.Elbow ADM 7.15 6.9 86.4 6.94 B.Elbow - Wrist 260 4.10 63      A.Elbow ADM 9.00 6.5 80.8 7.19 A.Elbow - B.Elbow 110 1.85 59   L Ulnar - ADM      Wrist ADM 3.19 8.5 100 5.94 Wrist - ADM 80        B.Elbow ADM 7.19 8.6 101 6.13 B.Elbow - Wrist 240 4.00 60      A.Elbow ADM 9.13 6.6 76.9 6.46 A.Elbow - B.Elbow 110 1.94 57                    INTERPRETATION  -Bilateral median motor nerve conduction study showed prolonged latency, norm amplitude, and dec conduction velocity on the right  -Bilateral median sensory nerve conduction study showed prolonged peak latency and normal amplitude  -Bilateral ulnar motor nerve conduction study showed normal latency, amplitude, and conduction velocity  -Bilateral ulnar sensory nerve conduction study showed normal peak latency and amplitude  -Bilateral radial sensory nerve conduction study showed normal peak latency and amplitude      IMPRESSION  ABNORMAL study  2. There is electrodiagnostic evidence of a moderate demyelinating median neuropathy (Carpal tunnel syndrome) across bilateral wrists-worse on the right    PLAN  Discussed in detail for greater than 30 minutes about diagnosis and treatment plan    1. Follow up with referring provider: Dr. Stan Hogan*  2. Handouts on CTS provided  3. This study is good for one year. If symptoms worsen or do not improve, please re-consult.    Nara Lynn M.D.  Physical Medicine and Rehab

## 2023-05-18 LAB
ANTI SM ANTIBODY: 0.07 RATIO (ref 0–0.99)
ANTI SM/RNP ANTIBODY: 0.08 RATIO (ref 0–0.99)
ANTI-SM INTERPRETATION: NEGATIVE
ANTI-SM/RNP INTERPRETATION: NEGATIVE
ANTI-SSA ANTIBODY: 0.08 RATIO (ref 0–0.99)
ANTI-SSA INTERPRETATION: NEGATIVE
ANTI-SSB ANTIBODY: 0.04 RATIO (ref 0–0.99)
ANTI-SSB INTERPRETATION: NEGATIVE
DSDNA AB SER-ACNC: NORMAL [IU]/ML

## 2023-05-19 ENCOUNTER — OFFICE VISIT (OUTPATIENT)
Dept: OPHTHALMOLOGY | Facility: CLINIC | Age: 63
End: 2023-05-19
Payer: COMMERCIAL

## 2023-05-19 ENCOUNTER — HOSPITAL ENCOUNTER (OUTPATIENT)
Dept: RADIOLOGY | Facility: HOSPITAL | Age: 63
Discharge: HOME | End: 2023-05-19
Attending: PHYSICIAN ASSISTANT
Payer: COMMERCIAL

## 2023-05-19 DIAGNOSIS — R76.8 POSITIVE ANA (ANTINUCLEAR ANTIBODY): ICD-10-CM

## 2023-05-19 DIAGNOSIS — H20.00 ACUTE IRITIS OF LEFT EYE: Primary | ICD-10-CM

## 2023-05-19 DIAGNOSIS — M79.641 PAIN OF RIGHT HAND: ICD-10-CM

## 2023-05-19 PROCEDURE — 1160F PR REVIEW ALL MEDS BY PRESCRIBER/CLIN PHARMACIST DOCUMENTED: ICD-10-PCS | Mod: CPTII,S$GLB,, | Performed by: OPTOMETRIST

## 2023-05-19 PROCEDURE — 99999 PR PBB SHADOW E&M-EST. PATIENT-LVL III: ICD-10-PCS | Mod: PBBFAC,,, | Performed by: OPTOMETRIST

## 2023-05-19 PROCEDURE — 1160F RVW MEDS BY RX/DR IN RCRD: CPT | Mod: CPTII,S$GLB,, | Performed by: OPTOMETRIST

## 2023-05-19 PROCEDURE — 73130 X-RAY EXAM OF HAND: CPT | Mod: 26,50,, | Performed by: RADIOLOGY

## 2023-05-19 PROCEDURE — 73130 XR HAND COMPLETE 3 VIEWS BILATERAL: ICD-10-PCS | Mod: 26,50,, | Performed by: RADIOLOGY

## 2023-05-19 PROCEDURE — 73130 X-RAY EXAM OF HAND: CPT | Mod: TC,50

## 2023-05-19 PROCEDURE — 3044F HG A1C LEVEL LT 7.0%: CPT | Mod: CPTII,S$GLB,, | Performed by: OPTOMETRIST

## 2023-05-19 PROCEDURE — 3044F PR MOST RECENT HEMOGLOBIN A1C LEVEL <7.0%: ICD-10-PCS | Mod: CPTII,S$GLB,, | Performed by: OPTOMETRIST

## 2023-05-19 PROCEDURE — 99213 OFFICE O/P EST LOW 20 MIN: CPT | Mod: S$GLB,,, | Performed by: OPTOMETRIST

## 2023-05-19 PROCEDURE — 1159F PR MEDICATION LIST DOCUMENTED IN MEDICAL RECORD: ICD-10-PCS | Mod: CPTII,S$GLB,, | Performed by: OPTOMETRIST

## 2023-05-19 PROCEDURE — 99213 PR OFFICE/OUTPT VISIT, EST, LEVL III, 20-29 MIN: ICD-10-PCS | Mod: S$GLB,,, | Performed by: OPTOMETRIST

## 2023-05-19 PROCEDURE — 1159F MED LIST DOCD IN RCRD: CPT | Mod: CPTII,S$GLB,, | Performed by: OPTOMETRIST

## 2023-05-19 PROCEDURE — 99999 PR PBB SHADOW E&M-EST. PATIENT-LVL III: CPT | Mod: PBBFAC,,, | Performed by: OPTOMETRIST

## 2023-05-19 RX ORDER — PREDNISOLONE ACETATE 10 MG/ML
SUSPENSION/ DROPS OPHTHALMIC
Qty: 5 ML | Refills: 0 | Status: SHIPPED | OUTPATIENT
Start: 2023-05-19 | End: 2023-06-02

## 2023-05-19 NOTE — PROGRESS NOTES
HPI     Eye Problem            Comments: Pain Scale: aching   Onset: 2 days ago  OD, OS, OU:  OU, OS>OD  Discharge: watery  A.M. Matting: yes  Itch: yes  Redness: yes  Photophobia: no  Foreign body sensation: no  Deep pain: no  Previous occurrence: yes  Drops:  none, Visine and Lumify sometimes          Last edited by Loretta Adkins MA on 5/19/2023  7:48 AM.            Assessment /Plan     For exam results, see Encounter Report.    Acute iritis of left eye  -     prednisoLONE acetate (PRED FORTE) 1 % DrpS; Place 1 drop into the left eye 4 (four) times daily for 7 days, THEN 1 drop 2 (two) times daily for 7 days.  Dispense: 5 mL; Refill: 0    Start Pred qid OS x 7 days, then bid OS x 7 days, then stop      RTC PRN if symptoms worsen or not resolved x 1-2 weeks  Discussed above and all questions were answered.

## 2023-05-23 ENCOUNTER — OFFICE VISIT (OUTPATIENT)
Dept: ORTHOPEDICS | Facility: CLINIC | Age: 63
End: 2023-05-23
Payer: COMMERCIAL

## 2023-05-23 VITALS — HEIGHT: 66 IN | WEIGHT: 244 LBS | BODY MASS INDEX: 39.21 KG/M2

## 2023-05-23 DIAGNOSIS — G56.01 CARPAL TUNNEL SYNDROME OF RIGHT WRIST: Primary | ICD-10-CM

## 2023-05-23 PROCEDURE — 99999 PR PBB SHADOW E&M-EST. PATIENT-LVL III: CPT | Mod: PBBFAC,,, | Performed by: ORTHOPAEDIC SURGERY

## 2023-05-23 PROCEDURE — 20526 THER INJECTION CARP TUNNEL: CPT | Mod: RT,S$GLB,, | Performed by: ORTHOPAEDIC SURGERY

## 2023-05-23 PROCEDURE — 1159F MED LIST DOCD IN RCRD: CPT | Mod: CPTII,S$GLB,, | Performed by: ORTHOPAEDIC SURGERY

## 2023-05-23 PROCEDURE — 1160F RVW MEDS BY RX/DR IN RCRD: CPT | Mod: CPTII,S$GLB,, | Performed by: ORTHOPAEDIC SURGERY

## 2023-05-23 PROCEDURE — 3008F PR BODY MASS INDEX (BMI) DOCUMENTED: ICD-10-PCS | Mod: CPTII,S$GLB,, | Performed by: ORTHOPAEDIC SURGERY

## 2023-05-23 PROCEDURE — 99213 PR OFFICE/OUTPT VISIT, EST, LEVL III, 20-29 MIN: ICD-10-PCS | Mod: 25,S$GLB,, | Performed by: ORTHOPAEDIC SURGERY

## 2023-05-23 PROCEDURE — 99999 PR PBB SHADOW E&M-EST. PATIENT-LVL III: ICD-10-PCS | Mod: PBBFAC,,, | Performed by: ORTHOPAEDIC SURGERY

## 2023-05-23 PROCEDURE — 3044F HG A1C LEVEL LT 7.0%: CPT | Mod: CPTII,S$GLB,, | Performed by: ORTHOPAEDIC SURGERY

## 2023-05-23 PROCEDURE — 99213 OFFICE O/P EST LOW 20 MIN: CPT | Mod: 25,S$GLB,, | Performed by: ORTHOPAEDIC SURGERY

## 2023-05-23 PROCEDURE — 3008F BODY MASS INDEX DOCD: CPT | Mod: CPTII,S$GLB,, | Performed by: ORTHOPAEDIC SURGERY

## 2023-05-23 PROCEDURE — 3044F PR MOST RECENT HEMOGLOBIN A1C LEVEL <7.0%: ICD-10-PCS | Mod: CPTII,S$GLB,, | Performed by: ORTHOPAEDIC SURGERY

## 2023-05-23 PROCEDURE — 1159F PR MEDICATION LIST DOCUMENTED IN MEDICAL RECORD: ICD-10-PCS | Mod: CPTII,S$GLB,, | Performed by: ORTHOPAEDIC SURGERY

## 2023-05-23 PROCEDURE — 20526 CARPAL TUNNEL: ICD-10-PCS | Mod: RT,S$GLB,, | Performed by: ORTHOPAEDIC SURGERY

## 2023-05-23 PROCEDURE — 1160F PR REVIEW ALL MEDS BY PRESCRIBER/CLIN PHARMACIST DOCUMENTED: ICD-10-PCS | Mod: CPTII,S$GLB,, | Performed by: ORTHOPAEDIC SURGERY

## 2023-05-23 RX ORDER — TRIAMCINOLONE ACETONIDE 40 MG/ML
40 INJECTION, SUSPENSION INTRA-ARTICULAR; INTRAMUSCULAR
Status: DISCONTINUED | OUTPATIENT
Start: 2023-05-23 | End: 2023-05-23 | Stop reason: HOSPADM

## 2023-05-23 RX ADMIN — TRIAMCINOLONE ACETONIDE 40 MG: 40 INJECTION, SUSPENSION INTRA-ARTICULAR; INTRAMUSCULAR at 08:05

## 2023-05-23 NOTE — PROGRESS NOTES
Subjective:     Patient ID: Karla Arzola is a 63 y.o. female.    Chief Complaint: Pain and Numbness of the Right Wrist      HPI:  The patient is a 63-year-old female with bilateral carpal tunnel syndrome right greater than left documented by nerve conduction studies.  She wishes to try splinting and injection 1st.  She already has a thumb spica splint for de Quervain tendonitis and wishes to use that.  She requests injection today.    Past Medical History:   Diagnosis Date    Allergy     Anxiety     Hypertension     Obesity     Vitamin B 12 deficiency      Past Surgical History:   Procedure Laterality Date    BREAST BIOPSY Right 10/08/2021    MOUTH SURGERY      right knee scope      TUBAL LIGATION       Family History   Problem Relation Age of Onset    Diabetes Mother     Hypertension Mother     Stroke Mother     Hypertension Father     Hyperlipidemia Father     Kidney disease Father      Social History     Socioeconomic History    Marital status: Single   Occupational History     Employer: [employer]   Tobacco Use    Smoking status: Never    Smokeless tobacco: Never   Substance and Sexual Activity    Alcohol use: Yes     Alcohol/week: 0.0 standard drinks     Comment: socially     Drug use: No    Sexual activity: Yes   Other Topics Concern    Are you pregnant or think you may be? No    Breast-feeding No     Social Determinants of Health     Financial Resource Strain: Medium Risk    Difficulty of Paying Living Expenses: Somewhat hard   Food Insecurity: Food Insecurity Present    Worried About Running Out of Food in the Last Year: Sometimes true    Ran Out of Food in the Last Year: Never true   Transportation Needs: No Transportation Needs    Lack of Transportation (Medical): No    Lack of Transportation (Non-Medical): No   Physical Activity: Inactive    Days of Exercise per Week: 0 days    Minutes of Exercise per Session: 0 min   Stress: Stress Concern Present    Feeling of Stress : To some  extent   Social Connections: Unknown    Frequency of Communication with Friends and Family: Once a week    Frequency of Social Gatherings with Friends and Family: Patient refused    Active Member of Clubs or Organizations: Yes    Attends Club or Organization Meetings: 1 to 4 times per year    Marital Status:    Housing Stability: High Risk    Unable to Pay for Housing in the Last Year: Yes    Number of Places Lived in the Last Year: 1    Unstable Housing in the Last Year: No     Medication List with Changes/Refills   Current Medications    ACYCLOVIR (ZOVIRAX) 200 MG CAPSULE    Take 1 capsule (200 mg total) by mouth once daily.    ALBUTEROL (VENTOLIN HFA) 90 MCG/ACTUATION INHALER    Inhale 2 puffs into the lungs every 6 (six) hours as needed for Wheezing. Rescue    AMLODIPINE (NORVASC) 10 MG TABLET    Take 1 tablet (10 mg total) by mouth once daily.    AZELAIC ACID (FINACEA) 15 % FOAM    Apply 1 application topically 2 (two) times daily.    CICLOPIROX (PENLAC) 8 % SOLN    Apply to nails once daily    CITALOPRAM (CELEXA) 20 MG TABLET    Take 1 tablet (20 mg total) by mouth once daily.    DIETHYLPROPION 25 MG TAB    Take 1 tablet by mouth once daily.    ERGOCALCIFEROL (ERGOCALCIFEROL) 50,000 UNIT CAP    Take 1 capsule (50,000 Units total) by mouth every 7 days.    FLUTICASONE PROPIONATE (FLONASE) 50 MCG/ACTUATION NASAL SPRAY    1 spray (50 mcg total) by Each Nostril route once daily.    FUROSEMIDE (LASIX) 20 MG TABLET    Take 1 tablet (20 mg total) by mouth daily as needed (swelling).    IPRATROPIUM-ALBUTEROL (COMBIVENT)  MCG/ACTUATION INHALER    Inhale 1 puff into the lungs every 6 (six) hours as needed for Wheezing or Shortness of Breath. Rescue    MULTIVITAMIN CAPSULE    Take 1 capsule by mouth once daily.    NAPROXEN (NAPROSYN) 500 MG TABLET    Take 1 tablet (500 mg total) by mouth 2 (two) times daily.    PREDNISOLONE ACETATE (PRED FORTE) 1 % DRPS    Place 1 drop into the left eye 4 (four) times daily  for 7 days, THEN 1 drop 2 (two) times daily for 7 days.     Review of patient's allergies indicates:  No Known Allergies  Review of Systems   Constitutional: Negative for malaise/fatigue.   HENT:  Negative for hearing loss.    Eyes:  Negative for double vision and visual disturbance.   Cardiovascular:  Negative for chest pain.   Respiratory:  Negative for shortness of breath.    Endocrine: Negative for cold intolerance.   Hematologic/Lymphatic: Does not bruise/bleed easily.   Skin:  Negative for poor wound healing and suspicious lesions.   Musculoskeletal:  Negative for gout, joint pain and joint swelling.   Gastrointestinal:  Negative for nausea and vomiting.   Genitourinary:  Negative for dysuria.   Neurological:  Positive for numbness, paresthesias and sensory change.   Psychiatric/Behavioral:  Negative for depression, memory loss and substance abuse. The patient is not nervous/anxious.    Allergic/Immunologic: Negative for persistent infections.     Objective:   Body mass index is 39.38 kg/m².  There were no vitals filed for this visit.             General    Constitutional: She is oriented to person, place, and time. She appears well-developed and well-nourished. No distress.   HENT:   Head: Normocephalic.   Eyes: EOM are normal.   Pulmonary/Chest: Effort normal.   Neurological: She is oriented to person, place, and time.   Psychiatric: She has a normal mood and affect.             Right Hand/Wrist Exam     Inspection   Scars: Wrist - absent Hand -  absent  Effusion: Wrist - absent Hand -  absent    Pain   Wrist - The patient exhibits pain of the flexor/pronator group.    Tests   Phalens sign: positive  Tinel's sign (median nerve): positive  Carpal Tunnel Compression Test: positive    Atrophy   Thenar:  negative  Intrinsic:  negative    Other     Neuorologic Exam    Median Distribution: abnormal  Ulnar Distribution: normal  Radial Distribution: normal    Comments:  The patient has a positive Tinel and positive  Phalen sign.  There is no thenar atrophy noted.      Left Hand/Wrist Exam     Inspection   Scars: Wrist - absent Hand -  absent  Effusion: Wrist - absent Hand -  absent    Pain   Wrist - The patient exhibits pain of the flexor/pronator group.    Tests   Phalens sign: positive  Tinel's sign (median nerve): positive  Carpal Tunnel Compression Test: positive    Atrophy  Thenar:  Negative  Intrinsic: negative    Other     Sensory Exam  Median Distribution: abnormal  Ulnar Distribution: normal  Radial Distribution: normal    Comments:  The patient has a positive Tinel and positive Phalen sign.  There is no thenar atrophy noted.          Vascular Exam       Capillary Refill  Right Hand: normal capillary refill  Left Hand: normal capillary refill        Relevant imaging results reviewed and interpreted by me, discussed with the patient and / or family today radiographs both hands were normal  Assessment:     Encounter Diagnosis   Name Primary?    Carpal tunnel syndrome of right wrist Yes        Plan:       The patient was injected in her right carpal tunnel at her request with 1 cc of Kenalog and 1 cc of 2% plain lidocaine under sterile technique.  She will wait at least 3 months between injections.              Disclaimer: This note was prepared using a voice recognition system and is likely to have sound alike errors within the text.

## 2023-05-23 NOTE — PROCEDURES
Carpal Tunnel    Date/Time: 5/23/2023 8:30 AM  Performed by: Stan Alexis MD  Authorized by: Stan Alexis MD     Consent Done?:  Yes (Verbal)  Indications:  Pain  Timeout: prior to procedure the correct patient, procedure, and site was verified    Prep: patient was prepped and draped in usual sterile fashion      Local anesthesia used?: Yes    Local anesthetic:  Lidocaine 2% without epinephrine  Anesthetic total (ml):  1    Location:  Wrist  Site:  R carpal tunnel  Ultrasonic Guidance for Needle Placement?: No    Needle size:  25 G  Approach:  Volar  Medications:  40 mg triamcinolone acetonide 40 mg/mL

## 2023-06-13 NOTE — PROGRESS NOTES
NUTRITION NOTE    Referring Physician: Dr. Lezama  Reason for MNT Referral: Medically Supervised Diet pending Gastric Sleeve    PAST MEDICAL HISTORY:    Patient presents for 2nd visit for MSD with weight loss over the past month; total weight loss by making numerous dietary and lifestyle changes.    Changes made since last appointment:  Stopped eating after 7pm.  Being more active, 3x/week. Walk for 45-60 minutes.   Increased water intake.  Continue to decrease soda intake. Currently takes only a few sips of coke, once weekly to satisfy craving.  Reduced sugar cravings.   Reduced intake of white foods (bread, potatoes).  Reduced dining out.   Reduced wine intake, 3x/week.          Past Medical History:   Diagnosis Date    Allergy     Anxiety     Hypertension     Obesity     Vitamin B 12 deficiency        CLINICAL DATA:  63 y.o. female.    There were no vitals filed for this visit.    Current Weight: 239.8 lbs   2023: 241 lbs  Weight Change Since Initial Visit: -1.2 lbs  Ideal Body Weight: 174 lbs +/-10%  BMI: 38.8    CURRENT DIET:  Regular diet.  Diet Recall: Food records are present.    Diet Includes:   Meal Pattern: Same.  Protein Supplements: 0 per day.  Snackin-1    Vegetables: Likes a variety. Eats daily.  Fruits: Likes a variety. Eats 2-3 times per week.  Beverages: coffee (1 daily), water, Coke (decreased), tea (with splenda)  Dining Out: Dining out: less than once per week  Cooking at home: Daily.     CURRENT EXERCISE:  Increased.  3x/week  45-60 minute walking sesions    Limited to light activity due to knee pain.     Vitamins / Minerals / Herbs:   Multivitamin, B12, Vitamin D, collagen with biotin (nail and hair health)    Food Allergies:   NKFA.   Reports intolerance to dairy products, but still consumes. Side effect of diarrhea.     Social:  Works regular daytime shifts.  Alcohol: Cutting back. 1 glass of wine 3x/week  Smoking: None.    ASSESSMENT:  Patient demonstrates all willingness to change  lifestyle habits as evidenced by weight loss, good exercise, dietary changes, and improved portion sizes .    Doing fairly well with working on greatest challenges (portion control).    Adequate calorie intake.  Inadequate protein intake.    PLAN:    Diet: Adjust diet plan.  Continue with previous changes.  Increase water intake.      Exercise: Increase.   Aim to walk for 45-60 minute sessions 4x/week    Behavior Modification: Continue to document food & activity logs daily.        Return to clinic in one month.    SESSION TIME:  30 minutes

## 2023-06-14 ENCOUNTER — NUTRITION (OUTPATIENT)
Dept: INTERNAL MEDICINE | Facility: CLINIC | Age: 63
End: 2023-06-14
Payer: COMMERCIAL

## 2023-06-14 DIAGNOSIS — Z71.3 DIETARY COUNSELING: Primary | ICD-10-CM

## 2023-06-14 DIAGNOSIS — I10 PRIMARY HYPERTENSION: ICD-10-CM

## 2023-06-14 DIAGNOSIS — M19.90 OSTEOARTHRITIS, UNSPECIFIED OSTEOARTHRITIS TYPE, UNSPECIFIED SITE: ICD-10-CM

## 2023-06-14 DIAGNOSIS — E66.01 SEVERE OBESITY WITH BODY MASS INDEX (BMI) OF 35.0 TO 35.9 AND COMORBIDITY: ICD-10-CM

## 2023-06-14 DIAGNOSIS — Z71.3 PRE-BARIATRIC SURGERY NUTRITION EVALUATION: ICD-10-CM

## 2023-06-14 PROCEDURE — 97803 PR MED NUTR THER, SUBSQ, INDIV, EA 15 MIN: ICD-10-PCS | Mod: S$GLB,,, | Performed by: DIETITIAN, REGISTERED

## 2023-06-14 PROCEDURE — 97803 MED NUTRITION INDIV SUBSEQ: CPT | Mod: S$GLB,,, | Performed by: DIETITIAN, REGISTERED

## 2023-06-22 ENCOUNTER — OFFICE VISIT (OUTPATIENT)
Dept: INTERNAL MEDICINE | Facility: CLINIC | Age: 63
End: 2023-06-22
Payer: COMMERCIAL

## 2023-06-22 ENCOUNTER — TELEPHONE (OUTPATIENT)
Dept: PHARMACY | Facility: CLINIC | Age: 63
End: 2023-06-22
Payer: COMMERCIAL

## 2023-06-22 ENCOUNTER — PATIENT MESSAGE (OUTPATIENT)
Dept: INTERNAL MEDICINE | Facility: CLINIC | Age: 63
End: 2023-06-22

## 2023-06-22 VITALS
DIASTOLIC BLOOD PRESSURE: 84 MMHG | OXYGEN SATURATION: 99 % | TEMPERATURE: 98 F | SYSTOLIC BLOOD PRESSURE: 136 MMHG | WEIGHT: 239.19 LBS | HEART RATE: 82 BPM | BODY MASS INDEX: 38.61 KG/M2 | RESPIRATION RATE: 18 BRPM

## 2023-06-22 DIAGNOSIS — E66.01 SEVERE OBESITY (BMI 35.0-39.9) WITH COMORBIDITY: ICD-10-CM

## 2023-06-22 DIAGNOSIS — R20.0 NUMBNESS AND TINGLING IN RIGHT HAND: ICD-10-CM

## 2023-06-22 DIAGNOSIS — F41.8 DEPRESSION WITH ANXIETY: ICD-10-CM

## 2023-06-22 DIAGNOSIS — M65.4 DE QUERVAIN'S TENOSYNOVITIS, RIGHT: ICD-10-CM

## 2023-06-22 DIAGNOSIS — R06.2 WHEEZING: ICD-10-CM

## 2023-06-22 DIAGNOSIS — J30.2 SEASONAL ALLERGIES: ICD-10-CM

## 2023-06-22 DIAGNOSIS — J40 BRONCHITIS: ICD-10-CM

## 2023-06-22 DIAGNOSIS — I10 ESSENTIAL HYPERTENSION: ICD-10-CM

## 2023-06-22 DIAGNOSIS — R20.2 NUMBNESS AND TINGLING IN RIGHT HAND: ICD-10-CM

## 2023-06-22 DIAGNOSIS — Z12.4 PAP SMEAR FOR CERVICAL CANCER SCREENING: Primary | ICD-10-CM

## 2023-06-22 DIAGNOSIS — M94.262 CHONDROMALACIA, LEFT KNEE: ICD-10-CM

## 2023-06-22 DIAGNOSIS — S83.282A TEAR OF LATERAL MENISCUS OF LEFT KNEE, CURRENT, UNSPECIFIED TEAR TYPE, INITIAL ENCOUNTER: ICD-10-CM

## 2023-06-22 PROCEDURE — 3008F PR BODY MASS INDEX (BMI) DOCUMENTED: ICD-10-PCS | Mod: CPTII,S$GLB,, | Performed by: FAMILY MEDICINE

## 2023-06-22 PROCEDURE — 3044F HG A1C LEVEL LT 7.0%: CPT | Mod: CPTII,S$GLB,, | Performed by: FAMILY MEDICINE

## 2023-06-22 PROCEDURE — 3079F PR MOST RECENT DIASTOLIC BLOOD PRESSURE 80-89 MM HG: ICD-10-PCS | Mod: CPTII,S$GLB,, | Performed by: FAMILY MEDICINE

## 2023-06-22 PROCEDURE — 88175 CYTOPATH C/V AUTO FLUID REDO: CPT | Performed by: FAMILY MEDICINE

## 2023-06-22 PROCEDURE — 3075F SYST BP GE 130 - 139MM HG: CPT | Mod: CPTII,S$GLB,, | Performed by: FAMILY MEDICINE

## 2023-06-22 PROCEDURE — 3008F BODY MASS INDEX DOCD: CPT | Mod: CPTII,S$GLB,, | Performed by: FAMILY MEDICINE

## 2023-06-22 PROCEDURE — 3044F PR MOST RECENT HEMOGLOBIN A1C LEVEL <7.0%: ICD-10-PCS | Mod: CPTII,S$GLB,, | Performed by: FAMILY MEDICINE

## 2023-06-22 PROCEDURE — 99999 PR PBB SHADOW E&M-EST. PATIENT-LVL III: ICD-10-PCS | Mod: PBBFAC,,, | Performed by: FAMILY MEDICINE

## 2023-06-22 PROCEDURE — 87624 HPV HI-RISK TYP POOLED RSLT: CPT | Performed by: FAMILY MEDICINE

## 2023-06-22 PROCEDURE — 3079F DIAST BP 80-89 MM HG: CPT | Mod: CPTII,S$GLB,, | Performed by: FAMILY MEDICINE

## 2023-06-22 PROCEDURE — 99396 PREV VISIT EST AGE 40-64: CPT | Mod: 25,S$GLB,, | Performed by: FAMILY MEDICINE

## 2023-06-22 PROCEDURE — 3075F PR MOST RECENT SYSTOLIC BLOOD PRESS GE 130-139MM HG: ICD-10-PCS | Mod: CPTII,S$GLB,, | Performed by: FAMILY MEDICINE

## 2023-06-22 PROCEDURE — 99396 PR PREVENTIVE VISIT,EST,40-64: ICD-10-PCS | Mod: 25,S$GLB,, | Performed by: FAMILY MEDICINE

## 2023-06-22 PROCEDURE — 99999 PR PBB SHADOW E&M-EST. PATIENT-LVL III: CPT | Mod: PBBFAC,,, | Performed by: FAMILY MEDICINE

## 2023-06-22 RX ORDER — FUROSEMIDE 20 MG/1
20 TABLET ORAL DAILY PRN
Qty: 60 TABLET | Refills: 1 | Status: SHIPPED | OUTPATIENT
Start: 2023-06-22 | End: 2024-03-08 | Stop reason: SDUPTHER

## 2023-06-22 RX ORDER — CITALOPRAM 20 MG/1
20 TABLET, FILM COATED ORAL DAILY
Qty: 90 TABLET | Refills: 3 | Status: SHIPPED | OUTPATIENT
Start: 2023-06-22 | End: 2024-06-16

## 2023-06-22 RX ORDER — FLUTICASONE PROPIONATE 50 MCG
1 SPRAY, SUSPENSION (ML) NASAL DAILY
Qty: 16 G | Refills: 0 | Status: SHIPPED | OUTPATIENT
Start: 2023-06-22

## 2023-06-22 RX ORDER — ALBUTEROL SULFATE 90 UG/1
2 AEROSOL, METERED RESPIRATORY (INHALATION) EVERY 6 HOURS PRN
Qty: 8.5 G | Refills: 0 | Status: SHIPPED | OUTPATIENT
Start: 2023-06-22 | End: 2024-02-05 | Stop reason: SDUPTHER

## 2023-06-22 RX ORDER — ORAL SEMAGLUTIDE 3 MG/1
3 TABLET ORAL DAILY
Qty: 30 TABLET | Refills: 0 | Status: SHIPPED | OUTPATIENT
Start: 2023-06-22 | End: 2023-12-28

## 2023-06-22 RX ORDER — AMLODIPINE BESYLATE 10 MG/1
10 TABLET ORAL DAILY
Qty: 90 TABLET | Refills: 1 | Status: SHIPPED | OUTPATIENT
Start: 2023-06-22

## 2023-06-22 RX ORDER — NAPROXEN 500 MG/1
500 TABLET ORAL 2 TIMES DAILY
Qty: 180 TABLET | Refills: 1 | Status: SHIPPED | OUTPATIENT
Start: 2023-06-22

## 2023-06-22 NOTE — PROGRESS NOTES
Subjective:      Karla Arzola is a 63 y.o. female who presents for an annual exam.     She reports her periods are: none    Complains of vaginitis No    Hot Flashes: mild    Colonoscopy Due: now    Last Dexa: Never    Review of Systems  Pertinent ROS negative      Objective:      /84 (BP Location: Right arm, Patient Position: Sitting, BP Method: Large (Manual))   Pulse 82   Temp 98 °F (36.7 °C)   Resp 18   Wt 108.5 kg (239 lb 3.2 oz)   LMP 06/11/2015   SpO2 99%   BMI 38.61 kg/m²     General Appearance:    Alert, cooperative, no distress, appears stated age   Breast Exam:    No tenderness, masses, or nipple abnormality   Abdomen:     Soft, non-tender, no masses   External Genitalia:    Normal female without lesion   Cervix:      No lesions   Vagina:    Normal   Uterus:   Small mobile               .           Assessment:     Encounter Diagnoses   Name Primary?    Pap smear for cervical cancer screening Yes    Severe obesity (BMI 35.0-39.9) with comorbidity           Plan:       All questions answered.  Await pap smear results.     Orders Placed This Encounter   Procedures    HPV High Risk Genotypes, PCR     Order Specific Question:   Source     Answer:   Cervix     Order Specific Question:   Release to patient     Answer:   Immediate

## 2023-06-26 ENCOUNTER — PATIENT MESSAGE (OUTPATIENT)
Dept: INTERNAL MEDICINE | Facility: CLINIC | Age: 63
End: 2023-06-26
Payer: COMMERCIAL

## 2023-06-26 LAB
HPV HR 12 DNA SPEC QL NAA+PROBE: NEGATIVE
HPV16 AG SPEC QL: NEGATIVE
HPV18 DNA SPEC QL NAA+PROBE: NEGATIVE

## 2023-06-27 ENCOUNTER — OFFICE VISIT (OUTPATIENT)
Dept: SPORTS MEDICINE | Facility: CLINIC | Age: 63
End: 2023-06-27
Payer: COMMERCIAL

## 2023-06-27 VITALS — WEIGHT: 239 LBS | HEIGHT: 66 IN | BODY MASS INDEX: 38.41 KG/M2

## 2023-06-27 DIAGNOSIS — M23.201 OLD COMPLEX TEAR OF LATERAL MENISCUS OF LEFT KNEE: ICD-10-CM

## 2023-06-27 DIAGNOSIS — M17.12 PRIMARY OSTEOARTHRITIS OF LEFT KNEE: Primary | ICD-10-CM

## 2023-06-27 LAB
FINAL PATHOLOGIC DIAGNOSIS: NORMAL
Lab: NORMAL

## 2023-06-27 PROCEDURE — 1160F RVW MEDS BY RX/DR IN RCRD: CPT | Mod: CPTII,S$GLB,, | Performed by: PHYSICIAN ASSISTANT

## 2023-06-27 PROCEDURE — 1159F MED LIST DOCD IN RCRD: CPT | Mod: CPTII,S$GLB,, | Performed by: PHYSICIAN ASSISTANT

## 2023-06-27 PROCEDURE — 99999 PR PBB SHADOW E&M-EST. PATIENT-LVL III: ICD-10-PCS | Mod: PBBFAC,,, | Performed by: PHYSICIAN ASSISTANT

## 2023-06-27 PROCEDURE — 20610 LARGE JOINT ASPIRATION/INJECTION: L KNEE: ICD-10-PCS | Mod: LT,S$GLB,, | Performed by: PHYSICIAN ASSISTANT

## 2023-06-27 PROCEDURE — 3044F PR MOST RECENT HEMOGLOBIN A1C LEVEL <7.0%: ICD-10-PCS | Mod: CPTII,S$GLB,, | Performed by: PHYSICIAN ASSISTANT

## 2023-06-27 PROCEDURE — 99999 PR PBB SHADOW E&M-EST. PATIENT-LVL III: CPT | Mod: PBBFAC,,, | Performed by: PHYSICIAN ASSISTANT

## 2023-06-27 PROCEDURE — 3008F BODY MASS INDEX DOCD: CPT | Mod: CPTII,S$GLB,, | Performed by: PHYSICIAN ASSISTANT

## 2023-06-27 PROCEDURE — 99214 OFFICE O/P EST MOD 30 MIN: CPT | Mod: 25,S$GLB,, | Performed by: PHYSICIAN ASSISTANT

## 2023-06-27 PROCEDURE — 1159F PR MEDICATION LIST DOCUMENTED IN MEDICAL RECORD: ICD-10-PCS | Mod: CPTII,S$GLB,, | Performed by: PHYSICIAN ASSISTANT

## 2023-06-27 PROCEDURE — 20610 DRAIN/INJ JOINT/BURSA W/O US: CPT | Mod: LT,S$GLB,, | Performed by: PHYSICIAN ASSISTANT

## 2023-06-27 PROCEDURE — 3008F PR BODY MASS INDEX (BMI) DOCUMENTED: ICD-10-PCS | Mod: CPTII,S$GLB,, | Performed by: PHYSICIAN ASSISTANT

## 2023-06-27 PROCEDURE — 3044F HG A1C LEVEL LT 7.0%: CPT | Mod: CPTII,S$GLB,, | Performed by: PHYSICIAN ASSISTANT

## 2023-06-27 PROCEDURE — 1160F PR REVIEW ALL MEDS BY PRESCRIBER/CLIN PHARMACIST DOCUMENTED: ICD-10-PCS | Mod: CPTII,S$GLB,, | Performed by: PHYSICIAN ASSISTANT

## 2023-06-27 PROCEDURE — 99214 PR OFFICE/OUTPT VISIT, EST, LEVL IV, 30-39 MIN: ICD-10-PCS | Mod: 25,S$GLB,, | Performed by: PHYSICIAN ASSISTANT

## 2023-06-27 RX ORDER — TRIAMCINOLONE ACETONIDE 40 MG/ML
40 INJECTION, SUSPENSION INTRA-ARTICULAR; INTRAMUSCULAR
Status: DISCONTINUED | OUTPATIENT
Start: 2023-06-27 | End: 2023-06-27 | Stop reason: HOSPADM

## 2023-06-27 RX ADMIN — TRIAMCINOLONE ACETONIDE 40 MG: 40 INJECTION, SUSPENSION INTRA-ARTICULAR; INTRAMUSCULAR at 09:06

## 2023-06-27 NOTE — PROGRESS NOTES
Orthopaedic Follow-Up Visit    Last Appointment:  01/18/2023  Diagnosis:  Primary osteoarthritis of the left knee, complex tear of lateral meniscus of left knee  Prior Procedure:  Euflexxa series completion 01/18/2023    Karla Arzola is a 63 y.o. female who is here for f/u evaluation of left knee pain. The patient was last seen here by me on 01/18/2023 at which point we decided to try viscosupplementation prior to considering further treatment options. The patient returns today reporting that she only got about 2 months of pain relief with the viscosupplementation in her symptoms have since returned and she is interested in proceeding with further treatment options.     To review her history, To review her history, Karla Arzola is a 62 y.o. year old female patient who presented with pain and dysfunction involving the left knee that began approximately 4-5 months ago with no specific mechanism of injury just a gradual worsening of symptoms. Her symptoms included intermittent sharp pain localized anterolaterally to the knee, intermittent swelling, and difficulty with straightening the knee. Her pain was aggravated by walking long distances, sitting for long periods of time, and going from sitting to standing. She has tried rest, acitivity modification, bracing, and NSAIDs with no improvement of her pain. An MRI was ordered to evaluate for suspected meniscus tear.     Patient's medications, allergies, past medical, surgical, social and family histories were reviewed and updated as appropriate.    Review of Systems   All systems reviewed were negative.  Specifically, the patient denies fever, chills, weight loss, chest pain, shortness of breath, or dyspnea on exertion.      Past Medical History:   Diagnosis Date    Allergy     Anxiety     Hypertension     Obesity     Vitamin B 12 deficiency        Objective:      Physical Exam  Patient is alert and oriented, no distress. Skin is intact. Neuro is normal  with no focal motor or sensory findings.    Standing exam  stance: normal alignment, no significant leg-length discrepancy  gait: antalgic      Knee      RIGHT  LEFT  Skin:     Intact   Intact  ROM:     0-130  5-100  Effusion:    Neg   ++ moderate  Medial joint line tenderness:  Neg   +  Lateral joint line tenderness:  Neg   +  Paulino:     Neg   +  Patella crepitus:   Neg   Neg  Patella tenderness:   Neg   +  Patella grind:      Neg   Neg  Lachman:    Neg   Neg  Pivot shift:    Neg   Neg  Valgus stress:    Neg   Neg  Varus stress:    Neg   Neg  Posterior drawer:   Neg   Neg  N-V               intact  intact  Hip:    nml    nml   Lower extremity edema: Negative negative    Neurovascular exam  - motor function grossly intact bilaterally to hip flexion, knee extension and flexion, ankle dorsiflexion and plantarflexion  - sensation intact to light touch bilaterally to femoral, tibial, tibial and peroneal distributions  - symmetrical pedal pulses    Imaging:   XR Results:  Results for orders placed during the hospital encounter of 08/12/22    X-ray Knee Ortho Left with Flexion    Narrative  EXAMINATION:  XR KNEE ORTHO LEFT WITH FLEXION    CLINICAL HISTORY:  Pain in left knee    TECHNIQUE:  AP standing views of both knees, AP flexion views of both knees, lateral view of the left knee and Merchant views of both knees    COMPARISON:  10/02/2020    FINDINGS:  The joint spaces of all 3 compartments of the left knee appear to be relatively well maintained.  Mild-to-moderate joint space narrowing seen involving the medial compartment of the right knee with minimal marginal osteophyte formation noted.  No left-sided joint effusion.  No acute fracture or dislocation.    Impression  1.  As above      Electronically signed by: Nba Doe DO  Date:    08/12/2022  Time:    14:43      MRI Results:  Results for orders placed during the hospital encounter of 12/13/22    MRI Knee Without Contrast  Left    Narrative  EXAMINATION:  MRI KNEE WITHOUT CONTRAST LEFT    CLINICAL HISTORY:  Meniscal tear, untreated, new symptoms; Other tear of medial meniscus, current injury, left knee, initial encounter    TECHNIQUE:  Left knee MRI without IV contrast.    COMPARISON:  Left knee radiographs 08/12/2022    FINDINGS:  ACL, PCL, MCL, and LCL complex are intact.    Medial meniscus is normal.    Somewhat complex horizontal tear courses through anterior horn and anterior body of lateral meniscus.  No displaced meniscal fragment.    Subcortical moderate increased T2 and low T1 bone marrow signal alteration affects anterior aspect of lateral tibial plateau.    Moderate cartilage thinning occurs diffusely in the lateral compartment.  Medial and patellofemoral compartment articular cartilage is normal.  Bone marrow signal is otherwise normal.    The extensor mechanism is intact.  Moderate left knee joint effusion is present.  Popliteal fossa is unremarkable.    Mild subcutaneous edema is noted about the left knee, most prominent anteriorly.    Impression  1. Complex horizontal tear of anterior horn and body of lateral meniscus.  2. Moderate cartilage thinning throughout the lateral compartment.  3. Focal bone marrow edema along anterior aspect of lateral tibial plateau, either related to altered mechanics/chronic repetitive stress or reactive/degenerative bone marrow edema.      Electronically signed by: Doug Hensley MD  Date:    12/13/2022  Time:    19:25      Physician Read: I agree with the above impression.    Assessment/Plan:   Assessment:  Karla Arzola is a 63 y.o. female with primary osteoarthritis of the left knee, old tear lateral meniscus of left knee    Plan:    Discussed diagnosis and treatment options with the patient today.  She is a known old meniscus tear of her left knee as well as primary osteoarthritis of the left knee  She has tried conservative treatment options in the form rest, activity  modification, bracing, anti-inflammatories, previous corticosteroid injection, and viscosupplementation.  She last completed Visco in January.  She reports that she only got 2 months of pain relief with these injections and would like to defer further Visco.+  Today she has a large effusion on physical exam, for this I recommend we proceed with an aspiration as well as a corticosteroid injection  Left knee aspiration procedure performed, 25 cc yellow fluid obtained, left knee CSI given. Patient tolerated the procedure well with no immediate complications  Compression knee sleeve provided today.  I encouraged her to continue to compress the knee and tried to ice the knee throughout the day today, patient voiced understanding  Follow-up with me in about 3 months        Mara Akbar PA-C  Sports Medicine Physician Assistant       Disclaimer: This note was prepared using a voice recognition system and is likely to have sound alike errors within the text.

## 2023-07-04 ENCOUNTER — PATIENT MESSAGE (OUTPATIENT)
Dept: SPORTS MEDICINE | Facility: CLINIC | Age: 63
End: 2023-07-04
Payer: COMMERCIAL

## 2023-07-07 ENCOUNTER — PATIENT MESSAGE (OUTPATIENT)
Dept: PHARMACY | Facility: CLINIC | Age: 63
End: 2023-07-07
Payer: COMMERCIAL

## 2023-07-26 ENCOUNTER — NUTRITION (OUTPATIENT)
Dept: INTERNAL MEDICINE | Facility: CLINIC | Age: 63
End: 2023-07-26
Payer: COMMERCIAL

## 2023-07-26 DIAGNOSIS — Z71.3 DIETARY COUNSELING: Primary | ICD-10-CM

## 2023-07-26 DIAGNOSIS — Z71.3 PRE-BARIATRIC SURGERY NUTRITION EVALUATION: ICD-10-CM

## 2023-07-26 DIAGNOSIS — M19.90 OSTEOARTHRITIS, UNSPECIFIED OSTEOARTHRITIS TYPE, UNSPECIFIED SITE: ICD-10-CM

## 2023-07-26 DIAGNOSIS — I10 PRIMARY HYPERTENSION: ICD-10-CM

## 2023-07-26 PROCEDURE — 97803 MED NUTRITION INDIV SUBSEQ: CPT | Mod: S$GLB,,, | Performed by: DIETITIAN, REGISTERED

## 2023-07-26 PROCEDURE — 97803 PR MED NUTR THER, SUBSQ, INDIV, EA 15 MIN: ICD-10-PCS | Mod: S$GLB,,, | Performed by: DIETITIAN, REGISTERED

## 2023-07-26 NOTE — PROGRESS NOTES
NUTRITION NOTE    Referring Physician: Dr. Lezama  Reason for MNT Referral: Medically Supervised Diet pending Gastric Sleeve    PAST MEDICAL HISTORY:    Patient presents for 3rd visit for MSD with weight loss over the past month; total weight loss by making numerous dietary and lifestyle changes.    Reports attempting to continue with previous changes made.   Wine intake has reduced even further to 1x/week (previously 3x/week).    Son and daughter-in-law recently moved in with her (temporarily) causing her eat dinner later than usual.  The later dinner time has not affected her hunger levels, food choices, or portion sizes.    Exercis continues to remain at 3x/week for 45-60 minute sessions even though she is struggling to make it to the 45 minute maria d due to knee issues.    Sample Lunch: something small, fruit// salad  Sample Dinner: baked/grilled chicken + vegetables + yellow rice/baked potato        Past Medical History:   Diagnosis Date    Allergy     Anxiety     Hypertension     Obesity     Vitamin B 12 deficiency        CLINICAL DATA:  63 y.o. female.        Current Weight: 236.5 lbs   2023: 239.8 lbs              2023: 241 lbs  Weight Change Since Initial Visit: -4.5 lbs  Ideal Body Weight: 172 lbs +/-10%  BMI: 38.3    CURRENT DIET:  Regular diet.  Diet Recall: Food records are present.    Diet Includes:   Meal Pattern: Same.  Protein Supplements: 0 per day.  Snackin-1  Vegetables: Likes a variety. Eats daily.  Fruits: Likes a variety. Eats 2-3 times per week.  Beverages: coffee (1 daily), water, Coke (reduced further), tea (with splenda)  Dining Out: Dining out: less than once per week  Cooking at home: Daily.     CURRENT EXERCISE:  Adequate  3x/week, 45-60 minute sessions    Vitamins / Minerals / Herbs:   Multivitamin, B12, Vitamin D, collagen with biotin (nail and hair health)     Food Allergies:   NKFA.   Reports intolerance to dairy products, but still consumes. Side effect of  diarrhea    Social:  Works regular daytime shifts.  Alcohol: Cutting back. 1 glass of wine 1x/week  Smoking: None.    ASSESSMENT:  Patient demonstrates all willingness to change lifestyle habits as evidenced by weight loss, good exercise, and dietary changes.    Doing fairly well with working on greatest challenges (portion control).    Adequate calorie intake.  Inadequate protein intake.    PLAN:    Diet: Adjust diet plan.   Include protein source with lunch   Consider adding protein shake to diet once daily a few days per week   Practice plate method for dinner meals    Exercise: Maintain. Consider maintaining same walking frequency and time but decrease speed to see if knee is less bothersome    Behavior Modification: Continue to document food & activity logs daily.      Return to clinic in one month.    SESSION TIME:  15 minutes

## 2023-08-07 ENCOUNTER — PATIENT MESSAGE (OUTPATIENT)
Dept: SPORTS MEDICINE | Facility: CLINIC | Age: 63
End: 2023-08-07
Payer: COMMERCIAL

## 2023-08-07 DIAGNOSIS — M23.201 OLD COMPLEX TEAR OF LATERAL MENISCUS OF LEFT KNEE: Primary | ICD-10-CM

## 2023-08-07 DIAGNOSIS — M17.12 PRIMARY OSTEOARTHRITIS OF LEFT KNEE: ICD-10-CM

## 2023-08-29 ENCOUNTER — TELEPHONE (OUTPATIENT)
Dept: INTERNAL MEDICINE | Facility: CLINIC | Age: 63
End: 2023-08-29
Payer: COMMERCIAL

## 2023-08-29 RX ORDER — ERGOCALCIFEROL 1.25 MG/1
50000 CAPSULE ORAL
Qty: 12 CAPSULE | Refills: 0 | Status: SHIPPED | OUTPATIENT
Start: 2023-08-29 | End: 2023-12-28 | Stop reason: SDUPTHER

## 2023-08-30 ENCOUNTER — PATIENT MESSAGE (OUTPATIENT)
Dept: INTERNAL MEDICINE | Facility: CLINIC | Age: 63
End: 2023-08-30

## 2023-09-13 ENCOUNTER — CLINICAL SUPPORT (OUTPATIENT)
Dept: REHABILITATION | Facility: HOSPITAL | Age: 63
End: 2023-09-13
Payer: COMMERCIAL

## 2023-09-13 DIAGNOSIS — R52 PAIN: ICD-10-CM

## 2023-09-13 DIAGNOSIS — R68.89 DECREASED STRENGTH, ENDURANCE, AND MOBILITY: ICD-10-CM

## 2023-09-13 DIAGNOSIS — R26.89 BALANCE PROBLEM: ICD-10-CM

## 2023-09-13 DIAGNOSIS — Z74.09 DECREASED STRENGTH, ENDURANCE, AND MOBILITY: ICD-10-CM

## 2023-09-13 DIAGNOSIS — M17.12 PRIMARY OSTEOARTHRITIS OF LEFT KNEE: ICD-10-CM

## 2023-09-13 DIAGNOSIS — R53.1 DECREASED STRENGTH, ENDURANCE, AND MOBILITY: ICD-10-CM

## 2023-09-13 DIAGNOSIS — M25.60 DECREASED RANGE OF MOTION: ICD-10-CM

## 2023-09-13 DIAGNOSIS — M23.201 OLD COMPLEX TEAR OF LATERAL MENISCUS OF LEFT KNEE: ICD-10-CM

## 2023-09-13 PROCEDURE — 97110 THERAPEUTIC EXERCISES: CPT

## 2023-09-13 PROCEDURE — 97162 PT EVAL MOD COMPLEX 30 MIN: CPT

## 2023-09-13 NOTE — PLAN OF CARE
"[facility]   Physical Therapy Initial Evaluation      Date: 9/13/2023   Name: Karla Arzola  Clinic Number: [id number]    Therapy Diagnosis:    Encounter Diagnoses   Name Primary?    Old complex tear of lateral meniscus of left knee     Primary osteoarthritis of left knee     Decreased range of motion     Decreased strength, endurance, and mobility     Pain     Balance problem       Physician: Mara Akbar PA-C     Physician Orders: PT Eval and Treat  Medical Diagnosis from Referral: Old complex tear of lateral meniscus of left knee; Primary osteoarthritis of left knee  Evaluation Date: 9/13/2023  Authorization Period Expiration: 8/6/2024  Plan of Care Expiration: 11/13/23  Progress Note Due: 10/13/2023  Visit # / Visits authorized: 1/1  FOTO: 1/3 (last performed on 9/13/2023)    Precautions: Standard, Fall, and HTN    Time In: 3:15 pm  Time Out: 4:!0 pm  Total Billable Time (timed & untimed codes): 50 minutes    SUBJECTIVE   Date of onset: About a year    History of current condition - Karla reports both knees bother her but left is more constant and much, had right knee scope in 2008 and reports in was "cleaned out". Has gradually worsened in past year - no injury that she recalls.  She indicates pain in different areas at different times - indicates some pain at the sides or over patellar tendon area, and some pain over kneecap as well. Patient presents in sleeve and some edema noted, indentions over patella with brace removal.  Had  cortisone injection in June - no help with pain, and had efflexa series - which patient reports was not worth the pain of the shot, only had relief for about 2 months.    Current Activity Level:  Not exercising, last ex'd in early 2022.    Falls: [] No  [x] Yes: last fall was Sunday - was asleep on couch jumped up when got startled and fell, thinks knee gave out. Landed on knee, was more painful but has returned to prior level pain.    Imaging: " [x] Xray [x] MRI [] CT:   2022: MRI Impression:1. Complex horizontal tear of anterior horn and body of lateral meniscus.2. Moderate cartilage thinning throughout the lateral compartment.  3. Focal bone marrow edema along anterior aspect of lateral tibial plateau, either related to altered mechanics/chronic repetitive stress or reactive/degenerative bone marrow edema    Prior Therapy:  [x] No  [] Yes:   Social History: Patient lives with their family  Stairs: [] No  [x] Yes: 4 steps, unable to lead with left knee, no handrail.  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 On feet M, W, F mostly all day 8 hours, a little slower on Tuesday and Thursday.  School/Work Restrictions: [x] none  Prior Level of Function: ~1 year ago: walking about 2 miles per day, standing at work without issue.  Current Level of Function:  walking: immediately pain when gets up for about first 20 steps then eases, has increased symptoms at work after about first 20 min of up and down with patient's.    Pain:  Current 5/10, worst 8/10, best 3/10   Location: left knee> right knee  Description: Numb and stinging, numbness over kneecap, stinging/burning pain - which also moves around can be sides of knee or over top of kneecap, aching pain at night when goes to bed.  Aggravating Factors: walking  Easing Factors: massage, ice, and OTC medication, elevation    Patients goals: To be able to walking without pain or with minimal pain.    Medical History:   Past Medical History:   Diagnosis Date    Allergy     Anxiety     Hypertension     Obesity     Vitamin B 12 deficiency        Surgical History:   Karla Arzola  has a past surgical history that includes Tubal ligation; right knee scope; Mouth surgery; and Breast biopsy (Right, 10/08/2021).    Medications:   Karla has a current medication list which includes the following prescription(s): acyclovir, albuterol, amlodipine, finacea, ciclopirox, citalopram, ergocalciferol,  fluticasone propionate, furosemide, ipratropium-albuterol, multivitamin, naproxen, and rybelsus.    Allergies:   Review of patient's allergies indicates:  No Known Allergies     OBJECTIVE     Posture:  Patient presents with postural abnormalities which include:    [x] Forward Head   [x] Increased Lumbar Lordosis   [x] Rounded Shoulder   [] Flat Back Posture   [] Increased Thoracic Kyphosis [] Pes Planus   [] Increased Trunk Sway  [] Valgus knee position   [] Increased Trunk Rotation  [] Varus knee position   [] Increased cervical lordosis [x] Other: Decreased knee extension in standing    Sensation:    Sensation to light touch over UE's is  [] Intact [] Impaired [x] Defer  Sensation to light touch over LE's is  [x] Intact [] Impaired [] Defer    Reflexes:  Patellar   [x] Defer [] Normal [] Hyper []  Hypo   Achilles  [x] Defer [] Normal [] Hyper []  Hypo  Tricep  [x] Defer [] Normal [] Hyper []  Hypo  Brachioradialis [x] Defer [] Normal [] Hyper []  Hypo      Gait Analysis: The patient ambulated with the following assistive device: none; the patient presents with the following gait abnormalities: unsteady gait, decreased step length, increased base of support, antalgic gait, flexed posturing, and trunk sway and hip shift away from painful side.     Balance  Right   (seconds) Left  (seconds) Norms   Single Leg Stance 8 sec 5-6 sec Less than 4.9 sec high risk  5-6.4 sec increased risk  6.5 or greater low risk       Functional Tests  Outcome Norms   Five Time Sit to Stand Defer due to pain 60s: <11.4 sec  70s: <12.6 sec  80s: 14.8 sec  Greater than 14 sec high risk  12.1-14 sec increased risk  12 sec or less low risk        Range of Motion:    SITTING IN CHAIR/supine:  Knee AROM/PROM Right Left Pain/Dysfunction with Movement   Knee Flexion (135º)sitting/supine 110 70/70    Knee Extension (0º) sitting/supine -10 -30/-16      Strength:    L/E MMT Right  (spine) Left Pain/Dysfunction with Movement   Hip Flexion  3+/5 2+/5     Knee Extension 4+/5 3+/5 Pain on left    Knee Flexion 4+/5 4-/5 Pain on left    Hip IR 4/5 4/5 Pain on left    Hip ER 4/5 4/5 Pain on left    Ankle DF 4+/5 4+/5    Ankle PF 4+/5 4+/5        Muscle Length:       Muscle Tested  Left  Limitation   Hip Flexors [] Normal      [x] Limited    Quadriceps [] Normal      [x] Limited    Hamstrings  [] Normal      [x] Limited    Gastrocnemius  [] Normal      [x] Limited    Soleus  [] Normal      [x] Limited        Joint Mobility:   JOINT MOBILITY CHARTS:   Joint Motion Left Mobility   Patellar Medial Glide  [x] Hypo     [] Normal     [] Hyper   Patellar Lateral Glide  [x] Hypo     [] Normal     [] Hyper   Patellar Superior Glide  [x] Hypo     [] Normal     [] Hyper   Patellar Inferior Glide  [x] Hypo     [] Normal     [] Hyper       Special Tests:  defer    Palpation:  Tender with edema present over left patella, tender and increased tone over quads/hamstring/gastroc/soleus muscle's.       FOTO:    Intake Outcome Measure for FOTO knee Survey    Therapist reviewed FOTO scores for Karla Arzola on 9/13/2023.   FOTO documents entered into [employer] - see Media section or FOTO account episode details..    Intake Score: see below           TREATMENT     Total Treatment time (time-based codes) separate from Evaluation: (8) minutes     Karla received the treatments listed below:   Karla received therapeutic exercises to develop strength, ROM, flexibility, and core stabilization for 8 minutes including:    Intervention 9/13/2023  Parameters   HEP x                        Plan for Next Visit: initiate aquatic therapy        PATIENT EDUCATION AND HOME EXERCISES     Education provided: (during treatment session) minutes  Home exercise program, and importance of ROM, regular movement and strength    Written Home Exercises Provided: yes.  Exercises were reviewed and Karla was able to demonstrate them prior to the end of the session.  Karla demonstrated good  understanding of the  education provided. See EMR under Patient Instructions for exercises provided during therapy sessions.    ASSESSMENT     Karla is a 63 y.o. female referred to outpatient Physical Therapy with a medical diagnosis of Old complex tear of lateral meniscus of left knee; Primary osteoarthritis of left knee. The patient presents with signs and symptoms consistent with diagnosis along with impairments which include weakness, impaired endurance, impaired functional mobility, gait instability, impaired balance, decreased lower extremity function, pain, abnormal tone, decreased ROM, impaired joint extensibility, and impaired muscle length.      Patient prognosis is Good, if patient is consistent and compliant with Physical Therapy and home exercise program.  Patient will benefit from skilled outpatient Physical Therapy to address the deficits stated above and in the chart below, provide patient/family education, and to maximize patient's level of independence.     Plan of care discussed with patient: Yes  Patient's spiritual, cultural and educational needs considered and patient is agreeable to the plan of care and goals as stated below:     Anticipated Barriers for therapy: co-morbidities, sedentary lifestyle, chronicity of condition, adherence to treatment plan, lack of support system, and coping style      Medical Necessity is demonstrated by the following   History  Co-morbidities and personal factors that may impact the plan of care [] LOW: no personal factors / co-morbidities  [] MODERATE: 1-2 personal factors / co-morbidities  [x] HIGH: 3+ personal factors / co-morbidities    Moderate / High Support Documentation:   Past Medical History:   Diagnosis Date    Allergy     Anxiety     Hypertension     Obesity     Vitamin B 12 deficiency          Examination  Body Structures and Functions, activity limitations and participation restrictions that may impact the plan of care [] LOW: addressing 1-2 elements  [] MODERATE: 3+  elements  [x] HIGH: 4+ elements (please support below)    Moderate / High Support Documentation: See evaluation / objective measurements above and FOTO.     Clinical Presentation [] LOW: stable  [x] MODERATE: Evolving  [] HIGH: Unstable     Decision Making/ Complexity Score: moderate         Goals:  Reviewed: 9/13/2023      Short Term Goals: In 4 weeks  Progress Date   1.Patient to be educated on HEP. [] Progressing  [] MET   [] Not   [] Not tested    2.Patient to increase Knee extension range of motion by 10 degrees, in order to improve available range of motion for ADL's.  [] Progressing  [] MET   [] Not MET  [] Not tested    3.Patient to increase bilateral LE strength by 1/2 grade, in order to improve endurance and increase ability to perform all functional activities for increased time.  [] Progressing  [] MET   [] Not MET  [] Not tested    4.Patient to have pain less than 5/10 at worst, to improve QOL. [] Progressing  [] MET   [] Not MET  [] Not tested    5.Patient to improve score on the FOTO, to improve QOL. [] Progressing  [] MET   [] Not MET  [] Not tested    6. Patient to improve score on single leg balance in order to decrease fall risk. [] Progressing  [] MET   [] Not MET  [] Not tested      Long Term Goals: In 8 weeks Progress Date   1.Patient to improve score on the FOTO predicted score or better, to improve QOL. [] Progressing  [] MET   [] Not MET  [] Not tested    2. Patient to increase bilateral LE strength to 4+/5 or greater, in order to improve endurance and increase ability to perform all functional activities for increased time. [] Progressing  [] MET   [] Not MET  [] Not tested    3. Patient to have decreased pain to 2/10 at worst, to improve QOL. [] Progressing  [] MET   [] Not MET  [] Not tested    4. Patient to normalize score on single leg balance, in order to improve endurance and decrease fall risk. [] Progressing  [] MET   [] Not MET  [] Not tested    5. Patient to perform daily  activities including walking during normal work activities without increased symptoms. [] Progressing  [] MET   [] Not MET  [] Not tested      PLAN     Plan of care Certification: 9/13/2023  to 11/13/2023.    Outpatient Physical Therapy 2 times weekly for 8 weeks to include any combination of the following interventions: Aquatic Therapy, electrical stimulation (PRN), Gait Training, Manual Therapy, Neuromuscular Re-ed, Patient Education/Self Care, Therapeutic Activites, Therapeutic Exercise, Dry Needling, and Moist Hot Pack/Cold Pack    Albertina Valverde, PT

## 2023-09-16 PROBLEM — R53.1 DECREASED STRENGTH, ENDURANCE, AND MOBILITY: Status: ACTIVE | Noted: 2023-09-16

## 2023-09-16 PROBLEM — Z74.09 DECREASED STRENGTH, ENDURANCE, AND MOBILITY: Status: ACTIVE | Noted: 2023-09-16

## 2023-09-16 PROBLEM — R68.89 DECREASED STRENGTH, ENDURANCE, AND MOBILITY: Status: ACTIVE | Noted: 2023-09-16

## 2023-09-16 PROBLEM — M25.60 DECREASED RANGE OF MOTION: Status: ACTIVE | Noted: 2023-09-16

## 2023-09-16 PROBLEM — R26.89 BALANCE PROBLEM: Status: ACTIVE | Noted: 2023-09-16

## 2023-09-16 PROBLEM — R52 PAIN: Status: ACTIVE | Noted: 2023-09-16

## 2023-09-19 ENCOUNTER — OFFICE VISIT (OUTPATIENT)
Dept: SPORTS MEDICINE | Facility: CLINIC | Age: 63
End: 2023-09-19
Payer: COMMERCIAL

## 2023-09-19 VITALS — WEIGHT: 239 LBS | HEIGHT: 66 IN | BODY MASS INDEX: 38.41 KG/M2

## 2023-09-19 DIAGNOSIS — M23.201 OLD COMPLEX TEAR OF LATERAL MENISCUS OF LEFT KNEE: Primary | ICD-10-CM

## 2023-09-19 DIAGNOSIS — M17.12 PRIMARY OSTEOARTHRITIS OF LEFT KNEE: ICD-10-CM

## 2023-09-19 PROCEDURE — 99214 PR OFFICE/OUTPT VISIT, EST, LEVL IV, 30-39 MIN: ICD-10-PCS | Mod: S$GLB,,, | Performed by: PHYSICIAN ASSISTANT

## 2023-09-19 PROCEDURE — 3008F PR BODY MASS INDEX (BMI) DOCUMENTED: ICD-10-PCS | Mod: CPTII,S$GLB,, | Performed by: PHYSICIAN ASSISTANT

## 2023-09-19 PROCEDURE — 3044F HG A1C LEVEL LT 7.0%: CPT | Mod: CPTII,S$GLB,, | Performed by: PHYSICIAN ASSISTANT

## 2023-09-19 PROCEDURE — 1159F PR MEDICATION LIST DOCUMENTED IN MEDICAL RECORD: ICD-10-PCS | Mod: CPTII,S$GLB,, | Performed by: PHYSICIAN ASSISTANT

## 2023-09-19 PROCEDURE — 3008F BODY MASS INDEX DOCD: CPT | Mod: CPTII,S$GLB,, | Performed by: PHYSICIAN ASSISTANT

## 2023-09-19 PROCEDURE — 3044F PR MOST RECENT HEMOGLOBIN A1C LEVEL <7.0%: ICD-10-PCS | Mod: CPTII,S$GLB,, | Performed by: PHYSICIAN ASSISTANT

## 2023-09-19 PROCEDURE — 1160F PR REVIEW ALL MEDS BY PRESCRIBER/CLIN PHARMACIST DOCUMENTED: ICD-10-PCS | Mod: CPTII,S$GLB,, | Performed by: PHYSICIAN ASSISTANT

## 2023-09-19 PROCEDURE — 1159F MED LIST DOCD IN RCRD: CPT | Mod: CPTII,S$GLB,, | Performed by: PHYSICIAN ASSISTANT

## 2023-09-19 PROCEDURE — 99999 PR PBB SHADOW E&M-EST. PATIENT-LVL III: CPT | Mod: PBBFAC,,, | Performed by: PHYSICIAN ASSISTANT

## 2023-09-19 PROCEDURE — 99214 OFFICE O/P EST MOD 30 MIN: CPT | Mod: S$GLB,,, | Performed by: PHYSICIAN ASSISTANT

## 2023-09-19 PROCEDURE — 1160F RVW MEDS BY RX/DR IN RCRD: CPT | Mod: CPTII,S$GLB,, | Performed by: PHYSICIAN ASSISTANT

## 2023-09-19 PROCEDURE — 99999 PR PBB SHADOW E&M-EST. PATIENT-LVL III: ICD-10-PCS | Mod: PBBFAC,,, | Performed by: PHYSICIAN ASSISTANT

## 2023-09-19 NOTE — PROGRESS NOTES
Orthopaedic Follow-Up Visit    Last Appointment:  06/27/2023  Diagnosis:  Primary osteoarthritis of the left knee, old complex tear of the lateral meniscus of left knee  Prior Procedure:  Aspiration CSI 06/27/2023, Euflexxa series completed 01/18/2023    Karla Arzola is a 63 y.o. female who is here for f/u evaluation of left knee pain. The patient was last seen here by me on 06/27/2023 at which point we decided to try a left knee aspiration and corticosteroid injection and referral to [facility] physical therapy prior to considering further treatment options. The patient returns today reporting that the symptoms have persisted, noting 2 recent falls due to instability, and is interested in proceeding with further treatment options.     To review her history, Karla Arzola is a 62 y.o. year old female patient who initially presented to clinic on 08/12/2022 with pain and dysfunction involving the left knee that began approximately 4-5 months prior with no specific mechanism of injury just a gradual worsening of symptoms. Her symptoms included intermittent sharp pain localized anterolaterally to the knee, intermittent swelling, and difficulty with straightening the knee. Her pain was aggravated by walking long distances, sitting for long periods of time, and going from sitting to standing. She has tried rest, acitivity modification, bracing, and NSAIDs with no improvement of her pain. An MRI was ordered to evaluate for suspected meniscus tear.        Patient's medications, allergies, past medical, surgical, social and family histories were reviewed and updated as appropriate.    Review of Systems   All systems reviewed were negative.  Specifically, the patient denies fever, chills, weight loss, chest pain, shortness of breath, or dyspnea on exertion.      Past Medical History:   Diagnosis Date    Allergy     Anxiety     Hypertension     Obesity     Vitamin B 12 deficiency        Objective:      Physical  Exam  Patient is alert and oriented, no distress. Skin is intact. Neuro is normal with no focal motor or sensory findings.    Standing exam  stance: normal alignment, no significant leg-length discrepancy  gait: antalgic, presents wearing short CAM walking boot on right ankle, compression sleeve present on left knee     Knee                                                  RIGHT             LEFT  Skin:                                         Intact               Intact  ROM:                                        0-130              5-90  Effusion:                                   Neg                  ++ moderate  Medial joint line tenderness:    Neg                  +  Lateral joint line tenderness:    Neg                  +  Paulino:                                Neg                  +  Patella crepitus:                       Neg                  Neg  Patella tenderness:                  Neg                  +, tendon  Patella grind:                            Neg                  Neg  Lachman:                                 Neg                  Neg  Pivot shift:                                Neg                  Neg  Valgus stress:                          Neg                  Neg  Varus stress:                            Neg                  Neg  Posterior drawer:                     Neg                  Neg  N-V                                          intact               intact  Hip:                                          nml                    nml              Lower extremity edema:         Negative          negative       Neurovascular exam  - motor function grossly intact bilaterally to hip flexion, knee extension and flexion, ankle dorsiflexion and plantarflexion  - sensation intact to light touch bilaterally to femoral, tibial, tibial and peroneal distributions  - symmetrical pedal pulses    Imaging:   XR Results:  Results for orders placed during the hospital encounter of 08/12/22    X-ray Knee  Ortho Left with Flexion    Narrative  EXAMINATION:  XR KNEE ORTHO LEFT WITH FLEXION    CLINICAL HISTORY:  Pain in left knee    TECHNIQUE:  AP standing views of both knees, AP flexion views of both knees, lateral view of the left knee and Merchant views of both knees    COMPARISON:  10/02/2020    FINDINGS:  The joint spaces of all 3 compartments of the left knee appear to be relatively well maintained.  Mild-to-moderate joint space narrowing seen involving the medial compartment of the right knee with minimal marginal osteophyte formation noted.  No left-sided joint effusion.  No acute fracture or dislocation.    Impression  1.  As above      Electronically signed by: Nba Doe DO  Date:    08/12/2022  Time:    14:43      MRI Results:  Results for orders placed during the hospital encounter of 12/13/22    MRI Knee Without Contrast Left    Narrative  EXAMINATION:  MRI KNEE WITHOUT CONTRAST LEFT    CLINICAL HISTORY:  Meniscal tear, untreated, new symptoms; Other tear of medial meniscus, current injury, left knee, initial encounter    TECHNIQUE:  Left knee MRI without IV contrast.    COMPARISON:  Left knee radiographs 08/12/2022    FINDINGS:  ACL, PCL, MCL, and LCL complex are intact.    Medial meniscus is normal.    Somewhat complex horizontal tear courses through anterior horn and anterior body of lateral meniscus.  No displaced meniscal fragment.    Subcortical moderate increased T2 and low T1 bone marrow signal alteration affects anterior aspect of lateral tibial plateau.    Moderate cartilage thinning occurs diffusely in the lateral compartment.  Medial and patellofemoral compartment articular cartilage is normal.  Bone marrow signal is otherwise normal.    The extensor mechanism is intact.  Moderate left knee joint effusion is present.  Popliteal fossa is unremarkable.    Mild subcutaneous edema is noted about the left knee, most prominent anteriorly.    Impression  1. Complex horizontal tear of anterior horn  and body of lateral meniscus.  2. Moderate cartilage thinning throughout the lateral compartment.  3. Focal bone marrow edema along anterior aspect of lateral tibial plateau, either related to altered mechanics/chronic repetitive stress or reactive/degenerative bone marrow edema.      Electronically signed by: Doug Hensley MD  Date:    12/13/2022  Time:    19:25      CT Results:  No results found for this or any previous visit.      Physician Read: I agree with the above impression.    Assessment/Plan:   Assessment:  Karla Arzola is a 63 y.o. female with left knee complex lateral meniscus tear, proximal tibia bone bruise/insufficiency fracture    Plan:    Discussed diagnosis and treatment options with patient today.  She has a known left knee meniscus tear as well as a insufficiency fracture, these findings are consistent with osteoarthritis of the knee.  We had a long discussion today regarding treatment options. She has tried considerable conservative treatment in the form of rest, activity modification, knee bracing, oral anti-inflammatories, previous corticosteroid injection, aspiration, viscosupplementation (Euflexxa series), home exercises and formal physical therapy. Despite these interventions she still continues to have pain with ADLs  She recently started her aquatic therapy this week, she has completed 1 session thus far. I would like for her to trial at least 6 weeks of aquatic therapy prior to considering further treatment options  I also discussed with her that I would like for her to begin taking calcium and vitamin-D to optimize her bone health. She reports that she currently takes vitamin-D, I encouraged her to add on calcium  I also discussed with her that there could be an inflammatory arthritis component to her knee pain and swelling. She has previously seen Rheumatology and had a positive DALI. She has not trialed a DMARD thus far, only prednisone. She is scheduled to follow up with  Rheumatology in November, I encouraged her to keep this appointment as I do think a trial of a DMARD could be a good option for her   I would like for her to return to clinic in about 6 weeks, I would like for her to see Dr. Adams at that time to discuss further treatment options with the patient, patient was in agreement with the above treatment plan  Follow-up with Dr. Adams in 6 weeks          Mara Akbar PA-C  Sports Medicine Physician Assistant       Disclaimer: This note was prepared using a voice recognition system and is likely to have sound alike errors within the text.

## 2023-09-20 ENCOUNTER — CLINICAL SUPPORT (OUTPATIENT)
Dept: REHABILITATION | Facility: HOSPITAL | Age: 63
End: 2023-09-20
Payer: COMMERCIAL

## 2023-09-20 DIAGNOSIS — R52 PAIN: ICD-10-CM

## 2023-09-20 DIAGNOSIS — R26.89 BALANCE PROBLEM: ICD-10-CM

## 2023-09-20 DIAGNOSIS — R68.89 DECREASED STRENGTH, ENDURANCE, AND MOBILITY: ICD-10-CM

## 2023-09-20 DIAGNOSIS — Z74.09 DECREASED STRENGTH, ENDURANCE, AND MOBILITY: ICD-10-CM

## 2023-09-20 DIAGNOSIS — M25.60 DECREASED RANGE OF MOTION: Primary | ICD-10-CM

## 2023-09-20 DIAGNOSIS — R53.1 DECREASED STRENGTH, ENDURANCE, AND MOBILITY: ICD-10-CM

## 2023-09-20 PROCEDURE — 97113 AQUATIC THERAPY/EXERCISES: CPT

## 2023-09-20 NOTE — PROGRESS NOTES
[facility]   Physical Therapy Treatment Note        Name: Karla Arzola  Clinic Number: [id number]    Therapy Diagnosis:   Encounter Diagnoses   Name Primary?    Decreased range of motion Yes    Decreased strength, endurance, and mobility     Pain     Balance problem      Physician: Mara Akbar PA-C    Visit Date: 9/20/2023    Physician Orders: PT Eval and Treat  Medical Diagnosis from Referral: Old complex tear of lateral meniscus of left knee; Primary osteoarthritis of left knee  Evaluation Date: 9/13/2023  Authorization Period Expiration: 8/6/2024  Plan of Care Expiration: 12/13/23  Progress Note Due: 10/13/2023  Visit # / Visits authorized: 1/1  FOTO: 1/3 (last performed on 9/13/2023)  PTA Visit #: 0/5      Precautions: Standard, Fall, and HTN    Time In: 2:40 pm  Time Out: 3:25 pm  Total Billable Time: 40 minutes      SUBJECTIVE     Pt reports: Fell on Sunday when knee gave out - hurt foot and now in boot, OK to get into pool. She was more painful in knee after fall but back to normal pain/soreness now.     She was compliant with home exercise program.  Response to previous treatment: no change  Functional change: recent fall and in boot    Pain: 3/10  Location: left knee      OBJECTIVE     Objective Measures updated at progress report unless specified.       TREATMENT       Karla received the treatments listed below:         Karla Arzola received aquatic therapy with the use of water to decrease the pull of gravity and weightbearing forces on the body to increase tolerance to resisted therapeutic exercise; including detailed instruction of all therapeutic exercise as well as stepping in and out of the pool with use of handrail and step-to pattern for 40 minutes including:    Exercise 9/20/2023 Parameters   FW/BW walking  x 4 minutes   Side stepping x 3 minutes/each     3 minutes   Marching, alternating x 3 minutes   Hamstring curls, alternating  x 3 minutes   Hip  "3-way, B lower extremity x 2 minute bilateral        Bicycle FW/BW x 2 minutes each    Flutter kick  2 minutes    Scissor kicks x 2 minutes    Un-weight on noodle x         Standing knee flexion stretch on step     Quad stretch on step   2   Hamstring stretch on step   2 x 30"    GSS at wall  2 x 30"         1/4 squat  x 3 x 10 each    Standing hip circles CW/CCW  10 each    Standing figure 8   10x each    Heel/toe raises x 1 min        UE alt flexion bilateral paddles     UE alt ABD bilateral  paddles     UE alt hugs bilateral  paddles     UE IR/ER bilateral  paddles     UE alt rows bilateral  paddles                              PATIENT EDUCATION AND HOME EXERCISES     Home Exercises Provided and Patient Education Provided     Education provided:   - home exercise program     Written Home Exercises Provided: Patient instructed to cont prior HEP. Exercises were reviewed and Karla was able to demonstrate them prior to the end of the session.  Karla demonstrated good  understanding of the education provided. See EMR under Patient Instructions for exercises provided during therapy sessions    ASSESSMENT     Patient tolerated all treatment well she did require extensive cues to complete all interventions today. Patient with no complaints of pain with treatment session and was encouraged to participate with home exercise program.    Karla Is progressing well towards her goals.   Pt prognosis is Good.     Pt will continue to benefit from skilled outpatient physical therapy to address the deficits listed in the problem list box on initial evaluation, provide pt/family education and to maximize pt's level of independence in the home and community environment.     Pt's spiritual, cultural and educational needs considered and pt agreeable to plan of care and goals.     Anticipated barriers to physical therapy:  co-morbidities, sedentary lifestyle, chronicity of condition, adherence to treatment plan, lack of support system, " and coping style    Goals:   Reviewed: 9/20/2023       Short Term Goals: In 4 weeks  Progress Date   1.Patient to be educated on HEP. [x] Progressing  [] MET   [] Not   [] Not tested     2.Patient to increase Knee extension range of motion by 10 degrees, in order to improve available range of motion for ADL's.  [x] Progressing  [] MET   [] Not MET  [] Not tested     3.Patient to increase bilateral LE strength by 1/2 grade, in order to improve endurance and increase ability to perform all functional activities for increased time.  [x] Progressing  [] MET   [] Not MET  [] Not tested     4.Patient to have pain less than 5/10 at worst, to improve QOL. [x] Progressing  [] MET   [] Not MET  [] Not tested     5.Patient to improve score on the FOTO, to improve QOL. [x] Progressing  [] MET   [] Not MET  [] Not tested     6. Patient to improve score on single leg balance in order to decrease fall risk. [x] Progressing  [] MET   [] Not MET  [] Not tested        Long Term Goals: In 8 weeks Progress Date   1.Patient to improve score on the FOTO predicted score or better, to improve QOL. [x] Progressing  [] MET   [] Not MET  [] Not tested     2. Patient to increase bilateral LE strength to 4+/5 or greater, in order to improve endurance and increase ability to perform all functional activities for increased time. [x] Progressing  [] MET   [] Not MET  [] Not tested     3. Patient to have decreased pain to 2/10 at worst, to improve QOL. [x] Progressing  [] MET   [] Not MET  [] Not tested     4. Patient to normalize score on single leg balance, in order to improve endurance and decrease fall risk. [x] Progressing  [] MET   [] Not MET  [] Not tested     5. Patient to perform daily activities including walking during normal work activities without increased symptoms. [x] Progressing  [] MET   [] Not MET  [] Not tested          PLAN     Monitor response to today's treatment session and progress with Physical Therapy plan of care as  indicated.    Plan of care Certification: 9/13/2023  to 11/13/2023.     Outpatient Physical Therapy 2 times weekly for 8 weeks to include any combination of the following interventions: Aquatic Therapy, electrical stimulation (PRN), Gait Training, Manual Therapy, Neuromuscular Re-ed, Patient Education/Self Care, Therapeutic Activites, Therapeutic Exercise, Dry Needling, and Moist Hot Pack/Cold Pack    Albertina Valverde, PT

## 2023-09-26 ENCOUNTER — CLINICAL SUPPORT (OUTPATIENT)
Dept: REHABILITATION | Facility: HOSPITAL | Age: 63
End: 2023-09-26
Payer: COMMERCIAL

## 2023-09-26 DIAGNOSIS — R68.89 DECREASED STRENGTH, ENDURANCE, AND MOBILITY: ICD-10-CM

## 2023-09-26 DIAGNOSIS — R53.1 DECREASED STRENGTH, ENDURANCE, AND MOBILITY: ICD-10-CM

## 2023-09-26 DIAGNOSIS — M25.60 DECREASED RANGE OF MOTION: Primary | ICD-10-CM

## 2023-09-26 DIAGNOSIS — Z74.09 DECREASED STRENGTH, ENDURANCE, AND MOBILITY: ICD-10-CM

## 2023-09-26 DIAGNOSIS — R26.89 BALANCE PROBLEM: ICD-10-CM

## 2023-09-26 DIAGNOSIS — R52 PAIN: ICD-10-CM

## 2023-09-26 PROCEDURE — 97113 AQUATIC THERAPY/EXERCISES: CPT | Mod: CQ

## 2023-09-26 NOTE — PROGRESS NOTES
[facility]   Physical Therapy Treatment Note        Name: Karla Arzola  Clinic Number: [id number]    Therapy Diagnosis:   Encounter Diagnoses   Name Primary?    Decreased range of motion Yes    Decreased strength, endurance, and mobility     Pain     Balance problem      Physician: Mara Akbar PA-C    Visit Date: 9/26/2023    Physician Orders: PT Eval and Treat  Medical Diagnosis from Referral: Old complex tear of lateral meniscus of left knee; Primary osteoarthritis of left knee  Evaluation Date: 9/13/2023  Authorization Period Expiration: 12/21/2023  Plan of Care Expiration: 12/13/23  Progress Note Due: 10/13/2023  Visit # / Visits authorized: 2/12 + eval  FOTO: 1/3 (last performed on 9/13/2023)  PTA Visit #: 0/5      Precautions: Standard, Fall, and HTN    Time In: 2:35 pm  Time Out: 3:15 pm  Total Billable Time: 40 minutes      SUBJECTIVE     Pt reports: foot is bothering her more today for some reason, knee is feeling okay. States she had some soreness after last session which did not last long.     She was compliant with home exercise program.  Response to previous treatment: no change  Functional change: recent fall and in boot    Pain: 3/10  Location: left knee      OBJECTIVE     Objective Measures updated at progress report unless specified.       TREATMENT       Karla received the treatments listed below:         Karla Arzola received aquatic therapy with the use of water to decrease the pull of gravity and weightbearing forces on the body to increase tolerance to resisted therapeutic exercise; including detailed instruction of all therapeutic exercise as well as stepping in and out of the pool with use of handrail and step-to pattern for 40 minutes including:    Exercise 9/26/2023 Parameters   FW/BW walking  x 6 minutes   Side stepping x 6 minutes     3 minutes   Marching, alternating x 3 minutes   Hamstring curls, alternating  x 3 minutes   Hip 3-way, B  "lower extremity x 3 minute bilateral        Bicycle FW/BW x 3 minutes each    Flutter kick  2 minutes    Scissor kicks  2 minutes    Un-weight on noodle x 2 minutes        Standing knee flexion stretch on step     Quad stretch on step   2   Hamstring stretch on step   2 x 30"    GSS at wall  2 x 30"         1/4 squat  x 3 x 10 each    Standing hip circles CW/CCW  10 each    Standing figure 8   10x each    Heel/toe raises x 2 min        UE alt flexion bilateral paddles     UE alt ABD bilateral  paddles     UE alt hugs bilateral  paddles     UE IR/ER bilateral  paddles     UE alt rows bilateral  paddles                              PATIENT EDUCATION AND HOME EXERCISES     Home Exercises Provided and Patient Education Provided     Education provided:   - home exercise program     Written Home Exercises Provided: Patient instructed to cont prior HEP. Exercises were reviewed and Karla was able to demonstrate them prior to the end of the session.  Karla demonstrated good  understanding of the education provided. See EMR under Patient Instructions for exercises provided during therapy sessions    ASSESSMENT     Patient did well with session today with minimal complaints of discomfort. Some guarding noted with knee flexion, range modified and pain subsided. Good performance of hip activities with good upright posture and core activation. Patient with difficulty navigating stairs due to foot pain and knee pain. Patient left session with good fatigue and some soreness.     Karla Is progressing well towards her goals.   Pt prognosis is Good.     Pt will continue to benefit from skilled outpatient physical therapy to address the deficits listed in the problem list box on initial evaluation, provide pt/family education and to maximize pt's level of independence in the home and community environment.     Pt's spiritual, cultural and educational needs considered and pt agreeable to plan of care and goals.     Anticipated barriers " to physical therapy:  co-morbidities, sedentary lifestyle, chronicity of condition, adherence to treatment plan, lack of support system, and coping style    Goals:   Reviewed: 9/26/2023       Short Term Goals: In 4 weeks  Progress Date   1.Patient to be educated on HEP. [x] Progressing  [] MET   [] Not   [] Not tested     2.Patient to increase Knee extension range of motion by 10 degrees, in order to improve available range of motion for ADL's.  [x] Progressing  [] MET   [] Not MET  [] Not tested     3.Patient to increase bilateral LE strength by 1/2 grade, in order to improve endurance and increase ability to perform all functional activities for increased time.  [x] Progressing  [] MET   [] Not MET  [] Not tested     4.Patient to have pain less than 5/10 at worst, to improve QOL. [x] Progressing  [] MET   [] Not MET  [] Not tested     5.Patient to improve score on the FOTO, to improve QOL. [x] Progressing  [] MET   [] Not MET  [] Not tested     6. Patient to improve score on single leg balance in order to decrease fall risk. [x] Progressing  [] MET   [] Not MET  [] Not tested        Long Term Goals: In 8 weeks Progress Date   1.Patient to improve score on the FOTO predicted score or better, to improve QOL. [x] Progressing  [] MET   [] Not MET  [] Not tested     2. Patient to increase bilateral LE strength to 4+/5 or greater, in order to improve endurance and increase ability to perform all functional activities for increased time. [x] Progressing  [] MET   [] Not MET  [] Not tested     3. Patient to have decreased pain to 2/10 at worst, to improve QOL. [x] Progressing  [] MET   [] Not MET  [] Not tested     4. Patient to normalize score on single leg balance, in order to improve endurance and decrease fall risk. [x] Progressing  [] MET   [] Not MET  [] Not tested     5. Patient to perform daily activities including walking during normal work activities without increased symptoms. [x] Progressing  [] MET   [] Not  MET  [] Not tested          PLAN     Monitor response to today's treatment session and progress with Physical Therapy plan of care as indicated.    Plan of care Certification: 9/13/2023  to 11/13/2023.     Outpatient Physical Therapy 2 times weekly for 8 weeks to include any combination of the following interventions: Aquatic Therapy, electrical stimulation (PRN), Gait Training, Manual Therapy, Neuromuscular Re-ed, Patient Education/Self Care, Therapeutic Activites, Therapeutic Exercise, Dry Needling, and Moist Hot Pack/Cold Pack    Padma Walden, PTA

## 2023-09-28 ENCOUNTER — CLINICAL SUPPORT (OUTPATIENT)
Dept: REHABILITATION | Facility: HOSPITAL | Age: 63
End: 2023-09-28
Payer: COMMERCIAL

## 2023-09-28 DIAGNOSIS — M25.60 DECREASED RANGE OF MOTION: Primary | ICD-10-CM

## 2023-09-28 DIAGNOSIS — Z74.09 DECREASED STRENGTH, ENDURANCE, AND MOBILITY: ICD-10-CM

## 2023-09-28 DIAGNOSIS — R68.89 DECREASED STRENGTH, ENDURANCE, AND MOBILITY: ICD-10-CM

## 2023-09-28 DIAGNOSIS — R52 PAIN: ICD-10-CM

## 2023-09-28 DIAGNOSIS — R26.89 BALANCE PROBLEM: ICD-10-CM

## 2023-09-28 DIAGNOSIS — R53.1 DECREASED STRENGTH, ENDURANCE, AND MOBILITY: ICD-10-CM

## 2023-09-28 PROCEDURE — 97113 AQUATIC THERAPY/EXERCISES: CPT | Mod: CQ

## 2023-09-28 NOTE — PROGRESS NOTES
[facility]   Physical Therapy Treatment Note        Name: Karla Arzola  Clinic Number: [id number]    Therapy Diagnosis:   Encounter Diagnoses   Name Primary?    Decreased range of motion Yes    Decreased strength, endurance, and mobility     Pain     Balance problem      Physician: Mara Akbar PA-C    Visit Date: 9/28/2023    Physician Orders: PT Eval and Treat  Medical Diagnosis from Referral: Old complex tear of lateral meniscus of left knee; Primary osteoarthritis of left knee  Evaluation Date: 9/13/2023  Authorization Period Expiration: 12/21/2023  Plan of Care Expiration: 12/13/23  Progress Note Due: 10/13/2023  Visit # / Visits authorized: 3/12 + eval  FOTO: 1/3 (last performed on 9/13/2023)  PTA Visit #: 2/5      Precautions: Standard, Fall, and HTN    Time In: 9:50 am  Time Out: 10:30 am  Total Billable Time: 40 minutes      SUBJECTIVE     Pt reports: heel raises bothered the ankle last visit. Knee is hurting more today, does not think it is related to therapy. She was on her feet a little more yesterday and she had increased knee pain starting last night, more of a burning/stinging feeling.    She was compliant with home exercise program.  Response to previous treatment: no change  Functional change: recent fall and in boot    Pain: 6/10  Location: left knee      OBJECTIVE     Objective Measures updated at progress report unless specified.       TREATMENT       Karla received the treatments listed below:       Karla Arzola received aquatic therapy with the use of water to decrease the pull of gravity and weightbearing forces on the body to increase tolerance to resisted therapeutic exercise; including detailed instruction of all therapeutic exercise as well as stepping in and out of the pool with use of handrail and step-to pattern for 40 minutes including:    Exercise 9/28/2023 Parameters   FW/BW walking  x 6 minutes   Side stepping x 6 minutes     3 minutes  "  Marching, alternating x 3 minutes   Hamstring curls, alternating  x 3 minutes   Hip 3-way, B lower extremity x 3 minute bilateral        Bicycle FW/BW x 3 minutes each    Flutter kick  2 minutes    Scissor kicks  2 minutes    Un-weight on noodle x 3 minutes        Standing knee flexion stretch on step     Quad stretch on step   2   Hamstring stretch on step   2 x 30"    GSS at wall  2 x 30"         1/4 squat  x 3 minutes   Standing hip circles CW/CCW  10 each    Standing figure 8   10x each    Heel/toe raises  2 min        UE alt flexion bilateral paddles     UE alt ABD bilateral  paddles     UE alt hugs bilateral  paddles     UE IR/ER bilateral  paddles     UE alt rows bilateral  paddles                              PATIENT EDUCATION AND HOME EXERCISES     Home Exercises Provided and Patient Education Provided     Education provided:   - home exercise program     Written Home Exercises Provided: Patient instructed to cont prior HEP. Exercises were reviewed and Karla was able to demonstrate them prior to the end of the session.  Karla demonstrated good  understanding of the education provided. See EMR under Patient Instructions for exercises provided during therapy sessions    ASSESSMENT     Patient did well with session today with minimal complaints of discomfort. Heel raises not performed today due to increased ankle pain last visit. Good performance of activities with no verbal cues necessary for decreased compensation. Patient continues with difficulty navigating stairs. Patient left session with no reports of increased pain.     Karla Is progressing well towards her goals.   Pt prognosis is Good.     Pt will continue to benefit from skilled outpatient physical therapy to address the deficits listed in the problem list box on initial evaluation, provide pt/family education and to maximize pt's level of independence in the home and community environment.     Pt's spiritual, cultural and educational needs " considered and pt agreeable to plan of care and goals.     Anticipated barriers to physical therapy:  co-morbidities, sedentary lifestyle, chronicity of condition, adherence to treatment plan, lack of support system, and coping style    Goals:   Reviewed: 9/28/2023       Short Term Goals: In 4 weeks  Progress Date   1.Patient to be educated on HEP. [x] Progressing  [] MET   [] Not   [] Not tested     2.Patient to increase Knee extension range of motion by 10 degrees, in order to improve available range of motion for ADL's.  [x] Progressing  [] MET   [] Not MET  [] Not tested     3.Patient to increase bilateral LE strength by 1/2 grade, in order to improve endurance and increase ability to perform all functional activities for increased time.  [x] Progressing  [] MET   [] Not MET  [] Not tested     4.Patient to have pain less than 5/10 at worst, to improve QOL. [x] Progressing  [] MET   [] Not MET  [] Not tested     5.Patient to improve score on the FOTO, to improve QOL. [x] Progressing  [] MET   [] Not MET  [] Not tested     6. Patient to improve score on single leg balance in order to decrease fall risk. [x] Progressing  [] MET   [] Not MET  [] Not tested        Long Term Goals: In 8 weeks Progress Date   1.Patient to improve score on the FOTO predicted score or better, to improve QOL. [x] Progressing  [] MET   [] Not MET  [] Not tested     2. Patient to increase bilateral LE strength to 4+/5 or greater, in order to improve endurance and increase ability to perform all functional activities for increased time. [x] Progressing  [] MET   [] Not MET  [] Not tested     3. Patient to have decreased pain to 2/10 at worst, to improve QOL. [x] Progressing  [] MET   [] Not MET  [] Not tested     4. Patient to normalize score on single leg balance, in order to improve endurance and decrease fall risk. [x] Progressing  [] MET   [] Not MET  [] Not tested     5. Patient to perform daily activities including walking during  normal work activities without increased symptoms. [x] Progressing  [] MET   [] Not MET  [] Not tested          PLAN     Monitor response to today's treatment session and progress with Physical Therapy plan of care as indicated.    Plan of care Certification: 9/13/2023  to 11/13/2023.     Outpatient Physical Therapy 2 times weekly for 8 weeks to include any combination of the following interventions: Aquatic Therapy, electrical stimulation (PRN), Gait Training, Manual Therapy, Neuromuscular Re-ed, Patient Education/Self Care, Therapeutic Activites, Therapeutic Exercise, Dry Needling, and Moist Hot Pack/Cold Pack    Padma Walden, PTA

## 2023-10-04 ENCOUNTER — TELEPHONE (OUTPATIENT)
Dept: REHABILITATION | Facility: HOSPITAL | Age: 63
End: 2023-10-04

## 2023-10-04 NOTE — TELEPHONE ENCOUNTER
Called patient to check statis due to no show for appointment. Patient reports that she has been on the phone rescheduling patient's, totally forgot about appointment today and apologized. Confirmed next appointment on Friday and reminded patient to please contact us 24 hours before if she will not make appointment. Patient voiced understanding.  Albertina Valverde, PT

## 2023-10-06 ENCOUNTER — OFFICE VISIT (OUTPATIENT)
Dept: PODIATRY | Facility: CLINIC | Age: 63
End: 2023-10-06
Payer: COMMERCIAL

## 2023-10-06 ENCOUNTER — HOSPITAL ENCOUNTER (OUTPATIENT)
Dept: RADIOLOGY | Facility: HOSPITAL | Age: 63
Discharge: HOME | End: 2023-10-06
Attending: PODIATRIST
Payer: COMMERCIAL

## 2023-10-06 VITALS — HEIGHT: 66 IN | WEIGHT: 239 LBS | BODY MASS INDEX: 38.41 KG/M2

## 2023-10-06 DIAGNOSIS — S93.601A RIGHT FOOT SPRAIN, INITIAL ENCOUNTER: ICD-10-CM

## 2023-10-06 DIAGNOSIS — S93.601A RIGHT FOOT SPRAIN, INITIAL ENCOUNTER: Primary | ICD-10-CM

## 2023-10-06 PROCEDURE — 73630 X-RAY EXAM OF FOOT: CPT | Mod: 26,RT,, | Performed by: RADIOLOGY

## 2023-10-06 PROCEDURE — 73610 X-RAY EXAM OF ANKLE: CPT | Mod: TC,RT

## 2023-10-06 PROCEDURE — 99214 OFFICE O/P EST MOD 30 MIN: CPT | Mod: S$GLB,,, | Performed by: PODIATRIST

## 2023-10-06 PROCEDURE — 1160F RVW MEDS BY RX/DR IN RCRD: CPT | Mod: CPTII,S$GLB,, | Performed by: PODIATRIST

## 2023-10-06 PROCEDURE — 1159F PR MEDICATION LIST DOCUMENTED IN MEDICAL RECORD: ICD-10-PCS | Mod: CPTII,S$GLB,, | Performed by: PODIATRIST

## 2023-10-06 PROCEDURE — 3044F PR MOST RECENT HEMOGLOBIN A1C LEVEL <7.0%: ICD-10-PCS | Mod: CPTII,S$GLB,, | Performed by: PODIATRIST

## 2023-10-06 PROCEDURE — 73610 X-RAY EXAM OF ANKLE: CPT | Mod: 26,RT,, | Performed by: RADIOLOGY

## 2023-10-06 PROCEDURE — 99214 PR OFFICE/OUTPT VISIT, EST, LEVL IV, 30-39 MIN: ICD-10-PCS | Mod: S$GLB,,, | Performed by: PODIATRIST

## 2023-10-06 PROCEDURE — 1160F PR REVIEW ALL MEDS BY PRESCRIBER/CLIN PHARMACIST DOCUMENTED: ICD-10-PCS | Mod: CPTII,S$GLB,, | Performed by: PODIATRIST

## 2023-10-06 PROCEDURE — 1159F MED LIST DOCD IN RCRD: CPT | Mod: CPTII,S$GLB,, | Performed by: PODIATRIST

## 2023-10-06 PROCEDURE — 3008F PR BODY MASS INDEX (BMI) DOCUMENTED: ICD-10-PCS | Mod: CPTII,S$GLB,, | Performed by: PODIATRIST

## 2023-10-06 PROCEDURE — 3044F HG A1C LEVEL LT 7.0%: CPT | Mod: CPTII,S$GLB,, | Performed by: PODIATRIST

## 2023-10-06 PROCEDURE — 99999 PR PBB SHADOW E&M-EST. PATIENT-LVL IV: ICD-10-PCS | Mod: PBBFAC,,, | Performed by: PODIATRIST

## 2023-10-06 PROCEDURE — 73630 X-RAY EXAM OF FOOT: CPT | Mod: TC,RT

## 2023-10-06 PROCEDURE — 3008F BODY MASS INDEX DOCD: CPT | Mod: CPTII,S$GLB,, | Performed by: PODIATRIST

## 2023-10-06 PROCEDURE — 73610 XR ANKLE COMPLETE 3 VIEW RIGHT: ICD-10-PCS | Mod: 26,RT,, | Performed by: RADIOLOGY

## 2023-10-06 PROCEDURE — 99999 PR PBB SHADOW E&M-EST. PATIENT-LVL IV: CPT | Mod: PBBFAC,,, | Performed by: PODIATRIST

## 2023-10-06 PROCEDURE — 73630 XR FOOT COMPLETE 3 VIEW RIGHT: ICD-10-PCS | Mod: 26,RT,, | Performed by: RADIOLOGY

## 2023-10-06 RX ORDER — METHYLPREDNISOLONE 4 MG/1
4 TABLET ORAL DAILY
Qty: 21 TABLET | Refills: 0 | Status: SHIPPED | OUTPATIENT
Start: 2023-10-06 | End: 2023-12-28

## 2023-10-06 RX ORDER — METHOCARBAMOL 500 MG/1
500 TABLET, FILM COATED ORAL NIGHTLY PRN
Qty: 30 TABLET | Refills: 0 | Status: SHIPPED | OUTPATIENT
Start: 2023-10-06

## 2023-10-06 NOTE — PROGRESS NOTES
Subjective:     Patient ID: Karla Arzola is a 63 y.o. female.    Chief Complaint: Foot Pain (C/o states sprained right foot 3 weeks ago(9/17/23), rates pain 8/10, wearing a boot on right foot and tennis on left. (Non-diabetic pt, last seen PCP Dr. Momin 06/22/23))    Karla is a 63 y.o. female who presents to the podiatry clinic  with complaint of  right foot pain. Onset of the symptoms was  09/16/2023 . Precipitating event: injured right foot . Current symptoms include: inability to bear weight. Aggravating factors: any weight bearing and walking. Symptoms have gradually worsened. Patient has had prior foot problems. Evaluation to date: plain films: soft tissue swelling and ER . Treatment to date: prescription NSAIDS which are not very effective and walking boot . Patients rates pain 8/10 on pain scale. Patient points to right lateral foot. Patient has no other pedal complaints at this time.     Patient Active Problem List   Diagnosis    HTN (hypertension)    Severe obesity (BMI 35.0-39.9) with comorbidity    Recurrent genital HSV (herpes simplex virus) infection    Internal derangement of right knee    Chondromalacia of right knee    Screen for colon cancer    Decreased range of motion    Decreased strength, endurance, and mobility    Pain    Balance problem       Medication List with Changes/Refills   New Medications    METHOCARBAMOL (ROBAXIN) 500 MG TAB    Take 1 tablet (500 mg total) by mouth nightly as needed.    METHYLPREDNISOLONE (MEDROL DOSEPACK) 4 MG TABLET    Take 1 tablet (4 mg total) by mouth once daily. Use as instructed on dose pack   Current Medications    ACYCLOVIR (ZOVIRAX) 200 MG CAPSULE    Take 1 capsule (200 mg total) by mouth once daily.    ALBUTEROL (VENTOLIN HFA) 90 MCG/ACTUATION INHALER    Inhale 2 puffs into the lungs every 6 (six) hours as needed for Wheezing. Rescue    AMLODIPINE (NORVASC) 10 MG TABLET    Take 1 tablet (10 mg total) by mouth once daily.    AZELAIC ACID (FINACEA) 15 %  FOAM    Apply 1 application topically 2 (two) times daily.    CICLOPIROX (PENLAC) 8 % SOLN    Apply to nails once daily    CITALOPRAM (CELEXA) 20 MG TABLET    Take 1 tablet (20 mg total) by mouth once daily.    ERGOCALCIFEROL (VITAMIN D2) 50,000 UNIT CAP    Take 1 capsule (50,000 Units total) by mouth every 7 days.    FLUTICASONE PROPIONATE (FLONASE) 50 MCG/ACTUATION NASAL SPRAY    1 spray (50 mcg total) by Each Nostril route once daily.    FUROSEMIDE (LASIX) 20 MG TABLET    Take 1 tablet (20 mg total) by mouth daily as needed (swelling).    HYDROCODONE-ACETAMINOPHEN (NORCO) 5-325 MG PER TABLET    Take 1 tablet by mouth every 8 (eight) hours as needed for pain for up to 10 doses    IPRATROPIUM-ALBUTEROL (COMBIVENT)  MCG/ACTUATION INHALER    Inhale 1 puff into the lungs every 6 (six) hours as needed for Wheezing or Shortness of Breath. Rescue    MULTIVITAMIN CAPSULE    Take 1 capsule by mouth once daily.    NAPROXEN (NAPROSYN) 500 MG TABLET    Take 1 tablet (500 mg total) by mouth 2 (two) times daily.    SEMAGLUTIDE (RYBELSUS) 3 MG TABLET    Take 1 tablet (3 mg total) by mouth once daily.       Review of patient's allergies indicates:  No Known Allergies    Past Surgical History:   Procedure Laterality Date    BREAST BIOPSY Right 10/08/2021    MOUTH SURGERY      right knee scope      TUBAL LIGATION         Family History   Problem Relation Age of Onset    Diabetes Mother     Hypertension Mother     Stroke Mother     Hypertension Father     Hyperlipidemia Father     Kidney disease Father        Social History     Socioeconomic History    Marital status: Single   Occupational History     Employer: [employer]   Tobacco Use    Smoking status: Never     Passive exposure: Never    Smokeless tobacco: Never   Substance and Sexual Activity    Alcohol use: Yes     Comment: socially     Drug use: No    Sexual activity: Not Currently     Partners: Male     Birth control/protection: Abstinence   Other Topics  "Concern    Are you pregnant or think you may be? No    Breast-feeding No     Social Determinants of Health     Financial Resource Strain: Medium Risk (2/24/2023)    Overall Financial Resource Strain (CARDIA)     Difficulty of Paying Living Expenses: Somewhat hard   Food Insecurity: Food Insecurity Present (2/24/2023)    Hunger Vital Sign     Worried About Running Out of Food in the Last Year: Sometimes true     Ran Out of Food in the Last Year: Never true   Transportation Needs: No Transportation Needs (2/24/2023)    PRAPARE - Transportation     Lack of Transportation (Medical): No     Lack of Transportation (Non-Medical): No   Recent Concern: Transportation Needs - Unmet Transportation Needs (12/2/2022)    PRAPARE - Transportation     Lack of Transportation (Medical): No     Lack of Transportation (Non-Medical): Yes   Physical Activity: Inactive (2/24/2023)    Exercise Vital Sign     Days of Exercise per Week: 0 days     Minutes of Exercise per Session: 0 min   Stress: Stress Concern Present (2/24/2023)    [place] [employer] - Occupational Stress Questionnaire     Feeling of Stress : To some extent   Social Connections: Unknown (2/24/2023)    Social Connection and Isolation Panel [NHANES]     Frequency of Communication with Friends and Family: Once a week     Frequency of Social Gatherings with Friends and Family: Patient refused     Active Member of Clubs or Organizations: Yes     Attends Club or Organization Meetings: 1 to 4 times per year     Marital Status:    Housing Stability: High Risk (2/24/2023)    Housing Stability Vital Sign     Unable to Pay for Housing in the Last Year: Yes     Number of Places Lived in the Last Year: 1     Unstable Housing in the Last Year: No       Vitals:    10/06/23 0803   Weight: 108.4 kg (238 lb 15.7 oz)   Height: 5' 6" (1.676 m)   PainSc:   8       Hemoglobin A1C   Date Value Ref Range Status   03/02/2023 5.2 4.0 - 5.6 % Final     Comment:     ADA " Screening Guidelines:  5.7-6.4%  Consistent with prediabetes  >or=6.5%  Consistent with diabetes    High levels of fetal hemoglobin interfere with the HbA1C  assay. Heterozygous hemoglobin variants (HbS, HgC, etc)do  not significantly interfere with this assay.   However, presence of multiple variants may affect accuracy.     04/19/2011 5.1 4.0 - 6.2 % Final       Review of Systems   Constitutional:  Negative for chills and fever.   Respiratory:  Negative for shortness of breath.    Cardiovascular:  Negative for chest pain, palpitations, orthopnea, claudication and leg swelling.   Gastrointestinal:  Negative for diarrhea, nausea and vomiting.   Musculoskeletal:  Positive for joint pain (right lateral foot/ankle).   Skin:  Negative for rash.   Neurological:  Negative for dizziness, tingling, sensory change, focal weakness and weakness.   Psychiatric/Behavioral: Negative.               Objective:       PHYSICAL EXAM: Apperance: Alert and orient in no distress,well developed, and with good attention to grooming and body habits  Patient presents ambulating in boot on the right and tennis shoe on left.   Lower Extremity Physical Exam:  VASCULAR: Dorsalis pedis pulses 2/4 right and Posterior Tibial pulses 2/4 right. Capillary fill time <4 seconds right.   DERMATOLOGICAL: No skin rashes, subcutaneous nodules, lesions, or ulcers observed bilateral.  NEUROLOGICAL: Light touch, sharp-dull, proprioception all present and equal bilaterally.    MUSCULOSKELETAL: Muscle strength is 5/5 for foot inverters, everters, plantarflexors, and dorsiflexors. Muscle tone is normal. (+) pain on palpation of right lateral and medial ankle and dorsal hindfoot.     TEST RESULTS: Radiographs of right foot/ankle taken today reveals No acute osseous abnormality or advanced arthritic change.  Moderate to prominent both dorsal and plantar calcaneal enthesophytes.  No focal soft tissue edema.          Assessment:       ICD-10-CM ICD-9-CM   1. Right  foot sprain, initial encounter  S93.601A 845.10       Plan:   Right foot sprain, initial encounter  -     X-Ray Foot Complete Right; Future; Expected date: 10/06/2023  -     X-Ray Ankle Complete Right; Future; Expected date: 10/06/2023  -     methylPREDNISolone (MEDROL DOSEPACK) 4 mg tablet; Take 1 tablet (4 mg total) by mouth once daily. Use as instructed on dose pack  Dispense: 21 tablet; Refill: 0  -     methocarbamoL (ROBAXIN) 500 MG Tab; Take 1 tablet (500 mg total) by mouth nightly as needed.  Dispense: 30 tablet; Refill: 0  -     MRI Foot (Hindfoot) Right Without Contrast; Future; Expected date: 10/06/2023      I counseled the patient on her conditions, regarding findings of my examination, my impressions, and usual treatment plan.   Reviewed with patient outside previous x-rays.   Patient instructed on adequate icing techniques. Patient should ice the affected area at least once per day x 10 minutes for 10 days . I advised the  patient that extra icing would also be beneficial to ensure adequate anti inflammatory effect.   Patient instructed to continue walking boot until MRI completed.   Prescribed Medrol Dosepak to be taken as directed on package. Discussed possible increase in blood sugar with taking steroid medication. Patient advised on the possible elevation of blood pressure sugar and caution to take pills as prescribed and to discontinue use if symptoms arise, patient agreed.  Prescribed Robaxin 500mg to be taken as needed at night.   Ordered right foot hindfoot MRI to rule out ligament/tendon tear.    Patient to return after MRI completed           Jimbo Montes DPM  [facility]

## 2023-10-10 ENCOUNTER — PATIENT MESSAGE (OUTPATIENT)
Dept: INTERNAL MEDICINE | Facility: CLINIC | Age: 63
End: 2023-10-10
Payer: COMMERCIAL

## 2023-10-10 ENCOUNTER — PATIENT MESSAGE (OUTPATIENT)
Dept: REHABILITATION | Facility: HOSPITAL | Age: 63
End: 2023-10-10
Payer: COMMERCIAL

## 2023-10-10 ENCOUNTER — PATIENT MESSAGE (OUTPATIENT)
Dept: ADMINISTRATIVE | Facility: OTHER | Age: 63
End: 2023-10-10
Payer: COMMERCIAL

## 2023-10-17 ENCOUNTER — PATIENT MESSAGE (OUTPATIENT)
Dept: REHABILITATION | Facility: HOSPITAL | Age: 63
End: 2023-10-17
Payer: COMMERCIAL

## 2023-10-19 ENCOUNTER — CLINICAL SUPPORT (OUTPATIENT)
Dept: REHABILITATION | Facility: HOSPITAL | Age: 63
End: 2023-10-19
Payer: COMMERCIAL

## 2023-10-19 DIAGNOSIS — R53.1 DECREASED STRENGTH, ENDURANCE, AND MOBILITY: ICD-10-CM

## 2023-10-19 DIAGNOSIS — M25.60 DECREASED RANGE OF MOTION: Primary | ICD-10-CM

## 2023-10-19 DIAGNOSIS — R52 PAIN: ICD-10-CM

## 2023-10-19 DIAGNOSIS — R26.89 BALANCE PROBLEM: ICD-10-CM

## 2023-10-19 DIAGNOSIS — R68.89 DECREASED STRENGTH, ENDURANCE, AND MOBILITY: ICD-10-CM

## 2023-10-19 DIAGNOSIS — Z74.09 DECREASED STRENGTH, ENDURANCE, AND MOBILITY: ICD-10-CM

## 2023-10-19 PROCEDURE — 97113 AQUATIC THERAPY/EXERCISES: CPT | Mod: CQ

## 2023-10-19 NOTE — PROGRESS NOTES
[facility]   Physical Therapy Treatment Note + Progress Note       Name: Karla Arzola  Clinic Number: [id number]    Therapy Diagnosis:   Encounter Diagnoses   Name Primary?    Decreased range of motion Yes    Decreased strength, endurance, and mobility     Pain     Balance problem      Physician: Mara Akbar PA-C    Visit Date: 10/19/2023    Physician Orders: PT Eval and Treat  Medical Diagnosis from Referral: Old complex tear of lateral meniscus of left knee; Primary osteoarthritis of left knee  Evaluation Date: 9/13/2023  Authorization Period Expiration: 12/21/2023  Plan of Care Expiration: 11/13/23  Progress Note Due: 11/19/2023  Visit # / Visits authorized: 4/12 + eval  FOTO: 1/3 (last performed on 9/13/2023)  PTA Visit #: 3/5      Precautions: Standard, Fall, and HTN    Time In: 11:30 am  Time Out: 12:10 pm  Total Billable Time: 40 minutes      SUBJECTIVE     Pt reports: she has been feeling  a little bit better. The ankle still bothers her, but it has slightly improved. She is still having pain and swelling towards the end of a work day, but she has been keeping up with her exercises at home.     She was compliant with home exercise program.  Response to previous treatment: no change  Functional change: recent fall and in boot    Pain: 3/10  Location: left knee      OBJECTIVE     Objective Measures updated at progress report unless specified.     Range of Motion:     SITTING IN CHAIR/supine:  Knee AROM/PROM Right Left Pain/Dysfunction with Movement   Knee Flexion (135º)sitting/supine 120 110     Knee Extension (0º) sitting/supine 0 -10        Strength:     L/E MMT Right  (spine) Left Pain/Dysfunction with Movement   Hip Flexion  4+/5 4-/5     Knee Extension 5/5 4/5 Pain on left    Knee Flexion 4+/5 4+/5 Pain on left    Hip IR NT 4/5 NT 4/5 Pain on left    Hip ER NT 4/5 NT 4/5 Pain on left    Ankle DF 5/5 5/5     Ankle PF 5/5 5/5               TREATMENT       Karla  "received the treatments listed below:       Karla Arzola received aquatic therapy with the use of water to decrease the pull of gravity and weightbearing forces on the body to increase tolerance to resisted therapeutic exercise; including detailed instruction of all therapeutic exercise as well as stepping in and out of the pool with use of handrail and step-to pattern for 40 minutes including:    Exercise 10/19/2023 Parameters   FW/BW walking  x 6 minutes   Side stepping x 6 minutes     3 minutes   Marching, alternating x 3 minutes   Hamstring curls, alternating  x 3 minutes   Hip 3-way, B lower extremity x 3 minute bilateral        Bicycle FW/BW x 3 minutes each    Flutter kick  2 minutes    Scissor kicks  2 minutes    Un-weight on noodle x 5 minutes        Standing knee flexion stretch on step     Quad stretch on step   2   Hamstring stretch on step   2 x 30"    GSS at wall  2 x 30"         1/4 squat  x 3 minutes   Standing hip circles CW/CCW  10 each    Standing figure 8   10x each    Heel/toe raises  2 min        UE alt flexion bilateral paddles     UE alt ABD bilateral  paddles     UE alt hugs bilateral  paddles     UE IR/ER bilateral  paddles     UE alt rows bilateral  paddles                              PATIENT EDUCATION AND HOME EXERCISES     Home Exercises Provided and Patient Education Provided     Education provided:   - home exercise program     Written Home Exercises Provided: Patient instructed to cont prior HEP. Exercises were reviewed and Karla was able to demonstrate them prior to the end of the session.  Karla demonstrated good  understanding of the education provided. See EMR under Patient Instructions for exercises provided during therapy sessions    ASSESSMENT     Patient did well with session today with minimal complaints of discomfort. Some discomfort noted with hamstring curl due to limited knee flexion on the left side. Good performance of all other activities with no verbal cues " necessary for decreased compensation. Manual Muscle Testing and Range of Motion assessed. Patient with increased lower extremity strength and improved range of motion per measurements taken today. Patient also self reports improved limitation status percentage per FOTO results. Patient will continue to benefit from physical therapy to address remaining limitations in lower extremity strength, range of motion, flexibility, and stability.     Karla Is progressing well towards her goals.   Pt prognosis is Good.     Pt will continue to benefit from skilled outpatient physical therapy to address the deficits listed in the problem list box on initial evaluation, provide pt/family education and to maximize pt's level of independence in the home and community environment.     Pt's spiritual, cultural and educational needs considered and pt agreeable to plan of care and goals.     Anticipated barriers to physical therapy:  co-morbidities, sedentary lifestyle, chronicity of condition, adherence to treatment plan, lack of support system, and coping style    Goals:   Reviewed: 10/19/2023       Short Term Goals: In 4 weeks  Progress Date   1.Patient to be educated on HEP. [x] Progressing  [] MET   [] Not   [] Not tested     2.Patient to increase Knee extension range of motion by 10 degrees, in order to improve available range of motion for ADL's.  [] Progressing  [x] MET   [] Not MET  [] Not tested  10/19/23   3.Patient to increase bilateral LE strength by 1/2 grade, in order to improve endurance and increase ability to perform all functional activities for increased time.  [] Progressing  [x] MET   [] Not MET  [] Not tested  10/19/23   4.Patient to have pain less than 5/10 at worst, to improve QOL. [] Progressing  [x] MET   [] Not MET  [] Not tested  10/19/23   5.Patient to improve score on the FOTO, to improve QOL. [] Progressing  [x] MET   [] Not MET  [] Not tested  10/19/23   6. Patient to improve score on single leg balance  in order to decrease fall risk. [x] Progressing  [] MET   [] Not MET  [] Not tested        Long Term Goals: In 8 weeks Progress Date   1.Patient to improve score on the FOTO predicted score or better, to improve QOL. [x] Progressing  [] MET   [] Not MET  [] Not tested     2. Patient to increase bilateral LE strength to 4+/5 or greater, in order to improve endurance and increase ability to perform all functional activities for increased time. [x] Progressing  [] MET   [] Not MET  [] Not tested     3. Patient to have decreased pain to 2/10 at worst, to improve QOL. [x] Progressing  [] MET   [] Not MET  [] Not tested     4. Patient to normalize score on single leg balance, in order to improve endurance and decrease fall risk. [x] Progressing  [] MET   [] Not MET  [] Not tested     5. Patient to perform daily activities including walking during normal work activities without increased symptoms. [x] Progressing  [] MET   [] Not MET  [] Not tested          PLAN     Monitor response to today's treatment session and progress with Physical Therapy plan of care as indicated.    Plan of care Certification: 9/13/2023  to 11/13/2023.     Outpatient Physical Therapy 2 times weekly for 8 weeks to include any combination of the following interventions: Aquatic Therapy, electrical stimulation (PRN), Gait Training, Manual Therapy, Neuromuscular Re-ed, Patient Education/Self Care, Therapeutic Activites, Therapeutic Exercise, Dry Needling, and Moist Hot Pack/Cold Pack    Padma Walden PTA

## 2023-10-19 NOTE — PLAN OF CARE
[facility]   Physical Therapy Treatment Note + Progress Note       Name: Karla Arzola  Clinic Number: [id number]    Therapy Diagnosis:   Encounter Diagnoses   Name Primary?    Decreased range of motion Yes    Decreased strength, endurance, and mobility     Pain     Balance problem      Physician: Mara Akbar PA-C    Visit Date: 10/19/2023    Physician Orders: PT Eval and Treat  Medical Diagnosis from Referral: Old complex tear of lateral meniscus of left knee; Primary osteoarthritis of left knee  Evaluation Date: 9/13/2023  Authorization Period Expiration: 12/21/2023  Plan of Care Expiration: 11/13/23  Progress Note Due: 11/19/2023  Visit # / Visits authorized: 4/12 + eval  FOTO: 1/3 (last performed on 9/13/2023)  PTA Visit #: 3/5      Precautions: Standard, Fall, and HTN    Time In: 11:30 am  Time Out: 12:10 pm  Total Billable Time: 40 minutes      SUBJECTIVE     Pt reports: she has been feeling  a little bit better. The ankle still bothers her, but it has slightly improved. She is still having pain and swelling towards the end of a work day, but she has been keeping up with her exercises at home.     She was compliant with home exercise program.  Response to previous treatment: no change  Functional change: recent fall and in boot    Pain: 3/10  Location: left knee      OBJECTIVE     Objective Measures updated at progress report unless specified.     Range of Motion:     SITTING IN CHAIR/supine:  Knee AROM/PROM Right Left Pain/Dysfunction with Movement   Knee Flexion (135º)sitting/supine 120 110     Knee Extension (0º) sitting/supine 0 -10        Strength:     L/E MMT Right  (spine) Left Pain/Dysfunction with Movement   Hip Flexion  4+/5 4-/5     Knee Extension 5/5 4/5 Pain on left    Knee Flexion 4+/5 4+/5 Pain on left    Hip IR NT 4/5 NT 4/5 Pain on left    Hip ER NT 4/5 NT 4/5 Pain on left    Ankle DF 5/5 5/5     Ankle PF 5/5 5/5               TREATMENT       Karla  "received the treatments listed below:       Karla Arzola received aquatic therapy with the use of water to decrease the pull of gravity and weightbearing forces on the body to increase tolerance to resisted therapeutic exercise; including detailed instruction of all therapeutic exercise as well as stepping in and out of the pool with use of handrail and step-to pattern for 40 minutes including:    Exercise 10/19/2023 Parameters   FW/BW walking  x 6 minutes   Side stepping x 6 minutes     3 minutes   Marching, alternating x 3 minutes   Hamstring curls, alternating  x 3 minutes   Hip 3-way, B lower extremity x 3 minute bilateral        Bicycle FW/BW x 3 minutes each    Flutter kick  2 minutes    Scissor kicks  2 minutes    Un-weight on noodle x 5 minutes        Standing knee flexion stretch on step     Quad stretch on step   2   Hamstring stretch on step   2 x 30"    GSS at wall  2 x 30"         1/4 squat  x 3 minutes   Standing hip circles CW/CCW  10 each    Standing figure 8   10x each    Heel/toe raises  2 min        UE alt flexion bilateral paddles     UE alt ABD bilateral  paddles     UE alt hugs bilateral  paddles     UE IR/ER bilateral  paddles     UE alt rows bilateral  paddles                              PATIENT EDUCATION AND HOME EXERCISES     Home Exercises Provided and Patient Education Provided     Education provided:   - home exercise program     Written Home Exercises Provided: Patient instructed to cont prior HEP. Exercises were reviewed and Karla was able to demonstrate them prior to the end of the session.  Karla demonstrated good  understanding of the education provided. See EMR under Patient Instructions for exercises provided during therapy sessions    ASSESSMENT     Patient did well with session today with minimal complaints of discomfort. Some discomfort noted with hamstring curl due to limited knee flexion on the left side. Good performance of all other activities with no verbal cues " necessary for decreased compensation. Manual Muscle Testing and Range of Motion assessed. Patient with increased lower extremity strength and improved range of motion per measurements taken today. Patient also self reports improved limitation status percentage per FOTO results. Patient will continue to benefit from physical therapy to address remaining limitations in lower extremity strength, range of motion, flexibility, and stability.     Karla Is progressing well towards her goals.   Pt prognosis is Good.     Pt will continue to benefit from skilled outpatient physical therapy to address the deficits listed in the problem list box on initial evaluation, provide pt/family education and to maximize pt's level of independence in the home and community environment.     Pt's spiritual, cultural and educational needs considered and pt agreeable to plan of care and goals.     Anticipated barriers to physical therapy:  co-morbidities, sedentary lifestyle, chronicity of condition, adherence to treatment plan, lack of support system, and coping style    Goals:   Reviewed: 10/19/2023       Short Term Goals: In 4 weeks  Progress Date   1.Patient to be educated on HEP. [x] Progressing  [] MET   [] Not   [] Not tested     2.Patient to increase Knee extension range of motion by 10 degrees, in order to improve available range of motion for ADL's.  [] Progressing  [x] MET   [] Not MET  [] Not tested  10/19/23   3.Patient to increase bilateral LE strength by 1/2 grade, in order to improve endurance and increase ability to perform all functional activities for increased time.  [] Progressing  [x] MET   [] Not MET  [] Not tested  10/19/23   4.Patient to have pain less than 5/10 at worst, to improve QOL. [] Progressing  [x] MET   [] Not MET  [] Not tested  10/19/23   5.Patient to improve score on the FOTO, to improve QOL. [] Progressing  [x] MET   [] Not MET  [] Not tested  10/19/23   6. Patient to improve score on single leg balance  in order to decrease fall risk. [x] Progressing  [] MET   [] Not MET  [] Not tested        Long Term Goals: In 8 weeks Progress Date   1.Patient to improve score on the FOTO predicted score or better, to improve QOL. [x] Progressing  [] MET   [] Not MET  [] Not tested     2. Patient to increase bilateral LE strength to 4+/5 or greater, in order to improve endurance and increase ability to perform all functional activities for increased time. [x] Progressing  [] MET   [] Not MET  [] Not tested     3. Patient to have decreased pain to 2/10 at worst, to improve QOL. [x] Progressing  [] MET   [] Not MET  [] Not tested     4. Patient to normalize score on single leg balance, in order to improve endurance and decrease fall risk. [x] Progressing  [] MET   [] Not MET  [] Not tested     5. Patient to perform daily activities including walking during normal work activities without increased symptoms. [x] Progressing  [] MET   [] Not MET  [] Not tested          PLAN     Monitor response to today's treatment session and progress with Physical Therapy plan of care as indicated.    Plan of care Certification: 9/13/2023  to 11/13/2023.     Outpatient Physical Therapy 2 times weekly for 8 weeks to include any combination of the following interventions: Aquatic Therapy, electrical stimulation (PRN), Gait Training, Manual Therapy, Neuromuscular Re-ed, Patient Education/Self Care, Therapeutic Activites, Therapeutic Exercise, Dry Needling, and Moist Hot Pack/Cold Pack    Padma Walden PTA

## 2023-10-23 ENCOUNTER — PATIENT MESSAGE (OUTPATIENT)
Dept: INTERNAL MEDICINE | Facility: CLINIC | Age: 63
End: 2023-10-23
Payer: COMMERCIAL

## 2023-10-24 ENCOUNTER — CLINICAL SUPPORT (OUTPATIENT)
Dept: REHABILITATION | Facility: HOSPITAL | Age: 63
End: 2023-10-24
Payer: COMMERCIAL

## 2023-10-24 DIAGNOSIS — R68.89 DECREASED STRENGTH, ENDURANCE, AND MOBILITY: ICD-10-CM

## 2023-10-24 DIAGNOSIS — Z74.09 DECREASED STRENGTH, ENDURANCE, AND MOBILITY: ICD-10-CM

## 2023-10-24 DIAGNOSIS — R52 PAIN: ICD-10-CM

## 2023-10-24 DIAGNOSIS — R26.89 BALANCE PROBLEM: ICD-10-CM

## 2023-10-24 DIAGNOSIS — M25.60 DECREASED RANGE OF MOTION: Primary | ICD-10-CM

## 2023-10-24 DIAGNOSIS — R53.1 DECREASED STRENGTH, ENDURANCE, AND MOBILITY: ICD-10-CM

## 2023-10-24 PROCEDURE — 97113 AQUATIC THERAPY/EXERCISES: CPT

## 2023-10-24 NOTE — PROGRESS NOTES
[facility]   Physical Therapy Treatment Note        Name: Karla Arzola  Clinic Number: [id number]    Therapy Diagnosis:   Encounter Diagnoses   Name Primary?    Decreased range of motion Yes    Decreased strength, endurance, and mobility     Pain     Balance problem      Physician: Mara Akbar PA-C    Visit Date: 10/24/2023    Physician Orders: PT Eval and Treat  Medical Diagnosis from Referral: Old complex tear of lateral meniscus of left knee; Primary osteoarthritis of left knee  Evaluation Date: 9/13/2023  Authorization Period Expiration: 12/21/2023  Plan of Care Expiration: 11/09/23  Progress Note Due: 11/09/2023  Visit # / Visits authorized: 5/12 (+1)  FOTO: 2/3 (last performed on 10/19/2023)  PTA Visit #: 3/5      Precautions: Standard, Fall, and HTN    Time In: 10:33 am  Time Out: 11:15 am  Total Billable Time: 40 minutes      SUBJECTIVE     Pt reports: she has been having some pain in the back of knee last few days was sleeping and she's not sure what happened maybe moved wrong but back of leg form knee to calf started to hurt difficulty straightening knee x 2-3 days, but better today. Still sore but can straighten knee. Also stopped wearing cast boot recently.    She was compliant with home exercise program.  Response to previous treatment: no change  Functional change: recent fall and in boot    Pain: 4/10  Location: left knee      OBJECTIVE     Objective Measures updated at progress report unless specified.      TREATMENT       Karla received the treatments listed below:       Karla Arzola received aquatic therapy with the use of water to decrease the pull of gravity and weightbearing forces on the body to increase tolerance to resisted therapeutic exercise; including detailed instruction of all therapeutic exercise as well as stepping in and out of the pool with use of handrail and step-to pattern for 40 minutes including:    Exercise 10/24/2023 Parameters  "  FW/BW walking  x 6 minutes   Side stepping x 6 minutes        Marching, alternating x 3 minutes   Hamstring curls, alternating  x 3 minutes   Hip 3-way, B lower extremity x 3 minute bilateral        Bicycle FW/BW x 3 minutes each    Flutter kick  2 minutes    Scissor kicks  2 minutes    Un-weight on noodle  5 minutes        Standing left knee flexion stretch on step x 15 sec 2x (3rd step from top)   Quad stretch on step   2   Hamstring stretch on step   2 x 30"    GSS at wall x 2 x 30"         1/4 squat  x 3 minutes   Standing hip circles CW/CCW  10 each    Standing figure 8   10x each    Heel/toe raises  2 min        UE alt flexion bilateral paddles     UE alt ABD bilateral  paddles     UE alt hugs bilateral  paddles     UE IR/ER bilateral  paddles     UE alt rows bilateral  paddles                              PATIENT EDUCATION AND HOME EXERCISES     Home Exercises Provided and Patient Education Provided     Education provided:   - home exercise program     Written Home Exercises Provided: Patient instructed to cont prior HEP. Exercises were reviewed and Karla was able to demonstrate them prior to the end of the session.  Karla demonstrated good  understanding of the education provided. See EMR under Patient Instructions for exercises provided during therapy sessions    ASSESSMENT     Patient tolerated treatment well and was able to tolerate activities as documented. She did require cues during treatment session for correct technique and to avoid substitution patterns. Encouraged home exercise program and issued list of pools in [place] area via [employer].    Karla Is progressing well towards her goals.   Pt prognosis is Good.     Pt will continue to benefit from skilled outpatient physical therapy to address the deficits listed in the problem list box on initial evaluation, provide pt/family education and to maximize pt's level of independence in the home and community environment.     Pt's spiritual, cultural and " educational needs considered and pt agreeable to plan of care and goals.     Anticipated barriers to physical therapy:  co-morbidities, sedentary lifestyle, chronicity of condition, adherence to treatment plan, lack of support system, and coping style    Goals:   Reviewed: 10/24/2023       Short Term Goals: In 4 weeks  Progress Date   1.Patient to be educated on HEP. [x] Progressing  [] MET   [] Not   [] Not tested     2.Patient to increase Knee extension range of motion by 10 degrees, in order to improve available range of motion for ADL's.  [] Progressing  [x] MET   [] Not MET  [] Not tested  10/19/23   3.Patient to increase bilateral LE strength by 1/2 grade, in order to improve endurance and increase ability to perform all functional activities for increased time.  [] Progressing  [x] MET   [] Not MET  [] Not tested  10/19/23   4.Patient to have pain less than 5/10 at worst, to improve QOL. [] Progressing  [x] MET   [] Not MET  [] Not tested  10/19/23   5.Patient to improve score on the FOTO, to improve QOL. [] Progressing  [x] MET   [] Not MET  [] Not tested  10/19/23   6. Patient to improve score on single leg balance in order to decrease fall risk. [x] Progressing  [] MET   [] Not MET  [] Not tested        Long Term Goals: In 8 weeks Progress Date   1.Patient to improve score on the FOTO predicted score or better, to improve QOL. [x] Progressing  [] MET   [] Not MET  [] Not tested     2. Patient to increase bilateral LE strength to 4+/5 or greater, in order to improve endurance and increase ability to perform all functional activities for increased time. [x] Progressing  [] MET   [] Not MET  [] Not tested     3. Patient to have decreased pain to 2/10 at worst, to improve QOL. [x] Progressing  [] MET   [] Not MET  [] Not tested     4. Patient to normalize score on single leg balance, in order to improve endurance and decrease fall risk. [x] Progressing  [] MET   [] Not MET  [] Not tested     5. Patient to  perform daily activities including walking during normal work activities without increased symptoms. [x] Progressing  [] MET   [] Not MET  [] Not tested          PLAN     Monitor response to today's treatment session and progress with Physical Therapy plan of care as indicated.    Plan of care Certification: 9/13/2023  to 11/13/2023.     Outpatient Physical Therapy 2 times weekly for 8 weeks to include any combination of the following interventions: Aquatic Therapy, electrical stimulation (PRN), Gait Training, Manual Therapy, Neuromuscular Re-ed, Patient Education/Self Care, Therapeutic Activites, Therapeutic Exercise, Dry Needling, and Moist Hot Pack/Cold Pack    Albertina Valverde, PT

## 2023-10-24 NOTE — PATIENT INSTRUCTIONS
[facility]:  [address], [place] [address]   [phone]    Kendy Brunson   [address], [place] [address]    [phone]    [facility]  [address], [place] [address]   [phone]      [facility]   [address] [facility] [place] [address]    [phone]    [facility]  [address], [place] [address]  [phone]      [facility]  [address], [place] [address]    [phone]    [address] [place] [address]  [phone]      [facility]  [address], [place] [address]   [phone]      [facility]  [address], [place] [address]  [phone]    [facility]  [address] [place] [address]  [phone]    [facility]  [address] [place] [address]  [phone]      [facility]  [address], [place] [address]  [phone]

## 2023-10-25 ENCOUNTER — PATIENT MESSAGE (OUTPATIENT)
Dept: PSYCHIATRY | Facility: CLINIC | Age: 63
End: 2023-10-25
Payer: COMMERCIAL

## 2023-10-25 ENCOUNTER — NUTRITION (OUTPATIENT)
Dept: INTERNAL MEDICINE | Facility: CLINIC | Age: 63
End: 2023-10-25
Payer: COMMERCIAL

## 2023-10-25 DIAGNOSIS — Z71.3 DIETARY COUNSELING: ICD-10-CM

## 2023-10-25 DIAGNOSIS — Z71.3 PRE-BARIATRIC SURGERY NUTRITION EVALUATION: Primary | ICD-10-CM

## 2023-10-25 DIAGNOSIS — M19.90 OSTEOARTHRITIS, UNSPECIFIED OSTEOARTHRITIS TYPE, UNSPECIFIED SITE: ICD-10-CM

## 2023-10-25 DIAGNOSIS — I10 PRIMARY HYPERTENSION: ICD-10-CM

## 2023-10-25 PROCEDURE — 97803 PR MED NUTR THER, SUBSQ, INDIV, EA 15 MIN: ICD-10-PCS | Mod: S$GLB,,, | Performed by: DIETITIAN, REGISTERED

## 2023-10-25 PROCEDURE — 97803 MED NUTRITION INDIV SUBSEQ: CPT | Mod: S$GLB,,, | Performed by: DIETITIAN, REGISTERED

## 2023-10-25 NOTE — PROGRESS NOTES
NUTRITION NOTE    Referring Physician: Dr. Lezama  Reason for MNT Referral: Medically Supervised Diet pending Gastric Sleeve    PAST MEDICAL HISTORY:    Patient presents for 4th visit for MSD with weight gain over the past 3 months.    Sprained foot 5 weeks ago resulting in a lack of exercise.  Just took off boot so will be able to exercise again soon.    Currently in physical therapy twice weekly for knee.     Continues with late night eating due to son and daughter-in-law living with her temporarily (moving out this weekend).  Meals cooked for dinner by her son are not always healthy.   Vegetable intake has decreased.     Meal pattern:  Breakfast: fruit + coffee  Lunch: salad// vegetable + grilled shrimp  Dinner: pork chop + rice/potatoes// fried chicken  Drinks: water, coffee, herbal tea (with Splenda)  No soda.   Alcohol: reduced wine even more. Less than 1x/week         Past Medical History:   Diagnosis Date    Allergy     Anxiety     Hypertension     Obesity     Vitamin B 12 deficiency        CLINICAL DATA:  63 y.o. female.    There were no vitals filed for this visit.    Current Weight: 245.7 lbs   2023: 236.5 lbs              2023: 239.8 lbs              2023: 241 lbs  Weight Change Since Initial Visit: +4.7 lbs  Ideal Body Weight: 176 lbs +/-10%  BMI: 39.7    CURRENT DIET:  Regular diet.  Diet Recall: Food records are present.    Diet Includes:   Meal Pattern: Same.  Protein Supplements: 0 per day.  Snackin-1    Vegetables: reduced, few times per week  Fruits: Likes 2-3 times per week  Beverages: water, coffee, herbal tea made with Splenda   Dining Out: less than once per week  Cooking at home: Daily    CURRENT EXERCISE:  none    Vitamins / Minerals / Herbs:   Multivitamin, B12, Vitamin D, collagen with biotin (nail and hair health), calcium    Food Allergies:   NKFA    Social:  Works regular daytime shifts.  Alcohol: reduced, less than 1x/week.  Smoking: None.    ASSESSMENT:  Patient  demonstrates some willingness to change lifestyle habits as evidenced by some positive dietary changes such as eliminated soda intake and reduced alcohol intake.     Doing fairly well with working on greatest challenges (portion control).    Excess calorie intake.  Inadequate protein intake.    PLAN:    Diet: Adjust diet plan.   Include protein source with breakfast.   Improve dinner choices. Gradually reduced starchy CHO intake.     Exercise:  Begin, as able .    Behavior Modification: Continue to document food & activity logs daily.      Return to clinic in one month.    SESSION TIME:  30 minutes

## 2023-10-28 NOTE — PROGRESS NOTES
Orthopaedic Follow-Up Visit    Last Appointment: 9/19/23  Diagnosis:  left knee complex lateral meniscus tear, proximal tibia bone bruise/insufficiency fracture  Prior Procedure:  Aquatic therapy, Aspiration & CSI 06/27/2023, Euflexxa series completed 01/18/2023    Karla Arzola is a 63 y.o. female who is here for f/u evaluation of left knee pain. The patient was last seen here by me on 12/14/22 at which time her MRI showed right knee complex lateral meniscus tear and insufficiency fracture, consistent with knee arthritis. We discussed proceeding with knee viscosupplementation injection series, but also discussed operative treatment of a right knee arthroscopy if symptoms persisted or failed treatment with injections. The patient completed with viscosupplementation injection series with Mara Akbar on 1/18/23. She returned to [facility] on 6/27/23 reporting about 2 months of pain relief with the viscosupplementation but her symptoms had since returned. She also had a large effusion so they elected to proceed with aspiration and CSI at that time. She returned again on 09/19/2023 reporting that the symptoms had persisted, and noted 2 recent falls due to instability. They discussed continuing with aquatic therapy and advised to begin taking calcium and vitamin-D to optimize her bone health. She reports that she currently takes vitamin-D, I encouraged her to add on calcium. She also had upcoming appointment in November with Rheumotology to discuss possible DMARD. The patient returns today reporting she continues have pain and intermittent swelling in the left knee.  She also reports mechanical symptoms of catching and locking.  Of note she had previous right knee arthroscopy for similar symptoms multiple years ago.    To review her history, Karla Arzola is a 63 y.o. year old female patient who initially presented to clinic on 08/12/2022 with pain and dysfunction involving the left knee that began  approximately 4-5 months prior with no specific mechanism of injury just a gradual worsening of symptoms. Her symptoms included intermittent sharp pain localized anterolaterally to the knee, intermittent swelling, and difficulty with straightening the knee. Her pain was aggravated by walking long distances, sitting for long periods of time, and going from sitting to standing. She has tried rest, acitivity modification, bracing, and NSAIDs with no improvement of her pain. An MRI was ordered to evaluate for suspected meniscus tear.        Patient's medications, allergies, past medical, surgical, social and family histories were reviewed and updated as appropriate.    Review of Systems   All systems reviewed were negative.  Specifically, the patient denies fever, chills, weight loss, chest pain, shortness of breath, or dyspnea on exertion.      Past Medical History:   Diagnosis Date    Allergy     Anxiety     Hypertension     Obesity     Vitamin B 12 deficiency        Objective:      Physical Exam  Patient is alert and oriented, no distress. Skin is intact. Neuro is normal with no focal motor or sensory findings.    Standing exam  stance: normal alignment, no significant leg-length discrepancy  gait: antalgic, presents wearing short CAM walking boot on right ankle, compression sleeve present on left knee     Knee                                                  RIGHT             LEFT  Skin:                                         Intact               Intact  ROM:                                        0-130              5-120  Effusion:                                   Neg                  +  Medial joint line tenderness:    Neg                  +  Lateral joint line tenderness:    Neg                  +  Paulino:                                Neg                  +  Patella crepitus:                       Neg                  Neg  Patella tenderness:                  Neg                  Neg  Patella grind:                             Neg                  Neg  Lachman:                                 Neg                  Neg  Valgus stress:                          Neg                  Neg  Varus stress:                            Neg                  Neg  Posterior drawer:                     Neg                  Neg  N-V                                          intact               intact  Hip:                                          nml                    nml              Lower extremity edema:         Negative          negative       Neurovascular exam  - motor function grossly intact bilaterally to hip flexion, knee extension and flexion, ankle dorsiflexion and plantarflexion  - sensation intact to light touch bilaterally to femoral, tibial, tibial and peroneal distributions  - symmetrical pedal pulses    Imaging:   XR Results:  Results for orders placed during the hospital encounter of 08/12/22    X-ray Knee Ortho Left with Flexion    Narrative  EXAMINATION:  XR KNEE ORTHO LEFT WITH FLEXION    CLINICAL HISTORY:  Pain in left knee    TECHNIQUE:  AP standing views of both knees, AP flexion views of both knees, lateral view of the left knee and Merchant views of both knees    COMPARISON:  10/02/2020    FINDINGS:  The joint spaces of all 3 compartments of the left knee appear to be relatively well maintained.  Mild-to-moderate joint space narrowing seen involving the medial compartment of the right knee with minimal marginal osteophyte formation noted.  No left-sided joint effusion.  No acute fracture or dislocation.    Impression  1.  As above      Electronically signed by: Nba Doe DO  Date:    08/12/2022  Time:    14:43      MRI Results:  Results for orders placed during the hospital encounter of 12/13/22    MRI Knee Without Contrast Left    Narrative  EXAMINATION:  MRI KNEE WITHOUT CONTRAST LEFT    CLINICAL HISTORY:  Meniscal tear, untreated, new symptoms; Other tear of medial meniscus, current injury, left knee,  initial encounter    TECHNIQUE:  Left knee MRI without IV contrast.    COMPARISON:  Left knee radiographs 08/12/2022    FINDINGS:  ACL, PCL, MCL, and LCL complex are intact.    Medial meniscus is normal.    Somewhat complex horizontal tear courses through anterior horn and anterior body of lateral meniscus.  No displaced meniscal fragment.    Subcortical moderate increased T2 and low T1 bone marrow signal alteration affects anterior aspect of lateral tibial plateau.    Moderate cartilage thinning occurs diffusely in the lateral compartment.  Medial and patellofemoral compartment articular cartilage is normal.  Bone marrow signal is otherwise normal.    The extensor mechanism is intact.  Moderate left knee joint effusion is present.  Popliteal fossa is unremarkable.    Mild subcutaneous edema is noted about the left knee, most prominent anteriorly.    Impression  1. Complex horizontal tear of anterior horn and body of lateral meniscus.  2. Moderate cartilage thinning throughout the lateral compartment.  3. Focal bone marrow edema along anterior aspect of lateral tibial plateau, either related to altered mechanics/chronic repetitive stress or reactive/degenerative bone marrow edema.      Electronically signed by: Doug Hensley MD  Date:    12/13/2022  Time:    19:25      CT Results:  No results found for this or any previous visit.      Physician Read: I agree with the above impression.    Assessment/Plan:   Assessment:  Karla Arzola is a 63 y.o. female with left knee complex lateral meniscus tear, proximal tibia bone bruise/insufficiency fracture    Plan:    She has a known left knee meniscus tear as well as a history of insufficiency fracture.  Her history and physical exam are consistent with mechanical symptoms of meniscus tear.    She has tried considerable conservative treatment in the form of rest, activity modification, knee bracing, oral anti-inflammatories, previous corticosteroid injection,  aspiration, viscosupplementation (Euflexxa series), home exercises and formal physical therapy. Despite these interventions she still continues to have pain with ADLs that diminish her quality of life. She is ready to move forward with operative treatment at this time.   I recommend proceeding with left knee arthroscopy with partial lateral meniscectomy and any indicated procedures.    We reviewed the proposed procedure in detail, which included discussion of risks and benefits, techniques, and possible complications of the procedure. Risks include infection, bleeding, damage to artery and nerves, continual pain and possible stiffness, and blood clots. We reviewed the post-operative restrictions, recovery period, and rehabilitation.  All patient questions were answered. Despite the risks, she elected to proceed with surgery and the consent was freely signed.  At least 10 minutes were spent instructing the patient in home care following surgery including, but not limited to: sling use, sleeping, hygiene, post-operative exercises, preventing post-operative complications, etc.  All questions were answered.  This service was performed under the direction of Larry Adams MD.  CPT 99414-RT.  Follow up with me 10-14 days after surgery          Larry Adams MD    I, Roberth Couch, acted as a scribe for Larry Adams MD for the duration of this office visit.

## 2023-10-31 ENCOUNTER — OFFICE VISIT (OUTPATIENT)
Dept: SPORTS MEDICINE | Facility: CLINIC | Age: 63
End: 2023-10-31
Payer: COMMERCIAL

## 2023-10-31 VITALS — WEIGHT: 238 LBS | HEIGHT: 66 IN | RESPIRATION RATE: 17 BRPM | BODY MASS INDEX: 38.25 KG/M2

## 2023-10-31 DIAGNOSIS — M23.201 OLD COMPLEX TEAR OF LATERAL MENISCUS OF LEFT KNEE: Primary | ICD-10-CM

## 2023-10-31 DIAGNOSIS — M17.12 PRIMARY OSTEOARTHRITIS OF LEFT KNEE: ICD-10-CM

## 2023-10-31 PROCEDURE — 1160F PR REVIEW ALL MEDS BY PRESCRIBER/CLIN PHARMACIST DOCUMENTED: ICD-10-PCS | Mod: CPTII,S$GLB,, | Performed by: STUDENT IN AN ORGANIZED HEALTH CARE EDUCATION/TRAINING PROGRAM

## 2023-10-31 PROCEDURE — 1159F MED LIST DOCD IN RCRD: CPT | Mod: CPTII,S$GLB,, | Performed by: STUDENT IN AN ORGANIZED HEALTH CARE EDUCATION/TRAINING PROGRAM

## 2023-10-31 PROCEDURE — 99999 PR PBB SHADOW E&M-EST. PATIENT-LVL IV: ICD-10-PCS | Mod: PBBFAC,,, | Performed by: STUDENT IN AN ORGANIZED HEALTH CARE EDUCATION/TRAINING PROGRAM

## 2023-10-31 PROCEDURE — 99999 PR PBB SHADOW E&M-EST. PATIENT-LVL IV: CPT | Mod: PBBFAC,,, | Performed by: STUDENT IN AN ORGANIZED HEALTH CARE EDUCATION/TRAINING PROGRAM

## 2023-10-31 PROCEDURE — 1159F PR MEDICATION LIST DOCUMENTED IN MEDICAL RECORD: ICD-10-PCS | Mod: CPTII,S$GLB,, | Performed by: STUDENT IN AN ORGANIZED HEALTH CARE EDUCATION/TRAINING PROGRAM

## 2023-10-31 PROCEDURE — 3044F PR MOST RECENT HEMOGLOBIN A1C LEVEL <7.0%: ICD-10-PCS | Mod: CPTII,S$GLB,, | Performed by: STUDENT IN AN ORGANIZED HEALTH CARE EDUCATION/TRAINING PROGRAM

## 2023-10-31 PROCEDURE — 3008F PR BODY MASS INDEX (BMI) DOCUMENTED: ICD-10-PCS | Mod: CPTII,S$GLB,, | Performed by: STUDENT IN AN ORGANIZED HEALTH CARE EDUCATION/TRAINING PROGRAM

## 2023-10-31 PROCEDURE — 1160F RVW MEDS BY RX/DR IN RCRD: CPT | Mod: CPTII,S$GLB,, | Performed by: STUDENT IN AN ORGANIZED HEALTH CARE EDUCATION/TRAINING PROGRAM

## 2023-10-31 PROCEDURE — 3044F HG A1C LEVEL LT 7.0%: CPT | Mod: CPTII,S$GLB,, | Performed by: STUDENT IN AN ORGANIZED HEALTH CARE EDUCATION/TRAINING PROGRAM

## 2023-10-31 PROCEDURE — 97110 THERAPEUTIC EXERCISES: CPT | Mod: S$GLB,,, | Performed by: STUDENT IN AN ORGANIZED HEALTH CARE EDUCATION/TRAINING PROGRAM

## 2023-10-31 PROCEDURE — 99214 PR OFFICE/OUTPT VISIT, EST, LEVL IV, 30-39 MIN: ICD-10-PCS | Mod: S$GLB,,, | Performed by: STUDENT IN AN ORGANIZED HEALTH CARE EDUCATION/TRAINING PROGRAM

## 2023-10-31 PROCEDURE — 99214 OFFICE O/P EST MOD 30 MIN: CPT | Mod: S$GLB,,, | Performed by: STUDENT IN AN ORGANIZED HEALTH CARE EDUCATION/TRAINING PROGRAM

## 2023-10-31 PROCEDURE — 3008F BODY MASS INDEX DOCD: CPT | Mod: CPTII,S$GLB,, | Performed by: STUDENT IN AN ORGANIZED HEALTH CARE EDUCATION/TRAINING PROGRAM

## 2023-10-31 PROCEDURE — 97110 PR THERAPEUTIC EXERCISES: ICD-10-PCS | Mod: S$GLB,,, | Performed by: STUDENT IN AN ORGANIZED HEALTH CARE EDUCATION/TRAINING PROGRAM

## 2023-10-31 NOTE — PATIENT INSTRUCTIONS
In preparation for you upcoming surgery, here are some things to keep in mind leading up to and after your surgery:    PRE-ADMIT APPOINTMENT  We have a department that will review your chart for any health conditions or other issues to make sure that it is safe from an anesthesia standpoint to undergo surgery.   If they have any concerns they may schedule an appointment for you to be evaluated and have any further testing done. This may include but is not limited to bloodwork, EKG, chest X-ray, referral to cardiologist for additional testing/clearance, referral to pulmonologist for additional testing/clearance, or referral to any other needed specialties for additional testing/clearance.  If only basic testing is needed this appointment may be scheduled a few days before your actually surgery. Unless any new concerns or issues arise, this typically does not affect the date of your surgery.   If you are on any medications, at this appointment they will also review and discuss/provide instructions on when to stop or start taking these medications before and after surgery.   INSTRUCTIONS FOR SURGERY   The day before your surgery (usually between 1-3 PM), someone will call you to give you the scheduled time for you surgery, what time you need to arrive, what time to not eat/drink past, and any other final instructions  If your surgery is scheduled for Monday then they will call you on Friday afternoon.   If you do not receive this call please reach out to our office before the end of the day (4 PM) so that we may assist you.   PHYSICAL THERAPY  A referral for physical therapy will be placed to the location we discussed today. If you would like to make changes to this, please give us a call or send us a [employer] message as soon as possible so we may coordinate these changes.   We will send that referral to the desired location and also provide them with the rehabilitation protocol that will be followed to make sure you  are progressed appropriately after surgery.   They will also be provided with the start date of your PT after surgery. You will likely start PT prior to you first post-op appointment. Depending on the procedure you have performed this may be as soon as 3 days after surgery or up to 2 weeks after surgery. Below are a few examples of some common time frames for certain procedures:  Rotator cuff surgery- 10-11 days after surgery  Shoulder labrum surgery- 3-5 days after surgery   Shoulder replacement- 3-5 days after surgery  ACL Reconstruction- 3-5 days after surgery  Hip scope- 3-5 days after surgery  Distal biceps tendon repair- once post-op splint is removed/per Dr. Adams recommendation  Fracture- once post-op splint is removed/per Dr. Adams recommendation   If you ever have any problems or issues with your physical therapy or wish to change locations at any point, please let us know and we are happy to assist with that change.   POST-OP APPOINTMENTS  Post-op appointments will be scheduled at 2 weeks, 6 weeks, and 3 months from the date of your surgery. We will schedule these appointments prior to your surgery and they will be able to be viewed in [employer].  2 week post-op appointment  This appointment will be with Mara Akbar who is Dr. Adams's physician assistant (PA). At this appointment we will be removing your sutures, checking for any signs of infection or other concerning issues, and checking to make sure your range of motion is appropriate.   Dr. Adams will also be in clinic on the same day as this appointment and can step in to see you if there is anything of concern that needs to be addressed.   You may also have X-rays scheduled at this appointment, depending on the procedure you had performed (shoulder replacement, ACL surgery, surgery for a fracture, etc.)  6 week post-op appointment  This appointment will be with Dr. Adams. He will make sure everything is progressing well and may  also review your pictures from surgery if any were taken.   You may also have X-rays at this appointment, depending on the procedure you had performed (shoulder replacement, surgery for a fracture, etc.)  3 month post-op appointment  This appointment will be with Dr. Adams. He will make sure you continue to progress appropriately.  Any follow-ups after this visit will be at the discretion of Dr. Adams based upon your recovery/progress, procedure performed, etc.   FMLA OR SHORT TERM DISABILITY PAPERWORK  If you have any paperwork that needs to be filled out in regards to FMLA leave or short term disability leave, you may drop these forms off at the [facility] or attachment them to a patient message via [employer].   These forms will be filled out within a few days of your actual surgery being performed in case your surgery date is rescheduled or changed and the forms would need to potentially be filled out again.   Please try to provide these forms to our office in a timely manner, as we ask for 5-7 business days for them to be completed.       Meniscus Tears    This information does not include all information related to this topic. For more information please visit the American Academy of Orthopaedic [employer]geons website using the following link: Meniscus Tears    Meniscus tears are among the most common knee injuries. Athletes, particularly those who play contact sports, are at risk for meniscus tears. However, anyone at any age can tear the meniscus. When people talk about torn cartilage in the knee, they are usually referring to a torn meniscus.    Anatomy  Two bones meet to form your knee joint: the femur and the tibia. The kneecap (patella) sits in front of the joint to provide some protection. Two wedge-shaped pieces of fibrocartilage act as shock absorbers between your femur and tibia. These are the menisci. The menisci help to transmit weight from one bone to another and play an important role in knee  stability.    Description  The meniscus can tear from acute trauma or as the result of degenerative changes that happen over time.  Tears are noted by how they look, as well as where the tear occurs in the meniscus. Common tears include bucket handle, flap, and radial.    Sports-related meniscus injuries often occur along with other knee injuries, such as anterior cruciate ligament (ACL) tears.        Cause  Acute meniscus tears often happen during sports. These can occur through either a contact or non-contact injury -- for example, a pivoting or cutting injury.    As people age, they are more likely to have degenerative meniscus tears. Aged, worn tissue is more prone to tears. An awkward twist when getting up from a chair may be enough to cause a tear in an aging meniscus.    Symptoms  You might feel a pop when you tear the meniscus. Most people can still walk on their injured knee, and many athletes are able to keep playing with a tear. Over 2 to 3 days, however, the knee will gradually become more stiff and swollen.    The most common symptoms of a meniscus tear are:  Pain  Stiffness and swelling  Catching or locking of your knee  The sensation of your knee giving way  Inability to move your knee through its full range of motion    Imaging Tests  Because other knee injuries can cause similar symptoms, your doctor may order imaging tests to help confirm the diagnosis.  X-rays: X-rays provide images of dense structures, such as bone. Although an X-ray will not show a meniscus tear, your doctor may order one to look for other causes of knee pain, such as osteoarthritis.  Magnetic resonance imaging (MRI) scans:  An MRI scan assesses the soft tissues in your knee joint, including the menisci, cartilage, tendons, and ligaments.        Treatment  The treatment your doctor recommends will depend on a number of factors, including your age, symptoms, and activity level. They will also consider the type, size, and location  "of the injury.    The outer one-third of the meniscus has a rich blood supply. A tear in this "red" zone may heal on its own, or can often be repaired with surgery. A longitudinal tear is an example of this kind of tear.    In contrast, the inner two-thirds of the meniscus lacks a significant blood supply. Without nutrients from blood, tears in this "white" zone with limited blood flow cannot heal. Because the pieces cannot grow back together, symptomatic tears in this zone that do not respond to conservative treatment are usually trimmed surgically.        Nonsurgical Treatment  Many meniscus tears will not need immediate surgery. If your symptoms do not persist and you have no locking or swelling of the knee, your doctor may recommend nonsurgical treatment.    PEACE & LOVE: The PEACE & LOVE method has replaced the previously recognized RICE method.        Nonsteroidal anti-inflammatory drugs (NSAIDs): Anti-inflammatory drugs such as aspirin, ibuprofen, and naproxen help reduce pain and swelling.  Steroid injection: Your doctor may inject a corticosteroid medication into your knee joint to help eliminate pain and swelling.  Physical therapy: Specific exercises can help increase range of motion and flexibility, as well as help strengthen the muscles in your leg. Your doctor or a physical therapist can help develop an individualized exercise program that meets your needs and lifestyle.  Examples of knee exercises can be found at the following link: Knee Conditioning Program  Other nonsurgical treatment: Biologics injections, such as platelet-rich plasma (PRP), are currently being studied and may show promise in the future for the treatment of meniscus tears.    Surgical Treatment  If your symptoms persist with nonsurgical treatment, your doctor may suggest arthroscopic surgery.    Procedure   Knee arthroscopy is one of the most commonly performed surgical procedures. In this procedure, the surgeon inserts a miniature " camera through a small incision (portal) in the knee. This provides a clear view of the inside of the knee. The surgeon then inserts surgical instruments through two or three other small portals to trim or repair the tear.        Partial Meniscectomy: In this procedure, the damaged meniscus tissue is trimmed away. This procedure typically allows for immediate weight bearing, and full range of motion soon after surgery. A video of a partial meniscectomy can be found at the following link: Partial Meniscectomy    Meniscus Repair: Some meniscus tears can be repaired by suturing (stitching) the torn pieces together. Whether a tear can be successfully repaired depends upon the type of tear, as well as the overall condition of the injured meniscus. Because the meniscus must heal back together, recovery time for a repair is longer than for a meniscectomy.        Once the initial healing is complete, your doctor will prescribe rehabilitation exercises. Regular exercise to restore your knee mobility and strength is necessary. You will start with exercises to improve your range of motion. Strengthening exercises will gradually be added to your rehabilitation plan.    In many cases, rehabilitation can be carried out at home, although your doctor may recommend working with a physical therapist. Rehabilitation time for a meniscus repair is about 3 to 6 months. A meniscectomy requires less time for healing -- approximately 3 to 6 weeks.  Examples of knee exercises can be found at the following link: Knee Conditioning Program    Recovery  Meniscus tears are extremely common knee injuries. With proper diagnosis, treatment, and rehabilitation, patients often return to their pre-injury abilities.    Links  Meniscus Tears (https://orthoinfo.aaos.org/en/diseases--conditions/meniscus-tears/)  Knee Conditioning Program (https://orthoinfo.aaos.org/globalassets/pdfs/2017-rehab_knee.pdf)

## 2023-11-02 ENCOUNTER — CLINICAL SUPPORT (OUTPATIENT)
Dept: REHABILITATION | Facility: HOSPITAL | Age: 63
End: 2023-11-02
Payer: COMMERCIAL

## 2023-11-02 DIAGNOSIS — R53.1 DECREASED STRENGTH, ENDURANCE, AND MOBILITY: ICD-10-CM

## 2023-11-02 DIAGNOSIS — M25.60 DECREASED RANGE OF MOTION: Primary | ICD-10-CM

## 2023-11-02 DIAGNOSIS — R52 PAIN: ICD-10-CM

## 2023-11-02 DIAGNOSIS — R26.89 BALANCE PROBLEM: ICD-10-CM

## 2023-11-02 DIAGNOSIS — Z74.09 DECREASED STRENGTH, ENDURANCE, AND MOBILITY: ICD-10-CM

## 2023-11-02 DIAGNOSIS — R68.89 DECREASED STRENGTH, ENDURANCE, AND MOBILITY: ICD-10-CM

## 2023-11-02 PROCEDURE — 97110 THERAPEUTIC EXERCISES: CPT

## 2023-11-02 PROCEDURE — 97112 NEUROMUSCULAR REEDUCATION: CPT

## 2023-11-02 NOTE — PROGRESS NOTES
[facility]   Physical Therapy Treatment Note        Name: Karla Arzola  Clinic Number: [id number]    Therapy Diagnosis:   Encounter Diagnoses   Name Primary?    Decreased range of motion Yes    Decreased strength, endurance, and mobility     Pain     Balance problem        Physician: Mara Akbar PA-C    Visit Date: 11/2/2023    Physician Orders: PT Eval and Treat  Medical Diagnosis from Referral: Old complex tear of lateral meniscus of left knee; Primary osteoarthritis of left knee  Evaluation Date: 9/13/2023  Authorization Period Expiration: 12/21/2023  Plan of Care Expiration: 11/09/23  Progress Note Due: 11/09/2023  Visit # / Visits authorized: 6/12 (+1)  FOTO: 2/3 (last performed on 10/19/2023)  PTA Visit #: 3/5      Precautions: Standard, Fall, and HTN    Time In: 8:45 am  Time Out: 9:27 am  Total Billable Time: 41 minutes      SUBJECTIVE     Pt reports: she has scheduled for knee surgery in start of January with Dr Adams. He does want her to continue with therapy to increase strength as much as possible.  Rates her pain at 3/10 today - more stiffness.  Agreeable to perform land therapy today and we discussed staying on land to increase knee and LE strength.    She was compliant with home exercise program.  Response to previous treatment: no change  Functional change: recent fall and in boot    Pain: 4/10  Location: left knee      OBJECTIVE     Objective Measures updated at progress report unless specified.      TREATMENT       Karla received the treatments listed below:      Karla received therapeutic exercises to develop strength, endurance, ROM, flexibility, and core stabilization for 18 minutes including:    Intervention 11/2/2023  Parameters   Nustep x 7 min   LAQ x 10x/ 2 sets #2   Leg press x #33 6x/ 3 sets   Knee flexion with strap x 10x (pillowcase on foot)                                             Karla participated in neuromuscular re-education activities  to improve: Balance, Coordination, Proprioception, and Posture for 23 minutes. The following activities were included:    Intervention 11/2/2023  Parameters   tailgaiters x 1 min   bridge x 10x/ 3 set   Sidelying hip series x  x  x Clamshells 10x/ 2 sets  Abduction 10x  Circles CW/CCW 5x each   Prone hip extension with knee flex x 7x each   Prone hip extension x 10x/each LE alternating                                       PATIENT EDUCATION AND HOME EXERCISES     Home Exercises Provided and Patient Education Provided     Education provided:   - home exercise program     Written Home Exercises Provided: yes. Exercises were reviewed and Karla was able to demonstrate them prior to the end of the session.  Karla demonstrated good  understanding of the education provided. See EMR under Patient Instructions for exercises provided during therapy sessions    ASSESSMENT     Patient tolerated treatment well and was able to progress with land strengthening as noted without increased pain. She did require cues throughout treatment session to perform all interventions correctly. Home exercise program issued and encouraged.    Karla Is progressing well towards her goals.   Pt prognosis is Good.     Pt will continue to benefit from skilled outpatient physical therapy to address the deficits listed in the problem list box on initial evaluation, provide pt/family education and to maximize pt's level of independence in the home and community environment.     Pt's spiritual, cultural and educational needs considered and pt agreeable to plan of care and goals.     Anticipated barriers to physical therapy:  co-morbidities, sedentary lifestyle, chronicity of condition, adherence to treatment plan, lack of support system, and coping style    Goals:   Reviewed: 11/2/2023       Short Term Goals: In 4 weeks  Progress Date   1.Patient to be educated on HEP. [x] Progressing  [] MET   [] Not   [] Not tested     2.Patient to increase Knee  extension range of motion by 10 degrees, in order to improve available range of motion for ADL's.  [] Progressing  [x] MET   [] Not MET  [] Not tested  10/19/23   3.Patient to increase bilateral LE strength by 1/2 grade, in order to improve endurance and increase ability to perform all functional activities for increased time.  [] Progressing  [x] MET   [] Not MET  [] Not tested  10/19/23   4.Patient to have pain less than 5/10 at worst, to improve QOL. [] Progressing  [x] MET   [] Not MET  [] Not tested  10/19/23   5.Patient to improve score on the FOTO, to improve QOL. [] Progressing  [x] MET   [] Not MET  [] Not tested  10/19/23   6. Patient to improve score on single leg balance in order to decrease fall risk. [x] Progressing  [] MET   [] Not MET  [] Not tested        Long Term Goals: In 8 weeks Progress Date   1.Patient to improve score on the FOTO predicted score or better, to improve QOL. [x] Progressing  [] MET   [] Not MET  [] Not tested     2. Patient to increase bilateral LE strength to 4+/5 or greater, in order to improve endurance and increase ability to perform all functional activities for increased time. [x] Progressing  [] MET   [] Not MET  [] Not tested     3. Patient to have decreased pain to 2/10 at worst, to improve QOL. [x] Progressing  [] MET   [] Not MET  [] Not tested     4. Patient to normalize score on single leg balance, in order to improve endurance and decrease fall risk. [x] Progressing  [] MET   [] Not MET  [] Not tested     5. Patient to perform daily activities including walking during normal work activities without increased symptoms. [x] Progressing  [] MET   [] Not MET  [] Not tested          PLAN     Monitor response to today's treatment session and progress with Physical Therapy plan of care as indicated.    Plan of care Certification: 9/13/2023  to 11/13/2023.     Outpatient Physical Therapy 2 times weekly for 8 weeks to include any combination of the following interventions:  Aquatic Therapy, electrical stimulation (PRN), Gait Training, Manual Therapy, Neuromuscular Re-ed, Patient Education/Self Care, Therapeutic Activites, Therapeutic Exercise, Dry Needling, and Moist Hot Pack/Cold Pack    Albertina Valverde PT

## 2023-11-07 ENCOUNTER — CLINICAL SUPPORT (OUTPATIENT)
Dept: REHABILITATION | Facility: HOSPITAL | Age: 63
End: 2023-11-07
Payer: COMMERCIAL

## 2023-11-07 DIAGNOSIS — M25.60 DECREASED RANGE OF MOTION: Primary | ICD-10-CM

## 2023-11-07 DIAGNOSIS — R53.1 DECREASED STRENGTH, ENDURANCE, AND MOBILITY: ICD-10-CM

## 2023-11-07 DIAGNOSIS — R26.89 BALANCE PROBLEM: ICD-10-CM

## 2023-11-07 DIAGNOSIS — R52 PAIN: ICD-10-CM

## 2023-11-07 DIAGNOSIS — R68.89 DECREASED STRENGTH, ENDURANCE, AND MOBILITY: ICD-10-CM

## 2023-11-07 DIAGNOSIS — Z74.09 DECREASED STRENGTH, ENDURANCE, AND MOBILITY: ICD-10-CM

## 2023-11-07 PROCEDURE — 97110 THERAPEUTIC EXERCISES: CPT

## 2023-11-07 PROCEDURE — 97112 NEUROMUSCULAR REEDUCATION: CPT

## 2023-11-07 NOTE — PROGRESS NOTES
[facility]   Physical Therapy Treatment Note        Name: Karla Arzola  Clinic Number: [id number]    Therapy Diagnosis:   Encounter Diagnoses   Name Primary?    Decreased range of motion Yes    Decreased strength, endurance, and mobility     Pain     Balance problem          Physician: Mara Akbar PA-C    Visit Date: 11/7/2023    Physician Orders: PT Eval and Treat  Medical Diagnosis from Referral: Old complex tear of lateral meniscus of left knee; Primary osteoarthritis of left knee  Evaluation Date: 9/13/2023  Authorization Period Expiration: 12/21/2023  Plan of Care Expiration: 11/09/23  Progress Note Due: 11/09/2023  Visit # / Visits authorized: 7/12 (+1)  FOTO: 2/3 (last performed on 10/19/2023)  PTA Visit #: 0/5      Precautions: Standard, Fall, and HTN  Pre-op strengthening    Time In: 8:15 am  Time Out: 9:05 am  Total Billable Time: 43 minutes      SUBJECTIVE     Pt reports: She did have some muscle soreness after last treatment session. Better after today's treatment session.    She was compliant with home exercise program.  Response to previous treatment: no change  Functional change: recent fall and in boot    Pain: 3/10  Location: left knee      OBJECTIVE     Objective Measures updated at progress report unless specified.      TREATMENT       Karla received the treatments listed below:      Karla received therapeutic exercises to develop strength, endurance, ROM, flexibility, and core stabilization for 19 minutes including:    Intervention 11/7/2023  Parameters   Nustep x 7 min, L3   LAQ x 8x/ 3 sets #3   Leg press x #55 8x/ 3 sets (foot plate 8, seat 3)   Knee flexion with strap x 10x (pillowcase on foot) 10 sec hold                                             Karla participated in neuromuscular re-education activities to improve: Balance, Coordination, Proprioception, and Posture for 24 minutes. The following activities were included:    Intervention 11/7/2023   Parameters   tailgaiters x 2 min   bridge x 10x/ 3 set 10#KB   Sidelying hip series x  x  x Clamshells 10x/ 2 sets  Abduction 10x  Circles CW/CCW 5x each   Prone hip extension with knee flex x 8x each   Prone hip extension x 10x/each LE alternating   Standing TKE x Purple cook band, 5 sec hold 2 min                                  PATIENT EDUCATION AND HOME EXERCISES     Home Exercises Provided and Patient Education Provided     Education provided:   - home exercise program     Written Home Exercises Provided: yes. Exercises were reviewed and Karla was able to demonstrate them prior to the end of the session.  Karla demonstrated good  understanding of the education provided. See EMR under Patient Instructions for exercises provided during therapy sessions    ASSESSMENT     Patient tolerated progressions well despite some complaints of pain. She did require cues during treatment session for correct technique. Encouraged home exercise program.    Karla Is progressing well towards her goals.   Pt prognosis is Good.     Pt will continue to benefit from skilled outpatient physical therapy to address the deficits listed in the problem list box on initial evaluation, provide pt/family education and to maximize pt's level of independence in the home and community environment.     Pt's spiritual, cultural and educational needs considered and pt agreeable to plan of care and goals.     Anticipated barriers to physical therapy:  co-morbidities, sedentary lifestyle, chronicity of condition, adherence to treatment plan, lack of support system, and coping style    Goals:   Reviewed: 11/7/2023       Short Term Goals: In 4 weeks  Progress Date   1.Patient to be educated on HEP. [x] Progressing  [] MET   [] Not   [] Not tested     2.Patient to increase Knee extension range of motion by 10 degrees, in order to improve available range of motion for ADL's.  [] Progressing  [x] MET   [] Not MET  [] Not tested  10/19/23   3.Patient to  increase bilateral LE strength by 1/2 grade, in order to improve endurance and increase ability to perform all functional activities for increased time.  [] Progressing  [x] MET   [] Not MET  [] Not tested  10/19/23   4.Patient to have pain less than 5/10 at worst, to improve QOL. [] Progressing  [x] MET   [] Not MET  [] Not tested  10/19/23   5.Patient to improve score on the FOTO, to improve QOL. [] Progressing  [x] MET   [] Not MET  [] Not tested  10/19/23   6. Patient to improve score on single leg balance in order to decrease fall risk. [x] Progressing  [] MET   [] Not MET  [] Not tested        Long Term Goals: In 8 weeks Progress Date   1.Patient to improve score on the FOTO predicted score or better, to improve QOL. [x] Progressing  [] MET   [] Not MET  [] Not tested     2. Patient to increase bilateral LE strength to 4+/5 or greater, in order to improve endurance and increase ability to perform all functional activities for increased time. [x] Progressing  [] MET   [] Not MET  [] Not tested     3. Patient to have decreased pain to 2/10 at worst, to improve QOL. [x] Progressing  [] MET   [] Not MET  [] Not tested     4. Patient to normalize score on single leg balance, in order to improve endurance and decrease fall risk. [x] Progressing  [] MET   [] Not MET  [] Not tested     5. Patient to perform daily activities including walking during normal work activities without increased symptoms. [x] Progressing  [] MET   [] Not MET  [] Not tested          PLAN     Monitor response to today's treatment session and progress with Physical Therapy plan of care as indicated.    Plan of care Certification: 9/13/2023  to 11/13/2023.     Outpatient Physical Therapy 2 times weekly for 8 weeks to include any combination of the following interventions: Aquatic Therapy, electrical stimulation (PRN), Gait Training, Manual Therapy, Neuromuscular Re-ed, Patient Education/Self Care, Therapeutic Activites, Therapeutic Exercise, Dry  Needling, and Moist Hot Pack/Cold Pack    Albertina Valverde, PT

## 2023-11-09 ENCOUNTER — CLINICAL SUPPORT (OUTPATIENT)
Dept: REHABILITATION | Facility: HOSPITAL | Age: 63
End: 2023-11-09
Payer: COMMERCIAL

## 2023-11-09 ENCOUNTER — LAB VISIT (OUTPATIENT)
Dept: LAB | Facility: HOSPITAL | Age: 63
End: 2023-11-09
Attending: PHYSICIAN ASSISTANT
Payer: COMMERCIAL

## 2023-11-09 DIAGNOSIS — R76.8 POSITIVE ANA (ANTINUCLEAR ANTIBODY): ICD-10-CM

## 2023-11-09 DIAGNOSIS — Z74.09 DECREASED STRENGTH, ENDURANCE, AND MOBILITY: ICD-10-CM

## 2023-11-09 DIAGNOSIS — R68.89 DECREASED STRENGTH, ENDURANCE, AND MOBILITY: ICD-10-CM

## 2023-11-09 DIAGNOSIS — R53.1 DECREASED STRENGTH, ENDURANCE, AND MOBILITY: ICD-10-CM

## 2023-11-09 DIAGNOSIS — M25.60 DECREASED RANGE OF MOTION: Primary | ICD-10-CM

## 2023-11-09 DIAGNOSIS — R26.89 BALANCE PROBLEM: ICD-10-CM

## 2023-11-09 DIAGNOSIS — R52 PAIN: ICD-10-CM

## 2023-11-09 LAB
ALBUMIN SERPL BCP-MCNC: 3.6 G/DL (ref 3.5–5.2)
ALP SERPL-CCNC: 97 U/L (ref 55–135)
ALT SERPL W/O P-5'-P-CCNC: 14 U/L (ref 10–44)
ANION GAP SERPL CALC-SCNC: 10 MMOL/L (ref 8–16)
AST SERPL-CCNC: 13 U/L (ref 10–40)
BASOPHILS # BLD AUTO: 0.02 K/UL (ref 0–0.2)
BASOPHILS NFR BLD: 0.3 % (ref 0–1.9)
BILIRUB SERPL-MCNC: 0.4 MG/DL (ref 0.1–1)
BUN SERPL-MCNC: 15 MG/DL (ref 8–23)
CALCIUM SERPL-MCNC: 9.1 MG/DL (ref 8.7–10.5)
CHLORIDE SERPL-SCNC: 108 MMOL/L (ref 95–110)
CO2 SERPL-SCNC: 26 MMOL/L (ref 23–29)
CREAT SERPL-MCNC: 1 MG/DL (ref 0.5–1.4)
CRP SERPL-MCNC: 11.5 MG/L (ref 0–8.2)
DIFFERENTIAL METHOD: ABNORMAL
EOSINOPHIL # BLD AUTO: 0.2 K/UL (ref 0–0.5)
EOSINOPHIL NFR BLD: 3 % (ref 0–8)
ERYTHROCYTE [DISTWIDTH] IN BLOOD BY AUTOMATED COUNT: 13.5 % (ref 11.5–14.5)
ERYTHROCYTE [SEDIMENTATION RATE] IN BLOOD BY PHOTOMETRIC METHOD: 7 MM/HR (ref 0–36)
EST. GFR  (NO RACE VARIABLE): >60 ML/MIN/1.73 M^2
GLUCOSE SERPL-MCNC: 106 MG/DL (ref 70–110)
HCT VFR BLD AUTO: 45.9 % (ref 37–48.5)
HGB BLD-MCNC: 14.5 G/DL (ref 12–16)
IMM GRANULOCYTES # BLD AUTO: 0.01 K/UL (ref 0–0.04)
IMM GRANULOCYTES NFR BLD AUTO: 0.2 % (ref 0–0.5)
LYMPHOCYTES # BLD AUTO: 2.1 K/UL (ref 1–4.8)
LYMPHOCYTES NFR BLD: 31.8 % (ref 18–48)
MCH RBC QN AUTO: 29.5 PG (ref 27–31)
MCHC RBC AUTO-ENTMCNC: 31.6 G/DL (ref 32–36)
MCV RBC AUTO: 93 FL (ref 82–98)
MONOCYTES # BLD AUTO: 0.6 K/UL (ref 0.3–1)
MONOCYTES NFR BLD: 9 % (ref 4–15)
NEUTROPHILS # BLD AUTO: 3.7 K/UL (ref 1.8–7.7)
NEUTROPHILS NFR BLD: 55.7 % (ref 38–73)
NRBC BLD-RTO: 0 /100 WBC
PLATELET # BLD AUTO: 247 K/UL (ref 150–450)
PMV BLD AUTO: 11 FL (ref 9.2–12.9)
POTASSIUM SERPL-SCNC: 4.5 MMOL/L (ref 3.5–5.1)
PROT SERPL-MCNC: 6.7 G/DL (ref 6–8.4)
RBC # BLD AUTO: 4.92 M/UL (ref 4–5.4)
SODIUM SERPL-SCNC: 144 MMOL/L (ref 136–145)
WBC # BLD AUTO: 6.57 K/UL (ref 3.9–12.7)

## 2023-11-09 PROCEDURE — 85652 RBC SED RATE AUTOMATED: CPT | Performed by: PHYSICIAN ASSISTANT

## 2023-11-09 PROCEDURE — 97112 NEUROMUSCULAR REEDUCATION: CPT

## 2023-11-09 PROCEDURE — 85025 COMPLETE CBC W/AUTO DIFF WBC: CPT | Performed by: PHYSICIAN ASSISTANT

## 2023-11-09 PROCEDURE — 86235 NUCLEAR ANTIGEN ANTIBODY: CPT | Mod: 59 | Performed by: PHYSICIAN ASSISTANT

## 2023-11-09 PROCEDURE — 36415 COLL VENOUS BLD VENIPUNCTURE: CPT | Performed by: PHYSICIAN ASSISTANT

## 2023-11-09 PROCEDURE — 80053 COMPREHEN METABOLIC PANEL: CPT | Performed by: PHYSICIAN ASSISTANT

## 2023-11-09 PROCEDURE — 97110 THERAPEUTIC EXERCISES: CPT

## 2023-11-09 PROCEDURE — 86038 ANTINUCLEAR ANTIBODIES: CPT | Performed by: PHYSICIAN ASSISTANT

## 2023-11-09 PROCEDURE — 86039 ANTINUCLEAR ANTIBODIES (ANA): CPT | Performed by: PHYSICIAN ASSISTANT

## 2023-11-09 PROCEDURE — 86140 C-REACTIVE PROTEIN: CPT | Performed by: PHYSICIAN ASSISTANT

## 2023-11-09 NOTE — PLAN OF CARE
[facility]  Physical Therapy Plan of Care Note       Name: Karla Arzola  Clinic Number: [id number]    Therapy Diagnosis:   Encounter Diagnoses   Name Primary?    Decreased range of motion Yes    Decreased strength, endurance, and mobility     Pain     Balance problem      Physician: Mara Akbar PA-C    Visit Date: 11/9/2023    Physician Orders: PT Eval and Treat  Medical Diagnosis from Referral: Old complex tear of lateral meniscus of left knee; Primary osteoarthritis of left knee  Evaluation Date: 9/13/2023  Authorization Period Expiration: 12/21/2023  Plan of Care Expiration: 12/13/23  Progress Note Due: 12/09/2023  Visit # / Visits authorized: 8/12 (+1)  FOTO: 3/3 (last performed on 11/9/2023)  PTA Visit #: 0/5      Precautions: Standard, Fall, and HTN  Pre-op strengthening  Functional Level Prior to Evaluation:  see evaluation    SUBJECTIVE     Update: see daily note    OBJECTIVE     Update: see daily note    ASSESSMENT     Update: see daily note    Previous Short Term Goals Status:   see daily note  New Short Term Goals Status:   see daily note  Long Term Goal Status: continue per initial plan of care.  Reasons for Recertification of Therapy:   see daily note    GOALS  see daily note    PLAN     Updated Certification Period: 11/9/23 to 12/13/23   Recommended Treatment Plan: 2 times per week for 5-6 weeks:  Aquatic Therapy, Electrical Stimulation PRN, Gait Training, Manual Therapy, Moist Heat/ Ice, Neuromuscular Re-ed, Patient Education, Self Care, Therapeutic Activities, and Therapeutic Exercise  Other Recommendations: none    Albertina Valverde PT      Physician's Signature: ___________________________________________________

## 2023-11-09 NOTE — PROGRESS NOTES
[facility]   Physical Therapy Treatment Note & PROGRESS NOTE       Name: Karla Arzola  Clinic Number: [id number]    Therapy Diagnosis:   Encounter Diagnoses   Name Primary?    Decreased range of motion Yes    Decreased strength, endurance, and mobility     Pain     Balance problem            Physician: Mara Akbar PA-C    Visit Date: 11/9/2023    Physician Orders: PT Eval and Treat  Medical Diagnosis from Referral: Old complex tear of lateral meniscus of left knee; Primary osteoarthritis of left knee  Evaluation Date: 9/13/2023  Authorization Period Expiration: 12/21/2023  Plan of Care Expiration: 12/13/23  Progress Note Due: 12/09/2023  Visit # / Visits authorized: 8/12 (+1)  FOTO: 3/3 (last performed on 11/9/2023)  PTA Visit #: 0/5      Precautions: Standard, Fall, and HTN  Pre-op strengthening    Time In: 8:45 am  Time Out: 9:38 am  Total Billable Time: 48 minutes      SUBJECTIVE     Pt reports: She did well after last visit - just a little achy. Less pain with bending knee with strap today.    She was compliant with home exercise program.  Response to previous treatment: no change  Functional change: recent fall and in boot    Pain: 2/10  Location: left knee      OBJECTIVE     Objective Measures updated at progress report unless specified.        Range of Motion:     SITTING IN CHAIR/supine:  Knee AROM/PROM Right Left Left  11/9/23   Knee Flexion (135º)sitting/supine 110 70/70 Supine = 120    Knee Extension (0º) sitting/supine -10 -30/-16  Supine = (-6)  Sitting= (-5)      Strength:     L/E MMT Right  (spine) Left Pain/Dysfunction with Movement Left  11/9/23   Hip Flexion  3+/5 2+/5   4   Knee Extension 4+/5 3+/5 Pain on left  4+   Knee Flexion 4+/5 4-/5 Pain on left  4+   Hip IR 4/5 4/5 Pain on left  4+   Hip ER 4/5 4/5 Pain on left  4+   Ankle DF 4+/5 4+/5   5   Ankle PF 4+/5 4+/5   5      FOTO:        TREATMENT       Karla received the treatments listed below:       Karla received therapeutic exercises to develop strength, endurance, ROM, flexibility, and core stabilization for 25 minutes including:    Intervention 11/9/2023  Parameters   Nustep x 7 min, L3   LAQ x 8x/ 3 sets #3   Leg press x #55 8x/ 3 sets (foot plate 8, seat 3)   Knee flexion with strap x 10x (pillowcase on foot) 10 sec hold        Knee extension prop  x Supine 4#, 5 min        Re-assessment x                          Karla participated in neuromuscular re-education activities to improve: Balance, Coordination, Proprioception, and Posture for 23 minutes. The following activities were included:    Intervention 11/9/2023  Parameters   tailgaiters x 1 min   bridge x 10x/ 3 set 10#KB   Sidelying hip series x  x  x Clamshells 10x/ 2 sets  Abduction 10x  Circles CW/CCW 10x each   Prone hip extension with knee flex x 10x each   Prone hip extension x 10x/2 sets each LE alternating   Standing TKE x Maroon cook band, 5 sec hold 3 min                                  PATIENT EDUCATION AND HOME EXERCISES     Home Exercises Provided and Patient Education Provided     Education provided:   - home exercise program     Written Home Exercises Provided: Patient instructed to cont prior HEP. Exercises were reviewed and Karla was able to demonstrate them prior to the end of the session.  Karla demonstrated good  understanding of the education provided. See EMR under Patient Instructions for exercises provided during therapy sessions    ASSESSMENT     Patient has mad excellent progress with all objective measurements and is reporting less pain and imporved function per the FOTO. She should continue to benefit form continued therapy in preparation for upcoming knee surgery. Encouraged home exercise program.    Karla Is progressing well towards her goals.   Pt prognosis is Good.     Pt will continue to benefit from skilled outpatient physical therapy to address the deficits listed in the problem list box on initial evaluation,  provide pt/family education and to maximize pt's level of independence in the home and community environment.     Pt's spiritual, cultural and educational needs considered and pt agreeable to plan of care and goals.     Anticipated barriers to physical therapy:  co-morbidities, sedentary lifestyle, chronicity of condition, adherence to treatment plan, lack of support system, and coping style    Goals:   Reviewed: 11/9/2023       Short Term Goals: In 4 weeks  Progress Date   1.Patient to be educated on HEP. [x] Progressing  [] MET   [] Not   [] Not tested  11/9/2023   2.Patient to increase Knee extension range of motion by 10 degrees, in order to improve available range of motion for ADL's.  [] Progressing  [x] MET   [] Not MET  [] Not tested  10/19/23   3.Patient to increase bilateral LE strength by 1/2 grade, in order to improve endurance and increase ability to perform all functional activities for increased time.  [] Progressing  [x] MET   [] Not MET  [] Not tested  10/19/23   4.Patient to have pain less than 5/10 at worst, to improve QOL. [] Progressing  [x] MET   [] Not MET  [] Not tested  10/19/23   5.Patient to improve score on the FOTO, to improve QOL. [] Progressing  [x] MET   [] Not MET  [] Not tested  10/19/23   6. Patient to improve score on single leg balance in order to decrease fall risk. [] Progressing  [] MET   [] Not MET  [x] Not tested  11/9/2023      Long Term Goals: In 8 weeks Progress Date   1.Patient to improve score on the FOTO predicted score or better, to improve QOL. [] Progressing  [x] MET   [] Not MET  [] Not tested  11/9/2023   2. Patient to increase bilateral LE strength to 4+/5 or greater, in order to improve endurance and increase ability to perform all functional activities for increased time. [] Progressing  [x] MET   [] Not MET  [] Not tested  11/9/2023   3. Patient to have decreased pain to 2/10 at worst, to improve QOL. [x] Progressing  [] MET   [] Not MET  [] Not tested     4.  Patient to normalize score on single leg balance, in order to improve endurance and decrease fall risk. [x] Progressing  [] MET   [] Not MET  [] Not tested  11/9/2023   5. Patient to perform daily activities including walking during normal work activities without increased symptoms. [x] Progressing  [] MET   [] Not MET  [] Not tested  11/9/2023        PLAN     Monitor response to today's treatment session and progress with Physical Therapy plan of care as indicated.    Updated Certification Period: 11/9/23 to 12/13/23   Recommended Treatment Plan: 2 times per week for 5-6 weeks:  Aquatic Therapy, Electrical Stimulation PRN, Gait Training, Manual Therapy, Moist Heat/ Ice, Neuromuscular Re-ed, Patient Education, Self Care, Therapeutic Activities, and Therapeutic Exercise    Albertina Valverde, PT

## 2023-11-14 LAB
ANTI SM ANTIBODY: 0.15 RATIO (ref 0–0.99)
ANTI SM/RNP ANTIBODY: 0.12 RATIO (ref 0–0.99)
ANTI-SM INTERPRETATION: NEGATIVE
ANTI-SM/RNP INTERPRETATION: NEGATIVE
ANTI-SSA ANTIBODY: 0.06 RATIO (ref 0–0.99)
ANTI-SSA INTERPRETATION: NEGATIVE
ANTI-SSB ANTIBODY: 0.06 RATIO (ref 0–0.99)
ANTI-SSB INTERPRETATION: NEGATIVE
DSDNA AB SER-ACNC: NORMAL [IU]/ML

## 2023-11-15 ENCOUNTER — CLINICAL SUPPORT (OUTPATIENT)
Dept: REHABILITATION | Facility: HOSPITAL | Age: 63
End: 2023-11-15
Payer: COMMERCIAL

## 2023-11-15 DIAGNOSIS — R68.89 DECREASED STRENGTH, ENDURANCE, AND MOBILITY: ICD-10-CM

## 2023-11-15 DIAGNOSIS — R52 PAIN: ICD-10-CM

## 2023-11-15 DIAGNOSIS — M25.60 DECREASED RANGE OF MOTION: Primary | ICD-10-CM

## 2023-11-15 DIAGNOSIS — R26.89 BALANCE PROBLEM: ICD-10-CM

## 2023-11-15 DIAGNOSIS — R53.1 DECREASED STRENGTH, ENDURANCE, AND MOBILITY: ICD-10-CM

## 2023-11-15 DIAGNOSIS — Z74.09 DECREASED STRENGTH, ENDURANCE, AND MOBILITY: ICD-10-CM

## 2023-11-15 PROCEDURE — 97110 THERAPEUTIC EXERCISES: CPT | Mod: CQ

## 2023-11-15 PROCEDURE — 97112 NEUROMUSCULAR REEDUCATION: CPT | Mod: CQ

## 2023-11-15 NOTE — PROGRESS NOTES
[facility]   Physical Therapy Treatment Note       Name: Karla Arzola  Clinic Number: [id number]    Therapy Diagnosis:   Encounter Diagnoses   Name Primary?    Decreased range of motion Yes    Decreased strength, endurance, and mobility     Pain     Balance problem            Physician: Mara Akbar PA-C    Visit Date: 11/15/2023    Physician Orders: PT Eval and Treat  Medical Diagnosis from Referral: Old complex tear of lateral meniscus of left knee; Primary osteoarthritis of left knee  Evaluation Date: 9/13/2023  Authorization Period Expiration: 12/21/2023  Plan of Care Expiration: 12/13/23  Progress Note Due: 12/09/2023  Visit # / Visits authorized: 9/12 (+1)  FOTO: 3/3 (last performed on 11/9/2023)  PTA Visit #: 1/5      Precautions: Standard, Fall, and HTN  Pre-op strengthening    Time In: 11:25 am  Time Out: 12:05 pm  Total Billable Time: 40 minutes      SUBJECTIVE     Pt reports: knee is feeling pretty good today, states she feels like she is getting stronger.     She was compliant with home exercise program.  Response to previous treatment: no change  Functional change: recent fall and in boot    Pain: 3/10  Location: left knee      OBJECTIVE     Objective Measures updated at progress report unless specified.        Range of Motion:       TREATMENT     Karla received the treatments listed below:      Karla received therapeutic exercises to develop strength, endurance, ROM, flexibility, and core stabilization for 15 minutes including:    Intervention 11/15/2023  Parameters   Nustep x 8 min, L3   LAQ x 8x/ 3 sets #3   Leg press  #55 8x/ 3 sets (foot plate 8, seat 3)   Knee flexion with strap  10x (pillowcase on foot) 10 sec hold   Hamstring stretch x Step 3x30s   Knee extension prop   Supine 4#, 5 min        Re-assessment                           Karla participated in neuromuscular re-education activities to improve: Balance, Coordination, Proprioception, and Posture  for 25 minutes. The following activities were included:    Intervention 11/15/2023  Parameters   tailgaiters x 2 min 3#   bridge x 10x/ 3 set 10#KB   Sidelying hip series x  x  x Clamshells 10x/ 2 sets  Abduction 10x  Circles CW/CCW 10x each   Prone hip extension with knee flex  10x each   Prone hip extension x 10x/2 sets each LE alternating   Standing TKE  Maroon cook band, 5 sec hold 3 min   Step ups x 3x8 each bilateral 6 inch   Sit to stand x Airex in chair 3x10                        PATIENT EDUCATION AND HOME EXERCISES     Home Exercises Provided and Patient Education Provided     Education provided:   - home exercise program     Written Home Exercises Provided: Patient instructed to cont prior HEP. Exercises were reviewed and Karla was able to demonstrate them prior to the end of the session.  Karla demonstrated good  understanding of the education provided. See EMR under Patient Instructions for exercises provided during therapy sessions    ASSESSMENT     Patient did well with session today with minimal complaints of discomfort. Some challenges with sit to stands, slight left knee discomfort which improved with modifications. Patient left session with good fatigue and soreness.     Karla Is progressing well towards her goals.   Pt prognosis is Good.     Pt will continue to benefit from skilled outpatient physical therapy to address the deficits listed in the problem list box on initial evaluation, provide pt/family education and to maximize pt's level of independence in the home and community environment.     Pt's spiritual, cultural and educational needs considered and pt agreeable to plan of care and goals.     Anticipated barriers to physical therapy:  co-morbidities, sedentary lifestyle, chronicity of condition, adherence to treatment plan, lack of support system, and coping style    Goals:   Reviewed: 11/15/2023       Short Term Goals: In 4 weeks  Progress Date   1.Patient to be educated on HEP. [x]  Progressing  [] MET   [] Not   [] Not tested  11/9/2023   2.Patient to increase Knee extension range of motion by 10 degrees, in order to improve available range of motion for ADL's.  [] Progressing  [x] MET   [] Not MET  [] Not tested  10/19/23   3.Patient to increase bilateral LE strength by 1/2 grade, in order to improve endurance and increase ability to perform all functional activities for increased time.  [] Progressing  [x] MET   [] Not MET  [] Not tested  10/19/23   4.Patient to have pain less than 5/10 at worst, to improve QOL. [] Progressing  [x] MET   [] Not MET  [] Not tested  10/19/23   5.Patient to improve score on the FOTO, to improve QOL. [] Progressing  [x] MET   [] Not MET  [] Not tested  10/19/23   6. Patient to improve score on single leg balance in order to decrease fall risk. [] Progressing  [] MET   [] Not MET  [x] Not tested  11/9/2023      Long Term Goals: In 8 weeks Progress Date   1.Patient to improve score on the FOTO predicted score or better, to improve QOL. [] Progressing  [x] MET   [] Not MET  [] Not tested  11/9/2023   2. Patient to increase bilateral LE strength to 4+/5 or greater, in order to improve endurance and increase ability to perform all functional activities for increased time. [] Progressing  [x] MET   [] Not MET  [] Not tested  11/9/2023   3. Patient to have decreased pain to 2/10 at worst, to improve QOL. [x] Progressing  [] MET   [] Not MET  [] Not tested     4. Patient to normalize score on single leg balance, in order to improve endurance and decrease fall risk. [x] Progressing  [] MET   [] Not MET  [] Not tested  11/9/2023   5. Patient to perform daily activities including walking during normal work activities without increased symptoms. [x] Progressing  [] MET   [] Not MET  [] Not tested  11/9/2023        PLAN     Monitor response to today's treatment session and progress with Physical Therapy plan of care as indicated.    Updated Certification Period: 11/9/23 to  12/13/23   Recommended Treatment Plan: 2 times per week for 5-6 weeks:  Aquatic Therapy, Electrical Stimulation PRN, Gait Training, Manual Therapy, Moist Heat/ Ice, Neuromuscular Re-ed, Patient Education, Self Care, Therapeutic Activities, and Therapeutic Exercise    Padma Walden, PTA

## 2023-11-16 ENCOUNTER — OFFICE VISIT (OUTPATIENT)
Dept: RHEUMATOLOGY | Facility: CLINIC | Age: 63
End: 2023-11-16
Payer: COMMERCIAL

## 2023-11-16 VITALS
SYSTOLIC BLOOD PRESSURE: 140 MMHG | HEART RATE: 92 BPM | WEIGHT: 245.81 LBS | DIASTOLIC BLOOD PRESSURE: 80 MMHG | HEIGHT: 66 IN | BODY MASS INDEX: 39.51 KG/M2

## 2023-11-16 DIAGNOSIS — M35.9 UNDIFFERENTIATED CONNECTIVE TISSUE DISEASE: Primary | ICD-10-CM

## 2023-11-16 DIAGNOSIS — M65.4 DE QUERVAIN'S TENOSYNOVITIS, RIGHT: ICD-10-CM

## 2023-11-16 DIAGNOSIS — H20.9 UVEITIS: ICD-10-CM

## 2023-11-16 DIAGNOSIS — Z79.899 HIGH RISK MEDICATION USE: ICD-10-CM

## 2023-11-16 DIAGNOSIS — D84.821 IMMUNOCOMPROMISED STATE DUE TO DRUG THERAPY: ICD-10-CM

## 2023-11-16 DIAGNOSIS — Z51.81 MEDICATION MONITORING ENCOUNTER: ICD-10-CM

## 2023-11-16 DIAGNOSIS — S83.282A TEAR OF LATERAL MENISCUS OF LEFT KNEE, CURRENT, UNSPECIFIED TEAR TYPE, INITIAL ENCOUNTER: ICD-10-CM

## 2023-11-16 DIAGNOSIS — Z79.899 IMMUNOCOMPROMISED STATE DUE TO DRUG THERAPY: ICD-10-CM

## 2023-11-16 PROCEDURE — 3044F PR MOST RECENT HEMOGLOBIN A1C LEVEL <7.0%: ICD-10-PCS | Mod: CPTII,S$GLB,, | Performed by: PHYSICIAN ASSISTANT

## 2023-11-16 PROCEDURE — 99999 PR PBB SHADOW E&M-EST. PATIENT-LVL IV: ICD-10-PCS | Mod: PBBFAC,,, | Performed by: PHYSICIAN ASSISTANT

## 2023-11-16 PROCEDURE — 3008F PR BODY MASS INDEX (BMI) DOCUMENTED: ICD-10-PCS | Mod: CPTII,S$GLB,, | Performed by: PHYSICIAN ASSISTANT

## 2023-11-16 PROCEDURE — 99214 OFFICE O/P EST MOD 30 MIN: CPT | Mod: S$GLB,,, | Performed by: PHYSICIAN ASSISTANT

## 2023-11-16 PROCEDURE — 99214 PR OFFICE/OUTPT VISIT, EST, LEVL IV, 30-39 MIN: ICD-10-PCS | Mod: S$GLB,,, | Performed by: PHYSICIAN ASSISTANT

## 2023-11-16 PROCEDURE — 3077F SYST BP >= 140 MM HG: CPT | Mod: CPTII,S$GLB,, | Performed by: PHYSICIAN ASSISTANT

## 2023-11-16 PROCEDURE — 3077F PR MOST RECENT SYSTOLIC BLOOD PRESSURE >= 140 MM HG: ICD-10-PCS | Mod: CPTII,S$GLB,, | Performed by: PHYSICIAN ASSISTANT

## 2023-11-16 PROCEDURE — 1160F RVW MEDS BY RX/DR IN RCRD: CPT | Mod: CPTII,S$GLB,, | Performed by: PHYSICIAN ASSISTANT

## 2023-11-16 PROCEDURE — 3079F PR MOST RECENT DIASTOLIC BLOOD PRESSURE 80-89 MM HG: ICD-10-PCS | Mod: CPTII,S$GLB,, | Performed by: PHYSICIAN ASSISTANT

## 2023-11-16 PROCEDURE — 3008F BODY MASS INDEX DOCD: CPT | Mod: CPTII,S$GLB,, | Performed by: PHYSICIAN ASSISTANT

## 2023-11-16 PROCEDURE — 3044F HG A1C LEVEL LT 7.0%: CPT | Mod: CPTII,S$GLB,, | Performed by: PHYSICIAN ASSISTANT

## 2023-11-16 PROCEDURE — 1160F PR REVIEW ALL MEDS BY PRESCRIBER/CLIN PHARMACIST DOCUMENTED: ICD-10-PCS | Mod: CPTII,S$GLB,, | Performed by: PHYSICIAN ASSISTANT

## 2023-11-16 PROCEDURE — 3079F DIAST BP 80-89 MM HG: CPT | Mod: CPTII,S$GLB,, | Performed by: PHYSICIAN ASSISTANT

## 2023-11-16 PROCEDURE — 1159F MED LIST DOCD IN RCRD: CPT | Mod: CPTII,S$GLB,, | Performed by: PHYSICIAN ASSISTANT

## 2023-11-16 PROCEDURE — 1159F PR MEDICATION LIST DOCUMENTED IN MEDICAL RECORD: ICD-10-PCS | Mod: CPTII,S$GLB,, | Performed by: PHYSICIAN ASSISTANT

## 2023-11-16 PROCEDURE — 99999 PR PBB SHADOW E&M-EST. PATIENT-LVL IV: CPT | Mod: PBBFAC,,, | Performed by: PHYSICIAN ASSISTANT

## 2023-11-16 RX ORDER — HYDROXYCHLOROQUINE SULFATE 200 MG/1
200 TABLET, FILM COATED ORAL 2 TIMES DAILY
Qty: 60 TABLET | Refills: 5 | Status: SHIPPED | OUTPATIENT
Start: 2023-11-16

## 2023-11-16 NOTE — PATIENT INSTRUCTIONS
Biotene/oasis products - OTC for dry mouth  Sugar free mints (Xylimelts) also OTC  Refresh/systane drops for eyes

## 2023-11-16 NOTE — PROGRESS NOTES
Subjective:      Patient ID: Karla Arzola is a 63 y.o. female.    Chief Complaint: Positive DALI    HPI   Karla Arzola  is a 63 y.o. female seen for f/ur +DALI, joint pain and elevated inflammatory markers.  Jt pain did not improve significantly w PDN trial.  Main c/o is the left knee.  She has a meniscus tear and is planning to have the knee scoped.  Pain largely mechanical in nature.  She has failed  corticosteroid injections as well as Euflexxa injections and PT.      Also with right hand pain.  She has EMG nerve conduction study later today to evaluate for carpal tunnel syndrome.  She does have numbness and tingling in her fingers.  She also has had wrist pain from de Quervain tenosynovitis.  This improved with a corticosteroid injection with Dr. Alexis.  It is bothering her more now.    Now c/o both xerostomia and xero-ophthalmia.  Additionally she is complaining of mild photosensitivity.  She also has history of uveitis treated by Dr. Lafluer.  Has not had any recurrence since her last visit with me about 6 months ago.  Overall the eye is feeling much better.  She states the redness has resolved.  She has no eye pain no visual disturbances.  We never initiated immunosuppressive therapy for the anterior uveitis.    She has dyspnea on exertion of walking more than about 5-10 minutes.  Started after she had covid in 2020.  Chest x-ray previously essentially normal.  She missed pulmonology appointment previously.  Has not called to reschedule.    Patient denies fevers, chills, photosensitivity, eye pain, chest pain, hematuria, blood in the stool, rash, sicca symptoms, raynauds, finger/oral/nasal ulcerations, LAD, dyshagia.  Rheumatologic systems otherwise negative.    Serologies/Labs:  (+) DALI 1:320 homogenous, neg profile  Neg RF, CCP  HLA B 27 negative  Current Treatment:  na  Previous Treatment:   Pred forte drops eyes      Current Outpatient Medications:     acyclovir (ZOVIRAX) 200 MG capsule,  Take 1 capsule (200 mg total) by mouth once daily., Disp: 90 capsule, Rfl: 3    albuterol (VENTOLIN HFA) 90 mcg/actuation inhaler, Inhale 2 puffs into the lungs every 6 (six) hours as needed for Wheezing. Rescue, Disp: 8.5 g, Rfl: 0    amLODIPine (NORVASC) 10 MG tablet, Take 1 tablet (10 mg total) by mouth once daily., Disp: 90 tablet, Rfl: 1    azelaic acid (FINACEA) 15 % Foam, Apply 1 application topically 2 (two) times daily., Disp: 50 g, Rfl: 2    ciclopirox (PENLAC) 8 % Soln, Apply to nails once daily, Disp: 6.6 mL, Rfl: 2    citalopram (CELEXA) 20 MG tablet, Take 1 tablet (20 mg total) by mouth once daily., Disp: 90 tablet, Rfl: 3    ergocalciferol (VITAMIN D2) 50,000 unit Cap, Take 1 capsule (50,000 Units total) by mouth every 7 days., Disp: 12 capsule, Rfl: 0    fluticasone propionate (FLONASE) 50 mcg/actuation nasal spray, 1 spray (50 mcg total) by Each Nostril route once daily., Disp: 16 g, Rfl: 0    furosemide (LASIX) 20 MG tablet, Take 1 tablet (20 mg total) by mouth daily as needed (swelling)., Disp: 60 tablet, Rfl: 1    HYDROcodone-acetaminophen (NORCO) 5-325 mg per tablet, Take 1 tablet by mouth every 8 (eight) hours as needed for pain for up to 10 doses, Disp: 10 tablet, Rfl: 0    ipratropium-albuteroL (COMBIVENT)  mcg/actuation inhaler, Inhale 1 puff into the lungs every 6 (six) hours as needed for Wheezing or Shortness of Breath. Rescue, Disp: 4 g, Rfl: 0    methocarbamoL (ROBAXIN) 500 MG Tab, Take 1 tablet (500 mg total) by mouth nightly as needed., Disp: 30 tablet, Rfl: 0    methylPREDNISolone (MEDROL DOSEPACK) 4 mg tablet, Take 1 tablet (4 mg total) by mouth once daily. Use as instructed on dose pack, Disp: 21 tablet, Rfl: 0    multivitamin capsule, Take 1 capsule by mouth once daily., Disp: , Rfl:     naproxen (NAPROSYN) 500 MG tablet, Take 1 tablet (500 mg total) by mouth 2 (two) times daily., Disp: 180 tablet, Rfl: 1    semaglutide (RYBELSUS) 3 mg tablet, Take 1 tablet (3 mg total) by  mouth once daily., Disp: 30 tablet, Rfl: 0    hydroxychloroquine (PLAQUENIL) 200 mg tablet, Take 1 tablet (200 mg total) by mouth 2 (two) times daily., Disp: 60 tablet, Rfl: 5    Past Medical History:   Diagnosis Date    Allergy     Anxiety     Hypertension     Obesity     Vitamin B 12 deficiency      Family History   Problem Relation Age of Onset    Diabetes Mother     Hypertension Mother     Stroke Mother     Hypertension Father     Hyperlipidemia Father     Kidney disease Father      Social History     Socioeconomic History    Marital status: Single   Occupational History     Employer: [employer]   Tobacco Use    Smoking status: Never     Passive exposure: Never    Smokeless tobacco: Never   Substance and Sexual Activity    Alcohol use: Yes     Comment: socially     Drug use: No    Sexual activity: Not Currently     Partners: Male     Birth control/protection: Abstinence   Other Topics Concern    Are you pregnant or think you may be? No    Breast-feeding No     Social Determinants of Health     Financial Resource Strain: Medium Risk (2/24/2023)    Overall Financial Resource Strain (CARDIA)     Difficulty of Paying Living Expenses: Somewhat hard   Food Insecurity: Food Insecurity Present (2/24/2023)    Hunger Vital Sign     Worried About Running Out of Food in the Last Year: Sometimes true     Ran Out of Food in the Last Year: Never true   Transportation Needs: No Transportation Needs (2/24/2023)    PRAPARE - Transportation     Lack of Transportation (Medical): No     Lack of Transportation (Non-Medical): No   Recent Concern: Transportation Needs - Unmet Transportation Needs (12/2/2022)    PRAPARE - Transportation     Lack of Transportation (Medical): No     Lack of Transportation (Non-Medical): Yes   Physical Activity: Inactive (2/24/2023)    Exercise Vital Sign     Days of Exercise per Week: 0 days     Minutes of Exercise per Session: 0 min   Stress: Stress Concern Present (2/24/2023)    [place]  "[employer] - Occupational Stress Questionnaire     Feeling of Stress : To some extent   Social Connections: Unknown (2/24/2023)    Social Connection and Isolation Panel [NHANES]     Frequency of Communication with Friends and Family: Once a week     Frequency of Social Gatherings with Friends and Family: Patient refused     Active Member of Clubs or Organizations: Yes     Attends Club or Organization Meetings: 1 to 4 times per year     Marital Status:    Housing Stability: High Risk (2/24/2023)    Housing Stability Vital Sign     Unable to Pay for Housing in the Last Year: Yes     Number of Places Lived in the Last Year: 1     Unstable Housing in the Last Year: No     Review of patient's allergies indicates:  No Known Allergies    Objective:   BP (!) 140/80   Pulse 92   Ht 5' 6" (1.676 m)   Wt 111.5 kg (245 lb 13 oz)   LMP 06/11/2015   BMI 39.68 kg/m²   Immunization History   Administered Date(s) Administered    COVID-19, MRNA, LN-S, PF (Pfizer) (Purple Cap) 01/11/2021, 02/01/2021, 01/14/2022    Influenza 10/12/2007, 10/10/2008, 09/24/2009, 10/29/2010    Influenza (FLUAD) - Quadrivalent - Adjuvanted - PF *Preferred* (65+) 09/26/2022    Influenza - Quadrivalent 10/07/2015, 09/30/2016    Influenza - Quadrivalent - PF *Preferred* (6 months and older) 09/28/2017, 09/27/2018, 09/25/2019, 11/17/2020, 09/23/2021, 10/18/2023    Influenza - Trivalent (ADULT) 10/12/2007, 10/10/2008, 09/24/2009, 10/29/2010    Influenza A (H1N1) 2009 Monovalent - IM 10/27/2009    Tdap 11/01/2007, 04/14/2011    Zoster Recombinant 12/01/2021       Physical Exam   Constitutional: She is oriented to person, place, and time. No distress.   HENT:   Head: Normocephalic and atraumatic.   Pulmonary/Chest: Effort normal.   Abdominal: She exhibits no distension.   Musculoskeletal:         General: No swelling or tenderness. Normal range of motion.      Cervical back: Normal range of motion.   Lymphadenopathy:     She has " no cervical adenopathy.   Neurological: She is alert and oriented to person, place, and time.   Skin: Skin is warm and dry. No rash noted.   Psychiatric: Mood normal.   Nursing note and vitals reviewed.     No synovitis, dactylitis, enthesitis    Left knee  LUIS  No effusion  TTP MJL, LJL, MFC  Right hand  Neg finklesteins  min ttp 1st dorsl comp  NVI        Recent Results (from the past 672 hour(s))   CBC Auto Differential    Collection Time: 11/09/23  8:11 AM   Result Value Ref Range    WBC 6.57 3.90 - 12.70 K/uL    RBC 4.92 4.00 - 5.40 M/uL    Hemoglobin 14.5 12.0 - 16.0 g/dL    Hematocrit 45.9 37.0 - 48.5 %    MCV 93 82 - 98 fL    MCH 29.5 27.0 - 31.0 pg    MCHC 31.6 (L) 32.0 - 36.0 g/dL    RDW 13.5 11.5 - 14.5 %    Platelets 247 150 - 450 K/uL    MPV 11.0 9.2 - 12.9 fL    Immature Granulocytes 0.2 0.0 - 0.5 %    Gran # (ANC) 3.7 1.8 - 7.7 K/uL    Immature Grans (Abs) 0.01 0.00 - 0.04 K/uL    Lymph # 2.1 1.0 - 4.8 K/uL    Mono # 0.6 0.3 - 1.0 K/uL    Eos # 0.2 0.0 - 0.5 K/uL    Baso # 0.02 0.00 - 0.20 K/uL    nRBC 0 0 /100 WBC    Gran % 55.7 38.0 - 73.0 %    Lymph % 31.8 18.0 - 48.0 %    Mono % 9.0 4.0 - 15.0 %    Eosinophil % 3.0 0.0 - 8.0 %    Basophil % 0.3 0.0 - 1.9 %    Differential Method Automated    Comprehensive Metabolic Panel    Collection Time: 11/09/23  8:11 AM   Result Value Ref Range    Sodium 144 136 - 145 mmol/L    Potassium 4.5 3.5 - 5.1 mmol/L    Chloride 108 95 - 110 mmol/L    CO2 26 23 - 29 mmol/L    Glucose 106 70 - 110 mg/dL    BUN 15 8 - 23 mg/dL    Creatinine 1.0 0.5 - 1.4 mg/dL    Calcium 9.1 8.7 - 10.5 mg/dL    Total Protein 6.7 6.0 - 8.4 g/dL    Albumin 3.6 3.5 - 5.2 g/dL    Total Bilirubin 0.4 0.1 - 1.0 mg/dL    Alkaline Phosphatase 97 55 - 135 U/L    AST 13 10 - 40 U/L    ALT 14 10 - 44 U/L    eGFR >60 >60 mL/min/1.73 m^2    Anion Gap 10 8 - 16 mmol/L   Sedimentation rate    Collection Time: 11/09/23  8:11 AM   Result Value Ref Range    Sed Rate 7 0 - 36 mm/Hr   C-Reactive Protein     Collection Time: 11/09/23  8:11 AM   Result Value Ref Range    CRP 11.5 (H) 0.0 - 8.2 mg/L   DALI Screen w/Reflex    Collection Time: 11/09/23  8:11 AM   Result Value Ref Range    DALI Screen Positive (A) Negative <1:80   DALI Profile    Collection Time: 11/09/23  8:11 AM   Result Value Ref Range    Anti Sm Antibody 0.15 0.00 - 0.99 Ratio    Anti-Sm Interpretation Negative Negative    Anti-SSA Antibody 0.06 0.00 - 0.99 Ratio    Anti-SSA Interpretation Negative Negative    Anti-SSB Antibody 0.06 0.00 - 0.99 Ratio    Anti-SSB Interpretation Negative Negative    ds DNA Ab Negative 1:10 Negative 1:10    Anti Sm/RNP Antibody 0.12 0.00 - 0.99 Ratio    Anti-Sm/RNP Interpretation Negative Negative   DALI Pattern 2    Collection Time: 11/09/23  8:11 AM   Result Value Ref Range    DALI Pattern 2 Speckled    DALI Titer 1    Collection Time: 11/09/23  8:11 AM   Result Value Ref Range    DALI Titer 1 1:1280    DALI Pattern 1    Collection Time: 11/09/23  8:11 AM   Result Value Ref Range    DALI PATTERN 1 Homogeneous    DALI Titer 2    Collection Time: 11/09/23  8:11 AM   Result Value Ref Range    DALI Titer 2 1:1280      Recent Results (from the past 1008 hour(s))   CBC Auto Differential    Collection Time: 11/09/23  8:11 AM   Result Value Ref Range    WBC 6.57 3.90 - 12.70 K/uL    RBC 4.92 4.00 - 5.40 M/uL    Hemoglobin 14.5 12.0 - 16.0 g/dL    Hematocrit 45.9 37.0 - 48.5 %    MCV 93 82 - 98 fL    MCH 29.5 27.0 - 31.0 pg    MCHC 31.6 (L) 32.0 - 36.0 g/dL    RDW 13.5 11.5 - 14.5 %    Platelets 247 150 - 450 K/uL    MPV 11.0 9.2 - 12.9 fL    Immature Granulocytes 0.2 0.0 - 0.5 %    Gran # (ANC) 3.7 1.8 - 7.7 K/uL    Immature Grans (Abs) 0.01 0.00 - 0.04 K/uL    Lymph # 2.1 1.0 - 4.8 K/uL    Mono # 0.6 0.3 - 1.0 K/uL    Eos # 0.2 0.0 - 0.5 K/uL    Baso # 0.02 0.00 - 0.20 K/uL    nRBC 0 0 /100 WBC    Gran % 55.7 38.0 - 73.0 %    Lymph % 31.8 18.0 - 48.0 %    Mono % 9.0 4.0 - 15.0 %    Eosinophil % 3.0 0.0 - 8.0 %    Basophil % 0.3 0.0 - 1.9 %  "   Differential Method Automated    Comprehensive Metabolic Panel    Collection Time: 11/09/23  8:11 AM   Result Value Ref Range    Sodium 144 136 - 145 mmol/L    Potassium 4.5 3.5 - 5.1 mmol/L    Chloride 108 95 - 110 mmol/L    CO2 26 23 - 29 mmol/L    Glucose 106 70 - 110 mg/dL    BUN 15 8 - 23 mg/dL    Creatinine 1.0 0.5 - 1.4 mg/dL    Calcium 9.1 8.7 - 10.5 mg/dL    Total Protein 6.7 6.0 - 8.4 g/dL    Albumin 3.6 3.5 - 5.2 g/dL    Total Bilirubin 0.4 0.1 - 1.0 mg/dL    Alkaline Phosphatase 97 55 - 135 U/L    AST 13 10 - 40 U/L    ALT 14 10 - 44 U/L    eGFR >60 >60 mL/min/1.73 m^2    Anion Gap 10 8 - 16 mmol/L   Sedimentation rate    Collection Time: 11/09/23  8:11 AM   Result Value Ref Range    Sed Rate 7 0 - 36 mm/Hr   C-Reactive Protein    Collection Time: 11/09/23  8:11 AM   Result Value Ref Range    CRP 11.5 (H) 0.0 - 8.2 mg/L   DALI Screen w/Reflex    Collection Time: 11/09/23  8:11 AM   Result Value Ref Range    DALI Screen Positive (A) Negative <1:80   DALI Profile    Collection Time: 11/09/23  8:11 AM   Result Value Ref Range    Anti Sm Antibody 0.15 0.00 - 0.99 Ratio    Anti-Sm Interpretation Negative Negative    Anti-SSA Antibody 0.06 0.00 - 0.99 Ratio    Anti-SSA Interpretation Negative Negative    Anti-SSB Antibody 0.06 0.00 - 0.99 Ratio    Anti-SSB Interpretation Negative Negative    ds DNA Ab Negative 1:10 Negative 1:10    Anti Sm/RNP Antibody 0.12 0.00 - 0.99 Ratio    Anti-Sm/RNP Interpretation Negative Negative   DALI Pattern 2    Collection Time: 11/09/23  8:11 AM   Result Value Ref Range    DALI Pattern 2 Speckled    DALI Titer 1    Collection Time: 11/09/23  8:11 AM   Result Value Ref Range    DALI Titer 1 1:1280    DALI Pattern 1    Collection Time: 11/09/23  8:11 AM   Result Value Ref Range    DALI PATTERN 1 Homogeneous    DALI Titer 2    Collection Time: 11/09/23  8:11 AM   Result Value Ref Range    DALI Titer 2 1:1280           No results found for: "TBGOLDPLUS"   Lab Results   Component Value Date "    HEPAIGM Negative 10/27/2009    HEPBIGM Negative 10/27/2009    HEPCAB Negative 10/27/2009      Imaging  I have personally reviewed images and reports as below.  I agree with the interpretation.  X-Ray Chest PA And Lateral  Order: [id number]  Status: Final result       Visible to patient: Yes (seen)       Next appt: 11/21/2023 at 03:30 PM in Outpatient Rehab ([facility], [facility])       Dx: Cough, unspecified type    0 Result Notes       1 Patient Communication  Details    Reading Physician Reading Date Result Priority   Rolando Meadows MD  [phone] 1/25/2023      Narrative & Impression  EXAM: XR CHEST PA AND LATERAL     CLINICAL HISTORY:   [R05.9]-Cough, unspecified.     COMPARISON:  06/16/2022.     FINDINGS:  2 view chest.  Mediastinal silhouette is within normal limits.  The lungs are clear.  No pneumothorax or pleural effusion.  No acute osseous finding.        Impression:     No acute finding in the chest.     Finalized on: 1/25/2023 1:43 PM By:  Rolando Meadows MD  [facility]# [id number]      2023-01-25 13:45:[id number]    [facility]         Assessment:     1. Undifferentiated connective tissue disease    2. Tear of lateral meniscus of left knee, current, unspecified tear type, initial encounter    3. De Quervain's tenosynovitis, right    4. Uveitis    5. High risk medication use    6. Immunocompromised state due to drug therapy    7. Medication monitoring encounter          Plan:     Karla was seen today for positive harjinder.    Diagnoses and all orders for this visit:    Undifferentiated connective tissue disease  -     hydroxychloroquine (PLAQUENIL) 200 mg tablet; Take 1 tablet (200 mg total) by mouth 2 (two) times daily.  -     Anti-DNA Ab, Double-Stranded; Standing  -     Protein/Creatinine Ratio, Urine; Standing  -     C4 Complement; Standing  -     C3 Complement; Standing  -     Urinalysis Microscopic; Standing    Tear of lateral meniscus of left knee, current, unspecified tear type, initial encounter    De Quervain's  tenosynovitis, right    Uveitis    High risk medication use    Immunocompromised state due to drug therapy    Medication monitoring encounter      + DALI with complaints of mild photosensitivity and sicca syndrome consistent with undifferentiated connective tissue disease  Start  mg bid  Discussed risks and benefits  Recommend baseline eye exam in 3-6 months and then yearly thereafter  Recommend sunscreen when she is outside  Discussed conservative measures for sicca send thumbs.  Consider addition of pilocarpine if no improvement at follow-up.  Left knee pain and right thumb pain  No improvement w PDN trial  F/u ortho for Lat meniscus tear left knee  F/u Dr. Alexis for de quervain's tenosynovitis  H/o anterior uveitis  Recommend f/u ophtho for any increase in erythema and/or eye pain to be evaled for recurrence  No need to add MTX at this time  Drug therapy requiring intensive monitoring for toxicity  High Risk Medication Monitoring encounter  No current medication related issues, no evidence of toxicity  I ordered labs for toxicity monitoring, have personally reviewed the findings, and discussed them with the patient.  Pending labs will be sent via the portal  Compromised immune system secondary to autoimmune disease and/or use of immunosuppressive drugs.  Monitor carefully for infections.  Advised patient to get immediate medical care if any infection arises.  Also advised strict adherence age-appropriate vaccinations and cancer screenings with PCP.  Patient advised to hold DMARD and/or biologic therapy for signs of infection or for surgery. If you are unsure what to do please call our office for instruction.[facility] [phone]  Return to clinic: 6 mos w SLE labs prior    Follow up in about 4 months (around 3/16/2024).    The patient understands, chooses and consents to this plan and accepts all the risks which include but are not limited to the risks mentioned above.     Disclaimer:  This note was prepared using a voice recognition system and is likely to have sound alike errors within the text.

## 2023-11-28 ENCOUNTER — OFFICE VISIT (OUTPATIENT)
Dept: ORTHOPEDICS | Facility: CLINIC | Age: 63
End: 2023-11-28
Payer: COMMERCIAL

## 2023-11-28 VITALS — BODY MASS INDEX: 39.37 KG/M2 | WEIGHT: 245 LBS | HEIGHT: 66 IN

## 2023-11-28 DIAGNOSIS — G56.01 CARPAL TUNNEL SYNDROME OF RIGHT WRIST: Primary | ICD-10-CM

## 2023-11-28 DIAGNOSIS — M65.4 RADIAL STYLOID TENOSYNOVITIS (DE QUERVAIN): ICD-10-CM

## 2023-11-28 PROCEDURE — 99999 PR PBB SHADOW E&M-EST. PATIENT-LVL III: ICD-10-PCS | Mod: PBBFAC,,, | Performed by: ORTHOPAEDIC SURGERY

## 2023-11-28 PROCEDURE — 3008F PR BODY MASS INDEX (BMI) DOCUMENTED: ICD-10-PCS | Mod: CPTII,S$GLB,, | Performed by: ORTHOPAEDIC SURGERY

## 2023-11-28 PROCEDURE — 20550 NJX 1 TENDON SHEATH/LIGAMENT: CPT | Mod: 51,RT,S$GLB, | Performed by: ORTHOPAEDIC SURGERY

## 2023-11-28 PROCEDURE — 20526 THER INJECTION CARP TUNNEL: CPT | Mod: 59,RT,S$GLB, | Performed by: ORTHOPAEDIC SURGERY

## 2023-11-28 PROCEDURE — 20526 CARPAL TUNNEL: ICD-10-PCS | Mod: 59,RT,S$GLB, | Performed by: ORTHOPAEDIC SURGERY

## 2023-11-28 PROCEDURE — 99999 PR PBB SHADOW E&M-EST. PATIENT-LVL III: CPT | Mod: PBBFAC,,, | Performed by: ORTHOPAEDIC SURGERY

## 2023-11-28 PROCEDURE — 3008F BODY MASS INDEX DOCD: CPT | Mod: CPTII,S$GLB,, | Performed by: ORTHOPAEDIC SURGERY

## 2023-11-28 PROCEDURE — 1159F MED LIST DOCD IN RCRD: CPT | Mod: CPTII,S$GLB,, | Performed by: ORTHOPAEDIC SURGERY

## 2023-11-28 PROCEDURE — 99213 OFFICE O/P EST LOW 20 MIN: CPT | Mod: 25,S$GLB,, | Performed by: ORTHOPAEDIC SURGERY

## 2023-11-28 PROCEDURE — 1159F PR MEDICATION LIST DOCUMENTED IN MEDICAL RECORD: ICD-10-PCS | Mod: CPTII,S$GLB,, | Performed by: ORTHOPAEDIC SURGERY

## 2023-11-28 PROCEDURE — 99213 PR OFFICE/OUTPT VISIT, EST, LEVL III, 20-29 MIN: ICD-10-PCS | Mod: 25,S$GLB,, | Performed by: ORTHOPAEDIC SURGERY

## 2023-11-28 PROCEDURE — 1160F PR REVIEW ALL MEDS BY PRESCRIBER/CLIN PHARMACIST DOCUMENTED: ICD-10-PCS | Mod: CPTII,S$GLB,, | Performed by: ORTHOPAEDIC SURGERY

## 2023-11-28 PROCEDURE — 1160F RVW MEDS BY RX/DR IN RCRD: CPT | Mod: CPTII,S$GLB,, | Performed by: ORTHOPAEDIC SURGERY

## 2023-11-28 PROCEDURE — 3044F HG A1C LEVEL LT 7.0%: CPT | Mod: CPTII,S$GLB,, | Performed by: ORTHOPAEDIC SURGERY

## 2023-11-28 PROCEDURE — 20550 TENDON SHEATH: ICD-10-PCS | Mod: 51,RT,S$GLB, | Performed by: ORTHOPAEDIC SURGERY

## 2023-11-28 PROCEDURE — 3044F PR MOST RECENT HEMOGLOBIN A1C LEVEL <7.0%: ICD-10-PCS | Mod: CPTII,S$GLB,, | Performed by: ORTHOPAEDIC SURGERY

## 2023-11-28 RX ORDER — TRIAMCINOLONE ACETONIDE 40 MG/ML
40 INJECTION, SUSPENSION INTRA-ARTICULAR; INTRAMUSCULAR
Status: DISCONTINUED | OUTPATIENT
Start: 2023-11-28 | End: 2023-11-28 | Stop reason: HOSPADM

## 2023-11-28 RX ADMIN — TRIAMCINOLONE ACETONIDE 40 MG: 40 INJECTION, SUSPENSION INTRA-ARTICULAR; INTRAMUSCULAR at 07:11

## 2023-11-28 NOTE — PROGRESS NOTES
Subjective:     Patient ID: Karla Arzola is a 63 y.o. female.    Chief Complaint: Pain and Numbness of the Right Wrist      HPI:  The patient is a 63-year-old female with right de Quervain tendonitis and bilateral carpal tunnel syndrome documented by nerve conduction studies.  She was last injected 05/23/2023 with good results until recently and requests reinjection today    Past Medical History:   Diagnosis Date    Allergy     Anxiety     Hypertension     Obesity     Vitamin B 12 deficiency      Past Surgical History:   Procedure Laterality Date    BREAST BIOPSY Right 10/08/2021    MOUTH SURGERY      right knee scope      TUBAL LIGATION       Family History   Problem Relation Age of Onset    Diabetes Mother     Hypertension Mother     Stroke Mother     Hypertension Father     Hyperlipidemia Father     Kidney disease Father      Social History     Socioeconomic History    Marital status: Single   Occupational History     Employer: [employer]   Tobacco Use    Smoking status: Never     Passive exposure: Never    Smokeless tobacco: Never   Substance and Sexual Activity    Alcohol use: Yes     Comment: socially     Drug use: No    Sexual activity: Not Currently     Partners: Male     Birth control/protection: Abstinence   Other Topics Concern    Are you pregnant or think you may be? No    Breast-feeding No     Social Determinants of Health     Financial Resource Strain: Medium Risk (2/24/2023)    Overall Financial Resource Strain (CARDIA)     Difficulty of Paying Living Expenses: Somewhat hard   Food Insecurity: Food Insecurity Present (2/24/2023)    Hunger Vital Sign     Worried About Running Out of Food in the Last Year: Sometimes true     Ran Out of Food in the Last Year: Never true   Transportation Needs: No Transportation Needs (2/24/2023)    PRAPARE - Transportation     Lack of Transportation (Medical): No     Lack of Transportation (Non-Medical): No   Recent Concern: Transportation Needs -  Unmet Transportation Needs (12/2/2022)    PRAPARE - Transportation     Lack of Transportation (Medical): No     Lack of Transportation (Non-Medical): Yes   Physical Activity: Inactive (2/24/2023)    Exercise Vital Sign     Days of Exercise per Week: 0 days     Minutes of Exercise per Session: 0 min   Stress: Stress Concern Present (2/24/2023)    [place] [employer] - Occupational Stress Questionnaire     Feeling of Stress : To some extent   Social Connections: Unknown (2/24/2023)    Social Connection and Isolation Panel [NHANES]     Frequency of Communication with Friends and Family: Once a week     Frequency of Social Gatherings with Friends and Family: Patient refused     Active Member of Clubs or Organizations: Yes     Attends Club or Organization Meetings: 1 to 4 times per year     Marital Status:    Housing Stability: High Risk (2/24/2023)    Housing Stability Vital Sign     Unable to Pay for Housing in the Last Year: Yes     Number of Places Lived in the Last Year: 1     Unstable Housing in the Last Year: No     Medication List with Changes/Refills   Current Medications    ACYCLOVIR (ZOVIRAX) 200 MG CAPSULE    Take 1 capsule (200 mg total) by mouth once daily.    ALBUTEROL (VENTOLIN HFA) 90 MCG/ACTUATION INHALER    Inhale 2 puffs into the lungs every 6 (six) hours as needed for Wheezing. Rescue    AMLODIPINE (NORVASC) 10 MG TABLET    Take 1 tablet (10 mg total) by mouth once daily.    AZELAIC ACID (FINACEA) 15 % FOAM    Apply 1 application topically 2 (two) times daily.    CICLOPIROX (PENLAC) 8 % SOLN    Apply to nails once daily    CITALOPRAM (CELEXA) 20 MG TABLET    Take 1 tablet (20 mg total) by mouth once daily.    ERGOCALCIFEROL (VITAMIN D2) 50,000 UNIT CAP    Take 1 capsule (50,000 Units total) by mouth every 7 days.    FLUTICASONE PROPIONATE (FLONASE) 50 MCG/ACTUATION NASAL SPRAY    1 spray (50 mcg total) by Each Nostril route once daily.    FUROSEMIDE (LASIX) 20 MG TABLET     Take 1 tablet (20 mg total) by mouth daily as needed (swelling).    HYDROCODONE-ACETAMINOPHEN (NORCO) 5-325 MG PER TABLET    Take 1 tablet by mouth every 8 (eight) hours as needed for pain for up to 10 doses    HYDROXYCHLOROQUINE (PLAQUENIL) 200 MG TABLET    Take 1 tablet (200 mg total) by mouth 2 (two) times daily.    IPRATROPIUM-ALBUTEROL (COMBIVENT)  MCG/ACTUATION INHALER    Inhale 1 puff into the lungs every 6 (six) hours as needed for Wheezing or Shortness of Breath. Rescue    METHOCARBAMOL (ROBAXIN) 500 MG TAB    Take 1 tablet (500 mg total) by mouth nightly as needed.    METHYLPREDNISOLONE (MEDROL DOSEPACK) 4 MG TABLET    Take 1 tablet (4 mg total) by mouth once daily. Use as instructed on dose pack    MULTIVITAMIN CAPSULE    Take 1 capsule by mouth once daily.    NAPROXEN (NAPROSYN) 500 MG TABLET    Take 1 tablet (500 mg total) by mouth 2 (two) times daily.    SEMAGLUTIDE (RYBELSUS) 3 MG TABLET    Take 1 tablet (3 mg total) by mouth once daily.     Review of patient's allergies indicates:  No Known Allergies  Review of Systems   Constitutional: Negative for malaise/fatigue.   HENT:  Negative for hearing loss.    Eyes:  Negative for double vision and visual disturbance.   Cardiovascular:  Negative for chest pain.   Respiratory:  Negative for shortness of breath.    Endocrine: Negative for cold intolerance.   Hematologic/Lymphatic: Does not bruise/bleed easily.   Skin:  Negative for poor wound healing and suspicious lesions.   Musculoskeletal:  Negative for gout, joint pain and joint swelling.   Gastrointestinal:  Negative for nausea and vomiting.   Genitourinary:  Negative for dysuria.   Neurological:  Positive for loss of balance, numbness, paresthesias and sensory change.   Psychiatric/Behavioral:  Negative for depression, memory loss and substance abuse. The patient is not nervous/anxious.    Allergic/Immunologic: Negative for persistent infections.       Objective:   Body mass index is 39.54  kg/m².  There were no vitals filed for this visit.             General    Constitutional: She is oriented to person, place, and time. She appears well-developed and well-nourished. No distress.   HENT:   Head: Normocephalic.   Eyes: EOM are normal.   Pulmonary/Chest: Effort normal.   Neurological: She is oriented to person, place, and time.   Psychiatric: She has a normal mood and affect.             Right Hand/Wrist Exam     Inspection   Scars: Wrist - absent Hand -  absent  Effusion: Wrist - absent Hand -  absent    Pain   Wrist - The patient exhibits pain of the extensory musculature and flexor/pronator group.    Tests   Phalens sign: positive  Tinel's sign (median nerve): positive  Finkelstein's test: positive  Carpal Tunnel Compression Test: positive    Atrophy   Thenar:  negative  Intrinsic:  negative    Other     Neuorologic Exam    Median Distribution: abnormal  Ulnar Distribution: normal  Radial Distribution: normal    Comments:  The patient has a positive Tinel and positive Phalen sign.  There is no thenar atrophy noted.  She has tenderness about the 1st dorsal extensor tendon compartment with a positive Finkelstein test          Vascular Exam       Capillary Refill  Right Hand: normal capillary refill           radiographs were not obtained today  Assessment:     Encounter Diagnoses   Name Primary?    Carpal tunnel syndrome of right wrist Yes    Radial styloid tenosynovitis (de quervain)         Plan:     The patient was injected right carpal tunnel with 1 cc of Kenalog and 1 cc of 2% plain lidocaine in the right wrist 1st dorsal extensor tendon compartment with 0.5 cc of Kenalog and 0.5 cc of 2% plain lidocaine.  She will wait at least 3 months between injections.                Disclaimer: This note was prepared using a voice recognition system and is likely to have sound alike errors within the text.

## 2023-11-28 NOTE — PROCEDURES
Tendon Sheath    Date/Time: 11/28/2023 7:15 AM    Performed by: Stan Alexis MD  Authorized by: Stan Alexis MD    Consent Done?:  Yes (Verbal)  Indications:  Pain  Local anesthetic:  Lidocaine 2% without epinephrine  Anesthetic total (ml):  0.5    Location:  Wrist  Site:  R first doral compartment  Ultrasonic guidance for needle placement?: No    Needle size:  25 G  Approach:  Radial  Medications:  40 mg triamcinolone acetonide 40 mg/mL

## 2023-11-28 NOTE — PROCEDURES
Carpal Tunnel    Date/Time: 11/28/2023 7:15 AM    Performed by: Stan Alexis MD  Authorized by: Stan Alexis MD    Consent Done?:  Yes (Verbal)  Indications:  Pain  Timeout: prior to procedure the correct patient, procedure, and site was verified    Prep: patient was prepped and draped in usual sterile fashion      Local anesthesia used?: Yes    Local anesthetic:  Lidocaine 2% without epinephrine  Anesthetic total (ml):  1    Location:  Wrist  Site:  R carpal tunnel  Ultrasonic Guidance for Needle Placement?: No    Needle size:  25 G  Approach:  Volar  Medications:  40 mg triamcinolone acetonide 40 mg/mL

## 2023-11-29 ENCOUNTER — CLINICAL SUPPORT (OUTPATIENT)
Dept: REHABILITATION | Facility: HOSPITAL | Age: 63
End: 2023-11-29
Payer: COMMERCIAL

## 2023-11-29 ENCOUNTER — NUTRITION (OUTPATIENT)
Dept: INTERNAL MEDICINE | Facility: CLINIC | Age: 63
End: 2023-11-29
Payer: COMMERCIAL

## 2023-11-29 DIAGNOSIS — M25.60 DECREASED RANGE OF MOTION: Primary | ICD-10-CM

## 2023-11-29 DIAGNOSIS — I10 PRIMARY HYPERTENSION: ICD-10-CM

## 2023-11-29 DIAGNOSIS — Z74.09 DECREASED STRENGTH, ENDURANCE, AND MOBILITY: ICD-10-CM

## 2023-11-29 DIAGNOSIS — R26.89 BALANCE PROBLEM: ICD-10-CM

## 2023-11-29 DIAGNOSIS — Z71.3 PRE-BARIATRIC SURGERY NUTRITION EVALUATION: Primary | ICD-10-CM

## 2023-11-29 DIAGNOSIS — R52 PAIN: ICD-10-CM

## 2023-11-29 DIAGNOSIS — R53.1 DECREASED STRENGTH, ENDURANCE, AND MOBILITY: ICD-10-CM

## 2023-11-29 DIAGNOSIS — R68.89 DECREASED STRENGTH, ENDURANCE, AND MOBILITY: ICD-10-CM

## 2023-11-29 DIAGNOSIS — M19.90 OSTEOARTHRITIS, UNSPECIFIED OSTEOARTHRITIS TYPE, UNSPECIFIED SITE: ICD-10-CM

## 2023-11-29 DIAGNOSIS — Z71.3 DIETARY COUNSELING: ICD-10-CM

## 2023-11-29 PROCEDURE — 97803 PR MED NUTR THER, SUBSQ, INDIV, EA 15 MIN: ICD-10-PCS | Mod: S$GLB,,, | Performed by: DIETITIAN, REGISTERED

## 2023-11-29 PROCEDURE — 97110 THERAPEUTIC EXERCISES: CPT | Mod: CQ

## 2023-11-29 PROCEDURE — 97112 NEUROMUSCULAR REEDUCATION: CPT | Mod: CQ

## 2023-11-29 PROCEDURE — 97803 MED NUTRITION INDIV SUBSEQ: CPT | Mod: S$GLB,,, | Performed by: DIETITIAN, REGISTERED

## 2023-11-29 NOTE — PROGRESS NOTES
NUTRITION NOTE    Referring Physician: Dr. Lezama  Reason for MNT Referral: Medically Supervised Diet pending Gastric Sleeve    PAST MEDICAL HISTORY:    Patient presents for 5th visit (of 6) for MSD with weight loss over the past month; total weight loss by making numerous dietary and lifestyle changes.    Reports doing better with diet over the last month.  No longer eating late at night.  Dinner is typically consumed prior to 7pm.  Continues to not drink any sodas.  Switched to sugar free tea.   Alcohol intake remains reduced. Consumed at most twice weekly.  Began incorporating protein shakes (premier protein) into diet 3x/week for breakfast.    Exercise: moving around more + using a stepper machine// physical therapy once weekly now, surgery in January for knee        Past Medical History:   Diagnosis Date    Allergy     Anxiety     Hypertension     Obesity     Vitamin B 12 deficiency        CLINICAL DATA:  63 y.o. female.    There were no vitals filed for this visit.    Current Weight: 242.8 lbs   10-: 245.7 lbs              2023: 236.5 lbs              2023: 239.8 lbs              2023: 241 lbs  Weight Change Since Initial Visit: +1.8 lbs  Ideal Body Weight: 175 lbs +/-10%  BMI: 39.3    CURRENT DIET:  Regular diet.  Diet Recall: Food records are present.    Diet Includes:   Meal Pattern: Same.  Protein Supplements: 0-1 per day.  Snackin-1  Vegetables: several days weekly  Fruits: 2-3 times per week  Beverages: water, coffee, herbal tea made with Splenda, sugar free tea   Dining Out: reduced  Cooking at home: Daily    CURRENT EXERCISE:  Fair    Vitamins / Minerals / Herbs:   Multivitamin, B12, Vitamin D, collagen with biotin (nail and hair health), calcium     Food Allergies:   NKFA     Social:  Works regular daytime shifts.  Alcohol: at most twice weekly   Smoking: None.    ASSESSMENT:  Patient demonstrates some willingness to change lifestyle habits as evidenced by dietary changes,  including protein drinks, and healthier cooking at home.    Doing fairly well with working on greatest challenges (portion control).    Adequate calorie intake.  Adequate protein intake.    PLAN:    Diet: Adjust diet plan.   Continue with previous efforts.   Reduce/eliminate intake of daily caffeine.     Exercise: Increase. As able.     Behavior Modification: Continue to document food & activity logs daily.      Return to clinic in one month.    SESSION TIME:  30 minutes

## 2023-11-29 NOTE — PROGRESS NOTES
[facility]   Physical Therapy Treatment Note       Name: Karla Arzola  Clinic Number: [id number]    Therapy Diagnosis:   Encounter Diagnoses   Name Primary?    Decreased range of motion Yes    Decreased strength, endurance, and mobility     Pain     Balance problem            Physician: Mara Akbar PA-C    Visit Date: 11/29/2023    Physician Orders: PT Eval and Treat  Medical Diagnosis from Referral: Old complex tear of lateral meniscus of left knee; Primary osteoarthritis of left knee  Evaluation Date: 9/13/2023  Authorization Period Expiration: 12/21/2023  Plan of Care Expiration: 12/13/23  Progress Note Due: 12/09/2023  Visit # / Visits authorized: 10/12 (+1)  FOTO: 3/3 (last performed on 11/9/2023)  PTA Visit #: 2/5      Precautions: Standard, Fall, and HTN  Pre-op strengthening    Time In: 1:50 pm  Time Out: 2:30 pm  Total Billable Time: 40 minutes      SUBJECTIVE     Pt reports: she is feeling pretty good. She is having intermittent pain, nothing constant.     She was compliant with home exercise program.  Response to previous treatment: no change  Functional change: recent fall and in boot    Pain: 4/10  Location: left knee      OBJECTIVE     Objective Measures updated at progress report unless specified.        Range of Motion:       TREATMENT     Karla received the treatments listed below:      Karla received therapeutic exercises to develop strength, endurance, ROM, flexibility, and core stabilization for 25 minutes including:    Intervention 11/29/2023  Parameters   Nustep x 8 min, L3   LAQ x 8x/ 3 sets #3   Leg press x #55 10x/ 3 sets (foot plate 8, seat 3)  35# single leg 2x10 each   Knee flexion with strap  10x (pillowcase on foot) 10 sec hold   Hamstring stretch x Step 3x30s   Knee extension prop   Supine 4#, 5 min   Knee flexion stretch at step x 3x30s each bilateral   Re-assessment                           Karla participated in neuromuscular re-education  activities to improve: Balance, Coordination, Proprioception, and Posture for 15 minutes. The following activities were included:    Intervention 11/29/2023  Parameters   tailgaiters x 2 min 3#   bridge x 10x/ 3 set 10#KB   Sidelying hip series  Clamshells 10x/ 2 sets  Abduction 10x  Circles CW/CCW 10x each   Prone hip extension with knee flex  10x each   Prone hip extension x 10x/2 sets each LE alternating   Standing TKE  Maroon cook band, 5 sec hold 3 min   Step ups x 3x10 each bilateral 6 inch   Sit to stand  Airex in chair 3x10                        PATIENT EDUCATION AND HOME EXERCISES     Home Exercises Provided and Patient Education Provided     Education provided:   - home exercise program     Written Home Exercises Provided: Patient instructed to cont prior HEP. Exercises were reviewed and Karla was able to demonstrate them prior to the end of the session.  Karla demonstrated good  understanding of the education provided. See EMR under Patient Instructions for exercises provided during therapy sessions    ASSESSMENT     Patient did well with session today with no complaints of discomfort. Single leg press added today, patient with good performance. Some cracking/popping noises noted in right knee with step ups, no pain accompanied. Patient left session with minimal soreness.     Karla Is progressing well towards her goals.   Pt prognosis is Good.     Pt will continue to benefit from skilled outpatient physical therapy to address the deficits listed in the problem list box on initial evaluation, provide pt/family education and to maximize pt's level of independence in the home and community environment.     Pt's spiritual, cultural and educational needs considered and pt agreeable to plan of care and goals.     Anticipated barriers to physical therapy:  co-morbidities, sedentary lifestyle, chronicity of condition, adherence to treatment plan, lack of support system, and coping style    Goals:   Reviewed:  11/29/2023       Short Term Goals: In 4 weeks  Progress Date   1.Patient to be educated on HEP. [x] Progressing  [] MET   [] Not   [] Not tested  11/9/2023   2.Patient to increase Knee extension range of motion by 10 degrees, in order to improve available range of motion for ADL's.  [] Progressing  [x] MET   [] Not MET  [] Not tested  10/19/23   3.Patient to increase bilateral LE strength by 1/2 grade, in order to improve endurance and increase ability to perform all functional activities for increased time.  [] Progressing  [x] MET   [] Not MET  [] Not tested  10/19/23   4.Patient to have pain less than 5/10 at worst, to improve QOL. [] Progressing  [x] MET   [] Not MET  [] Not tested  10/19/23   5.Patient to improve score on the FOTO, to improve QOL. [] Progressing  [x] MET   [] Not MET  [] Not tested  10/19/23   6. Patient to improve score on single leg balance in order to decrease fall risk. [] Progressing  [] MET   [] Not MET  [x] Not tested  11/9/2023      Long Term Goals: In 8 weeks Progress Date   1.Patient to improve score on the FOTO predicted score or better, to improve QOL. [] Progressing  [x] MET   [] Not MET  [] Not tested  11/9/2023   2. Patient to increase bilateral LE strength to 4+/5 or greater, in order to improve endurance and increase ability to perform all functional activities for increased time. [] Progressing  [x] MET   [] Not MET  [] Not tested  11/9/2023   3. Patient to have decreased pain to 2/10 at worst, to improve QOL. [x] Progressing  [] MET   [] Not MET  [] Not tested     4. Patient to normalize score on single leg balance, in order to improve endurance and decrease fall risk. [x] Progressing  [] MET   [] Not MET  [] Not tested  11/9/2023   5. Patient to perform daily activities including walking during normal work activities without increased symptoms. [x] Progressing  [] MET   [] Not MET  [] Not tested  11/9/2023        PLAN     Monitor response to today's treatment session and  progress with Physical Therapy plan of care as indicated.    Updated Certification Period: 11/9/23 to 12/13/23   Recommended Treatment Plan: 2 times per week for 5-6 weeks:  Aquatic Therapy, Electrical Stimulation PRN, Gait Training, Manual Therapy, Moist Heat/ Ice, Neuromuscular Re-ed, Patient Education, Self Care, Therapeutic Activities, and Therapeutic Exercise    Padma Walden, PTA

## 2023-12-04 ENCOUNTER — PATIENT MESSAGE (OUTPATIENT)
Dept: SPORTS MEDICINE | Facility: CLINIC | Age: 63
End: 2023-12-04
Payer: COMMERCIAL

## 2023-12-04 ENCOUNTER — TELEPHONE (OUTPATIENT)
Dept: SPORTS MEDICINE | Facility: CLINIC | Age: 63
End: 2023-12-04
Payer: COMMERCIAL

## 2023-12-04 DIAGNOSIS — Z01.818 PREOPERATIVE TESTING: Primary | ICD-10-CM

## 2023-12-05 ENCOUNTER — HOSPITAL ENCOUNTER (OUTPATIENT)
Dept: CARDIOLOGY | Facility: HOSPITAL | Age: 63
Discharge: HOME | End: 2023-12-05
Attending: STUDENT IN AN ORGANIZED HEALTH CARE EDUCATION/TRAINING PROGRAM
Payer: COMMERCIAL

## 2023-12-05 DIAGNOSIS — Z01.818 PREOPERATIVE TESTING: ICD-10-CM

## 2023-12-05 PROCEDURE — 93005 ELECTROCARDIOGRAM TRACING: CPT

## 2023-12-05 PROCEDURE — 93010 EKG 12-LEAD: ICD-10-PCS | Mod: ,,, | Performed by: INTERNAL MEDICINE

## 2023-12-05 PROCEDURE — 93010 ELECTROCARDIOGRAM REPORT: CPT | Mod: ,,, | Performed by: INTERNAL MEDICINE

## 2023-12-06 ENCOUNTER — CLINICAL SUPPORT (OUTPATIENT)
Dept: REHABILITATION | Facility: HOSPITAL | Age: 63
End: 2023-12-06
Payer: COMMERCIAL

## 2023-12-06 DIAGNOSIS — R26.89 BALANCE PROBLEM: ICD-10-CM

## 2023-12-06 DIAGNOSIS — R52 PAIN: ICD-10-CM

## 2023-12-06 DIAGNOSIS — R53.1 DECREASED STRENGTH, ENDURANCE, AND MOBILITY: ICD-10-CM

## 2023-12-06 DIAGNOSIS — Z74.09 DECREASED STRENGTH, ENDURANCE, AND MOBILITY: ICD-10-CM

## 2023-12-06 DIAGNOSIS — M25.60 DECREASED RANGE OF MOTION: Primary | ICD-10-CM

## 2023-12-06 DIAGNOSIS — R68.89 DECREASED STRENGTH, ENDURANCE, AND MOBILITY: ICD-10-CM

## 2023-12-06 PROCEDURE — 97112 NEUROMUSCULAR REEDUCATION: CPT

## 2023-12-06 PROCEDURE — 97110 THERAPEUTIC EXERCISES: CPT

## 2023-12-06 NOTE — PROGRESS NOTES
[facility]   Physical Therapy Treatment Note       Name: Karla Arzola  Clinic Number: [id number]    Therapy Diagnosis:   Encounter Diagnoses   Name Primary?    Decreased range of motion Yes    Decreased strength, endurance, and mobility     Pain     Balance problem        Physician: Mara Akbar PA-C    Visit Date: 12/6/2023    Physician Orders: PT Eval and Treat  Medical Diagnosis from Referral: Old complex tear of lateral meniscus of left knee; Primary osteoarthritis of left knee  Evaluation Date: 9/13/2023  Authorization Period Expiration: 12/21/2023  Plan of Care Expiration: 12/13/23  Progress Note Due: 12/09/2023  Visit # / Visits authorized: 11/12 (+1)  FOTO: 3/3 (last performed on 11/9/2023)  PTA Visit #: 0/5      Precautions: Standard, Fall, and HTN  Pre-op strengthening    Time In: 1:03 pm  Time Out: 1:45 pm  Total Billable Time: 40 minutes      SUBJECTIVE     Pt reports: she is scheduled for surgery for her knee in January. She is feeling pretty good today. Increased knee pain after single leg leg press today.     She was compliant with home exercise program.  Response to previous treatment: no change  Functional change: recent fall and in boot    Pain: 4/10  Location: left knee      OBJECTIVE     Objective Measures updated at progress report unless specified.        Range of Motion:       TREATMENT     Karla received the treatments listed below:      Karla received therapeutic exercises to develop strength, endurance, ROM, flexibility, and core stabilization for 23 minutes including:    Intervention 12/6/2023  Parameters   Nustep  8 min, L3   BIKE (recumbent) x 8 min, L1   LAQ x 8x/ 3 sets #3   Leg press x #55 10x/ 3 sets (foot plate 8, seat 3)  35# single leg 3x10 each   Knee flexion with strap  10x (pillowcase on foot) 10 sec hold   Hamstring stretch  Step 3x30s   Knee extension prop   Supine 4#, 5 min   Knee flexion stretch at step - 3x30s each bilateral  "  Re-assessment                           Karla participated in neuromuscular re-education activities to improve: Balance, Coordination, Proprioception, and Posture for 17 minutes. The following activities were included:    Intervention 12/6/2023  Parameters   tailgaiters x 2 min 0#   bridge x 10x/ 2 set 0#KB   Sidelying hip series x  x  x Clamshells 10x/ 1 sets  Abduction 10x  Circles CW/CCW 10x each   Prone hip extension with knee flex x 10x each   Prone hip extension  10x/2 sets each LE alternating   Standing TKE x Maroon cook band, 5 sec hold 3 min   Step ups - 3x10 each bilateral 6 inch   Sit to stand - Airex in chair 3x10                        PATIENT EDUCATION AND HOME EXERCISES     Home Exercises Provided and Patient Education Provided     Education provided:   - home exercise program     Written Home Exercises Provided: Patient instructed to cont prior HEP. Exercises were reviewed and Karla was able to demonstrate them prior to the end of the session.  Karla demonstrated good  understanding of the education provided. See EMR under Patient Instructions for exercises provided during therapy sessions    ASSESSMENT     Patient had decreased tolerance to single leg leg press today, will hold. She tolerated all other treatment well but required upper extremities support with TKE"s today. Encouraged ROM.     Karla Is progressing well towards her goals.   Pt prognosis is Good.     Pt will continue to benefit from skilled outpatient physical therapy to address the deficits listed in the problem list box on initial evaluation, provide pt/family education and to maximize pt's level of independence in the home and community environment.     Pt's spiritual, cultural and educational needs considered and pt agreeable to plan of care and goals.     Anticipated barriers to physical therapy:  co-morbidities, sedentary lifestyle, chronicity of condition, adherence to treatment plan, lack of support system, and coping " style    Goals:   Reviewed: 12/6/2023       Short Term Goals: In 4 weeks  Progress Date   1.Patient to be educated on HEP. [x] Progressing  [] MET   [] Not   [] Not tested  11/9/2023   2.Patient to increase Knee extension range of motion by 10 degrees, in order to improve available range of motion for ADL's.  [] Progressing  [x] MET   [] Not MET  [] Not tested  10/19/23   3.Patient to increase bilateral LE strength by 1/2 grade, in order to improve endurance and increase ability to perform all functional activities for increased time.  [] Progressing  [x] MET   [] Not MET  [] Not tested  10/19/23   4.Patient to have pain less than 5/10 at worst, to improve QOL. [] Progressing  [x] MET   [] Not MET  [] Not tested  10/19/23   5.Patient to improve score on the FOTO, to improve QOL. [] Progressing  [x] MET   [] Not MET  [] Not tested  10/19/23   6. Patient to improve score on single leg balance in order to decrease fall risk. [] Progressing  [] MET   [] Not MET  [x] Not tested  11/9/2023      Long Term Goals: In 8 weeks Progress Date   1.Patient to improve score on the FOTO predicted score or better, to improve QOL. [] Progressing  [x] MET   [] Not MET  [] Not tested  11/9/2023   2. Patient to increase bilateral LE strength to 4+/5 or greater, in order to improve endurance and increase ability to perform all functional activities for increased time. [] Progressing  [x] MET   [] Not MET  [] Not tested  11/9/2023   3. Patient to have decreased pain to 2/10 at worst, to improve QOL. [x] Progressing  [] MET   [] Not MET  [] Not tested     4. Patient to normalize score on single leg balance, in order to improve endurance and decrease fall risk. [x] Progressing  [] MET   [] Not MET  [] Not tested  11/9/2023   5. Patient to perform daily activities including walking during normal work activities without increased symptoms. [x] Progressing  [] MET   [] Not MET  [] Not tested  11/9/2023        PLAN     Monitor response to  today's treatment session and progress with Physical Therapy plan of care as indicated.    Updated Certification Period: 11/9/23 to 12/13/23   Recommended Treatment Plan: 2 times per week for 5-6 weeks:  Aquatic Therapy, Electrical Stimulation PRN, Gait Training, Manual Therapy, Moist Heat/ Ice, Neuromuscular Re-ed, Patient Education, Self Care, Therapeutic Activities, and Therapeutic Exercise    Alebrtina Valverde, PT

## 2023-12-12 ENCOUNTER — PATIENT MESSAGE (OUTPATIENT)
Dept: SPORTS MEDICINE | Facility: CLINIC | Age: 63
End: 2023-12-12
Payer: COMMERCIAL

## 2023-12-13 ENCOUNTER — CLINICAL SUPPORT (OUTPATIENT)
Dept: REHABILITATION | Facility: HOSPITAL | Age: 63
End: 2023-12-13
Payer: COMMERCIAL

## 2023-12-13 DIAGNOSIS — R52 PAIN: ICD-10-CM

## 2023-12-13 DIAGNOSIS — Z74.09 DECREASED STRENGTH, ENDURANCE, AND MOBILITY: ICD-10-CM

## 2023-12-13 DIAGNOSIS — R68.89 DECREASED STRENGTH, ENDURANCE, AND MOBILITY: ICD-10-CM

## 2023-12-13 DIAGNOSIS — R53.1 DECREASED STRENGTH, ENDURANCE, AND MOBILITY: ICD-10-CM

## 2023-12-13 DIAGNOSIS — M25.60 DECREASED RANGE OF MOTION: Primary | ICD-10-CM

## 2023-12-13 DIAGNOSIS — R26.89 BALANCE PROBLEM: ICD-10-CM

## 2023-12-13 PROCEDURE — 97110 THERAPEUTIC EXERCISES: CPT

## 2023-12-13 PROCEDURE — 97112 NEUROMUSCULAR REEDUCATION: CPT

## 2023-12-13 NOTE — PROGRESS NOTES
[facility]   Physical Therapy Treatment Note       Name: Karla Arzola  Clinic Number: [id number]    Therapy Diagnosis:   Encounter Diagnoses   Name Primary?    Decreased range of motion Yes    Decreased strength, endurance, and mobility     Pain     Balance problem        Physician: Mara Akbar PA-C    Visit Date: 12/13/2023    Physician Orders: PT Eval and Treat  Medical Diagnosis from Referral: Old complex tear of lateral meniscus of left knee; Primary osteoarthritis of left knee  Evaluation Date: 9/13/2023  Authorization Period Expiration: 12/21/2023  Plan of Care Expiration: 12/13/23  Progress Note Due: 12/09/2023  Visit # / Visits authorized: 12/12 (+1)  FOTO: 3/3 (last performed on 11/9/2023)  PTA Visit #: 0/5      Precautions: Standard, Fall, and HTN  Pre-op strengthening    Time In: 1:03 pm  Time Out: 1:45 pm  Total Billable Time: 40 minutes      SUBJECTIVE     Pt reports: she is scheduled for surgery for her knee in January. She is feeling pretty good today. Increased knee pain after single leg leg press today.     She was compliant with home exercise program.  Response to previous treatment: no change  Functional change: recent fall and in boot    Pain: 3/10  Location: left knee      OBJECTIVE     Objective Measures updated at progress report unless specified.      Balance  Right   (seconds) Left  (seconds) Norms 12/13/23   Single Leg Stance 8 sec 5-6 sec Less than 4.9 sec high risk  5-6.4 sec increased risk  6.5 or greater low risk Right = 12 sec  Left =13 sec         Functional Tests  Outcome Norms 12/13/23   Five Time Sit to Stand Defer due to pain 60s: <11.4 sec  70s: <12.6 sec  80s: 14.8 sec  Greater than 14 sec high risk  12.1-14 sec increased risk  12 sec or less low risk 25.38 sec          Range of Motion:     SITTING IN CHAIR/supine:  Knee AROM/PROM Right Left Left  11/9/23 Left  12/13/23   Knee Flexion (135º)sitting/supine 110 70/70 Supine = 120  130  supine   Knee Extension (0º) sitting/supine -10 -30/-16  Supine = (-6)  Sitting= (-5) -5 sitting  -3 supine       Strength:     L/E MMT Right  (spine) Left Pain/Dysfunction with Movement Left  11/9/23 Left  12/13/23   Hip Flexion  3+/5 2+/5   4 4+/5   Knee Extension 4+/5 3+/5 Pain on left  4+ 5/5   Knee Flexion 4+/5 4-/5 Pain on left  4+ 5/5   Hip IR 4/5 4/5 Pain on left  4+ 4+/5   Hip ER 4/5 4/5 Pain on left  4+ 4+/5   Ankle DF 4+/5 4+/5   5 5/5   Ankle PF 4+/5 4+/5   5 5/5      FOTO:          TREATMENT     Karla received the treatments listed below:      Karla received therapeutic exercises to develop strength, endurance, ROM, flexibility, and core stabilization for 28 minutes including:    Intervention 12/13/2023  Parameters   Nustep  8 min, L3   BIKE (recumbent) x 9 min, L1   LAQ x 8x/ 3 sets #3   Leg press x #55 10x/ 3 sets (foot plate 8, seat 3)  35# single leg 3x10 each   Knee flexion with strap  10x (pillowcase on foot) 10 sec hold   Hamstring stretch  Step 3x30s   Knee extension prop   Supine 4#, 5 min   Knee flexion stretch at step - 3x30s each bilateral   Re-assessment x                          Karla participated in neuromuscular re-education activities to improve: Balance, Coordination, Proprioception, and Posture for 12 minutes. The following activities were included:    Intervention 12/13/2023  Parameters   tailgaiters x 2 min no weight, 2 min 3#   bridge x 10x/ 2 set 0#KB   Sidelying hip series -  -  - Clamshells 10x/ 1 sets  Abduction 10x  Circles CW/CCW 10x each   Prone hip extension with knee flex x 10x each   Prone hip extension  10x/2 sets each LE alternating   Standing TKE x Maroon cook band, 5 sec hold 3 min   Step ups - 3x10 each bilateral 6 inch   Sit to stand - Airex in chair 3x10                        PATIENT EDUCATION AND HOME EXERCISES     Home Exercises Provided and Patient Education Provided     Education provided:   - home exercise program     Written Home Exercises Provided: Patient  instructed to cont prior HEP. Exercises were reviewed and Karla was able to demonstrate them prior to the end of the session.  Karla demonstrated good  understanding of the education provided. See EMR under Patient Instructions for exercises provided during therapy sessions    ASSESSMENT     Patient has made good progress since evaluation, however she is showing some signs of plateau with FOTO and objective measurements. Issued home exercise program and encouraged participation with all activities until surgery, discussed post surgical home exercise program: quadriceps sets, heel slides.    Karla Is progressing well towards her goals.   Pt prognosis is Good.     Pt will continue to benefit from skilled outpatient physical therapy to address the deficits listed in the problem list box on initial evaluation, provide pt/family education and to maximize pt's level of independence in the home and community environment.     Pt's spiritual, cultural and educational needs considered and pt agreeable to plan of care and goals.     Anticipated barriers to physical therapy:  co-morbidities, sedentary lifestyle, chronicity of condition, adherence to treatment plan, lack of support system, and coping style    Goals:   Reviewed: 12/13/2023       Short Term Goals: In 4 weeks  Progress Date   1.Patient to be educated on HEP. [] Progressing  [x] MET   [] Not   [] Not tested  12/13/2023   2.Patient to increase Knee extension range of motion by 10 degrees, in order to improve available range of motion for ADL's.  [] Progressing  [x] MET   [] Not MET  [] Not tested  10/19/23   3.Patient to increase bilateral LE strength by 1/2 grade, in order to improve endurance and increase ability to perform all functional activities for increased time.  [] Progressing  [x] MET   [] Not MET  [] Not tested  10/19/23   4.Patient to have pain less than 5/10 at worst, to improve QOL. [] Progressing  [x] MET   [] Not MET  [] Not tested  10/19/23    5.Patient to improve score on the FOTO, to improve QOL. [] Progressing  [x] MET   [] Not MET  [] Not tested  10/19/23   6. Patient to improve score on single leg balance in order to decrease fall risk. [] Progressing  [x] MET   [] Not MET  [] Not tested  12/13/2023      Long Term Goals: In 8 weeks Progress Date   1.Patient to improve score on the FOTO predicted score or better, to improve QOL. [] Progressing  [x] MET   [] Not MET  [] Not tested  11/9/2023   2. Patient to increase bilateral LE strength to 4+/5 or greater, in order to improve endurance and increase ability to perform all functional activities for increased time. [] Progressing  [x] MET   [] Not MET  [] Not tested  11/9/2023   3. Patient to have decreased pain to 2/10 at worst, to improve QOL. [] Progressing  [] MET   [x] Not MET  [] Not tested  12/13/2023   4. Patient to normalize score on single leg balance, in order to improve endurance and decrease fall risk. [x] Progressing  [] MET   [] Not MET  [] Not tested  12/13/2023   5. Patient to perform daily activities including walking during normal work activities without increased symptoms. [x] Progressing  [] MET   [] Not MET  [] Not tested  12/13/2023        PLAN     DC to self care with home exercise program.     Albertina Valverde, PT

## 2023-12-17 PROBLEM — R26.89 BALANCE PROBLEM: Status: RESOLVED | Noted: 2023-09-16 | Resolved: 2023-12-13

## 2023-12-17 PROBLEM — M25.60 DECREASED RANGE OF MOTION: Status: RESOLVED | Noted: 2023-09-16 | Resolved: 2023-12-13

## 2023-12-17 PROBLEM — R68.89 DECREASED STRENGTH, ENDURANCE, AND MOBILITY: Status: RESOLVED | Noted: 2023-09-16 | Resolved: 2023-12-13

## 2023-12-17 PROBLEM — R52 PAIN: Status: RESOLVED | Noted: 2023-09-16 | Resolved: 2023-12-13

## 2023-12-17 PROBLEM — Z74.09 DECREASED STRENGTH, ENDURANCE, AND MOBILITY: Status: RESOLVED | Noted: 2023-09-16 | Resolved: 2023-12-13

## 2023-12-17 PROBLEM — R53.1 DECREASED STRENGTH, ENDURANCE, AND MOBILITY: Status: RESOLVED | Noted: 2023-09-16 | Resolved: 2023-12-13

## 2023-12-18 NOTE — PLAN OF CARE
[facility]  Physical Therapy Discharge Note    Name: Karla Arzola  Clinic Number: [id number]    Therapy Diagnosis:   Encounter Diagnoses   Name Primary?    Decreased range of motion Yes    Decreased strength, endurance, and mobility     Pain     Balance problem      Physician: Mara Akbar PA-C    Physician Orders: PT Eval and Treat  Medical Diagnosis from Referral: Old complex tear of lateral meniscus of left knee; Primary osteoarthritis of left knee  Evaluation Date: 9/13/2023      Date of Last visit: 12/13/2023   Total Visits Received: 13    ASSESSMENT      See daily note    Discharge reason: Other:  Patient will be having TKR, has been issued home exercise program in preparation.    Discharge FOTO Score: see daily note    Goals: see daily note    PLAN   This patient is discharged from Physical Therapy      Albertina Valverde PT

## 2023-12-19 ENCOUNTER — PATIENT MESSAGE (OUTPATIENT)
Dept: SPORTS MEDICINE | Facility: CLINIC | Age: 63
End: 2023-12-19
Payer: COMMERCIAL

## 2023-12-27 ENCOUNTER — PATIENT MESSAGE (OUTPATIENT)
Dept: INTERNAL MEDICINE | Facility: CLINIC | Age: 63
End: 2023-12-27

## 2023-12-27 ENCOUNTER — OFFICE VISIT (OUTPATIENT)
Dept: INTERNAL MEDICINE | Facility: CLINIC | Age: 63
End: 2023-12-27
Payer: COMMERCIAL

## 2023-12-27 VITALS
SYSTOLIC BLOOD PRESSURE: 152 MMHG | HEART RATE: 78 BPM | OXYGEN SATURATION: 99 % | RESPIRATION RATE: 14 BRPM | DIASTOLIC BLOOD PRESSURE: 70 MMHG | TEMPERATURE: 97 F

## 2023-12-27 DIAGNOSIS — E66.01 SEVERE OBESITY (BMI 35.0-39.9) WITH COMORBIDITY: ICD-10-CM

## 2023-12-27 DIAGNOSIS — F41.9 ANXIETY: ICD-10-CM

## 2023-12-27 DIAGNOSIS — H20.9 UVEITIS: ICD-10-CM

## 2023-12-27 DIAGNOSIS — M35.9 CONNECTIVE TISSUE DISORDER: ICD-10-CM

## 2023-12-27 DIAGNOSIS — Z01.818 PRE-OP EXAM: Primary | ICD-10-CM

## 2023-12-27 DIAGNOSIS — I10 PRIMARY HYPERTENSION: ICD-10-CM

## 2023-12-27 PROBLEM — S83.272A COMPLEX TEAR OF LATERAL MENISCUS OF LEFT KNEE: Status: ACTIVE | Noted: 2023-12-27

## 2023-12-27 PROCEDURE — 99999 PR PBB SHADOW E&M-EST. PATIENT-LVL V: CPT | Mod: PBBFAC,,,

## 2023-12-27 PROCEDURE — 99499 UNLISTED E&M SERVICE: CPT | Mod: S$GLB,,, | Performed by: ANESTHESIOLOGY

## 2023-12-27 PROCEDURE — 99999 PR PBB SHADOW E&M-EST. PATIENT-LVL V: ICD-10-PCS | Mod: PBBFAC,,,

## 2023-12-27 PROCEDURE — 99499 NO LOS: ICD-10-PCS | Mod: S$GLB,,, | Performed by: ANESTHESIOLOGY

## 2023-12-27 NOTE — ASSESSMENT & PLAN NOTE
Patient presents at the request of Dr. Adams who plans on performing a a L knee meniscectomy on 1/4/23  Known risk factors for perioperative complications:  HTN, BMI 39     Difficulty with intubation is anticipated. No airway hx for  review. Pt with large tongue, small mouth, have glidescope available.     Cardiac Risk Estimation:  Per Revised Cardiac Risk Index patient is a Class I risk with a 3.9% risk of a major cardiac event.      1.) Preoperative workup as follows: hemoglobin, hematocrit, electrolytes, creatinine, glucose, liver function studies.  2.) Change in medication regimen before surgery:  hold NSAIDS 7 days hols semaglutide 1 week pre op  .  3.) Prophylaxis for cardiac events with perioperative beta-blockers: not indicated.  4.) Invasive hemodynamic monitoring perioperatively: not indicated.  5.) Deep vein thrombosis prophylaxis postoperatively: intermittent pneumatic compression boots and regimen to be chosen by surgical team.  6.) Surveillance for postoperative MI with ECG immediately postoperatively and on postoperati ve days 1 and 2 AND troponin levels 24 hours postoperatively and on day 4 or hospital discharge (whichever comes first): not indicated.  7.) Current medications which may produce withdrawal symptoms if withheld perioperatively: none  8.) Other measures:  none

## 2023-12-27 NOTE — ASSESSMENT & PLAN NOTE
Affecting eyes, shoulders feet, toes  Followed by rheum   Recently started plaquenil, continue   - keep appropriate follow up

## 2023-12-27 NOTE — ASSESSMENT & PLAN NOTE
- mildly elevated in clinic , no meds yet this AM  systolic at home 130s  - continue home medications  - keep follow-up with primary care

## 2023-12-27 NOTE — PRE-PROCEDURE INSTRUCTIONS
To confirm, your doctor has instructed you: Surgery is scheduled for 1/4/2023.   Pre admit office will call the afternoon prior to surgery between 1PM and 3PM with arrival time.    Surgery will be at [facility],  The address is [address]. [place] [id number].      IMPORTANT INSTRUCTIONS!    Do not eat or drink after 12 midnight, including water. Do not smoke or use chewing tobacco after 12 midnight!  OK to brush teeth, but no gum, candy, or mints!      *Take only these medicines with a small swallow of water-morning of surgery*     amlodipine, celexa         ____ Stop taking all vitamins, herbal supplements, Aspirin, & NSAIDS (Ibuprofen, Advil, Aleve) 7 days prior to surgery, as these can thin your blood.    ____ Weight loss medication, such as Adipex and Phentermine, must be stopped 14 days prior to surgery, no exceptions!    *Diabetic Patients: If you take diabetic or weight loss medication, do NOT take morning of surgery unless instructed by Doctor. Metformin to be stopped 24 hrs prior to surgery. DO NOT take short-acting insulin the day of surgery. Only take HALF of your regular dose of long-acting insulin the night before surgery, unless instructed otherwise. Blood sugars will be checked in pre-op.   ~Ozempic/Mounjaro/Wegovy/Trulicity/Semaglutide injections must be stopped 7 days prior to surgery.     Please notify MD office if you develop an active infection, are prescribed antibiotics by someone other than the surgeon doing your surgery, or visit urgent care/ER.      Bathing Instructions:   The night before surgery and the morning prior to coming to the hospital:    - Shower & rinse your body as usual with anti-bacterial Soap (Dial or Lever 2000)   -Hibiclens (if indicated) use AFTER anti-bacterial soap; 1 packet PM/1 packet in AM on surgical site only   -Do not use hibiclens on your head, face, or genitals.    -Do not wash with anti-bacterial soap after you use the hibiclens.    -Do  not shave surgical site 5-7 days prior to surgery.    -Pubic hair 7 days prior to surgery (OBGYN/Urology only).       Additional Instructions:   __ No powder, lotions, creams, or body spray to skin   __ No deodorant if you are having: breast procedure, PORT, or upper shoulder surgery!   __ No nail polish or artificial nails       **SURGERY WILL BE CANCELLED IF ARTIFICIAL/NAIL POLISH IS PRESENT!!!**  __ Please remove all piercings and leave all jewelry at home.    **SURGERY WILL BE CANCELLED IF PIERCINGS ARE PRESENT!!!**    __ Dentures, Hearing Aids and Contact Lens need to be removed prior to the start of surgery.    __ Avoid Alcoholic beverages 3 days prior to surgery, as it can thin the blood, unless told otherwise by pre-admit department.  __ Females: may need to give a urine sample the morning of surgery;   **Please ask  for a specimen cup if you need to use the restroom prior to being called into pre-op.**  __ Males: Stop ED medications (Viagra, Cialis) 24 hrs prior to surgery.  __ Wear clean, loose-fitting clothing. Allow for dressings/bandages/surgical equipment   __ You must have transportation, and they MUST stay the entire time.   __  Bring photo ID and insurance information to \[facility]\""      [facility] Visitor/Ride Policy:   Only 1 adult allowed (over the age of 18) to accompany you and MUST STAY through the entire length of admission.     -Must have a ride home from a responsible adult that you know and trust.    -Medical Transport, Uber or Lyft can only be used if patient has a responsible adult to accompany them during ride home.    ~Your ride MUST STAY the entire time until you are discharged~        Post-Op Instructions: You will receive surgery post-op instructions by your Discharge Nurse prior to going home.   Surgical Site Infection:   Prevention of surgical site infections:   -Keep incisions clean and dry.   -Do not soak/submerge incisions in water until completely healed.   -Do not apply  lotions, powders, creams, or deodorants to site.   -Always make sure hands are cleaned with antibacterial soap/ alcohol-based  prior to touching the surgical site.       Signs and symptoms:               -Redness and pain around the area where you had surgery               -Drainage of cloudy fluid from your surgical wound               -Fever over 100.4 or chills     >>>Call Surgeon office/on-call Surgeon if you experience any of these signs & symptoms post-surgery @ [phone].    *If you are running late or have questions the morning of surgery before 7AM, please call the Pre-OP Department @ [phone].    *Please Call [facility] Pre-Admit Department for surgery instruction questions:  [phone]    *Payment questions:  [phone]    *Billing questions:  [phone]

## 2023-12-27 NOTE — PROGRESS NOTES
Preoperative History and Physical  [facility]                                                                   Chief Complaint: Preoperative evaluation     History of Present Illness:      Karla Arzola is a 63 y.o. female who presents to the office today for a preoperative consultation at the request of Dr. Adams who plans on performing L knee scope with meniscectomy on 1/4/23  o.     Functional Status:      The patient is not able to climb a flight of stairs due to knee pain, can climb a few stairs. . The patient is able to ambulate  without difficulty. The patient's functional status is affected by the surgical problem. The patient's functional status is not affected by shortness of breath, chest pain, dyspnea on exertion and fatigue.  Can exercise on mini elliptical 3 x weekly 15 min before knee pain no CP No SOB , can shop in grocery , do housework    MET score greater than 4    Patient Anesthesia History:      History of Malignant Hyperthermia: no  History of Pseudocholinesterase Deficiency: no  History PONV: no  History of difficult intubation: no  History of delayed emergence: no    Family Anesthesia History:      History of Malignant Hyperthermia: no  History of Pseudocholinesterase Deficiency: no     Past Medical History:      Past Medical History:   Diagnosis Date    Allergy     Anxiety     GERD (gastroesophageal reflux disease)     Hypertension     Obesity     Vitamin B 12 deficiency         Past Surgical History:      Past Surgical History:   Procedure Laterality Date    BREAST BIOPSY Right 10/08/2021    MOUTH SURGERY      right knee scope      TUBAL LIGATION          Social History:      Social History     Socioeconomic History    Marital status: Single   Occupational History     Employer: [employer]   Tobacco Use    Smoking status: Never     Passive exposure: Never    Smokeless tobacco: Never   Substance and Sexual Activity     Alcohol use: Yes     Comment: socially 1x weekly    Drug use: No    Sexual activity: Not Currently     Partners: Male     Birth control/protection: Abstinence   Other Topics Concern    Are you pregnant or think you may be? No    Breast-feeding No     Social Determinants of Health     Financial Resource Strain: Medium Risk (2/24/2023)    Overall Financial Resource Strain (CARDIA)     Difficulty of Paying Living Expenses: Somewhat hard   Food Insecurity: Food Insecurity Present (2/24/2023)    Hunger Vital Sign     Worried About Running Out of Food in the Last Year: Sometimes true     Ran Out of Food in the Last Year: Never true   Transportation Needs: No Transportation Needs (2/24/2023)    PRAPARE - Transportation     Lack of Transportation (Medical): No     Lack of Transportation (Non-Medical): No   Recent Concern: Transportation Needs - Unmet Transportation Needs (12/2/2022)    PRAPARE - Transportation     Lack of Transportation (Medical): No     Lack of Transportation (Non-Medical): Yes   Physical Activity: Inactive (2/24/2023)    Exercise Vital Sign     Days of Exercise per Week: 0 days     Minutes of Exercise per Session: 0 min   Stress: Stress Concern Present (2/24/2023)    [employer] - Occupational Stress Questionnaire     Feeling of Stress : To some extent   Social Connections: Unknown (2/24/2023)    Social Connection and Isolation Panel [NHANES]     Frequency of Communication with Friends and Family: Once a week     Frequency of Social Gatherings with Friends and Family: Patient declined     Active Member of Clubs or Organizations: Yes     Attends Club or Organization Meetings: 1 to 4 times per year     Marital Status:    Housing Stability: High Risk (2/24/2023)    Housing Stability Vital Sign     Unable to Pay for Housing in the Last Year: Yes     Number of Places Lived in the Last Year: 1     Unstable Housing in the Last Year: No        Family History:      Family  History   Problem Relation Age of Onset    Diabetes Mother     Hypertension Mother     Stroke Mother     Hypertension Father     Hyperlipidemia Father     Kidney disease Father     Hypertension Sister     Hypertension Brother     Heart failure Brother     No Known Problems Brother     No Known Problems Brother     Hypertension Maternal Grandmother     Lung cancer Maternal Grandfather        Allergies:      Review of patient's allergies indicates:  No Known Allergies    Medications:      Current Outpatient Medications   Medication Sig    acyclovir (ZOVIRAX) 200 MG capsule Take 1 capsule (200 mg total) by mouth once daily.    albuterol (VENTOLIN HFA) 90 mcg/actuation inhaler Inhale 2 puffs into the lungs every 6 (six) hours as needed for Wheezing. Rescue    amLODIPine (NORVASC) 10 MG tablet Take 1 tablet (10 mg total) by mouth once daily.    ciclopirox (PENLAC) 8 % Soln Apply to nails once daily    citalopram (CELEXA) 20 MG tablet Take 1 tablet (20 mg total) by mouth once daily.    ergocalciferol (VITAMIN D2) 50,000 unit Cap Take 1 capsule (50,000 Units total) by mouth every 7 days.    fluticasone propionate (FLONASE) 50 mcg/actuation nasal spray 1 spray (50 mcg total) by Each Nostril route once daily.    furosemide (LASIX) 20 MG tablet Take 1 tablet (20 mg total) by mouth daily as needed (swelling).    hydroxychloroquine (PLAQUENIL) 200 mg tablet Take 1 tablet (200 mg total) by mouth 2 (two) times daily.    multivitamin capsule Take 1 capsule by mouth once daily.    naproxen (NAPROSYN) 500 MG tablet Take 1 tablet (500 mg total) by mouth 2 (two) times daily.    azelaic acid (FINACEA) 15 % Foam Apply 1 application topically 2 (two) times daily. (Patient not taking: Reported on 12/27/2023)    HYDROcodone-acetaminophen (NORCO) 5-325 mg per tablet Take 1 tablet by mouth every 8 (eight) hours as needed for pain for up to 10 doses (Patient not taking: Reported on 12/27/2023)    ipratropium-albuteroL (COMBIVENT)   mcg/actuation inhaler Inhale 1 puff into the lungs every 6 (six) hours as needed for Wheezing or Shortness of Breath. Rescue (Patient not taking: Reported on 12/27/2023)    methocarbamoL (ROBAXIN) 500 MG Tab Take 1 tablet (500 mg total) by mouth nightly as needed. (Patient not taking: Reported on 12/27/2023)    methylPREDNISolone (MEDROL DOSEPACK) 4 mg tablet Take 1 tablet (4 mg total) by mouth once daily. Use as instructed on dose pack (Patient not taking: Reported on 12/27/2023)    semaglutide (RYBELSUS) 3 mg tablet Take 1 tablet (3 mg total) by mouth once daily. (Patient not taking: Reported on 12/27/2023)     No current facility-administered medications for this visit.       Vitals:      Vitals:    12/27/23 0759   BP: (!) 152/70   Pulse: 78   Resp: 14   Temp: 97.4 °F (36.3 °C)       Review of Systems:        Constitutional: Negative for fever, chills, weight loss, malaise/fatigue and diaphoresis.   HENT: Negative for hearing loss, ear pain, nosebleeds, congestion, sore throat, neck pain, tinnitus and ear discharge.    Eyes: Negative for blurred vision, double vision, photophobia, pain, discharge and redness.   Respiratory: Negative for , hemoptysis, sputum production, shortness of breath, wheezing and stridor.  Non productive, rare residual cough from URI 2 weeks ago   Cardiovascular: Negative for chest pain, palpitations, orthopnea, claudication, leg swelling and PND.   Gastrointestinal: Negative for , nausea, vomiting, abdominal pain, diarrhea, constipation, blood in stool and melena. + heartburn - does not wake pt at night , controlled on meds   Genitourinary: Negative for dysuria, urgency, frequency, hematuria and flank pain.   Musculoskeletal: Negative for myalgias.  + back pian, R shoulder pain , toe and foot pain   Skin: Negative for itching and rash.   Neurological: Negative for dizziness, tingling, tremors, sensory change, speech change, focal weakness, seizures, loss of consciousness, weakness and  "headaches. R hand numbness   Endo/Heme/Allergies: Negative for environmental allergies and polydipsia.   Psychiatric/Behavioral: Negative for depression, + anxiety     Physical Exam:      Constitutional: Appears well-developed, well-nourished and in no acute distress.  Patient is oriented to person, place, and time.   Head: Normocephalic and atraumatic. Mucous membranes moist.  Neck: Neck supple no mass.   Cardiovascular: Normal rate and regular rhythm.  S1 S2 appreciated by ascultation.  Pulmonary/Chest: Effort normal and clear to auscultation bilaterally. No respiratory distress.   Abdomen:  non-distended.   Neurological: Patient is alert and oriented to person, place and time. Moves all extremities.  Skin: Warm and dry. No lesions.  Extremities: No clubbing, cyanosis or edema.    Laboratory data:      Reviewed and noted in plan where applicable. Please see chart for full laboratory data.    @BVJYBAJYY77(cpk,cpkmb,troponini,mb)@ @FCLBUUJHZ19(poctglucose)@     No results found for: "INR", "PROTIME"    Lab Results   Component Value Date    WBC 12.18 2023    HGB 14.3 2023    HCT 45.1 2023    MCV 93 2023     2023       @EPRBEAMXR85(GLU,NA,K,Cl,CO2,BUN,Creatinine,Calcium,MG)@    Predictors of intubation difficulty:       Morbid obesity? yes - BMI 39.54   Anatomically abnormal facies? no   Prominent incisors? no   Receding mandible? no   Short, thick neck? yes - short   Neck range of motion: normal   Dentition:  upper and lower dentures  Based on the Modified Mallampati, patient is a mallampati score: III (soft and hard palate and base of uvula visible)    Cardiographics:      EC2023 NSR  Echocardiogram:  none    Imaging:      Chest x-ray:  none      Assessment and Plan:      Pre-op exam  Patient presents at the request of Dr. Adams who plans on performing a a L knee meniscectomy on 23  Known risk factors for perioperative complications:  HTN, BMI 39     Difficulty with " intubation is anticipated. No airway hx for  review. Pt with large tongue, small mouth, have glidescope available.     Cardiac Risk Estimation:  Per Revised Cardiac Risk Index patient is a Class I risk with a 3.9% risk of a major cardiac event.      1.) Preoperative workup as follows: hemoglobin, hematocrit, electrolytes, creatinine, glucose, liver function studies.  2.) Change in medication regimen before surgery:  hold NSAIDS 7 days hols semaglutide 1 week pre op  .  3.) Prophylaxis for cardiac events with perioperative beta-blockers: not indicated.  4.) Invasive hemodynamic monitoring perioperatively: not indicated.  5.) Deep vein thrombosis prophylaxis postoperatively: intermittent pneumatic compression boots and regimen to be chosen by surgical team.  6.) Surveillance for postoperative MI with ECG immediately postoperatively and on postoperati ve days 1 and 2 AND troponin levels 24 hours postoperatively and on day 4 or hospital discharge (whichever comes first): not indicated.  7.) Current medications which may produce withdrawal symptoms if withheld perioperatively: none  8.) Other measures:  none        Complex tear of lateral meniscus of left knee  Plans for surgical repair on 1/4/23     HTN (hypertension)  - mildly elevated in clinic , no meds yet this AM  systolic at home 130s  - continue home medications  - keep follow-up with primary care      Anxiety  - controlled on current therapies, continue  - keep follow up with PCP     Connective tissue disorder  Affecting eyes, shoulders feet, toes  Followed by rheum   Recently started plaquenil, continue   - keep appropriate follow up      Uveitis  - last flare January 2023  - no curretn therapy  - keep follow up with ophthalmology     Severe obesity (BMI 35.0-39.9) with comorbidity  BMI 39.54

## 2023-12-27 NOTE — DISCHARGE INSTRUCTIONS
To confirm, your doctor has instructed you: Surgery is scheduled for 1/4/2023.   Pre admit office will call the afternoon prior to surgery between 1PM and 3PM with arrival time.    Surgery will be at [facility],  The address is [address]. [place] [id number].      IMPORTANT INSTRUCTIONS!    Do not eat or drink after 12 midnight, including water. Do not smoke or use chewing tobacco after 12 midnight!  OK to brush teeth, but no gum, candy, or mints!      *Take only these medicines with a small swallow of water-morning of surgery*     amlodipine, celexa         ____ Stop taking all vitamins, herbal supplements, Aspirin, & NSAIDS (Ibuprofen, Advil, Aleve) 7 days prior to surgery, as these can thin your blood.    ____ Weight loss medication, such as Adipex and Phentermine, must be stopped 14 days prior to surgery, no exceptions!    *Diabetic Patients: If you take diabetic or weight loss medication, do NOT take morning of surgery unless instructed by Doctor. Metformin to be stopped 24 hrs prior to surgery. DO NOT take short-acting insulin the day of surgery. Only take HALF of your regular dose of long-acting insulin the night before surgery, unless instructed otherwise. Blood sugars will be checked in pre-op.   ~Ozempic/Mounjaro/Wegovy/Trulicity/Semaglutide injections must be stopped 7 days prior to surgery.     Please notify MD office if you develop an active infection, are prescribed antibiotics by someone other than the surgeon doing your surgery, or visit urgent care/ER.      Bathing Instructions:   The night before surgery and the morning prior to coming to the hospital:    - Shower & rinse your body as usual with anti-bacterial Soap (Dial or Lever 2000)   -Hibiclens (if indicated) use AFTER anti-bacterial soap; 1 packet PM/1 packet in AM on surgical site only   -Do not use hibiclens on your head, face, or genitals.    -Do not wash with anti-bacterial soap after you use the hibiclens.    -Do  not shave surgical site 5-7 days prior to surgery.    -Pubic hair 7 days prior to surgery (OBGYN/Urology only).       Additional Instructions:   __ No powder, lotions, creams, or body spray to skin   __ No deodorant if you are having: breast procedure, PORT, or upper shoulder surgery!   __ No nail polish or artificial nails       **SURGERY WILL BE CANCELLED IF ARTIFICIAL/NAIL POLISH IS PRESENT!!!**  __ Please remove all piercings and leave all jewelry at home.    **SURGERY WILL BE CANCELLED IF PIERCINGS ARE PRESENT!!!**    __ Dentures, Hearing Aids and Contact Lens need to be removed prior to the start of surgery.    __ Avoid Alcoholic beverages 3 days prior to surgery, as it can thin the blood, unless told otherwise by pre-admit department.  __ Females: may need to give a urine sample the morning of surgery;   **Please ask  for a specimen cup if you need to use the restroom prior to being called into pre-op.**  __ Males: Stop ED medications (Viagra, Cialis) 24 hrs prior to surgery.  __ Wear clean, loose-fitting clothing. Allow for dressings/bandages/surgical equipment   __ You must have transportation, and they MUST stay the entire time.   __  Bring photo ID and insurance information to [facility]      [facility] Visitor/Ride Policy:   Only 1 adult allowed (over the age of 18) to accompany you and MUST STAY through the entire length of admission.     -Must have a ride home from a responsible adult that you know and trust.    -Medical Transport, Uber or Lyft can only be used if patient has a responsible adult to accompany them during ride home.    ~Your ride MUST STAY the entire time until you are discharged~        Post-Op Instructions: You will receive surgery post-op instructions by your Discharge Nurse prior to going home.   Surgical Site Infection:   Prevention of surgical site infections:   -Keep incisions clean and dry.   -Do not soak/submerge incisions in water until completely healed.   -Do not apply  lotions, powders, creams, or deodorants to site.   -Always make sure hands are cleaned with antibacterial soap/ alcohol-based  prior to touching the surgical site.       Signs and symptoms:               -Redness and pain around the area where you had surgery               -Drainage of cloudy fluid from your surgical wound               -Fever over 100.4 or chills     >>>Call Surgeon office/on-call Surgeon if you experience any of these signs & symptoms post-surgery @ [phone].    *If you are running late or have questions the morning of surgery before 7AM, please call the Pre-OP Department @ [phone].    *Please Call [facility] Pre-Admit Department for surgery instruction questions:  [phone]    *Payment questions:  [phone]    *Billing questions:  [phone]

## 2023-12-28 ENCOUNTER — OFFICE VISIT (OUTPATIENT)
Dept: FAMILY MEDICINE | Facility: CLINIC | Age: 63
End: 2023-12-28
Attending: FAMILY MEDICINE
Payer: COMMERCIAL

## 2023-12-28 ENCOUNTER — NUTRITION (OUTPATIENT)
Dept: INTERNAL MEDICINE | Facility: CLINIC | Age: 63
End: 2023-12-28
Payer: COMMERCIAL

## 2023-12-28 VITALS
HEIGHT: 66 IN | WEIGHT: 242.5 LBS | BODY MASS INDEX: 38.97 KG/M2 | OXYGEN SATURATION: 96 % | HEART RATE: 82 BPM | DIASTOLIC BLOOD PRESSURE: 78 MMHG | SYSTOLIC BLOOD PRESSURE: 136 MMHG | TEMPERATURE: 97 F | RESPIRATION RATE: 18 BRPM

## 2023-12-28 DIAGNOSIS — Z71.3 PRE-BARIATRIC SURGERY NUTRITION EVALUATION: Primary | ICD-10-CM

## 2023-12-28 DIAGNOSIS — I10 PRIMARY HYPERTENSION: ICD-10-CM

## 2023-12-28 DIAGNOSIS — L98.9 SKIN DISORDER: ICD-10-CM

## 2023-12-28 DIAGNOSIS — M19.90 OSTEOARTHRITIS, UNSPECIFIED OSTEOARTHRITIS TYPE, UNSPECIFIED SITE: ICD-10-CM

## 2023-12-28 DIAGNOSIS — Z12.11 COLON CANCER SCREENING: ICD-10-CM

## 2023-12-28 DIAGNOSIS — Z71.3 DIETARY COUNSELING: ICD-10-CM

## 2023-12-28 DIAGNOSIS — I10 ESSENTIAL HYPERTENSION: ICD-10-CM

## 2023-12-28 DIAGNOSIS — Z12.31 OTHER SCREENING MAMMOGRAM: ICD-10-CM

## 2023-12-28 DIAGNOSIS — E66.01 SEVERE OBESITY (BMI 35.0-39.9) WITH COMORBIDITY: ICD-10-CM

## 2023-12-28 DIAGNOSIS — E55.9 VITAMIN D DEFICIENCY DISEASE: ICD-10-CM

## 2023-12-28 PROCEDURE — 99999 PR PBB SHADOW E&M-EST. PATIENT-LVL V: ICD-10-PCS | Mod: PBBFAC,,, | Performed by: FAMILY MEDICINE

## 2023-12-28 PROCEDURE — 99214 PR OFFICE/OUTPT VISIT, EST, LEVL IV, 30-39 MIN: ICD-10-PCS | Mod: S$GLB,,, | Performed by: FAMILY MEDICINE

## 2023-12-28 PROCEDURE — 97803 PR MED NUTR THER, SUBSQ, INDIV, EA 15 MIN: ICD-10-PCS | Mod: S$GLB,,, | Performed by: DIETITIAN, REGISTERED

## 2023-12-28 PROCEDURE — 3078F PR MOST RECENT DIASTOLIC BLOOD PRESSURE < 80 MM HG: ICD-10-PCS | Mod: CPTII,S$GLB,, | Performed by: FAMILY MEDICINE

## 2023-12-28 PROCEDURE — 3075F SYST BP GE 130 - 139MM HG: CPT | Mod: CPTII,S$GLB,, | Performed by: FAMILY MEDICINE

## 2023-12-28 PROCEDURE — 3008F PR BODY MASS INDEX (BMI) DOCUMENTED: ICD-10-PCS | Mod: CPTII,S$GLB,, | Performed by: FAMILY MEDICINE

## 2023-12-28 PROCEDURE — 1159F MED LIST DOCD IN RCRD: CPT | Mod: CPTII,S$GLB,, | Performed by: FAMILY MEDICINE

## 2023-12-28 PROCEDURE — 97803 MED NUTRITION INDIV SUBSEQ: CPT | Mod: S$GLB,,, | Performed by: DIETITIAN, REGISTERED

## 2023-12-28 PROCEDURE — 1160F RVW MEDS BY RX/DR IN RCRD: CPT | Mod: CPTII,S$GLB,, | Performed by: FAMILY MEDICINE

## 2023-12-28 PROCEDURE — 1159F PR MEDICATION LIST DOCUMENTED IN MEDICAL RECORD: ICD-10-PCS | Mod: CPTII,S$GLB,, | Performed by: FAMILY MEDICINE

## 2023-12-28 PROCEDURE — 3075F PR MOST RECENT SYSTOLIC BLOOD PRESS GE 130-139MM HG: ICD-10-PCS | Mod: CPTII,S$GLB,, | Performed by: FAMILY MEDICINE

## 2023-12-28 PROCEDURE — 1160F PR REVIEW ALL MEDS BY PRESCRIBER/CLIN PHARMACIST DOCUMENTED: ICD-10-PCS | Mod: CPTII,S$GLB,, | Performed by: FAMILY MEDICINE

## 2023-12-28 PROCEDURE — 3044F HG A1C LEVEL LT 7.0%: CPT | Mod: CPTII,S$GLB,, | Performed by: FAMILY MEDICINE

## 2023-12-28 PROCEDURE — 3078F DIAST BP <80 MM HG: CPT | Mod: CPTII,S$GLB,, | Performed by: FAMILY MEDICINE

## 2023-12-28 PROCEDURE — 99999 PR PBB SHADOW E&M-EST. PATIENT-LVL V: CPT | Mod: PBBFAC,,, | Performed by: FAMILY MEDICINE

## 2023-12-28 PROCEDURE — 3008F BODY MASS INDEX DOCD: CPT | Mod: CPTII,S$GLB,, | Performed by: FAMILY MEDICINE

## 2023-12-28 PROCEDURE — 3044F PR MOST RECENT HEMOGLOBIN A1C LEVEL <7.0%: ICD-10-PCS | Mod: CPTII,S$GLB,, | Performed by: FAMILY MEDICINE

## 2023-12-28 PROCEDURE — 99214 OFFICE O/P EST MOD 30 MIN: CPT | Mod: S$GLB,,, | Performed by: FAMILY MEDICINE

## 2023-12-28 RX ORDER — ERGOCALCIFEROL 1.25 MG/1
50000 CAPSULE ORAL
Qty: 12 CAPSULE | Refills: 1 | Status: SHIPPED | OUTPATIENT
Start: 2023-12-28

## 2023-12-28 RX ORDER — TIRZEPATIDE 2.5 MG/.5ML
2.5 INJECTION, SOLUTION SUBCUTANEOUS
Qty: 4 PEN | Refills: 1 | Status: SHIPPED | OUTPATIENT
Start: 2023-12-28

## 2023-12-28 NOTE — PROGRESS NOTES
NUTRITION NOTE    Referring Physician: Dr. Lezama  Reason for MNT Referral: Medically Supervised Diet pending Gastric Sleeve    PAST MEDICAL HISTORY:    Patient presents for 6th visit (of 6) for MSD with weight loss over the past month; total weight loss by making numerous dietary and lifestyle changes.    Continues with previous dietary changes.  Maintained portions during [place] events.  No exercise for the last week due to knee pain.   Having knee surgery next Thursday and then will resume physical therapy after.        Past Medical History:   Diagnosis Date    Allergy     Anxiety     GERD (gastroesophageal reflux disease)     Hypertension     Obesity     Vitamin B 12 deficiency        CLINICAL DATA:  63 y.o. female.    There were no vitals filed for this visit.    Current Weight: 239.5 lbs   2023: 242.8 lbs              10-: 245.7 lbs              2023: 236.5 lbs              2023: 239.8 lbs              2023: 241 lbs  Weight Change Since Initial Visit: -1.5 lbs  Ideal Body Weight: 174 lbs +/-10%  BMI: 38.7    CURRENT DIET:  Regular diet.  Diet Recall: Food records are present.    Diet Includes:   Meal Pattern: Same.  Protein Supplements: 3 times per week for breakfast  Snackin-1  Vegetables: several days weekly  Fruits: 2-3 times per week  Beverages: water, coffee (1 cup), herbal tea made with Splenda, sugar free tea   Dining Out: reduced  Cooking at home: Daily    CURRENT EXERCISE:  None    Vitamins / Minerals / Herbs:   Multivitamin, B12, Vitamin D, collagen with biotin (nail and hair health), calcium     Food Allergies:   NKFA     Social:  Works regular daytime shifts.  Alcohol: 1 glass on   Smoking: None.    ASSESSMENT:  Patient demonstrates all willingness to change lifestyle habits as evidenced by weight loss, daily food logs, dietary changes, including protein drinks, and improved portion control .    Doing well with working on greatest challenges (portion  control).    Adequate calorie intake.  Adequate protein intake.    PLAN:    Diet: Adjust diet plan.  Continue previous changes.  Begin reducing/eliminating intake of current coffee or transition to decaf coffee.  Begin pre-op liquid diet 2 weeks prior to surgery.     Exercise: Increase. As able     Behavior Modification: Continue to document food & activity logs daily.      Return to clinic 2 weeks post gastric sleeve surgery.     SESSION TIME:  15 minutes

## 2023-12-28 NOTE — PROGRESS NOTES
Karla Arzola    Chief Complaint   Patient presents with    Establish Care       History of Present Illness:   Ms. Arzola comes in today to establish care with me.  She states she has been previously followed by PCP Dr. Robina Momin.  She states she has not fasting but has taken medications today.    She states she continues to take amlodipine 10 mg daily for treatment of hypertension; vitamin-D 75565 units weekly for treatment of vitamin-D deficiency; and Celexa 20 mg g daily for treatment of anxiety.    She states she performs home blood pressure checks 2 times a week with levels ranging 130-1 4 2/70-80's.  She states she tries to monitor what she eats.  She states she has not been exercising due to left knee pain.    She states she recently completed 3 months of physical therapy for left knee pain and is scheduled to have arthroscopy on January 4, 2024. She saw Dr. Larry Adams with Sports Medicine on October 31, 2023 for old complex tear of lateral meniscus of left knee, primary osteoarthritis of left knee. She saw TIAGO Russo with Rheumatology on November 16, 2023 for undifferentiated connective tissue disease, tear of lateral meniscus of left knee, current, unspecified tear type, initial encounter, de Quervain's tenosynovitis, right, uveitis, high risk medication use, immunocompromised state due to drug therapy, medication monitoring encounter.    She states she never took Rybelsus which was prescribed for weight management as it was not covered by insurance.    She states she has rash (circles) on her right lower leg since last year.  She denies associated trauma.    Otherwise, she denies having fever, chills, fatigue, appetite changes; shortness of breath, cough, wheezing; chest pain, palpitations, leg swelling; abdominal pain, nausea, vomiting, diarrhea, constipation; unusual urinary symptoms; polydipsia, polyphagia, polyuria, hot or cold intolerance; back pain; acute visual changes,  numbness, headache; anxiety, depression, homicidal or suicidal thoughts.           Labs:                      WBC                      12.18               12/05/2023                 HGB                      14.3                12/05/2023                 HCT                      45.1                12/05/2023                 PLT                      341                 12/05/2023                 CHOL                     162                 03/02/2023                 TRIG                     117                 03/02/2023                 HDL                      51                  03/02/2023                 ALT                      14                  12/05/2023                 AST                      14                  12/05/2023                 NA                       144                 12/05/2023                 K                        4.0                 12/05/2023                 CL                       108                 12/05/2023                 CREATININE               0.9                 12/05/2023                 BUN                      22                  12/05/2023                 CO2                      26                  12/05/2023                 TSH                      1.720               03/02/2023                 GLUF                     99                  08/08/2008                 HGBA1C                   5.2                 03/02/2023               LDLCALC                  87.6                03/02/2023                Past Medical History:   Diagnosis Date    Allergy     Anxiety     GERD (gastroesophageal reflux disease)     Hypertension     Obesity     Vitamin B 12 deficiency     Vitamin D deficiency disease        Current Outpatient Medications   Medication Sig    acyclovir (ZOVIRAX) 200 MG capsule Take 1 capsule (200 mg total) by mouth once daily.    albuterol (VENTOLIN HFA) 90 mcg/actuation inhaler Inhale 2 puffs into the lungs every 6 (six) hours as needed for Wheezing. Rescue     amLODIPine (NORVASC) 10 MG tablet Take 1 tablet (10 mg total) by mouth once daily.    azelaic acid (FINACEA) 15 % Foam Apply 1 application topically 2 (two) times daily.    ciclopirox (PENLAC) 8 % Soln Apply to nails once daily    citalopram (CELEXA) 20 MG tablet Take 1 tablet (20 mg total) by mouth once daily.    ergocalciferol (VITAMIN D2) 50,000 unit Cap Take 1 capsule (50,000 Units total) by mouth every 7 days.    fluticasone propionate (FLONASE) 50 mcg/actuation nasal spray 1 spray (50 mcg total) by Each Nostril route once daily.    furosemide (LASIX) 20 MG tablet Take 1 tablet (20 mg total) by mouth daily as needed (swelling).    HYDROcodone-acetaminophen (NORCO) 5-325 mg per tablet Take 1 tablet by mouth every 8 (eight) hours as needed for pain for up to 10 doses    hydroxychloroquine (PLAQUENIL) 200 mg tablet Take 1 tablet (200 mg total) by mouth 2 (two) times daily.    ipratropium-albuteroL (COMBIVENT)  mcg/actuation inhaler Inhale 1 puff into the lungs every 6 (six) hours as needed for Wheezing or Shortness of Breath. Rescue    methocarbamoL (ROBAXIN) 500 MG Tab Take 1 tablet (500 mg total) by mouth nightly as needed.    multivitamin capsule Take 1 capsule by mouth once daily.    naproxen (NAPROSYN) 500 MG tablet Take 1 tablet (500 mg total) by mouth 2 (two) times daily.       Review of Systems   Constitutional:  Negative for activity change, appetite change, chills, fatigue and fever.        See history of present illness.   Respiratory:  Negative for cough, shortness of breath and wheezing.    Cardiovascular:  Negative for chest pain, palpitations and leg swelling.        See history of present illness.   Gastrointestinal:  Negative for abdominal pain, constipation, diarrhea, nausea and vomiting.   Endocrine: Negative for cold intolerance, heat intolerance, polydipsia, polyphagia and polyuria.        See history of present illness.   Genitourinary:  Negative for difficulty urinating.    Musculoskeletal:  Positive for arthralgias. Negative for back pain.        See history of present illness.   Neurological:  Negative for numbness and headaches.   Psychiatric/Behavioral:  Negative for dysphoric mood and suicidal ideas. The patient is not nervous/anxious.         Negative for homicidal ideas.       Objective:  Physical Exam  Vitals reviewed.   Constitutional:       General: She is not in acute distress.     Appearance: Normal appearance. She is obese. She is not ill-appearing, toxic-appearing or diaphoretic.      Comments: Pleasant.   Cardiovascular:      Rate and Rhythm: Normal rate and regular rhythm.      Pulses: Normal pulses.      Heart sounds: No murmur heard.  Pulmonary:      Effort: Pulmonary effort is normal. No respiratory distress.      Breath sounds: Normal breath sounds. No wheezing.   Abdominal:      General: Bowel sounds are normal. There is no distension.      Palpations: Abdomen is soft. There is no mass.      Tenderness: There is no abdominal tenderness. There is no guarding or rebound.   Musculoskeletal:         General: No swelling or tenderness. Normal range of motion.      Cervical back: Normal range of motion and neck supple. No tenderness.      Comments: She is ambulatory without problems.   Lymphadenopathy:      Cervical: No cervical adenopathy.   Skin:     General: Skin is warm.   Neurological:      General: No focal deficit present.      Mental Status: She is alert and oriented to person, place, and time.   Psychiatric:         Mood and Affect: Mood normal.         Behavior: Behavior normal.         Thought Content: Thought content normal.         Judgment: Judgment normal.         ASSESSMENT:  1. Essential hypertension    2. Vitamin D deficiency disease    3. Skin disorder    4. Severe obesity (BMI 35.0-39.9) with comorbidity    5. Other screening mammogram    6. Colon cancer screening        PLAN:  Karla was seen today for establish care.    Diagnoses and all orders for  this visit:    Essential hypertension    Vitamin D deficiency disease  -     ergocalciferol (VITAMIN D2) 50,000 unit Cap; Take 1 capsule (50,000 Units total) by mouth every 7 days.    Skin disorder  -     Ambulatory referral/consult to Dermatology; Future    Severe obesity (BMI 35.0-39.9) with comorbidity  -     tirzepatide, weight loss, (ZEPBOUND) 2.5 mg/0.5 mL PnIj; Inject 2.5 mg into the skin every 7 days.    Other screening mammogram  -     Mammo Digital Diagnostic Bilat with [facility]; Future    Colon cancer screening  -     Ambulatory referral/consult to Endo Procedure ; Future      Patient advised to call for results.  Continue current medications, follow low sodium, low cholesterol, low carb diet, daily walks.  Prescription refills as noted above.  Try Zepbound 2.5 mg weekly as directed; medication precautions discussed with patient.  If insurance-covered and/or affordable and tolerable, patient advised to call in 1 month for Zepbound titration.  Keep follow up with specialists.  Follow up in about 4 months (around 4/23/2024).

## 2023-12-29 ENCOUNTER — HOSPITAL ENCOUNTER (OUTPATIENT)
Dept: PREADMISSION TESTING | Facility: HOSPITAL | Age: 63
Discharge: HOME | End: 2023-12-29
Attending: INTERNAL MEDICINE
Payer: COMMERCIAL

## 2023-12-29 DIAGNOSIS — Z12.11 COLON CANCER SCREENING: Primary | ICD-10-CM

## 2023-12-29 PROBLEM — E55.9 VITAMIN D DEFICIENCY DISEASE: Status: ACTIVE | Noted: 2023-12-29

## 2023-12-29 RX ORDER — SODIUM, POTASSIUM,MAG SULFATES 17.5-3.13G
1 SOLUTION, RECONSTITUTED, ORAL ORAL DAILY
Qty: 1 KIT | Refills: 0 | Status: SHIPPED | OUTPATIENT
Start: 2023-12-29 | End: 2024-01-05

## 2024-01-02 ENCOUNTER — ANESTHESIA EVENT (OUTPATIENT)
Dept: SURGERY | Facility: HOSPITAL | Age: 64
End: 2024-01-02
Payer: COMMERCIAL

## 2024-01-02 NOTE — ANESTHESIA PREPROCEDURE EVALUATION
01/02/2024  Karla Arzola is a 63 y.o., female.  Past Medical History:   Diagnosis Date    Allergy     Anxiety     GERD (gastroesophageal reflux disease)     Hypertension     Obesity     Vitamin B 12 deficiency     Vitamin D deficiency disease      Past Surgical History:   Procedure Laterality Date    BREAST BIOPSY Right 10/08/2021    MOUTH SURGERY      right knee scope      TUBAL LIGATION           Pre-op Assessment    I have reviewed the Patient Summary Reports.    I have reviewed the NPO Status.   I have reviewed the Medications.     Review of Systems  Anesthesia Hx:  No problems with previous Anesthesia   History of prior surgery of interest to airway management or planning:  Previous anesthesia: General        Denies Family Hx of Anesthesia complications.    Denies Personal Hx of Anesthesia complications.                    Social:  Non-Smoker       Hematology/Oncology:  Hematology Normal                                     Cardiovascular:     Hypertension                                        Pulmonary:  Pulmonary Normal                       Renal/:  Renal/ Normal                 Hepatic/GI:     GERD             Musculoskeletal:  Arthritis               Neurological:  Neurology Normal                                      Endocrine:        Morbid Obesity / BMI > 40  Psych:  Psychiatric History anxiety                 Physical Exam  General: Well nourished and Cooperative    Airway:  Mallampati: II   Mouth Opening: Normal  TM Distance: 4 - 6 cm  Tongue: Normal  Neck ROM: Normal ROM    Dental:  Dentures        Anesthesia Plan  Type of Anesthesia, risks & benefits discussed:    Anesthesia Type: Gen ETT  Intra-op Monitoring Plan: Standard ASA Monitors  Post Op Pain Control Plan: multimodal analgesia, IV/PO Opioids PRN and peripheral nerve block  Induction:  IV  Informed Consent: Informed consent  signed with the Patient and all parties understand the risks and agree with anesthesia plan.  All questions answered.   ASA Score: 2  Day of Surgery Review of History & Physical: H&P Update referred to the surgeon/provider.    Ready For Surgery From Anesthesia Perspective.     .

## 2024-01-03 ENCOUNTER — PATIENT MESSAGE (OUTPATIENT)
Dept: PREADMISSION TESTING | Facility: HOSPITAL | Age: 64
End: 2024-01-03
Payer: COMMERCIAL

## 2024-01-04 ENCOUNTER — ANESTHESIA (OUTPATIENT)
Dept: SURGERY | Facility: HOSPITAL | Age: 64
End: 2024-01-04
Payer: COMMERCIAL

## 2024-01-04 ENCOUNTER — HOSPITAL ENCOUNTER (OUTPATIENT)
Facility: HOSPITAL | Age: 64
Discharge: HOME | End: 2024-01-04
Attending: STUDENT IN AN ORGANIZED HEALTH CARE EDUCATION/TRAINING PROGRAM | Admitting: STUDENT IN AN ORGANIZED HEALTH CARE EDUCATION/TRAINING PROGRAM
Payer: COMMERCIAL

## 2024-01-04 VITALS
OXYGEN SATURATION: 96 % | DIASTOLIC BLOOD PRESSURE: 79 MMHG | TEMPERATURE: 98 F | BODY MASS INDEX: 38.97 KG/M2 | HEIGHT: 66 IN | SYSTOLIC BLOOD PRESSURE: 125 MMHG | RESPIRATION RATE: 17 BRPM | HEART RATE: 83 BPM | WEIGHT: 242.5 LBS

## 2024-01-04 DIAGNOSIS — M23.201 OLD COMPLEX TEAR OF LATERAL MENISCUS OF LEFT KNEE: ICD-10-CM

## 2024-01-04 DIAGNOSIS — Z01.818 PRE-OP EXAM: Primary | ICD-10-CM

## 2024-01-04 PROCEDURE — 63600175 PHARM REV CODE 636 W HCPCS: Performed by: STUDENT IN AN ORGANIZED HEALTH CARE EDUCATION/TRAINING PROGRAM

## 2024-01-04 PROCEDURE — D9220A PRA ANESTHESIA: Mod: ,,, | Performed by: NURSE ANESTHETIST, CERTIFIED REGISTERED

## 2024-01-04 PROCEDURE — 29881 ARTHRS KNE SRG MNISECTMY M/L: CPT | Mod: LT,,, | Performed by: STUDENT IN AN ORGANIZED HEALTH CARE EDUCATION/TRAINING PROGRAM

## 2024-01-04 PROCEDURE — 25000003 PHARM REV CODE 250: Performed by: NURSE ANESTHETIST, CERTIFIED REGISTERED

## 2024-01-04 PROCEDURE — 63600175 PHARM REV CODE 636 W HCPCS: Performed by: NURSE ANESTHETIST, CERTIFIED REGISTERED

## 2024-01-04 PROCEDURE — 27201423 OPTIME MED/SURG SUP & DEVICES STERILE SUPPLY: Performed by: STUDENT IN AN ORGANIZED HEALTH CARE EDUCATION/TRAINING PROGRAM

## 2024-01-04 PROCEDURE — 71000015 HC POSTOP RECOV 1ST HR: Performed by: STUDENT IN AN ORGANIZED HEALTH CARE EDUCATION/TRAINING PROGRAM

## 2024-01-04 PROCEDURE — 37000009 HC ANESTHESIA EA ADD 15 MINS: Performed by: STUDENT IN AN ORGANIZED HEALTH CARE EDUCATION/TRAINING PROGRAM

## 2024-01-04 PROCEDURE — 37000008 HC ANESTHESIA 1ST 15 MINUTES: Performed by: STUDENT IN AN ORGANIZED HEALTH CARE EDUCATION/TRAINING PROGRAM

## 2024-01-04 PROCEDURE — 36000711: Performed by: STUDENT IN AN ORGANIZED HEALTH CARE EDUCATION/TRAINING PROGRAM

## 2024-01-04 PROCEDURE — 63600175 PHARM REV CODE 636 W HCPCS: Performed by: ANESTHESIOLOGY

## 2024-01-04 PROCEDURE — 36000710: Performed by: STUDENT IN AN ORGANIZED HEALTH CARE EDUCATION/TRAINING PROGRAM

## 2024-01-04 PROCEDURE — 71000033 HC RECOVERY, INTIAL HOUR: Performed by: STUDENT IN AN ORGANIZED HEALTH CARE EDUCATION/TRAINING PROGRAM

## 2024-01-04 PROCEDURE — 63600175 PHARM REV CODE 636 W HCPCS: Performed by: PHYSICIAN ASSISTANT

## 2024-01-04 RX ORDER — DEXAMETHASONE SODIUM PHOSPHATE 4 MG/ML
INJECTION, SOLUTION INTRA-ARTICULAR; INTRALESIONAL; INTRAMUSCULAR; INTRAVENOUS; SOFT TISSUE
Status: DISCONTINUED | OUTPATIENT
Start: 2024-01-04 | End: 2024-01-04

## 2024-01-04 RX ORDER — ACETAMINOPHEN 500 MG
1000 TABLET ORAL EVERY 8 HOURS PRN
Qty: 60 TABLET | Refills: 0 | Status: SHIPPED | OUTPATIENT
Start: 2024-01-04

## 2024-01-04 RX ORDER — ROCURONIUM BROMIDE 10 MG/ML
INJECTION, SOLUTION INTRAVENOUS
Status: DISCONTINUED | OUTPATIENT
Start: 2024-01-04 | End: 2024-01-04

## 2024-01-04 RX ORDER — ASPIRIN 81 MG/1
81 TABLET ORAL 2 TIMES DAILY
Qty: 28 TABLET | Refills: 0 | Status: SHIPPED | OUTPATIENT
Start: 2024-01-04 | End: 2024-02-02

## 2024-01-04 RX ORDER — NEOSTIGMINE METHYLSULFATE 0.5 MG/ML
INJECTION, SOLUTION INTRAVENOUS
Status: DISCONTINUED | OUTPATIENT
Start: 2024-01-04 | End: 2024-01-04

## 2024-01-04 RX ORDER — EPINEPHRINE 1 MG/ML
INJECTION, SOLUTION, CONCENTRATE INTRAVENOUS
Status: DISCONTINUED | OUTPATIENT
Start: 2024-01-04 | End: 2024-01-04 | Stop reason: HOSPADM

## 2024-01-04 RX ORDER — DEXMEDETOMIDINE HYDROCHLORIDE 100 UG/ML
INJECTION, SOLUTION INTRAVENOUS
Status: DISCONTINUED | OUTPATIENT
Start: 2024-01-04 | End: 2024-01-04

## 2024-01-04 RX ORDER — OXYCODONE AND ACETAMINOPHEN 5; 325 MG/1; MG/1
1 TABLET ORAL
Status: DISCONTINUED | OUTPATIENT
Start: 2024-01-04 | End: 2024-01-04 | Stop reason: HOSPADM

## 2024-01-04 RX ORDER — LIDOCAINE HYDROCHLORIDE 20 MG/ML
INJECTION, SOLUTION EPIDURAL; INFILTRATION; INTRACAUDAL; PERINEURAL
Status: DISCONTINUED | OUTPATIENT
Start: 2024-01-04 | End: 2024-01-04

## 2024-01-04 RX ORDER — BUPIVACAINE HYDROCHLORIDE 2.5 MG/ML
INJECTION, SOLUTION EPIDURAL; INFILTRATION; INTRACAUDAL
Status: DISCONTINUED | OUTPATIENT
Start: 2024-01-04 | End: 2024-01-04 | Stop reason: HOSPADM

## 2024-01-04 RX ORDER — SODIUM CHLORIDE 9 MG/ML
INJECTION, SOLUTION INTRAVENOUS CONTINUOUS
Status: DISCONTINUED | OUTPATIENT
Start: 2024-01-04 | End: 2024-01-04 | Stop reason: HOSPADM

## 2024-01-04 RX ORDER — SODIUM CHLORIDE, SODIUM LACTATE, POTASSIUM CHLORIDE, CALCIUM CHLORIDE 600; 310; 30; 20 MG/100ML; MG/100ML; MG/100ML; MG/100ML
INJECTION, SOLUTION INTRAVENOUS CONTINUOUS
Status: ACTIVE | OUTPATIENT
Start: 2024-01-04

## 2024-01-04 RX ORDER — PROPOFOL 10 MG/ML
VIAL (ML) INTRAVENOUS
Status: DISCONTINUED | OUTPATIENT
Start: 2024-01-04 | End: 2024-01-04

## 2024-01-04 RX ORDER — ACETAMINOPHEN 10 MG/ML
INJECTION, SOLUTION INTRAVENOUS
Status: DISCONTINUED | OUTPATIENT
Start: 2024-01-04 | End: 2024-01-04

## 2024-01-04 RX ORDER — BUPIVACAINE HYDROCHLORIDE 2.5 MG/ML
INJECTION, SOLUTION EPIDURAL; INFILTRATION; INTRACAUDAL
Status: DISCONTINUED
Start: 2024-01-04 | End: 2024-01-04 | Stop reason: HOSPADM

## 2024-01-04 RX ORDER — FENTANYL CITRATE 50 UG/ML
INJECTION, SOLUTION INTRAMUSCULAR; INTRAVENOUS
Status: DISCONTINUED | OUTPATIENT
Start: 2024-01-04 | End: 2024-01-04

## 2024-01-04 RX ORDER — CELECOXIB 200 MG/1
200 CAPSULE ORAL 2 TIMES DAILY
Qty: 28 CAPSULE | Refills: 0 | Status: SHIPPED | OUTPATIENT
Start: 2024-01-04 | End: 2024-01-17 | Stop reason: SDUPTHER

## 2024-01-04 RX ORDER — OXYCODONE HYDROCHLORIDE 5 MG/1
5 TABLET ORAL
Qty: 21 TABLET | Refills: 0 | Status: SHIPPED | OUTPATIENT
Start: 2024-01-04 | End: 2024-02-02

## 2024-01-04 RX ORDER — ONDANSETRON 2 MG/ML
4 INJECTION INTRAMUSCULAR; INTRAVENOUS DAILY PRN
Status: DISCONTINUED | OUTPATIENT
Start: 2024-01-04 | End: 2024-01-04 | Stop reason: HOSPADM

## 2024-01-04 RX ORDER — FENTANYL CITRATE 50 UG/ML
25 INJECTION, SOLUTION INTRAMUSCULAR; INTRAVENOUS EVERY 5 MIN PRN
Status: DISCONTINUED | OUTPATIENT
Start: 2024-01-04 | End: 2024-01-04 | Stop reason: HOSPADM

## 2024-01-04 RX ORDER — CEFAZOLIN SODIUM 2 G/50ML
2 SOLUTION INTRAVENOUS
Status: COMPLETED | OUTPATIENT
Start: 2024-01-04 | End: 2024-01-04

## 2024-01-04 RX ORDER — EPINEPHRINE 1 MG/ML
INJECTION, SOLUTION, CONCENTRATE INTRAVENOUS
Status: DISCONTINUED
Start: 2024-01-04 | End: 2024-01-04 | Stop reason: HOSPADM

## 2024-01-04 RX ORDER — ONDANSETRON HYDROCHLORIDE 2 MG/ML
INJECTION, SOLUTION INTRAMUSCULAR; INTRAVENOUS
Status: DISCONTINUED | OUTPATIENT
Start: 2024-01-04 | End: 2024-01-04

## 2024-01-04 RX ORDER — MEPERIDINE HYDROCHLORIDE 25 MG/ML
12.5 INJECTION INTRAMUSCULAR; INTRAVENOUS; SUBCUTANEOUS ONCE AS NEEDED
Status: DISCONTINUED | OUTPATIENT
Start: 2024-01-04 | End: 2024-01-04 | Stop reason: HOSPADM

## 2024-01-04 RX ORDER — CHLORHEXIDINE GLUCONATE ORAL RINSE 1.2 MG/ML
10 SOLUTION DENTAL
Status: DISCONTINUED | OUTPATIENT
Start: 2024-01-04 | End: 2024-01-04 | Stop reason: HOSPADM

## 2024-01-04 RX ORDER — SUCCINYLCHOLINE CHLORIDE 20 MG/ML
INJECTION INTRAMUSCULAR; INTRAVENOUS
Status: DISCONTINUED | OUTPATIENT
Start: 2024-01-04 | End: 2024-01-04

## 2024-01-04 RX ORDER — MIDAZOLAM HYDROCHLORIDE 1 MG/ML
INJECTION INTRAMUSCULAR; INTRAVENOUS
Status: DISCONTINUED | OUTPATIENT
Start: 2024-01-04 | End: 2024-01-04

## 2024-01-04 RX ADMIN — CEFAZOLIN SODIUM 2 G: 2 SOLUTION INTRAVENOUS at 07:01

## 2024-01-04 RX ADMIN — DEXAMETHASONE SODIUM PHOSPHATE 8 MG: 4 INJECTION, SOLUTION INTRA-ARTICULAR; INTRALESIONAL; INTRAMUSCULAR; INTRAVENOUS; SOFT TISSUE at 07:01

## 2024-01-04 RX ADMIN — GLYCOPYRROLATE 0.8 MG: 0.2 INJECTION, SOLUTION INTRAMUSCULAR; INTRAVITREAL at 07:01

## 2024-01-04 RX ADMIN — PROPOFOL 200 MG: 10 INJECTION, EMULSION INTRAVENOUS at 06:01

## 2024-01-04 RX ADMIN — FENTANYL CITRATE 100 MCG: 0.05 INJECTION, SOLUTION INTRAMUSCULAR; INTRAVENOUS at 06:01

## 2024-01-04 RX ADMIN — DEXMEDETOMIDINE HYDROCHLORIDE 4 MCG: 100 INJECTION, SOLUTION INTRAVENOUS at 07:01

## 2024-01-04 RX ADMIN — MIDAZOLAM HYDROCHLORIDE 2 MG: 1 INJECTION INTRAMUSCULAR; INTRAVENOUS at 06:01

## 2024-01-04 RX ADMIN — LIDOCAINE HYDROCHLORIDE 80 MG: 20 INJECTION, SOLUTION EPIDURAL; INFILTRATION; INTRACAUDAL; PERINEURAL at 06:01

## 2024-01-04 RX ADMIN — SUCCINYLCHOLINE CHLORIDE 160 MG: 20 INJECTION, SOLUTION INTRAMUSCULAR; INTRAVENOUS; PARENTERAL at 07:01

## 2024-01-04 RX ADMIN — FENTANYL CITRATE 50 MCG: 0.05 INJECTION, SOLUTION INTRAMUSCULAR; INTRAVENOUS at 07:01

## 2024-01-04 RX ADMIN — ONDANSETRON HYDROCHLORIDE 4 MG: 2 INJECTION, SOLUTION INTRAMUSCULAR; INTRAVENOUS at 07:01

## 2024-01-04 RX ADMIN — FENTANYL CITRATE 50 MCG: 0.05 INJECTION, SOLUTION INTRAMUSCULAR; INTRAVENOUS at 08:01

## 2024-01-04 RX ADMIN — SODIUM CHLORIDE, SODIUM LACTATE, POTASSIUM CHLORIDE, AND CALCIUM CHLORIDE: 600; 310; 30; 20 INJECTION, SOLUTION INTRAVENOUS at 06:01

## 2024-01-04 RX ADMIN — NEOSTIGMINE METHYLSULFATE 4 MG: 0.5 INJECTION INTRAVENOUS at 07:01

## 2024-01-04 RX ADMIN — ROCURONIUM BROMIDE 10 MG: 10 SOLUTION INTRAVENOUS at 06:01

## 2024-01-04 RX ADMIN — ROCURONIUM BROMIDE 20 MG: 10 SOLUTION INTRAVENOUS at 07:01

## 2024-01-04 RX ADMIN — ACETAMINOPHEN 1000 MG: 10 INJECTION, SOLUTION INTRAVENOUS at 07:01

## 2024-01-04 NOTE — DISCHARGE INSTRUCTIONS
DISCHARGE INSTRUCTIONS FOR  KNEE ARTHROSCOPY    Contact the [facility] at [phone] if you have questions about your instructions or follow-up appointment.  DIET:   Start with clear liquids and light foods to minimize nausea. Once these are tolerated, advance to a regular diet.     TRIP HOME:   To encourage blood flow and decrease your risk of blood clots, do the following on your ride home:    If possible, perform ankle pumps by moving your foot up and down.    If your ride home will be longer than 2 hours, it is best to stop at least once to get out of the car and move your leg.    While in the car, consider riding in the back seat with your surgical leg elevated.     DRESSING AND WOUND CARE:   Keep the dressing clean and dry. It is normal for there to be some drainage after surgery since the knee was irrigated with large amounts of fluid. Reinforce with additional gauze and/or ACE wraps as necessary.  Remove the dressing the 2nd day after surgery and begin changing daily with clean gauze or Band-Aids® and the ACE wrap. Keep your incisions covered until you follow up in clinic.  If you have Steri-Strips in place of stitches, allow them to stay in place as long as possible. Steri-Strips are made of a fabric material that can get wet in the shower and pat dry with a towel. They usually fall off on their own within 7 to 10 days. You may trim the edges as they begin to curl.       BATHING:   You may bathe or shower on the 2nd day after surgery, but do not scrub or soak the incisions. Dry the area by gently blotting it with a gauze or towel. After it is completely dry, cover the wound with clean gauze or Band-Aids®. Do NOT submerge the incisions (bath/swim) until after the sutures are removed and the wound has completely healed.     ACTIVITY:    Ice should be applied to the knee for 20-30 minutes, 5-6 times a day, to help control pain and swelling. Apply additional times as needed, especially after  exercise, for the first 1-2 weeks. Do not apply ice directly to the skin; use a thin barrier in between. Also, do not use heat.    Compression applied to the knee with an ACE bandage can help with swelling. When wrapping, start below the knee and work up.   Elevate the leg when possible with pillows under the ankle. While it may seem comfortable, do not put pillows under the knee for long periods of time. It is important to be able to straighten your knee and having the knee bent while elevating can make this difficult. Elevation, as a rule, should be higher than your heart. You can stop elevating when comfortable.   Bear weight as tolerated. For the first 2-3 days, use cane or crutches for comfort only. You can stop using them once you are able to walk without a limp. It is important to move after surgery. There are no restrictions on your walking. NO jumping, twisting, or running sports.   Physical therapy is encouraged after knee surgery to improve your knee motion and strength.  You should have gotten a referral from your surgeon.  Schedule a physical therapy appointment within a couple days of your surgery.      PAIN CONTROL:   It is important to stay ahead of pain as it becomes challenging to get under control if you fall behind. Ice and elevation can help and should be used as much as possible in the first few days.   Narcotic pain medications, such as hydrocodone or oxycodone, should be taken as prescribed. Wean off as soon as possible. Take these with food to decrease the chances of nausea and vomiting. Do not drink alcohol, drive a vehicle, or use heavy machinery while taking narcotic pain medications.    NSAID medications are used for pain control and to decrease inflammation. You may be prescribed an NSAID such as celebrex. Take as instructed. Other NSAID medications such as ibuprofen, Motrin, Advil, naproxen, or Aleve can be used once you have finished the Toradol, or if a prescription for Toradol was  not provided.   Acetaminophen (Tylenol) is an effective over-the-counter pain medication that can be used with NSAID medications and non-acetaminophen containing narcotics such as plain oxycodone.    ASPIRIN FOR PREVENTION OF BLOOD CLOTS:   You should take one 81 mg baby aspirin twice daily for two weeks starting the evening of the day you have surgery unless instructed otherwise or taking a different blood thinner such as enoxaparin or warfarin. If you are aware that you are at high risk for a blood clot, notify your physician as soon as possible.  Take aspirin at least 30 minutes before taking ibuprofen or Toradol.    CONSTIPATION PREVENTION:  Anesthesia and pain medications, changes in eating and drinking, and less activity can all lead to constipation after surgery. To prevent or reduce constipation, take an over-the-counter stool softener (brands include Colace and Miralax).  Follow the directions on the bottle. Drink plenty of water and eat high fiber foods including whole grains, fresh fruits, vegetables, beans, prunes or prune juice.     PROBLEMS TO REPORT:  Persistent bloody drainage that soaks through reinforced dressings.  Fever greater than 101F or 38C.   Incision that is very red, swollen, draining pus, shows red streaks, or feels hot.  Inability to urinate within 8 hours of surgery (a rare effect of the anesthesia).  If you develop a rash, generalized itching or swelling from the medications, STOP the medication and call the clinic or the orthopedic surgery resident on call.    Daytime calls should be directed to the Sports Medicine Clinic at [phone].   Night-time and weekend calls should be directed to the after hours nurse on call, who can be reached at [phone].       FREQUENTLY ASKED QUESTIONS     WHAT DAILY ACTIVITIES CAN I DO?  After knee surgery, daily activities such as walking, going up/down stairs, or desk work should not cause damage to your knee.     CAN I DRIVE OR RIDE BY CAR/  TRAIN/ PLANE?   You should not drive until you are off crutches. You should not drive while taking narcotic pain medications. You may ride in a car after surgery as needed. Keep the knee elevated with pillows under the ankle. You may take a train or even fly the day after your surgery as long as you feel secure and comfortable. If you do fly, expect some extra swelling due the drop in air pressure. A compressive bandage, ice, and elevation should help this resolve.    WHAT ABOUT WORK?   You may return to an office-type job or to school whenever comfortable. For most patients this occurs within a few days after surgery. Any other unusual types of jobs should be discussed to determine a date for return to work.      WHAT ABOUT SWELLING?   Expect swelling as a normal process after surgery. Ice, compression, elevation, and other treatments provided at physical therapy will allow this to improve in time. Some swelling may remain for up to 8 weeks, and this is normal.     WHAT IF IT REALLY HURTS TOO MUCH?   Surgery hurts and you cannot expect to be pain free, but our goal is for it to be tolerable. Try to use all available pain therapies such as narcotics, NSAIDS, and acetaminophen. Always try more ice and elevation. If the pain is not tolerable, call the clinic or the after hours nurse.       Knee & Quad Exercise Instructions     Larry Adams MD   [address] [facility] [place] [address]  Ph: [phone] Fax: [phone]       Exercises listed are to be performed by the patient following surgery. Perform sets of 10 repetitions, 4 times per day.        Heel Slides        Lie flat or sit with your leg straight. Slide your heel toward your hip. Try to get your knee bent to a 90° angle. Slide your heel back so your leg is straight then relax.       Knee Extension (Lying Down)      While lying down, rest your ankle on a towel roll so that your knee and calf are not touching the floor. Allow gravity to  straighten your knee. Maintain this position for up to 10 minutes.       Knee Extension (Sitting in a Chair)      While sitting in a chair, prop your heel on another chair so that there is nothing behind your calf or knee. Allow gravity to straighten your knee. Maintain this position for up to 10 minute       Quadriceps Isometrics (Quad Sets)        Lie flat or sit with your surgical leg straight. Tighten the muscle in the front of your thigh as much as you can, pushing the back or your knee flat against the floor. Hold this tight for 5 seconds then relax.        Straight Leg Raises (SLR)      Lie flat or sit with your leg straight and your knee brace on (if you have one). You may have your non-operative knee bent slightly for comfort. Perform a Quad set (as above) and flex your toes straight up. Lift your heel off of the floor and hold for at least 5 seconds. Keep your thigh muscle as tight as you can and lower your heel back down then relax.       Seated Knee Flexion        Sit with your legs dangling over the bed. Relax your leg allowing gravity to bend your knee. You may use your non-operative leg to gently push your operative leg into more of a bend. Maintain this position for up to 10 minutes.        Calf Pumps         Point and flex your toes to tighten your calf muscles.               Nozin Instructions  Goal: the goal of Nozin is to reduce the risk of post-procedural infections by bacteria in the nasal cavity. Think of it as hand  for your nose.    How to use:    1. Shake Nozin bottle well    2. Take a cotton swab and apply 4 drops to one tip    3. Insert cotton tip into one nostril, being sure not to go deeper into nose than tip of the swab.    4. Swab nostril 6 times counterclockwise and 6 times clockwise. Make sure to swab the inside front pocket of the nostril.    5. Take swab out and apply 2 drops to the same cotton tip. Repeat steps 3 and 4 in the other nostril.        Do steps 1-5 twice a  day for 7 days.

## 2024-01-04 NOTE — H&P
[facility]  Orthopedics  H&P    Patient Name: Karal Arzola  MRN: [id number]  Admission Date: (Not on file)  Primary Care Provider: Ninfa Frost MD    Patient information was obtained from patient.     Subjective:     Principal Problem: Old complex tear of lateral meniscus of left knee, primary OA left knee    Chief Complaint: left knee pain     HPI: Karla Arzola is a 63 y.o. female who is here today for a  left knee arthroscopy with partial lateral meniscectomy and any indicated procedures.  with Dr. Adams, she was last seen by him on 10/31/2023, reports no change in history since that visit.     To review her history, Karla Arzola is a 63 y.o. year old female patient who initially presented to clinic on 08/12/2022 with pain and dysfunction involving the left knee that began approximately 4-5 months prior with no specific mechanism of injury just a gradual worsening of symptoms. Her symptoms included intermittent sharp pain localized anterolaterally to the knee, intermittent swelling, and difficulty with straightening the knee. Her pain was aggravated by walking long distances, sitting for long periods of time, and going from sitting to standing. She has tried rest, acitivity modification, bracing, and NSAIDs with no improvement of her pain. An MRI was ordered to evaluate for suspected meniscus tear.      Past Medical History:   Diagnosis Date    Allergy     Anxiety     GERD (gastroesophageal reflux disease)     Hypertension     Obesity     Vitamin B 12 deficiency     Vitamin D deficiency disease        Past Surgical History:   Procedure Laterality Date    BREAST BIOPSY Right 10/08/2021    MOUTH SURGERY      right knee scope      TUBAL LIGATION         Review of patient's allergies indicates:  No Known Allergies    No current facility-administered medications for this encounter.     Current Outpatient Medications   Medication Sig    acyclovir (ZOVIRAX) 200 MG capsule Take  1 capsule (200 mg total) by mouth once daily.    albuterol (VENTOLIN HFA) 90 mcg/actuation inhaler Inhale 2 puffs into the lungs every 6 (six) hours as needed for Wheezing. Rescue    amLODIPine (NORVASC) 10 MG tablet Take 1 tablet (10 mg total) by mouth once daily.    azelaic acid (FINACEA) 15 % Foam Apply 1 application topically 2 (two) times daily.    ciclopirox (PENLAC) 8 % Soln Apply to nails once daily    citalopram (CELEXA) 20 MG tablet Take 1 tablet (20 mg total) by mouth once daily.    ergocalciferol (VITAMIN D2) 50,000 unit Cap Take 1 capsule (50,000 Units total) by mouth every 7 days.    fluticasone propionate (FLONASE) 50 mcg/actuation nasal spray 1 spray (50 mcg total) by Each Nostril route once daily.    furosemide (LASIX) 20 MG tablet Take 1 tablet (20 mg total) by mouth daily as needed (swelling).    HYDROcodone-acetaminophen (NORCO) 5-325 mg per tablet Take 1 tablet by mouth every 8 (eight) hours as needed for pain for up to 10 doses    hydroxychloroquine (PLAQUENIL) 200 mg tablet Take 1 tablet (200 mg total) by mouth 2 (two) times daily.    ipratropium-albuteroL (COMBIVENT)  mcg/actuation inhaler Inhale 1 puff into the lungs every 6 (six) hours as needed for Wheezing or Shortness of Breath. Rescue    methocarbamoL (ROBAXIN) 500 MG Tab Take 1 tablet (500 mg total) by mouth nightly as needed.    multivitamin capsule Take 1 capsule by mouth once daily.    naproxen (NAPROSYN) 500 MG tablet Take 1 tablet (500 mg total) by mouth 2 (two) times daily.    sodium,potassium,mag sulfates (SUPREP BOWEL PREP KIT) 17.5-3.13-1.6 gram SolR Take 177 mLs by mouth once daily. for 2 days    tirzepatide, weight loss, (ZEPBOUND) 2.5 mg/0.5 mL PnIj Inject 2.5 mg into the skin every 7 days.     Family History       Problem Relation (Age of Onset)    Diabetes Mother    Heart failure Brother    Hyperlipidemia Father    Hypertension Mother, Father, Sister, Brother, Maternal Grandmother    Kidney disease Father    Lung  cancer Maternal Grandfather    No Known Problems Brother, Brother    Stroke Mother          Tobacco Use    Smoking status: Never     Passive exposure: Never    Smokeless tobacco: Never   Substance and Sexual Activity    Alcohol use: Yes     Comment: socially 1x weekly    Drug use: No    Sexual activity: Not Currently     Partners: Male     Birth control/protection: Abstinence     ROS  Objective:     Vital Signs (Most Recent):    Vital Signs (24h Range):              There is no height or weight on file to calculate BMI.    No intake or output data in the 24 hours ending 01/03/24 2012    Ortho/SPM Exam  Standing exam  stance: normal alignment, no significant leg-length discrepancy  gait: antalgic, presents wearing short CAM walking boot on right ankle, compression sleeve present on left knee     Knee                                                  RIGHT             LEFT  Skin:                                         Intact               Intact  ROM:                                        0-130              5-120  Effusion:                                   Neg                  +  Medial joint line tenderness:    Neg                  +  Lateral joint line tenderness:    Neg                  +  Paulino:                                Neg                  +  Patella crepitus:                       Neg                  Neg  Patella tenderness:                  Neg                  Neg  Patella grind:                            Neg                  Neg  Lachman:                                 Neg                  Neg  Valgus stress:                          Neg                  Neg  Varus stress:                            Neg                  Neg  Posterior drawer:                     Neg                  Neg  N-V                                          intact               intact  Hip:                                          nml                    nml              Lower extremity edema:         Negative           negative        Neurovascular exam  - motor function grossly intact bilaterally to hip flexion, knee extension and flexion, ankle dorsiflexion and plantarflexion  - sensation intact to light touch bilaterally to femoral, tibial, tibial and peroneal distributions  - symmetrical pedal pulses     Imaging:   XR Results:  Results for orders placed during the hospital encounter of 08/12/22     X-ray Knee Ortho Left with Flexion     Narrative  EXAMINATION:  XR KNEE ORTHO LEFT WITH FLEXION     CLINICAL HISTORY:  Pain in left knee     TECHNIQUE:  AP standing views of both knees, AP flexion views of both knees, lateral view of the left knee and Merchant views of both knees     COMPARISON:  10/02/2020     FINDINGS:  The joint spaces of all 3 compartments of the left knee appear to be relatively well maintained.  Mild-to-moderate joint space narrowing seen involving the medial compartment of the right knee with minimal marginal osteophyte formation noted.  No left-sided joint effusion.  No acute fracture or dislocation.     Impression  1.  As above        Electronically signed by:     Nba Doe DO  Date:                                                08/12/2022  Time:                                               14:43        MRI Results:  Results for orders placed during the hospital encounter of 12/13/22     MRI Knee Without Contrast Left     Narrative  EXAMINATION:  MRI KNEE WITHOUT CONTRAST LEFT     CLINICAL HISTORY:  Meniscal tear, untreated, new symptoms; Other tear of medial meniscus, current injury, left knee, initial encounter     TECHNIQUE:  Left knee MRI without IV contrast.     COMPARISON:  Left knee radiographs 08/12/2022     FINDINGS:  ACL, PCL, MCL, and LCL complex are intact.     Medial meniscus is normal.     Somewhat complex horizontal tear courses through anterior horn and anterior body of lateral meniscus.  No displaced meniscal fragment.     Subcortical moderate increased T2 and low T1 bone marrow  signal alteration affects anterior aspect of lateral tibial plateau.     Moderate cartilage thinning occurs diffusely in the lateral compartment.  Medial and patellofemoral compartment articular cartilage is normal.  Bone marrow signal is otherwise normal.     The extensor mechanism is intact.  Moderate left knee joint effusion is present.  Popliteal fossa is unremarkable.     Mild subcutaneous edema is noted about the left knee, most prominent anteriorly.     Impression  1. Complex horizontal tear of anterior horn and body of lateral meniscus.  2. Moderate cartilage thinning throughout the lateral compartment.  3. Focal bone marrow edema along anterior aspect of lateral tibial plateau, either related to altered mechanics/chronic repetitive stress or reactive/degenerative bone marrow edema.        Electronically signed by:     Doug Hensley MD  Date:                                                12/13/2022  Time:                                               19:25        Physician Read: I agree with the above impression.       Significant Labs: All pertinent labs within the past 24 hours have been reviewed.    Significant Imaging: I have reviewed and interpreted all pertinent imaging results/findings.    Assessment/Plan:     There are no hospital problems to display for this patient.    Plan:  left knee arthroscopy with partial lateral meniscectomy and any indicated procedures.       Mara Akbar PA-C  Orthopedics  [facility]

## 2024-01-04 NOTE — ADDENDUM NOTE
Addendum  created 01/04/24 0913 by Albania Keating CRNA    Attestation recorded in Intraprocedure, Intraprocedure Attestations filed

## 2024-01-04 NOTE — ANESTHESIA POSTPROCEDURE EVALUATION
Anesthesia Post Evaluation    Patient: Karla Arzola    Procedure(s) Performed: Procedure(s) (LRB):  ARTHROSCOPY, KNEE, WITH MENISCECTOMY (Left)    Final Anesthesia Type: general      Patient location during evaluation: PACU  Patient participation: Yes- Able to Participate  Level of consciousness: awake  Post-procedure vital signs: reviewed and stable  Pain management: adequate  Airway patency: patent    PONV status at discharge: No PONV  Anesthetic complications: no      Cardiovascular status: stable  Respiratory status: unassisted  Hydration status: euvolemic  Follow-up not needed.              Vitals Value Taken Time   /64 01/04/24 0834   Temp 36.6 °C (97.9 °F) 01/04/24 0814   Pulse 78 01/04/24 0837   Resp 20 01/04/24 0837   SpO2 100 % 01/04/24 0837   Vitals shown include unvalidated device data.      No case tracking events are documented in the log.      Pain/Marina Score: Marina Score: 8 (1/4/2024  8:14 AM)

## 2024-01-04 NOTE — DISCHARGE SUMMARY
[facility]  Discharge Note  Short Stay    Procedure(s) (LRB):  ARTHROSCOPY, KNEE, WITH MENISCECTOMY (Left)      OUTCOME: Patient tolerated treatment/procedure well without complication and is now ready for discharge.    DISPOSITION: Home or Self Care    FINAL DIAGNOSIS:  left knee lateral meniscus tear    FOLLOWUP: In clinic    DISCHARGE INSTRUCTIONS:    Discharge Procedure Orders   Ambulatory referral/consult to Pre-Admit   Standing Status: Future   Referral Priority: Routine Referral Type: Consultation   Referral Reason: Specialty Services Required   Requested Specialty: Internal Medicine   Number of Visits Requested: 1        TIME SPENT ON DISCHARGE: 10 minutes  
No

## 2024-01-04 NOTE — ANESTHESIA PROCEDURE NOTES
Intubation    Date/Time: 1/4/2024 7:02 AM    Performed by: Albania Keating CRNA  Authorized by: Matt Bishop MD    Intubation:     Induction:  Intravenous    Intubated:  Postinduction    Mask Ventilation:  Moderately difficult with oral airway    Attempts:  1    Attempted By:  CRNA    Method of Intubation:  Video laryngoscopy    Blade:  Nicole 3    Laryngeal View Grade: Grade I - full view of cords      Difficult Airway Encountered?: No      Complications:  None    Airway Device:  Oral endotracheal tube    Airway Device Size:  7.0    Style/Cuff Inflation:  Cuffed (inflated to minimal occlusive pressure)    Inflation Amount (mL):  6    Tube secured:  22    Placement Verified By:  Capnometry    Complicating Factors:  Obesity    Findings Post-Intubation:  BS equal bilateral and atraumatic/condition of teeth unchanged

## 2024-01-04 NOTE — TRANSFER OF CARE
"Anesthesia Transfer of Care Note    Patient: Karla Arzola    Procedure(s) Performed: Procedure(s) (LRB):  ARTHROSCOPY, KNEE, WITH MENISCECTOMY (Left)    Patient location: PACU    Anesthesia Type: general    Transport from OR: Transported from OR on room air with adequate spontaneous ventilation    Post pain: adequate analgesia    Post assessment: no apparent anesthetic complications    Post vital signs: stable    Level of consciousness: awake    Complications: none    Transfer of care protocol was followed      Last vitals: Visit Vitals  /70 (BP Location: Right arm, Patient Position: Sitting)   Pulse 85   Temp 36.2 °C (97.2 °F) (Temporal)   Resp 16   Ht 5' 6" (1.676 m)   Wt 110 kg (242 lb 8.1 oz)   LMP 06/11/2015   SpO2 100%   Breastfeeding No   BMI 39.14 kg/m²     "

## 2024-01-04 NOTE — OP NOTE
ORTHOPAEDIC SURGERY OPERATIVE REPORT    PRIMARY SURGEON: Larry Adams    DATE OF SURGERY: 1/4/2024    PREOPERATIVE DIAGNOSIS:   Left knee lateral meniscus tear    POSTOPERATIVE DIAGNOSIS:   Left knee lateral meniscus tear    PROCEDURE PERFORMED:   Left knee arthroscopic partial lateral meniscectomy     ESTIMATED BLOOD LOSS: Minimal.     COMPLICATIONS: None.     TOURNIQUET TIME: 27 minutes.     IMPLANTS: None    INDICATIONS FOR PROCEDURE: Karla Arzola is a 63 y.o. female who has had ongoing left knee pain which had been limiting to activity. More recently the patient has been experiencing mechanical symptoms with catching and locking.  An MRI was performed that showed a lateral meniscus tear that we felt would be amenable to arthroscopic debridement.  The patient decided to opt for surgery after failing conservative management.    OPERATIVE REPORT: [facility]. The patient was seen in the preoperative holding area and the operative leg was marked, consents were reviewed, and any questions were answered for the patient. The patient was then taken back to the operating room, placed supine on the operating table with all bony prominences padded. Then a nonsterile tourniquet was placed around the upper thigh. The patient was induced under general anesthesia. Then the lower extremity was prepped and draped in standard sterile fashion. A timeout was performed and preop antibiotics were given.  The leg was exsanguinated with an Esmarch and the tourniquet inflated to 300 mmHg.     The knee was then flexed and the inferolateral portal was created with an #11 blade scalpel. The camera was introduced, using the blunt trocar, into the suprapatellar pouch. The undersurface of the patella and the trochlear groove were visualized and found to be intact. The camera was then taken down into the lateral gutter, where no loose bodies or plica were found. The  camera was then brought into the medial gutter, where no loose bodies were seen. At this point an anteromedial portal was created from outside in under direct visualization using an #11 blade scalpel. A shaver was introduced and the fat pad was partially excised to allow visualization of the medial and lateral compartments.    The medial compartment was visualized and found to have grade 4 cartilage changes. We visualized the meniscus and it was found to be intact and stable to probing. There was a small defect near the posterior horn but this was stable to probing.     The camera was turned to the notch where the ACL was found to be intact. We then placed the camera into the lateral compartment. Overall the femoral cartilage was intact with grade 2 changes but the tibia had grade 3-4 changes. The lateral meniscus was visualized and probed and found to have a complex tear of the body and anterior horn. A combination of biters and shaver was used to debride the meniscus back to a stable rim.    The knee was once more examined for loose bodies, of which none were found. Final pictures were taken.    The knee was then evacuated of all fluids. The portals were closed with 3-0 nylon interrupted sutures. 15mL of 0.25% Bupivicaine were infiltrated around each portal for a total of 30mL.  Xeroform sterile dressing was applied along with an Ace bandage, and the tourniquet was deflated after 27 minutes. The patient was awakened from anesthesia and taken to the postoperative care unit in stable condition.     Plan:  Knee arthroscopy protocol  WBAT LLE  Full ROM  ASA 81mg BID x 2 weeks for dvt ppx          Larry Adams MD

## 2024-01-09 ENCOUNTER — CLINICAL SUPPORT (OUTPATIENT)
Dept: REHABILITATION | Facility: HOSPITAL | Age: 64
End: 2024-01-09
Attending: STUDENT IN AN ORGANIZED HEALTH CARE EDUCATION/TRAINING PROGRAM
Payer: COMMERCIAL

## 2024-01-09 DIAGNOSIS — Z74.09 DECREASED FUNCTIONAL MOBILITY AND ENDURANCE: ICD-10-CM

## 2024-01-09 DIAGNOSIS — M17.12 PRIMARY OSTEOARTHRITIS OF LEFT KNEE: ICD-10-CM

## 2024-01-09 DIAGNOSIS — R26.9 GAIT ABNORMALITY: ICD-10-CM

## 2024-01-09 DIAGNOSIS — M23.201 OLD COMPLEX TEAR OF LATERAL MENISCUS OF LEFT KNEE: ICD-10-CM

## 2024-01-09 DIAGNOSIS — M25.662 DECREASED RANGE OF MOTION OF LEFT KNEE: Primary | ICD-10-CM

## 2024-01-09 PROCEDURE — 97162 PT EVAL MOD COMPLEX 30 MIN: CPT

## 2024-01-09 PROCEDURE — 97110 THERAPEUTIC EXERCISES: CPT

## 2024-01-09 NOTE — PLAN OF CARE
[facility]   Physical Therapy Initial Evaluation      Date: 1/9/2024   Name: Karla Arzola  Clinic Number: [id number]    Therapy Diagnosis:    Encounter Diagnoses   Name Primary?    Primary osteoarthritis of left knee     Old complex tear of lateral meniscus of left knee     Decreased range of motion of left knee Yes    Decreased functional mobility and endurance     Gait abnormality       Physician: Larry Adams*     Physician Orders: PT Eval and Treat  Medical Diagnosis from Referral:  Primary osteoarthritis of left knee [M17.12], Old complex tear of lateral meniscus of left knee [M23.201]   Evaluation Date: 1/9/2024  Authorization Period Expiration: 10/30/2024   Plan of Care Expiration: 4/9/2024  Progress Note Due: 2/9/2024  Visit # / Visits authorized: 1/1   FOTO: 1/3 (last performed on 1/9/2024)    Precautions: Standard    Time In: 0705  Time Out: 0800  Total Billable Time (timed & untimed codes): 50 minutes    Subjective     Date of onset: 1/4/2024    History of current condition - Karla reports to PT following recent meniscectomy. States she is feeling okay today.  Notes fluctuating pain prior to surgery but has not been having as much pain since surgery. Max 8/10 pain prior to surgery. Was able to wean off crutches by Sunday. Had pre-op PT and was doing better overall but states symptoms regressed back to normal prior to surgery.       Imaging: [] Xray [x] MRI [] CT: Performed on: knee on 12/13/2022    Pain:  Current 2/10, worst 4/10, best 0/10   Location: [] Right   [x] Left:  Knee  Description: aching  and stinging  Aggravating Factors: walking, when pain medication wears off (taking 2x per day)   Easing Factors: activity avoidance, rest, tylenol PRN, celebrex, aspirin, roxycodone     Prior Therapy:   [] N/A    [x] Yes:   Social History: Pt lives with their family  Occupation: Pt is not working   Prior Level of Function: Independent and pain free with all ADL,  IADL, community mobility and functional activities.   Current Level of Function: patient is currently ambulating without assistive device but notes increased pain and requires increased time. Not currently working due to post-operative state. Needs help with sit to stand and household chores but is able to dress and perform hygiene tasks independently     Dominant Extremity:    [x] Right    [] Left    Pts goals: Pt reported goals are to decrease overall pain levels in order to return to prior functional level.      Medical History:   Past Medical History:   Diagnosis Date    Allergy     Anxiety     GERD (gastroesophageal reflux disease)     Hypertension     Obesity     Vitamin B 12 deficiency     Vitamin D deficiency disease        Surgical History:   Karla Arzola  has a past surgical history that includes Tubal ligation; right knee scope; Mouth surgery; Breast biopsy (Right, 10/08/2021); and Knee arthroscopy w/ meniscectomy (Left, 1/4/2024).    Medications:   Karla has a current medication list which includes the following prescription(s): acetaminophen, acyclovir, albuterol, amlodipine, aspirin, finacea, celecoxib, ciclopirox, citalopram, ergocalciferol, fluticasone propionate, furosemide, hydrocodone-acetaminophen, hydroxychloroquine, ipratropium-albuterol, methocarbamol, multivitamin, naproxen, oxycodone, and zepbound, and the following Facility-Administered Medications: lactated ringers.    Allergies:   Review of patient's allergies indicates:  No Known Allergies     Objective        GIRTH/EDEMA:     KNEE Mid Patella  (cm) 3 in Supperior (cm) 6 in Inferior (cm)   RIGHT 44.3 51 38.9   LEFT 43.8 52.5 36.7             RANGE OF MOTION:   Knee AROM/PROM Right   Left   Goal   Hyper - Zero - Flexion (PRE)   0-2 -120 0-5-94 0-0-120   Hyper - Zero - Flexion (POST) NT NT N/A         STRENGTH:   L/E MMT Right  (spine) Left Pain/Dysfunction with Movement Goal   Modified (90/90) Abdominal Strength  NT ---     Hip  Flexion  NT NT  4+/5 B   Hip Extension  NT NT  4+/5 B   Hip Abduction  NT NT  4+/5 B   Knee Extension NT NT  5/5 B   Knee Flexion NT NT  5/5 B   Hip IR NT NT  4+/5 B   Hip ER NT NT  4+/5 B   Ankle DF NT NT  5/5 B   Ankle PF NT NT  5/5 B   Ankle Inversion NT NT  5/5 B   Ankle Eversion NT NT  5/5 B                            SENSATION  [x] Intact to Light Touch   [] Impaired:      PALPATION: Muscles: Increased tone and tenderness to palpation of: L knee grossly.       POSTURE:  Pt presents with postural abnormalities which include:    [x] Forward Head   [] Increased Lumbar Lordosis   [x] Rounded Shoulder   [] Genu Recurvatum   [x] Increased Thoracic Kyphosis [] Genu Valgus   [] Trunk Deviated    [] Pes Planus   [] Scapular Winging    [] Other:         FUNCTIONAL MOVEMENT PATTERNS  Movement Analysis Notes   Bed Mobility  []Functional  [x]Dysfunctional:  [x]Painful  []Non-Painful       Transfers []Functional  [x]Dysfunctional:  [x]Painful  []Non-Painful       Sit to Stand []Functional  [x]Dysfunctional:  [x]Painful  []Non-Painful       Squat []Functional  []Dysfunctional:  []Painful  []Non-Painful    Not tested    Single Leg Squat  []Functional  []Dysfunctional:  []Painful  []Non-Painful    Not tested    Step Down  []Functional  []Dysfunctional:  []Painful  []Non-Painful    Not tested          Function:     Intake Outcome Measure for FOTO Knee Survey    Therapist reviewed FOTO scores for Karla on 1/9/2024.   FOTO report - see Media section or FOTO account for episode details    Intake Score: 50%         Treatment     Total Treatment time (time-based codes) separate from Evaluation: (30) minutes     Karla received the treatments listed below:        THERAPEUTIC EXERCISES to develop strength, endurance, ROM, flexibility, posture, and core stabilization for (25) minutes including:    Performed Today:     Heel prop 3 minutes  Stretches   Gastrocnemius 3x30s   Hamstring 3x30s   NMES: 3 minutes each   Quadriceps sets   SAQ    Heel slides 3 minutes   Weight shift   Lateral 20x   Forward 30x    Interventions DEFERRED today                  Patient Education and Home Exercises     Education provided: (5) minutes  PURPOSE: Patient educated on the impairments noted above and the effects of physical therapy intervention to improve overall condition and QOL.   EXERCISE: Patient was educated on all the above exercise prior/during/after for proper posture, positioning, and execution for safe performance with home exercise program.   STRENGTH: Patient educated on the importance of improved core and extremity strength in order to improve alignment of the spine and extremities with static positions and dynamic movement.   GAIT & BALANCE: Patient educated on the importance of strong core and lower extremity musculature in order to improve both static and dynamic balance, improve gait mechanics, reduce fall risk and improve household and community mobility.   POST-OP PRECAUTIONS: patient educated on post-operative precautions in order to protect surgical repair, decrease risk of injury and promote healing.     Written Home Exercises Provided: yes.  Exercises were reviewed and Karla was able to demonstrate them prior to the end of the session.  Karla demonstrated good  understanding of the education provided. See EMR under Patient Instructions for exercises provided during therapy sessions.    Assessment     Karla is a 63 y.o. female referred to outpatient Physical Therapy with a medical diagnosis of primary OA of left knee and an old complex tear of the left meniscus. Pt presents with impairments in the following categories: IMPAIRMENTS: ROM, strength, endurance, balance, gait mechanics, core strength and stability, functional movement patterns, and scar tissue    Pt prognosis is Excellent  Pt will benefit from skilled outpatient Physical Therapy to address the deficits stated above and in the chart below, provide pt/family education, and to  "maximize pt's level of independence.     Plan of care discussed with patient: Yes  Pt's spiritual, cultural and educational needs considered and patient is agreeable to the plan of care and goals as stated below:     Anticipated Barriers for therapy: none    Medical Necessity is demonstrated by the following  History  Co-morbidities and personal factors that may impact the plan of care [] LOW: no personal factors / co-morbidities  [x] MODERATE: 1-2 personal factors / co-morbidities  [] HIGH: 3+ personal factors / co-morbidities    Moderate / High Support Documentation:   Past Medical History:   Diagnosis Date    Allergy     Anxiety     GERD (gastroesophageal reflux disease)     Hypertension     Obesity     Vitamin B 12 deficiency     Vitamin D deficiency disease         Examination  Body Structures and Functions, activity limitations and participation restrictions that may impact the plan of care [] LOW: addressing 1-2 elements  [] MODERATE: 3+ elements  [x] HIGH: 4+ elements (please support below)    Moderate / High Support Documentation: See above in "Current Level of Function"      Clinical Presentation [] LOW: stable  [x] MODERATE: Evolving  [] HIGH: Unstable     Decision Making/ Complexity Score: moderate         Short Term Goals:  6 weeks Status  Date Met   PAIN: Pt will report worst pain of 3/10 in order to progress toward max functional ability and improve quality of life. [x] Progressing  [] Met  [] Not Met    FUNCTION: Patient will demonstrate improved function as indicated by a score of greater than or equal to 57 out of 100 on FOTO. [x] Progressing  [] Met  [] Not Met    MOBILITY: Patient will improve AROM to 50% of stated goals, listed in objective measures above, in order to progress towards independence with functional activities.  [x] Progressing  [] Met  [] Not Met    STRENGTH: Patient will improve strength to 50% of stated goals, listed in objective measures above, in order to progress towards " independence with functional activities. [x] Progressing  [] Met  [] Not Met    POSTURE: Patient will correct postural deviations in sitting and standing, to decrease pain and promote long term stability.  [x] Progressing  [] Met  [] Not Met    HEP: Patient will demonstrate independence with HEP in order to progress toward functional independence. [x] Progressing  [] Met  [] Not Met      Long Term Goals:  12 weeks Status Date Met   PAIN: Pt will report worst pain of 1/10 in order to progress toward max functional ability and improve quality of life [x] Progressing  [] Met  [] Not Met    FUNCTION: Patient will demonstrate improved function as indicated by a score of greater than or equal to 64 out of 100 on FOTO. [x] Progressing  [] Met  [] Not Met    MOBILITY: Patient will improve AROM to stated goals, listed in objective measures above, in order to return to maximal functional potential and improve quality of life.  [x] Progressing  [] Met  [] Not Met    STRENGTH: Patient will improve strength to stated goals, listed in objective measures above, in order to improve functional independence and quality of life.  [x] Progressing  [] Met  [] Not Met    Patient will return to normal ADL's, IADL's, community involvement, recreational activities, and work-related activities with less than or equal to 1/10 pain and maximal function.  [x] Progressing  [] Met  [] Not Met      Plan     Plan of care Certification: 1/9/2024 to 4/9/2024.    Outpatient Physical Therapy 2 times weekly for 12 weeks to include any combination of the following interventions: virtual visits, dry needling, modalities, electrical stimulation (IFC, Pre-Mod, Attended with Functional Dry Needling), Cervical/Lumbar Traction, Gait Training, Manual Therapy, Neuromuscular Re-ed, Patient Education, Self Care, Therapeutic Exercise, and Therapeutic Activites     Kaitlin Arzola, PT, DPT

## 2024-01-12 ENCOUNTER — CLINICAL SUPPORT (OUTPATIENT)
Dept: REHABILITATION | Facility: HOSPITAL | Age: 64
End: 2024-01-12
Payer: COMMERCIAL

## 2024-01-12 DIAGNOSIS — M25.662 DECREASED RANGE OF MOTION OF LEFT KNEE: ICD-10-CM

## 2024-01-12 DIAGNOSIS — R26.89 POOR BALANCE: Primary | ICD-10-CM

## 2024-01-12 DIAGNOSIS — Z74.09 DECREASED FUNCTIONAL MOBILITY AND ENDURANCE: ICD-10-CM

## 2024-01-12 DIAGNOSIS — R26.9 GAIT ABNORMALITY: ICD-10-CM

## 2024-01-12 PROCEDURE — 97112 NEUROMUSCULAR REEDUCATION: CPT

## 2024-01-12 PROCEDURE — 97110 THERAPEUTIC EXERCISES: CPT

## 2024-01-12 NOTE — PROGRESS NOTES
Orthopaedics  Post-operative follow-up    Procedure Performed:   Left knee arthroscopic partial lateral meniscectomy     Date of Surgery: 1/4/24    Subjective: Karla Arzola is now almost 2 weeks out from her knee surgery.  She is doing well with no specific complaints other than the expected post-operative pain and stiffness. She has started PT at the [facility]. Still taking celebrex and tylenol as needed for pain    Exam:  Sutures removed, portal sites benign with no drainage or redness  Minimal effusion  Knee ROM full extension to 100 degrees flexion  Veronica's sign negative and no calf tenderness  Motor and sensory intact distally    Impression:  S/p left knee arthroscopic partial lateral meniscectomy, initial post-operative visit - doing well    Plan:  Discussed surgical findings, operative procedure  Reviewed post-operative instructions, rehabilitation  Weight bearing status: WBAT  ROM as tolerated  Symptomatic treatment for pain / swelling  Refilled celebrex  Instructed patient to call clinic if questions or concerns    Follow-up with Dr. Adams in 1 month          Mara Akbar PA-C  Sports Medicine Physician Assistant       Disclaimer: This note was prepared using a voice recognition system and is likely to have sound alike errors within the text.

## 2024-01-16 NOTE — PROGRESS NOTES
[facility]   Physical Therapy Treatment Note        Name: Karla Arzola  Clinic Number: [id number]    Therapy Diagnosis:   Encounter Diagnoses   Name Primary?    Poor balance Yes    Decreased range of motion of left knee     Decreased functional mobility and endurance     Gait abnormality      Physician: Larry Adams*    Visit Date: 1/12/2024    Physician Orders: PT Eval and Treat  Medical Diagnosis from Referral:  Primary osteoarthritis of left knee [M17.12], Old complex tear of lateral meniscus of left knee [M23.201]   Evaluation Date: 1/9/2024  Authorization Period Expiration: 10/30/2024   Plan of Care Expiration: 4/9/2024  Progress Note Due: 2/9/2024  Visit # / Visits authorized: 1/20 (+1 for evaluation)   FOTO: 1/3 (last performed on 1/9/2024)     Precautions: Standard    Time In: 0700  Time Out: 0758  Total Billable Time: 48 minutes (Billing reflects 1 on 1 treatment time spent with patient)    Subjective     Patient reports: she is feeling okay. Has been focusing on weight bearing and proper gait mechanics     He/She was compliant with home exercise program.  Response to previous treatment: mild increased soreness following exercises   Functional change: increased knee A/PROM. Improved quadriceps activation and active knee extension    Pain: NT/10     Location: L knee    Objective      Objective Measures updated at progress report or POC update only unless otherwise noted.       Treatment     Karla received the treatments listed below:       MANUAL THERAPY TECHNIQUES were applied for (0) minutes, including:    Soft tissue mobilization:     Joint mobilizations:     Functional dry needling: DEFERRED        THERAPEUTIC EXERCISES to develop strength, endurance, ROM, flexibility, posture, and core stabilization for (23) minutes including:    Performed Today:     Recumbent bike: 7 minutes for ROM   Knee PROM   Calf stretch 3x30s   Hamstring stretch (seated) 3x30s   Heel  prop: 3# for 5 minutes   Heel slides: 3 minutes x 5s holds      Interventions DEFERRED today                  NEUROMUSCULAR RE-EDUCATION ACTIVITIES to improve Balance, Coordination, Kinesthetic, Sense, Proprioception, and Posture for (25) minutes.  The following were included:    Performed Today:     Quadriceps sets with strap: 3 minutes x 10s holds   NMES: 4 minutes each   Quadriceps sets   SAQ   LAQ   TKE- purple cord   Weight shift to single leg stance: 3 minutes x 5s holds      Interventions DEFERRED today                  THERAPEUTIC ACTIVITIES to improve dynamic and functional  performance for (0) minutes including:    Performed Today:      Interventions DEFERRED today                  Next Session:  Scar mobilizations   Joint mobilizations   Shuttle    Heel/toe raises or ankle 4 way   Hamstring curls       Patient Education and Home Exercises       Home Exercises Provided and Patient Education Provided     Education provided: (time included with treatment) minutes  PURPOSE: Patient educated on the impairments noted above and the effects of physical therapy intervention to improve overall condition and QOL.   EXERCISE: Patient was educated on all the above exercise prior/during/after for proper posture, positioning, and execution for safe performance with home exercise program.   STRENGTH: Patient educated on the importance of improved core and extremity strength in order to improve alignment of the spine and extremities with static positions and dynamic movement.   GAIT & BALANCE: Patient educated on the importance of strong core and lower extremity musculature in order to improve both static and dynamic balance, improve gait mechanics, reduce fall risk and improve household and community mobility.     Written Home Exercises Provided: yes.  Exercises were reviewed and Karla was able to demonstrate them prior to the end of the session.  Karla demonstrated good  understanding of the education provided. See EMR  under Patient Instructions for exercises provided during therapy sessions.    Assessment     Pt tolerated session well today. Significant improvements noted in knee A/PROM with decreased pain reported. Incorporated weight shifts and pt was able to quickly transition to single leg stance with mild  discomfort noted. Improved quadriceps activation noted with quadriceps sets today. Added LAQ and TKE with NMES to further improve quadriceps activation to improved active extension noted     Karla is progressing well towards her goals.   Patient prognosis is Excellent.     Patient will continue to benefit from skilled outpatient physical therapy to address the deficits listed in the problem list box on initial evaluation, provide pt/family education and to maximize patient's level of independence in the home and community environment.     Patient's spiritual, cultural and educational needs considered and pt agreeable to plan of care and goals.       Anticipated Barriers for therapy: none       Short Term Goals:  6 weeks Status  Date Met   PAIN: Pt will report worst pain of 3/10 in order to progress toward max functional ability and improve quality of life. [x] Progressing  [] Met  [] Not Met     FUNCTION: Patient will demonstrate improved function as indicated by a score of greater than or equal to 57 out of 100 on FOTO. [x] Progressing  [] Met  [] Not Met     MOBILITY: Patient will improve AROM to 50% of stated goals, listed in objective measures above, in order to progress towards independence with functional activities.  [x] Progressing  [] Met  [] Not Met     STRENGTH: Patient will improve strength to 50% of stated goals, listed in objective measures above, in order to progress towards independence with functional activities. [x] Progressing  [] Met  [] Not Met     POSTURE: Patient will correct postural deviations in sitting and standing, to decrease pain and promote long term stability.  [x] Progressing  [] Met  []  Not Met     HEP: Patient will demonstrate independence with HEP in order to progress toward functional independence. [x] Progressing  [] Met  [] Not Met        Long Term Goals:  12 weeks Status Date Met   PAIN: Pt will report worst pain of 1/10 in order to progress toward max functional ability and improve quality of life [x] Progressing  [] Met  [] Not Met     FUNCTION: Patient will demonstrate improved function as indicated by a score of greater than or equal to 64 out of 100 on FOTO. [x] Progressing  [] Met  [] Not Met     MOBILITY: Patient will improve AROM to stated goals, listed in objective measures above, in order to return to maximal functional potential and improve quality of life.  [x] Progressing  [] Met  [] Not Met     STRENGTH: Patient will improve strength to stated goals, listed in objective measures above, in order to improve functional independence and quality of life.  [x] Progressing  [] Met  [] Not Met     Patient will return to normal ADL's, IADL's, community involvement, recreational activities, and work-related activities with less than or equal to 1/10 pain and maximal function.  [x] Progressing  [] Met  [] Not Met           Plan     Continue Plan of Care (POC) and progress per patient tolerance. See treatment section for details on planned progressions next session.      Kaitlin Arzola, PT

## 2024-01-17 ENCOUNTER — CLINICAL SUPPORT (OUTPATIENT)
Dept: REHABILITATION | Facility: HOSPITAL | Age: 64
End: 2024-01-17
Payer: COMMERCIAL

## 2024-01-17 ENCOUNTER — TELEPHONE (OUTPATIENT)
Dept: PSYCHIATRY | Facility: CLINIC | Age: 64
End: 2024-01-17
Payer: COMMERCIAL

## 2024-01-17 ENCOUNTER — OFFICE VISIT (OUTPATIENT)
Dept: SPORTS MEDICINE | Facility: CLINIC | Age: 64
End: 2024-01-17
Payer: COMMERCIAL

## 2024-01-17 VITALS — WEIGHT: 242 LBS | HEIGHT: 66 IN | BODY MASS INDEX: 38.89 KG/M2 | RESPIRATION RATE: 17 BRPM

## 2024-01-17 DIAGNOSIS — R26.89 POOR BALANCE: Primary | ICD-10-CM

## 2024-01-17 DIAGNOSIS — Z74.09 DECREASED FUNCTIONAL MOBILITY AND ENDURANCE: ICD-10-CM

## 2024-01-17 DIAGNOSIS — M25.662 DECREASED RANGE OF MOTION OF LEFT KNEE: ICD-10-CM

## 2024-01-17 DIAGNOSIS — Z87.828 STATUS POST ARTHROSCOPIC PARTIAL LATERAL MENISCECTOMY OF LEFT KNEE: Primary | ICD-10-CM

## 2024-01-17 DIAGNOSIS — R26.9 GAIT ABNORMALITY: ICD-10-CM

## 2024-01-17 DIAGNOSIS — Z98.890 STATUS POST ARTHROSCOPIC PARTIAL LATERAL MENISCECTOMY OF LEFT KNEE: Primary | ICD-10-CM

## 2024-01-17 PROCEDURE — 1159F MED LIST DOCD IN RCRD: CPT | Mod: CPTII,S$GLB,, | Performed by: PHYSICIAN ASSISTANT

## 2024-01-17 PROCEDURE — 97112 NEUROMUSCULAR REEDUCATION: CPT

## 2024-01-17 PROCEDURE — 97110 THERAPEUTIC EXERCISES: CPT

## 2024-01-17 PROCEDURE — 97140 MANUAL THERAPY 1/> REGIONS: CPT

## 2024-01-17 PROCEDURE — 99024 POSTOP FOLLOW-UP VISIT: CPT | Mod: S$GLB,,, | Performed by: PHYSICIAN ASSISTANT

## 2024-01-17 PROCEDURE — 99999 PR PBB SHADOW E&M-EST. PATIENT-LVL IV: CPT | Mod: PBBFAC,,, | Performed by: PHYSICIAN ASSISTANT

## 2024-01-17 RX ORDER — CELECOXIB 200 MG/1
200 CAPSULE ORAL 2 TIMES DAILY
Qty: 60 CAPSULE | Refills: 0 | Status: SHIPPED | OUTPATIENT
Start: 2024-01-17 | End: 2024-04-09 | Stop reason: ALTCHOICE

## 2024-01-17 NOTE — LETTER
January 17, 2024      [facility]  [id number]  [place] [address]  Phone: [phone]  Fax: [phone]       Patient: Karla Arzola   YOB: 1960  Date of Visit: 01/17/2024    To Whom It May Concern:    Huma Arzola  was at [facility] on 01/17/2024. The patient may return to work on 1/22/2023 with the following restrictions:  -please allow light duty desk work upon return to work  -avoid prolonged standing  -avoid prolonged ambulation     If you have any questions or concerns, or if I can be of further assistance, please do not hesitate to contact me.    Sincerely,        Mara Akbar PA-C

## 2024-01-17 NOTE — PROGRESS NOTES
[facility]   Physical Therapy Treatment Note        Name: Karla Arzola  Clinic Number: [id number]    Therapy Diagnosis:   Encounter Diagnoses   Name Primary?    Poor balance Yes    Decreased range of motion of left knee     Decreased functional mobility and endurance     Gait abnormality        Physician: Larry Adams    Visit Date: 1/17/2024    Physician Orders: PT Eval and Treat  Medical Diagnosis from Referral:  Primary osteoarthritis of left knee [M17.12], Old complex tear of lateral meniscus of left knee [M23.201]   Evaluation Date: 1/9/2024  Authorization Period Expiration: 10/30/2024   Plan of Care Expiration: 4/9/2024  Progress Note Due: 2/9/2024  Visit # / Visits authorized: 2/20 (+1 for evaluation)   FOTO: 1/3 (last performed on 1/9/2024)     Precautions: Standard    Time In: 0900  Time Out: 1005  Total Billable Time: 65 minutes (Billing reflects 1 on 1 treatment time spent with patient)    Subjective     Patient reports: she feels she is walking better recently, and is making a conscious effort to do so. She reports soreness today and after previous session.    He/She was compliant with home exercise program.  Response to previous treatment: mild increased soreness following exercises   Functional change: increased knee A/PROM. Improved quadriceps activation and active knee extension    Pain: 5/10     Location: L knee    Objective      Objective Measures updated at progress report or POC update only unless otherwise noted.       Treatment     Karla received the treatments listed below:       MANUAL THERAPY TECHNIQUES were applied for (12) minutes, including:    Soft tissue mobilization:     Joint mobilizations:   Tibiofemoral glides  Patellofemoral glides and tilts in all directions   Functional dry needling: DEFERRED        THERAPEUTIC EXERCISES to develop strength, endurance, ROM, flexibility, posture, and core stabilization for (43) minutes  including:    Performed Today:     Recumbent bike: 5 mins (full revolutions)  Calf stretch 3x30s   Hamstring stretch (seated) 3x30s   Heel prop: 3# for 5 minutes   Knee PROM   HS curls RTB 2x10  Shuttle squats 2x10 4 cords   Interventions DEFERRED today     Heel slides: 3 minutes x 5s holds              NEUROMUSCULAR RE-EDUCATION ACTIVITIES to improve Balance, Coordination, Kinesthetic, Sense, Proprioception, and Posture for (10) minutes.  The following were included:    Performed Today:     Quadriceps sets with strap: 3 minutes x 10s holds   SAQ 2x10  LAQ 2x10     Interventions DEFERRED today     Weight shift to single leg stance: 3 minutes x 5s holds     NMES: 4 minutes each   Quadriceps sets   SAQ   LAQ   TKE- purple cord            THERAPEUTIC ACTIVITIES to improve dynamic and functional  performance for (0) minutes including:    Performed Today:      Interventions DEFERRED today                  Next Session:  Scar mobilizations   Heel/toe raises or ankle 4 way   SLR  Gait training  Ecc. Quads      Patient Education and Home Exercises       Home Exercises Provided and Patient Education Provided     Education provided: (time included with treatment) minutes  PURPOSE: Patient educated on the impairments noted above and the effects of physical therapy intervention to improve overall condition and QOL.   EXERCISE: Patient was educated on all the above exercise prior/during/after for proper posture, positioning, and execution for safe performance with home exercise program.   STRENGTH: Patient educated on the importance of improved core and extremity strength in order to improve alignment of the spine and extremities with static positions and dynamic movement.   GAIT & BALANCE: Patient educated on the importance of strong core and lower extremity musculature in order to improve both static and dynamic balance, improve gait mechanics, reduce fall risk and improve household and community mobility.     Written Home  Exercises Provided: yes.  Exercises were reviewed and Karla was able to demonstrate them prior to the end of the session.  Karla demonstrated good  understanding of the education provided. See EMR under Patient Instructions for exercises provided during therapy sessions.    Assessment     Pt tolerated session well today. Quad sets, SAQs, and LAQs were progressed to performing without NMES today. These exercises were performed with emphasis on improving neuromuscular control for open chain knee extension. HS curls and shuttle squats were initiated in order to increase muscular strength of the hamstrings. Joint mobilizations were added today in order to improve and maintain mobility about the knee joint.    Karla is progressing well towards her goals.   Patient prognosis is Excellent.     Patient will continue to benefit from skilled outpatient physical therapy to address the deficits listed in the problem list box on initial evaluation, provide pt/family education and to maximize patient's level of independence in the home and community environment.     Patient's spiritual, cultural and educational needs considered and pt agreeable to plan of care and goals.       Anticipated Barriers for therapy: none       Short Term Goals:  6 weeks Status  Date Met   PAIN: Pt will report worst pain of 3/10 in order to progress toward max functional ability and improve quality of life. [x] Progressing  [] Met  [] Not Met     FUNCTION: Patient will demonstrate improved function as indicated by a score of greater than or equal to 57 out of 100 on FOTO. [x] Progressing  [] Met  [] Not Met     MOBILITY: Patient will improve AROM to 50% of stated goals, listed in objective measures above, in order to progress towards independence with functional activities.  [x] Progressing  [] Met  [] Not Met     STRENGTH: Patient will improve strength to 50% of stated goals, listed in objective measures above, in order to progress towards independence  with functional activities. [x] Progressing  [] Met  [] Not Met     POSTURE: Patient will correct postural deviations in sitting and standing, to decrease pain and promote long term stability.  [x] Progressing  [] Met  [] Not Met     HEP: Patient will demonstrate independence with HEP in order to progress toward functional independence. [x] Progressing  [] Met  [] Not Met        Long Term Goals:  12 weeks Status Date Met   PAIN: Pt will report worst pain of 1/10 in order to progress toward max functional ability and improve quality of life [x] Progressing  [] Met  [] Not Met     FUNCTION: Patient will demonstrate improved function as indicated by a score of greater than or equal to 64 out of 100 on FOTO. [x] Progressing  [] Met  [] Not Met     MOBILITY: Patient will improve AROM to stated goals, listed in objective measures above, in order to return to maximal functional potential and improve quality of life.  [x] Progressing  [] Met  [] Not Met     STRENGTH: Patient will improve strength to stated goals, listed in objective measures above, in order to improve functional independence and quality of life.  [x] Progressing  [] Met  [] Not Met     Patient will return to normal ADL's, IADL's, community involvement, recreational activities, and work-related activities with less than or equal to 1/10 pain and maximal function.  [x] Progressing  [] Met  [] Not Met           Plan     Continue Plan of Care (POC) and progress per patient tolerance. See treatment section for details on planned progressions next session.      Kaitlin Arzola, PT

## 2024-01-17 NOTE — TELEPHONE ENCOUNTER
Rtc to patient. In regards of a sooner appointment with Wayne Canales. On Wednesday 1/17/24. Patient was inform that Wayne is booked.

## 2024-01-19 ENCOUNTER — CLINICAL SUPPORT (OUTPATIENT)
Dept: REHABILITATION | Facility: HOSPITAL | Age: 64
End: 2024-01-19
Payer: COMMERCIAL

## 2024-01-19 DIAGNOSIS — R26.89 POOR BALANCE: Primary | ICD-10-CM

## 2024-01-19 DIAGNOSIS — Z74.09 DECREASED FUNCTIONAL MOBILITY AND ENDURANCE: ICD-10-CM

## 2024-01-19 DIAGNOSIS — R26.9 GAIT ABNORMALITY: ICD-10-CM

## 2024-01-19 DIAGNOSIS — M25.662 DECREASED RANGE OF MOTION OF LEFT KNEE: ICD-10-CM

## 2024-01-19 PROCEDURE — 97530 THERAPEUTIC ACTIVITIES: CPT

## 2024-01-19 PROCEDURE — 97140 MANUAL THERAPY 1/> REGIONS: CPT

## 2024-01-19 PROCEDURE — 97110 THERAPEUTIC EXERCISES: CPT

## 2024-01-19 NOTE — PROGRESS NOTES
[facility]   Physical Therapy Treatment Note        Name: Karla Arzola  Clinic Number: [id number]    Therapy Diagnosis:   Encounter Diagnoses   Name Primary?    Poor balance Yes    Decreased range of motion of left knee     Decreased functional mobility and endurance     Gait abnormality        Physician: Larry Adams    Visit Date: 1/19/2024    Physician Orders: PT Eval and Treat  Medical Diagnosis from Referral:  Primary osteoarthritis of left knee [M17.12], Old complex tear of lateral meniscus of left knee [M23.201]   Evaluation Date: 1/9/2024  Authorization Period Expiration: 10/30/2024   Plan of Care Expiration: 4/9/2024  Progress Note Due: 2/9/2024  Visit # / Visits authorized: 3/20 (+1 for evaluation)   FOTO: 1/3 (last performed on 1/9/2024)     Precautions: Standard    Time In: 1231  Time Out: 1335  Total Billable Time: 64 minutes (Billing reflects 1 on 1 treatment time spent with patient)    Subjective     Patient reports: She feels okay today. She reports she did not take her fluid pill this morning, which she believes may be contributing to increased swelling today.    He/She was compliant with home exercise program.  Response to previous treatment: mild increased soreness following exercises   Functional change: increased knee A/PROM. Improved quadriceps activation and active knee extension    Pain: 5/10     Location: L knee    Objective      Objective Measures updated at progress report or POC update only unless otherwise noted.       Treatment     Karla received the treatments listed below:       MANUAL THERAPY TECHNIQUES were applied for (25) minutes, including:    Soft tissue mobilization:   Scar mobilizations  Joint mobilizations:   Tibiofemoral glides  Patellofemoral glides and tilts in all directions   Functional dry needling: DEFERRED        THERAPEUTIC EXERCISES to develop strength, endurance, ROM, flexibility, posture, and core stabilization for (30)  minutes including:    Performed Today:     Recumbent bike: 7 mins (rocking, then full revolutions)  Prone hang: 3' 0#  Prone quad stretch 3x30s  Knee PROM   HS curls RTB 2x10       Interventions DEFERRED today     Heel slides: 3 minutes x 5s holds   Heel prop: 3# for 5 minutes   Calf stretch 3x30s (wedge)  Hamstring stretch (seated) 3x30s            NEUROMUSCULAR RE-EDUCATION ACTIVITIES to improve Balance, Coordination, Kinesthetic, Sense, Proprioception, and Posture for (0) minutes.  The following were included:    Performed Today:          Interventions DEFERRED today     Weight shift to single leg stance: 3 minutes x 5s holds     NMES: 4 minutes each   Quadriceps sets   SAQ   LAQ   TKE- purple cord   TKE: 3' x 5s holds  Quadriceps sets with strap: 3 minutes x 10s holds   SAQ 2x10  LAQ 2x10         THERAPEUTIC ACTIVITIES to improve dynamic and functional  performance for (9) minutes including:    Performed Today:     STS: 2x10  SLR 3x8 Interventions DEFERRED today     Shuttle squats 2x10 4 cords             Next Session:  Scar mobilizations   Heel/toe raises or ankle 4 way   SLR  Gait training  Ecc. Quads      Patient Education and Home Exercises       Home Exercises Provided and Patient Education Provided     Education provided: (time included with treatment) minutes  PURPOSE: Patient educated on the impairments noted above and the effects of physical therapy intervention to improve overall condition and QOL.   EXERCISE: Patient was educated on all the above exercise prior/during/after for proper posture, positioning, and execution for safe performance with home exercise program.   STRENGTH: Patient educated on the importance of improved core and extremity strength in order to improve alignment of the spine and extremities with static positions and dynamic movement.   GAIT & BALANCE: Patient educated on the importance of strong core and lower extremity musculature in order to improve both static and dynamic  balance, improve gait mechanics, reduce fall risk and improve household and community mobility.     Written Home Exercises Provided: yes.  Exercises were reviewed and Karla was able to demonstrate them prior to the end of the session.  Karla demonstrated good  understanding of the education provided. See EMR under Patient Instructions for exercises provided during therapy sessions.    Assessment     Pt tolerated session well today. More time was dedicated to manual therapy and PROM today in order to address increased stiffness from previou and encourage greater range of motion with knee flexion and extension. STS and SLR were added to encourage improved strength with functional activities. Prone hangs were also added in order to address ROM limitations.    Karla is progressing well towards her goals.   Patient prognosis is Excellent.     Patient will continue to benefit from skilled outpatient physical therapy to address the deficits listed in the problem list box on initial evaluation, provide pt/family education and to maximize patient's level of independence in the home and community environment.     Patient's spiritual, cultural and educational needs considered and pt agreeable to plan of care and goals.       Anticipated Barriers for therapy: none       Short Term Goals:  6 weeks Status  Date Met   PAIN: Pt will report worst pain of 3/10 in order to progress toward max functional ability and improve quality of life. [x] Progressing  [] Met  [] Not Met     FUNCTION: Patient will demonstrate improved function as indicated by a score of greater than or equal to 57 out of 100 on FOTO. [x] Progressing  [] Met  [] Not Met     MOBILITY: Patient will improve AROM to 50% of stated goals, listed in objective measures above, in order to progress towards independence with functional activities.  [x] Progressing  [] Met  [] Not Met     STRENGTH: Patient will improve strength to 50% of stated goals, listed in objective  measures above, in order to progress towards independence with functional activities. [x] Progressing  [] Met  [] Not Met     POSTURE: Patient will correct postural deviations in sitting and standing, to decrease pain and promote long term stability.  [x] Progressing  [] Met  [] Not Met     HEP: Patient will demonstrate independence with HEP in order to progress toward functional independence. [x] Progressing  [] Met  [] Not Met        Long Term Goals:  12 weeks Status Date Met   PAIN: Pt will report worst pain of 1/10 in order to progress toward max functional ability and improve quality of life [x] Progressing  [] Met  [] Not Met     FUNCTION: Patient will demonstrate improved function as indicated by a score of greater than or equal to 64 out of 100 on FOTO. [x] Progressing  [] Met  [] Not Met     MOBILITY: Patient will improve AROM to stated goals, listed in objective measures above, in order to return to maximal functional potential and improve quality of life.  [x] Progressing  [] Met  [] Not Met     STRENGTH: Patient will improve strength to stated goals, listed in objective measures above, in order to improve functional independence and quality of life.  [x] Progressing  [] Met  [] Not Met     Patient will return to normal ADL's, IADL's, community involvement, recreational activities, and work-related activities with less than or equal to 1/10 pain and maximal function.  [x] Progressing  [] Met  [] Not Met           Plan     Continue Plan of Care (POC) and progress per patient tolerance. See treatment section for details on planned progressions next session.      Kaitlin Arzola, PT

## 2024-01-22 ENCOUNTER — CLINICAL SUPPORT (OUTPATIENT)
Dept: REHABILITATION | Facility: HOSPITAL | Age: 64
End: 2024-01-22
Payer: COMMERCIAL

## 2024-01-22 DIAGNOSIS — R26.9 GAIT ABNORMALITY: ICD-10-CM

## 2024-01-22 DIAGNOSIS — M25.662 DECREASED RANGE OF MOTION OF LEFT KNEE: ICD-10-CM

## 2024-01-22 DIAGNOSIS — Z74.09 DECREASED FUNCTIONAL MOBILITY AND ENDURANCE: ICD-10-CM

## 2024-01-22 DIAGNOSIS — R26.89 POOR BALANCE: Primary | ICD-10-CM

## 2024-01-22 PROCEDURE — 97530 THERAPEUTIC ACTIVITIES: CPT

## 2024-01-22 PROCEDURE — 97140 MANUAL THERAPY 1/> REGIONS: CPT

## 2024-01-22 PROCEDURE — 97110 THERAPEUTIC EXERCISES: CPT

## 2024-01-22 NOTE — PROGRESS NOTES
[facility]   Physical Therapy Treatment Note        Name: Karla Arzola  Clinic Number: [id number]    Therapy Diagnosis:   Encounter Diagnoses   Name Primary?    Poor balance Yes    Decreased range of motion of left knee     Decreased functional mobility and endurance     Gait abnormality          Physician: Larry Adams    Visit Date: 1/22/2024    Physician Orders: PT Eval and Treat  Medical Diagnosis from Referral:  Primary osteoarthritis of left knee [M17.12], Old complex tear of lateral meniscus of left knee [M23.201]   Evaluation Date: 1/9/2024  Authorization Period Expiration: 10/30/2024   Plan of Care Expiration: 4/9/2024  Progress Note Due: 2/9/2024  Visit # / Visits authorized: 4/20 (+1 for evaluation)   FOTO: 1/3 (last performed on 1/9/2024)     Precautions: Standard    Time In: 1600  Time Out: 1700  Total Billable Time: 60 minutes (Billing reflects 1 on 1 treatment time spent with patient)    Subjective     Patient reports: She went back to work today and was sitting 95% of the time, so limited stress on the knee. She reports some soreness after previous session that resolved by next morning. She denies any pain at initiation of session today.    He/She was compliant with home exercise program.  Response to previous treatment: mild increased soreness following exercises   Functional change: increased knee A/PROM. Improved quadriceps activation and active knee extension    Pain: 5/10     Location: L knee    Objective      Objective Measures updated at progress report or POC update only unless otherwise noted.       Treatment     Karla received the treatments listed below:       MANUAL THERAPY TECHNIQUES were applied for (12) minutes, including:    Soft tissue mobilization:   Scar mobilizations  Joint mobilizations:   Tibiofemoral glides  Patellofemoral glides and tilts in all directions   Functional dry needling: DEFERRED        THERAPEUTIC EXERCISES to develop  strength, endurance, ROM, flexibility, posture, and core stabilization for (25) minutes including:    Performed Today:     Recumbent bike: 7 mins (full revolutions)  Knee PROM   Tailgaters 2'  Prone HS curls RTB 2x10  LAQ 3x10 +3s hold in knee ext       Interventions DEFERRED today     Heel slides: 3 minutes x 5s holds   Heel prop: 3# for 5 minutes   Calf stretch 3x30s (wedge)  Hamstring stretch (seated) 3x30s   Prone hang: 3' 0#  Prone quad stretch 3x30s         NEUROMUSCULAR RE-EDUCATION ACTIVITIES to improve Balance, Coordination, Kinesthetic, Sense, Proprioception, and Posture for (0) minutes.  The following were included:    Performed Today:          Interventions DEFERRED today     Weight shift to single leg stance: 3 minutes x 5s holds     NMES: 4 minutes each   Quadriceps sets   SAQ   LAQ   TKE- purple cord   TKE: 3' x 5s holds  Quadriceps sets with strap: 3 minutes x 10s holds   SAQ 2x10  LAQ 2x10           THERAPEUTIC ACTIVITIES to improve dynamic and functional  performance for (15) minutes including:    Performed Today:     STS: 3x8 5# KB  SLR 3x8 3x10  Shuttle squats: 5', 4 cords  Calf raises 3x10           Interventions DEFERRED today                  Next Session:  Scar mobilizations   Heel/toe raises or ankle 4 way   Ecc. LAQ (2 up, 1 down)  LAQ + ankle weights  SLR + #  Step ups  SLS    Patient Education and Home Exercises       Home Exercises Provided and Patient Education Provided     Education provided: (time included with treatment) minutes  PURPOSE: Patient educated on the impairments noted above and the effects of physical therapy intervention to improve overall condition and QOL.   EXERCISE: Patient was educated on all the above exercise prior/during/after for proper posture, positioning, and execution for safe performance with home exercise program.   STRENGTH: Patient educated on the importance of improved core and extremity strength in order to improve alignment of the spine and  extremities with static positions and dynamic movement.   GAIT & BALANCE: Patient educated on the importance of strong core and lower extremity musculature in order to improve both static and dynamic balance, improve gait mechanics, reduce fall risk and improve household and community mobility.     Written Home Exercises Provided: yes.  Exercises were reviewed and Karla was able to demonstrate them prior to the end of the session.  Karla demonstrated good  understanding of the education provided. See EMR under Patient Instructions for exercises provided during therapy sessions.    Assessment   Pt tolerated treatment well today with minimal complaints of pain and reports of appropriate level of muscle burn/fatigue. STS were upgraded to perform with 5# KB today in order to increase functional strength with this activity. Calf raises were initiated today in order to improve functional calf strength with ADLs.       Karla is progressing well towards her goals.   Patient prognosis is Excellent.     Patient will continue to benefit from skilled outpatient physical therapy to address the deficits listed in the problem list box on initial evaluation, provide pt/family education and to maximize patient's level of independence in the home and community environment.     Patient's spiritual, cultural and educational needs considered and pt agreeable to plan of care and goals.       Anticipated Barriers for therapy: none       Short Term Goals:  6 weeks Status  Date Met   PAIN: Pt will report worst pain of 3/10 in order to progress toward max functional ability and improve quality of life. [x] Progressing  [] Met  [] Not Met     FUNCTION: Patient will demonstrate improved function as indicated by a score of greater than or equal to 57 out of 100 on FOTO. [x] Progressing  [] Met  [] Not Met     MOBILITY: Patient will improve AROM to 50% of stated goals, listed in objective measures above, in order to progress towards independence  with functional activities.  [x] Progressing  [] Met  [] Not Met     STRENGTH: Patient will improve strength to 50% of stated goals, listed in objective measures above, in order to progress towards independence with functional activities. [x] Progressing  [] Met  [] Not Met     POSTURE: Patient will correct postural deviations in sitting and standing, to decrease pain and promote long term stability.  [x] Progressing  [] Met  [] Not Met     HEP: Patient will demonstrate independence with HEP in order to progress toward functional independence. [x] Progressing  [] Met  [] Not Met        Long Term Goals:  12 weeks Status Date Met   PAIN: Pt will report worst pain of 1/10 in order to progress toward max functional ability and improve quality of life [x] Progressing  [] Met  [] Not Met     FUNCTION: Patient will demonstrate improved function as indicated by a score of greater than or equal to 64 out of 100 on FOTO. [x] Progressing  [] Met  [] Not Met     MOBILITY: Patient will improve AROM to stated goals, listed in objective measures above, in order to return to maximal functional potential and improve quality of life.  [x] Progressing  [] Met  [] Not Met     STRENGTH: Patient will improve strength to stated goals, listed in objective measures above, in order to improve functional independence and quality of life.  [x] Progressing  [] Met  [] Not Met     Patient will return to normal ADL's, IADL's, community involvement, recreational activities, and work-related activities with less than or equal to 1/10 pain and maximal function.  [x] Progressing  [] Met  [] Not Met           Plan     Continue Plan of Care (POC) and progress per patient tolerance. See treatment section for details on planned progressions next session.      Kaitlin Arzola, PT

## 2024-01-24 ENCOUNTER — CLINICAL SUPPORT (OUTPATIENT)
Dept: REHABILITATION | Facility: HOSPITAL | Age: 64
End: 2024-01-24
Payer: COMMERCIAL

## 2024-01-24 DIAGNOSIS — R26.89 POOR BALANCE: Primary | ICD-10-CM

## 2024-01-24 DIAGNOSIS — R26.9 GAIT ABNORMALITY: ICD-10-CM

## 2024-01-24 DIAGNOSIS — Z74.09 DECREASED FUNCTIONAL MOBILITY AND ENDURANCE: ICD-10-CM

## 2024-01-24 DIAGNOSIS — M25.662 DECREASED RANGE OF MOTION OF LEFT KNEE: ICD-10-CM

## 2024-01-24 PROCEDURE — 97530 THERAPEUTIC ACTIVITIES: CPT

## 2024-01-24 PROCEDURE — 97110 THERAPEUTIC EXERCISES: CPT

## 2024-01-24 NOTE — PROGRESS NOTES
[facility]   Physical Therapy Treatment Note        Name: Karla Arzola  Clinic Number: [id number]    Therapy Diagnosis:   Encounter Diagnoses   Name Primary?    Poor balance Yes    Decreased range of motion of left knee     Decreased functional mobility and endurance     Gait abnormality            Physician: Larry Adams    Visit Date: 1/24/2024    Physician Orders: PT Eval and Treat  Medical Diagnosis from Referral:  Primary osteoarthritis of left knee [M17.12], Old complex tear of lateral meniscus of left knee [M23.201]   Evaluation Date: 1/9/2024  Authorization Period Expiration: 10/30/2024   Plan of Care Expiration: 4/9/2024  Progress Note Due: 2/9/2024  Visit # / Visits authorized: 5/20 (+1 for evaluation)   FOTO: 1/3 (last performed on 1/9/2024)     Precautions: Standard    Time In: 0900  Time Out: 0956  Total Billable Time: 56 minutes (Billing reflects 1 on 1 treatment time spent with patient)    Subjective     Patient reports: She had muscle soreness following previous session, which resolved by following morning. She reports mild ache in knee joint by the end of her shift yesterday. Today she endorses no knee pain.     He/She was compliant with home exercise program.  Response to previous treatment: mild increased soreness following exercises   Functional change: increased knee A/PROM. Improved quadriceps activation and active knee extension    Pain: 5/10     Location: L knee    Objective      Objective Measures updated at progress report or POC update only unless otherwise noted.       Treatment     Karla received the treatments listed below:       MANUAL THERAPY TECHNIQUES were applied for (0) minutes, including:    Soft tissue mobilization:   Scar mobilizations  Joint mobilizations:   Tibiofemoral glides  Patellofemoral glides and tilts in all directions   Functional dry needling: DEFERRED        THERAPEUTIC EXERCISES to develop strength, endurance, ROM,  "flexibility, posture, and core stabilization for (25) minutes including:    Performed Today:     Recumbent bike: 7 mins (full revolutions)  LAQ 3x8 2# ankle weight  Tailgaters 2'  Hamstring curl machine 35# 3x8  Ecc. Knee Extension Machine 15# 3x8       Interventions DEFERRED today     Calf stretch 3x30s (wedge)  Hamstring stretch (seated) 3x30s   Prone hang: 3' 0#  Prone quad stretch 3x30s         NEUROMUSCULAR RE-EDUCATION ACTIVITIES to improve Balance, Coordination, Kinesthetic, Sense, Proprioception, and Posture for (0) minutes.  The following were included:    Performed Today:          Interventions DEFERRED today                THERAPEUTIC ACTIVITIES to improve dynamic and functional  performance for (35) minutes including:    Performed Today:     SLR 3x8 2#  Weight shifts in quadruped x15 lateral, x2' posterior  STS: 3x8 7.5# KB  Calf raises 3x10  Toe Raises 3x10  Step ups 3x10 6"  SLS 3x30s B      Interventions DEFERRED today     Shuttle squats: 5', 4 cords             Next Session:  Deadlift vs RDL  Bridges       Patient Education and Home Exercises       Home Exercises Provided and Patient Education Provided     Education provided: (time included with treatment) minutes  PURPOSE: Patient educated on the impairments noted above and the effects of physical therapy intervention to improve overall condition and QOL.   EXERCISE: Patient was educated on all the above exercise prior/during/after for proper posture, positioning, and execution for safe performance with home exercise program.   STRENGTH: Patient educated on the importance of improved core and extremity strength in order to improve alignment of the spine and extremities with static positions and dynamic movement.   GAIT & BALANCE: Patient educated on the importance of strong core and lower extremity musculature in order to improve both static and dynamic balance, improve gait mechanics, reduce fall risk and improve household and community mobility. "     Written Home Exercises Provided: yes.  Exercises were reviewed and Karla was able to demonstrate them prior to the end of the session.  Karla demonstrated good  understanding of the education provided. See EMR under Patient Instructions for exercises provided during therapy sessions.    Assessment   Pt tolerated treatment well today with minimal complaints of pain and reports of appropriate level of muscle burn/fatigue. Weighted exercises were progressed as noted in objective session in order to maintain appropriate level of challenge. HS curl machine and knee extension machine were initiated today in order to address strength deficits of hamstrings and quadriceps, respectively. Weight shifting (lateral and posterior) was initiated in order to improve ability to load the knee in kneeling as well as to address flexion deficit. This intervention is important to the patient's overall level of functionality, as well as her specific goal of being able to kneel to play with her puppy. Toe raises were initiated in order to address functional strength with dorsiflexion, especially in gait. Step ups and SLS balance were initiated in order to improve functional strength and balance respectively.      Karla is progressing well towards her goals.   Patient prognosis is Excellent.     Patient will continue to benefit from skilled outpatient physical therapy to address the deficits listed in the problem list box on initial evaluation, provide pt/family education and to maximize patient's level of independence in the home and community environment.     Patient's spiritual, cultural and educational needs considered and pt agreeable to plan of care and goals.       Anticipated Barriers for therapy: none       Short Term Goals:  6 weeks Status  Date Met   PAIN: Pt will report worst pain of 3/10 in order to progress toward max functional ability and improve quality of life. [x] Progressing  [] Met  [] Not Met     FUNCTION: Patient  will demonstrate improved function as indicated by a score of greater than or equal to 57 out of 100 on FOTO. [x] Progressing  [] Met  [] Not Met     MOBILITY: Patient will improve AROM to 50% of stated goals, listed in objective measures above, in order to progress towards independence with functional activities.  [x] Progressing  [] Met  [] Not Met     STRENGTH: Patient will improve strength to 50% of stated goals, listed in objective measures above, in order to progress towards independence with functional activities. [x] Progressing  [] Met  [] Not Met     POSTURE: Patient will correct postural deviations in sitting and standing, to decrease pain and promote long term stability.  [x] Progressing  [] Met  [] Not Met     HEP: Patient will demonstrate independence with HEP in order to progress toward functional independence. [x] Progressing  [] Met  [] Not Met        Long Term Goals:  12 weeks Status Date Met   PAIN: Pt will report worst pain of 1/10 in order to progress toward max functional ability and improve quality of life [x] Progressing  [] Met  [] Not Met     FUNCTION: Patient will demonstrate improved function as indicated by a score of greater than or equal to 64 out of 100 on FOTO. [x] Progressing  [] Met  [] Not Met     MOBILITY: Patient will improve AROM to stated goals, listed in objective measures above, in order to return to maximal functional potential and improve quality of life.  [x] Progressing  [] Met  [] Not Met     STRENGTH: Patient will improve strength to stated goals, listed in objective measures above, in order to improve functional independence and quality of life.  [x] Progressing  [] Met  [] Not Met     Patient will return to normal ADL's, IADL's, community involvement, recreational activities, and work-related activities with less than or equal to 1/10 pain and maximal function.  [x] Progressing  [] Met  [] Not Met           Plan     Continue Plan of Care (POC) and progress per  patient tolerance. See treatment section for details on planned progressions next session.      Kaitlin Arzola, PT

## 2024-01-29 ENCOUNTER — CLINICAL SUPPORT (OUTPATIENT)
Dept: REHABILITATION | Facility: HOSPITAL | Age: 64
End: 2024-01-29
Payer: COMMERCIAL

## 2024-01-29 DIAGNOSIS — M25.662 DECREASED RANGE OF MOTION OF LEFT KNEE: ICD-10-CM

## 2024-01-29 DIAGNOSIS — R26.9 GAIT ABNORMALITY: ICD-10-CM

## 2024-01-29 DIAGNOSIS — R26.89 POOR BALANCE: Primary | ICD-10-CM

## 2024-01-29 DIAGNOSIS — Z74.09 DECREASED FUNCTIONAL MOBILITY AND ENDURANCE: ICD-10-CM

## 2024-01-29 PROCEDURE — 97112 NEUROMUSCULAR REEDUCATION: CPT

## 2024-01-29 PROCEDURE — 97110 THERAPEUTIC EXERCISES: CPT

## 2024-01-29 PROCEDURE — 97530 THERAPEUTIC ACTIVITIES: CPT

## 2024-01-29 NOTE — PROGRESS NOTES
[facility]   Physical Therapy Treatment Note        Name: Karla Arzola  Clinic Number: [id number]    Therapy Diagnosis:   Encounter Diagnoses   Name Primary?    Poor balance Yes    Decreased range of motion of left knee     Decreased functional mobility and endurance     Gait abnormality            Physician: Larry Adams    Visit Date: 1/29/2024    Physician Orders: PT Eval and Treat  Medical Diagnosis from Referral:  Primary osteoarthritis of left knee [M17.12], Old complex tear of lateral meniscus of left knee [M23.201]   Evaluation Date: 1/9/2024  Authorization Period Expiration: 10/30/2024   Plan of Care Expiration: 4/9/2024  Progress Note Due: 2/9/2024  Visit # / Visits authorized: 6/20 (+1 for evaluation)   FOTO: 1/3 (last performed on 1/9/2024)     Precautions: Standard    Time In: 0830  Time Out: 0933  Total Billable Time: 56 minutes (Billing reflects 1 on 1 treatment time spent with patient)    Subjective     Patient reports: she has been feeling more stiff for the last few days    He/She was compliant with home exercise program.  Response to previous treatment: mild increased soreness following exercises   Functional change: increased knee A/PROM. Improved quadriceps activation and active knee extension    Pain: 5/10     Location: L knee    Objective      Objective Measures updated at progress report or POC update only unless otherwise noted.       Treatment     Karla received the treatments listed below:       MANUAL THERAPY TECHNIQUES were applied for (6) minutes, including:    Soft tissue mobilization: DEFERRED  Scar mobilizations  Joint mobilizations:   Tibiofemoral glides  Patellofemoral glides and tilts in all directions   Functional dry needling: DEFERRED        THERAPEUTIC EXERCISES to develop strength, endurance, ROM, flexibility, posture, and core stabilization for (12) minutes including:    Performed Today:     Recumbent bike: 5 mins (full  "revolutions)  Tailgaters 2'  Heel prop: 5# for 5 minutes          Interventions DEFERRED today     Calf stretch 3x30s (wedge)  Hamstring stretch (seated) 3x30s   Prone hang: 3' 0#  Prone quad stretch 3x30s         NEUROMUSCULAR RE-EDUCATION ACTIVITIES to improve Balance, Coordination, Kinesthetic, Sense, Proprioception, and Posture for (8) minutes.  The following were included:    Performed Today:     Quadriceps sets: 3 minutes x 10s holds   SLS 30s B   Tandem stance ball pass: 2x10 to each side B        Interventions DEFERRED today     Weight shifts in quadruped x15 lateral, x2' posterior           THERAPEUTIC ACTIVITIES to improve dynamic and functional  performance for (30) minutes including:    Performed Today:     Bridges 3x10   LAQ: 3 minutes x 5s holds   DL Knee Extensions 25# 3x10  Hamstring curl machine 45# 2x10  Calf raises 2x10  Step ups 2x10 6"  Step downs 1x10 4" then discontinued due to pain   STS: 2x10 15# KB      Interventions DEFERRED today     SLR 3x8 2#           Next Session:  Deadlift vs RDL  Bridges       Patient Education and Home Exercises       Home Exercises Provided and Patient Education Provided     Education provided: (time included with treatment) minutes  PURPOSE: Patient educated on the impairments noted above and the effects of physical therapy intervention to improve overall condition and QOL.   EXERCISE: Patient was educated on all the above exercise prior/during/after for proper posture, positioning, and execution for safe performance with home exercise program.   STRENGTH: Patient educated on the importance of improved core and extremity strength in order to improve alignment of the spine and extremities with static positions and dynamic movement.   GAIT & BALANCE: Patient educated on the importance of strong core and lower extremity musculature in order to improve both static and dynamic balance, improve gait mechanics, reduce fall risk and improve household and community " mobility.     Written Home Exercises Provided: yes.  Exercises were reviewed and Karla was able to demonstrate them prior to the end of the session.  Karla demonstrated good  understanding of the education provided. See EMR under Patient Instructions for exercises provided during therapy sessions.    Assessment   Pt tolerated treatment well today with minimal complaints of pain and reports of appropriate level of muscle burn/fatigue. Pt ambulated into clinic with a mild limp and inability to obtain terminal knee extension during heel strike. Incorporated bike, joint mobilizations and heel prop in order to improve joint mobility. Full terminal knee extension accomplished following interventions. Attempted to incorporate step downs to improve functional strength; however, intervention was discontinued due to pain. Increased weight with knee extensions, hamstring curls and sit to stands to progress functional strength with reports of significant muscle fatigue but minimal pain.       Karla is progressing well towards her goals.   Patient prognosis is Excellent.     Patient will continue to benefit from skilled outpatient physical therapy to address the deficits listed in the problem list box on initial evaluation, provide pt/family education and to maximize patient's level of independence in the home and community environment.     Patient's spiritual, cultural and educational needs considered and pt agreeable to plan of care and goals.       Anticipated Barriers for therapy: none       Short Term Goals:  6 weeks Status  Date Met   PAIN: Pt will report worst pain of 3/10 in order to progress toward max functional ability and improve quality of life. [x] Progressing  [] Met  [] Not Met     FUNCTION: Patient will demonstrate improved function as indicated by a score of greater than or equal to 57 out of 100 on FOTO. [x] Progressing  [] Met  [] Not Met     MOBILITY: Patient will improve AROM to 50% of stated goals, listed  in objective measures above, in order to progress towards independence with functional activities.  [x] Progressing  [] Met  [] Not Met     STRENGTH: Patient will improve strength to 50% of stated goals, listed in objective measures above, in order to progress towards independence with functional activities. [x] Progressing  [] Met  [] Not Met     POSTURE: Patient will correct postural deviations in sitting and standing, to decrease pain and promote long term stability.  [x] Progressing  [] Met  [] Not Met     HEP: Patient will demonstrate independence with HEP in order to progress toward functional independence. [x] Progressing  [] Met  [] Not Met        Long Term Goals:  12 weeks Status Date Met   PAIN: Pt will report worst pain of 1/10 in order to progress toward max functional ability and improve quality of life [x] Progressing  [] Met  [] Not Met     FUNCTION: Patient will demonstrate improved function as indicated by a score of greater than or equal to 64 out of 100 on FOTO. [x] Progressing  [] Met  [] Not Met     MOBILITY: Patient will improve AROM to stated goals, listed in objective measures above, in order to return to maximal functional potential and improve quality of life.  [x] Progressing  [] Met  [] Not Met     STRENGTH: Patient will improve strength to stated goals, listed in objective measures above, in order to improve functional independence and quality of life.  [x] Progressing  [] Met  [] Not Met     Patient will return to normal ADL's, IADL's, community involvement, recreational activities, and work-related activities with less than or equal to 1/10 pain and maximal function.  [x] Progressing  [] Met  [] Not Met           Plan     Continue Plan of Care (POC) and progress per patient tolerance. See treatment section for details on planned progressions next session.      Kaitlin Arzola, PT

## 2024-01-31 ENCOUNTER — CLINICAL SUPPORT (OUTPATIENT)
Dept: REHABILITATION | Facility: HOSPITAL | Age: 64
End: 2024-01-31
Payer: COMMERCIAL

## 2024-01-31 DIAGNOSIS — R26.9 GAIT ABNORMALITY: ICD-10-CM

## 2024-01-31 DIAGNOSIS — M25.662 DECREASED RANGE OF MOTION OF LEFT KNEE: ICD-10-CM

## 2024-01-31 DIAGNOSIS — R26.89 POOR BALANCE: Primary | ICD-10-CM

## 2024-01-31 DIAGNOSIS — Z74.09 DECREASED FUNCTIONAL MOBILITY AND ENDURANCE: ICD-10-CM

## 2024-01-31 PROCEDURE — 97110 THERAPEUTIC EXERCISES: CPT

## 2024-01-31 PROCEDURE — 97112 NEUROMUSCULAR REEDUCATION: CPT

## 2024-01-31 PROCEDURE — 97530 THERAPEUTIC ACTIVITIES: CPT

## 2024-01-31 NOTE — PROGRESS NOTES
[facility]   Physical Therapy Treatment Note        Name: Karla Arzola  Clinic Number: [id number]    Therapy Diagnosis:   No diagnosis found.          Physician: Larry Adams*    Visit Date: 1/31/2024    Physician Orders: PT Eval and Treat  Medical Diagnosis from Referral:  Primary osteoarthritis of left knee [M17.12], Old complex tear of lateral meniscus of left knee [M23.201]   Evaluation Date: 1/9/2024  Authorization Period Expiration: 10/30/2024   Plan of Care Expiration: 4/9/2024  Progress Note Due: 2/9/2024  Visit # / Visits authorized: 6/20 (+1 for evaluation)   FOTO: 1/3 (last performed on 1/9/2024)     Precautions: Standard    Time In: 0902  Time Out: 0958  Total Billable Time: 56 minutes (Billing reflects 1 on 1 treatment time spent with patient)    Subjective     Patient reports: she was sore for the day following previous session. She feels slightly more stiff and swollen today. She has also increased work duties, which she believes may be contributing.     He/She was compliant with home exercise program.  Response to previous treatment: mild increased soreness following exercises   Functional change: increased knee A/PROM. Improved quadriceps activation and active knee extension    Pain: 5/10     Location: L knee    Objective      Objective Measures updated at progress report or POC update only unless otherwise noted.       Treatment     Karla received the treatments listed below:       MANUAL THERAPY TECHNIQUES were applied for (4) minutes, including:    Soft tissue mobilization: DEFERRED  Scar mobilizations  Joint mobilizations:   Tibiofemoral glides  Patellofemoral glides and tilts in all directions   Functional dry needling: DEFERRED        THERAPEUTIC EXERCISES to develop strength, endurance, ROM, flexibility, posture, and core stabilization for (8) minutes including:    Performed Today:     Recumbent bike: 5 mins (full revolutions)  Heel prop: 5# for 3  "minutes          Interventions DEFERRED today     Calf stretch 3x30s (wedge)  Hamstring stretch (seated) 3x30s   Prone hang: 3' 0#  Prone quad stretch 3x30s  Tailgaters 2'         NEUROMUSCULAR RE-EDUCATION ACTIVITIES to improve Balance, Coordination, Kinesthetic, Sense, Proprioception, and Posture for (8) minutes.  The following were included:    Performed Today:     Quadriceps sets: 2 minutes x 10s holds   SLS 2x30s B   Weight shifts in quadruped x2' lateral, x2' posterior       Interventions DEFERRED today     Tandem stance ball pass: 2x10 to each side B            THERAPEUTIC ACTIVITIES to improve dynamic and functional  performance for (36) minutes including:    Performed Today:     LAQ: 3 minutes x 5s holds   Bridges 3x10   STS: 3x10 15# KB  Calf raises 2x10  DL Knee Extensions 25# 3x10  Hamstring curl machine 45# 3x10  Leg Press Machine 55# 3x10  Step ups 3x10 6"     Interventions DEFERRED today     SLR 3x8 2#  Step downs 1x10 4" then discontinued due to pain          Next Session:  Deadlift vs RDL  Ecc. Knee ext machine  Lateral walks      Patient Education and Home Exercises       Home Exercises Provided and Patient Education Provided     Education provided: (time included with treatment) minutes  PURPOSE: Patient educated on the impairments noted above and the effects of physical therapy intervention to improve overall condition and QOL.   EXERCISE: Patient was educated on all the above exercise prior/during/after for proper posture, positioning, and execution for safe performance with home exercise program.   STRENGTH: Patient educated on the importance of improved core and extremity strength in order to improve alignment of the spine and extremities with static positions and dynamic movement.   GAIT & BALANCE: Patient educated on the importance of strong core and lower extremity musculature in order to improve both static and dynamic balance, improve gait mechanics, reduce fall risk and improve household " and community mobility.     Written Home Exercises Provided: yes.  Exercises were reviewed and Karla was able to demonstrate them prior to the end of the session.  Karla demonstrated good  understanding of the education provided. See EMR under Patient Instructions for exercises provided during therapy sessions.    Assessment   Patient tolerated treatment well today, reporting appropriate level of challenge, and minimal pain with all interventions today. She continues to progress towards more functional interventions from initial program. 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      Karla is progressing well towards her goals.   Patient prognosis is Excellent.     Patient will continue to benefit from skilled outpatient physical therapy to address the deficits listed in the problem list box on initial evaluation, provide pt/family education and to maximize patient's level of independence in the home and community environment.     Patient's spiritual, cultural and educational needs considered and pt agreeable to plan of care and goals.       Anticipated Barriers for therapy: none       Short Term Goals:  6 weeks Status  Date Met   PAIN: Pt will report worst pain of 3/10 in order to progress toward max functional ability and improve quality of life. [x] Progressing  [] Met  [] Not Met     FUNCTION: Patient will demonstrate improved function as indicated by a score of greater than or equal to 57 out of 100 on FOTO. [x] Progressing  [] Met  [] Not Met     MOBILITY: Patient will improve AROM to 50% of stated goals, listed in objective measures above, in order to progress towards independence with functional activities.  [x] Progressing  [] Met  [] Not Met     STRENGTH: Patient will improve strength to 50% of stated goals, listed in objective measures above, in order to progress towards independence with functional  activities. [x] Progressing  [] Met  [] Not Met     POSTURE: Patient will correct postural deviations in sitting and standing, to decrease pain and promote long term stability.  [x] Progressing  [] Met  [] Not Met     HEP: Patient will demonstrate independence with HEP in order to progress toward functional independence. [x] Progressing  [] Met  [] Not Met        Long Term Goals:  12 weeks Status Date Met   PAIN: Pt will report worst pain of 1/10 in order to progress toward max functional ability and improve quality of life [x] Progressing  [] Met  [] Not Met     FUNCTION: Patient will demonstrate improved function as indicated by a score of greater than or equal to 64 out of 100 on FOTO. [x] Progressing  [] Met  [] Not Met     MOBILITY: Patient will improve AROM to stated goals, listed in objective measures above, in order to return to maximal functional potential and improve quality of life.  [x] Progressing  [] Met  [] Not Met     STRENGTH: Patient will improve strength to stated goals, listed in objective measures above, in order to improve functional independence and quality of life.  [x] Progressing  [] Met  [] Not Met     Patient will return to normal ADL's, IADL's, community involvement, recreational activities, and work-related activities with less than or equal to 1/10 pain and maximal function.  [x] Progressing  [] Met  [] Not Met           Plan     Continue Plan of Care (POC) and progress per patient tolerance. See treatment section for details on planned progressions next session.      Jeimy Kruger, SPT

## 2024-02-01 ENCOUNTER — PATIENT MESSAGE (OUTPATIENT)
Dept: SPORTS MEDICINE | Facility: CLINIC | Age: 64
End: 2024-02-01
Payer: COMMERCIAL

## 2024-02-01 NOTE — PROGRESS NOTES
[facility]   Physical Therapy Treatment Note        Name: Karla Arzola  Clinic Number: [id number]    Therapy Diagnosis:   Encounter Diagnoses   Name Primary?    Poor balance Yes    Decreased range of motion of left knee     Decreased functional mobility and endurance     Gait abnormality        Physician: Larry Adams    Visit Date: 1/31/2024    Physician Orders: PT Eval and Treat  Medical Diagnosis from Referral:  Primary osteoarthritis of left knee [M17.12], Old complex tear of lateral meniscus of left knee [M23.201]   Evaluation Date: 1/9/2024  Authorization Period Expiration: 10/30/2024   Plan of Care Expiration: 4/9/2024  Progress Note Due: 2/9/2024  Visit # / Visits authorized: 7/20 (+1 for evaluation)   FOTO: 1/3 (last performed on 1/9/2024)     Precautions: Standard    Time In: 0902  Time Out: 0958  Total Billable Time: 56 minutes (Billing reflects 1 on 1 treatment time spent with patient)    Subjective     Patient reports: she was sore for the day following previous session. She feels slightly more stiff and swollen today. She has also increased work duties, which she believes may be contributing.     He/She was compliant with home exercise program.  Response to previous treatment: mild increased soreness following exercises   Functional change: increased knee A/PROM. Improved quadriceps activation and active knee extension    Pain: 5/10     Location: L knee    Objective      Objective Measures updated at progress report or POC update only unless otherwise noted.       Treatment     Karla received the treatments listed below:       MANUAL THERAPY TECHNIQUES were applied for (4) minutes, including:    Soft tissue mobilization: DEFERRED  Scar mobilizations  Joint mobilizations:   Tibiofemoral glides  Patellofemoral glides and tilts in all directions   Functional dry needling: DEFERRED        THERAPEUTIC EXERCISES to develop strength, endurance, ROM, flexibility,  "posture, and core stabilization for (8) minutes including:    Performed Today:     Recumbent bike: 5 mins (full revolutions)  Heel prop: 5# for 3 minutes          Interventions DEFERRED today     Calf stretch 3x30s (wedge)  Hamstring stretch (seated) 3x30s   Prone hang: 3' 0#  Prone quad stretch 3x30s  Tailgaters 2'         NEUROMUSCULAR RE-EDUCATION ACTIVITIES to improve Balance, Coordination, Kinesthetic, Sense, Proprioception, and Posture for (8) minutes.  The following were included:    Performed Today:     Quadriceps sets: 2 minutes x 10s holds   SLS 2x30s B   Weight shifts in quadruped x2' lateral, x2' posterior       Interventions DEFERRED today     Tandem stance ball pass: 2x10 to each side B            THERAPEUTIC ACTIVITIES to improve dynamic and functional  performance for (36) minutes including:    Performed Today:     LAQ: 3 minutes x 5s holds   Bridges 3x10   STS: 3x10 15# KB  Calf raises 2x10  DL Knee Extensions 25# 3x10  Hamstring curl machine 45# 3x10  Leg Press Machine 55# 3x10  Step ups 3x10 6"     Interventions DEFERRED today     SLR 3x8 2#  Step downs 1x10 4" then discontinued due to pain          Next Session:  Deadlift vs RDL  Ecc. Knee ext machine  Lateral walks      Patient Education and Home Exercises       Home Exercises Provided and Patient Education Provided     Education provided: (time included with treatment) minutes  PURPOSE: Patient educated on the impairments noted above and the effects of physical therapy intervention to improve overall condition and QOL.   EXERCISE: Patient was educated on all the above exercise prior/during/after for proper posture, positioning, and execution for safe performance with home exercise program.   STRENGTH: Patient educated on the importance of improved core and extremity strength in order to improve alignment of the spine and extremities with static positions and dynamic movement.   GAIT & BALANCE: Patient educated on the importance of strong core " and lower extremity musculature in order to improve both static and dynamic balance, improve gait mechanics, reduce fall risk and improve household and community mobility.     Written Home Exercises Provided: yes.  Exercises were reviewed and Karla was able to demonstrate them prior to the end of the session.  Karla demonstrated good  understanding of the education provided. See EMR under Patient Instructions for exercises provided during therapy sessions.    Assessment   Patient tolerated treatment well today, reporting appropriate level of challenge, and minimal pain with all interventions today. She continues to progress towards more functional interventions from initial program. 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      Karla is progressing well towards her goals.   Patient prognosis is Excellent.     Patient will continue to benefit from skilled outpatient physical therapy to address the deficits listed in the problem list box on initial evaluation, provide pt/family education and to maximize patient's level of independence in the home and community environment.     Patient's spiritual, cultural and educational needs considered and pt agreeable to plan of care and goals.       Anticipated Barriers for therapy: none       Short Term Goals:  6 weeks Status  Date Met   PAIN: Pt will report worst pain of 3/10 in order to progress toward max functional ability and improve quality of life. [x] Progressing  [] Met  [] Not Met     FUNCTION: Patient will demonstrate improved function as indicated by a score of greater than or equal to 57 out of 100 on FOTO. [x] Progressing  [] Met  [] Not Met     MOBILITY: Patient will improve AROM to 50% of stated goals, listed in objective measures above, in order to progress towards independence with functional activities.  [x] Progressing  [] Met  [] Not Met     STRENGTH:  Patient will improve strength to 50% of stated goals, listed in objective measures above, in order to progress towards independence with functional activities. [x] Progressing  [] Met  [] Not Met     POSTURE: Patient will correct postural deviations in sitting and standing, to decrease pain and promote long term stability.  [x] Progressing  [] Met  [] Not Met     HEP: Patient will demonstrate independence with HEP in order to progress toward functional independence. [x] Progressing  [] Met  [] Not Met        Long Term Goals:  12 weeks Status Date Met   PAIN: Pt will report worst pain of 1/10 in order to progress toward max functional ability and improve quality of life [x] Progressing  [] Met  [] Not Met     FUNCTION: Patient will demonstrate improved function as indicated by a score of greater than or equal to 64 out of 100 on FOTO. [x] Progressing  [] Met  [] Not Met     MOBILITY: Patient will improve AROM to stated goals, listed in objective measures above, in order to return to maximal functional potential and improve quality of life.  [x] Progressing  [] Met  [] Not Met     STRENGTH: Patient will improve strength to stated goals, listed in objective measures above, in order to improve functional independence and quality of life.  [x] Progressing  [] Met  [] Not Met     Patient will return to normal ADL's, IADL's, community involvement, recreational activities, and work-related activities with less than or equal to 1/10 pain and maximal function.  [x] Progressing  [] Met  [] Not Met           Plan     Continue Plan of Care (POC) and progress per patient tolerance. See treatment section for details on planned progressions next session.      Kaitlin Arzola, PT

## 2024-02-01 NOTE — PROGRESS NOTES
[facility]   Physical Therapy Treatment Note        Name: Karla Arzola  Clinic Number: [id number]    Therapy Diagnosis:   Encounter Diagnoses   Name Primary?    Poor balance Yes    Decreased range of motion of left knee     Decreased functional mobility and endurance     Gait abnormality        Physician: Larry Adams    Visit Date: 1/31/2024    Physician Orders: PT Eval and Treat  Medical Diagnosis from Referral:  Primary osteoarthritis of left knee [M17.12], Old complex tear of lateral meniscus of left knee [M23.201]   Evaluation Date: 1/9/2024  Authorization Period Expiration: 10/30/2024   Plan of Care Expiration: 4/9/2024  Progress Note Due: 2/9/2024  Visit # / Visits authorized: 7/20 (+1 for evaluation)   FOTO: 1/3 (last performed on 1/9/2024)     Precautions: Standard    Time In: 0902  Time Out: 0958  Total Billable Time: 56 minutes (Billing reflects 1 on 1 treatment time spent with patient)    Subjective     Patient reports: she was sore for the day following previous session. She feels slightly more stiff and swollen today. She has also increased work duties, which she believes may be contributing.     He/She was compliant with home exercise program.  Response to previous treatment: mild increased soreness following exercises   Functional change: increased knee A/PROM. Improved quadriceps activation and active knee extension    Pain: 5/10     Location: L knee    Objective      Objective Measures updated at progress report or POC update only unless otherwise noted.       Treatment     Karla received the treatments listed below:       MANUAL THERAPY TECHNIQUES were applied for (4) minutes, including:    Soft tissue mobilization: DEFERRED  Scar mobilizations  Joint mobilizations:   Tibiofemoral glides  Patellofemoral glides and tilts in all directions   Functional dry needling: DEFERRED        THERAPEUTIC EXERCISES to develop strength, endurance, ROM, flexibility,  "posture, and core stabilization for (8) minutes including:    Performed Today:     Recumbent bike: 5 mins (full revolutions)  Heel prop: 5# for 3 minutes          Interventions DEFERRED today     Calf stretch 3x30s (wedge)  Hamstring stretch (seated) 3x30s   Prone hang: 3' 0#  Prone quad stretch 3x30s  Tailgaters 2'         NEUROMUSCULAR RE-EDUCATION ACTIVITIES to improve Balance, Coordination, Kinesthetic, Sense, Proprioception, and Posture for (8) minutes.  The following were included:    Performed Today:     Quadriceps sets: 2 minutes x 10s holds   SLS 2x30s B   Weight shifts in quadruped x2' lateral, x2' posterior       Interventions DEFERRED today     Tandem stance ball pass: 2x10 to each side B            THERAPEUTIC ACTIVITIES to improve dynamic and functional  performance for (36) minutes including:    Performed Today:     LAQ: 3 minutes x 5s holds   Bridges 3x10   STS: 3x10 15# KB  Calf raises 2x10  DL Knee Extensions 25# 3x10  Hamstring curl machine 45# 3x10  Leg Press Machine 55# 3x10  Step ups 3x10 6"     Interventions DEFERRED today     SLR 3x8 2#  Step downs 1x10 4" then discontinued due to pain          Next Session:  Deadlift vs RDL  Ecc. Knee ext machine  Lateral walks      Patient Education and Home Exercises       Home Exercises Provided and Patient Education Provided     Education provided: (time included with treatment) minutes  PURPOSE: Patient educated on the impairments noted above and the effects of physical therapy intervention to improve overall condition and QOL.   EXERCISE: Patient was educated on all the above exercise prior/during/after for proper posture, positioning, and execution for safe performance with home exercise program.   STRENGTH: Patient educated on the importance of improved core and extremity strength in order to improve alignment of the spine and extremities with static positions and dynamic movement.   GAIT & BALANCE: Patient educated on the importance of strong core " and lower extremity musculature in order to improve both static and dynamic balance, improve gait mechanics, reduce fall risk and improve household and community mobility.     Written Home Exercises Provided: yes.  Exercises were reviewed and Karla was able to demonstrate them prior to the end of the session.  Karla demonstrated good  understanding of the education provided. See EMR under Patient Instructions for exercises provided during therapy sessions.    Assessment   Patient tolerated treatment well today, reporting appropriate level of challenge, and minimal pain with all interventions today. She continues to progress towards more functional interventions from initial program. 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      Karla is progressing well towards her goals.   Patient prognosis is Excellent.     Patient will continue to benefit from skilled outpatient physical therapy to address the deficits listed in the problem list box on initial evaluation, provide pt/family education and to maximize patient's level of independence in the home and community environment.     Patient's spiritual, cultural and educational needs considered and pt agreeable to plan of care and goals.       Anticipated Barriers for therapy: none       Short Term Goals:  6 weeks Status  Date Met   PAIN: Pt will report worst pain of 3/10 in order to progress toward max functional ability and improve quality of life. [x] Progressing  [] Met  [] Not Met     FUNCTION: Patient will demonstrate improved function as indicated by a score of greater than or equal to 57 out of 100 on FOTO. [x] Progressing  [] Met  [] Not Met     MOBILITY: Patient will improve AROM to 50% of stated goals, listed in objective measures above, in order to progress towards independence with functional activities.  [x] Progressing  [] Met  [] Not Met     STRENGTH:  Patient will improve strength to 50% of stated goals, listed in objective measures above, in order to progress towards independence with functional activities. [x] Progressing  [] Met  [] Not Met     POSTURE: Patient will correct postural deviations in sitting and standing, to decrease pain and promote long term stability.  [x] Progressing  [] Met  [] Not Met     HEP: Patient will demonstrate independence with HEP in order to progress toward functional independence. [x] Progressing  [] Met  [] Not Met        Long Term Goals:  12 weeks Status Date Met   PAIN: Pt will report worst pain of 1/10 in order to progress toward max functional ability and improve quality of life [x] Progressing  [] Met  [] Not Met     FUNCTION: Patient will demonstrate improved function as indicated by a score of greater than or equal to 64 out of 100 on FOTO. [x] Progressing  [] Met  [] Not Met     MOBILITY: Patient will improve AROM to stated goals, listed in objective measures above, in order to return to maximal functional potential and improve quality of life.  [x] Progressing  [] Met  [] Not Met     STRENGTH: Patient will improve strength to stated goals, listed in objective measures above, in order to improve functional independence and quality of life.  [x] Progressing  [] Met  [] Not Met     Patient will return to normal ADL's, IADL's, community involvement, recreational activities, and work-related activities with less than or equal to 1/10 pain and maximal function.  [x] Progressing  [] Met  [] Not Met           Plan     Continue Plan of Care (POC) and progress per patient tolerance. See treatment section for details on planned progressions next session.      Kaitlin Arzola, PT

## 2024-02-02 ENCOUNTER — E-VISIT (OUTPATIENT)
Dept: FAMILY MEDICINE | Facility: CLINIC | Age: 64
End: 2024-02-02
Attending: FAMILY MEDICINE
Payer: COMMERCIAL

## 2024-02-02 DIAGNOSIS — J06.9 URI, ACUTE: Primary | ICD-10-CM

## 2024-02-02 PROCEDURE — 99421 OL DIG E/M SVC 5-10 MIN: CPT | Mod: ,,, | Performed by: FAMILY MEDICINE

## 2024-02-02 NOTE — PROGRESS NOTES
Patient ID: Karla Arzola is a 63 y.o. female.    Chief Complaint: URI (Entered automatically based on patient selection in Patient Portal.)    The patient initiated a request through [facility] on 2/2/2024 for evaluation and management with a chief complaint of URI (Entered automatically based on patient selection in Patient Portal.)     I evaluated the questionnaire submission on 2/2/2024.    [facility] P[facility] Evisit Upper Respitatory/Cough Questionnaire    2/2/2024  8:50 AM CST - Filed by Patient   Do you agree to participate in an E-Visit? Yes   If you have any of the following symptoms, please present to your local ER or call 911:  I acknowledge   What is the main issue that you would like for your doctor to address today? Coughing, congestion, headache and sore throat   Are you able to take your vital signs? Yes   Systolic Blood Pressure: 128   Diastolic Blood Pressure: 82   Weight: 232   Height: 66   Pulse: 91   Temperature: 98.4   Respiration rate: 28   Pulse Oxygen: 98   What symptoms do you currently have?  Chills;  Cough;  Headache;  Nasal Congestion;  Sore throat   Describe your cough: Productive (containing mucus)   Describe the mucus: Yellow;  Thick   Have you had any of the following? None of the above   Have you ever smoked? I have never smoked   Have you had a fever? No   When did your symptoms first appear? 2/1/2024   In the last two weeks, have you been in close contact with someone who has COVID-19 or the Flu? No   In the last two weeks, have you worked or volunteered in a healthcare facility or as a ? Healthcare facilities include a hospital, medical or dental clinic, long-term care facility, or nursing home Yes   Do you live in a long-term care facility, nursing home, group home, or homeless shelter? No   List what you have done or taken to help your symptoms. Theraflu   How severe are your symptoms? Moderate   Have your symptoms improved since they first appeared? No change   Have  you taken an at home Covid test? No   Have you taken a Flu test? No   Have you been fully vaccinated for COVID? (2 Pfizer, 2 Moderna or 1 Mu & Mu vaccine injections) Yes   Have you received a booster? No   Have you recieved a Flu shot? Yes   When did you recieve your Flu shot? 10/16/2023   Do you have transportation to get tested for COVID if it is indicated and ordered for you at an [facility] location? Yes   Provide any information you feel is important to your history not asked above    Please attach any relevant images or files          Encounter Diagnosis   Name Primary?    URI, acute Yes        No orders of the defined types were placed in this encounter.           Patient advised - as your symptoms started on yesterday, I recommend you take a home COVID test; if positive, please contact me ASAP as you may need Paxlovid for COVID treatment.  You may continue to use TheraFlu along with Flonase nasal spray for your symptoms.   Follow up if symptoms worsen or fail to improve.      E-Visit Time Tracking: 10 minutes    Day 1 Time (in minutes): 10    Total Time (in minutes): 10

## 2024-02-05 ENCOUNTER — OFFICE VISIT (OUTPATIENT)
Dept: INTERNAL MEDICINE | Facility: CLINIC | Age: 64
End: 2024-02-05
Payer: COMMERCIAL

## 2024-02-05 ENCOUNTER — PATIENT MESSAGE (OUTPATIENT)
Dept: REHABILITATION | Facility: HOSPITAL | Age: 64
End: 2024-02-05
Payer: COMMERCIAL

## 2024-02-05 VITALS
DIASTOLIC BLOOD PRESSURE: 80 MMHG | WEIGHT: 242.75 LBS | TEMPERATURE: 98 F | BODY MASS INDEX: 39.01 KG/M2 | HEIGHT: 66 IN | SYSTOLIC BLOOD PRESSURE: 122 MMHG | HEART RATE: 68 BPM | OXYGEN SATURATION: 99 % | RESPIRATION RATE: 20 BRPM

## 2024-02-05 DIAGNOSIS — E66.01 SEVERE OBESITY (BMI 35.0-39.9) WITH COMORBIDITY: Chronic | ICD-10-CM

## 2024-02-05 DIAGNOSIS — J40 WHEEZY BRONCHITIS: Primary | ICD-10-CM

## 2024-02-05 DIAGNOSIS — M35.9 UNDIFFERENTIATED CONNECTIVE TISSUE DISEASE: Chronic | ICD-10-CM

## 2024-02-05 DIAGNOSIS — J06.9 URI WITH COUGH AND CONGESTION: ICD-10-CM

## 2024-02-05 DIAGNOSIS — I10 ESSENTIAL HYPERTENSION: Chronic | ICD-10-CM

## 2024-02-05 LAB
CTP QC/QA: YES
CTP QC/QA: YES
POC MOLECULAR INFLUENZA A AGN: NEGATIVE
POC MOLECULAR INFLUENZA B AGN: NEGATIVE
SARS-COV-2 RDRP RESP QL NAA+PROBE: NEGATIVE

## 2024-02-05 PROCEDURE — 87635 SARS-COV-2 COVID-19 AMP PRB: CPT | Mod: QW,S$GLB,, | Performed by: FAMILY MEDICINE

## 2024-02-05 PROCEDURE — 3074F SYST BP LT 130 MM HG: CPT | Mod: CPTII,S$GLB,, | Performed by: FAMILY MEDICINE

## 2024-02-05 PROCEDURE — 1160F RVW MEDS BY RX/DR IN RCRD: CPT | Mod: CPTII,S$GLB,, | Performed by: FAMILY MEDICINE

## 2024-02-05 PROCEDURE — 99999 PR PBB SHADOW E&M-EST. PATIENT-LVL V: CPT | Mod: PBBFAC,,, | Performed by: FAMILY MEDICINE

## 2024-02-05 PROCEDURE — 3079F DIAST BP 80-89 MM HG: CPT | Mod: CPTII,S$GLB,, | Performed by: FAMILY MEDICINE

## 2024-02-05 PROCEDURE — 1159F MED LIST DOCD IN RCRD: CPT | Mod: CPTII,S$GLB,, | Performed by: FAMILY MEDICINE

## 2024-02-05 PROCEDURE — 87502 INFLUENZA DNA AMP PROBE: CPT | Mod: QW,S$GLB,, | Performed by: FAMILY MEDICINE

## 2024-02-05 PROCEDURE — 99214 OFFICE O/P EST MOD 30 MIN: CPT | Mod: S$GLB,,, | Performed by: FAMILY MEDICINE

## 2024-02-05 PROCEDURE — 3008F BODY MASS INDEX DOCD: CPT | Mod: CPTII,S$GLB,, | Performed by: FAMILY MEDICINE

## 2024-02-05 RX ORDER — PREDNISONE 5 MG/1
TABLET ORAL
Qty: 30 TABLET | Refills: 0 | Status: SHIPPED | OUTPATIENT
Start: 2024-02-05 | End: 2024-02-17

## 2024-02-05 RX ORDER — BENZONATATE 200 MG/1
200 CAPSULE ORAL 3 TIMES DAILY PRN
Qty: 30 CAPSULE | Refills: 1 | Status: SHIPPED | OUTPATIENT
Start: 2024-02-05

## 2024-02-05 RX ORDER — GUAIFENESIN 400 MG/1
400 TABLET ORAL EVERY 6 HOURS PRN
Qty: 30 TABLET | Refills: 1 | Status: SHIPPED | OUTPATIENT
Start: 2024-02-05

## 2024-02-05 RX ORDER — ALBUTEROL SULFATE 90 UG/1
2 AEROSOL, METERED RESPIRATORY (INHALATION) EVERY 6 HOURS PRN
Qty: 8.5 G | Refills: 1 | Status: SHIPPED | OUTPATIENT
Start: 2024-02-05 | End: 2025-02-04

## 2024-02-05 NOTE — TELEPHONE ENCOUNTER
Employee Health Medical Consideration Form completed and faxed to [employer] at [phone], fax confirmation received

## 2024-02-05 NOTE — PROGRESS NOTES
OFFICE VISIT 2/5/24  9:20 AM CST    CHIEF COMPLAINT: Cough and Nasal Congestion    She comes in complaining of a four-day history of cough, head and chest congestion, and wheezing. She does not have a history of asthma, but she says that she has had bronchitis previously multiple times, which was associated with wheezing, for which she was treated with an albuterol inhaler. She has been using her albuterol inhaler for this acute illness, and it has been helping to a degree, but insufficiently. Rapid tests for influenza and COVID-19 are negative. We discussed differential diagnosis and risks and benefits of treatment options. Chronic conditions are otherwise represented as and appear to be compensated/controlled and stable. No other new complaints or concerns reported.       1. Wheezy bronchitis  -     predniSONE (DELTASONE) 5 MG tablet; Take 4 tablets (20 mg total) by mouth once daily for 3 days, THEN 3 tablets (15 mg total) once daily for 3 days, THEN 2 tablets (10 mg total) once daily for 3 days, THEN 1 tablet (5 mg total) once daily for 3 days.  Dispense: 30 tablet; Refill: 0  -     albuterol (VENTOLIN HFA) 90 mcg/actuation inhaler; Inhale 2 puffs into the lungs every 6 (six) hours as needed for Wheezing. Rescue  Dispense: 18 g; Refill: 1  -     inhalation spacing device; Use as directed when taking inhaled medicine  Dispense: 1 each; Refill: 0    2. URI with cough and congestion  -     POCT COVID-19 Rapid Screening  -     POCT Influenza A/B Molecular  -     benzonatate (TESSALON) 200 MG capsule; Take 1 capsule (200 mg total) by mouth 3 (three) times daily as needed for Cough.  Dispense: 30 capsule; Refill: 1  -     guaiFENesin (HUMIBID E) 400 mg Tab; Take 1 tablet (400 mg total) by mouth every 6 (six) hours as needed (for chest congestion).  Dispense: 30 tablet; Refill: 1    3. Undifferentiated connective tissue disease  Assessment & Plan:  ASSESSMENT: This is a chronic problem that appears  "compensated/controlled and stable on hydroxychloroquine.  PLAN: Continue present treatment plan.       4. Severe obesity (BMI 35.0-39.9) with comorbidity  Assessment & Plan:  Wt Readings from Last 6 Encounters:   02/05/24 110.1 kg (242 lb 11.6 oz)   01/17/24 109.8 kg (242 lb)   01/04/24 110 kg (242 lb 8.1 oz)   12/28/23 110 kg (242 lb 8.1 oz)   11/28/23 111.1 kg (245 lb)   11/16/23 111.5 kg (245 lb 13 oz)     Estimated body mass index is 39.18 kg/m² as calculated from the following:    Height as of this encounter: 5' 6" (1.676 m).    Weight as of this encounter: 110.1 kg (242 lb 11.6 oz).    Therapeutic lifestyle changes encouraged. Education/instructions were reviewed and shared with Karla. Refer to After Visit Summary.       5. Essential hypertension  Assessment & Plan:  BP Readings from Last 6 Encounters:   02/05/24 122/80   01/04/24 125/79   12/28/23 136/78   12/27/23 (!) 152/70   11/16/23 (!) 140/80   06/22/23 136/84      Last 5 Patient Entered Readings                Current 30 Day Average:   Recent Readings 8/21/2023 8/21/2023 7/20/2023 7/20/2023 5/30/2023   SBP (mmHg) 149 152 153 139 162   DBP (mmHg) 75 91 100 77 89   Pulse 81 84 86 99 75                 Lab Results   Component Value Date    EGFRNORACEVR >60.0 12/05/2023    CREATININE 0.9 12/05/2023    BUN 22 12/05/2023    K 4.0 12/05/2023     12/05/2023     12/05/2023     Results for orders placed or performed during the hospital encounter of 12/05/23   EKG 12-lead    Collection Time: 12/05/23  1:34 PM    Narrative    Test Reason : Z01.818,    Vent. Rate : 088 BPM     Atrial Rate : 088 BPM     P-R Int : 144 ms          QRS Dur : 078 ms      QT Int : 376 ms       P-R-T Axes : 058 -06 057 degrees     QTc Int : 454 ms    Normal sinus rhythm  Normal ECG  When compared with ECG of 08-JUL-2013 10:15,  No significant change was found  Confirmed by ARMINDA HILLIARD MD ([id number]) on 12/5/2023 2:29:29 PM    Referred By: ESTEFANIA BOB           Confirmed " "By:ARMINDA HILLIARD MD           Unless noted herein, any chronic conditions are represented as and appear stable, and no other significant complaints or concerns were reported.    Follow up if symptoms worsen or fail to improve.       Review of Systems   Constitutional:  Negative for fever.   HENT:  Negative for trouble swallowing.    Respiratory:  Positive for cough and wheezing. Negative for chest tightness and shortness of breath.    Cardiovascular:  Negative for chest pain.   Gastrointestinal:  Negative for abdominal pain.       Vitals:    02/05/24 0933   BP: 122/80   BP Location: Right arm   Patient Position: Sitting   BP Method: Large (Manual)   Pulse: 68   Resp: 20   Temp: 98.4 °F (36.9 °C)   SpO2: 99%   Weight: 110.1 kg (242 lb 11.6 oz)   Height: 5' 6" (1.676 m)   Physical Exam  Vitals reviewed.   Constitutional:       General: She is not in acute distress.     Appearance: Normal appearance. She is not ill-appearing, toxic-appearing or diaphoretic.   HENT:      Right Ear: Tympanic membrane, ear canal and external ear normal. There is no impacted cerumen.      Left Ear: Tympanic membrane, ear canal and external ear normal. There is no impacted cerumen.      Nose: Congestion and rhinorrhea present.      Mouth/Throat:      Mouth: Mucous membranes are moist.      Pharynx: Oropharynx is clear. No oropharyngeal exudate or posterior oropharyngeal erythema.   Eyes:      General:         Right eye: No discharge.         Left eye: No discharge.   Cardiovascular:      Rate and Rhythm: Normal rate and regular rhythm.      Heart sounds: Normal heart sounds.   Pulmonary:      Effort: Pulmonary effort is normal. No respiratory distress.      Breath sounds: No stridor. Wheezing present. No rhonchi.   Lymphadenopathy:      Cervical: No cervical adenopathy.   Skin:     General: Skin is warm and dry.   Neurological:      Mental Status: She is alert and oriented to person, place, and time. Mental status is at baseline. " "  Psychiatric:         Behavior: Behavior normal.       Documentation entered by me for this encounter may have been done in part using speech-recognition technology. Although I have made an effort to ensure accuracy, "sound like" errors may exist and should be interpreted in context.  "

## 2024-02-09 NOTE — PROGRESS NOTES
Orthopaedics  Post-operative follow-up    Procedure Performed:   Left knee arthroscopic partial lateral meniscectomy      Date of Surgery: 1/4/24    Subjective: Karla Arzola is now approximately 6 weeks out from her knee surgery.  She continues to make progress with pain and function. She continues with PT at the [facility].  She has returned to work.    Exam:  Portal sites healing without complication  Minimal effusion  Knee ROM full extension to 130 degrees flexion  No crepitus on ROM  Minimal joint line tenderness  Veronica's sign negative and no calf tenderness  Motor and sensory intact distally    Impression:  6 weeks s/p left knee arthroscopic partial lateral meniscectomy, second post-operative visit - doing well    Plan:  Continue to progress activities in PT as tolerated  Weight bearing status: WBAT  Symptomatic treatment for pain / swelling  Instructed patient to call clinic if questions or concerns    Follow-up with me in 6 weeks at 3 months post-op (around 4/4/24)      Larry Adams MD    I, Roberth Couch, acted as a scribe for Larry Adams MD for the duration of this office visit.

## 2024-02-14 ENCOUNTER — OFFICE VISIT (OUTPATIENT)
Dept: SPORTS MEDICINE | Facility: CLINIC | Age: 64
End: 2024-02-14
Payer: COMMERCIAL

## 2024-02-14 VITALS — BODY MASS INDEX: 39.01 KG/M2 | HEIGHT: 66 IN | RESPIRATION RATE: 17 BRPM | WEIGHT: 242.75 LBS

## 2024-02-14 DIAGNOSIS — Z87.828 STATUS POST ARTHROSCOPIC PARTIAL LATERAL MENISCECTOMY OF LEFT KNEE: Primary | ICD-10-CM

## 2024-02-14 DIAGNOSIS — Z98.890 STATUS POST ARTHROSCOPIC PARTIAL LATERAL MENISCECTOMY OF LEFT KNEE: Primary | ICD-10-CM

## 2024-02-14 PROCEDURE — 99999 PR PBB SHADOW E&M-EST. PATIENT-LVL IV: CPT | Mod: PBBFAC,,, | Performed by: STUDENT IN AN ORGANIZED HEALTH CARE EDUCATION/TRAINING PROGRAM

## 2024-02-14 PROCEDURE — 99024 POSTOP FOLLOW-UP VISIT: CPT | Mod: S$GLB,,, | Performed by: STUDENT IN AN ORGANIZED HEALTH CARE EDUCATION/TRAINING PROGRAM

## 2024-02-14 PROCEDURE — 1159F MED LIST DOCD IN RCRD: CPT | Mod: CPTII,S$GLB,, | Performed by: STUDENT IN AN ORGANIZED HEALTH CARE EDUCATION/TRAINING PROGRAM

## 2024-02-14 PROCEDURE — 1160F RVW MEDS BY RX/DR IN RCRD: CPT | Mod: CPTII,S$GLB,, | Performed by: STUDENT IN AN ORGANIZED HEALTH CARE EDUCATION/TRAINING PROGRAM

## 2024-02-20 ENCOUNTER — HOSPITAL ENCOUNTER (OUTPATIENT)
Dept: RADIOLOGY | Facility: HOSPITAL | Age: 64
Discharge: HOME | End: 2024-02-20
Attending: FAMILY MEDICINE
Payer: COMMERCIAL

## 2024-02-20 VITALS — HEIGHT: 66 IN | WEIGHT: 242.5 LBS | BODY MASS INDEX: 38.97 KG/M2

## 2024-02-20 DIAGNOSIS — Z12.31 ENCOUNTER FOR SCREENING MAMMOGRAM FOR BREAST CANCER: ICD-10-CM

## 2024-02-20 PROCEDURE — 77067 SCR MAMMO BI INCL CAD: CPT | Mod: TC

## 2024-02-20 PROCEDURE — 77063 BREAST TOMOSYNTHESIS BI: CPT | Mod: 26,,, | Performed by: RADIOLOGY

## 2024-02-20 PROCEDURE — 77067 SCR MAMMO BI INCL CAD: CPT | Mod: 26,,, | Performed by: RADIOLOGY

## 2024-02-21 ENCOUNTER — OFFICE VISIT (OUTPATIENT)
Dept: FAMILY MEDICINE | Facility: CLINIC | Age: 64
End: 2024-02-21
Payer: COMMERCIAL

## 2024-02-21 ENCOUNTER — HOSPITAL ENCOUNTER (OUTPATIENT)
Dept: RADIOLOGY | Facility: HOSPITAL | Age: 64
Discharge: HOME | End: 2024-02-21
Attending: NURSE PRACTITIONER
Payer: COMMERCIAL

## 2024-02-21 VITALS
DIASTOLIC BLOOD PRESSURE: 76 MMHG | SYSTOLIC BLOOD PRESSURE: 118 MMHG | HEIGHT: 66 IN | OXYGEN SATURATION: 98 % | RESPIRATION RATE: 18 BRPM | HEART RATE: 89 BPM | TEMPERATURE: 97 F | WEIGHT: 244.69 LBS | BODY MASS INDEX: 39.32 KG/M2

## 2024-02-21 DIAGNOSIS — R06.02 SOB (SHORTNESS OF BREATH): Primary | ICD-10-CM

## 2024-02-21 DIAGNOSIS — R06.02 SOB (SHORTNESS OF BREATH): ICD-10-CM

## 2024-02-21 DIAGNOSIS — R05.9 COUGH, UNSPECIFIED TYPE: ICD-10-CM

## 2024-02-21 DIAGNOSIS — J40 BRONCHITIS: ICD-10-CM

## 2024-02-21 PROCEDURE — 99999 PR PBB SHADOW E&M-EST. PATIENT-LVL V: CPT | Mod: PBBFAC,,, | Performed by: NURSE PRACTITIONER

## 2024-02-21 PROCEDURE — 71046 X-RAY EXAM CHEST 2 VIEWS: CPT | Mod: 26,,, | Performed by: RADIOLOGY

## 2024-02-21 PROCEDURE — 1160F RVW MEDS BY RX/DR IN RCRD: CPT | Mod: CPTII,S$GLB,, | Performed by: NURSE PRACTITIONER

## 2024-02-21 PROCEDURE — 1159F MED LIST DOCD IN RCRD: CPT | Mod: CPTII,S$GLB,, | Performed by: NURSE PRACTITIONER

## 2024-02-21 PROCEDURE — 71046 X-RAY EXAM CHEST 2 VIEWS: CPT | Mod: TC,FY,PO

## 2024-02-21 PROCEDURE — 3074F SYST BP LT 130 MM HG: CPT | Mod: CPTII,S$GLB,, | Performed by: NURSE PRACTITIONER

## 2024-02-21 PROCEDURE — 3078F DIAST BP <80 MM HG: CPT | Mod: CPTII,S$GLB,, | Performed by: NURSE PRACTITIONER

## 2024-02-21 PROCEDURE — 3008F BODY MASS INDEX DOCD: CPT | Mod: CPTII,S$GLB,, | Performed by: NURSE PRACTITIONER

## 2024-02-21 PROCEDURE — 99214 OFFICE O/P EST MOD 30 MIN: CPT | Mod: S$GLB,,, | Performed by: NURSE PRACTITIONER

## 2024-02-21 RX ORDER — AZITHROMYCIN 250 MG/1
TABLET, FILM COATED ORAL
Qty: 6 TABLET | Refills: 0 | Status: SHIPPED | OUTPATIENT
Start: 2024-02-21 | End: 2024-04-15

## 2024-02-21 RX ORDER — PROMETHAZINE HYDROCHLORIDE AND DEXTROMETHORPHAN HYDROBROMIDE 6.25; 15 MG/5ML; MG/5ML
10 SYRUP ORAL EVERY 6 HOURS PRN
Qty: 240 ML | Refills: 0 | Status: SHIPPED | OUTPATIENT
Start: 2024-02-21 | End: 2024-03-02

## 2024-02-21 NOTE — PROGRESS NOTES
Karla Arzola  02/21/2024  [id number]    Ninfa Frost MD  Patient Care Team:  Ninfa Frost MD as PCP - General (Family Medicine)  Cindy Rosa as Digital Medicine Health   Shannon Smith as Hypertension Digital Medicine Clinician  Nydia Dockery, NP as Nurse Practitioner (Family Medicine)  1, [employer] [facility] [employer] - [employer] as Hypertension Digital Medicine Contract  Ninfa Frost MD as Hypertension Digital Medicine Responsible Provider (Family Medicine)          Visit Type:a scheduled routine follow-up visit    Chief Complaint:  Chief Complaint   Patient presents with    Shortness of Breath     Since 1/4/24       History of Present Illness:    62 yo female presents with co continued cough and SOB after diagnosis pf bronchitis.  Denies throat pain, sinus congestion, fever, chills, sweats, CP, NVD, abdominal pain, fatigue, headache, body aches, loss of taste or smell.   Reports completion of medrol dose pack, inhaler and tessalon.  History:  Past Medical History:   Diagnosis Date    Allergy     Anxiety     GERD (gastroesophageal reflux disease)     Hypertension     Obesity     Vitamin B 12 deficiency     Vitamin D deficiency disease      Past Surgical History:   Procedure Laterality Date    BREAST BIOPSY Right 10/08/2021    KNEE ARTHROSCOPY W/ MENISCECTOMY Left 1/4/2024    Procedure: ARTHROSCOPY, KNEE, WITH MENISCECTOMY;  Surgeon: Larry Adams MD;  Location: [facility];  Service: Orthopedics;  Laterality: Left;    MOUTH SURGERY      right knee scope      TUBAL LIGATION       Family History   Problem Relation Age of Onset    Diabetes Mother     Hypertension Mother     Stroke Mother     Hypertension Father     Hyperlipidemia Father     Kidney disease Father     Hypertension Sister     Hypertension Brother     Heart failure Brother     No Known Problems Brother     No Known Problems Brother     Hypertension Maternal Grandmother     Lung cancer Maternal Grandfather     Thyroid  cancer Neg Hx     Other Neg Hx         MEN2     Social History     Socioeconomic History    Marital status: Single   Occupational History     Employer: [employer]   Tobacco Use    Smoking status: Never     Passive exposure: Never    Smokeless tobacco: Never   Substance and Sexual Activity    Alcohol use: Yes     Comment: socially 1x weekly    Drug use: No    Sexual activity: Not Currently     Partners: Male     Birth control/protection: Abstinence   Other Topics Concern    Are you pregnant or think you may be? No    Breast-feeding No     Social Determinants of Health     Financial Resource Strain: Medium Risk (2/24/2023)    Overall Financial Resource Strain (CARDIA)     Difficulty of Paying Living Expenses: Somewhat hard   Food Insecurity: Food Insecurity Present (2/24/2023)    Hunger Vital Sign     Worried About Running Out of Food in the Last Year: Sometimes true     Ran Out of Food in the Last Year: Never true   Transportation Needs: No Transportation Needs (2/24/2023)    PRAPARE - Transportation     Lack of Transportation (Medical): No     Lack of Transportation (Non-Medical): No   Recent Concern: Transportation Needs - Unmet Transportation Needs (12/2/2022)    PRAPARE - Transportation     Lack of Transportation (Medical): No     Lack of Transportation (Non-Medical): Yes   Physical Activity: Inactive (12/28/2023)    Exercise Vital Sign     Days of Exercise per Week: 0 days     Minutes of Exercise per Session: 0 min   Stress: Stress Concern Present (2/24/2023)    [place] [employer] - Occupational Stress Questionnaire     Feeling of Stress : To some extent   Social Connections: Unknown (2/24/2023)    Social Connection and Isolation Panel [NHANES]     Frequency of Communication with Friends and Family: Once a week     Frequency of Social Gatherings with Friends and Family: Patient declined     Active Member of Clubs or Organizations: Yes     Attends Club or Organization  Meetings: 1 to 4 times per year     Marital Status:    Housing Stability: High Risk (2/24/2023)    Housing Stability Vital Sign     Unable to Pay for Housing in the Last Year: Yes     Number of Places Lived in the Last Year: 1     Unstable Housing in the Last Year: No     Patient Active Problem List   Diagnosis    Essential hypertension    Severe obesity (BMI 35.0-39.9) with comorbidity    Recurrent genital HSV (herpes simplex virus) infection    Internal derangement of right knee    Chondromalacia of right knee    Screen for colon cancer    Poor balance    Pre-op exam    Complex tear of lateral meniscus of left knee    Anxiety    Connective tissue disorder    Uveitis    Vitamin D deficiency disease    Decreased range of motion of left knee    Decreased functional mobility and endurance    Gait abnormality     Review of patient's allergies indicates:  No Known Allergies    The following were reviewed at this visit: active problem list, medication list, allergies, family history, social history, and health maintenance.    Medications:  Current Outpatient Medications on File Prior to Visit   Medication Sig Dispense Refill    acetaminophen (TYLENOL) 500 MG tablet Take 2 tablets (1,000 mg total) by mouth every 8 (eight) hours as needed for Pain. 60 tablet 0    acyclovir (ZOVIRAX) 200 MG capsule Take 1 capsule (200 mg total) by mouth once daily. 90 capsule 3    albuterol (VENTOLIN HFA) 90 mcg/actuation inhaler Inhale 2 puffs into the lungs every 6 (six) hours as needed for Wheezing. Rescue 8.5 g 1    amLODIPine (NORVASC) 10 MG tablet Take 1 tablet (10 mg total) by mouth once daily. 90 tablet 1    benzonatate (TESSALON) 200 MG capsule Take 1 capsule (200 mg total) by mouth 3 (three) times daily as needed for Cough. 30 capsule 1    celecoxib (CELEBREX) 200 MG capsule Take 1 capsule (200 mg total) by mouth 2 (two) times daily. 60 capsule 0    ciclopirox (PENLAC) 8 % Soln Apply to nails once daily 6.6 mL 2     citalopram (CELEXA) 20 MG tablet Take 1 tablet (20 mg total) by mouth once daily. 90 tablet 3    ergocalciferol (VITAMIN D2) 50,000 unit Cap Take 1 capsule (50,000 Units total) by mouth every 7 days. 12 capsule 1    fluticasone propionate (FLONASE) 50 mcg/actuation nasal spray 1 spray (50 mcg total) by Each Nostril route once daily. 16 g 0    furosemide (LASIX) 20 MG tablet Take 1 tablet (20 mg total) by mouth daily as needed (swelling). 60 tablet 1    guaiFENesin (HUMIBID E) 400 mg Tab Take 1 tablet (400 mg total) by mouth every 6 (six) hours as needed (for chest congestion). 30 tablet 1    hydroxychloroquine (PLAQUENIL) 200 mg tablet Take 1 tablet (200 mg total) by mouth 2 (two) times daily. 60 tablet 5    inhalation spacing device Use as directed when taking inhaled medicine 1 each 0    ipratropium-albuteroL (COMBIVENT)  mcg/actuation inhaler Inhale 1 puff into the lungs every 6 (six) hours as needed for Wheezing or Shortness of Breath. Rescue 4 g 0    methocarbamoL (ROBAXIN) 500 MG Tab Take 1 tablet (500 mg total) by mouth nightly as needed. 30 tablet 0    multivitamin capsule Take 1 capsule by mouth once daily.      naproxen (NAPROSYN) 500 MG tablet Take 1 tablet (500 mg total) by mouth 2 (two) times daily. 180 tablet 1    tirzepatide, weight loss, (ZEPBOUND) 2.5 mg/0.5 mL PnIj Inject 2.5 mg into the skin every 7 days. (Patient not taking: Reported on 2/21/2024) 4 Pen 1     Current Facility-Administered Medications on File Prior to Visit   Medication Dose Route Frequency Provider Last Rate Last Admin    lactated ringers infusion   Intravenous Continuous Kathleen Renteria MD   New Bag at 01/04/24 0655       Medications have been reviewed and reconciled with patient at this visit.  Barriers to medications reviewed with patient.    Adverse reactions to current medications reviewed with patient..    Over the counter medications reviewed and reconciled with patient.    Exam:  Wt Readings from Last 3 Encounters:    02/21/24 111 kg (244 lb 11.4 oz)   02/20/24 110 kg (242 lb 8.1 oz)   02/14/24 110.1 kg (242 lb 11.6 oz)     Temp Readings from Last 3 Encounters:   02/21/24 96.9 °F (36.1 °C) (Tympanic)   02/05/24 98.4 °F (36.9 °C)   01/04/24 97.9 °F (36.6 °C) (Temporal)     BP Readings from Last 3 Encounters:   02/21/24 118/76   02/05/24 122/80   01/04/24 125/79     Pulse Readings from Last 3 Encounters:   02/21/24 89   02/05/24 68   01/04/24 83     Body mass index is 39.5 kg/m².      Review of Systems   Constitutional:  Negative for fever.   Respiratory:  Positive for cough and shortness of breath. Negative for wheezing.    Cardiovascular:  Negative for chest pain and palpitations.   Gastrointestinal:  Negative for nausea.   Neurological:  Negative for speech change, weakness and headaches.   All other systems reviewed and are negative.    Physical Exam  Vitals and nursing note reviewed.   Constitutional:       Appearance: Normal appearance. She is obese.   HENT:      Head: Normocephalic and atraumatic.      Right Ear: Tympanic membrane, ear canal and external ear normal.      Left Ear: Tympanic membrane, ear canal and external ear normal.      Nose: Nose normal.      Mouth/Throat:      Mouth: Mucous membranes are moist.      Pharynx: Oropharynx is clear.   Eyes:      Extraocular Movements: Extraocular movements intact.      Conjunctiva/sclera: Conjunctivae normal.      Pupils: Pupils are equal, round, and reactive to light.   Cardiovascular:      Rate and Rhythm: Normal rate and regular rhythm.      Pulses: Normal pulses.      Heart sounds: Normal heart sounds.   Pulmonary:      Effort: Pulmonary effort is normal.      Breath sounds: Normal breath sounds.   Abdominal:      General: Bowel sounds are normal.      Palpations: Abdomen is soft.   Musculoskeletal:         General: Normal range of motion.      Cervical back: Normal range of motion and neck supple.   Skin:     General: Skin is warm and dry.      Capillary Refill:  Capillary refill takes less than 2 seconds.   Neurological:      General: No focal deficit present.      Mental Status: She is alert and oriented to person, place, and time.   Psychiatric:         Mood and Affect: Mood normal.         Behavior: Behavior normal.         Thought Content: Thought content normal.         Judgment: Judgment normal.         Laboratory Reviewed ({Yes)  Lab Results   Component Value Date    WBC 12.18 12/05/2023    HGB 14.3 12/05/2023    HCT 45.1 12/05/2023     12/05/2023    CHOL 162 03/02/2023    TRIG 117 03/02/2023    HDL 51 03/02/2023    ALT 14 12/05/2023    AST 14 12/05/2023     12/05/2023    K 4.0 12/05/2023     12/05/2023    CREATININE 0.9 12/05/2023    BUN 22 12/05/2023    CO2 26 12/05/2023    TSH 1.720 03/02/2023    GLUF 99 08/08/2008    HGBA1C 5.2 03/02/2023       Karla was seen today for shortness of breath.    Diagnoses and all orders for this visit:    SOB (shortness of breath)  -     X-Ray Chest PA And Lateral; Future    Bronchitis  -     azithromycin (Z-LETICIA) 250 MG tablet; Take 2 tablets by mouth on day 1, then 1 tablet by mouth on days 2-5.    Cough, unspecified type  -     promethazine-dextromethorphan (PROMETHAZINE-DM) 6.25-15 mg/5 mL Syrp; Take 10 mLs by mouth every 6 (six) hours as needed.        Plan  Xray  Promethazine  Azithromycin  If SOB continues CTA         Care Plan/Goals: Reviewed    Goals         Blood Pressure < 130/80 (pt-stated)       Exercise at least 150 minutes per week.       Take at least one BP reading per week at various times of the day         Karla was seen today for shortness of breath.    Diagnoses and all orders for this visit:    SOB (shortness of breath)  -     X-Ray Chest PA And Lateral; Future    Bronchitis  -     azithromycin (Z-LETICIA) 250 MG tablet; Take 2 tablets by mouth on day 1, then 1 tablet by mouth on days 2-5.    Cough, unspecified type  -     promethazine-dextromethorphan (PROMETHAZINE-DM) 6.25-15 mg/5 mL Syrp; Take  10 mLs by mouth every 6 (six) hours as needed.         Follow up: Follow up if symptoms worsen or fail to improve.    After visit summary was printed and given to patient upon discharge today.  Patient goals and care plan are included in After Visit Summary.

## 2024-02-21 NOTE — LETTER
February 21, 2024      [facility]  [address] [place] [address]  Phone: [phone]  Fax: [phone]       Patient: Karla Arzola   YOB: 1960  Date of Visit: 02/21/2024    To Whom It May Concern:    Huma Arzola  was at [facility] on 02/21/2024. The patient may return to work/school on 2/23/24 with no restrictions. If you have any questions or concerns, or if I can be of further assistance, please do not hesitate to contact me.    Sincerely,    Nydia Dockery NP

## 2024-03-06 ENCOUNTER — PATIENT MESSAGE (OUTPATIENT)
Dept: FAMILY MEDICINE | Facility: CLINIC | Age: 64
End: 2024-03-06
Payer: COMMERCIAL

## 2024-03-06 ENCOUNTER — TELEPHONE (OUTPATIENT)
Dept: FAMILY MEDICINE | Facility: CLINIC | Age: 64
End: 2024-03-06
Payer: COMMERCIAL

## 2024-03-06 DIAGNOSIS — F41.9 ANXIETY: Primary | ICD-10-CM

## 2024-03-06 NOTE — TELEPHONE ENCOUNTER
Patient sent over the following message: Good morning. I am taking Celexa 20mg daily. Lately I have been under a lot of stress and anxiety and I dont think the 10mg is working. Would it be ok to take 2 tablets (40mg)? Please advise

## 2024-03-06 NOTE — TELEPHONE ENCOUNTER
Patient portal response your Dr. Santosh MD previous questionA:    Yes I would like an outside referral and to try one of the medications.

## 2024-03-07 RX ORDER — HYDROXYZINE HYDROCHLORIDE 25 MG/1
25 TABLET, FILM COATED ORAL 2 TIMES DAILY PRN
Qty: 30 TABLET | Refills: 0 | Status: SHIPPED | OUTPATIENT
Start: 2024-03-07 | End: 2024-04-15

## 2024-03-07 NOTE — TELEPHONE ENCOUNTER
I have put the following orders and/or medications to this note.  Please advise pt and assist.    Orders Placed This Encounter   Procedures    Ambulatory referral/consult to Behavioral Health     Standing Status:   Future     Standing Expiration Date:   4/7/2025     Referral Priority:   Routine     Referral Type:   Psychiatric     Referral Reason:   Specialty Services Required     Requested Specialty:   Behavioral Health     Number of Visits Requested:   1       Medications Ordered This Encounter   Medications    hydrOXYzine HCL (ATARAX) 25 MG tablet     Sig: Take 1 tablet (25 mg total) by mouth 2 (two) times daily as needed for Anxiety.     Dispense:  30 tablet     Refill:  0

## 2024-03-08 ENCOUNTER — PATIENT MESSAGE (OUTPATIENT)
Dept: FAMILY MEDICINE | Facility: CLINIC | Age: 64
End: 2024-03-08
Payer: COMMERCIAL

## 2024-03-08 ENCOUNTER — PATIENT MESSAGE (OUTPATIENT)
Dept: PSYCHIATRY | Facility: CLINIC | Age: 64
End: 2024-03-08
Payer: COMMERCIAL

## 2024-03-08 ENCOUNTER — TELEPHONE (OUTPATIENT)
Dept: FAMILY MEDICINE | Facility: CLINIC | Age: 64
End: 2024-03-08
Payer: COMMERCIAL

## 2024-03-08 DIAGNOSIS — F41.8 DEPRESSION WITH ANXIETY: ICD-10-CM

## 2024-03-08 RX ORDER — FUROSEMIDE 20 MG/1
20 TABLET ORAL DAILY PRN
Qty: 60 TABLET | Refills: 1 | Status: SHIPPED | OUTPATIENT
Start: 2024-03-08 | End: 2025-03-08

## 2024-03-08 NOTE — TELEPHONE ENCOUNTER
Care Due:                  Date            Visit Type   Department     Provider  --------------------------------------------------------------------------------                                             [facility]  Last Visit: 12-      [facility]       Ninfa Frost                               -                              PRIMARY      [facility]  Next Visit: 04-      CARE ([facility])   MEDICINE       Ninfa Frost                                                            Last  Test          Frequency    Reason                     Performed    Due Date  --------------------------------------------------------------------------------    HBA1C.......  6 months...  tirzepatide,.............  03- 08-    Vitamin D...  12 months..  ergocalciferol...........  03- 02-    [employer] Embedded Care Due Messages. Reference number: [id number].   3/08/2024 2:57:24 PM CST

## 2024-03-12 ENCOUNTER — CLINICAL SUPPORT (OUTPATIENT)
Dept: REHABILITATION | Facility: HOSPITAL | Age: 64
End: 2024-03-12
Payer: COMMERCIAL

## 2024-03-12 ENCOUNTER — LAB VISIT (OUTPATIENT)
Dept: LAB | Facility: HOSPITAL | Age: 64
End: 2024-03-12
Attending: FAMILY MEDICINE
Payer: COMMERCIAL

## 2024-03-12 DIAGNOSIS — R76.8 POSITIVE ANA (ANTINUCLEAR ANTIBODY): ICD-10-CM

## 2024-03-12 DIAGNOSIS — R26.9 GAIT ABNORMALITY: ICD-10-CM

## 2024-03-12 DIAGNOSIS — R26.89 POOR BALANCE: Primary | ICD-10-CM

## 2024-03-12 DIAGNOSIS — M25.662 DECREASED RANGE OF MOTION OF LEFT KNEE: ICD-10-CM

## 2024-03-12 DIAGNOSIS — Z74.09 DECREASED FUNCTIONAL MOBILITY AND ENDURANCE: ICD-10-CM

## 2024-03-12 DIAGNOSIS — M35.9 UNDIFFERENTIATED CONNECTIVE TISSUE DISEASE: ICD-10-CM

## 2024-03-12 LAB
ALBUMIN SERPL BCP-MCNC: 3.5 G/DL (ref 3.5–5.2)
ALP SERPL-CCNC: 98 U/L (ref 55–135)
ALT SERPL W/O P-5'-P-CCNC: 15 U/L (ref 10–44)
ANION GAP SERPL CALC-SCNC: 6 MMOL/L (ref 8–16)
AST SERPL-CCNC: 13 U/L (ref 10–40)
BACTERIA #/AREA URNS HPF: NORMAL /HPF
BASOPHILS # BLD AUTO: 0.03 K/UL (ref 0–0.2)
BASOPHILS NFR BLD: 0.5 % (ref 0–1.9)
BILIRUB SERPL-MCNC: 0.7 MG/DL (ref 0.1–1)
BUN SERPL-MCNC: 13 MG/DL (ref 8–23)
C3 SERPL-MCNC: 141 MG/DL (ref 50–180)
C4 SERPL-MCNC: 57 MG/DL (ref 11–44)
CALCIUM SERPL-MCNC: 8.7 MG/DL (ref 8.7–10.5)
CHLORIDE SERPL-SCNC: 107 MMOL/L (ref 95–110)
CO2 SERPL-SCNC: 28 MMOL/L (ref 23–29)
CREAT SERPL-MCNC: 1 MG/DL (ref 0.5–1.4)
CREAT UR-MCNC: 276 MG/DL (ref 15–325)
CRP SERPL-MCNC: 14.3 MG/L (ref 0–8.2)
DIFFERENTIAL METHOD BLD: ABNORMAL
EOSINOPHIL # BLD AUTO: 0.1 K/UL (ref 0–0.5)
EOSINOPHIL NFR BLD: 1.1 % (ref 0–8)
ERYTHROCYTE [DISTWIDTH] IN BLOOD BY AUTOMATED COUNT: 14.6 % (ref 11.5–14.5)
ERYTHROCYTE [SEDIMENTATION RATE] IN BLOOD BY PHOTOMETRIC METHOD: 26 MM/HR (ref 0–36)
EST. GFR  (NO RACE VARIABLE): >60 ML/MIN/1.73 M^2
GLUCOSE SERPL-MCNC: 86 MG/DL (ref 70–110)
HCT VFR BLD AUTO: 43.1 % (ref 37–48.5)
HGB BLD-MCNC: 13.5 G/DL (ref 12–16)
IMM GRANULOCYTES # BLD AUTO: 0.01 K/UL (ref 0–0.04)
IMM GRANULOCYTES NFR BLD AUTO: 0.2 % (ref 0–0.5)
LYMPHOCYTES # BLD AUTO: 1.7 K/UL (ref 1–4.8)
LYMPHOCYTES NFR BLD: 27.3 % (ref 18–48)
MCH RBC QN AUTO: 28.5 PG (ref 27–31)
MCHC RBC AUTO-ENTMCNC: 31.3 G/DL (ref 32–36)
MCV RBC AUTO: 91 FL (ref 82–98)
MICROSCOPIC COMMENT: NORMAL
MONOCYTES # BLD AUTO: 0.5 K/UL (ref 0.3–1)
MONOCYTES NFR BLD: 8.4 % (ref 4–15)
NEUTROPHILS # BLD AUTO: 3.9 K/UL (ref 1.8–7.7)
NEUTROPHILS NFR BLD: 62.5 % (ref 38–73)
NRBC BLD-RTO: 0 /100 WBC
PLATELET # BLD AUTO: 268 K/UL (ref 150–450)
PMV BLD AUTO: 10.3 FL (ref 9.2–12.9)
POTASSIUM SERPL-SCNC: 4.3 MMOL/L (ref 3.5–5.1)
PROT SERPL-MCNC: 6.1 G/DL (ref 6–8.4)
PROT UR-MCNC: 13 MG/DL (ref 0–15)
PROT/CREAT UR: 0.05 MG/G{CREAT} (ref 0–0.2)
RBC # BLD AUTO: 4.74 M/UL (ref 4–5.4)
RBC #/AREA URNS HPF: 1 /HPF (ref 0–4)
SODIUM SERPL-SCNC: 141 MMOL/L (ref 136–145)
SQUAMOUS #/AREA URNS HPF: 3 /HPF
WBC # BLD AUTO: 6.16 K/UL (ref 3.9–12.7)
WBC #/AREA URNS HPF: 1 /HPF (ref 0–5)

## 2024-03-12 PROCEDURE — 86038 ANTINUCLEAR ANTIBODIES: CPT | Performed by: PHYSICIAN ASSISTANT

## 2024-03-12 PROCEDURE — 84156 ASSAY OF PROTEIN URINE: CPT | Performed by: PHYSICIAN ASSISTANT

## 2024-03-12 PROCEDURE — 86160 COMPLEMENT ANTIGEN: CPT | Performed by: PHYSICIAN ASSISTANT

## 2024-03-12 PROCEDURE — 80053 COMPREHEN METABOLIC PANEL: CPT | Performed by: PHYSICIAN ASSISTANT

## 2024-03-12 PROCEDURE — 86235 NUCLEAR ANTIGEN ANTIBODY: CPT | Mod: 59 | Performed by: PHYSICIAN ASSISTANT

## 2024-03-12 PROCEDURE — 85652 RBC SED RATE AUTOMATED: CPT | Performed by: PHYSICIAN ASSISTANT

## 2024-03-12 PROCEDURE — 81000 URINALYSIS NONAUTO W/SCOPE: CPT | Performed by: PHYSICIAN ASSISTANT

## 2024-03-12 PROCEDURE — 86225 DNA ANTIBODY NATIVE: CPT | Performed by: PHYSICIAN ASSISTANT

## 2024-03-12 PROCEDURE — 97110 THERAPEUTIC EXERCISES: CPT

## 2024-03-12 PROCEDURE — 86039 ANTINUCLEAR ANTIBODIES (ANA): CPT | Performed by: PHYSICIAN ASSISTANT

## 2024-03-12 PROCEDURE — 86140 C-REACTIVE PROTEIN: CPT | Performed by: PHYSICIAN ASSISTANT

## 2024-03-12 PROCEDURE — 97530 THERAPEUTIC ACTIVITIES: CPT

## 2024-03-12 PROCEDURE — 85025 COMPLETE CBC W/AUTO DIFF WBC: CPT | Performed by: PHYSICIAN ASSISTANT

## 2024-03-12 PROCEDURE — 86160 COMPLEMENT ANTIGEN: CPT | Mod: 59 | Performed by: PHYSICIAN ASSISTANT

## 2024-03-12 NOTE — PROGRESS NOTES
[facility]   Physical Therapy Treatment Note / Progress Note       Name: Karla Arzola  Clinic Number: [id number]    Therapy Diagnosis:   Encounter Diagnoses   Name Primary?    Poor balance Yes    Decreased range of motion of left knee     Decreased functional mobility and endurance     Gait abnormality        Physician: Larry Adams    Visit Date: 3/12/2024    Physician Orders: PT Eval and Treat  Medical Diagnosis from Referral:  Primary osteoarthritis of left knee [M17.12], Old complex tear of lateral meniscus of left knee [M23.201]   Evaluation Date: 1/9/2024  Authorization Period Expiration: 10/30/2024   Plan of Care Expiration: 4/9/2024  Progress Note Due: 2/9/2024  Visit # / Visits authorized: 8/20 (+1 for evaluation)   FOTO: 1/3 (last performed on 1/9/2024)     Precautions: Standard    Time In: 0700  Time Out: 0803  Total Billable Time: 57 minutes (Billing reflects 1 on 1 treatment time spent with patient)    Subjective     Patient reports: Patient has returned to physical therapy after recovering from bronchitis. States that she still gets some stiffness throughout the day, especially after sitting for prolong periods of time, overall, feeling pretty good.     He/She was compliant with home exercise program.  Response to previous treatment: mild increased soreness following exercises   Functional change: increased knee A/PROM. Improved quadriceps activation and active knee extension    Pain: 5/10     Location: L knee    Objective      Objective Measures updated at progress report or POC update only unless otherwise noted.     RANGE OF MOTION:   Knee AROM/PROM Right    Left    Goal   Hyper - Zero - Flexion (PRE)   0-2 -120 0-2-120 0-0-120   Hyper - Zero - Flexion (POST) NT NT N/A            STRENGTH:   L/E MMT Right  (spine) Left Pain/Dysfunction with Movement Goal   Modified (90/90) Abdominal Strength  NT ---       Hip Flexion  4+/5 4/5   4+/5 B   Hip Extension   4/5 4-/5   4+/5 B   Hip Abduction  4/5 4-/5   4+/5 B   Knee Extension 5/5 4+/5   5/5 B   Knee Flexion 4+/5 4/5   5/5 B   Hip IR 4+/5 4/5   4+/5 B   Hip ER 4+/5 4+/5   4+/5 B   Ankle DF 5/5 5/5   5/5 B   Ankle PF 5/5 5/5   5/5 B         PALPATION: Muscles: Increased tone and tenderness to palpation of: L knee grossly.         FUNCTIONAL MOVEMENT PATTERNS  Movement Analysis Notes   Bed Mobility  [x]Functional  []Dysfunctional:  []Painful  [x]Non-Painful        Transfers [x]Functional  []Dysfunctional:  []Painful  [x]Non-Painful        Sit to Stand [x]Functional  []Dysfunctional:  []Painful  [x]Non-Painful        Squat [x]Functional  []Dysfunctional:  []Painful  [x]Non-Painful       Single Leg Squat  []Functional  []Dysfunctional:  []Painful  []Non-Painful    Not tested    Step Down  []Functional  [x]Dysfunctional:  []Painful  [x]Non-Painful    Can perform from 4 inch step but requires upper extremity assist, mild trendelenburg noted             Function:            Treatment     Karla received the treatments listed below:       MANUAL THERAPY TECHNIQUES were applied for (0) minutes, including:    Soft tissue mobilization: DEFERRED  Scar mobilizations  Joint mobilizations:   Tibiofemoral glides  Patellofemoral glides and tilts in all directions   Functional dry needling: DEFERRED        THERAPEUTIC EXERCISES to develop strength, endurance, ROM, flexibility, posture, and core stabilization for (15) minutes including:    Performed Today:     Recumbent bike: 5 mins (full revolutions)  Heel prop: 5# for 3 minutes   Hamstring stretch (strap) 2x1 minute   Prone quad stretch 3x1 minute   Calf stretch 3x30s (wedge)   Interventions DEFERRED today     Prone hang: 3' 0#  Tailgaters 2'         NEUROMUSCULAR RE-EDUCATION ACTIVITIES to improve Balance, Coordination, Kinesthetic, Sense, Proprioception, and Posture for (4) minutes.  The following were included:    Performed Today:     SLS 4x30s B        Interventions DEFERRED today  "    Tandem stance ball pass: 2x10 to each side B            THERAPEUTIC ACTIVITIES to improve dynamic and functional  performance for (38) minutes including:    Performed Today:     Single leg Bridges (figure 4) 2x10 B  Single leg calf raises 3x10 B   DL Knee Extensions 25# 3x10  Hamstring curl machine 45# 3x10  Leg Press Machine   Double le# 2x10  Single leg 45# 3x10   Lateral squat walks- red band 3 laps    Interventions DEFERRED today     STS: 3x10 15# KB  Step ups 3x10 6"  SLR 3x8 2#  Step downs 1x10 4" then discontinued due to pain                Next Session:  Deadlift vs RDL  Ecc. Knee ext machine  Lateral walks      Patient Education and Home Exercises       Home Exercises Provided and Patient Education Provided     Education provided: (time included with treatment) minutes  PURPOSE: Patient educated on the impairments noted above and the effects of physical therapy intervention to improve overall condition and QOL.   EXERCISE: Patient was educated on all the above exercise prior/during/after for proper posture, positioning, and execution for safe performance with home exercise program.   STRENGTH: Patient educated on the importance of improved core and extremity strength in order to improve alignment of the spine and extremities with static positions and dynamic movement.   GAIT & BALANCE: Patient educated on the importance of strong core and lower extremity musculature in order to improve both static and dynamic balance, improve gait mechanics, reduce fall risk and improve household and community mobility.     Written Home Exercises Provided: yes.  Exercises were reviewed and Karla was able to demonstrate them prior to the end of the session.  Karla demonstrated good  understanding of the education provided. See EMR under Patient Instructions for exercises provided during therapy sessions.    Assessment   Patient tolerated treatment well today. An assessment was completed today with improvements noted " in knee range of motion, gross lower extremity strength, functional movement patterns, and tenderness to palpation compared to prior assessment; please see exam for details. Karla continues to have difficulty with gross L lower extremity strength and stiffness in the knee due to continues limitations in strength and stability..  FOTO scores noted above indicate the patients perceived functional levels are improving since prior assessment. The above impairments and functional deficits will continue to be addressed over the course of this plan of care. Karla was educated on continued progression of PT, expectations for the duration of care, updates on goals set at initial evaluation, and home exercise program.  Karla will continue to benefit from physical therapy in order to further address goals, maximize function and quality of life. Pt tolerated session well today. Continue to incorporated knee extensions, hamstring curls to improve functional strength with significant muscle fatigue reported following interventions. Pt continues to have difficulty with single leg stability and requires occasional fingertip support with single leg stance.       Karla is progressing well towards her goals.   Patient prognosis is Excellent.     Patient will continue to benefit from skilled outpatient physical therapy to address the deficits listed in the problem list box on initial evaluation, provide pt/family education and to maximize patient's level of independence in the home and community environment.     Patient's spiritual, cultural and educational needs considered and pt agreeable to plan of care and goals.       Anticipated Barriers for therapy: none       Short Term Goals:  6 weeks Status  Date Met   PAIN: Pt will report worst pain of 3/10 in order to progress toward max functional ability and improve quality of life. [x] Progressing  [] Met  [] Not Met     FUNCTION: Patient will demonstrate improved function as indicated  by a score of greater than or equal to 57 out of 100 on FOTO. [x] Progressing  [] Met  [] Not Met     MOBILITY: Patient will improve AROM to 50% of stated goals, listed in objective measures above, in order to progress towards independence with functional activities.  [x] Progressing  [] Met  [] Not Met     STRENGTH: Patient will improve strength to 50% of stated goals, listed in objective measures above, in order to progress towards independence with functional activities. [x] Progressing  [] Met  [] Not Met     POSTURE: Patient will correct postural deviations in sitting and standing, to decrease pain and promote long term stability.  [x] Progressing  [] Met  [] Not Met     HEP: Patient will demonstrate independence with HEP in order to progress toward functional independence. [x] Progressing  [] Met  [] Not Met        Long Term Goals:  12 weeks Status Date Met   PAIN: Pt will report worst pain of 1/10 in order to progress toward max functional ability and improve quality of life [x] Progressing  [] Met  [] Not Met     FUNCTION: Patient will demonstrate improved function as indicated by a score of greater than or equal to 64 out of 100 on FOTO. [x] Progressing  [] Met  [] Not Met     MOBILITY: Patient will improve AROM to stated goals, listed in objective measures above, in order to return to maximal functional potential and improve quality of life.  [x] Progressing  [] Met  [] Not Met     STRENGTH: Patient will improve strength to stated goals, listed in objective measures above, in order to improve functional independence and quality of life.  [x] Progressing  [] Met  [] Not Met     Patient will return to normal ADL's, IADL's, community involvement, recreational activities, and work-related activities with less than or equal to 1/10 pain and maximal function.  [x] Progressing  [] Met  [] Not Met           Plan     Continue Plan of Care (POC) and progress per patient tolerance. See treatment section for details  on planned progressions next session.      Kaitlin Arzola, PT

## 2024-03-13 ENCOUNTER — CLINICAL SUPPORT (OUTPATIENT)
Dept: REHABILITATION | Facility: HOSPITAL | Age: 64
End: 2024-03-13
Payer: COMMERCIAL

## 2024-03-13 DIAGNOSIS — R26.89 POOR BALANCE: Primary | ICD-10-CM

## 2024-03-13 DIAGNOSIS — Z74.09 DECREASED FUNCTIONAL MOBILITY AND ENDURANCE: ICD-10-CM

## 2024-03-13 DIAGNOSIS — M25.662 DECREASED RANGE OF MOTION OF LEFT KNEE: ICD-10-CM

## 2024-03-13 DIAGNOSIS — R26.9 GAIT ABNORMALITY: ICD-10-CM

## 2024-03-13 LAB
ANA PATTERN 1: NORMAL
ANA PATTERN 2: NORMAL
ANA SER QL IF: POSITIVE
ANA TITER 2: NORMAL
ANA TITR SER IF: NORMAL {TITER}
DSDNA AB SER-ACNC: NORMAL [IU]/ML

## 2024-03-13 PROCEDURE — 97110 THERAPEUTIC EXERCISES: CPT

## 2024-03-13 PROCEDURE — 97530 THERAPEUTIC ACTIVITIES: CPT

## 2024-03-13 PROCEDURE — 97112 NEUROMUSCULAR REEDUCATION: CPT

## 2024-03-14 LAB
ANTI SM ANTIBODY: 0.07 RATIO (ref 0–0.99)
ANTI SM/RNP ANTIBODY: 0.07 RATIO (ref 0–0.99)
ANTI-SM INTERPRETATION: NEGATIVE
ANTI-SM/RNP INTERPRETATION: NEGATIVE
ANTI-SSA ANTIBODY: 0.06 RATIO (ref 0–0.99)
ANTI-SSA INTERPRETATION: NEGATIVE
ANTI-SSB ANTIBODY: 0.06 RATIO (ref 0–0.99)
ANTI-SSB INTERPRETATION: NEGATIVE
DSDNA AB SER-ACNC: NORMAL [IU]/ML

## 2024-03-19 ENCOUNTER — OFFICE VISIT (OUTPATIENT)
Dept: DERMATOLOGY | Facility: CLINIC | Age: 64
End: 2024-03-19
Attending: FAMILY MEDICINE
Payer: COMMERCIAL

## 2024-03-19 ENCOUNTER — PATIENT MESSAGE (OUTPATIENT)
Dept: DERMATOLOGY | Facility: CLINIC | Age: 64
End: 2024-03-19

## 2024-03-19 DIAGNOSIS — L98.9 DERMATOSIS: Primary | ICD-10-CM

## 2024-03-19 DIAGNOSIS — L98.9 SKIN DISORDER: ICD-10-CM

## 2024-03-19 PROCEDURE — 99213 OFFICE O/P EST LOW 20 MIN: CPT | Mod: 25,S$GLB,, | Performed by: DERMATOLOGY

## 2024-03-19 PROCEDURE — 88305 TISSUE EXAM BY PATHOLOGIST: CPT | Performed by: PATHOLOGY

## 2024-03-19 PROCEDURE — 11104 PUNCH BX SKIN SINGLE LESION: CPT | Mod: S$GLB,,, | Performed by: DERMATOLOGY

## 2024-03-19 PROCEDURE — 1159F MED LIST DOCD IN RCRD: CPT | Mod: CPTII,S$GLB,, | Performed by: DERMATOLOGY

## 2024-03-19 PROCEDURE — 99999 PR PBB SHADOW E&M-EST. PATIENT-LVL IV: CPT | Mod: PBBFAC,,, | Performed by: DERMATOLOGY

## 2024-03-19 PROCEDURE — 88305 TISSUE EXAM BY PATHOLOGIST: CPT | Mod: 26,,, | Performed by: PATHOLOGY

## 2024-03-19 PROCEDURE — 1160F RVW MEDS BY RX/DR IN RCRD: CPT | Mod: CPTII,S$GLB,, | Performed by: DERMATOLOGY

## 2024-03-19 PROCEDURE — 11105 PUNCH BX SKIN EA SEP/ADDL: CPT | Mod: S$GLB,,, | Performed by: DERMATOLOGY

## 2024-03-19 NOTE — PATIENT INSTRUCTIONS
Punch Biopsy Wound Care    Your doctor has performed a punch biopsy today.  A band aid and antibiotic ointment has been placed over the site.  This should remain in place for 24 hours.  It is recommended that you keep the area dry for the first 24 hours.  After 24 hours, you may remove the band aid and wash the area with warm soap and water and apply Vaseline jelly.  Many patients prefer to use Neosporin or Bacitracin ointment.  This is acceptable; however know that you can develop an allergy to this medication even if you have used it safely for years.  It is important to keep the area moist.  Letting it dry out and get air slows healing time, will worsen the scar, and make it more difficult to remove the stitches if they were placed.  Band aid is optional after first 24 hours.      If you notice increasing redness, tenderness, pain, or yellow drainage at the biopsy or surgical site, please notify your doctor.  These are signs of an infection.    If your biopsy/surgical site is bleeding, apply firm pressure for 15 minutes straight.  Repeat for another 15 minutes, if it is still bleeding.   If the surgical site continues to bleed, then please contact your doctor.      [facility] [facility] - [facility]   DERMATOLOGY  [address]   [place] [address]   Dept: [phone]   Dept Fax: [phone]

## 2024-03-19 NOTE — PROGRESS NOTES
Subjective:      Patient ID:  Karla Arzola is a 63 y.o. female who presents for   Chief Complaint   Patient presents with    Spot     Perfectly round dark lesions on bilateral lower legs. Present for about a year.  Not itchy or painful.  Pt has not been seen previously for this.  Pt is only applying her regular lotion and oil to her legs.      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 Garcia on 11/16/22.     History of Present Illness: The patient presents with chief complaint of rash.  Location: bilateral legs  Duration: 1 year  Signs/Symptoms: asymptomatic    Prior treatments: OTC lotion          Review of Systems   Constitutional:  Negative for fever and chills.   Gastrointestinal:  Negative for nausea and vomiting.   Skin:  Positive for activity-related sunscreen use. Negative for daily sunscreen use and recent sunburn.   Hematologic/Lymphatic: Does not bruise/bleed easily.       Objective:   Physical Exam   Constitutional: She appears well-developed and well-nourished. No distress.   Neurological: She is alert and oriented to person, place, and time. She is not disoriented.   Psychiatric: She has a normal mood and affect.   Skin:   Areas Examined (abnormalities noted in diagram):   Head / Face Inspection Performed  Neck Inspection Performed  RLE Inspected  LLE Inspection Performed  Nails and Digits Inspection Performed                        Assessment / Plan:      Pathology Orders:       Normal Orders This Visit    Specimen to Pathology, Dermatology     Comments:    Number of Specimens:->2  ------------------------->-------------------------  Spec 1 Procedure:->Biopsy  Spec 1 Clinical Impression:->r/o DSAP  Spec 1 Source:->left leg  ------------------------->-------------------------  Spec 2 Procedure:->Biopsy  Spec 2 Clinical Impression:->r/o DSAP  Spec 2 Source:->right leg  Release to patient->Immediate    Questions:    Procedure Type: Dermatology and skin neoplasms     Number of Specimens: 2    ------------------------: -------------------------    Spec 1 Procedure: Biopsy    Spec 1 Clinical Impression: r/o DSAP    Spec 1 Source: left leg    ------------------------: -------------------------    Spec 2 Procedure: Biopsy    Spec 2 Clinical Impression: r/o DSAP    Spec 2 Source: right leg    Clinical Information: see above    Release to patient: Immediate          Dermatosis  -     Specimen to Pathology, Dermatology  Skin disorder  -     Ambulatory referral/consult to Dermatology  -     suspect possible DSAP. 2 punch biopsies done.  RTC in 2 weeks for suture removal and 3-4 weeks for results.  We will also notify patient once results are available in [facility].           Follow up in about 2 weeks (around 4/2/2024) for Suture Removal/ Results.    PROCEDURE NOTE -- PUNCH BIOPSY  Location: see above    After risks, benefits, and alternatives were discussed with the patient, the patient agrees to the procedure by verbal consent. The area(s) is cleansed with alcohol. A total of 2 cc of lidocaine 1% with epinephrine and sodium bicarbonate was injected for local anesthesia. A 3 mm punch biopsy was used to remove part or all of the lesion(s). The specimen was sent for tissue pathology. The defect(s) was then closed with interrupted 4.0 Ethilon sutures. The area was dressed with antibiotic ointment and bandaged. The patient tolerated the procedure well without adverse events.  Wound care instructions were given to the patient on the AVS.  The patient will be notified of pathology results once available. Results will also be available in Epic.

## 2024-03-19 NOTE — Clinical Note
Please make sure patient has a 2 week follow up for suture removal and a 3 to 4-week follow-up to discuss results

## 2024-03-22 LAB
FINAL PATHOLOGIC DIAGNOSIS: NORMAL
GROSS: NORMAL
Lab: NORMAL
MICROSCOPIC EXAM: NORMAL

## 2024-03-25 DIAGNOSIS — Q82.8 POROKERATOSIS: Primary | ICD-10-CM

## 2024-03-25 DIAGNOSIS — L70.0 ACNE VULGARIS: ICD-10-CM

## 2024-03-25 DIAGNOSIS — L85.3 XEROSIS CUTIS: ICD-10-CM

## 2024-03-26 ENCOUNTER — OFFICE VISIT (OUTPATIENT)
Dept: RHEUMATOLOGY | Facility: CLINIC | Age: 64
End: 2024-03-26
Payer: COMMERCIAL

## 2024-03-26 ENCOUNTER — PATIENT MESSAGE (OUTPATIENT)
Dept: REHABILITATION | Facility: HOSPITAL | Age: 64
End: 2024-03-26
Payer: COMMERCIAL

## 2024-03-26 VITALS
SYSTOLIC BLOOD PRESSURE: 128 MMHG | HEIGHT: 66 IN | WEIGHT: 242 LBS | DIASTOLIC BLOOD PRESSURE: 80 MMHG | BODY MASS INDEX: 38.89 KG/M2 | HEART RATE: 84 BPM

## 2024-03-26 DIAGNOSIS — M35.9 UNDIFFERENTIATED CONNECTIVE TISSUE DISEASE: Primary | ICD-10-CM

## 2024-03-26 PROCEDURE — 3079F DIAST BP 80-89 MM HG: CPT | Mod: CPTII,S$GLB,, | Performed by: STUDENT IN AN ORGANIZED HEALTH CARE EDUCATION/TRAINING PROGRAM

## 2024-03-26 PROCEDURE — 3074F SYST BP LT 130 MM HG: CPT | Mod: CPTII,S$GLB,, | Performed by: STUDENT IN AN ORGANIZED HEALTH CARE EDUCATION/TRAINING PROGRAM

## 2024-03-26 PROCEDURE — 3008F BODY MASS INDEX DOCD: CPT | Mod: CPTII,S$GLB,, | Performed by: STUDENT IN AN ORGANIZED HEALTH CARE EDUCATION/TRAINING PROGRAM

## 2024-03-26 PROCEDURE — 99214 OFFICE O/P EST MOD 30 MIN: CPT | Mod: S$GLB,,, | Performed by: STUDENT IN AN ORGANIZED HEALTH CARE EDUCATION/TRAINING PROGRAM

## 2024-03-26 PROCEDURE — 1160F RVW MEDS BY RX/DR IN RCRD: CPT | Mod: CPTII,S$GLB,, | Performed by: STUDENT IN AN ORGANIZED HEALTH CARE EDUCATION/TRAINING PROGRAM

## 2024-03-26 PROCEDURE — 99999 PR PBB SHADOW E&M-EST. PATIENT-LVL IV: CPT | Mod: PBBFAC,,, | Performed by: STUDENT IN AN ORGANIZED HEALTH CARE EDUCATION/TRAINING PROGRAM

## 2024-03-26 PROCEDURE — 1159F MED LIST DOCD IN RCRD: CPT | Mod: CPTII,S$GLB,, | Performed by: STUDENT IN AN ORGANIZED HEALTH CARE EDUCATION/TRAINING PROGRAM

## 2024-03-27 ENCOUNTER — TELEPHONE (OUTPATIENT)
Dept: DERMATOLOGY | Facility: CLINIC | Age: 64
End: 2024-03-27
Payer: COMMERCIAL

## 2024-03-27 ENCOUNTER — PATIENT MESSAGE (OUTPATIENT)
Dept: DERMATOLOGY | Facility: CLINIC | Age: 64
End: 2024-03-27
Payer: COMMERCIAL

## 2024-03-27 RX ORDER — AMMONIUM LACTATE 12 G/100G
LOTION TOPICAL
Qty: 226 G | Refills: 11 | Status: SHIPPED | OUTPATIENT
Start: 2024-03-27

## 2024-03-27 RX ORDER — TRETINOIN 0.5 MG/G
CREAM TOPICAL
Qty: 20 G | Refills: 6 | Status: SHIPPED | OUTPATIENT
Start: 2024-03-27 | End: 2025-03-27

## 2024-03-27 NOTE — TELEPHONE ENCOUNTER
Attempted to contact patient in regards to results and treatment plan. No answer. Unable to leave a message. Will send her a message in the patient portal.   ----- Message from Loyda Martinez MD sent at 3/25/2024  4:00 PM CDT -----  Please notify pt that rash on legs was confirmed as porokeratosis.  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  Will send to pharmacy on file. Please schedule f/u in 4 months.

## 2024-04-01 ENCOUNTER — TELEPHONE (OUTPATIENT)
Dept: PSYCHIATRY | Facility: CLINIC | Age: 64
End: 2024-04-01
Payer: COMMERCIAL

## 2024-04-02 NOTE — PROGRESS NOTES
Orthopaedics  Post-operative follow-up    Procedure Performed:   Left knee arthroscopic partial lateral meniscectomy      Date of Surgery: 1/4/24    Subjective: Karla Arzola is now approximately 3 months out from her knee surgery.  She continues to make progress with pain and function. ***     Exam:  Portal sites healing without complication  Minimal effusion  Knee ROM full extension to 130 degrees flexion  No crepitus on ROM  Minimal joint line tenderness  Veronica's sign negative and no calf tenderness  Motor and sensory intact distally    Impression:  3 months s/p left knee arthroscopic partial lateral meniscectomy - doing well ***     Plan:  ***   Symptomatic treatment for pain / swelling  Instructed patient to call clinic if questions or concerns    Follow-up with me in ***       Larry Adams MD    I, Roberth Couch, acted as a scribe for Larry Adams MD for the duration of this office visit.

## 2024-04-03 ENCOUNTER — OFFICE VISIT (OUTPATIENT)
Dept: PSYCHIATRY | Facility: CLINIC | Age: 64
End: 2024-04-03
Payer: COMMERCIAL

## 2024-04-03 ENCOUNTER — PATIENT MESSAGE (OUTPATIENT)
Dept: PULMONOLOGY | Facility: CLINIC | Age: 64
End: 2024-04-03
Payer: COMMERCIAL

## 2024-04-03 ENCOUNTER — CLINICAL SUPPORT (OUTPATIENT)
Dept: DERMATOLOGY | Facility: CLINIC | Age: 64
End: 2024-04-03
Payer: COMMERCIAL

## 2024-04-03 DIAGNOSIS — Z01.818 PREOP EXAMINATION: Primary | ICD-10-CM

## 2024-04-03 DIAGNOSIS — E66.01 SEVERE OBESITY (BMI 35.0-39.9) WITH COMORBIDITY: ICD-10-CM

## 2024-04-03 DIAGNOSIS — L98.9 SKIN SORE: Primary | ICD-10-CM

## 2024-04-03 DIAGNOSIS — Z48.02 VISIT FOR SUTURE REMOVAL: ICD-10-CM

## 2024-04-03 DIAGNOSIS — I10 HYPERTENSION, ESSENTIAL: ICD-10-CM

## 2024-04-03 PROCEDURE — 99999 PR PBB SHADOW E&M-EST. PATIENT-LVL II: CPT | Mod: PBBFAC,,,

## 2024-04-03 PROCEDURE — 99024 POSTOP FOLLOW-UP VISIT: CPT | Mod: S$GLB,,, | Performed by: DERMATOLOGY

## 2024-04-03 PROCEDURE — 99999 PR PBB SHADOW E&M-EST. PATIENT-LVL I: CPT | Mod: PBBFAC,,, | Performed by: SOCIAL WORKER

## 2024-04-03 PROCEDURE — 90791 PSYCH DIAGNOSTIC EVALUATION: CPT | Mod: S$GLB,,, | Performed by: SOCIAL WORKER

## 2024-04-03 RX ORDER — MUPIROCIN 20 MG/G
OINTMENT TOPICAL
Qty: 30 G | Refills: 1 | Status: SHIPPED | OUTPATIENT
Start: 2024-04-03

## 2024-04-03 NOTE — LETTER
April 9, 2024        Gustavo Lezama MD  [address]  4th Floor  [facility] [address]             [facility]  [address]  [place] [address]  Phone: [phone]  Fax: [phone]   Patient: Karla Arzola   MR Number: [id number]   YOB: 1960   Date of Visit: 4/3/2024       Dear Dr. Lezama:    Thank you for referring Karla Arzola to me for evaluation. Below are the relevant portions of my assessment and plan of care.    If you have questions, please do not hesitate to call me. I look forward to following Karla along with you.    Sincerely,      Wayne Canales, LCSW           CC    No Recipients

## 2024-04-03 NOTE — PROGRESS NOTES
Patient presents for suture removal. The wound is well healed without signs of infection.  Pt reports some tenderness on the right leg suture site when area is touched. Pt reports hitting the area accidentally.  The sutures are removed. Wound care and activity instructions given. Dr. Martinez visualized the site.  Per MD apply mupirocin and a Band-Aid. Follow up pushed back to 4 months per MD recommendations.

## 2024-04-03 NOTE — PROGRESS NOTES
Psychiatry Initial Visit (PhD/LCSW)  Diagnostic Interview - CPT 33213    Date: 4/3/2024    Site: [place]    Referral source: Gustavo Lezama MD    Clinical status of patient: Outpatient    Karla Arzola, a 63 y.o. female, for initial evaluation visit.  Met with patient.    Chief complaint/reason for encounter: Psychiatric Evaluation prior to bariatric surgery    History of present illness: She started to have some problems with weight after her second child.  She was able to lose weight through exercise.  She took off thirty pounds.  She was able to keep it off.  She would walk everyday at the gym on the indoor track.  She has tried diet pills.  They worked for a little while.  She stopped taking them. She has tried intermittent fasting, Weight Watchers, and portion control.  She has stopped snacking between meals.  She lost her mom in 2020.  She stayed in her room, ate, and drank alcohol.  She did that for a couple of months.  She stopped.  She realized it wasn't health.  She got up to 240.  She was 89.  She tried the things she used to and they did not work.  She thought of the surgery due to working there.  She got tired of being fat.  She met with Dr. Lezama.  It went well.  He thought she would do well.  She worked with [email or web address] for six visits.  She lost about seven pounds.  Her sons do not want her to do it.  They think it is unnecessary.  She has been talking to people that have had the surgery.  They have given her advice about things that will help.  She hopes to return to exercise after her knee heals.     Pain: 5 in her left knee.  It is worse when she is walking on it.  She goes to PT.    Symptoms:   Mood: weight gain and fatigue  Anxiety: denied  Substance abuse: denied  Cognitive functioning: denied  Health behaviors: overweight    Psychiatric history: She saw a counselor through [facility] telephonically.  She was in counseling last year due to her mother's death.  She was only approved for a  "certain number of visits.  She was given some tools to use.  She denies any thoughts of hurting herself past or present.    Medical history: She had surgery on her left knee in January.  Her meniscus was torn.  She fell in the parking lot in the rain.  She had a right scope in .  She has high blood pressure since 2016.  She sees rheumatology for connective tissue disorder.        Family history of psychiatric illness: Her mother was diagnosed with depression.  She did talk to herself.  Her son has severe depression.      Social history (marriage, employment, etc.): Patient's mother, [place],  in  due to aspirating after a hip replacement.  She was 78.  She lived in [place].  She owned a .  She stopped working fifteen years prior to her death.  Patient's father, Josue,  in  from heart problems.  He was 76.  He was a preacher.  He earned a living doing that job.  He lived in [place].  Her parents  when the patient was 14 due to infidelity.  She stayed with her mother.  She did not see her father very often.  She reconnected with him in her early 20s.  She is one of three children.  She is the oldest.  Her younger sister, Priya 53, lives in [place].  She is single with one child.  She is an .  Her younger brother, Josue THAO, is 51.  He lives in [place].  He is  with two children.  He is a vazquez in a chemical plant.  She is close to her siblings.  Her two younger paternal brothers are not as close to her.  They are 37 and 51.  They grew up in different households.  She grew up in [place].  She reports a short childhood.  When her parents , it changed her mother.  The patient was the "adult" to make sure her mother.  Her mother went into a depression.  She denies any physical or sexual abuse.  She graduated from [employer] in .  She was in dance and cheerleading.  She went to [employer] for one year.  She was pursuing " "marketing.  She came back to [place].  The patient went "pires wild" in [place].  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  She had to stop to work. 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  She worked at convenience store as a  for two years.  She worked at another store for a year.  She stayed at home with her children for four years.  She worked for the  of the [employer] For eight years.  She was a  and .  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  She graduated in 1999.  She has been with [facility] for 22 years in July.  She went for a year for pre reqs for nursing at [employer] in 2010.  She would like to go back to school for her RN.  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  She was a floating MA.  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  She works with Dr. Lezama, Nataly, Jeansonne, and Dr. Grace.  She likes her job. She has been  once.  Her ex , Luis, was  to her for eighteen years.  He is a longshoreman for the [place] [employer] [place].  He liked to perez.  She got tired of starting over.  Their sons are: Luis Manzo, 38, lives in [place].  He is  with three children.  He was a longshoreman.  He was hurt on the job.  He is not doing good physically.  He is trying to get disability. Jonathan, 37, lives in [place] with the patient.  He is single with three children.  He is a longshoreman.  He has been living with her off and on for seven years.  He comes home every night.  She is not currently in a relationship.  She  in 2007.  She was in a long term relationship with [place] for nine years.  He lived with the patient for about five years.  He was a narcissist.  They split in 2017.  She has had shorter relationships since then. She was raised [place].  She goes to " [facility]. She goes to Anabaptism every Sunday.  She believes in God and Mike.  She enjoys music.  She will relax on her patio and listen.  She plays with her grandchildren.  She likes to read history.  She is not reading currently.  She does not exercise.       Substance use:   Alcohol:  She drinks twice a week.  She will have wine. She will have two glasses.     Drugs: none   Tobacco: none   Caffeine: She drinks one cup of coffee per day.  She drinks tea twice a week.    Current medications and drug reactions (include OTC, herbal): see medication list   She has been on Celexa for four years.    At the end of February, she had a hard time due to the anniversary.  She started Atarax, but it makes her sleepy.      Strengths and liabilities: Strength: Patient accepts guidance/feedback, Strength: Patient is expressive/articulate., Strength: Patient is intelligent., Strength: Patient is motivated for change., Strength: Patient has positive support network., Strength: Patient has reasonable judgment., Strength: Patient is stable., Liability: Patient has poor health.    Current Evaluation:     Mental Status Exam:  General Appearance:  age appropriate, casually dressed, neatly groomed, overweight   Speech: normal tone, normal rate, normal pitch, normal volume      Level of Cooperation: cooperative      Thought Processes: normal and logical   Mood: steady      Thought Content: normal, no suicidality, no homicidality, delusions, or paranoia   Affect: congruent and appropriate   Orientation: Oriented x3   Memory: recent >  intact, remote >  intact   Attention Span & Concentration: intact   Fund of General Knowledge: intact and appropriate to age and level of education   Abstract Reasoning:    Judgment & Insight: good     Language  intact     Diagnostic Impression - Plan:     Preoperative assessment    Plan:  Patient does not exhibit or report any suicidal ideation, active addiction, or psychosis  which would prohibit bariatric surgery.  Educate the patient that bariatric surgery is one tool toward overall health.  She acknowledges the concepts of trading addictions and returning to counseling post op if emotional problems are experienced.       Return to Clinic: as needed    Length of Service (minutes): 45

## 2024-04-05 ENCOUNTER — PATIENT MESSAGE (OUTPATIENT)
Dept: DERMATOLOGY | Facility: CLINIC | Age: 64
End: 2024-04-05
Payer: COMMERCIAL

## 2024-04-08 NOTE — PROGRESS NOTES
RHEUMATOLOGY CLINIC ESTABLISHED PATIENT VISIT    Reason for consult:- positive DALI    Chief complaints, HPI, ROS, EXAM, Assessment & Plans:-    Karla Arzola is a 63 y.o. pleasant female who presents to follow up from her previous visit in November with Danni for management of positive DALI.  Possible undifferentiated connective tissue disease.  Overall she states she has been doing well.  No new symptoms in the interim.  Has been compliant with her hydroxychloroquine 200 mg twice daily.  Rheumatologic review of systems otherwise negative.  Physical exam is unremarkable.  No synovitis, dactylitis or enthesitis.    Reviewed all available old and outside pertinent medical records.    All lab results personally reviewed and interpreted by me.    1. Undifferentiated connective tissue disease        Problem List Items Addressed This Visit    None  Visit Diagnoses       Undifferentiated connective tissue disease    -  Primary    Relevant Orders    Anti-DNA Ab, Double-Stranded    C3 Complement    C4 Complement    CBC Auto Differential    Comprehensive Metabolic Panel    Urinalysis    Sedimentation rate    Protein/Creatinine Ratio, Urine    C-Reactive Protein            Patient following up for positive DALI with likely undifferentiated connective tissue disease  Doing well on hydroxychloroquine 200 mg b.i.d. with no new symptoms in the interim  We will plan to continue current therapy  Check lupus monitoring labs.    # Follow up in about 6 months (around 9/26/2024).    Chronic comorbid conditions affecting medical decision making today:    Past Medical History:   Diagnosis Date    Allergy     Anxiety     GERD (gastroesophageal reflux disease)     Hypertension     Obesity     Vitamin B 12 deficiency     Vitamin D deficiency disease        Past Surgical History:   Procedure Laterality Date    BREAST BIOPSY Right 10/08/2021    KNEE ARTHROSCOPY W/ MENISCECTOMY Left 1/4/2024    Procedure: ARTHROSCOPY, KNEE, WITH  MENISCECTOMY;  Surgeon: Larry Adams MD;  Location: [facility];  Service: Orthopedics;  Laterality: Left;    MOUTH SURGERY      right knee scope      TUBAL LIGATION          Social History     Tobacco Use    Smoking status: Never     Passive exposure: Never    Smokeless tobacco: Never   Substance Use Topics    Alcohol use: Yes     Comment: socially 1x weekly    Drug use: No       Family History   Problem Relation Age of Onset    Diabetes Mother     Hypertension Mother     Stroke Mother     Hypertension Father     Hyperlipidemia Father     Kidney disease Father     Hypertension Sister     Hypertension Brother     Heart failure Brother     No Known Problems Brother     No Known Problems Brother     Hypertension Maternal Grandmother     Lung cancer Maternal Grandfather     Thyroid cancer Neg Hx     Other Neg Hx         MEN2       Review of patient's allergies indicates:  No Known Allergies    Medication List with Changes/Refills   New Medications    AMMONIUM LACTATE (LAC-HYDRIN) 12 % LOTION    Apply to damp skin after bathing every other night    MUPIROCIN (BACTROBAN) 2 % OINTMENT    apply to affected area(s) with Q-tip twice a day    TRETINOIN (RETIN-A) 0.05 % CREAM    Apply small amount to affected areas every other night   Current Medications    ACETAMINOPHEN (TYLENOL) 500 MG TABLET    Take 2 tablets (1,000 mg total) by mouth every 8 (eight) hours as needed for Pain.    ACYCLOVIR (ZOVIRAX) 200 MG CAPSULE    Take 1 capsule (200 mg total) by mouth once daily.    ALBUTEROL (VENTOLIN HFA) 90 MCG/ACTUATION INHALER    Inhale 2 puffs into the lungs every 6 (six) hours as needed for Wheezing. Rescue    AMLODIPINE (NORVASC) 10 MG TABLET    Take 1 tablet (10 mg total) by mouth once daily.    AZITHROMYCIN (Z-LETICIA) 250 MG TABLET    Take 2 tablets by mouth on day 1, then 1 tablet by mouth on days 2-5.    BENZONATATE (TESSALON) 200 MG CAPSULE    Take 1 capsule (200 mg total) by mouth 3 (three) times daily as needed for  Cough.    CELECOXIB (CELEBREX) 200 MG CAPSULE    Take 1 capsule (200 mg total) by mouth 2 (two) times daily.    CICLOPIROX (PENLAC) 8 % SOLN    Apply to nails once daily    CITALOPRAM (CELEXA) 20 MG TABLET    Take 1 tablet (20 mg total) by mouth once daily.    ERGOCALCIFEROL (VITAMIN D2) 50,000 UNIT CAP    Take 1 capsule (50,000 Units total) by mouth every 7 days.    FLUTICASONE PROPIONATE (FLONASE) 50 MCG/ACTUATION NASAL SPRAY    1 spray (50 mcg total) by Each Nostril route once daily.    FUROSEMIDE (LASIX) 20 MG TABLET    Take 1 tablet (20 mg total) by mouth daily as needed (swelling).    GUAIFENESIN (HUMIBID E) 400 MG TAB    Take 1 tablet (400 mg total) by mouth every 6 (six) hours as needed (for chest congestion).    HYDROXYCHLOROQUINE (PLAQUENIL) 200 MG TABLET    Take 1 tablet (200 mg total) by mouth 2 (two) times daily.    HYDROXYZINE HCL (ATARAX) 25 MG TABLET    Take 1 tablet (25 mg total) by mouth 2 (two) times daily as needed for Anxiety.    INHALATION SPACING DEVICE    Use as directed when taking inhaled medicine    IPRATROPIUM-ALBUTEROL (COMBIVENT)  MCG/ACTUATION INHALER    Inhale 1 puff into the lungs every 6 (six) hours as needed for Wheezing or Shortness of Breath. Rescue    METHOCARBAMOL (ROBAXIN) 500 MG TAB    Take 1 tablet (500 mg total) by mouth nightly as needed.    MULTIVITAMIN CAPSULE    Take 1 capsule by mouth once daily.    NAPROXEN (NAPROSYN) 500 MG TABLET    Take 1 tablet (500 mg total) by mouth 2 (two) times daily.    TIRZEPATIDE, WEIGHT LOSS, (ZEPBOUND) 2.5 MG/0.5 ML PNIJ    Inject 2.5 mg into the skin every 7 days.         Disclaimer: This note was prepared using voice recognition system and is likely to have sound alike errors and is not proofread.  Please message me with any questions.    32 minutes of total time spent on the encounter, which includes face to face time and non-face to face time preparing to see the patient (eg, review of tests), Obtaining and/or reviewing  separately obtained history, Documenting clinical information in the electronic or other health record, Independently interpreting results (not separately reported) and communicating results to the patient/family/caregiver, or Care coordination (not separately reported).     Thank you for allowing me to participate in the care of Karla Arzola.    Art Herrera MD

## 2024-04-09 ENCOUNTER — OFFICE VISIT (OUTPATIENT)
Dept: SPORTS MEDICINE | Facility: CLINIC | Age: 64
End: 2024-04-09
Payer: COMMERCIAL

## 2024-04-09 VITALS — RESPIRATION RATE: 17 BRPM | BODY MASS INDEX: 38.9 KG/M2 | WEIGHT: 242.06 LBS | HEIGHT: 66 IN

## 2024-04-09 DIAGNOSIS — Z98.890 STATUS POST ARTHROSCOPIC PARTIAL LATERAL MENISCECTOMY OF LEFT KNEE: Primary | ICD-10-CM

## 2024-04-09 DIAGNOSIS — Z87.828 STATUS POST ARTHROSCOPIC PARTIAL LATERAL MENISCECTOMY OF LEFT KNEE: Primary | ICD-10-CM

## 2024-04-09 PROCEDURE — 99213 OFFICE O/P EST LOW 20 MIN: CPT | Mod: S$GLB,,, | Performed by: STUDENT IN AN ORGANIZED HEALTH CARE EDUCATION/TRAINING PROGRAM

## 2024-04-09 PROCEDURE — 3008F BODY MASS INDEX DOCD: CPT | Mod: CPTII,S$GLB,, | Performed by: STUDENT IN AN ORGANIZED HEALTH CARE EDUCATION/TRAINING PROGRAM

## 2024-04-09 PROCEDURE — 99999 PR PBB SHADOW E&M-EST. PATIENT-LVL IV: CPT | Mod: PBBFAC,,, | Performed by: STUDENT IN AN ORGANIZED HEALTH CARE EDUCATION/TRAINING PROGRAM

## 2024-04-09 PROCEDURE — 1159F MED LIST DOCD IN RCRD: CPT | Mod: CPTII,S$GLB,, | Performed by: STUDENT IN AN ORGANIZED HEALTH CARE EDUCATION/TRAINING PROGRAM

## 2024-04-09 PROCEDURE — 1160F RVW MEDS BY RX/DR IN RCRD: CPT | Mod: CPTII,S$GLB,, | Performed by: STUDENT IN AN ORGANIZED HEALTH CARE EDUCATION/TRAINING PROGRAM

## 2024-04-09 RX ORDER — NAPROXEN 500 MG/1
500 TABLET ORAL 2 TIMES DAILY
Qty: 60 TABLET | Refills: 1 | Status: SHIPPED | OUTPATIENT
Start: 2024-04-09 | End: 2024-05-15

## 2024-04-09 NOTE — PROGRESS NOTES
Orthopaedics  Post-operative follow-up    Procedure Performed:   Left knee arthroscopic partial lateral meniscectomy      Date of Surgery: 1/4/24    Subjective: Karla Arzola is now approximately 3 months out from her knee surgery.  She continues to make progress with pain and function.  She recently had a fall where she fell directly onto her knees, it has been sore and painful since then. Before the fall she reports she was doing well.     Exam:  Portal sites healed without complication  No joint effusion  Knee ROM full extension to 130 degrees flexion  No crepitus on ROM  Minimal lateral joint line tenderness  Veronica's sign negative and no calf tenderness  Motor and sensory intact distally    Impression:  3 months s/p left knee arthroscopic partial lateral meniscectomy - doing well      Plan:  She has some soreness after recent fall.  Otherwise, she had been doing well and her range motion is full on exam today.  Will renew prescription for Naproxen 500mg today.   Recommend icing and compression for pain control    Symptomatic treatment for pain / swelling  Instructed patient to call clinic if questions or concerns    Follow-up with me as needed.  If symptoms persist, we will consider injection with either corticosteroid or viscosupplementation.      Larry Adams MD    I, Hema Pedro, acted as a scribe for Larry Adams MD for the duration of this office visit.

## 2024-04-10 ENCOUNTER — PATIENT MESSAGE (OUTPATIENT)
Dept: FAMILY MEDICINE | Facility: CLINIC | Age: 64
End: 2024-04-10
Payer: COMMERCIAL

## 2024-04-11 ENCOUNTER — CLINICAL SUPPORT (OUTPATIENT)
Dept: REHABILITATION | Facility: HOSPITAL | Age: 64
End: 2024-04-11
Payer: COMMERCIAL

## 2024-04-11 DIAGNOSIS — R26.89 POOR BALANCE: Primary | ICD-10-CM

## 2024-04-11 DIAGNOSIS — R26.9 GAIT ABNORMALITY: ICD-10-CM

## 2024-04-11 DIAGNOSIS — Z74.09 DECREASED FUNCTIONAL MOBILITY AND ENDURANCE: ICD-10-CM

## 2024-04-11 DIAGNOSIS — M25.662 DECREASED RANGE OF MOTION OF LEFT KNEE: ICD-10-CM

## 2024-04-11 PROCEDURE — 97530 THERAPEUTIC ACTIVITIES: CPT

## 2024-04-11 PROCEDURE — 97110 THERAPEUTIC EXERCISES: CPT

## 2024-04-11 PROCEDURE — 97112 NEUROMUSCULAR REEDUCATION: CPT

## 2024-04-11 NOTE — PROGRESS NOTES
[facility]   Physical Therapy Treatment Note       Name: Karla Arzola  Clinic Number: [id number]    Therapy Diagnosis:   Encounter Diagnoses   Name Primary?    Poor balance Yes    Decreased range of motion of left knee     Decreased functional mobility and endurance     Gait abnormality        Physician: Larry Adams    Visit Date: 4/11/2024    Physician Orders: PT Eval and Treat  Medical Diagnosis from Referral:  Primary osteoarthritis of left knee [M17.12], Old complex tear of lateral meniscus of left knee [M23.201]   Evaluation Date: 1/9/2024  Authorization Period Expiration: 10/30/2024   Plan of Care Expiration: 4/9/2024  Progress Note Due: 4/9/2024  Visit # / Visits authorized: 10/20 (+1 for evaluation)   FOTO: 1/3 (last performed on 3/12/2024)     Precautions: Standard    Time In: 0801  Time Out: 0858  Total Billable Time: 53 minutes (Billing reflects 1 on 1 treatment time spent with patient)    Subjective     Patient reports:she slipped and fell due to water in the parking lot ~3 weeks ago. States she saw MD and was cleared for any significant injuries and states he feels that she must have just bruised her knee. Knee pain has been improving slowly but sometimes feels like the knee cap is catching     He/She was compliant with home exercise program.  Response to previous treatment: mild increased soreness following exercises   Functional change: increased knee A/PROM. Improved quadriceps activation and active knee extension    Pain: 5/10     Location: L knee    Objective      Objective Measures updated at progress report or POC update only unless otherwise noted.       Treatment     Karla received the treatments listed below:       MANUAL THERAPY TECHNIQUES were applied for (0) minutes, including:    Soft tissue mobilization: DEFERRED  Scar mobilizations  Joint mobilizations:   Tibiofemoral glides  Patellofemoral glides and tilts in all directions   Functional dry  "needling: DEFERRED        THERAPEUTIC EXERCISES to develop strength, endurance, ROM, flexibility, posture, and core stabilization for (23) minutes including:    Performed Today:     Recumbent bike: 5 mins (full revolutions)  Heel prop: 6# for 5 minutes   Heel slides: 3 minutes B   Hamstring stretch (strap) 2x1 minute   Active hamstring stretch (90/90) 10x10s holds   Prone quad stretch 3x1 minute   LAQ + SLR 2x10    Interventions DEFERRED today              NEUROMUSCULAR RE-EDUCATION ACTIVITIES to improve Balance, Coordination, Kinesthetic, Sense, Proprioception, and Posture for (12) minutes.  The following were included:    Performed Today:     Tandem stance ball pass: 3x10 to each side B   MOBO taps: 1 minute each B, odds and evens   Steamboats 2x8 B        Interventions DEFERRED today     SLS 4x30s B          THERAPEUTIC ACTIVITIES to improve dynamic and functional  performance for (18) minutes including:    Performed Today:     Single leg Bridges (figure 4) 2x10 B  Step downs 4" 2x10 B   Lateral squat walks- red band 3 laps   STS: 3x10 15# KB   Interventions DEFERRED today     Step ups 3x10 6"  Single leg calf raises 3x10 B   DL Knee Extensions 25# 3x10  Hamstring curl machine 45# 3x10  Leg Press Machine   Double le# 2x10  Single leg 45# 3x10              Next Session:  Deadlift vs RDL  Ecc. Knee ext machine  Lateral walks      Patient Education and Home Exercises       Home Exercises Provided and Patient Education Provided     Education provided: (time included with treatment) minutes  PURPOSE: Patient educated on the impairments noted above and the effects of physical therapy intervention to improve overall condition and QOL.   EXERCISE: Patient was educated on all the above exercise prior/during/after for proper posture, positioning, and execution for safe performance with home exercise program.   STRENGTH: Patient educated on the importance of improved core and extremity strength in order to improve " alignment of the spine and extremities with static positions and dynamic movement.   GAIT & BALANCE: Patient educated on the importance of strong core and lower extremity musculature in order to improve both static and dynamic balance, improve gait mechanics, reduce fall risk and improve household and community mobility.     Written Home Exercises Provided: yes.  Exercises were reviewed and Karla was able to demonstrate them prior to the end of the session.  Karla demonstrated good  understanding of the education provided. See EMR under Patient Instructions for exercises provided during therapy sessions.    Assessment   Patient tolerated treatment well today. Incorporated step downs with improved form and tolerance noted compared to previous attempt; however, pt required upper extremity assist to maintain balance and control movement. Added LAQ + straight leg raise to improve quadriceps and hip flexor strength; significant anterior thigh fatigue reported with intervention.    Karla is progressing well towards her goals.   Patient prognosis is Excellent.     Patient will continue to benefit from skilled outpatient physical therapy to address the deficits listed in the problem list box on initial evaluation, provide pt/family education and to maximize patient's level of independence in the home and community environment.     Patient's spiritual, cultural and educational needs considered and pt agreeable to plan of care and goals.       Anticipated Barriers for therapy: none       Short Term Goals:  6 weeks Status  Date Met   PAIN: Pt will report worst pain of 3/10 in order to progress toward max functional ability and improve quality of life. [x] Progressing  [] Met  [] Not Met     FUNCTION: Patient will demonstrate improved function as indicated by a score of greater than or equal to 57 out of 100 on FOTO. [x] Progressing  [] Met  [] Not Met     MOBILITY: Patient will improve AROM to 50% of stated goals, listed in  objective measures above, in order to progress towards independence with functional activities.  [x] Progressing  [] Met  [] Not Met     STRENGTH: Patient will improve strength to 50% of stated goals, listed in objective measures above, in order to progress towards independence with functional activities. [x] Progressing  [] Met  [] Not Met     POSTURE: Patient will correct postural deviations in sitting and standing, to decrease pain and promote long term stability.  [x] Progressing  [] Met  [] Not Met     HEP: Patient will demonstrate independence with HEP in order to progress toward functional independence. [x] Progressing  [] Met  [] Not Met        Long Term Goals:  12 weeks Status Date Met   PAIN: Pt will report worst pain of 1/10 in order to progress toward max functional ability and improve quality of life [x] Progressing  [] Met  [] Not Met     FUNCTION: Patient will demonstrate improved function as indicated by a score of greater than or equal to 64 out of 100 on FOTO. [x] Progressing  [] Met  [] Not Met     MOBILITY: Patient will improve AROM to stated goals, listed in objective measures above, in order to return to maximal functional potential and improve quality of life.  [x] Progressing  [] Met  [] Not Met     STRENGTH: Patient will improve strength to stated goals, listed in objective measures above, in order to improve functional independence and quality of life.  [x] Progressing  [] Met  [] Not Met     Patient will return to normal ADL's, IADL's, community involvement, recreational activities, and work-related activities with less than or equal to 1/10 pain and maximal function.  [x] Progressing  [] Met  [] Not Met           Plan     Continue Plan of Care (POC) and progress per patient tolerance. See treatment section for details on planned progressions next session.      Kaitlin Arzola, PT

## 2024-04-15 ENCOUNTER — OFFICE VISIT (OUTPATIENT)
Dept: FAMILY MEDICINE | Facility: CLINIC | Age: 64
End: 2024-04-15
Attending: FAMILY MEDICINE
Payer: COMMERCIAL

## 2024-04-15 DIAGNOSIS — F41.8 DEPRESSION WITH ANXIETY: ICD-10-CM

## 2024-04-15 DIAGNOSIS — E66.01 SEVERE OBESITY (BMI 35.0-39.9) WITH COMORBIDITY: ICD-10-CM

## 2024-04-15 DIAGNOSIS — R53.83 FATIGUE, UNSPECIFIED TYPE: Primary | ICD-10-CM

## 2024-04-15 PROCEDURE — 99214 OFFICE O/P EST MOD 30 MIN: CPT | Mod: 95,,, | Performed by: FAMILY MEDICINE

## 2024-04-15 PROCEDURE — 3044F HG A1C LEVEL LT 7.0%: CPT | Mod: CPTII,95,, | Performed by: FAMILY MEDICINE

## 2024-04-15 PROCEDURE — 1159F MED LIST DOCD IN RCRD: CPT | Mod: CPTII,95,, | Performed by: FAMILY MEDICINE

## 2024-04-15 PROCEDURE — 1160F RVW MEDS BY RX/DR IN RCRD: CPT | Mod: CPTII,95,, | Performed by: FAMILY MEDICINE

## 2024-04-15 NOTE — PROGRESS NOTES
Karla Arzola    Chief Complaint   Patient presents with    Fatigue       History of Present Illness:   The patient location is: In [place]  The chief complaint leading to consultation is: Fatigue    Visit type: audiovisual    Face to Face time with patient: 30 minutes with 30 minutes of total time spent on the encounter, which includes face to face time and non-face to face time preparing to see the patient (eg, review of tests), Obtaining and/or reviewing separately obtained history, Documenting clinical information in the electronic or other health record, Independently interpreting results (not separately reported) and communicating results to the patient/family/caregiver, or Care coordination (not separately reported).         Each patient to whom he or she provides medical services by telemedicine is:  (1) informed of the relationship between the physician and patient and the respective role of any other health care provider with respect to management of the patient; and (2) notified that he or she may decline to receive medical services by telemedicine and may withdraw from such care at any time.    Notes:     Ms. Arzola visits with me today stating she has no energy and must drink energy drank with help throughout the day for the last 2 weeks.  She states her fatigue has occurred intermittently over the past months but more severe within the last 2 weeks.  She states she has not been exercising as she is status post arthroscopy in January 2024; however, she states she continues to receive physical therapy for her knee issue.  She states she monitors her diet.      She states she could not take Atarax for anxiety as it caused her to have too much sedation. She states she feels slightly depressed. She reports no history of seizures but states she occasionally drinks alcohol. She denies tobacco or illicit drug use.    She denies having fever, chills, appetite changes; shortness of breath, cough, wheezing; chest  pain, palpitations, leg swelling; abdominal pain, nausea, vomiting, diarrhea, constipation; unusual urinary symptoms; polydipsia, polyphagia, polyuria, hot or cold intolerance; back pain; acute visual changes, numbness, headache; anxiety,  homicidal or suicidal thoughts.                  Current Outpatient Medications   Medication Sig      acetaminophen (TYLENOL) 500 MG tablet Take 2 tablets (1,000 mg total) by mouth every 8 (eight) hours as needed for Pain.      acyclovir (ZOVIRAX) 200 MG capsule Take 1 capsule (200 mg total) by mouth once daily.      albuterol (VENTOLIN HFA) 90 mcg/actuation inhaler Inhale 2 puffs into the lungs every 6 (six) hours as needed for Wheezing. Rescue      amLODIPine (NORVASC) 10 MG tablet Take 1 tablet (10 mg total) by mouth once daily.      ammonium lactate (LAC-HYDRIN) 12 % lotion Apply to damp skin after bathing every other night      benzonatate (TESSALON) 200 MG capsule Take 1 capsule (200 mg total) by mouth 3 (three) times daily as needed for Cough.      ciclopirox (PENLAC) 8 % Soln Apply to nails once daily      citalopram (CELEXA) 20 MG tablet Take 1 tablet (20 mg total) by mouth once daily.      ergocalciferol (VITAMIN D2) 50,000 unit Cap Take 1 capsule (50,000 Units total) by mouth every 7 days.      fluticasone propionate (FLONASE) 50 mcg/actuation nasal spray 1 spray (50 mcg total) by Each Nostril route once daily.      furosemide (LASIX) 20 MG tablet Take 1 tablet (20 mg total) by mouth daily as needed (swelling).      guaiFENesin (HUMIBID E) 400 mg Tab Take 1 tablet (400 mg total) by mouth every 6 (six) hours as needed (for chest congestion).      hydroxychloroquine (PLAQUENIL) 200 mg tablet Take 1 tablet (200 mg total) by mouth 2 (two) times daily.      inhalation spacing device Use as directed when taking inhaled medicine      ipratropium-albuteroL (COMBIVENT)  mcg/actuation inhaler Inhale 1 puff into the lungs every 6 (six) hours as needed for Wheezing or  Shortness of Breath. Rescue      methocarbamoL (ROBAXIN) 500 MG Tab Take 1 tablet (500 mg total) by mouth nightly as needed.      multivitamin capsule Take 1 capsule by mouth once daily.      mupirocin (BACTROBAN) 2 % ointment apply to affected area(s) with Q-tip twice a day      naproxen (NAPROSYN) 500 MG tablet Take 1 tablet (500 mg total) by mouth 2 (two) times daily.      tretinoin (RETIN-A) 0.05 % cream Apply small amount to affected areas every other night             Review of Systems   Constitutional:  Positive for fatigue. Negative for activity change, appetite change, chills, fever and unexpected weight change.   HENT:  Positive for postnasal drip. Negative for hearing loss, rhinorrhea and trouble swallowing.         Rare.   Eyes:  Negative for discharge and visual disturbance.   Respiratory:  Negative for cough, chest tightness, shortness of breath and wheezing.    Cardiovascular:  Negative for chest pain, palpitations and leg swelling.   Gastrointestinal:  Negative for abdominal pain, blood in stool, constipation, diarrhea, nausea and vomiting.   Endocrine: Negative for cold intolerance, heat intolerance, polydipsia, polyphagia and polyuria.   Genitourinary:  Negative for difficulty urinating, dysuria, hematuria and menstrual problem.   Musculoskeletal:  Negative for arthralgias, back pain, joint swelling and neck pain.   Neurological:  Negative for seizures, weakness, numbness and headaches.   Psychiatric/Behavioral:  Positive for dysphoric mood. Negative for confusion and suicidal ideas. The patient is not nervous/anxious.         Negative for homicidal ideas.       Objective:  Physical Exam  Constitutional:       General: She is not in acute distress.     Appearance: Normal appearance. She is not ill-appearing, toxic-appearing or diaphoretic.      Comments: Pleasant.   Pulmonary:      Effort: Pulmonary effort is normal. No respiratory distress.   Musculoskeletal:         General: Normal range of  motion.      Comments: She moves around her space without problems during virtual visit.   Neurological:      General: No focal deficit present.      Mental Status: She is alert and oriented to person, place, and time.   Psychiatric:         Mood and Affect: Mood normal.         Behavior: Behavior normal.         Thought Content: Thought content normal.         Judgment: Judgment normal.         ASSESSMENT:  1. Fatigue, unspecified type    2. Depression with anxiety    3. Severe obesity (BMI 35.0-39.9) with comorbidity        PLAN:  Karla was seen today for fatigue.    Diagnoses and all orders for this visit:    Fatigue, unspecified type  -     Ferritin; Future  -     CBC Auto Differential; Future  -     TSH; Future  -     Vitamin B12; Future  -     Vitamin D; Future  -     Comprehensive Metabolic Panel; Future  -     Hemoglobin A1C; Future    Depression with anxiety  -     buPROPion (WELLBUTRIN XL) 150 MG TB24 tablet; Take 1 tablet (150 mg total) by mouth once daily. (At noon)    Severe obesity (BMI 35.0-39.9) with comorbidity      Patient advised to call for results.  Continue current medications, follow low sodium, low cholesterol, low carb diet, daily walks.  Add Wellbutrin  mg at noon; medication precautions discussed with patient.  Keep follow up with specialists.  Follow up if symptoms worsen or fail to improve.

## 2024-04-16 ENCOUNTER — CLINICAL SUPPORT (OUTPATIENT)
Dept: REHABILITATION | Facility: HOSPITAL | Age: 64
End: 2024-04-16
Payer: COMMERCIAL

## 2024-04-16 DIAGNOSIS — R26.9 GAIT ABNORMALITY: ICD-10-CM

## 2024-04-16 DIAGNOSIS — M25.662 DECREASED RANGE OF MOTION OF LEFT KNEE: ICD-10-CM

## 2024-04-16 DIAGNOSIS — Z74.09 DECREASED FUNCTIONAL MOBILITY AND ENDURANCE: ICD-10-CM

## 2024-04-16 DIAGNOSIS — R26.89 POOR BALANCE: Primary | ICD-10-CM

## 2024-04-16 PROCEDURE — 97110 THERAPEUTIC EXERCISES: CPT

## 2024-04-16 PROCEDURE — 97530 THERAPEUTIC ACTIVITIES: CPT

## 2024-04-16 PROCEDURE — 97112 NEUROMUSCULAR REEDUCATION: CPT

## 2024-04-16 NOTE — PROGRESS NOTES
[facility]   Physical Therapy Treatment Note       Name: Karla Arzola  Clinic Number: [id number]    Therapy Diagnosis:   Encounter Diagnoses   Name Primary?    Poor balance Yes    Decreased range of motion of left knee     Decreased functional mobility and endurance     Gait abnormality        Physician: Larry Adams    Visit Date: 4/16/2024    Physician Orders: PT Eval and Treat  Medical Diagnosis from Referral:  Primary osteoarthritis of left knee [M17.12], Old complex tear of lateral meniscus of left knee [M23.201]   Evaluation Date: 1/9/2024  Authorization Period Expiration: 10/30/2024   Plan of Care Expiration: 4/9/2024  Progress Note Due: 4/9/2024  Visit # / Visits authorized: 11/20 (+1 for evaluation)   FOTO: 1/3 (last performed on 3/12/2024)     Precautions: Standard    Time In: 0801  Time Out: 0903  Total Billable Time: 53 minutes (Billing reflects 1 on 1 treatment time spent with patient)    Subjective     Patient reports:she continues to have stiffness in the knees. States she has been feeling a little better with exercises to help loosen the knees up but notes that she overslept this morning and did not have time to do her stretches     He/She was compliant with home exercise program.  Response to previous treatment: mild increased soreness following exercises   Functional change: increased knee A/PROM. Improved quadriceps activation and active knee extension    Pain: 5/10     Location: L knee    Objective      Objective Measures updated at progress report or POC update only unless otherwise noted.       Treatment     Karla received the treatments listed below:       MANUAL THERAPY TECHNIQUES were applied for (0) minutes, including:    Soft tissue mobilization: DEFERRED  Scar mobilizations  Joint mobilizations:   Tibiofemoral glides  Patellofemoral glides and tilts in all directions   Functional dry needling: DEFERRED        THERAPEUTIC EXERCISES to develop  "strength, endurance, ROM, flexibility, posture, and core stabilization for (23) minutes including:    Performed Today:     Recumbent bike: 5 mins (full revolutions)  Heel slides: 3 minutes x 5s holds B   Hamstring stretch (strap) 2x1 minute      Interventions DEFERRED today     Active hamstring stretch (90/90) 10x10s holds   Prone quad stretch 3x1 minute   LAQ + SLR 2x10          NEUROMUSCULAR RE-EDUCATION ACTIVITIES to improve Balance, Coordination, Kinesthetic, Sense, Proprioception, and Posture for (12) minutes.  The following were included:    Performed Today:     TKE  SLS 4x30s B    Interventions DEFERRED today     Tandem stance ball pass: 3x10 to each side B   MOBO taps: 1 minute each B, odds and evens   Steamboats 2x8 B          THERAPEUTIC ACTIVITIES to improve dynamic and functional  performance for (18) minutes including:    Performed Today:     DL Knee Extensions 15# 3x10  Hamstring curl machine 35# 3x10  Leg Press Machine   Double le# 2x10  Single leg 45# 3x10     Bridges (figure 4) 2x10 B  Step downs 4" 2x10 B   Lateral squat walks- red band 3 laps   STS: 3x10 15# KB   Interventions DEFERRED today     Step ups 3x10 6"  Single leg calf raises 3x10 B                Next Session:  Deadlift vs RDL  Ecc. Knee ext machine  Lateral walks      Patient Education and Home Exercises       Home Exercises Provided and Patient Education Provided     Education provided: (time included with treatment) minutes  PURPOSE: Patient educated on the impairments noted above and the effects of physical therapy intervention to improve overall condition and QOL.   EXERCISE: Patient was educated on all the above exercise prior/during/after for proper posture, positioning, and execution for safe performance with home exercise program.   STRENGTH: Patient educated on the importance of improved core and extremity strength in order to improve alignment of the spine and extremities with static positions and dynamic movement. "   GAIT & BALANCE: Patient educated on the importance of strong core and lower extremity musculature in order to improve both static and dynamic balance, improve gait mechanics, reduce fall risk and improve household and community mobility.     Written Home Exercises Provided: yes.  Exercises were reviewed and Karla was able to demonstrate them prior to the end of the session.  Karla demonstrated good  understanding of the education provided. See EMR under Patient Instructions for exercises provided during therapy sessions.    Assessment   Patient tolerated treatment well today. Incorporated step downs with improved form and tolerance noted compared to previous attempt; however, pt required upper extremity assist to maintain balance and control movement. Added LAQ + straight leg raise to improve quadriceps and hip flexor strength; significant anterior thigh fatigue reported with intervention.    Karla is progressing well towards her goals.   Patient prognosis is Excellent.     Patient will continue to benefit from skilled outpatient physical therapy to address the deficits listed in the problem list box on initial evaluation, provide pt/family education and to maximize patient's level of independence in the home and community environment.     Patient's spiritual, cultural and educational needs considered and pt agreeable to plan of care and goals.       Anticipated Barriers for therapy: none       Short Term Goals:  6 weeks Status  Date Met   PAIN: Pt will report worst pain of 3/10 in order to progress toward max functional ability and improve quality of life. [x] Progressing  [] Met  [] Not Met     FUNCTION: Patient will demonstrate improved function as indicated by a score of greater than or equal to 57 out of 100 on FOTO. [x] Progressing  [] Met  [] Not Met     MOBILITY: Patient will improve AROM to 50% of stated goals, listed in objective measures above, in order to progress towards independence with functional  activities.  [x] Progressing  [] Met  [] Not Met     STRENGTH: Patient will improve strength to 50% of stated goals, listed in objective measures above, in order to progress towards independence with functional activities. [x] Progressing  [] Met  [] Not Met     POSTURE: Patient will correct postural deviations in sitting and standing, to decrease pain and promote long term stability.  [x] Progressing  [] Met  [] Not Met     HEP: Patient will demonstrate independence with HEP in order to progress toward functional independence. [x] Progressing  [] Met  [] Not Met        Long Term Goals:  12 weeks Status Date Met   PAIN: Pt will report worst pain of 1/10 in order to progress toward max functional ability and improve quality of life [x] Progressing  [] Met  [] Not Met     FUNCTION: Patient will demonstrate improved function as indicated by a score of greater than or equal to 64 out of 100 on FOTO. [x] Progressing  [] Met  [] Not Met     MOBILITY: Patient will improve AROM to stated goals, listed in objective measures above, in order to return to maximal functional potential and improve quality of life.  [x] Progressing  [] Met  [] Not Met     STRENGTH: Patient will improve strength to stated goals, listed in objective measures above, in order to improve functional independence and quality of life.  [x] Progressing  [] Met  [] Not Met     Patient will return to normal ADL's, IADL's, community involvement, recreational activities, and work-related activities with less than or equal to 1/10 pain and maximal function.  [x] Progressing  [] Met  [] Not Met           Plan     Continue Plan of Care (POC) and progress per patient tolerance. See treatment section for details on planned progressions next session.      Kaitlin Arzola, PT

## 2024-04-17 ENCOUNTER — LAB VISIT (OUTPATIENT)
Dept: LAB | Facility: HOSPITAL | Age: 64
End: 2024-04-17
Attending: FAMILY MEDICINE
Payer: COMMERCIAL

## 2024-04-17 ENCOUNTER — PATIENT MESSAGE (OUTPATIENT)
Dept: FAMILY MEDICINE | Facility: CLINIC | Age: 64
End: 2024-04-17
Payer: COMMERCIAL

## 2024-04-17 DIAGNOSIS — R53.83 FATIGUE, UNSPECIFIED TYPE: ICD-10-CM

## 2024-04-17 LAB
25(OH)D3+25(OH)D2 SERPL-MCNC: 24 NG/ML (ref 30–96)
ALBUMIN SERPL BCP-MCNC: 3.5 G/DL (ref 3.5–5.2)
ALP SERPL-CCNC: 87 U/L (ref 55–135)
ALT SERPL W/O P-5'-P-CCNC: 15 U/L (ref 10–44)
ANION GAP SERPL CALC-SCNC: 11 MMOL/L (ref 8–16)
AST SERPL-CCNC: 18 U/L (ref 10–40)
BASOPHILS # BLD AUTO: 0.02 K/UL (ref 0–0.2)
BASOPHILS NFR BLD: 0.4 % (ref 0–1.9)
BILIRUB SERPL-MCNC: 0.4 MG/DL (ref 0.1–1)
BUN SERPL-MCNC: 16 MG/DL (ref 8–23)
CALCIUM SERPL-MCNC: 9 MG/DL (ref 8.7–10.5)
CHLORIDE SERPL-SCNC: 108 MMOL/L (ref 95–110)
CO2 SERPL-SCNC: 26 MMOL/L (ref 23–29)
CREAT SERPL-MCNC: 1.1 MG/DL (ref 0.5–1.4)
DIFFERENTIAL METHOD BLD: ABNORMAL
EOSINOPHIL # BLD AUTO: 0.1 K/UL (ref 0–0.5)
EOSINOPHIL NFR BLD: 1.6 % (ref 0–8)
ERYTHROCYTE [DISTWIDTH] IN BLOOD BY AUTOMATED COUNT: 15.2 % (ref 11.5–14.5)
EST. GFR  (NO RACE VARIABLE): 57.2 ML/MIN/1.73 M^2
ESTIMATED AVG GLUCOSE: 100 MG/DL (ref 68–131)
FERRITIN SERPL-MCNC: 30 NG/ML (ref 20–300)
GLUCOSE SERPL-MCNC: 88 MG/DL (ref 70–110)
HBA1C MFR BLD: 5.1 % (ref 4–5.6)
HCT VFR BLD AUTO: 43.1 % (ref 37–48.5)
HGB BLD-MCNC: 12.9 G/DL (ref 12–16)
IMM GRANULOCYTES # BLD AUTO: 0.01 K/UL (ref 0–0.04)
IMM GRANULOCYTES NFR BLD AUTO: 0.2 % (ref 0–0.5)
LYMPHOCYTES # BLD AUTO: 1.8 K/UL (ref 1–4.8)
LYMPHOCYTES NFR BLD: 32 % (ref 18–48)
MCH RBC QN AUTO: 28.9 PG (ref 27–31)
MCHC RBC AUTO-ENTMCNC: 29.9 G/DL (ref 32–36)
MCV RBC AUTO: 96 FL (ref 82–98)
MONOCYTES # BLD AUTO: 0.5 K/UL (ref 0.3–1)
MONOCYTES NFR BLD: 9 % (ref 4–15)
NEUTROPHILS # BLD AUTO: 3.2 K/UL (ref 1.8–7.7)
NEUTROPHILS NFR BLD: 56.8 % (ref 38–73)
NRBC BLD-RTO: 0 /100 WBC
PLATELET # BLD AUTO: 305 K/UL (ref 150–450)
PMV BLD AUTO: 11.2 FL (ref 9.2–12.9)
POTASSIUM SERPL-SCNC: 4.4 MMOL/L (ref 3.5–5.1)
PROT SERPL-MCNC: 6.4 G/DL (ref 6–8.4)
RBC # BLD AUTO: 4.47 M/UL (ref 4–5.4)
SODIUM SERPL-SCNC: 145 MMOL/L (ref 136–145)
TSH SERPL DL<=0.005 MIU/L-ACNC: 1.31 UIU/ML (ref 0.4–4)
VIT B12 SERPL-MCNC: 325 PG/ML (ref 210–950)
WBC # BLD AUTO: 5.57 K/UL (ref 3.9–12.7)

## 2024-04-17 PROCEDURE — 83036 HEMOGLOBIN GLYCOSYLATED A1C: CPT | Performed by: FAMILY MEDICINE

## 2024-04-17 PROCEDURE — 36415 COLL VENOUS BLD VENIPUNCTURE: CPT | Performed by: FAMILY MEDICINE

## 2024-04-17 PROCEDURE — 82728 ASSAY OF FERRITIN: CPT | Performed by: FAMILY MEDICINE

## 2024-04-17 PROCEDURE — 85025 COMPLETE CBC W/AUTO DIFF WBC: CPT | Performed by: FAMILY MEDICINE

## 2024-04-17 PROCEDURE — 84443 ASSAY THYROID STIM HORMONE: CPT | Performed by: FAMILY MEDICINE

## 2024-04-17 PROCEDURE — 82607 VITAMIN B-12: CPT | Performed by: FAMILY MEDICINE

## 2024-04-17 PROCEDURE — 82306 VITAMIN D 25 HYDROXY: CPT | Performed by: FAMILY MEDICINE

## 2024-04-17 PROCEDURE — 80053 COMPREHEN METABOLIC PANEL: CPT | Performed by: FAMILY MEDICINE

## 2024-04-19 ENCOUNTER — TELEPHONE (OUTPATIENT)
Dept: FAMILY MEDICINE | Facility: CLINIC | Age: 64
End: 2024-04-19
Payer: COMMERCIAL

## 2024-04-19 RX ORDER — BUPROPION HYDROCHLORIDE 150 MG/1
150 TABLET ORAL DAILY
Qty: 90 TABLET | Refills: 0 | Status: SHIPPED | OUTPATIENT
Start: 2024-04-19

## 2024-04-24 PROBLEM — F41.8 DEPRESSION WITH ANXIETY: Status: ACTIVE | Noted: 2024-04-24

## 2024-04-25 ENCOUNTER — CLINICAL SUPPORT (OUTPATIENT)
Dept: REHABILITATION | Facility: HOSPITAL | Age: 64
End: 2024-04-25
Payer: COMMERCIAL

## 2024-04-25 DIAGNOSIS — R26.89 POOR BALANCE: Primary | ICD-10-CM

## 2024-04-25 DIAGNOSIS — M25.662 DECREASED RANGE OF MOTION OF LEFT KNEE: ICD-10-CM

## 2024-04-25 DIAGNOSIS — Z74.09 DECREASED FUNCTIONAL MOBILITY AND ENDURANCE: ICD-10-CM

## 2024-04-25 DIAGNOSIS — R26.9 GAIT ABNORMALITY: ICD-10-CM

## 2024-04-25 PROCEDURE — 97110 THERAPEUTIC EXERCISES: CPT

## 2024-04-25 PROCEDURE — 97140 MANUAL THERAPY 1/> REGIONS: CPT

## 2024-04-25 PROCEDURE — 97112 NEUROMUSCULAR REEDUCATION: CPT

## 2024-04-25 PROCEDURE — 97530 THERAPEUTIC ACTIVITIES: CPT

## 2024-04-25 NOTE — PROGRESS NOTES
[facility]   Physical Therapy Treatment Note       Name: Karla Arzola  Clinic Number: [id number]    Therapy Diagnosis:   Encounter Diagnoses   Name Primary?    Poor balance Yes    Decreased range of motion of left knee     Decreased functional mobility and endurance     Gait abnormality          Physician: Larry Adams    Visit Date: 4/25/2024    Physician Orders: PT Eval and Treat  Medical Diagnosis from Referral:  Primary osteoarthritis of left knee [M17.12], Old complex tear of lateral meniscus of left knee [M23.201]   Evaluation Date: 1/9/2024  Authorization Period Expiration: 10/30/2024   Plan of Care Expiration: 4/9/2024  Progress Note Due: 4/9/2024  Visit # / Visits authorized: 12/20 (+1 for evaluation)   FOTO: 1/3 (last performed on 3/12/2024)     Precautions: Standard    Time In: 0800   Time Out: 0901  Total Billable Time: 53 minutes (Billing reflects 1 on 1 treatment time spent with patient)    Subjective     Patient reports: she is improving overall but continues to have stiffness in both knees. Notes that the exercises help her knees to feel looser for the rest of the day but that they are always stiff again when she wakes up the next day.     He/She was compliant with home exercise program.  Response to previous treatment: mild increased soreness following exercises   Functional change: increased knee A/PROM. Improved quadriceps activation and active knee extension    Pain: NT/10     Location: L knee    Objective      Objective Measures updated at progress report or POC update only unless otherwise noted.       Treatment     Karla received the treatments listed below:       MANUAL THERAPY TECHNIQUES were applied for (8) minutes, including:    Soft tissue mobilization:   B distal quadriceps   Joint mobilizations: DEFERRED  Tibiofemoral glides  Patellofemoral glides and tilts in all directions   Functional dry needling: DEFERRED        THERAPEUTIC EXERCISES to  "develop strength, endurance, ROM, flexibility, posture, and core stabilization for (10) minutes including:    Performed Today:     Recumbent bike: 5 mins (full revolutions)  Prone hang: 3'  Kneeling flexion: 10x10s holds      Interventions DEFERRED today     Heel slides: 3 minutes x 5s holds B   Hamstring stretch (strap) 2x1 minute          NEUROMUSCULAR RE-EDUCATION ACTIVITIES to improve Balance, Coordination, Kinesthetic, Sense, Proprioception, and Posture for (8) minutes.  The following were included:    Performed Today:     TKE: maroon cord 30x B   SLS 4x30s B   Quadruped weight shifts: 20x B    Interventions DEFERRED today     Tandem stance ball pass: 3x10 to each side B   MOBO taps: 1 minute each B, odds and evens   Steamboats 2x8 B          THERAPEUTIC ACTIVITIES to improve dynamic and functional  performance for (27) minutes including:    Performed Today:     DL Knee Extensions 15# 3x10  Hamstring curl machine 35# 3x10  Leg Press Machine   Double le# 2x10  Single leg 45# 3x10        Interventions DEFERRED today     Step ups 3x10 6"  Single leg calf raises 3x10 B   Bridges (figure 4) 2x10 B  Step downs 4" 2x10 B   Lateral squat walks- red band 3 laps   STS: 3x10 15# KB             Next Session:  Deadlift vs RDL  Ecc. Knee ext machine  Lateral walks      Patient Education and Home Exercises       Home Exercises Provided and Patient Education Provided     Education provided: (time included with treatment) minutes  PURPOSE: Patient educated on the impairments noted above and the effects of physical therapy intervention to improve overall condition and QOL.   EXERCISE: Patient was educated on all the above exercise prior/during/after for proper posture, positioning, and execution for safe performance with home exercise program.   STRENGTH: Patient educated on the importance of improved core and extremity strength in order to improve alignment of the spine and extremities with static positions and dynamic " movement.   GAIT & BALANCE: Patient educated on the importance of strong core and lower extremity musculature in order to improve both static and dynamic balance, improve gait mechanics, reduce fall risk and improve household and community mobility.     Written Home Exercises Provided: yes.  Exercises were reviewed and Karla was able to demonstrate them prior to the end of the session.  Karla demonstrated good  understanding of the education provided. See EMR under Patient Instructions for exercises provided during therapy sessions.    Assessment   Patient tolerated treatment well today. Incorporated prone hangs and kneeling flexion to improve knee ROM; pt reports significant tenderness to palpation initially when getting into quadruped position but notes that this eases significantly following weight shifts. Incorporated soft tissue mobilization at end of session which pt reports seems to help with stiffness.     Karla is progressing well towards her goals.   Patient prognosis is Excellent.     Patient will continue to benefit from skilled outpatient physical therapy to address the deficits listed in the problem list box on initial evaluation, provide pt/family education and to maximize patient's level of independence in the home and community environment.     Patient's spiritual, cultural and educational needs considered and pt agreeable to plan of care and goals.       Anticipated Barriers for therapy: none       Short Term Goals:  6 weeks Status  Date Met   PAIN: Pt will report worst pain of 3/10 in order to progress toward max functional ability and improve quality of life. [x] Progressing  [] Met  [] Not Met     FUNCTION: Patient will demonstrate improved function as indicated by a score of greater than or equal to 57 out of 100 on FOTO. [x] Progressing  [] Met  [] Not Met     MOBILITY: Patient will improve AROM to 50% of stated goals, listed in objective measures above, in order to progress towards  independence with functional activities.  [x] Progressing  [] Met  [] Not Met     STRENGTH: Patient will improve strength to 50% of stated goals, listed in objective measures above, in order to progress towards independence with functional activities. [x] Progressing  [] Met  [] Not Met     POSTURE: Patient will correct postural deviations in sitting and standing, to decrease pain and promote long term stability.  [x] Progressing  [] Met  [] Not Met     HEP: Patient will demonstrate independence with HEP in order to progress toward functional independence. [x] Progressing  [] Met  [] Not Met        Long Term Goals:  12 weeks Status Date Met   PAIN: Pt will report worst pain of 1/10 in order to progress toward max functional ability and improve quality of life [x] Progressing  [] Met  [] Not Met     FUNCTION: Patient will demonstrate improved function as indicated by a score of greater than or equal to 64 out of 100 on FOTO. [x] Progressing  [] Met  [] Not Met     MOBILITY: Patient will improve AROM to stated goals, listed in objective measures above, in order to return to maximal functional potential and improve quality of life.  [x] Progressing  [] Met  [] Not Met     STRENGTH: Patient will improve strength to stated goals, listed in objective measures above, in order to improve functional independence and quality of life.  [x] Progressing  [] Met  [] Not Met     Patient will return to normal ADL's, IADL's, community involvement, recreational activities, and work-related activities with less than or equal to 1/10 pain and maximal function.  [x] Progressing  [] Met  [] Not Met           Plan     Continue Plan of Care (POC) and progress per patient tolerance. See treatment section for details on planned progressions next session.      Kaitlin Arzola, PT

## 2024-04-26 ENCOUNTER — TELEPHONE (OUTPATIENT)
Dept: BARIATRICS | Facility: CLINIC | Age: 64
End: 2024-04-26
Payer: COMMERCIAL

## 2024-04-26 ENCOUNTER — OFFICE VISIT (OUTPATIENT)
Dept: FAMILY MEDICINE | Facility: CLINIC | Age: 64
End: 2024-04-26
Attending: FAMILY MEDICINE
Payer: COMMERCIAL

## 2024-04-26 VITALS
BODY MASS INDEX: 40.15 KG/M2 | SYSTOLIC BLOOD PRESSURE: 130 MMHG | OXYGEN SATURATION: 96 % | TEMPERATURE: 98 F | DIASTOLIC BLOOD PRESSURE: 70 MMHG | HEART RATE: 88 BPM | HEIGHT: 66 IN | WEIGHT: 249.81 LBS | RESPIRATION RATE: 18 BRPM

## 2024-04-26 DIAGNOSIS — E66.01 MORBID OBESITY WITH BMI OF 40.0-44.9, ADULT: ICD-10-CM

## 2024-04-26 DIAGNOSIS — M25.562 LEFT KNEE PAIN, UNSPECIFIED CHRONICITY: ICD-10-CM

## 2024-04-26 DIAGNOSIS — F32.A ANXIETY AND DEPRESSION: ICD-10-CM

## 2024-04-26 DIAGNOSIS — N28.9 FUNCTION KIDNEY DECREASED: ICD-10-CM

## 2024-04-26 DIAGNOSIS — E55.9 VITAMIN D DEFICIENCY DISEASE: ICD-10-CM

## 2024-04-26 DIAGNOSIS — M35.9 CONNECTIVE TISSUE DISORDER: ICD-10-CM

## 2024-04-26 DIAGNOSIS — F41.9 ANXIETY AND DEPRESSION: ICD-10-CM

## 2024-04-26 DIAGNOSIS — I10 ESSENTIAL HYPERTENSION: ICD-10-CM

## 2024-04-26 PROCEDURE — 3078F DIAST BP <80 MM HG: CPT | Mod: CPTII,S$GLB,, | Performed by: FAMILY MEDICINE

## 2024-04-26 PROCEDURE — 3075F SYST BP GE 130 - 139MM HG: CPT | Mod: CPTII,S$GLB,, | Performed by: FAMILY MEDICINE

## 2024-04-26 PROCEDURE — 3044F HG A1C LEVEL LT 7.0%: CPT | Mod: CPTII,S$GLB,, | Performed by: FAMILY MEDICINE

## 2024-04-26 PROCEDURE — 99214 OFFICE O/P EST MOD 30 MIN: CPT | Mod: S$GLB,,, | Performed by: FAMILY MEDICINE

## 2024-04-26 PROCEDURE — 99999 PR PBB SHADOW E&M-EST. PATIENT-LVL V: CPT | Mod: PBBFAC,,, | Performed by: FAMILY MEDICINE

## 2024-04-26 PROCEDURE — 3008F BODY MASS INDEX DOCD: CPT | Mod: CPTII,S$GLB,, | Performed by: FAMILY MEDICINE

## 2024-04-26 RX ORDER — METHOCARBAMOL 500 MG/1
500 TABLET, FILM COATED ORAL NIGHTLY PRN
Qty: 30 TABLET | Refills: 5 | Status: SHIPPED | OUTPATIENT
Start: 2024-04-26

## 2024-04-26 NOTE — LETTER
April 26, 2024      [facility]  [address] [place] [address]  Phone: [phone]  Fax: [phone]       Patient: Karla Arzola   YOB: 1960  Date of Visit: 04/26/2024    To Whom It May Concern:    Huma Arzola  was at [facility] on 04/26/2024. The patient may return to work today 04/06/2024 with no restrictions. If you have any questions or concerns, or if I can be of further assistance, please do not hesitate to contact me.    Sincerely,    Dr. Ninfa Frost MD

## 2024-04-26 NOTE — PROGRESS NOTES
Karla Arzola    Chief Complaint   Patient presents with    4 mos. f/u    Hypertension       History of Present Illness:   Ms. Arzola comes in today for 4-month follow-up of hypertension.  She states she is fasting and has not taken medications today.  She states she continues to take amlodipine 10 mg daily for blood pressure control and Lasix 20 mg daily prn swelling. She states she occasionally performs home blood pressure checks with levels ranging 128-142/70-82.  She states she exercises 3 times a week, 30-60 minutes each time with stretches but does not monitor what she eats.    She states she started Wellbutrin for depression, anxiety last week but states she continues to have no energy and feel tired.  She states she occasionally drinks alcohol but denies tobacco or illicit drug use.    She states her ankles swell during the day but resolves with overnight elevation of her feet.    She complains of having left knee pain and is status post surgery on January 4, 2024.  She states she receives physical therapy 1-2 times per week. She Dr. Larry Adams with orthopedic on April 9, 2024.  She states she fell onto both of her knees 1 week before Easter and again has pain.  She states she takes Tylenol arthritis with help for pain.  She saw Dr. Herrera, rheumatologist, on March 26, 2024 for undifferentiated connective tissue disease.    Otherwise, she denies having fever, chills, appetite changes; shortness of breath, cough, wheezing; chest pain, palpitations, leg swelling; abdominal pain, nausea, vomiting, diarrhea, constipation; unusual urinary symptoms; polydipsia, polyphagia, polyuria, hot or cold intolerance; back pain; acute visual changes, numbness, headache; anxiety, depression, homicidal or suicidal thoughts.      Labs:                    WBC                      5.57                04/17/2024                 HGB                      12.9                04/17/2024                 HCT                       43.1                04/17/2024                 PLT                      305                 04/17/2024                 CHOL                     162                 03/02/2023                 TRIG                     117                 03/02/2023                 HDL                      51                  03/02/2023                 ALT                      15                  04/17/2024                 AST                      18                  04/17/2024                 NA                       145                 04/17/2024                 K                        4.4                 04/17/2024                 CL                       108                 04/17/2024                 CREATININE               1.1                 04/17/2024                 BUN                      16                  04/17/2024                 CO2                      26                  04/17/2024                 TSH                      1.314               04/17/2024                 GLUF                     99                  08/08/2008                 HGBA1C                   5.1                 04/17/2024         Vit D, 25-Hydroxy  24 Low     04/17/2024           LDLCALC                  87.6                03/02/2023             FERRITIN                 30                  04/17/2024               CALCIUM                  9.0                 04/17/2024                 ANIONGAP                 11                  04/17/2024                 EGFRNORACEVR             57.2 (A)            04/17/2024                                       Current Outpatient Medications   Medication Sig      acetaminophen (TYLENOL) 500 MG tablet Take 2 tablets (1,000 mg total) by mouth every 8 (eight) hours as needed for Pain.      acyclovir (ZOVIRAX) 200 MG capsule Take 1 capsule (200 mg total) by mouth once daily.      albuterol (VENTOLIN HFA) 90 mcg/actuation inhaler Inhale 2 puffs into the lungs every 6 (six) hours as needed for Wheezing.  Rescue      amLODIPine (NORVASC) 10 MG tablet Take 1 tablet (10 mg total) by mouth once daily.      ammonium lactate (LAC-HYDRIN) 12 % lotion Apply to damp skin after bathing every other night      benzonatate (TESSALON) 200 MG capsule Take 1 capsule (200 mg total) by mouth 3 (three) times daily as needed for Cough.      buPROPion (WELLBUTRIN XL) 150 MG TB24 tablet Take 1 tablet (150 mg total) by mouth once daily. (At noon)      ciclopirox (PENLAC) 8 % Soln Apply to nails once daily      citalopram (CELEXA) 20 MG tablet Take 1 tablet (20 mg total) by mouth once daily.      ergocalciferol (VITAMIN D2) 50,000 unit Cap Take 1 capsule (50,000 Units total) by mouth every 7 days.      fluticasone propionate (FLONASE) 50 mcg/actuation nasal spray 1 spray (50 mcg total) by Each Nostril route once daily.      furosemide (LASIX) 20 MG tablet Take 1 tablet (20 mg total) by mouth daily as needed (swelling).      guaiFENesin (HUMIBID E) 400 mg Tab Take 1 tablet (400 mg total) by mouth every 6 (six) hours as needed (for chest congestion).      hydroxychloroquine (PLAQUENIL) 200 mg tablet Take 1 tablet (200 mg total) by mouth 2 (two) times daily.      inhalation spacing device Use as directed when taking inhaled medicine      ipratropium-albuteroL (COMBIVENT)  mcg/actuation inhaler Inhale 1 puff into the lungs every 6 (six) hours as needed for Wheezing or Shortness of Breath. Rescue      methocarbamoL (ROBAXIN) 500 MG Tab Take 1 tablet (500 mg total) by mouth nightly as needed.      multivitamin capsule Take 1 capsule by mouth once daily.      mupirocin (BACTROBAN) 2 % ointment apply to affected area(s) with Q-tip twice a day      naproxen (NAPROSYN) 500 MG tablet Take 1 tablet (500 mg total) by mouth 2 (two) times daily.      tretinoin (RETIN-A) 0.05 % cream Apply small amount to affected areas every other night         Review of Systems   Constitutional:  Positive for fatigue. Negative for activity change, appetite change,  chills and fever.   Eyes:  Negative for visual disturbance.   Respiratory:  Negative for cough, shortness of breath and wheezing.    Cardiovascular:  Negative for chest pain, palpitations and leg swelling.   Gastrointestinal:  Negative for abdominal pain, constipation, diarrhea, nausea and vomiting.   Endocrine: Negative for cold intolerance, heat intolerance, polydipsia, polyphagia and polyuria.   Genitourinary:  Negative for difficulty urinating.   Musculoskeletal:  Positive for arthralgias and joint swelling. Negative for back pain.   Neurological:  Negative for numbness and headaches.   Psychiatric/Behavioral:  Positive for dysphoric mood. Negative for suicidal ideas. The patient is not nervous/anxious.         Negative for homicidal ideas.       Objective:  Physical Exam  Vitals reviewed.   Constitutional:       General: She is not in acute distress.     Appearance: Normal appearance. She is obese. She is not ill-appearing, toxic-appearing or diaphoretic.      Comments: Pleasant.   Cardiovascular:      Rate and Rhythm: Normal rate and regular rhythm.      Pulses: Normal pulses.      Heart sounds: No murmur heard.  Pulmonary:      Effort: Pulmonary effort is normal. No respiratory distress.      Breath sounds: Normal breath sounds. No wheezing.   Abdominal:      General: Bowel sounds are normal. There is no distension.      Palpations: Abdomen is soft. There is no mass.      Tenderness: There is no abdominal tenderness. There is no guarding or rebound.   Musculoskeletal:         General: No swelling or tenderness. Normal range of motion.      Cervical back: Normal range of motion and neck supple. No tenderness.      Comments: She is ambulatory without problems. Non tender knees with full range of motion noted.   Lymphadenopathy:      Cervical: No cervical adenopathy.   Skin:     General: Skin is warm.   Neurological:      General: No focal deficit present.      Mental Status: She is alert and oriented to person,  place, and time.   Psychiatric:         Mood and Affect: Mood normal.         Behavior: Behavior normal.         Thought Content: Thought content normal.         Judgment: Judgment normal.         ASSESSMENT:  1. Essential hypertension    2. Function kidney decreased    3. Vitamin D deficiency disease    4. Connective tissue disorder    5. Left knee pain, unspecified chronicity    6. Anxiety and depression    7. Morbid obesity with BMI of 40.0-44.9, adult        PLAN:  Karla was seen today for 4 mos. f/u and hypertension.    Diagnoses and all orders for this visit:    Essential hypertension    Function kidney decreased  -     Basic Metabolic Panel; Future    Vitamin D deficiency disease    Connective tissue disorder    Left knee pain, unspecified chronicity  -     methocarbamoL (ROBAXIN) 500 MG Tab; Take 1 tablet (500 mg total) by mouth nightly as needed (pain).    Anxiety and depression    Morbid obesity with BMI of 40.0-44.9, adult        Continue current medications, follow low sodium, low cholesterol, low carb diet, daily walks.  Add OTC MVI with (vitamin D) daily.   Okay to add vitamin B12 250 to 500 mcg daily.  Stop Naproxen. Recheck BMP in 4 weeks. Patient advised to call for results.  Prescription refill as noted above.  Keep follow up with specialists.  Work excuse for today with return today.  Flu shot this fall.  Follow up in about 7 months (around 12/3/2024) for physical.

## 2024-04-26 NOTE — TELEPHONE ENCOUNTER
----- Message from Ann Ordoñez sent at 4/26/2024  2:14 PM CDT -----  Regarding: appt  Contact: [phone]  Pt stated she has done all her pre visits to get ready for surgery but she can't have the surgery in [place]. Pls call to discuss.

## 2024-04-26 NOTE — TELEPHONE ENCOUNTER
Contacted the patient. Patient was scheduled a FC appointment.   Patient was in work up in [place].

## 2024-04-29 DIAGNOSIS — E66.01 MORBID OBESITY WITH BMI OF 40.0-44.9, ADULT: Primary | ICD-10-CM

## 2024-05-01 ENCOUNTER — HOSPITAL ENCOUNTER (OUTPATIENT)
Dept: PREADMISSION TESTING | Facility: HOSPITAL | Age: 64
Discharge: HOME | End: 2024-05-01
Attending: SURGERY
Payer: COMMERCIAL

## 2024-05-01 DIAGNOSIS — E66.01 MORBID OBESITY WITH BMI OF 40.0-44.9, ADULT: ICD-10-CM

## 2024-05-03 ENCOUNTER — CLINICAL SUPPORT (OUTPATIENT)
Dept: REHABILITATION | Facility: HOSPITAL | Age: 64
End: 2024-05-03
Payer: COMMERCIAL

## 2024-05-03 DIAGNOSIS — M25.662 DECREASED RANGE OF MOTION OF LEFT KNEE: ICD-10-CM

## 2024-05-03 DIAGNOSIS — R26.89 POOR BALANCE: Primary | ICD-10-CM

## 2024-05-03 DIAGNOSIS — Z74.09 DECREASED FUNCTIONAL MOBILITY AND ENDURANCE: ICD-10-CM

## 2024-05-03 DIAGNOSIS — R26.9 GAIT ABNORMALITY: ICD-10-CM

## 2024-05-03 PROCEDURE — 97112 NEUROMUSCULAR REEDUCATION: CPT

## 2024-05-03 PROCEDURE — 97530 THERAPEUTIC ACTIVITIES: CPT

## 2024-05-03 PROCEDURE — 97110 THERAPEUTIC EXERCISES: CPT

## 2024-05-03 NOTE — PROGRESS NOTES
[facility]   Physical Therapy Treatment Note       Name: Karla Arzola  Clinic Number: [id number]    Therapy Diagnosis:   Encounter Diagnoses   Name Primary?    Poor balance Yes    Decreased range of motion of left knee     Decreased functional mobility and endurance     Gait abnormality          Physician: Larry Adams*    Visit Date: 5/3/2024    Physician Orders: PT Eval and Treat  Medical Diagnosis from Referral:  Primary osteoarthritis of left knee [M17.12], Old complex tear of lateral meniscus of left knee [M23.201]   Evaluation Date: 1/9/2024  Authorization Period Expiration: 10/30/2024   Plan of Care Expiration: 4/9/2024  Progress Note Due: 4/9/2024  Visit # / Visits authorized: 12/20 (+1 for evaluation)   FOTO: 1/3 (last performed on 3/12/2024)     Precautions: Standard    Time In: 1330  Time Out: 1435  Total Billable Time: 53 minutes (Billing reflects 1 on 1 treatment time spent with patient)    Subjective     Patient reports: she continues to have stiffness in both knees but notes that this continues to improve daily. She has been trying to do more exercises such as heel slides and LAQs to loosen the knee up and feels that this has been helping        He/She was compliant with home exercise program.  Response to previous treatment: mild increased soreness following exercises   Functional change: increased knee A/PROM. Improved quadriceps activation and active knee extension    Pain: NT/10     Location: L knee    Objective      Objective Measures updated at progress report or POC update only unless otherwise noted.       Treatment     Karla received the treatments listed below:     CPT Intervention Duration / Intensity  5/3/2024   MT Soft tissue mobilization      TE Recumbent bike  Level 5 for 5 mins    TE Gastroc stretch- wedge 3x30s    TE Kneeling flexion  3 [id number] holds    NMR TKE  Maroon cord, 30x b    NMR SLS 3x30s B    NMR Tandem stance ball pass     NMR  MOBO taps      NMR Steamboats     TA Knee extensions Double leg: 15# 3x10    TA Hamstring curls Double le# 3x10    TA Leg press Double le# 2x10   Single le# 3x10   TA Step ups  6 inch, 3x10 b    TA Single leg calf raises      TA Bridges - figure 4      TA Step downs     TA Lateral squat walks Red band, 3 laps    TA Sit to stand                              PLAN             CPT Codes available for Billing:   (00) minutes of Manual therapy (MT) to improve pain and ROM.  (12) minutes of Therapeutic Exercise (TE) to develop strength, endurance, range of motion, and flexibility.  (08) minutes of Neuromuscular Re-Education (NMR)  to improve: Balance, Coordination, Kinesthetic, Sense, Proprioception, and Posture.  (23) minutes of Therapeutic Activities (TA) to improve functional performance.  Unattended Electrical Stimulation (ES) for muscle performance or pain modulation.  BFR: Blood flow restriction applied during exercise  NP or (-): Not Performed    Patient Education and Home Exercises       Home Exercises Provided and Patient Education Provided     Education provided: (time included with treatment) minutes  PURPOSE: Patient educated on the impairments noted above and the effects of physical therapy intervention to improve overall condition and QOL.   EXERCISE: Patient was educated on all the above exercise prior/during/after for proper posture, positioning, and execution for safe performance with home exercise program.   STRENGTH: Patient educated on the importance of improved core and extremity strength in order to improve alignment of the spine and extremities with static positions and dynamic movement.   GAIT & BALANCE: Patient educated on the importance of strong core and lower extremity musculature in order to improve both static and dynamic balance, improve gait mechanics, reduce fall risk and improve household and community mobility.     Written Home Exercises Provided: yes.  Exercises were  reviewed and Karla was able to demonstrate them prior to the end of the session.  Karla demonstrated good  understanding of the education provided. See EMR under Patient Instructions for exercises provided during therapy sessions.    Assessment   Patient tolerated treatment well today. Reintroduced knee extensions, hamstring curls, leg press and step ups to improve overall functional strength. Improved mobility noted in B knees with kneeling flexion today. Pt also notes that she has less sensitivity at the anterior knee with kneeling today. Pt is very fatigued following session but feels good overall     Karla is progressing well towards her goals.   Patient prognosis is Excellent.     Patient will continue to benefit from skilled outpatient physical therapy to address the deficits listed in the problem list box on initial evaluation, provide pt/family education and to maximize patient's level of independence in the home and community environment.     Patient's spiritual, cultural and educational needs considered and pt agreeable to plan of care and goals.       Anticipated Barriers for therapy: none       Short Term Goals:  6 weeks Status  Date Met   PAIN: Pt will report worst pain of 3/10 in order to progress toward max functional ability and improve quality of life. [x] Progressing  [] Met  [] Not Met     FUNCTION: Patient will demonstrate improved function as indicated by a score of greater than or equal to 57 out of 100 on FOTO. [x] Progressing  [] Met  [] Not Met     MOBILITY: Patient will improve AROM to 50% of stated goals, listed in objective measures above, in order to progress towards independence with functional activities.  [x] Progressing  [] Met  [] Not Met     STRENGTH: Patient will improve strength to 50% of stated goals, listed in objective measures above, in order to progress towards independence with functional activities. [x] Progressing  [] Met  [] Not Met     POSTURE: Patient will correct  postural deviations in sitting and standing, to decrease pain and promote long term stability.  [x] Progressing  [] Met  [] Not Met     HEP: Patient will demonstrate independence with HEP in order to progress toward functional independence. [x] Progressing  [] Met  [] Not Met        Long Term Goals:  12 weeks Status Date Met   PAIN: Pt will report worst pain of 1/10 in order to progress toward max functional ability and improve quality of life [x] Progressing  [] Met  [] Not Met     FUNCTION: Patient will demonstrate improved function as indicated by a score of greater than or equal to 64 out of 100 on FOTO. [x] Progressing  [] Met  [] Not Met     MOBILITY: Patient will improve AROM to stated goals, listed in objective measures above, in order to return to maximal functional potential and improve quality of life.  [x] Progressing  [] Met  [] Not Met     STRENGTH: Patient will improve strength to stated goals, listed in objective measures above, in order to improve functional independence and quality of life.  [x] Progressing  [] Met  [] Not Met     Patient will return to normal ADL's, IADL's, community involvement, recreational activities, and work-related activities with less than or equal to 1/10 pain and maximal function.  [x] Progressing  [] Met  [] Not Met           Plan     Continue Plan of Care (POC) and progress per patient tolerance. See treatment section for details on planned progressions next session.      Kaitlin Arzola, PT

## 2024-05-07 ENCOUNTER — CLINICAL SUPPORT (OUTPATIENT)
Dept: REHABILITATION | Facility: HOSPITAL | Age: 64
End: 2024-05-07
Payer: COMMERCIAL

## 2024-05-07 DIAGNOSIS — Z74.09 DECREASED FUNCTIONAL MOBILITY AND ENDURANCE: ICD-10-CM

## 2024-05-07 DIAGNOSIS — R26.89 POOR BALANCE: Primary | ICD-10-CM

## 2024-05-07 DIAGNOSIS — M25.662 DECREASED RANGE OF MOTION OF LEFT KNEE: ICD-10-CM

## 2024-05-07 DIAGNOSIS — R26.9 GAIT ABNORMALITY: ICD-10-CM

## 2024-05-07 PROCEDURE — 97112 NEUROMUSCULAR REEDUCATION: CPT

## 2024-05-07 PROCEDURE — 97110 THERAPEUTIC EXERCISES: CPT

## 2024-05-07 PROCEDURE — 97140 MANUAL THERAPY 1/> REGIONS: CPT

## 2024-05-07 PROCEDURE — 97530 THERAPEUTIC ACTIVITIES: CPT

## 2024-05-07 NOTE — PROGRESS NOTES
[facility]   Physical Therapy Treatment Note       Name: Karla Arzola  Clinic Number: [id number]    Therapy Diagnosis:   Encounter Diagnoses   Name Primary?    Poor balance Yes    Decreased range of motion of left knee     Decreased functional mobility and endurance     Gait abnormality          Physician: Larry Adams    Visit Date: 5/7/2024    Physician Orders: PT Eval and Treat  Medical Diagnosis from Referral:  Primary osteoarthritis of left knee [M17.12], Old complex tear of lateral meniscus of left knee [M23.201]   Evaluation Date: 1/9/2024  Authorization Period Expiration: 10/30/2024   Plan of Care Expiration: 4/9/2024  Progress Note Due: 4/9/2024  Visit # / Visits authorized: 12/20 (+1 for evaluation)   FOTO: 1/3 (last performed on 3/12/2024)     Precautions: Standard    Time In: 1350  Time Out: 1458  Total Billable Time: 54 minutes (Billing reflects 1 on 1 treatment time spent with patient)    Subjective     Patient reports: she has mild aching in the knees but notes that overall symptoms are improving back to baseline from prior to her fall           He/She was compliant with home exercise program.  Response to previous treatment: mild increased soreness following exercises   Functional change: increased knee A/PROM. Improved quadriceps activation and active knee extension    Pain: NT/10     Location: L knee    Objective      Objective Measures updated at progress report or POC update only unless otherwise noted.       Treatment     Karla received the treatments listed below:   CPT Intervention Duration / Intensity  5/7/2024     POC Update     MT Soft tissue mobilization  B quads    TE Recumbent bike  Level 5 for 5 mins    TE Gastroc stretch- wedge 3x30s    TE Kneeling flexion  3 mins x 10s holds    TE Knee flexion on step  8x10s holds    NMR TKE  Maroon cord, 30x b    NMR SLS 2x30s b    NMR Tandem stance kettle pass 5# 2x10 b    TA Knee extensions Double leg:  25# 3x10  -limited range due to pain    TA Hamstring curls Double le# 3x10    TA Leg press Double le# 2x10   Single le# 3x10   TA Step ups  6 inch, 3x10 b    TA Incline calf raises  3x10   TA Bridges - figure 4      TA Step downs     TA Lateral squat walks Red band, 3 laps    TA Sit to stand  24 inch 3x10                            PLAN         CPT Codes available for Billing:   (10) minutes of Manual therapy (MT) to improve pain and ROM.  (12) minutes of Therapeutic Exercise (TE) to develop strength, endurance, range of motion, and flexibility.  (12) minutes of Neuromuscular Re-Education (NMR)  to improve: Balance, Coordination, Kinesthetic, Sense, Proprioception, and Posture.  (20) minutes of Therapeutic Activities (TA) to improve functional performance.  Unattended Electrical Stimulation (ES) for muscle performance or pain modulation.  BFR: Blood flow restriction applied during exercise  NP or (-): Not Performed    Patient Education and Home Exercises       Home Exercises Provided and Patient Education Provided     Education provided: (time included with treatment) minutes  PURPOSE: Patient educated on the impairments noted above and the effects of physical therapy intervention to improve overall condition and QOL.   EXERCISE: Patient was educated on all the above exercise prior/during/after for proper posture, positioning, and execution for safe performance with home exercise program.   STRENGTH: Patient educated on the importance of improved core and extremity strength in order to improve alignment of the spine and extremities with static positions and dynamic movement.   GAIT & BALANCE: Patient educated on the importance of strong core and lower extremity musculature in order to improve both static and dynamic balance, improve gait mechanics, reduce fall risk and improve household and community mobility.     Written Home Exercises Provided: yes.  Exercises were reviewed and Karla was able to  demonstrate them prior to the end of the session.  Karla demonstrated good  understanding of the education provided. See EMR under Patient Instructions for exercises provided during therapy sessions.    Assessment   Friday, April 12, 2024  15:16  Pt tolerated session well today. Incorporated sit to stands to improve functional strength. Attempted this from a 20 inch box; however, intervention was regressed to a 24 inch box due to pain. Added soft tissue mobilization due to continued reports of muscle tension and discomfort at the distal quads and anterior knee; pt notes significant relief of symptoms following intervention.       Karla is progressing well towards her goals.   Patient prognosis is Excellent.     Patient will continue to benefit from skilled outpatient physical therapy to address the deficits listed in the problem list box on initial evaluation, provide pt/family education and to maximize patient's level of independence in the home and community environment.     Patient's spiritual, cultural and educational needs considered and pt agreeable to plan of care and goals.       Anticipated Barriers for therapy: none       Short Term Goals:  6 weeks Status  Date Met   PAIN: Pt will report worst pain of 3/10 in order to progress toward max functional ability and improve quality of life. [x] Progressing  [] Met  [] Not Met     FUNCTION: Patient will demonstrate improved function as indicated by a score of greater than or equal to 57 out of 100 on FOTO. [x] Progressing  [] Met  [] Not Met     MOBILITY: Patient will improve AROM to 50% of stated goals, listed in objective measures above, in order to progress towards independence with functional activities.  [x] Progressing  [] Met  [] Not Met     STRENGTH: Patient will improve strength to 50% of stated goals, listed in objective measures above, in order to progress towards independence with functional activities. [x] Progressing  [] Met  [] Not Met     POSTURE:  Patient will correct postural deviations in sitting and standing, to decrease pain and promote long term stability.  [x] Progressing  [] Met  [] Not Met     HEP: Patient will demonstrate independence with HEP in order to progress toward functional independence. [x] Progressing  [] Met  [] Not Met        Long Term Goals:  12 weeks Status Date Met   PAIN: Pt will report worst pain of 1/10 in order to progress toward max functional ability and improve quality of life [x] Progressing  [] Met  [] Not Met     FUNCTION: Patient will demonstrate improved function as indicated by a score of greater than or equal to 64 out of 100 on FOTO. [x] Progressing  [] Met  [] Not Met     MOBILITY: Patient will improve AROM to stated goals, listed in objective measures above, in order to return to maximal functional potential and improve quality of life.  [x] Progressing  [] Met  [] Not Met     STRENGTH: Patient will improve strength to stated goals, listed in objective measures above, in order to improve functional independence and quality of life.  [x] Progressing  [] Met  [] Not Met     Patient will return to normal ADL's, IADL's, community involvement, recreational activities, and work-related activities with less than or equal to 1/10 pain and maximal function.  [x] Progressing  [] Met  [] Not Met           Plan     Continue Plan of Care (POC) and progress per patient tolerance. See treatment section for details on planned progressions next session.      Kaitlin Arzola, PT

## 2024-05-08 ENCOUNTER — OFFICE VISIT (OUTPATIENT)
Dept: FAMILY MEDICINE | Facility: CLINIC | Age: 64
End: 2024-05-08
Payer: COMMERCIAL

## 2024-05-08 ENCOUNTER — PATIENT MESSAGE (OUTPATIENT)
Dept: FAMILY MEDICINE | Facility: CLINIC | Age: 64
End: 2024-05-08

## 2024-05-08 VITALS
TEMPERATURE: 98 F | OXYGEN SATURATION: 95 % | HEIGHT: 66 IN | HEART RATE: 98 BPM | SYSTOLIC BLOOD PRESSURE: 110 MMHG | DIASTOLIC BLOOD PRESSURE: 74 MMHG | BODY MASS INDEX: 39.43 KG/M2 | WEIGHT: 245.38 LBS

## 2024-05-08 DIAGNOSIS — R10.9 FLANK PAIN: Primary | ICD-10-CM

## 2024-05-08 LAB
BILIRUB UR QL STRIP: NEGATIVE
CLARITY UR REFRACT.AUTO: CLEAR
COLOR UR AUTO: YELLOW
GLUCOSE UR QL STRIP: NEGATIVE
HGB UR QL STRIP: NEGATIVE
KETONES UR QL STRIP: NEGATIVE
LEUKOCYTE ESTERASE UR QL STRIP: NEGATIVE
NITRITE UR QL STRIP: NEGATIVE
PH UR STRIP: 6 [PH] (ref 5–8)
PROT UR QL STRIP: ABNORMAL
SP GR UR STRIP: >=1.03 (ref 1–1.03)
URN SPEC COLLECT METH UR: ABNORMAL

## 2024-05-08 PROCEDURE — 99999 PR PBB SHADOW E&M-EST. PATIENT-LVL V: CPT | Mod: PBBFAC,,, | Performed by: NURSE PRACTITIONER

## 2024-05-08 PROCEDURE — 81003 URINALYSIS AUTO W/O SCOPE: CPT | Performed by: NURSE PRACTITIONER

## 2024-05-08 PROCEDURE — 3044F HG A1C LEVEL LT 7.0%: CPT | Mod: CPTII,S$GLB,, | Performed by: NURSE PRACTITIONER

## 2024-05-08 PROCEDURE — 3078F DIAST BP <80 MM HG: CPT | Mod: CPTII,S$GLB,, | Performed by: NURSE PRACTITIONER

## 2024-05-08 PROCEDURE — 3074F SYST BP LT 130 MM HG: CPT | Mod: CPTII,S$GLB,, | Performed by: NURSE PRACTITIONER

## 2024-05-08 PROCEDURE — 3008F BODY MASS INDEX DOCD: CPT | Mod: CPTII,S$GLB,, | Performed by: NURSE PRACTITIONER

## 2024-05-08 PROCEDURE — 99214 OFFICE O/P EST MOD 30 MIN: CPT | Mod: S$GLB,,, | Performed by: NURSE PRACTITIONER

## 2024-05-08 PROCEDURE — 1159F MED LIST DOCD IN RCRD: CPT | Mod: CPTII,S$GLB,, | Performed by: NURSE PRACTITIONER

## 2024-05-08 PROCEDURE — 1160F RVW MEDS BY RX/DR IN RCRD: CPT | Mod: CPTII,S$GLB,, | Performed by: NURSE PRACTITIONER

## 2024-05-08 RX ORDER — CYCLOBENZAPRINE HCL 10 MG
10 TABLET ORAL NIGHTLY
Qty: 10 TABLET | Refills: 0 | Status: SHIPPED | OUTPATIENT
Start: 2024-05-08 | End: 2024-05-18

## 2024-05-08 RX ORDER — TRAMADOL HYDROCHLORIDE 50 MG/1
50 TABLET ORAL EVERY 6 HOURS
Qty: 12 TABLET | Refills: 0 | Status: SHIPPED | OUTPATIENT
Start: 2024-05-08

## 2024-05-08 NOTE — LETTER
May 8, 2024      [facility]  [address] [place] [address]  Phone: [phone]  Fax: [phone]       Patient: Karla Arzola   YOB: 1960  Date of Visit: 05/08/2024    To Whom It May Concern:    Huma Arzola  was at [facility] on 05/08/2024. The patient may return to work/school on 05/08/24 with no restrictions. If you have any questions or concerns, or if I can be of further assistance, please do not hesitate to contact me.    Sincerely,    Nydia Dockery NP

## 2024-05-08 NOTE — PROGRESS NOTES
Karla Arzola  05/08/2024  [id number]    Ninfa Frost MD  Patient Care Team:  Ninfa Frost MD as PCP - General (Family Medicine)  Cindy Rosa as Digital Medicine Health   Shannon Smith as Hypertension Digital Medicine Clinician  Nydia Dockery, NP as Nurse Practitioner (Family Medicine)  [facility], [facility] as Hypertension Digital Medicine Contract  Ninfa Frost MD as Hypertension Digital Medicine Responsible Provider (Family Medicine)          Visit Type:an urgent visit for a new problem    Chief Complaint:  Chief Complaint   Patient presents with    Back Pain     Pt states she been having back pain on her right side since Sunday. Pt also states when she breathing it hurts but its not sharp pain.       History of Present Illness:    63 yo female presents today with co right flank pain that started 3 days ago. Reports PT for her knees 1-2 days a week but not for her back.  States she took OTC Tylenol arthritis and robaxin for symptom relief.  Pain 6/10 before medication and 4/10 after medication  Denies known injury or trauma, dysuria, frequency or urgency     History:  Past Medical History:   Diagnosis Date    Allergy     Anxiety     GERD (gastroesophageal reflux disease)     Hypertension     Obesity     Vitamin B 12 deficiency     Vitamin D deficiency disease      Past Surgical History:   Procedure Laterality Date    BREAST BIOPSY Right 10/08/2021    KNEE ARTHROSCOPY W/ MENISCECTOMY Left 1/4/2024    Procedure: ARTHROSCOPY, KNEE, WITH MENISCECTOMY;  Surgeon: Larry Adams MD;  Location: [facility];  Service: Orthopedics;  Laterality: Left;    MOUTH SURGERY      right knee scope      TUBAL LIGATION       Family History   Problem Relation Name Age of Onset    Diabetes Mother      Hypertension Mother      Stroke Mother      Hypertension Father      Hyperlipidemia Father      Kidney disease Father      Hypertension Sister      Hypertension Brother E     Heart  failure Brother E     No Known Problems Brother J     No Known Problems Brother C     Hypertension Maternal Grandmother      Lung cancer Maternal Grandfather      Thyroid cancer Neg Hx      Other Neg Hx          MEN2     Social History     Socioeconomic History    Marital status: Single   Occupational History     Employer: [employer]   Tobacco Use    Smoking status: Never     Passive exposure: Never    Smokeless tobacco: Never   Substance and Sexual Activity    Alcohol use: Yes     Comment: socially 1x weekly    Drug use: No    Sexual activity: Not Currently     Partners: Male     Birth control/protection: Abstinence   Other Topics Concern    Are you pregnant or think you may be? No    Breast-feeding No     Social Determinants of Health     Financial Resource Strain: Medium Risk (2/24/2023)    Overall Financial Resource Strain (CARDIA)     Difficulty of Paying Living Expenses: Somewhat hard   Food Insecurity: Food Insecurity Present (2/24/2023)    Hunger Vital Sign     Worried About Running Out of Food in the Last Year: Sometimes true     Ran Out of Food in the Last Year: Never true   Transportation Needs: No Transportation Needs (2/24/2023)    PRAPARE - Transportation     Lack of Transportation (Medical): No     Lack of Transportation (Non-Medical): No   Recent Concern: Transportation Needs - Unmet Transportation Needs (12/2/2022)    PRAPARE - Transportation     Lack of Transportation (Medical): No     Lack of Transportation (Non-Medical): Yes   Physical Activity: Inactive (4/26/2024)    Exercise Vital Sign     Days of Exercise per Week: 0 days     Minutes of Exercise per Session: 0 min   Stress: Stress Concern Present (2/24/2023)    [employer] [place] [employer] - Occupational Stress Questionnaire     Feeling of Stress : To some extent   Housing Stability: High Risk (2/24/2023)    Housing Stability Vital Sign     Unable to Pay for Housing in the Last Year: Yes     Number of Places Lived  in the Last Year: 1     Unstable Housing in the Last Year: No     Patient Active Problem List   Diagnosis    Essential hypertension    Morbid obesity with BMI of 40.0-44.9, adult    Recurrent genital HSV (herpes simplex virus) infection    Internal derangement of right knee    Chondromalacia of right knee    Screen for colon cancer    Poor balance    Pre-op exam    Complex tear of lateral meniscus of left knee    Anxiety    Connective tissue disorder    Uveitis    Vitamin D deficiency disease    Decreased range of motion of left knee    Decreased functional mobility and endurance    Gait abnormality    Depression with anxiety     Review of patient's allergies indicates:  No Known Allergies    The following were reviewed at this visit: active problem list, medication list, allergies, family history, social history, and health maintenance.    Medications:  Current Outpatient Medications on File Prior to Visit   Medication Sig Dispense Refill    acetaminophen (TYLENOL) 500 MG tablet Take 2 tablets (1,000 mg total) by mouth every 8 (eight) hours as needed for Pain. 60 tablet 0    acyclovir (ZOVIRAX) 200 MG capsule Take 1 capsule (200 mg total) by mouth once daily. 90 capsule 3    albuterol (VENTOLIN HFA) 90 mcg/actuation inhaler Inhale 2 puffs into the lungs every 6 (six) hours as needed for Wheezing. Rescue 8.5 g 1    amLODIPine (NORVASC) 10 MG tablet Take 1 tablet (10 mg total) by mouth once daily. 90 tablet 1    ammonium lactate (LAC-HYDRIN) 12 % lotion Apply to damp skin after bathing every other night 226 g 11    benzonatate (TESSALON) 200 MG capsule Take 1 capsule (200 mg total) by mouth 3 (three) times daily as needed for Cough. 30 capsule 1    buPROPion (WELLBUTRIN XL) 150 MG TB24 tablet Take 1 tablet (150 mg total) by mouth once daily. (At noon) 90 tablet 0    ciclopirox (PENLAC) 8 % Soln Apply to nails once daily 6.6 mL 2    citalopram (CELEXA) 20 MG tablet Take 1 tablet (20 mg total) by mouth once daily. 90  tablet 3    ergocalciferol (VITAMIN D2) 50,000 unit Cap Take 1 capsule (50,000 Units total) by mouth every 7 days. 12 capsule 1    fluticasone propionate (FLONASE) 50 mcg/actuation nasal spray 1 spray (50 mcg total) by Each Nostril route once daily. 16 g 0    furosemide (LASIX) 20 MG tablet Take 1 tablet (20 mg total) by mouth daily as needed (swelling). 60 tablet 1    guaiFENesin (HUMIBID E) 400 mg Tab Take 1 tablet (400 mg total) by mouth every 6 (six) hours as needed (for chest congestion). 30 tablet 1    hydroxychloroquine (PLAQUENIL) 200 mg tablet Take 1 tablet (200 mg total) by mouth 2 (two) times daily. 60 tablet 5    inhalation spacing device Use as directed when taking inhaled medicine 1 each 0    ipratropium-albuteroL (COMBIVENT)  mcg/actuation inhaler Inhale 1 puff into the lungs every 6 (six) hours as needed for Wheezing or Shortness of Breath. Rescue 4 g 0    methocarbamoL (ROBAXIN) 500 MG Tab Take 1 tablet (500 mg total) by mouth nightly as needed (pain). 30 tablet 5    multivitamin capsule Take 1 capsule by mouth once daily.      mupirocin (BACTROBAN) 2 % ointment apply to affected area(s) with Q-tip twice a day 30 g 1    naproxen (NAPROSYN) 500 MG tablet Take 1 tablet (500 mg total) by mouth 2 (two) times daily. 60 tablet 1    tretinoin (RETIN-A) 0.05 % cream Apply small amount to affected areas every other night 20 g 6     Current Facility-Administered Medications on File Prior to Visit   Medication Dose Route Frequency Provider Last Rate Last Admin    lactated ringers infusion   Intravenous Continuous Kathleen Renteria MD   New Bag at 01/04/24 0655       Medications have been reviewed and reconciled with patient at this visit.  Barriers to medications reviewed with patient.    Adverse reactions to current medications reviewed with patient..    Over the counter medications reviewed and reconciled with patient.    Exam:  Wt Readings from Last 3 Encounters:   05/08/24 111.3 kg (245 lb 6 oz)    04/26/24 113.3 kg (249 lb 12.5 oz)   04/09/24 109.8 kg (242 lb 1 oz)     Temp Readings from Last 3 Encounters:   05/08/24 97.8 °F (36.6 °C) (Temporal)   04/26/24 98.3 °F (36.8 °C) (Tympanic)   02/21/24 96.9 °F (36.1 °C) (Tympanic)     BP Readings from Last 3 Encounters:   05/08/24 110/74   04/26/24 130/70   03/26/24 128/80     Pulse Readings from Last 3 Encounters:   05/08/24 98   04/26/24 88   03/26/24 84     Body mass index is 39.6 kg/m².      Review of Systems   Constitutional:  Negative for fever.   Respiratory:  Negative for cough, shortness of breath and wheezing.    Cardiovascular:  Negative for chest pain and palpitations.   Gastrointestinal:  Negative for nausea.   Genitourinary:  Positive for flank pain.   Neurological:  Negative for speech change, weakness and headaches.   All other systems reviewed and are negative.    Physical Exam  Vitals and nursing note reviewed.   Constitutional:       Appearance: Normal appearance. She is obese.   HENT:      Head: Normocephalic and atraumatic.      Right Ear: Tympanic membrane, ear canal and external ear normal.      Left Ear: Tympanic membrane, ear canal and external ear normal.      Nose: Nose normal.      Mouth/Throat:      Mouth: Mucous membranes are moist.      Pharynx: Oropharynx is clear.   Eyes:      Extraocular Movements: Extraocular movements intact.      Conjunctiva/sclera: Conjunctivae normal.      Pupils: Pupils are equal, round, and reactive to light.   Cardiovascular:      Rate and Rhythm: Normal rate and regular rhythm.      Pulses: Normal pulses.      Heart sounds: Normal heart sounds.   Pulmonary:      Effort: Pulmonary effort is normal.      Breath sounds: Normal breath sounds.   Abdominal:      General: Bowel sounds are normal.      Palpations: Abdomen is soft.   Musculoskeletal:         General: Normal range of motion.        Arms:       Cervical back: Normal range of motion and neck supple.   Skin:     General: Skin is warm and dry.       Capillary Refill: Capillary refill takes less than 2 seconds.   Neurological:      General: No focal deficit present.      Mental Status: She is alert and oriented to person, place, and time.   Psychiatric:         Mood and Affect: Mood normal.         Behavior: Behavior normal.         Thought Content: Thought content normal.         Judgment: Judgment normal.         Laboratory Reviewed ({Yes)  Lab Results   Component Value Date    WBC 5.57 04/17/2024    HGB 12.9 04/17/2024    HCT 43.1 04/17/2024     04/17/2024    CHOL 162 03/02/2023    TRIG 117 03/02/2023    HDL 51 03/02/2023    ALT 15 04/17/2024    AST 18 04/17/2024     04/17/2024    K 4.4 04/17/2024     04/17/2024    CREATININE 1.1 04/17/2024    BUN 16 04/17/2024    CO2 26 04/17/2024    TSH 1.314 04/17/2024    GLUF 99 08/08/2008    HGBA1C 5.1 04/17/2024       Karla was seen today for back pain.    Diagnoses and all orders for this visit:    Flank pain  -     Urinalysis  The following orders have not been finalized:  -     cyclobenzaprine (FLEXERIL) 10 MG tablet          Start  Start Ultram  Start cyclobenzaprine   UA       Care Plan/Goals: Reviewed    Goals         Blood Pressure < 130/80 (pt-stated)       Exercise at least 150 minutes per week.       Take at least one BP reading per week at various times of the day           Karla was seen today for back pain.    Diagnoses and all orders for this visit:    Flank pain  -     Urinalysis  The following orders have not been finalized:  -     cyclobenzaprine (FLEXERIL) 10 MG tablet       Follow up: Follow up if symptoms worsen or fail to improve.    After visit summary was printed and given to patient upon discharge today.  Patient goals and care plan are included in After Visit Summary.

## 2024-05-09 ENCOUNTER — PATIENT MESSAGE (OUTPATIENT)
Dept: FAMILY MEDICINE | Facility: CLINIC | Age: 64
End: 2024-05-09
Payer: COMMERCIAL

## 2024-05-10 ENCOUNTER — TELEPHONE (OUTPATIENT)
Dept: FAMILY MEDICINE | Facility: CLINIC | Age: 64
End: 2024-05-10
Payer: COMMERCIAL

## 2024-05-10 ENCOUNTER — PATIENT MESSAGE (OUTPATIENT)
Dept: FAMILY MEDICINE | Facility: CLINIC | Age: 64
End: 2024-05-10
Payer: COMMERCIAL

## 2024-05-10 NOTE — LETTER
May 10, 2024      [facility]  [address] [place] [address]  Phone: [phone]  Fax: [phone]       Patient: Karla Arzola   YOB: 1960  Date of Visit: 05/06/2024    To Whom It May Concern:    Huma Arzola  was at [facility] on 05/06/2025. The patient may return to work/school on 05/13/2024 with no restrictions. If you have any questions or concerns, or if I can be of further assistance, please do not hesitate to contact me.    Sincerely,    Renee Ferrer MA

## 2024-05-13 ENCOUNTER — TELEPHONE (OUTPATIENT)
Dept: BARIATRICS | Facility: CLINIC | Age: 64
End: 2024-05-13
Payer: COMMERCIAL

## 2024-05-13 NOTE — TELEPHONE ENCOUNTER
"Spoke with pt, confirmed Ht of 5'6" and Wt 245 lbs BMI 39.6 with HTN, pt completed FS appointment 5/8/2024, medical criteria reviewed including 6msd- pt explained she completed workup in [place] with Dr. Lezama, explained we would evaluate upon consultation what would be needed with our program, dashboard updated, discussed assigned nurse coordinator and program, scheduled Initial consult visits with MYRANDA and Dietician. Explained I would be sending a copy of our Surgery and Nutrition booklets for review, and the instructions to complete the online seminar before scheduled appointments. Pt verbalized understanding.  "

## 2024-05-14 ENCOUNTER — CLINICAL SUPPORT (OUTPATIENT)
Dept: REHABILITATION | Facility: HOSPITAL | Age: 64
End: 2024-05-14
Payer: COMMERCIAL

## 2024-05-14 DIAGNOSIS — Z74.09 DECREASED FUNCTIONAL MOBILITY AND ENDURANCE: ICD-10-CM

## 2024-05-14 DIAGNOSIS — M25.662 DECREASED RANGE OF MOTION OF LEFT KNEE: ICD-10-CM

## 2024-05-14 DIAGNOSIS — R26.9 GAIT ABNORMALITY: ICD-10-CM

## 2024-05-14 DIAGNOSIS — R26.89 POOR BALANCE: Primary | ICD-10-CM

## 2024-05-14 PROCEDURE — 97530 THERAPEUTIC ACTIVITIES: CPT

## 2024-05-14 PROCEDURE — 97140 MANUAL THERAPY 1/> REGIONS: CPT

## 2024-05-14 PROCEDURE — 97110 THERAPEUTIC EXERCISES: CPT

## 2024-05-14 NOTE — PROGRESS NOTES
[facility]   Physical Therapy Treatment Note       Name: Karla Arzola  Clinic Number: [id number]    Therapy Diagnosis:   Encounter Diagnoses   Name Primary?    Poor balance Yes    Decreased range of motion of left knee     Decreased functional mobility and endurance     Gait abnormality          Physician: Larry Adams*    Visit Date: 2024    Physician Orders: PT Eval and Treat  Medical Diagnosis from Referral:  Primary osteoarthritis of left knee [M17.12], Old complex tear of lateral meniscus of left knee [M23.201]   Evaluation Date: 2024  Authorization Period Expiration: 10/30/2024   Plan of Care Expiration: 2024  Progress Note Due: 2024  Visit # / Visits authorized: 15/20 (+1 for evaluation)   FOTO: 1/3 (last performed on 3/12/2024)     Precautions: Standard    Time In: 0954  Time Out: 1055  Total Billable Time: 56 minutes (Billing reflects 1 on 1 treatment time spent with patient)    Subjective     Patient reports:  she is feeling okay currently but has been having some occasional sharp pains at the lateral joint line      He/She was compliant with home exercise program.  Response to previous treatment: mild increased soreness following exercises   Functional change: increased knee A/PROM. Improved quadriceps activation and active knee extension    Pain: NT/10     Location: L knee    Objective      Objective Measures updated at progress report or POC update only unless otherwise noted.       Treatment     Karla received the treatments listed below:   CPT Intervention Duration / Intensity  2024   MT Soft tissue mobilization  B quads    TE Recumbent bike  Level 5 for 5 mins    TE Gastroc stretch- wedge     TE Kneeling flexion  3 mins x 10s holds    TE Knee flexion on step      NMR TKE      NMR SLS + kickstand + kettle swings (3 ways)  30s each b    NMR Tandem stance kettle pass     TA Knee extensions Double le# 3x10   TA Hamstring curls Double  le# 3x10    TA Leg press Double le# 2x10   Single le# 3x10   TA Step ups      TA Incline calf raises  3x10   TA Bridges - figure 4      TA Step downs     TA Lateral squat walks Red band, 3 laps    TA Sit to stand  10# 20 inch 3x10                            PLAN         CPT Codes available for Billing:   (15) minutes of Manual therapy (MT) to improve pain and ROM.  (08) minutes of Therapeutic Exercise (TE) to develop strength, endurance, range of motion, and flexibility.  (5) minutes of Neuromuscular Re-Education (NMR)  to improve: Balance, Coordination, Kinesthetic, Sense, Proprioception, and Posture.  (28) minutes of Therapeutic Activities (TA) to improve functional performance.  Unattended Electrical Stimulation (ES) for muscle performance or pain modulation.  BFR: Blood flow restriction applied during exercise  NP or (-): Not Performed    Patient Education and Home Exercises       Home Exercises Provided and Patient Education Provided     Education provided: (time included with treatment) minutes  PURPOSE: Patient educated on the impairments noted above and the effects of physical therapy intervention to improve overall condition and QOL.   EXERCISE: Patient was educated on all the above exercise prior/during/after for proper posture, positioning, and execution for safe performance with home exercise program.   STRENGTH: Patient educated on the importance of improved core and extremity strength in order to improve alignment of the spine and extremities with static positions and dynamic movement.   GAIT & BALANCE: Patient educated on the importance of strong core and lower extremity musculature in order to improve both static and dynamic balance, improve gait mechanics, reduce fall risk and improve household and community mobility.     Written Home Exercises Provided: yes.  Exercises were reviewed and Karla was able to demonstrate them prior to the end of the session.  Karla demonstrated good   understanding of the education provided. See EMR under Patient Instructions for exercises provided during therapy sessions.    Assessment    Pt tolerated session well today. Spent significant time with soft tissue mobilization today in order to reduce muscle tension and pain in the knee. Pt continues to have difficulty with single leg stance. Progressed intervention with addition of kettle swings; however, she had to use the contralateral foot as a kickstand in order to maintain stability. Patient states she feels good overall following session but is very fatigued.        Karla is progressing well towards her goals.   Patient prognosis is Excellent.     Patient will continue to benefit from skilled outpatient physical therapy to address the deficits listed in the problem list box on initial evaluation, provide pt/family education and to maximize patient's level of independence in the home and community environment.     Patient's spiritual, cultural and educational needs considered and pt agreeable to plan of care and goals.       Anticipated Barriers for therapy: none       Short Term Goals:  6 weeks Status  Date Met   PAIN: Pt will report worst pain of 3/10 in order to progress toward max functional ability and improve quality of life. [x] Progressing  [] Met  [] Not Met     FUNCTION: Patient will demonstrate improved function as indicated by a score of greater than or equal to 57 out of 100 on FOTO. [x] Progressing  [] Met  [] Not Met     MOBILITY: Patient will improve AROM to 50% of stated goals, listed in objective measures above, in order to progress towards independence with functional activities.  [x] Progressing  [] Met  [] Not Met     STRENGTH: Patient will improve strength to 50% of stated goals, listed in objective measures above, in order to progress towards independence with functional activities. [x] Progressing  [] Met  [] Not Met     POSTURE: Patient will correct postural deviations in sitting and  standing, to decrease pain and promote long term stability.  [x] Progressing  [] Met  [] Not Met     HEP: Patient will demonstrate independence with HEP in order to progress toward functional independence. [x] Progressing  [] Met  [] Not Met        Long Term Goals:  12 weeks Status Date Met   PAIN: Pt will report worst pain of 1/10 in order to progress toward max functional ability and improve quality of life [x] Progressing  [] Met  [] Not Met     FUNCTION: Patient will demonstrate improved function as indicated by a score of greater than or equal to 64 out of 100 on FOTO. [x] Progressing  [] Met  [] Not Met     MOBILITY: Patient will improve AROM to stated goals, listed in objective measures above, in order to return to maximal functional potential and improve quality of life.  [x] Progressing  [] Met  [] Not Met     STRENGTH: Patient will improve strength to stated goals, listed in objective measures above, in order to improve functional independence and quality of life.  [x] Progressing  [] Met  [] Not Met     Patient will return to normal ADL's, IADL's, community involvement, recreational activities, and work-related activities with less than or equal to 1/10 pain and maximal function.  [x] Progressing  [] Met  [] Not Met           Plan     Continue Plan of Care (POC) and progress per patient tolerance. See treatment section for details on planned progressions next session.      Kaitlin Arzola, PT

## 2024-05-15 ENCOUNTER — CLINICAL SUPPORT (OUTPATIENT)
Dept: BARIATRICS | Facility: CLINIC | Age: 64
End: 2024-05-15
Payer: COMMERCIAL

## 2024-05-15 ENCOUNTER — CLINICAL SUPPORT (OUTPATIENT)
Dept: REHABILITATION | Facility: HOSPITAL | Age: 64
End: 2024-05-15
Payer: COMMERCIAL

## 2024-05-15 VITALS — BODY MASS INDEX: 39.38 KG/M2 | WEIGHT: 244 LBS

## 2024-05-15 DIAGNOSIS — E66.01 MORBID OBESITY WITH BMI OF 40.0-44.9, ADULT: Primary | ICD-10-CM

## 2024-05-15 DIAGNOSIS — R26.9 GAIT ABNORMALITY: ICD-10-CM

## 2024-05-15 DIAGNOSIS — E55.9 VITAMIN D DEFICIENCY DISEASE: ICD-10-CM

## 2024-05-15 DIAGNOSIS — Z74.09 DECREASED FUNCTIONAL MOBILITY AND ENDURANCE: ICD-10-CM

## 2024-05-15 DIAGNOSIS — M25.662 DECREASED RANGE OF MOTION OF LEFT KNEE: ICD-10-CM

## 2024-05-15 DIAGNOSIS — F41.9 ANXIETY: ICD-10-CM

## 2024-05-15 DIAGNOSIS — R26.89 POOR BALANCE: Primary | ICD-10-CM

## 2024-05-15 DIAGNOSIS — I10 ESSENTIAL HYPERTENSION: Chronic | ICD-10-CM

## 2024-05-15 PROCEDURE — 97140 MANUAL THERAPY 1/> REGIONS: CPT

## 2024-05-15 PROCEDURE — 97112 NEUROMUSCULAR REEDUCATION: CPT

## 2024-05-15 PROCEDURE — 99205 OFFICE O/P NEW HI 60 MIN: CPT | Mod: 95,,, | Performed by: NURSE PRACTITIONER

## 2024-05-15 PROCEDURE — 97530 THERAPEUTIC ACTIVITIES: CPT

## 2024-05-15 PROCEDURE — 97110 THERAPEUTIC EXERCISES: CPT

## 2024-05-15 NOTE — PROGRESS NOTES
The patient location is: [place]  The chief complaint leading to consultation is: New Consult    Visit type: audiovisual    Notes:    Subjective:    Review of Systems:   Negative unless otherwise noted positive-  Gen- Weakness/ Fatigue  Neuro- Confusion  CV- Chest Pain/ Palpitations  Resp: Cough/ SOB  GI- Nausea/Vomiting  Extrem- Pain/Swelling      Objective:  Physical Exam:  General- Patient alert and oriented x3 in NAD  HEENT- PERRLA, EOMI, OP clear  Resp- No increased WOB noted. Not using accessory muscles.  GI- Non tender/non-distended  Extrem- No cyanosis, clubbing, edema.   Skin-  No Jaundice. No visible skin lesions.    Face to Face time with patient: 45  60 minutes of total time spent on the encounter, which includes face to face time and non-face to face time preparing to see the patient (eg, review of tests), Obtaining and/or reviewing separately obtained history, Documenting clinical information in the electronic or other health record, Independently interpreting results (not separately reported) and communicating results to the patient/family/caregiver, or Care coordination (not separately reported).     Each patient to whom he or she provides medical services by telemedicine is:  (1) informed of the relationship between the physician and patient and the respective role of any other health care provider with respect to management of the patient; and (2) notified that he or she may decline to receive medical services by telemedicine and may withdraw from such care at any time.        BARIATRIC NEW PATIENT EVALUATION    CHIEF COMPLAINT:   Morbid obesity, body mass index is 39.38 kg/m². and inability to lose weight.    Pt was being worked up for sleeve with Dr. Lezama but needed to come to [facility] for surgery d/t insurance.     HPI:  Karla Arzola is a 64 y.o. morbidly obese female. Her current body mass index is 39.38 kg/m². She has multiple associated comorbidities including essential hypertension,  GERD on daily medications, depression, and anxiety.  She has struggled with excess weight since after second child.  Her highest adult weight was 244 lbs at age 64, and her lowest adult weight was 130 lbs at age 26.  The patient has tried Weight Watchers, Atkins Diet, calorie counting, phentermine (Adipex-P), exercise and intermittent fasting.  The patient was most successful with exercise and calorie counting with a weight loss of 30 lbs.  Her current exercise includes walking and wall pilates 2 times a week. She denies any history of eating disorder such as anorexia, bulimia, or taking laxatives for weight loss, and denies any addiction including illicit substances, alcohol, or gambling.  Patient states she has a good  support system.  She lives with family.  She is currently employed [employer] .  She  denies a history of GERD, PPI daily controls for one year.  The patient's goal is 180 lbs.    ESS: Score of 9, reviewed 05/15/2024.  Does not need Sleep Study.    Pre op weight-244  IBW-144    CXR 2/21/24The lungs are clear.  The cardiac silhouette and mediastinum are within normal limits.  Degenerative spurring the spine.  Tortuous thoracic aorta.Impression:No acute chest findings.    EKG 12/5/23 NSR    Psych 4/3/24 Per DARIEL Canales LCSW: Patient does not exhibit or report any suicidal ideation, active addiction, or psychosis which would prohibit bariatric surgery.  Educate the patient that bariatric surgery is one tool toward overall health.  She acknowledges the concepts of trading addictions and returning to counseling post op if emotional problems are experienced.       PAST MEDICAL HISTORY:  Past Medical History:   Diagnosis Date    Allergy     Anxiety     GERD (gastroesophageal reflux disease)     Hypertension     Obesity     Vitamin B 12 deficiency     Vitamin D deficiency disease        PAST SURGICAL HISTORY:  Past Surgical History:   Procedure Laterality Date    BREAST BIOPSY Right 10/08/2021    KNEE  ARTHROSCOPY W/ MENISCECTOMY Left 1/4/2024    Procedure: ARTHROSCOPY, KNEE, WITH MENISCECTOMY;  Surgeon: Larry Adams MD;  Location: [facility];  Service: Orthopedics;  Laterality: Left;    MOUTH SURGERY      right knee scope      TUBAL LIGATION         FAMILY HISTORY:  Family History   Problem Relation Name Age of Onset    Diabetes Mother      Hypertension Mother      Stroke Mother      Hypertension Father      Hyperlipidemia Father      Kidney disease Father      Hypertension Sister      Hypertension Brother E     Heart failure Brother E     No Known Problems Brother J     No Known Problems Brother C     Hypertension Maternal Grandmother      Lung cancer Maternal Grandfather      Thyroid cancer Neg Hx      Other Neg Hx          MEN2        SOCIAL HISTORY:  Social History     Socioeconomic History    Marital status: Single   Occupational History     Employer: [employer]   Tobacco Use    Smoking status: Never     Passive exposure: Never    Smokeless tobacco: Never   Substance and Sexual Activity    Alcohol use: Yes     Comment: socially 1x weekly    Drug use: No    Sexual activity: Not Currently     Partners: Male     Birth control/protection: Abstinence   Other Topics Concern    Are you pregnant or think you may be? No    Breast-feeding No     Social Determinants of Health     Financial Resource Strain: Medium Risk (2/24/2023)    Overall Financial Resource Strain (CARDIA)     Difficulty of Paying Living Expenses: Somewhat hard   Food Insecurity: Food Insecurity Present (2/24/2023)    Hunger Vital Sign     Worried About Running Out of Food in the Last Year: Sometimes true     Ran Out of Food in the Last Year: Never true   Transportation Needs: No Transportation Needs (2/24/2023)    PRAPARE - Transportation     Lack of Transportation (Medical): No     Lack of Transportation (Non-Medical): No   Recent Concern: Transportation Needs - Unmet Transportation Needs (12/2/2022)    PRAPARE -  Transportation     Lack of Transportation (Medical): No     Lack of Transportation (Non-Medical): Yes   Physical Activity: Inactive (4/26/2024)    Exercise Vital Sign     Days of Exercise per Week: 0 days     Minutes of Exercise per Session: 0 min   Stress: Stress Concern Present (2/24/2023)    [facility] of Occupational Health - Occupational Stress Questionnaire     Feeling of Stress : To some extent   Housing Stability: High Risk (2/24/2023)    Housing Stability Vital Sign     Unable to Pay for Housing in the Last Year: Yes     Number of Places Lived in the Last Year: 1     Unstable Housing in the Last Year: No       MEDICATIONS:    Current Outpatient Medications:     acetaminophen (TYLENOL) 500 MG tablet, Take 2 tablets (1,000 mg total) by mouth every 8 (eight) hours as needed for Pain., Disp: 60 tablet, Rfl: 0    acyclovir (ZOVIRAX) 200 MG capsule, Take 1 capsule (200 mg total) by mouth once daily., Disp: 90 capsule, Rfl: 3    albuterol (VENTOLIN HFA) 90 mcg/actuation inhaler, Inhale 2 puffs into the lungs every 6 (six) hours as needed for Wheezing. Rescue, Disp: 8.5 g, Rfl: 1    amLODIPine (NORVASC) 10 MG tablet, Take 1 tablet (10 mg total) by mouth once daily., Disp: 90 tablet, Rfl: 1    ammonium lactate (LAC-HYDRIN) 12 % lotion, Apply to damp skin after bathing every other night, Disp: 226 g, Rfl: 11    benzonatate (TESSALON) 200 MG capsule, Take 1 capsule (200 mg total) by mouth 3 (three) times daily as needed for Cough., Disp: 30 capsule, Rfl: 1    buPROPion (WELLBUTRIN XL) 150 MG TB24 tablet, Take 1 tablet (150 mg total) by mouth once daily. (At noon), Disp: 90 tablet, Rfl: 0    ciclopirox (PENLAC) 8 % Soln, Apply to nails once daily, Disp: 6.6 mL, Rfl: 2    citalopram (CELEXA) 20 MG tablet, Take 1 tablet (20 mg total) by mouth once daily., Disp: 90 tablet, Rfl: 3    cyclobenzaprine (FLEXERIL) 10 MG tablet, Take 1 tablet (10 mg total) by mouth every evening. for 10 days, Disp: 10 tablet, Rfl: 0     ergocalciferol (VITAMIN D2) 50,000 unit Cap, Take 1 capsule (50,000 Units total) by mouth every 7 days., Disp: 12 capsule, Rfl: 1    fluticasone propionate (FLONASE) 50 mcg/actuation nasal spray, 1 spray (50 mcg total) by Each Nostril route once daily., Disp: 16 g, Rfl: 0    furosemide (LASIX) 20 MG tablet, Take 1 tablet (20 mg total) by mouth daily as needed (swelling)., Disp: 60 tablet, Rfl: 1    guaiFENesin (HUMIBID E) 400 mg Tab, Take 1 tablet (400 mg total) by mouth every 6 (six) hours as needed (for chest congestion)., Disp: 30 tablet, Rfl: 1    hydroxychloroquine (PLAQUENIL) 200 mg tablet, Take 1 tablet (200 mg total) by mouth 2 (two) times daily., Disp: 60 tablet, Rfl: 5    inhalation spacing device, Use as directed when taking inhaled medicine, Disp: 1 each, Rfl: 0    ipratropium-albuteroL (COMBIVENT)  mcg/actuation inhaler, Inhale 1 puff into the lungs every 6 (six) hours as needed for Wheezing or Shortness of Breath. Rescue, Disp: 4 g, Rfl: 0    methocarbamoL (ROBAXIN) 500 MG Tab, Take 1 tablet (500 mg total) by mouth nightly as needed (pain)., Disp: 30 tablet, Rfl: 5    multivitamin capsule, Take 1 capsule by mouth once daily., Disp: , Rfl:     mupirocin (BACTROBAN) 2 % ointment, apply to affected area(s) with Q-tip twice a day, Disp: 30 g, Rfl: 1    naproxen (NAPROSYN) 500 MG tablet, Take 1 tablet (500 mg total) by mouth 2 (two) times daily., Disp: 60 tablet, Rfl: 1    traMADoL (ULTRAM) 50 mg tablet, Take 1 tablet (50 mg total) by mouth every 6 (six) hours., Disp: 12 tablet, Rfl: 0    tretinoin (RETIN-A) 0.05 % cream, Apply small amount to affected areas every other night, Disp: 20 g, Rfl: 6  No current facility-administered medications for this visit.    Facility-Administered Medications Ordered in Other Visits:     lactated ringers infusion, , Intravenous, Continuous, Kathleen Renteria MD, New Bag at 01/04/24 3277    ALLERGIES:  Review of patient's allergies indicates:  No Known  Allergies        Vitals:    05/15/24 1436   Weight: 110.7 kg (244 lb)   PainSc: 0-No pain         DIAGNOSIS:  1. Morbid obesity, body mass index is 39.38 kg/m². and inability to lose weight.  2. Co-morbidities: essential hypertension, GERD on daily medications, depression, and anxiety    PLAN:  The patient is a good candidate for Bariatric Surgery. The patient is interested in laparoscopic sleeve gastrectomy with Dr. Oneill. The surgery and post-op care was discussed in detail with the patient. All questions were answered.    The patient understands that bariatric surgery is a tool to aid in weight loss and that they need to be committed to the diet and exercise post-operatively for successful weight loss. Discussed with patient that bariatric surgery is not the easy way out and that it will take plenty of dedication on the patient's part to be successful. Also discussed the possibility of weight regain if the patient strays from the diet guidelines or exercise requirements. Patient verbalized understanding and wishes to proceed with the work-up.    Estimated Goal weight is 190-195 lbs.    Discussed no NSAID use post op     ORDERS:  1. Stress Test  2. Bariatric Dietician Consult  3. Bariatric Labs: Per orders.    PCP: Ninfa Frost MD  RTC: As scheduled.

## 2024-05-15 NOTE — PATIENT INSTRUCTIONS
Prior to surgery you will need to complete:  - Dietitian consult and follow up appointments as needed  - Stress test   - Labs      In preparation for bariatric surgery, please complete the following:   Discuss your current medications with your primary care provider, remember your medications will need to be crushed, chewable, or in liquid form for the first 2-4 weeks after a gastric bypass or sleeve.  For a gastric band, your medications will need to be crushed indefinitely.    If you take a blood thinner such as: Coumadin (warfarin), Pradaxa (dabigatran), or Plavix (clopidogrel), you will need to speak with your prescribing provider on how or if this medication can be stopped before surgery.   If you take a medication for depression or anxiety, remember to discuss this with the psychologist or psychiatrist that you see.   If you take medication for arthritis on a daily basis that is considered a non-steroidal anti-inflammatory (NSAID), please discuss with your prescribing physician an alternative medication.  After having gastric bypass or gastric sleeve, this group of medications is not appropriate to take due to increased risk of bleeding stomach ulcers.      DEFINITIONS  Appointments: Pre-scheduled meetings or consultations with any physician, advanced practice provider, dietitian, or psychologist, and labs, imaging studies, sleep studies, etc.   Late cancellation: Cancelling an appointment 24-48 hours prior to scheduled time.  No-Show appointment:  is when   You do NOT arrive to your appointment at the time its scheduled.  You call to cancel or cancel via [employer] less than 24 hours in advance of your scheduled appointment  You show up 15 minutes AFTER your scheduled appointment time without any notification of being late.     POLICY  You are allowed up to 3 cancellations for appointments.   After 3 cancellations your case will be placed on hold for 2 months and after that time you can resume the program.    You are allowed only 1 no-show for an appointment.   You will be re-scheduled one time and if there is a 2nd no-show at any point, your case will be placed on hold for 3 months.  After 3 months you can resume the program.     Upon resuming the program after being placed on hold for either above mentioned reasons, if you have a late cancel or no show for any appointment, the bariatric team will review if youre an appropriate candidate for surgery at the monthly meeting.

## 2024-05-15 NOTE — PROGRESS NOTES
[facility]   Physical Therapy Treatment Note       Name: Karla Arzola  Clinic Number: [id number]    Therapy Diagnosis:   Encounter Diagnoses   Name Primary?    Poor balance Yes    Decreased range of motion of left knee     Decreased functional mobility and endurance     Gait abnormality            Physician: Larry Adams*    Visit Date: 5/15/2024    Physician Orders: PT Eval and Treat  Medical Diagnosis from Referral:  Primary osteoarthritis of left knee [M17.12], Old complex tear of lateral meniscus of left knee [M23.201]   Evaluation Date: 1/9/2024  Authorization Period Expiration: 10/30/2024   Plan of Care Expiration: 4/9/2024  Progress Note Due: 4/9/2024  Visit # / Visits authorized: 15/20 (+1 for evaluation)   FOTO: 1/3 (last performed on 3/12/2024)     Precautions: Standard    Time In: 0955  Time Out: 1059  Total Billable Time: 55 minutes (Billing reflects 1 on 1 treatment time spent with patient)    Subjective     Patient reports:  she is feeling okay today. Notes that the manual therapy really seemed to help and that she is having less frequent sharp pains in the left knee today      He/She was compliant with home exercise program.  Response to previous treatment: mild increased soreness following exercises   Functional change: increased knee A/PROM. Improved quadriceps activation and active knee extension    Pain: NT/10     Location: L knee    Objective      Objective Measures updated at progress report or POC update only unless otherwise noted.       Treatment     Karla received the treatments listed below:   CPT Intervention Duration / Intensity  5/15/2024   MT Soft tissue mobilization  B quads  L fat pad medial and lateral glides   TE Recumbent bike  Level 5 for 5 mins    TE Gastroc stretch- wedge     TE Kneeling flexion  3 mins x 10s holds    TE Knee flexion on step      NMR TKE      NMR SLS + kickstand + kettle swings (3 ways)  30s each b    NMR Tandem stance  rebounder toss 2# ball 2x15b   TA Knee extensions Double le# 3x10   TA Hamstring curls Double le# 3x10    TA Leg press Double le# 2x10   Single le# 3x10   TA Step ups      TA Calf raises  Eccentric: 10x b   Double leg: 2x10    TA Bridges - figure 4  3x10 b    TA Step up      TA Lateral squat walks Green band, 4 laps    TA Sit to stand  10# 20 inch 3x10                            PLAN         CPT Codes available for Billing:   (15) minutes of Manual therapy (MT) to improve pain and ROM.  (08) minutes of Therapeutic Exercise (TE) to develop strength, endurance, range of motion, and flexibility.  (08) minutes of Neuromuscular Re-Education (NMR)  to improve: Balance, Coordination, Kinesthetic, Sense, Proprioception, and Posture.  (24) minutes of Therapeutic Activities (TA) to improve functional performance.  Unattended Electrical Stimulation (ES) for muscle performance or pain modulation.  BFR: Blood flow restriction applied during exercise  NP or (-): Not Performed    Patient Education and Home Exercises       Home Exercises Provided and Patient Education Provided     Education provided: (time included with treatment) minutes  PURPOSE: Patient educated on the impairments noted above and the effects of physical therapy intervention to improve overall condition and QOL.   EXERCISE: Patient was educated on all the above exercise prior/during/after for proper posture, positioning, and execution for safe performance with home exercise program.   STRENGTH: Patient educated on the importance of improved core and extremity strength in order to improve alignment of the spine and extremities with static positions and dynamic movement.   GAIT & BALANCE: Patient educated on the importance of strong core and lower extremity musculature in order to improve both static and dynamic balance, improve gait mechanics, reduce fall risk and improve household and community mobility.     Written Home Exercises Provided:  yes.  Exercises were reviewed and Karla was able to demonstrate them prior to the end of the session.  Karla demonstrated good  understanding of the education provided. See EMR under Patient Instructions for exercises provided during therapy sessions.    Assessment   Pt tolerated session well today. Improved soft tissue mobility and fat pad mobility noted today, compared to previous session. Increased resistance with lateral squat walks to improve lateral hip strength and endurance. Re-introduced step up to high knee to improve functional lower extremity strength; pt reports only mild discomfort in the knee.           Karla is progressing well towards her goals.   Patient prognosis is Excellent.     Patient will continue to benefit from skilled outpatient physical therapy to address the deficits listed in the problem list box on initial evaluation, provide pt/family education and to maximize patient's level of independence in the home and community environment.     Patient's spiritual, cultural and educational needs considered and pt agreeable to plan of care and goals.       Anticipated Barriers for therapy: none       Short Term Goals:  6 weeks Status  Date Met   PAIN: Pt will report worst pain of 3/10 in order to progress toward max functional ability and improve quality of life. [x] Progressing  [] Met  [] Not Met     FUNCTION: Patient will demonstrate improved function as indicated by a score of greater than or equal to 57 out of 100 on FOTO. [x] Progressing  [] Met  [] Not Met     MOBILITY: Patient will improve AROM to 50% of stated goals, listed in objective measures above, in order to progress towards independence with functional activities.  [x] Progressing  [] Met  [] Not Met     STRENGTH: Patient will improve strength to 50% of stated goals, listed in objective measures above, in order to progress towards independence with functional activities. [x] Progressing  [] Met  [] Not Met     POSTURE: Patient  will correct postural deviations in sitting and standing, to decrease pain and promote long term stability.  [x] Progressing  [] Met  [] Not Met     HEP: Patient will demonstrate independence with HEP in order to progress toward functional independence. [x] Progressing  [] Met  [] Not Met        Long Term Goals:  12 weeks Status Date Met   PAIN: Pt will report worst pain of 1/10 in order to progress toward max functional ability and improve quality of life [x] Progressing  [] Met  [] Not Met     FUNCTION: Patient will demonstrate improved function as indicated by a score of greater than or equal to 64 out of 100 on FOTO. [x] Progressing  [] Met  [] Not Met     MOBILITY: Patient will improve AROM to stated goals, listed in objective measures above, in order to return to maximal functional potential and improve quality of life.  [x] Progressing  [] Met  [] Not Met     STRENGTH: Patient will improve strength to stated goals, listed in objective measures above, in order to improve functional independence and quality of life.  [x] Progressing  [] Met  [] Not Met     Patient will return to normal ADL's, IADL's, community involvement, recreational activities, and work-related activities with less than or equal to 1/10 pain and maximal function.  [x] Progressing  [] Met  [] Not Met           Plan     Continue Plan of Care (POC) and progress per patient tolerance. See treatment section for details on planned progressions next session.      Kaitlin Arzola, PT

## 2024-05-21 ENCOUNTER — PATIENT MESSAGE (OUTPATIENT)
Dept: BARIATRICS | Facility: CLINIC | Age: 64
End: 2024-05-21

## 2024-05-21 ENCOUNTER — CLINICAL SUPPORT (OUTPATIENT)
Dept: REHABILITATION | Facility: HOSPITAL | Age: 64
End: 2024-05-21
Payer: COMMERCIAL

## 2024-05-21 ENCOUNTER — LAB VISIT (OUTPATIENT)
Dept: LAB | Facility: HOSPITAL | Age: 64
End: 2024-05-21
Attending: FAMILY MEDICINE
Payer: COMMERCIAL

## 2024-05-21 ENCOUNTER — CLINICAL SUPPORT (OUTPATIENT)
Dept: BARIATRICS | Facility: CLINIC | Age: 64
End: 2024-05-21
Payer: COMMERCIAL

## 2024-05-21 VITALS — WEIGHT: 245.38 LBS | BODY MASS INDEX: 39.43 KG/M2 | HEIGHT: 66 IN

## 2024-05-21 DIAGNOSIS — I10 ESSENTIAL HYPERTENSION: Primary | Chronic | ICD-10-CM

## 2024-05-21 DIAGNOSIS — Z74.09 DECREASED FUNCTIONAL MOBILITY AND ENDURANCE: ICD-10-CM

## 2024-05-21 DIAGNOSIS — N28.9 FUNCTION KIDNEY DECREASED: ICD-10-CM

## 2024-05-21 DIAGNOSIS — R26.89 POOR BALANCE: Primary | ICD-10-CM

## 2024-05-21 DIAGNOSIS — R26.9 GAIT ABNORMALITY: ICD-10-CM

## 2024-05-21 DIAGNOSIS — E66.01 SEVERE OBESITY (BMI 35.0-39.9) WITH COMORBIDITY: ICD-10-CM

## 2024-05-21 DIAGNOSIS — M25.662 DECREASED RANGE OF MOTION OF LEFT KNEE: ICD-10-CM

## 2024-05-21 DIAGNOSIS — Z71.3 DIETARY COUNSELING AND SURVEILLANCE: ICD-10-CM

## 2024-05-21 LAB
ANION GAP SERPL CALC-SCNC: 12 MMOL/L (ref 8–16)
BUN SERPL-MCNC: 15 MG/DL (ref 8–23)
CALCIUM SERPL-MCNC: 8.9 MG/DL (ref 8.7–10.5)
CHLORIDE SERPL-SCNC: 107 MMOL/L (ref 95–110)
CO2 SERPL-SCNC: 22 MMOL/L (ref 23–29)
CREAT SERPL-MCNC: 1 MG/DL (ref 0.5–1.4)
EST. GFR  (NO RACE VARIABLE): >60 ML/MIN/1.73 M^2
GLUCOSE SERPL-MCNC: 120 MG/DL (ref 70–110)
POTASSIUM SERPL-SCNC: 3.7 MMOL/L (ref 3.5–5.1)
SODIUM SERPL-SCNC: 141 MMOL/L (ref 136–145)

## 2024-05-21 PROCEDURE — 80048 BASIC METABOLIC PNL TOTAL CA: CPT | Performed by: FAMILY MEDICINE

## 2024-05-21 PROCEDURE — 99499 UNLISTED E&M SERVICE: CPT | Mod: 95,,, | Performed by: DIETITIAN, REGISTERED

## 2024-05-21 PROCEDURE — 97530 THERAPEUTIC ACTIVITIES: CPT

## 2024-05-21 PROCEDURE — 97803 MED NUTRITION INDIV SUBSEQ: CPT | Mod: 95,,, | Performed by: DIETITIAN, REGISTERED

## 2024-05-21 PROCEDURE — 97110 THERAPEUTIC EXERCISES: CPT

## 2024-05-21 PROCEDURE — 36415 COLL VENOUS BLD VENIPUNCTURE: CPT | Performed by: FAMILY MEDICINE

## 2024-05-21 PROCEDURE — 97140 MANUAL THERAPY 1/> REGIONS: CPT

## 2024-05-21 NOTE — PROGRESS NOTES
"The patient location is:  Patient Home   The chief complaint leading to consultation is: morbid obesity in work up for bariatric surgery  Visit type: Virtual visit with synchronous audio and video  Total time spent with patient: 60 min  Each patient to whom he or she provides medical services by telemedicine is:  (1) informed of the relationship between the physician and patient and the respective role of any other health care provider with respect to management of the patient; and (2) notified that he or she may decline to receive medical services by telemedicine and may withdraw from such care at any time.  Nutrition Handout located in AVS section of pt's [facility]ena and/or sent as a message via [facility] portal.  Pt informed of handout and how to access this information in [employer] neena.   NUTRITIONAL CONSULT    Referring Physician: Elayen Oneill M.D.   Reason for MNT Referral: Initial assessment for sleeve gastrectomy work-up    PAST MEDICAL HISTORY:   64 y.o. female  Body mass index is 39.6 kg/m²..  Weight history includes She has struggled with excess weight since after second child.  Her highest adult weight was 244 lbs at age 64, and her lowest adult weight was 130 lbs at age 26.    Dieting attempts include The patient has tried Weight Watchers, Atkins Diet, calorie counting, phentermine (Adipex-P), exercise and intermittent fasting.  The patient was most successful with exercise and calorie counting with a weight loss of 30 lbs.  Her current exercise includes walking and wall pilates 2 times a week. ..    Past Medical History:   Diagnosis Date    Allergy     Anxiety     GERD (gastroesophageal reflux disease)     Hypertension     Obesity     Vitamin B 12 deficiency     Vitamin D deficiency disease        CLINICAL DATA:  64 y.o.-year-old Black or  female.  Height: 5'6"  Weight: 245 lbs  IBW: 144 lbs  BMI: 39.60  The patient's goal weight (50% EBW): 194 lbs  Personal goal weight: 180 " lbs    Goal for Bariatric Surgery: to improve health, to improve quality of life, and to lose weight    DAILY NUTRITIONAL NEEDS: pre-op nutritional bariatric guidelines to promote weight loss  [id number] Calories    Grams Protein    NUTRITION & HEALTH HISTORY:  Greatest challenge: irregular meal patterns    Current diet recall:   2 meals per day  B: skips - drinks coffee- splenda and sometimes PJ oat milk with praline and cream or protein velvet ice or tea in morning  L: brings from home ie hamburger with no bun and salad with honey mustard dressing  S: pretzels from home  D: hamburger no bun       Current Diet:  Meal pattern: 2 meals and focus on meats  Protein supplements: Protein velvet ice or premier for breakfast or lunch  Snackin / day  Vegetables: Likes a variety. Eats almost daily.  Fruits: Likes a variety. Eats once per week.  Beverages: water and coffee without sugar, diet sweet tea, cut out coke  Dining out: Weekly. Once a week Mostly fast food, restaurants, and take-out.  Cooking at home: Weekly. Mostly baked and air fryer  meat, fish, and vegetables. Small amt of potatoes    Exercise:  Past exercise: Adequate    Current exercise: Adequate wall pilates and walking, PT twice a week     Restrictions to exercise: knee pain     Vitamins / Minerals / Herbs:   Vit D rx  B-12 drops sublingual  Multivitamin kamilla and sea godoy      Labs:   Reviewed.    Food Allergies:   None reported    Social:  Works regular daytime shifts.  Lives with daughter and son .  Grocery shopping- daughter and pt and food prep 90% daughter.  Patient believes the household will be supportive after surgery.  Alcohol:  Glass of wine twice a week and once on weekend .  Smoking: None.    ASSESSMENT:  Patient reports attempts at weight loss, only to regain lost weight.  Patient demonstrated knowledge of healthy eating behaviors and exercise patterns; admits to not eating healthy and not exercising at this point.  Patient  demonstrates willingness to change lifestyle and make behavior modifications as evidenced by good exercise, including protein drinks, reduced dining out, better choices when dining out, more food preparation at [employer], and healthier cooking at home.        Barriers to Education: none    Stage of change: action    NUTRITION DIAGNOSIS:    Morbid Obesity related to Excessive calorie intake as evidence by BMI.    BARIATRIC DIET DISCUSSION/PLAN:  Discussed diet after surgery and related to patient's food record.  Reviewed nutrition guidelines for before and after surgery.  Answered all questions.  Work on gradually cutting back on starchy CHO in the diet.  1200-calorie diet.  Start including protein supplements in the diet plan daily.  More grocery shopping and meal preparation at home.  Return to clinic.    RECOMMENDATIONS:  Pt is a potential candidate for bariatric surgery.    Follow up in one month.  Needs additional visits with RD.    Patient verbalized understanding.      Communicated nutrition plan with bariatric team.    SESSION TIME:  60 minutes

## 2024-05-21 NOTE — PROGRESS NOTES
[facility]   Physical Therapy Treatment Note       Name: Karla Arzola  Clinic Number: [id number]    Therapy Diagnosis:   Encounter Diagnoses   Name Primary?    Poor balance Yes    Decreased range of motion of left knee     Decreased functional mobility and endurance     Gait abnormality            Physician: Larry Adams*    Visit Date: 5/21/2024    Physician Orders: PT Eval and Treat  Medical Diagnosis from Referral:  Primary osteoarthritis of left knee [M17.12], Old complex tear of lateral meniscus of left knee [M23.201]   Evaluation Date: 1/9/2024  Authorization Period Expiration: 10/30/2024   Plan of Care Expiration: 4/9/2024  Progress Note Due: 4/9/2024  Visit # / Visits authorized: 17/20 (+1 for evaluation)   FOTO: 1/3 (last performed on 3/12/2024)     Precautions: Standard    Time In: 0755  Time Out: 0859  Total Billable Time: 55 minutes (Billing reflects 1 on 1 treatment time spent with patient)    Subjective     Patient reports:  she noticed increased stiffness and aching in the knees and quads last night       He/She was compliant with home exercise program.  Response to previous treatment: mild increased soreness following exercises   Functional change: increased knee A/PROM. Improved quadriceps activation and active knee extension    Pain: NT/10     Location: L knee    Objective      Objective Measures updated at progress report or POC update only unless otherwise noted.       Treatment     Karla received the treatments listed below:   CPT Intervention Duration / Intensity  Performed Today   MT Soft tissue mobilization  B quads  L fat pad medial and lateral glides   TE Recumbent bike  Level 5 for 5 mins    TE Gastroc stretch- wedge     TE Kneeling flexion      TE Knee flexion on step      TE Prone quad stretch - strap 3x1 minute B    NMR TKE     NMR SLS + kickstand + kettle swings (3 ways)     NMR Tandem stance rebounder toss    TA Knee extensions Double leg:  25# 3x10   TA Hamstring curls Double le# 3x10    TA Leg press Double le# 2x10   Single le# 3x10   TA Step ups      TA Calf raises  Eccentric: 10x b   Double leg: 2x10    TA Bridges - figure 4  3x10 b    TA Step up      TA Lateral squat walks Green band, 4 laps    TA Sit to stand  10# 20 inch 3x10                            PLAN         CPT Codes available for Billing:   (10) minutes of Manual therapy (MT) to improve pain and ROM.  (12) minutes of Therapeutic Exercise (TE) to develop strength, endurance, range of motion, and flexibility.  (00) minutes of Neuromuscular Re-Education (NMR)  to improve: Balance, Coordination, Kinesthetic, Sense, Proprioception, and Posture.  (33) minutes of Therapeutic Activities (TA) to improve functional performance.  Unattended Electrical Stimulation (ES) for muscle performance or pain modulation.  BFR: Blood flow restriction applied during exercise  NP or (-): Not Performed    Patient Education and Home Exercises       Home Exercises Provided and Patient Education Provided     Education provided: (time included with treatment) minutes  PURPOSE: Patient educated on the impairments noted above and the effects of physical therapy intervention to improve overall condition and QOL.   EXERCISE: Patient was educated on all the above exercise prior/during/after for proper posture, positioning, and execution for safe performance with home exercise program.   STRENGTH: Patient educated on the importance of improved core and extremity strength in order to improve alignment of the spine and extremities with static positions and dynamic movement.   GAIT & BALANCE: Patient educated on the importance of strong core and lower extremity musculature in order to improve both static and dynamic balance, improve gait mechanics, reduce fall risk and improve household and community mobility.     Written Home Exercises Provided: yes.  Exercises were reviewed and Karla was able to demonstrate  them prior to the end of the session.  Karla demonstrated good  understanding of the education provided. See EMR under Patient Instructions for exercises provided during therapy sessions.    Assessment   Pt tolerated session well today. Incorporate soft tissue mobilization to decrease muscle tension in the quads with reports of decreased pain and tension following intervention. Continue to incorporate hamstring curls, knee extensions and leg press to improve functional strength; pt continues to report significant fatigue with no significant changes in reps or resistance incorporated.     Karla is progressing well towards her goals.   Patient prognosis is Excellent.     Patient will continue to benefit from skilled outpatient physical therapy to address the deficits listed in the problem list box on initial evaluation, provide pt/family education and to maximize patient's level of independence in the home and community environment.     Patient's spiritual, cultural and educational needs considered and pt agreeable to plan of care and goals.       Anticipated Barriers for therapy: none       Short Term Goals:  6 weeks Status  Date Met   PAIN: Pt will report worst pain of 3/10 in order to progress toward max functional ability and improve quality of life. [x] Progressing  [] Met  [] Not Met     FUNCTION: Patient will demonstrate improved function as indicated by a score of greater than or equal to 57 out of 100 on FOTO. [x] Progressing  [] Met  [] Not Met     MOBILITY: Patient will improve AROM to 50% of stated goals, listed in objective measures above, in order to progress towards independence with functional activities.  [x] Progressing  [] Met  [] Not Met     STRENGTH: Patient will improve strength to 50% of stated goals, listed in objective measures above, in order to progress towards independence with functional activities. [x] Progressing  [] Met  [] Not Met     POSTURE: Patient will correct postural deviations  in sitting and standing, to decrease pain and promote long term stability.  [x] Progressing  [] Met  [] Not Met     HEP: Patient will demonstrate independence with HEP in order to progress toward functional independence. [x] Progressing  [] Met  [] Not Met        Long Term Goals:  12 weeks Status Date Met   PAIN: Pt will report worst pain of 1/10 in order to progress toward max functional ability and improve quality of life [x] Progressing  [] Met  [] Not Met     FUNCTION: Patient will demonstrate improved function as indicated by a score of greater than or equal to 64 out of 100 on FOTO. [x] Progressing  [] Met  [] Not Met     MOBILITY: Patient will improve AROM to stated goals, listed in objective measures above, in order to return to maximal functional potential and improve quality of life.  [x] Progressing  [] Met  [] Not Met     STRENGTH: Patient will improve strength to stated goals, listed in objective measures above, in order to improve functional independence and quality of life.  [x] Progressing  [] Met  [] Not Met     Patient will return to normal ADL's, IADL's, community involvement, recreational activities, and work-related activities with less than or equal to 1/10 pain and maximal function.  [x] Progressing  [] Met  [] Not Met           Plan     Continue Plan of Care (POC) and progress per patient tolerance. See treatment section for details on planned progressions next session.      Kaitlin Arzola, PT

## 2024-05-28 ENCOUNTER — PATIENT MESSAGE (OUTPATIENT)
Dept: FAMILY MEDICINE | Facility: CLINIC | Age: 64
End: 2024-05-28
Payer: COMMERCIAL

## 2024-05-29 DIAGNOSIS — R73.09 ELEVATED GLUCOSE: Primary | ICD-10-CM

## 2024-05-29 DIAGNOSIS — E55.9 VITAMIN D DEFICIENCY DISEASE: ICD-10-CM

## 2024-05-29 RX ORDER — ERGOCALCIFEROL 1.25 MG/1
50000 CAPSULE ORAL
Qty: 12 CAPSULE | Refills: 1 | Status: SHIPPED | OUTPATIENT
Start: 2024-05-29

## 2024-05-29 NOTE — TELEPHONE ENCOUNTER
Refill Routing Note   Medication(s) are not appropriate for processing by [facility] for the following reason(s):        Outside of protocol    [facility] action(s):  Route             Appointments  past 12m or future 3m with PCP    Date Provider   Last Visit   4/26/2024 Ninfa Frost MD   Next Visit   Visit date not found Ninfa Frost MD   ED visits in past 90 days: 0        Note composed:10:25 AM 05/29/2024

## 2024-05-29 NOTE — TELEPHONE ENCOUNTER
No care due was identified.  [facility] Embedded Care Due Messages. Reference number: [id number].   5/29/2024 10:14:46 AM CDT

## 2024-05-30 ENCOUNTER — PATIENT MESSAGE (OUTPATIENT)
Dept: FAMILY MEDICINE | Facility: CLINIC | Age: 64
End: 2024-05-30
Payer: COMMERCIAL

## 2024-05-30 ENCOUNTER — LAB VISIT (OUTPATIENT)
Dept: LAB | Facility: HOSPITAL | Age: 64
End: 2024-05-30
Attending: FAMILY MEDICINE
Payer: COMMERCIAL

## 2024-05-30 DIAGNOSIS — R73.09 ELEVATED GLUCOSE: ICD-10-CM

## 2024-05-30 LAB
ESTIMATED AVG GLUCOSE: 100 MG/DL (ref 68–131)
HBA1C MFR BLD: 5.1 % (ref 4–5.6)

## 2024-05-30 PROCEDURE — 36415 COLL VENOUS BLD VENIPUNCTURE: CPT | Performed by: FAMILY MEDICINE

## 2024-05-30 PROCEDURE — 83036 HEMOGLOBIN GLYCOSYLATED A1C: CPT | Performed by: FAMILY MEDICINE

## 2024-06-07 NOTE — PROGRESS NOTES
[facility]   Physical Therapy Treatment Note       Name: Karla Arzola  Clinic Number: [id number]    Therapy Diagnosis:   Encounter Diagnoses   Name Primary?    Poor balance Yes    Decreased range of motion of left knee     Decreased functional mobility and endurance     Gait abnormality        Physician: Larry Adams    Visit Date: 3/13/2024    Physician Orders: PT Eval and Treat  Medical Diagnosis from Referral:  Primary osteoarthritis of left knee [M17.12], Old complex tear of lateral meniscus of left knee [M23.201]   Evaluation Date: 1/9/2024  Authorization Period Expiration: 10/30/2024   Plan of Care Expiration: 4/9/2024  Progress Note Due: 4/9/2024  Visit # / Visits authorized: 9/20 (+1 for evaluation)   FOTO: 1/3 (last performed on 3/12/2024)     Precautions: Standard    Time In: 0700  Time Out: 0803  Total Billable Time: 53 minutes (Billing reflects 1 on 1 treatment time spent with patient)    Subjective     Patient reports: her muscles are sore from her appointment yesterday and notes the knee feels a little stiff nut she is feeling good overall     He/She was compliant with home exercise program.  Response to previous treatment: mild increased soreness following exercises   Functional change: increased knee A/PROM. Improved quadriceps activation and active knee extension    Pain: 5/10     Location: L knee    Objective      Objective Measures updated at progress report or POC update only unless otherwise noted.       Treatment     Karla received the treatments listed below:       MANUAL THERAPY TECHNIQUES were applied for (0) minutes, including:    Soft tissue mobilization: DEFERRED  Scar mobilizations  Joint mobilizations:   Tibiofemoral glides  Patellofemoral glides and tilts in all directions   Functional dry needling: DEFERRED        THERAPEUTIC EXERCISES to develop strength, endurance, ROM, flexibility, posture, and core stabilization for (23) minutes  "including:    Performed Today:     Recumbent bike: 5 mins (full revolutions)  Hamstring stretch (strap) 2x1 minute   Active hamstring stretch (90/90) 10x10s holds   Prone quad stretch 3x1 minute   LAQ + SLR 2x10    Interventions DEFERRED today              NEUROMUSCULAR RE-EDUCATION ACTIVITIES to improve Balance, Coordination, Kinesthetic, Sense, Proprioception, and Posture for (12) minutes.  The following were included:    Performed Today:     Tandem stance ball pass: 3x10 to each side B   MOBO taps: 1 minute each B, odds and evens   Steamboats 2x8 B        Interventions DEFERRED today     SLS 4x30s B          THERAPEUTIC ACTIVITIES to improve dynamic and functional  performance for (18) minutes including:    Performed Today:     Single leg Bridges (figure 4) 2x10 B  Step downs 4" 2x10 B   Lateral squat walks- red band 3 laps   STS: 3x10 15# KB   Interventions DEFERRED today     Step ups 3x10 6"  Single leg calf raises 3x10 B   DL Knee Extensions 25# 3x10  Hamstring curl machine 45# 3x10  Leg Press Machine   Double le# 2x10  Single leg 45# 3x10              Next Session:  Deadlift vs RDL  Ecc. Knee ext machine  Lateral walks      Patient Education and Home Exercises       Home Exercises Provided and Patient Education Provided     Education provided: (time included with treatment) minutes  PURPOSE: Patient educated on the impairments noted above and the effects of physical therapy intervention to improve overall condition and QOL.   EXERCISE: Patient was educated on all the above exercise prior/during/after for proper posture, positioning, and execution for safe performance with home exercise program.   STRENGTH: Patient educated on the importance of improved core and extremity strength in order to improve alignment of the spine and extremities with static positions and dynamic movement.   GAIT & BALANCE: Patient educated on the importance of strong core and lower extremity musculature in order to improve both " static and dynamic balance, improve gait mechanics, reduce fall risk and improve household and community mobility.     Written Home Exercises Provided: yes.  Exercises were reviewed and Karla was able to demonstrate them prior to the end of the session.  Karla demonstrated good  understanding of the education provided. See EMR under Patient Instructions for exercises provided during therapy sessions.    Assessment   Patient tolerated treatment well today. Incorporated step downs with improved form and tolerance noted compared to previous attempt; however, pt required upper extremity assist to maintain balance and control movement. Added LAQ + straight leg raise to improve quadriceps and hip flexor strength; significant anterior thigh fatigue reported with intervention.    Karla is progressing well towards her goals.   Patient prognosis is Excellent.     Patient will continue to benefit from skilled outpatient physical therapy to address the deficits listed in the problem list box on initial evaluation, provide pt/family education and to maximize patient's level of independence in the home and community environment.     Patient's spiritual, cultural and educational needs considered and pt agreeable to plan of care and goals.       Anticipated Barriers for therapy: none       Short Term Goals:  6 weeks Status  Date Met   PAIN: Pt will report worst pain of 3/10 in order to progress toward max functional ability and improve quality of life. [x] Progressing  [] Met  [] Not Met     FUNCTION: Patient will demonstrate improved function as indicated by a score of greater than or equal to 57 out of 100 on FOTO. [x] Progressing  [] Met  [] Not Met     MOBILITY: Patient will improve AROM to 50% of stated goals, listed in objective measures above, in order to progress towards independence with functional activities.  [x] Progressing  [] Met  [] Not Met     STRENGTH: Patient will improve strength to 50% of stated goals, listed in  objective measures above, in order to progress towards independence with functional activities. [x] Progressing  [] Met  [] Not Met     POSTURE: Patient will correct postural deviations in sitting and standing, to decrease pain and promote long term stability.  [x] Progressing  [] Met  [] Not Met     HEP: Patient will demonstrate independence with HEP in order to progress toward functional independence. [x] Progressing  [] Met  [] Not Met        Long Term Goals:  12 weeks Status Date Met   PAIN: Pt will report worst pain of 1/10 in order to progress toward max functional ability and improve quality of life [x] Progressing  [] Met  [] Not Met     FUNCTION: Patient will demonstrate improved function as indicated by a score of greater than or equal to 64 out of 100 on FOTO. [x] Progressing  [] Met  [] Not Met     MOBILITY: Patient will improve AROM to stated goals, listed in objective measures above, in order to return to maximal functional potential and improve quality of life.  [x] Progressing  [] Met  [] Not Met     STRENGTH: Patient will improve strength to stated goals, listed in objective measures above, in order to improve functional independence and quality of life.  [x] Progressing  [] Met  [] Not Met     Patient will return to normal ADL's, IADL's, community involvement, recreational activities, and work-related activities with less than or equal to 1/10 pain and maximal function.  [x] Progressing  [] Met  [] Not Met           Plan     Continue Plan of Care (POC) and progress per patient tolerance. See treatment section for details on planned progressions next session.      Kaitlin Arzola, PT     No

## 2024-06-12 ENCOUNTER — PATIENT MESSAGE (OUTPATIENT)
Dept: OPHTHALMOLOGY | Facility: CLINIC | Age: 64
End: 2024-06-12
Payer: COMMERCIAL

## 2024-06-25 ENCOUNTER — CLINICAL SUPPORT (OUTPATIENT)
Dept: BARIATRICS | Facility: CLINIC | Age: 64
End: 2024-06-25
Payer: COMMERCIAL

## 2024-06-25 VITALS — WEIGHT: 244.69 LBS | BODY MASS INDEX: 39.5 KG/M2

## 2024-06-25 DIAGNOSIS — I10 ESSENTIAL HYPERTENSION: Primary | Chronic | ICD-10-CM

## 2024-06-25 DIAGNOSIS — Z71.3 DIETARY COUNSELING AND SURVEILLANCE: ICD-10-CM

## 2024-06-25 DIAGNOSIS — E66.01 SEVERE OBESITY (BMI 35.0-39.9) WITH COMORBIDITY: ICD-10-CM

## 2024-06-25 NOTE — PROGRESS NOTES
The patient location is:  Patient Home   The chief complaint leading to consultation is: morbid obesity in work up for bariatric surgery  Visit type: Virtual visit with synchronous audio and video  Total time spent with patient: 30 min  Each patient to whom he or she provides medical services by telemedicine is:  (1) informed of the relationship between the physician and patient and the respective role of any other health care provider with respect to management of the patient; and (2) notified that he or she may decline to receive medical services by telemedicine and may withdraw from such care at any time.  Nutrition Handout located in AVS section of pt's [facility] and/or sent as a message via [facility] portal.  Pt informed of handout and how to access this information in [employer] neena.   NUTRITION NOTE    Referring Physician: Elayne Oneill M.D.   Reason for MNT Referral: 6 months Medically Supervised Diet pending Gastric Sleeve    Patient presents for f/u visit for MSD with weight loss over the past month; total weight loss by making numerous dietary and lifestyle changes.    CLINICAL DATA:  64 y.o. female.    Current Weight: 111.0 kg 244 lbs  Weight Change Since Initial Visit: Loss of 1 lbs  Ideal Body Weight: 144 lbs  Body mass index is 39.5 kg/m².  Initial Wt: 245 lbs  DAILY NUTRITIONAL NEEDS: pre-op nutritional bariatric guidelines to promote weight loss  1730-2885 Calories    Grams Protein    CURRENT DIET:  Reduced-calorie diet.  Diet Recall: Food records are not present.    B: skips - drinks coffee- splenda and sometimes PJ oat milk with praline and cream or protein velvet ice 2 times per week   L: Premier protein shake   S: fruit or chips or pretzels  D: meat and vegetables 630 or 7 pm  Diet Includes:   Meal Pattern: Improved.  Protein Supplements: 4 per week protein velvet ice almost daily  Snacking: Adequate. Snacks include healthy choices and chips and pretzels .  Vegetables: Likes a  variety. Eats daily.  Fruits: Likes a variety. Eats almost daily.  Beverages: water, sugar-free beverages, diet tea, coffee without sugar, and hot green tea with splenda  Dining Out:  Monthly. A few times per monthMostly fast food, restaurants, and take-out.  Cooking at home: Daily. Mostly baked and air fried  meat, fish, and vegetables.    CURRENT EXERCISE:  Adequate Knee scope in Jan  Walking increased knee pain  Hula hoop with weight  Wall pilates    Vitamins / Minerals / Herbs:   Vit D rx  B-12 drops sublingual  Multivitamin ashwanganda and sea godoy    LABS:    No recent      Food Allergies:   None reported    Social:  Works regular daytime shifts.  Alcohol:  Wine once a week 1 glass decreased .  Smoking: None.    ASSESSMENT:  Patient demonstrates some willingness to change lifestyle habits as evidenced by weight loss, dietary changes, including protein drinks, increased fruits, increased vegetables, reduced dining out, and bringing snacks to work.    Doing fairly well with working on greatest challenges (irregular meal patterns).    Barriers to Education:  none  Stage of Change:  action    NUTRITION DIAGNOSIS:  Morbid Obesity related to Excessive carbohydrate intake and Physical inactivity as evidence by BMI.  Status: Improved    PLAN:  Pt is potential candidate for surgery.    Diet: Adjust diet plan.  Work on gradually cutting back on starchy CHO in the diet.  1200-calorie diet.  1500-calorie diet.  More grocery shopping and meal preparation at home.  Increase exercise.  Return to clinic.    Exercise: Increase.  As tolerated  Behavior Modification: Begin to document food & activity logs daily.      Return to clinic in one month.  Needs additional visit(s) with RD.    Communicated nutrition plan with bariatric team.    SESSION TIME:  30 minutes

## 2024-07-01 ENCOUNTER — PATIENT MESSAGE (OUTPATIENT)
Dept: BARIATRICS | Facility: CLINIC | Age: 64
End: 2024-07-01
Payer: COMMERCIAL

## 2024-07-02 ENCOUNTER — TELEPHONE (OUTPATIENT)
Dept: PREADMISSION TESTING | Facility: HOSPITAL | Age: 64
End: 2024-07-02
Payer: COMMERCIAL

## 2024-07-02 ENCOUNTER — HOSPITAL ENCOUNTER (OUTPATIENT)
Dept: PREADMISSION TESTING | Facility: HOSPITAL | Age: 64
Discharge: HOME | End: 2024-07-02
Attending: SURGERY
Payer: COMMERCIAL

## 2024-07-02 DIAGNOSIS — E66.01 MORBID OBESITY WITH BMI OF 40.0-44.9, ADULT: ICD-10-CM

## 2024-07-02 DIAGNOSIS — Z12.11 COLON CANCER SCREENING: ICD-10-CM

## 2024-07-02 NOTE — TELEPHONE ENCOUNTER
----- Message from Umesh Nicole sent at 7/2/2024 10:58 AM CDT -----  Regarding: reschedule  Patient procedure is scheduled for 07/22/2024 on a Monday, she need it scheduled for a Tuesday please call patient back to reschedule    Thanks

## 2024-07-08 ENCOUNTER — PATIENT MESSAGE (OUTPATIENT)
Dept: FAMILY MEDICINE | Facility: CLINIC | Age: 64
End: 2024-07-08
Payer: COMMERCIAL

## 2024-07-08 DIAGNOSIS — I10 ESSENTIAL HYPERTENSION: ICD-10-CM

## 2024-07-08 NOTE — TELEPHONE ENCOUNTER
No care due was identified.  [facility] Embedded Care Due Messages. Reference number: [id number].   7/08/2024 10:13:29 AM CDT

## 2024-07-09 RX ORDER — AMLODIPINE BESYLATE 10 MG/1
10 TABLET ORAL DAILY
Qty: 90 TABLET | Refills: 3 | Status: SHIPPED | OUTPATIENT
Start: 2024-07-09

## 2024-07-09 NOTE — TELEPHONE ENCOUNTER
Refill Routing Note   Medication(s) are not appropriate for processing by [facility] for the following reason(s):        No active prescription written by provider    [facility] action(s):  Defer               Appointments  past 12m or future 3m with PCP    Date Provider   Last Visit   4/26/2024 Ninfa Frost MD   Next Visit   12/3/2024 Ninfa Frost MD   ED visits in past 90 days: 0        Note composed:4:15 AM 07/09/2024

## 2024-07-13 DIAGNOSIS — F41.8 DEPRESSION WITH ANXIETY: ICD-10-CM

## 2024-07-13 NOTE — TELEPHONE ENCOUNTER
No care due was identified.  [facility] Embedded Care Due Messages. Reference number: [id number].   7/13/2024 10:23:37 AM CDT

## 2024-07-14 RX ORDER — BUPROPION HYDROCHLORIDE 150 MG/1
150 TABLET ORAL DAILY
Qty: 90 TABLET | Refills: 1 | Status: SHIPPED | OUTPATIENT
Start: 2024-07-14

## 2024-07-14 NOTE — TELEPHONE ENCOUNTER
Refill Routing Note   Medication(s) are not appropriate for processing by [facility] for the following reason(s):        New or recently adjusted medication    [facility] action(s):  Defer               Appointments  past 12m or future 3m with PCP    Date Provider   Last Visit   4/26/2024 Ninfa Frost MD   Next Visit   12/3/2024 Ninfa Frost MD   ED visits in past 90 days: 0        Note composed:10:58 PM 07/13/2024

## 2024-07-23 NOTE — PROGRESS NOTES
The patient location is:  Patient Home   The chief complaint leading to consultation is: morbid obesity in work up for bariatric surgery  Visit type: Virtual visit with synchronous audio and video  Total time spent with patient: 30 min  Each patient to whom he or she provides medical services by telemedicine is:  (1) informed of the relationship between the physician and patient and the respective role of any other health care provider with respect to management of the patient; and (2) notified that he or she may decline to receive medical services by telemedicine and may withdraw from such care at any time.  Nutrition Handout located in AVS section of pt's [facility] and/or sent as a message via [facility].  Pt informed of handout and how to access this information in [employer] neena.   NUTRITION NOTE    Referring Physician: Elayne Oneill M.D.   Reason for MNT Referral: 6 months Medically Supervised Diet pending Gastric Sleeve    Patient presents for f/u visit for MSD with weight loss over the past month; total weight loss by making numerous dietary and lifestyle changes.    CLINICAL DATA:  64 y.o. female.    Current Weight:243 lb 110.5 on [facility] scale at patient's work  Weight Change Since Initial Visit: Loss of 2 lbs  Last wt: 244 lbs  Ideal Body Weight: 144 lbs  Body mass index is 39.32 kg/m².  Initial Wt: 245 lbs  DAILY NUTRITIONAL NEEDS: pre-op nutritional bariatric guidelines to promote weight loss  4207-1383 Calories    Grams Protein    CURRENT DIET:  Reduced-calorie diet.  Diet Recall: Food records are not present.    B: skips - drinks coffee- splenda and sometimes PJ oat milk or protein velvet ice 2 times per week   L: Chicken and vegetables or salad with grilled chicken with lf ranch or   vinaigrette  S: nuts   D: meat and vegetables or  ie sandwich turkey on 1 slice bread 630 or 7 pm  Diet Includes:   Meal Pattern: Improved.  Protein Supplements: 2-4 per week protein velvet ice  almost daily  Snacking: Adequate. Snacks include nuts or watermelon.  Vegetables: Likes a variety. Eats daily.  Fruits: Likes a variety. Eats almost daily.  Beverages: water, sugar-free beverages, diet tea, coffee without sugar, and hot green tea with splenda  Dining Out:  Reduced lately. None in a few months Mostly fast food, restaurants, and take-out.  Cooking at home: Daily. Mostly baked and blackening on top of stove or air fried  meat, fish, and vegetables.    CURRENT EXERCISE:  Adequate Knee scope in Jan  Decreased Walking with  increased knee pain  Hula hoop with weight  Wall pilates    Vitamins / Minerals / Herbs:   Vit D rx  B-12 drops sublingual  Multivitamin over 50  ashwanganda and sea godoy    LABS:    No recent      Food Allergies:   None reported    Social:  Works regular daytime shifts.  Alcohol: Decreased wine once a month .  Smoking: None.    ASSESSMENT:  Patient demonstrates some willingness to change lifestyle habits as evidenced by weight loss, dietary changes, including protein drinks, increased fruits, increased vegetables, reduced dining out, and bringing snacks to work.    Doing well with working on greatest challenges (irregular meal patterns).    Barriers to Education:  none  Stage of Change:  action    NUTRITION DIAGNOSIS:  Morbid Obesity related to Excessive carbohydrate intake and Physical inactivity as evidence by BMI.  Status: Improved    PLAN:  Pt is good candidate for surgery.    Diet: Adjust diet plan.  Work on gradually cutting back on starchy CHO in the diet.  1200-calorie diet.  1500-calorie diet.  Increase exercise.  Start shopping for bariatric vitamins & minerals.  Incorporate one protein shake per day into meal plan    Exercise: Increase.As tolerated  Behavior Modification: Begin to document food & activity logs daily.    Communicated nutrition plan with bariatric team.    SESSION TIME:  30 minutes

## 2024-07-24 ENCOUNTER — PATIENT MESSAGE (OUTPATIENT)
Dept: BARIATRICS | Facility: CLINIC | Age: 64
End: 2024-07-24

## 2024-07-24 ENCOUNTER — CLINICAL SUPPORT (OUTPATIENT)
Dept: BARIATRICS | Facility: CLINIC | Age: 64
End: 2024-07-24
Payer: COMMERCIAL

## 2024-07-24 VITALS — WEIGHT: 243.63 LBS | BODY MASS INDEX: 39.32 KG/M2

## 2024-07-24 DIAGNOSIS — Z71.3 DIETARY COUNSELING AND SURVEILLANCE: Primary | ICD-10-CM

## 2024-07-24 DIAGNOSIS — E66.01 SEVERE OBESITY (BMI 35.0-39.9) WITH COMORBIDITY: ICD-10-CM

## 2024-07-24 DIAGNOSIS — I10 ESSENTIAL HYPERTENSION: Chronic | ICD-10-CM

## 2024-07-28 ENCOUNTER — HOSPITAL ENCOUNTER (EMERGENCY)
Facility: HOSPITAL | Age: 64
Discharge: HOME | End: 2024-07-28
Attending: EMERGENCY MEDICINE
Payer: COMMERCIAL

## 2024-07-28 ENCOUNTER — PATIENT MESSAGE (OUTPATIENT)
Dept: FAMILY MEDICINE | Facility: CLINIC | Age: 64
End: 2024-07-28
Payer: COMMERCIAL

## 2024-07-28 VITALS
BODY MASS INDEX: 39.46 KG/M2 | TEMPERATURE: 98 F | HEART RATE: 109 BPM | WEIGHT: 244.5 LBS | RESPIRATION RATE: 16 BRPM | SYSTOLIC BLOOD PRESSURE: 167 MMHG | OXYGEN SATURATION: 98 % | DIASTOLIC BLOOD PRESSURE: 79 MMHG

## 2024-07-28 DIAGNOSIS — T14.8XXA MUSCLE STRAIN: Primary | ICD-10-CM

## 2024-07-28 PROCEDURE — 99284 EMERGENCY DEPT VISIT MOD MDM: CPT

## 2024-07-28 RX ORDER — HYDROCODONE BITARTRATE AND ACETAMINOPHEN 7.5; 325 MG/1; MG/1
1 TABLET ORAL EVERY 6 HOURS PRN
Qty: 12 TABLET | Refills: 0 | Status: SHIPPED | OUTPATIENT
Start: 2024-07-28

## 2024-07-28 RX ORDER — KETOROLAC TROMETHAMINE 10 MG/1
10 TABLET, FILM COATED ORAL EVERY 6 HOURS PRN
Qty: 15 TABLET | Refills: 0 | Status: SHIPPED | OUTPATIENT
Start: 2024-07-28

## 2024-07-28 NOTE — ED PROVIDER NOTES
Encounter Date: 7/28/2024       History     Chief Complaint   Patient presents with    Fall     Pt slipped and fell approximately one hour prior to arrival.  She c/o pain to her right leg and knee.  Pt did not hit her head.     64-year-old female with complaint of right hamstring pain over the past hour.  Patient reports that she slipped in her right heel extended forward and she almost did a split.  Patient reports immediate onset of pain in right hamstring.  Patient denies falling the ground.  Patient denies any other complaint.        Review of patient's allergies indicates:  No Known Allergies  Past Medical History:   Diagnosis Date    Allergy     Anxiety     GERD (gastroesophageal reflux disease)     Hypertension     Obesity     Vitamin B 12 deficiency     Vitamin D deficiency disease      Past Surgical History:   Procedure Laterality Date    BREAST BIOPSY Right 10/08/2021    KNEE ARTHROSCOPY W/ MENISCECTOMY Left 1/4/2024    Procedure: ARTHROSCOPY, KNEE, WITH MENISCECTOMY;  Surgeon: Larry Adams MD;  Location: [facility];  Service: Orthopedics;  Laterality: Left;    MOUTH SURGERY      right knee scope      TUBAL LIGATION       Family History   Problem Relation Name Age of Onset    Diabetes Mother      Hypertension Mother      Stroke Mother      Hypertension Father      Hyperlipidemia Father      Kidney disease Father      Hypertension Sister      Hypertension Brother E     Heart failure Brother E     No Known Problems Brother J     No Known Problems Brother C     Hypertension Maternal Grandmother      Lung cancer Maternal Grandfather      Thyroid cancer Neg Hx      Other Neg Hx          MEN2     Social History     Tobacco Use    Smoking status: Never     Passive exposure: Never    Smokeless tobacco: Never   Substance Use Topics    Alcohol use: Yes     Comment: socially 1x weekly    Drug use: No     Review of Systems   Constitutional:  Negative for fever.   HENT:  Negative for sore throat.     Respiratory:  Negative for shortness of breath.    Cardiovascular:  Negative for chest pain.   Gastrointestinal:  Negative for nausea.   Genitourinary:  Negative for dysuria.   Musculoskeletal:  Negative for back pain.        Right leg pain    Skin:  Negative for rash.   Neurological:  Negative for weakness.   Hematological:  Does not bruise/bleed easily.       Physical Exam     Initial Vitals [07/28/24 1553]   BP Pulse Resp Temp SpO2   (!) 167/79 109 16 98.4 °F (36.9 °C) 98 %      MAP       --         Physical Exam    Nursing note and vitals reviewed.  Constitutional: She appears well-developed and well-nourished.   HENT:   Head: Normocephalic and atraumatic.   Eyes: Conjunctivae and EOM are normal. Pupils are equal, round, and reactive to light.   Neck: Neck supple.   Normal range of motion.  Cardiovascular:  Normal rate, regular rhythm, normal heart sounds and intact distal pulses.           Pulmonary/Chest: Breath sounds normal.   Abdominal: Abdomen is soft. There is no abdominal tenderness. There is no rebound and no guarding.   Musculoskeletal:         General: Normal range of motion.      Cervical back: Normal range of motion and neck supple.      Comments: Tenderness right mid hamstring, no swelling, pain of right hamstring with extension of right knee and with stretch of leg     Neurological: She is alert and oriented to person, place, and time. She has normal strength and normal reflexes.   Skin: Skin is warm and dry.   Psychiatric: She has a normal mood and affect. Her behavior is normal. Thought content normal.         ED Course   Procedures  Labs Reviewed - No data to display       Imaging Results    None          Medications - No data to display  Medical Decision Making  Risk  Prescription drug management.                                      Clinical Impression:  Final diagnoses:  [T14.8XXA] Muscle strain (Primary)          ED Disposition Condition    Discharge Stable          ED Prescriptions        Medication Sig Dispense Start Date End Date Auth. Provider    HYDROcodone-acetaminophen (NORCO) 7.5-325 mg per tablet Take 1 tablet by mouth every 6 (six) hours as needed for Pain. 12 tablet 7/28/2024 -- Mani Jones NP    ketorolac (TORADOL) 10 mg tablet Take 1 tablet (10 mg total) by mouth every 6 (six) hours as needed for Pain. 15 tablet 7/28/2024 -- Mani Jones NP          Follow-up Information       Follow up With Specialties Details Why Contact Info    Ninfa Frost MD Family Medicine Schedule an appointment as soon as possible for a visit in 2 days  [address] [facility] [id number]  [phone]               Mani Jones NP  07/28/24 7736

## 2024-07-28 NOTE — Clinical Note
"Karla Green" Dariusz was seen and treated in our emergency department on 7/28/2024.  She may return to work on 07/31/2024.       If you have any questions or concerns, please don't hesitate to call.      Mani Jones, NP"

## 2024-07-30 ENCOUNTER — OFFICE VISIT (OUTPATIENT)
Dept: FAMILY MEDICINE | Facility: CLINIC | Age: 64
End: 2024-07-30
Payer: COMMERCIAL

## 2024-07-30 VITALS
DIASTOLIC BLOOD PRESSURE: 70 MMHG | WEIGHT: 244.69 LBS | HEIGHT: 66 IN | TEMPERATURE: 97 F | RESPIRATION RATE: 15 BRPM | BODY MASS INDEX: 39.32 KG/M2 | HEART RATE: 98 BPM | SYSTOLIC BLOOD PRESSURE: 116 MMHG | OXYGEN SATURATION: 98 %

## 2024-07-30 DIAGNOSIS — S76.301D RIGHT HAMSTRING INJURY, SUBSEQUENT ENCOUNTER: Primary | ICD-10-CM

## 2024-07-30 PROCEDURE — 1159F MED LIST DOCD IN RCRD: CPT | Mod: CPTII,S$GLB,, | Performed by: NURSE PRACTITIONER

## 2024-07-30 PROCEDURE — 3008F BODY MASS INDEX DOCD: CPT | Mod: CPTII,S$GLB,, | Performed by: NURSE PRACTITIONER

## 2024-07-30 PROCEDURE — 3044F HG A1C LEVEL LT 7.0%: CPT | Mod: CPTII,S$GLB,, | Performed by: NURSE PRACTITIONER

## 2024-07-30 PROCEDURE — 99999 PR PBB SHADOW E&M-EST. PATIENT-LVL V: CPT | Mod: PBBFAC,,, | Performed by: NURSE PRACTITIONER

## 2024-07-30 PROCEDURE — 3074F SYST BP LT 130 MM HG: CPT | Mod: CPTII,S$GLB,, | Performed by: NURSE PRACTITIONER

## 2024-07-30 PROCEDURE — 3078F DIAST BP <80 MM HG: CPT | Mod: CPTII,S$GLB,, | Performed by: NURSE PRACTITIONER

## 2024-07-30 PROCEDURE — 99214 OFFICE O/P EST MOD 30 MIN: CPT | Mod: S$GLB,,, | Performed by: NURSE PRACTITIONER

## 2024-07-30 PROCEDURE — 1160F RVW MEDS BY RX/DR IN RCRD: CPT | Mod: CPTII,S$GLB,, | Performed by: NURSE PRACTITIONER

## 2024-07-30 RX ORDER — CYCLOBENZAPRINE HCL 10 MG
10 TABLET ORAL NIGHTLY
Qty: 15 TABLET | Refills: 0 | Status: SHIPPED | OUTPATIENT
Start: 2024-07-30 | End: 2024-08-14

## 2024-07-30 NOTE — PROGRESS NOTES
Karla Arzola  07/30/2024  [id number]    Ninfa Frost MD  Patient Care Team:  Ninfa Frost MD as PCP - General (Family Medicine)  Nydia Dockery NP as Nurse Practitioner (Family Medicine)          Visit Type:an urgent visit for a new problem    Chief Complaint:  Chief Complaint   Patient presents with    Follow-up    Fall       History of Present Illness:  63 yo female presents wit co right hamstring pull after slipping on hrr dogs pad two days ago.   She was evaluated and treated it the ER       History:  Past Medical History:   Diagnosis Date    Allergy     Anxiety     GERD (gastroesophageal reflux disease)     Hypertension     Obesity     Vitamin B 12 deficiency     Vitamin D deficiency disease      Past Surgical History:   Procedure Laterality Date    BREAST BIOPSY Right 10/08/2021    KNEE ARTHROSCOPY W/ MENISCECTOMY Left 1/4/2024    Procedure: ARTHROSCOPY, KNEE, WITH MENISCECTOMY;  Surgeon: Larry Adams MD;  Location: [facility];  Service: Orthopedics;  Laterality: Left;    MOUTH SURGERY      right knee scope      TUBAL LIGATION       Family History   Problem Relation Name Age of Onset    Diabetes Mother      Hypertension Mother      Stroke Mother      Hypertension Father      Hyperlipidemia Father      Kidney disease Father      Hypertension Sister      Hypertension Brother E     Heart failure Brother E     No Known Problems Brother J     No Known Problems Brother C     Hypertension Maternal Grandmother      Lung cancer Maternal Grandfather      Thyroid cancer Neg Hx      Other Neg Hx          MEN2     Social History     Socioeconomic History    Marital status: Single   Occupational History     Employer: [employer]   Tobacco Use    Smoking status: Never     Passive exposure: Never    Smokeless tobacco: Never   Substance and Sexual Activity    Alcohol use: Yes     Comment: socially 1x weekly    Drug use: No    Sexual activity: Not Currently     Partners: Male      Birth control/protection: Abstinence   Other Topics Concern    Are you pregnant or think you may be? No    Breast-feeding No     Social Determinants of Health     Financial Resource Strain: Medium Risk (2/24/2023)    Overall Financial Resource Strain (CARDIA)     Difficulty of Paying Living Expenses: Somewhat hard   Food Insecurity: Food Insecurity Present (2/24/2023)    Hunger Vital Sign     Worried About Running Out of Food in the Last Year: Sometimes true     Ran Out of Food in the Last Year: Never true   Transportation Needs: No Transportation Needs (2/24/2023)    PRAPARE - Transportation     Lack of Transportation (Medical): No     Lack of Transportation (Non-Medical): No   Recent Concern: Transportation Needs - Unmet Transportation Needs (12/2/2022)    PRAPARE - Transportation     Lack of Transportation (Medical): No     Lack of Transportation (Non-Medical): Yes   Physical Activity: Inactive (4/26/2024)    Exercise Vital Sign     Days of Exercise per Week: 0 days     Minutes of Exercise per Session: 0 min   Stress: Stress Concern Present (2/24/2023)    [employer] - Occupational Stress Questionnaire     Feeling of Stress : To some extent   Housing Stability: High Risk (2/24/2023)    Housing Stability Vital Sign     Unable to Pay for Housing in the Last Year: Yes     Number of Places Lived in the Last Year: 1     Unstable Housing in the Last Year: No     Patient Active Problem List   Diagnosis    Essential hypertension    Morbid obesity with BMI of 40.0-44.9, adult    Recurrent genital HSV (herpes simplex virus) infection    Internal derangement of right knee    Chondromalacia of right knee    Screen for colon cancer    Poor balance    Pre-op exam    Complex tear of lateral meniscus of left knee    Anxiety    Connective tissue disorder    Uveitis    Vitamin D deficiency disease    Decreased range of motion of left knee    Decreased functional mobility and endurance    Gait  abnormality    Depression with anxiety     Review of patient's allergies indicates:  No Known Allergies    The following were reviewed at this visit: active problem list, medication list, allergies, family history, social history, and health maintenance.    Medications:  Current Outpatient Medications on File Prior to Visit   Medication Sig Dispense Refill    acetaminophen (TYLENOL) 500 MG tablet Take 2 tablets (1,000 mg total) by mouth every 8 (eight) hours as needed for Pain. 60 tablet 0    acyclovir (ZOVIRAX) 200 MG capsule Take 1 capsule (200 mg total) by mouth once daily. 90 capsule 3    albuterol (VENTOLIN HFA) 90 mcg/actuation inhaler Inhale 2 puffs into the lungs every 6 (six) hours as needed for Wheezing. Rescue 8.5 g 1    amLODIPine (NORVASC) 10 MG tablet Take 1 tablet (10 mg total) by mouth once daily. 90 tablet 3    ammonium lactate (LAC-HYDRIN) 12 % lotion Apply to damp skin after bathing every other night 226 g 11    benzonatate (TESSALON) 200 MG capsule Take 1 capsule (200 mg total) by mouth 3 (three) times daily as needed for Cough. 30 capsule 1    buPROPion (WELLBUTRIN XL) 150 MG TB24 tablet Take 1 tablet (150 mg total) by mouth once daily. (At noon) 90 tablet 1    ciclopirox (PENLAC) 8 % Soln Apply to nails once daily 6.6 mL 2    citalopram (CELEXA) 20 MG tablet Take 1 tablet (20 mg total) by mouth once daily. 90 tablet 3    ergocalciferol (VITAMIN D2) 50,000 unit Cap Take 1 capsule (50,000 Units total) by mouth every 7 days. 12 capsule 1    fluticasone propionate (FLONASE) 50 mcg/actuation nasal spray 1 spray (50 mcg total) by Each Nostril route once daily. 16 g 0    furosemide (LASIX) 20 MG tablet Take 1 tablet (20 mg total) by mouth daily as needed (swelling). 60 tablet 1    guaiFENesin (HUMIBID E) 400 mg Tab Take 1 tablet (400 mg total) by mouth every 6 (six) hours as needed (for chest congestion). 30 tablet 1    HYDROcodone-acetaminophen (NORCO) 7.5-325 mg per tablet Take 1 tablet by mouth  every 6 (six) hours as needed for Pain. 12 tablet 0    hydroxychloroquine (PLAQUENIL) 200 mg tablet Take 1 tablet (200 mg total) by mouth 2 (two) times daily. 60 tablet 5    inhalation spacing device Use as directed when taking inhaled medicine 1 each 0    ipratropium-albuteroL (COMBIVENT)  mcg/actuation inhaler Inhale 1 puff into the lungs every 6 (six) hours as needed for Wheezing or Shortness of Breath. Rescue 4 g 0    ketorolac (TORADOL) 10 mg tablet Take 1 tablet (10 mg total) by mouth every 6 (six) hours as needed for Pain. 15 tablet 0    methocarbamoL (ROBAXIN) 500 MG Tab Take 1 tablet (500 mg total) by mouth nightly as needed (pain). 30 tablet 5    multivitamin capsule Take 1 capsule by mouth once daily.      mupirocin (BACTROBAN) 2 % ointment apply to affected area(s) with Q-tip twice a day 30 g 1    traMADoL (ULTRAM) 50 mg tablet Take 1 tablet (50 mg total) by mouth every 6 (six) hours. 12 tablet 0    tretinoin (RETIN-A) 0.05 % cream Apply small amount to affected areas every other night 20 g 6     Current Facility-Administered Medications on File Prior to Visit   Medication Dose Route Frequency Provider Last Rate Last Admin    lactated ringers infusion   Intravenous Continuous Kathleen Renteria MD   New Bag at 01/04/24 0655       Medications have been reviewed and reconciled with patient at this visit.  Barriers to medications reviewed with patient.    Adverse reactions to current medications reviewed with patient..    Over the counter medications reviewed and reconciled with patient.    Exam:  Wt Readings from Last 3 Encounters:   07/30/24 111 kg (244 lb 11.4 oz)   07/28/24 110.9 kg (244 lb 7.8 oz)   07/24/24 110.5 kg (243 lb 9.7 oz)     Temp Readings from Last 3 Encounters:   07/30/24 97 °F (36.1 °C) (Tympanic)   07/28/24 98.4 °F (36.9 °C) (Oral)   05/08/24 97.8 °F (36.6 °C) (Temporal)     BP Readings from Last 3 Encounters:   07/30/24 116/70   07/28/24 (!) 167/79   05/08/24 110/74     Pulse  Readings from Last 3 Encounters:   07/30/24 98   07/28/24 109   05/08/24 98     Body mass index is 39.5 kg/m².      Review of Systems   Constitutional:  Negative for fever.   Respiratory:  Negative for cough, shortness of breath and wheezing.    Cardiovascular:  Negative for chest pain and palpitations.   Gastrointestinal:  Negative for nausea.   Musculoskeletal:  Positive for myalgias.        Right thigh pain    Neurological:  Negative for speech change, weakness and headaches.   All other systems reviewed and are negative.    Physical Exam  Vitals and nursing note reviewed.   Constitutional:       Appearance: Normal appearance. She is obese.   HENT:      Head: Normocephalic and atraumatic.      Right Ear: Tympanic membrane, ear canal and external ear normal.      Left Ear: Tympanic membrane, ear canal and external ear normal.      Nose: Nose normal.      Mouth/Throat:      Mouth: Mucous membranes are moist.      Pharynx: Oropharynx is clear.   Eyes:      Extraocular Movements: Extraocular movements intact.      Conjunctiva/sclera: Conjunctivae normal.      Pupils: Pupils are equal, round, and reactive to light.   Cardiovascular:      Rate and Rhythm: Normal rate and regular rhythm.      Pulses: Normal pulses.      Heart sounds: Normal heart sounds.   Pulmonary:      Effort: Pulmonary effort is normal.      Breath sounds: Normal breath sounds.   Abdominal:      General: Bowel sounds are normal.      Palpations: Abdomen is soft.   Musculoskeletal:         General: Normal range of motion.      Cervical back: Normal range of motion and neck supple.        Legs:       Comments: Ambulating with crutch    Skin:     General: Skin is warm and dry.      Capillary Refill: Capillary refill takes less than 2 seconds.   Neurological:      General: No focal deficit present.      Mental Status: She is alert and oriented to person, place, and time.   Psychiatric:         Mood and Affect: Mood normal.         Behavior: Behavior  normal.         Thought Content: Thought content normal.         Judgment: Judgment normal.         Laboratory Reviewed ({Yes)  Lab Results   Component Value Date    WBC 5.57 04/17/2024    HGB 12.9 04/17/2024    HCT 43.1 04/17/2024     04/17/2024    CHOL 162 03/02/2023    TRIG 117 03/02/2023    HDL 51 03/02/2023    ALT 15 04/17/2024    AST 18 04/17/2024     05/21/2024    K 3.7 05/21/2024     05/21/2024    CREATININE 1.0 05/21/2024    BUN 15 05/21/2024    CO2 22 (L) 05/21/2024    TSH 1.314 04/17/2024    GLUF 99 08/08/2008    HGBA1C 5.1 05/30/2024       Karla was seen today for follow-up and fall.    Diagnoses and all orders for this visit:    Right hamstring injury, subsequent encounter  -     Ambulatory referral/consult to Physical/Occupational Therapy; Future        Plan   The current medical regimen is effective;  continue present plan and medications.   RICE  PT        Care Plan/Goals: Reviewed    Goals         Blood Pressure < 130/80 (pt-stated)       Exercise at least 150 minutes per week.       Take at least one BP reading per week at various times of the day         Karla was seen today for follow-up and fall.    Diagnoses and all orders for this visit:    Right hamstring injury, subsequent encounter  -     Ambulatory referral/consult to Physical/Occupational Therapy; Future         Follow up: Follow up for with your PCP for evaluation and treatment.    After visit summary was printed and given to patient upon discharge today.  Patient goals and care plan are included in After Visit Summary.

## 2024-07-30 NOTE — LETTER
July 30, 2024      [facility]  [address] [place] [address]  Phone: [phone]  Fax: [phone]       Patient: Karla Arzola   YOB: 1960  Date of Visit: 07/30/2024    To Whom It May Concern:    Huma Arzola  was at [facility] on 07/30/2024. The patient may return to work/school on 8/5/24 with no restrictions. If you have any questions or concerns, or if I can be of further assistance, please do not hesitate to contact me.    Sincerely,    Nydia Dockery NP

## 2024-08-02 ENCOUNTER — TELEPHONE (OUTPATIENT)
Dept: PREADMISSION TESTING | Facility: HOSPITAL | Age: 64
End: 2024-08-02
Payer: COMMERCIAL

## 2024-08-07 ENCOUNTER — CLINICAL SUPPORT (OUTPATIENT)
Dept: REHABILITATION | Facility: HOSPITAL | Age: 64
End: 2024-08-07
Payer: COMMERCIAL

## 2024-08-07 DIAGNOSIS — S76.301D RIGHT HAMSTRING INJURY, SUBSEQUENT ENCOUNTER: ICD-10-CM

## 2024-08-07 DIAGNOSIS — R29.898 DECREASED STRENGTH INVOLVING KNEE JOINT: ICD-10-CM

## 2024-08-07 DIAGNOSIS — Z74.09 DECREASED FUNCTIONAL MOBILITY AND ENDURANCE: ICD-10-CM

## 2024-08-07 DIAGNOSIS — M25.661 DECREASED RANGE OF MOTION OF RIGHT KNEE: Primary | ICD-10-CM

## 2024-08-07 PROBLEM — R26.89 POOR BALANCE: Status: RESOLVED | Noted: 2023-09-16 | Resolved: 2024-08-07

## 2024-08-07 PROBLEM — M25.662 DECREASED RANGE OF MOTION OF LEFT KNEE: Status: RESOLVED | Noted: 2024-01-12 | Resolved: 2024-08-07

## 2024-08-07 PROBLEM — R26.9 GAIT ABNORMALITY: Status: RESOLVED | Noted: 2024-01-12 | Resolved: 2024-08-07

## 2024-08-07 PROCEDURE — 97162 PT EVAL MOD COMPLEX 30 MIN: CPT

## 2024-08-07 PROCEDURE — 97110 THERAPEUTIC EXERCISES: CPT

## 2024-08-09 ENCOUNTER — CLINICAL SUPPORT (OUTPATIENT)
Dept: REHABILITATION | Facility: HOSPITAL | Age: 64
End: 2024-08-09
Payer: COMMERCIAL

## 2024-08-09 DIAGNOSIS — Z74.09 DECREASED FUNCTIONAL MOBILITY AND ENDURANCE: ICD-10-CM

## 2024-08-09 DIAGNOSIS — M25.661 DECREASED RANGE OF MOTION OF RIGHT KNEE: Primary | ICD-10-CM

## 2024-08-09 DIAGNOSIS — R29.898 DECREASED STRENGTH INVOLVING KNEE JOINT: ICD-10-CM

## 2024-08-09 PROCEDURE — 97140 MANUAL THERAPY 1/> REGIONS: CPT

## 2024-08-09 PROCEDURE — 97110 THERAPEUTIC EXERCISES: CPT

## 2024-08-09 PROCEDURE — 97112 NEUROMUSCULAR REEDUCATION: CPT

## 2024-08-13 ENCOUNTER — CLINICAL SUPPORT (OUTPATIENT)
Dept: REHABILITATION | Facility: HOSPITAL | Age: 64
End: 2024-08-13
Payer: COMMERCIAL

## 2024-08-13 DIAGNOSIS — R29.898 DECREASED STRENGTH INVOLVING KNEE JOINT: ICD-10-CM

## 2024-08-13 DIAGNOSIS — Z74.09 DECREASED FUNCTIONAL MOBILITY AND ENDURANCE: ICD-10-CM

## 2024-08-13 DIAGNOSIS — M25.661 DECREASED RANGE OF MOTION OF RIGHT KNEE: Primary | ICD-10-CM

## 2024-08-13 PROCEDURE — 97112 NEUROMUSCULAR REEDUCATION: CPT

## 2024-08-13 PROCEDURE — 97140 MANUAL THERAPY 1/> REGIONS: CPT

## 2024-08-13 PROCEDURE — 97110 THERAPEUTIC EXERCISES: CPT

## 2024-08-13 NOTE — PROGRESS NOTES
[facility]   Physical Therapy Treatment Note      Name: Karla Arzola  Clinic Number: [id number]    Therapy Diagnosis:   Encounter Diagnoses   Name Primary?    Decreased range of motion of right knee Yes    Decreased strength involving knee joint     Decreased functional mobility and endurance      Physician: Nydia Dockery,*    Visit Date: 8/13/2024    Physician Orders: Physical Therapy Evaluation and Treat  Medical Diagnosis from Referral: S76.301D (ICD-10-CM) - Right hamstring injury, subsequent encounter   Evaluation Date: 8/7/2024  Authorization Period Expiration: 12/31/2024  Plan of Care Expiration: 10/30/2024  Progress Note Due: 09/06/2024  Visit # / Visits authorized: 2/20 (+1)   FOTO: 1/3      Precautions: Standard; Prior Bilateral Menisectomy    Time In: 7:06 am  Time Out: 8:00 am  Total Billable Time: 54 minutes    SUBJECTIVE     Patient reports: She feels like she is doing a little bit better since last treatment session and at work is not dealing with as much discomfort in her right hamstring.    She was compliant with home exercise program.  Response to previous treatment: decreased pain intensity  Functional change:  decreased pain at work    Pain: NT/10  Location:  [x] Right   [] Left:  Hamstring from attachment site, muscle belly, and insertion points     OBJECTIVE     Objective Measures updated at progress report unless specified.       TREATMENT       CPT Intervention Performed  8/13/2024  Duration / Intensity     MT  hamstring manual  x  10 min   TE  Bike x  6 min      heel slides (supine) x  8x/ 3 sets      Heel slides sitting -     NMR  active hamstring stretch -  10sec/ 5x      SAQ x  10x/ 3 sets (bilateral)      LAQ x  10x/ 3 sets (bilateral)     standing heel raises   10x/ 3 sets     bridge x 10x/ 3 sets    Supine Heel Digs x 2 x 10    PPT with march -     Isometric adduction x 3 min, 3 sec hold    Supine clamshell x Black 3 min, 3 sec hold    side  stepping  4 laps                      TA                                            PLAN              CPT Codes available for Billing:   (10) minutes of Manual therapy (MT) to improve pain and ROM.  (12) minutes of Therapeutic Exercise (TE) to develop strength, endurance, range of motion, and flexibility.  (32) minutes of Neuromuscular Re-Education (NMR)  to improve: Balance, Coordination, Kinesthetic Sense, Proprioception, and Posture.  (00) minutes of Therapeutic Activities (TA) to improve functional performance.  Unattended Electrical Stimulation (ES) for muscle performance or pain modulation.  BFR: Blood flow restriction applied during exercise  NP or (-): Not Performed    PATIENT EDUCATION AND HOME EXERCISES     Home Exercises Provided and Patient Education Provided     Education provided:   - home exercise program     Written Home Exercises Provided: Patient instructed to cont prior HEP. Exercises were reviewed and Karla was able to demonstrate them prior to the end of the session.  Karla demonstrated good  understanding of the education provided. See EMR under Patient Instructions for exercises provided during therapy sessions      ASSESSMENT     Patient tolerated treatment session well and was able to tolerate increased repetitions on supine heel slides with good tolerance as well as ability to perform right hamstrings heel digs to begin initiating isometric contractions. Patient tolerated manual therapy well but still does present with some swelling on medial portion of hamstrings.     Karla Is progressing well towards her goals.   Pt prognosis is Good.     Pt will continue to benefit from skilled outpatient physical therapy to address the deficits listed in the problem list box on initial evaluation, provide pt/family education and to maximize pt's level of independence in the home and community environment.     Pt's spiritual, cultural and educational needs considered and pt agreeable to plan of care and  goals.     Anticipated barriers to physical therapy: co-morbidities and occupation     Goals:   Reviewed: 8/13/2024      Short Term Goals:  6 weeks Status  Date Met   PAIN: Patient will report worst pain of 5/10 in order to progress toward max functional ability and improve quality of life. [x] Progressing  [] Met  [] Not Met     FUNCTION: Patient will demonstrate improved function as indicated by a functional intake score of more than or equal to 46 out of 100 on FOTO. [x] Progressing  [] Met  [] Not Met     MOBILITY: Patient will improve Range of Motion to 50% of stated goals, listed in objective measures above, in order to progress towards independence with functional activities.  [x] Progressing  [] Met  [] Not Met     STRENGTH: Patient will improve strength to 50% of stated goals, listed in objective measures above, in order to progress towards independence with functional activities.  [x] Progressing  [] Met  [] Not Met     GAIT: Patient will demonstrate improved gait mechanics in order to improve functional mobility, improve quality of life, and decrease risk of further injury or fall.  [x] Progressing  [] Met  [] Not Met     HEP: Patient will demonstrate independence with HEP in order to progress toward functional independence. [x] Progressing  [] Met  [] Not Met        Long Term Goals:  12 weeks Status Date Met   PAIN: Patient will report worst pain of 2/10 in order to progress toward max functional ability and improve quality of life [x] Progressing  [] Met  [] Not Met     FUNCTION: Patient will demonstrate improved function as indicated by a functional intake score of more than or equal to 62 out of 100 on FOTO. [x] Progressing  [] Met  [] Not Met     MOBILITY: Patient will improve Range of Motion to stated goals, listed in objective measures above, in order to return to maximal functional potential and improve quality of life. [x] Progressing  [] Met  [] Not Met     STRENGTH: Patient will improve  strength to stated goals, listed in objective measures above, in order to improve functional independence and quality of life. [x] Progressing  [] Met  [] Not Met     GAIT: Patient will demonstrate normalized gait mechanics with minimal compensation in order to return to prior level of function. [x] Progressing  [] Met  [] Not Met     Patient will return to normal ADL's, IADL's, community involvement, recreational activities, and work-related activities with less than or equal to 2/10 pain and maximal function.  [x] Progressing  [] Met  [] Not Met          PLAN     Monitor response to today's treatment session and progress with Physical Therapy plan of care as indicated.    Plan of Care Certification: 8/7/2024 to 10/30/2024.     Outpatient Physical Therapy 2 times weekly for 12 weeks to include any combination of the following interventions: virtual visits, dry needling, modalities, electrical stimulation (IFC, Pre-Mod, Attended with Functional Dry Needling), Cervical/Lumbar Traction, Gait Training, Manual Therapy, Moist Heat/ Ice, Neuromuscular Re-ed, Patient Education, Self Care, Therapeutic Exercise, and Therapeutic Activites     Chrissy Galicia, PT

## 2024-08-20 ENCOUNTER — CLINICAL SUPPORT (OUTPATIENT)
Dept: REHABILITATION | Facility: HOSPITAL | Age: 64
End: 2024-08-20
Payer: COMMERCIAL

## 2024-08-20 DIAGNOSIS — R29.898 DECREASED STRENGTH INVOLVING KNEE JOINT: ICD-10-CM

## 2024-08-20 DIAGNOSIS — M25.661 DECREASED RANGE OF MOTION OF RIGHT KNEE: Primary | ICD-10-CM

## 2024-08-20 DIAGNOSIS — Z74.09 DECREASED FUNCTIONAL MOBILITY AND ENDURANCE: ICD-10-CM

## 2024-08-20 PROCEDURE — 97140 MANUAL THERAPY 1/> REGIONS: CPT

## 2024-08-20 PROCEDURE — 97110 THERAPEUTIC EXERCISES: CPT

## 2024-08-20 PROCEDURE — 97112 NEUROMUSCULAR REEDUCATION: CPT

## 2024-08-20 NOTE — PROGRESS NOTES
[facility]   Physical Therapy Treatment Note      Name: Karla Arzola  Clinic Number: [id number]    Therapy Diagnosis:   Encounter Diagnoses   Name Primary?    Decreased range of motion of right knee Yes    Decreased strength involving knee joint     Decreased functional mobility and endurance      Physician: Nydia Dockery,*    Visit Date: 8/20/2024    Physician Orders: Physical Therapy Evaluation and Treat  Medical Diagnosis from Referral: S76.301D (ICD-10-CM) - Right hamstring injury, subsequent encounter   Evaluation Date: 8/7/2024  Authorization Period Expiration: 12/31/2024  Plan of Care Expiration: 10/30/2024  Progress Note Due: 09/06/2024  Visit # / Visits authorized: 3/20 (+1)   FOTO: 1/3      Precautions: Standard; Prior Bilateral Menisectomy    Time In: 7:06 am  Time Out: 8:00 am  Total Billable Time: 54 minutes    SUBJECTIVE     Patient reports: She is continuing to improve but still feels pain with certain quick motions with her right LOWER EXTREMITY.    She was compliant with home exercise program.  Response to previous treatment: decreased pain intensity  Functional change:  decreased pain at work, able to bend right knee further    Pain: NT/10  Location:  [x] Right   [] Left:  Hamstring from attachment site, muscle belly, and insertion points     OBJECTIVE     Objective Measures updated at progress report unless specified.       TREATMENT       CPT Intervention Performed  8/20/2024  Duration / Intensity     MT  hamstring manual  x  10 min   TE  Bike x  6 min      heel slides (supine) x  2 x 10 right ACTIVE      Heel slides sitting -     NMR  active hamstring stretch -  10sec/ 5x      SAQ   10x/ 3 sets (bilateral)      LAQ x  10x/ 3 sets (bilateral) 2lbs     standing heel raises   10x/ 3 sets     bridge x 10x/ 3 sets    Supine Heel Digs  2 x 10    PPT with march -     Isometric adduction  3 min, 3 sec hold    Supine clamshell  Black 3 min, 3 sec hold    side  stepping  4 laps    Sidelying clamshell x 2 x 10 bilateral      prone hamstring curl x  3 x 10 right       Straight Leg Raise x 2 x 10 bilateral    TA  matrix hamstring         matrix knee extension          leg press                        PLAN              CPT Codes available for Billing:   (10) minutes of Manual therapy (MT) to improve pain and ROM.  (14) minutes of Therapeutic Exercise (TE) to develop strength, endurance, range of motion, and flexibility.  (30) minutes of Neuromuscular Re-Education (NMR)  to improve: Balance, Coordination, Kinesthetic Sense, Proprioception, and Posture.  (00) minutes of Therapeutic Activities (TA) to improve functional performance.  Unattended Electrical Stimulation (ES) for muscle performance or pain modulation.  BFR: Blood flow restriction applied during exercise  NP or (-): Not Performed    PATIENT EDUCATION AND HOME EXERCISES     Home Exercises Provided and Patient Education Provided     Education provided:   - home exercise program     Written Home Exercises Provided: Patient instructed to cont prior HEP. Exercises were reviewed and Karla was able to demonstrate them prior to the end of the session.  Karla demonstrated good  understanding of the education provided. See EMR under Patient Instructions for exercises provided during therapy sessions      ASSESSMENT     Progressed patient with active right hamstrings movements to which patient tolerated really well and without complaints of increased discomfort. Patient is also tolerating SOFT TISSUE MOBILIZATION to hamstrings much better and is overall less sensitive to area. Plan to progress patient to further hamstrings strengthening next visit as tolerated.     Karla Is progressing well towards her goals.   Pt prognosis is Good.     Pt will continue to benefit from skilled outpatient physical therapy to address the deficits listed in the problem list box on initial evaluation, provide pt/family education and to maximize pt's  level of independence in the home and community environment.     Pt's spiritual, cultural and educational needs considered and pt agreeable to plan of care and goals.     Anticipated barriers to physical therapy: co-morbidities and occupation     Goals:   Reviewed: 8/20/2024      Short Term Goals:  6 weeks Status  Date Met   PAIN: Patient will report worst pain of 5/10 in order to progress toward max functional ability and improve quality of life. [x] Progressing  [] Met  [] Not Met     FUNCTION: Patient will demonstrate improved function as indicated by a functional intake score of more than or equal to 46 out of 100 on FOTO. [x] Progressing  [] Met  [] Not Met     MOBILITY: Patient will improve Range of Motion to 50% of stated goals, listed in objective measures above, in order to progress towards independence with functional activities.  [x] Progressing  [] Met  [] Not Met     STRENGTH: Patient will improve strength to 50% of stated goals, listed in objective measures above, in order to progress towards independence with functional activities.  [x] Progressing  [] Met  [] Not Met     GAIT: Patient will demonstrate improved gait mechanics in order to improve functional mobility, improve quality of life, and decrease risk of further injury or fall.  [x] Progressing  [] Met  [] Not Met     HEP: Patient will demonstrate independence with HEP in order to progress toward functional independence. [x] Progressing  [] Met  [] Not Met        Long Term Goals:  12 weeks Status Date Met   PAIN: Patient will report worst pain of 2/10 in order to progress toward max functional ability and improve quality of life [x] Progressing  [] Met  [] Not Met     FUNCTION: Patient will demonstrate improved function as indicated by a functional intake score of more than or equal to 62 out of 100 on FOTO. [x] Progressing  [] Met  [] Not Met     MOBILITY: Patient will improve Range of Motion to stated goals, listed in objective measures  above, in order to return to maximal functional potential and improve quality of life. [x] Progressing  [] Met  [] Not Met     STRENGTH: Patient will improve strength to stated goals, listed in objective measures above, in order to improve functional independence and quality of life. [x] Progressing  [] Met  [] Not Met     GAIT: Patient will demonstrate normalized gait mechanics with minimal compensation in order to return to prior level of function. [x] Progressing  [] Met  [] Not Met     Patient will return to normal ADL's, IADL's, community involvement, recreational activities, and work-related activities with less than or equal to 2/10 pain and maximal function.  [x] Progressing  [] Met  [] Not Met          PLAN     Monitor response to today's treatment session and progress with Physical Therapy plan of care as indicated.    Plan of Care Certification: 8/7/2024 to 10/30/2024.     Outpatient Physical Therapy 2 times weekly for 12 weeks to include any combination of the following interventions: virtual visits, dry needling, modalities, electrical stimulation (IFC, Pre-Mod, Attended with Functional Dry Needling), Cervical/Lumbar Traction, Gait Training, Manual Therapy, Moist Heat/ Ice, Neuromuscular Re-ed, Patient Education, Self Care, Therapeutic Exercise, and Therapeutic Activites     Chrissy Galicia, PT

## 2024-08-23 ENCOUNTER — CLINICAL SUPPORT (OUTPATIENT)
Dept: REHABILITATION | Facility: HOSPITAL | Age: 64
End: 2024-08-23
Payer: COMMERCIAL

## 2024-08-23 DIAGNOSIS — Z74.09 DECREASED FUNCTIONAL MOBILITY AND ENDURANCE: ICD-10-CM

## 2024-08-23 DIAGNOSIS — R29.898 DECREASED STRENGTH INVOLVING KNEE JOINT: ICD-10-CM

## 2024-08-23 DIAGNOSIS — M25.661 DECREASED RANGE OF MOTION OF RIGHT KNEE: Primary | ICD-10-CM

## 2024-08-23 PROCEDURE — 97110 THERAPEUTIC EXERCISES: CPT

## 2024-08-23 PROCEDURE — 97112 NEUROMUSCULAR REEDUCATION: CPT

## 2024-08-23 PROCEDURE — 97530 THERAPEUTIC ACTIVITIES: CPT

## 2024-08-23 NOTE — PROGRESS NOTES
[facility]   Physical Therapy Treatment Note      Name: Karla Arzola  Clinic Number: [id number]    Therapy Diagnosis:   Encounter Diagnoses   Name Primary?    Decreased range of motion of right knee Yes    Decreased strength involving knee joint     Decreased functional mobility and endurance      Physician: Nydia Dockery,*    Visit Date: 8/23/2024    Physician Orders: Physical Therapy Evaluation and Treat  Medical Diagnosis from Referral: S76.301D (ICD-10-CM) - Right hamstring injury, subsequent encounter   Evaluation Date: 8/7/2024  Authorization Period Expiration: 12/31/2024  Plan of Care Expiration: 10/30/2024  Progress Note Due: 09/06/2024  Visit # / Visits authorized: 4/20 (+1)   FOTO: 1/3      Precautions: Standard; Prior Bilateral Menisectomy    Time In: 7:05 am  Time Out: 8:00 am  Total Billable Time: 55 minutes    SUBJECTIVE     Patient reports: she is doing well today but did report some pain with full stretch on hamstring curl machine so this was modified to decrease stretch on area and patient reported this as tolerable.    She was compliant with home exercise program.  Response to previous treatment: decreased pain intensity  Functional change:  decreased pain at work, able to bend right knee further    Pain: NT/10  Location:  [x] Right   [] Left:  Hamstring from attachment site, muscle belly, and insertion points     OBJECTIVE     Objective Measures updated at progress report unless specified.       TREATMENT       CPT Intervention Performed  8/23/2024  Duration / Intensity     MT  hamstring manual  x  10 min               TE  Bike x  6 min      heel slides (supine) x  3 x 10 right ACTIVE      Heel slides sitting -                 NMR  active hamstring stretch -  10sec/ 5x      SAQ   10x/ 3 sets (bilateral)      LAQ   10x/ 3 sets (bilateral) 2lbs     standing heel raises   10x/ 3 sets     bridge x 10x/ 3 sets    Supine Heel Digs  2 x 10    PPT with march -      Isometric adduction  3 min, 3 sec hold    Supine clamshell  Black 3 min, 3 sec hold    side stepping  4 laps    Sidelying clamshell x 2 x 10 bilateral      prone hamstring curl x 3 x 10 right       Straight Leg Raise x 2 x 10 bilateral                TA  matrix hamstring x 2 x 10 15lbs at half range      matrix knee extension  x 3 x 10 15lbs      leg press                        PLAN              CPT Codes available for Billing:   (10) minutes of Manual therapy (MT) to improve pain and ROM.  (11) minutes of Therapeutic Exercise (TE) to develop strength, endurance, range of motion, and flexibility.  (31) minutes of Neuromuscular Re-Education (NMR)  to improve: Balance, Coordination, Kinesthetic Sense, Proprioception, and Posture.  (08) minutes of Therapeutic Activities (TA) to improve functional performance.  Unattended Electrical Stimulation (ES) for muscle performance or pain modulation.  BFR: Blood flow restriction applied during exercise  NP or (-): Not Performed    PATIENT EDUCATION AND HOME EXERCISES     Home Exercises Provided and Patient Education Provided     Education provided:   - home exercise program     Written Home Exercises Provided: Patient instructed to cont prior HEP. Exercises were reviewed and Karla was able to demonstrate them prior to the end of the session.  Karla demonstrated good  understanding of the education provided. See EMR under Patient Instructions for exercises provided during therapy sessions      ASSESSMENT     Progressed patient with hamstring curl machine today, but patient unable to tolerate full stretch on right hamstring, therefore machine modified to perform motion in approximately half range to which patient tolerated well. Encouraged to perform hamstrings stretch on own throughout day as needed.    Karla Is progressing well towards her goals.   Pt prognosis is Good.     Pt will continue to benefit from skilled outpatient physical therapy to address the deficits listed in  the problem list box on initial evaluation, provide pt/family education and to maximize pt's level of independence in the home and community environment.     Pt's spiritual, cultural and educational needs considered and pt agreeable to plan of care and goals.     Anticipated barriers to physical therapy: co-morbidities and occupation     Goals:   Reviewed: 8/23/2024      Short Term Goals:  6 weeks Status  Date Met   PAIN: Patient will report worst pain of 5/10 in order to progress toward max functional ability and improve quality of life. [x] Progressing  [] Met  [] Not Met     FUNCTION: Patient will demonstrate improved function as indicated by a functional intake score of more than or equal to 46 out of 100 on FOTO. [x] Progressing  [] Met  [] Not Met     MOBILITY: Patient will improve Range of Motion to 50% of stated goals, listed in objective measures above, in order to progress towards independence with functional activities.  [x] Progressing  [] Met  [] Not Met     STRENGTH: Patient will improve strength to 50% of stated goals, listed in objective measures above, in order to progress towards independence with functional activities.  [x] Progressing  [] Met  [] Not Met     GAIT: Patient will demonstrate improved gait mechanics in order to improve functional mobility, improve quality of life, and decrease risk of further injury or fall.  [x] Progressing  [] Met  [] Not Met     HEP: Patient will demonstrate independence with HEP in order to progress toward functional independence. [x] Progressing  [] Met  [] Not Met        Long Term Goals:  12 weeks Status Date Met   PAIN: Patient will report worst pain of 2/10 in order to progress toward max functional ability and improve quality of life [x] Progressing  [] Met  [] Not Met     FUNCTION: Patient will demonstrate improved function as indicated by a functional intake score of more than or equal to 62 out of 100 on FOTO. [x] Progressing  [] Met  [] Not Met      MOBILITY: Patient will improve Range of Motion to stated goals, listed in objective measures above, in order to return to maximal functional potential and improve quality of life. [x] Progressing  [] Met  [] Not Met     STRENGTH: Patient will improve strength to stated goals, listed in objective measures above, in order to improve functional independence and quality of life. [x] Progressing  [] Met  [] Not Met     GAIT: Patient will demonstrate normalized gait mechanics with minimal compensation in order to return to prior level of function. [x] Progressing  [] Met  [] Not Met     Patient will return to normal ADL's, IADL's, community involvement, recreational activities, and work-related activities with less than or equal to 2/10 pain and maximal function.  [x] Progressing  [] Met  [] Not Met          PLAN     Monitor response to today's treatment session and progress with Physical Therapy plan of care as indicated.    Plan of Care Certification: 8/7/2024 to 10/30/2024.     Outpatient Physical Therapy 2 times weekly for 12 weeks to include any combination of the following interventions: virtual visits, dry needling, modalities, electrical stimulation (IFC, Pre-Mod, Attended with Functional Dry Needling), Cervical/Lumbar Traction, Gait Training, Manual Therapy, Moist Heat/ Ice, Neuromuscular Re-ed, Patient Education, Self Care, Therapeutic Exercise, and Therapeutic Activites     Chrissy Galicia, PT

## 2024-08-30 ENCOUNTER — PATIENT MESSAGE (OUTPATIENT)
Dept: FAMILY MEDICINE | Facility: CLINIC | Age: 64
End: 2024-08-30
Payer: COMMERCIAL

## 2024-08-30 ENCOUNTER — CLINICAL SUPPORT (OUTPATIENT)
Dept: REHABILITATION | Facility: HOSPITAL | Age: 64
End: 2024-08-30
Payer: COMMERCIAL

## 2024-08-30 DIAGNOSIS — A60.00 RECURRENT GENITAL HSV (HERPES SIMPLEX VIRUS) INFECTION: ICD-10-CM

## 2024-08-30 DIAGNOSIS — R29.898 DECREASED STRENGTH INVOLVING KNEE JOINT: ICD-10-CM

## 2024-08-30 DIAGNOSIS — M25.661 DECREASED RANGE OF MOTION OF RIGHT KNEE: Primary | ICD-10-CM

## 2024-08-30 DIAGNOSIS — Z74.09 DECREASED FUNCTIONAL MOBILITY AND ENDURANCE: ICD-10-CM

## 2024-08-30 PROCEDURE — 97112 NEUROMUSCULAR REEDUCATION: CPT

## 2024-08-30 PROCEDURE — 97530 THERAPEUTIC ACTIVITIES: CPT

## 2024-08-30 PROCEDURE — 97110 THERAPEUTIC EXERCISES: CPT

## 2024-08-30 PROCEDURE — 97140 MANUAL THERAPY 1/> REGIONS: CPT

## 2024-08-30 NOTE — PROGRESS NOTES
[facility]   Physical Therapy Treatment Note      Name: Karla Arzola  Clinic Number: [id number]    Therapy Diagnosis:   Encounter Diagnoses   Name Primary?    Decreased range of motion of right knee Yes    Decreased strength involving knee joint     Decreased functional mobility and endurance      Physician: Nydia Dockery,*    Visit Date: 8/30/2024    Physician Orders: Physical Therapy Evaluation and Treat  Medical Diagnosis from Referral: S76.301D (ICD-10-CM) - Right hamstring injury, subsequent encounter   Evaluation Date: 8/7/2024  Authorization Period Expiration: 12/31/2024  Plan of Care Expiration: 10/30/2024  Progress Note Due: 09/06/2024  Visit # / Visits authorized: 5/20 (+1)   FOTO: 1/3      Precautions: Standard; Prior Bilateral Menisectomy    Time In: 7:03 am  Time Out: 7:58 am  Total Billable Time: 55 minutes    SUBJECTIVE     Patient reports: she actually is not feeling any pain in her right hamstrings today. Patient states she does not have a lot planned this weekend and will be able to rest.    She was compliant with home exercise program.  Response to previous treatment: decreased pain intensity  Functional change:  decreased pain at work, able to bend right knee further, increasing strength    Pain: 0/10  Location:  [x] Right   [] Left:  Hamstring from attachment site, muscle belly, and insertion points     OBJECTIVE     Objective Measures updated at progress report unless specified.       TREATMENT       CPT Intervention Performed  8/30/2024  Duration / Intensity     MT  hamstring manual  x  10 min               TE  Bike x  7 min      heel slides (supine) x  3 x 10 right ACTIVE on sliding board with sock      Heel slides sitting -                 NMR  active hamstring stretch -  10sec/ 5x      SAQ   10x/ 3 sets (bilateral)      LAQ   10x/ 3 sets (bilateral) 2lbs     standing heel raises   10x/ 3 sets     bridge x 10x/ 3 sets    Supine Heel Digs  2 x 10     PPT with march -     Isometric adduction  3 min, 3 sec hold    Supine clamshell  Black 3 min, 3 sec hold    side stepping  4 laps    Sidelying clamshell x 3 x 10 bilateral      prone hamstring curl x 3 x 10 right       Straight Leg Raise x 3 x 10 bilateral                TA  matrix hamstring x 2 x 10 15lbs at half range      matrix knee extension  x 3 x 10 15lbs      leg press                        PLAN              CPT Codes available for Billing:   (10) minutes of Manual therapy (MT) to improve pain and ROM.  (11) minutes of Therapeutic Exercise (TE) to develop strength, endurance, range of motion, and flexibility.  (31) minutes of Neuromuscular Re-Education (NMR)  to improve: Balance, Coordination, Kinesthetic Sense, Proprioception, and Posture.  (08) minutes of Therapeutic Activities (TA) to improve functional performance.  Unattended Electrical Stimulation (ES) for muscle performance or pain modulation.  BFR: Blood flow restriction applied during exercise  NP or (-): Not Performed    PATIENT EDUCATION AND HOME EXERCISES     Home Exercises Provided and Patient Education Provided     Education provided:   - home exercise program     Written Home Exercises Provided: Patient instructed to cont prior HEP. Exercises were reviewed and Karla was able to demonstrate them prior to the end of the session.  Karla demonstrated good  understanding of the education provided. See EMR under Patient Instructions for exercises provided during therapy sessions      ASSESSMENT     Patient tolerated today's session very well and was able to tolerate increased repetitions performed on clamshells and straight leg raises. Patient is also tolerating greater pressure with SOFT TISSUE MOBILIZATION. Patient would continue to benefit from further strengthening and endurance of lower extremities.    Karla Is progressing well towards her goals.   Pt prognosis is Good.     Pt will continue to benefit from skilled outpatient physical therapy  to address the deficits listed in the problem list box on initial evaluation, provide pt/family education and to maximize pt's level of independence in the home and community environment.     Pt's spiritual, cultural and educational needs considered and pt agreeable to plan of care and goals.     Anticipated barriers to physical therapy: co-morbidities and occupation     Goals:   Reviewed: 8/30/2024      Short Term Goals:  6 weeks Status  Date Met   PAIN: Patient will report worst pain of 5/10 in order to progress toward max functional ability and improve quality of life. [x] Progressing  [] Met  [] Not Met     FUNCTION: Patient will demonstrate improved function as indicated by a functional intake score of more than or equal to 46 out of 100 on FOTO. [x] Progressing  [] Met  [] Not Met     MOBILITY: Patient will improve Range of Motion to 50% of stated goals, listed in objective measures above, in order to progress towards independence with functional activities.  [x] Progressing  [] Met  [] Not Met     STRENGTH: Patient will improve strength to 50% of stated goals, listed in objective measures above, in order to progress towards independence with functional activities.  [x] Progressing  [] Met  [] Not Met     GAIT: Patient will demonstrate improved gait mechanics in order to improve functional mobility, improve quality of life, and decrease risk of further injury or fall.  [x] Progressing  [] Met  [] Not Met     HEP: Patient will demonstrate independence with HEP in order to progress toward functional independence. [x] Progressing  [] Met  [] Not Met        Long Term Goals:  12 weeks Status Date Met   PAIN: Patient will report worst pain of 2/10 in order to progress toward max functional ability and improve quality of life [x] Progressing  [] Met  [] Not Met     FUNCTION: Patient will demonstrate improved function as indicated by a functional intake score of more than or equal to 62 out of 100 on FOTO. [x]  Progressing  [] Met  [] Not Met     MOBILITY: Patient will improve Range of Motion to stated goals, listed in objective measures above, in order to return to maximal functional potential and improve quality of life. [x] Progressing  [] Met  [] Not Met     STRENGTH: Patient will improve strength to stated goals, listed in objective measures above, in order to improve functional independence and quality of life. [x] Progressing  [] Met  [] Not Met     GAIT: Patient will demonstrate normalized gait mechanics with minimal compensation in order to return to prior level of function. [x] Progressing  [] Met  [] Not Met     Patient will return to normal ADL's, IADL's, community involvement, recreational activities, and work-related activities with less than or equal to 2/10 pain and maximal function.  [x] Progressing  [] Met  [] Not Met          PLAN     Monitor response to today's treatment session and progress with Physical Therapy plan of care as indicated.    Plan of Care Certification: 8/7/2024 to 10/30/2024.     Outpatient Physical Therapy 2 times weekly for 12 weeks to include any combination of the following interventions: virtual visits, dry needling, modalities, electrical stimulation (IFC, Pre-Mod, Attended with Functional Dry Needling), Cervical/Lumbar Traction, Gait Training, Manual Therapy, Moist Heat/ Ice, Neuromuscular Re-ed, Patient Education, Self Care, Therapeutic Exercise, and Therapeutic Activites     Chrissy Galicia, PT

## 2024-08-30 NOTE — TELEPHONE ENCOUNTER
No care due was identified.  [facility] Embedded Care Due Messages. Reference number: [id number].   8/30/2024 3:02:43 PM CDT

## 2024-09-03 ENCOUNTER — TELEPHONE (OUTPATIENT)
Dept: PREADMISSION TESTING | Facility: HOSPITAL | Age: 64
End: 2024-09-03
Payer: COMMERCIAL

## 2024-09-05 ENCOUNTER — PATIENT MESSAGE (OUTPATIENT)
Dept: FAMILY MEDICINE | Facility: CLINIC | Age: 64
End: 2024-09-05
Payer: COMMERCIAL

## 2024-09-05 DIAGNOSIS — A60.00 RECURRENT GENITAL HSV (HERPES SIMPLEX VIRUS) INFECTION: ICD-10-CM

## 2024-09-05 NOTE — TELEPHONE ENCOUNTER
No care due was identified.  [facility] Embedded Care Due Messages. Reference number: [id number].   9/05/2024 9:59:37 AM CDT

## 2024-09-06 ENCOUNTER — PATIENT MESSAGE (OUTPATIENT)
Dept: REHABILITATION | Facility: HOSPITAL | Age: 64
End: 2024-09-06
Payer: COMMERCIAL

## 2024-09-06 RX ORDER — ACYCLOVIR 200 MG/1
200 CAPSULE ORAL DAILY
Qty: 90 CAPSULE | Refills: 0 | Status: SHIPPED | OUTPATIENT
Start: 2024-09-06

## 2024-09-07 RX ORDER — ACYCLOVIR 200 MG/1
200 CAPSULE ORAL DAILY
Qty: 90 CAPSULE | Refills: 3 | OUTPATIENT
Start: 2024-09-07

## 2024-09-17 ENCOUNTER — CLINICAL SUPPORT (OUTPATIENT)
Dept: REHABILITATION | Facility: HOSPITAL | Age: 64
End: 2024-09-17
Payer: COMMERCIAL

## 2024-09-17 ENCOUNTER — TELEPHONE (OUTPATIENT)
Dept: REHABILITATION | Facility: HOSPITAL | Age: 64
End: 2024-09-17
Payer: COMMERCIAL

## 2024-09-17 DIAGNOSIS — Z74.09 DECREASED FUNCTIONAL MOBILITY AND ENDURANCE: ICD-10-CM

## 2024-09-17 DIAGNOSIS — R29.898 DECREASED STRENGTH INVOLVING KNEE JOINT: ICD-10-CM

## 2024-09-17 DIAGNOSIS — M25.661 DECREASED RANGE OF MOTION OF RIGHT KNEE: Primary | ICD-10-CM

## 2024-09-17 PROCEDURE — 97112 NEUROMUSCULAR REEDUCATION: CPT

## 2024-09-17 PROCEDURE — 97530 THERAPEUTIC ACTIVITIES: CPT

## 2024-09-17 PROCEDURE — 97110 THERAPEUTIC EXERCISES: CPT

## 2024-09-17 NOTE — PLAN OF CARE
[facility]   Physical Therapy Treatment Note + Progress Report     Name: Karla Arzola  Clinic Number: [id number]    Therapy Diagnosis:   Encounter Diagnoses   Name Primary?    Decreased range of motion of right knee Yes    Decreased strength involving knee joint     Decreased functional mobility and endurance      Physician: Nydia Dockery,*    Visit Date: 9/17/2024    Physician Orders: Physical Therapy Evaluation and Treat  Medical Diagnosis from Referral: S76.301D (ICD-10-CM) - Right hamstring injury, subsequent encounter   Evaluation Date: 8/7/2024  Authorization Period Expiration: 12/31/2024  Plan of Care Expiration: 10/30/2024  Progress Note Due: 10/17/2024  Visit # / Visits authorized: 5/20 (+1)   FOTO: 2/3      Precautions: Standard; Prior Bilateral Menisectomy    Time In: 2:00 pm  Time Out: 2:50 pm  Total Billable Time: 50 minutes    SUBJECTIVE     Patient reports: she is doing well overall. Patient states she does feel a little tenderness in her right hamstrings and residual weakness, however, patient reports that she feels like she has improved a lot since her initial evaluation.     She was compliant with home exercise program.  Response to previous treatment: decreased pain intensity  Functional change:  decreased pain at work, able to bend right knee further, increasing strength    Pain: 0/10  Location:  [x] Right   [] Left:  Hamstring from attachment site, muscle belly, and insertion points     OBJECTIVE     Objective Measures updated at progress report unless specified.     *Denotes change since initial evaluation, re-tested on 09/17/2024     Gait Analysis: The patient ambulated with the following assistive device: none; the patient presents with the following gait abnormalities: WITHIN FUNCTIONAL LIMITS *       RANGE OF MOTION:     Knee AROM/PROM Right Left Pain/Dysfunction with Movement Goal   Knee Flexion (135) 125* 125 Pain at end range in right hamstring  125 right    Knee Extension (0) 0* -3 Pain at end range in right hamstring 0 right       STRENGTH:     L/E MMT Right    Left Pain/Dysfunction with Movement Goal   Hip Flexion  4+/5* 4+/5  4+/5 Bilateral   Knee Extension 5/5* 5/5*  5/5 Bilateral   Knee Flexion 4+/5* 5/5*  5/5 Bilateral   Ankle Dorsiflexion 5/5 5/5   5/5 Bilateral   Ankle Plantarflexion 5/5 5/5   5/5 Bilateral         FUNCTION:      CMS Impairment/Limitation/Restriction for FOTO Lower Leg Survey     Therapist reviewed FOTO scores for Karla on 8/7/2024.   FOTO documents entered into [facility] - see Media section.     Intake Score: 72*           TREATMENT       CPT Intervention Performed  9/17/2024  Duration / Intensity     MT  hamstring manual  x  10 min               TE  Bike x  6 min      heel slides (supine)   3 x 10 right ACTIVE on sliding board with sock      Heel slides sitting -      Objectives re-assessed x 5 min         NMR  active hamstring stretch -  10sec/ 5x      SAQ   10x/ 3 sets (bilateral)      LAQ   10x/ 3 sets (bilateral) 2lbs     standing heel raises   10x/ 3 sets     bridge x 10x/ 3 sets    Supine Heel Digs  2 x 10    PPT with march -     Isometric adduction  3 min, 3 sec hold    Supine clamshell  Black 3 min, 3 sec hold    side stepping  4 laps    Sidelying clamshell x 3 x 10 bilateral yellow band      prone hamstring curl x 3 x 10 bilateral 2lbs      Straight Leg Raise x 2 x 10 bilateral 2lbs                TA  matrix hamstring x 2 x 10 15lbs at half range      matrix knee extension  x 3 x 10 15lbs      leg press x  3 x 10 95 lbs                     PLAN              CPT Codes available for Billing:   (00) minutes of Manual therapy (MT) to improve pain and ROM.  (11) minutes of Therapeutic Exercise (TE) to develop strength, endurance, range of motion, and flexibility.  (27) minutes of Neuromuscular Re-Education (NMR)  to improve: Balance, Coordination, Kinesthetic Sense, Proprioception, and Posture.  (12) minutes of Therapeutic  Activities (TA) to improve functional performance.  Unattended Electrical Stimulation (ES) for muscle performance or pain modulation.  BFR: Blood flow restriction applied during exercise  NP or (-): Not Performed    PATIENT EDUCATION AND HOME EXERCISES     Home Exercises Provided and Patient Education Provided     Education provided:   - home exercise program     Written Home Exercises Provided: Patient instructed to cont prior HEP. Exercises were reviewed and Karla was able to demonstrate them prior to the end of the session.  Karla demonstrated good  understanding of the education provided. See EMR under Patient Instructions for exercises provided during therapy sessions      ASSESSMENT   Since initial evaluation patient demonstrates significant improvements in right hip flexion, right knee extension, and right knee flexion strength as well as significant improvements in right knee ACTIVE RANGE OF MOTION. Patient also has decreased overall functional limitations per FOTO. Due to patient's progress, patient to be ready to transition to independent phase of rehabilitation at next visit pending no new changes in status.    Karla Is progressing well towards her goals.   Pt prognosis is Good.     Pt will continue to benefit from skilled outpatient physical therapy to address the deficits listed in the problem list box on initial evaluation, provide pt/family education and to maximize pt's level of independence in the home and community environment.     Pt's spiritual, cultural and educational needs considered and pt agreeable to plan of care and goals.     Anticipated barriers to physical therapy: co-morbidities and occupation     Goals:   Reviewed: 9/17/2024      Short Term Goals:  6 weeks Status  Date Met   PAIN: Patient will report worst pain of 5/10 in order to progress toward max functional ability and improve quality of life. [] Progressing  [x] Met  [] Not Met 09/17/2024    FUNCTION: Patient will demonstrate  improved function as indicated by a functional intake score of more than or equal to 46 out of 100 on FOTO. [] Progressing  [x] Met  [] Not Met 09/17/2024     MOBILITY: Patient will improve Range of Motion to 50% of stated goals, listed in objective measures above, in order to progress towards independence with functional activities.  [] Progressing  [x] Met  [] Not Met  09/17/2024    STRENGTH: Patient will improve strength to 50% of stated goals, listed in objective measures above, in order to progress towards independence with functional activities.  [] Progressing  [x] Met  [] Not Met 09/17/2024     GAIT: Patient will demonstrate improved gait mechanics in order to improve functional mobility, improve quality of life, and decrease risk of further injury or fall.  [] Progressing  [x] Met  [] Not Met  09/17/2024    HEP: Patient will demonstrate independence with HEP in order to progress toward functional independence. [] Progressing  [x] Met  [] Not Met 09/17/2024        Long Term Goals:  12 weeks Status Date Met   PAIN: Patient will report worst pain of 2/10 in order to progress toward max functional ability and improve quality of life [] Progressing  [x] Met  [] Not Met 09/17/2024     FUNCTION: Patient will demonstrate improved function as indicated by a functional intake score of more than or equal to 62 out of 100 on FOTO. [] Progressing  [x] Met  [] Not Met  09/17/2024    MOBILITY: Patient will improve Range of Motion to stated goals, listed in objective measures above, in order to return to maximal functional potential and improve quality of life. [] Progressing  [x] Met  [] Not Met  09/17/2024    STRENGTH: Patient will improve strength to stated goals, listed in objective measures above, in order to improve functional independence and quality of life. [x] Progressing  [] Met  [] Not Met     GAIT: Patient will demonstrate normalized gait mechanics with minimal compensation in order to return to prior level of  function. [] Progressing  [x] Met  [] Not Met 09/17/2024     Patient will return to normal ADL's, IADL's, community involvement, recreational activities, and work-related activities with less than or equal to 2/10 pain and maximal function.  [] Progressing  [x] Met  [] Not Met 09/17/2024          PLAN     Monitor response to today's treatment session and progress with Physical Therapy plan of care as indicated.    Plan of Care Certification: 8/7/2024 to 10/30/2024.     Outpatient Physical Therapy 2 times weekly for 12 weeks to include any combination of the following interventions: virtual visits, dry needling, modalities, electrical stimulation (IFC, Pre-Mod, Attended with Functional Dry Needling), Cervical/Lumbar Traction, Gait Training, Manual Therapy, Moist Heat/ Ice, Neuromuscular Re-ed, Patient Education, Self Care, Therapeutic Exercise, and Therapeutic Activites     Chrissy Galicia, PT

## 2024-09-17 NOTE — PROGRESS NOTES
[facility]   Physical Therapy Treatment Note + Progress Report     Name: Karla Arzola  Clinic Number: [id number]    Therapy Diagnosis:   Encounter Diagnoses   Name Primary?    Decreased range of motion of right knee Yes    Decreased strength involving knee joint     Decreased functional mobility and endurance      Physician: Nydia Dockery,*    Visit Date: 9/17/2024    Physician Orders: Physical Therapy Evaluation and Treat  Medical Diagnosis from Referral: S76.301D (ICD-10-CM) - Right hamstring injury, subsequent encounter   Evaluation Date: 8/7/2024  Authorization Period Expiration: 12/31/2024  Plan of Care Expiration: 10/30/2024  Progress Note Due: 10/17/2024  Visit # / Visits authorized: 5/20 (+1)   FOTO: 2/3      Precautions: Standard; Prior Bilateral Menisectomy    Time In: 2:00 pm  Time Out: 2:50 pm  Total Billable Time: 50 minutes    SUBJECTIVE     Patient reports: she is doing well overall. Patient states she does feel a little tenderness in her right hamstrings and residual weakness, however, patient reports that she feels like she has improved a lot since her initial evaluation.     She was compliant with home exercise program.  Response to previous treatment: decreased pain intensity  Functional change:  decreased pain at work, able to bend right knee further, increasing strength    Pain: 0/10  Location:  [x] Right   [] Left:  Hamstring from attachment site, muscle belly, and insertion points     OBJECTIVE     Objective Measures updated at progress report unless specified.     *Denotes change since initial evaluation, re-tested on 09/17/2024     Gait Analysis: The patient ambulated with the following assistive device: none; the patient presents with the following gait abnormalities: WITHIN FUNCTIONAL LIMITS *       RANGE OF MOTION:     Knee AROM/PROM Right Left Pain/Dysfunction with Movement Goal   Knee Flexion (135) 125* 125 Pain at end range in right hamstring  125 right    Knee Extension (0) 0* -3 Pain at end range in right hamstring 0 right       STRENGTH:     L/E MMT Right    Left Pain/Dysfunction with Movement Goal   Hip Flexion  4+/5* 4+/5  4+/5 Bilateral   Knee Extension 5/5* 5/5*  5/5 Bilateral   Knee Flexion 4+/5* 5/5*  5/5 Bilateral   Ankle Dorsiflexion 5/5 5/5   5/5 Bilateral   Ankle Plantarflexion 5/5 5/5   5/5 Bilateral         FUNCTION:      CMS Impairment/Limitation/Restriction for FOTO Lower Leg Survey     Therapist reviewed FOTO scores for Karla on 8/7/2024.   FOTO documents entered into [employer] - see Media section.     Intake Score: 72*           TREATMENT       CPT Intervention Performed  9/17/2024  Duration / Intensity     MT  hamstring manual  x  10 min               TE  Bike x  6 min      heel slides (supine)   3 x 10 right ACTIVE on sliding board with sock      Heel slides sitting -      Objectives re-assessed x 5 min         NMR  active hamstring stretch -  10sec/ 5x      SAQ   10x/ 3 sets (bilateral)      LAQ   10x/ 3 sets (bilateral) 2lbs     standing heel raises   10x/ 3 sets     bridge x 10x/ 3 sets    Supine Heel Digs  2 x 10    PPT with march -     Isometric adduction  3 min, 3 sec hold    Supine clamshell  Black 3 min, 3 sec hold    side stepping  4 laps    Sidelying clamshell x 3 x 10 bilateral yellow band      prone hamstring curl x 3 x 10 bilateral 2lbs      Straight Leg Raise x 2 x 10 bilateral 2lbs                TA  matrix hamstring x 2 x 10 15lbs at half range      matrix knee extension  x 3 x 10 15lbs      leg press x  3 x 10 95 lbs                     PLAN              CPT Codes available for Billing:   (00) minutes of Manual therapy (MT) to improve pain and ROM.  (11) minutes of Therapeutic Exercise (TE) to develop strength, endurance, range of motion, and flexibility.  (27) minutes of Neuromuscular Re-Education (NMR)  to improve: Balance, Coordination, Kinesthetic Sense, Proprioception, and Posture.  (12) minutes of Therapeutic  Activities (TA) to improve functional performance.  Unattended Electrical Stimulation (ES) for muscle performance or pain modulation.  BFR: Blood flow restriction applied during exercise  NP or (-): Not Performed    PATIENT EDUCATION AND HOME EXERCISES     Home Exercises Provided and Patient Education Provided     Education provided:   - home exercise program     Written Home Exercises Provided: Patient instructed to cont prior HEP. Exercises were reviewed and Karla was able to demonstrate them prior to the end of the session.  Karla demonstrated good  understanding of the education provided. See EMR under Patient Instructions for exercises provided during therapy sessions      ASSESSMENT   Since initial evaluation patient demonstrates significant improvements in right hip flexion, right knee extension, and right knee flexion strength as well as significant improvements in right knee ACTIVE RANGE OF MOTION. Patient also has decreased overall functional limitations per FOTO. Due to patient's progress, patient to be ready to transition to independent phase of rehabilitation at next visit pending no new changes in status.    Karla Is progressing well towards her goals.   Pt prognosis is Good.     Pt will continue to benefit from skilled outpatient physical therapy to address the deficits listed in the problem list box on initial evaluation, provide pt/family education and to maximize pt's level of independence in the home and community environment.     Pt's spiritual, cultural and educational needs considered and pt agreeable to plan of care and goals.     Anticipated barriers to physical therapy: co-morbidities and occupation     Goals:   Reviewed: 9/17/2024      Short Term Goals:  6 weeks Status  Date Met   PAIN: Patient will report worst pain of 5/10 in order to progress toward max functional ability and improve quality of life. [] Progressing  [x] Met  [] Not Met 09/17/2024    FUNCTION: Patient will demonstrate  improved function as indicated by a functional intake score of more than or equal to 46 out of 100 on FOTO. [] Progressing  [x] Met  [] Not Met 09/17/2024     MOBILITY: Patient will improve Range of Motion to 50% of stated goals, listed in objective measures above, in order to progress towards independence with functional activities.  [] Progressing  [x] Met  [] Not Met  09/17/2024    STRENGTH: Patient will improve strength to 50% of stated goals, listed in objective measures above, in order to progress towards independence with functional activities.  [] Progressing  [x] Met  [] Not Met 09/17/2024     GAIT: Patient will demonstrate improved gait mechanics in order to improve functional mobility, improve quality of life, and decrease risk of further injury or fall.  [] Progressing  [x] Met  [] Not Met  09/17/2024    HEP: Patient will demonstrate independence with HEP in order to progress toward functional independence. [] Progressing  [x] Met  [] Not Met 09/17/2024        Long Term Goals:  12 weeks Status Date Met   PAIN: Patient will report worst pain of 2/10 in order to progress toward max functional ability and improve quality of life [] Progressing  [x] Met  [] Not Met 09/17/2024     FUNCTION: Patient will demonstrate improved function as indicated by a functional intake score of more than or equal to 62 out of 100 on FOTO. [] Progressing  [x] Met  [] Not Met  09/17/2024    MOBILITY: Patient will improve Range of Motion to stated goals, listed in objective measures above, in order to return to maximal functional potential and improve quality of life. [] Progressing  [x] Met  [] Not Met  09/17/2024    STRENGTH: Patient will improve strength to stated goals, listed in objective measures above, in order to improve functional independence and quality of life. [x] Progressing  [] Met  [] Not Met     GAIT: Patient will demonstrate normalized gait mechanics with minimal compensation in order to return to prior level of  function. [] Progressing  [x] Met  [] Not Met 09/17/2024     Patient will return to normal ADL's, IADL's, community involvement, recreational activities, and work-related activities with less than or equal to 2/10 pain and maximal function.  [] Progressing  [x] Met  [] Not Met 09/17/2024          PLAN     Monitor response to today's treatment session and progress with Physical Therapy plan of care as indicated.    Plan of Care Certification: 8/7/2024 to 10/30/2024.     Outpatient Physical Therapy 2 times weekly for 12 weeks to include any combination of the following interventions: virtual visits, dry needling, modalities, electrical stimulation (IFC, Pre-Mod, Attended with Functional Dry Needling), Cervical/Lumbar Traction, Gait Training, Manual Therapy, Moist Heat/ Ice, Neuromuscular Re-ed, Patient Education, Self Care, Therapeutic Exercise, and Therapeutic Activites     Chrissy Galicia, PT

## 2024-09-18 ENCOUNTER — PATIENT MESSAGE (OUTPATIENT)
Dept: RHEUMATOLOGY | Facility: CLINIC | Age: 64
End: 2024-09-18
Payer: COMMERCIAL

## 2024-09-20 ENCOUNTER — CLINICAL SUPPORT (OUTPATIENT)
Dept: REHABILITATION | Facility: HOSPITAL | Age: 64
End: 2024-09-20
Payer: COMMERCIAL

## 2024-09-20 DIAGNOSIS — R29.898 DECREASED STRENGTH INVOLVING KNEE JOINT: ICD-10-CM

## 2024-09-20 DIAGNOSIS — Z74.09 DECREASED FUNCTIONAL MOBILITY AND ENDURANCE: ICD-10-CM

## 2024-09-20 DIAGNOSIS — M25.661 DECREASED RANGE OF MOTION OF RIGHT KNEE: Primary | ICD-10-CM

## 2024-09-20 PROCEDURE — 97110 THERAPEUTIC EXERCISES: CPT

## 2024-09-20 PROCEDURE — 97530 THERAPEUTIC ACTIVITIES: CPT

## 2024-09-20 PROCEDURE — 97112 NEUROMUSCULAR REEDUCATION: CPT

## 2024-09-20 NOTE — PLAN OF CARE
[facility]   Physical Therapy Treatment Note + Discharge Summary     Name: Karla Arzola  Clinic Number: [id number]    Therapy Diagnosis:   Encounter Diagnoses   Name Primary?    Decreased range of motion of right knee Yes    Decreased strength involving knee joint     Decreased functional mobility and endurance      Physician: Nydia Dockery,*    Visit Date: 9/20/2024    Physician Orders: Physical Therapy Evaluation and Treat  Medical Diagnosis from Referral: S76.301D (ICD-10-CM) - Right hamstring injury, subsequent encounter   Evaluation Date: 8/7/2024  Authorization Period Expiration: 12/31/2024  Plan of Care Expiration: 10/30/2024  Progress Note Due: 10/17/2024  Visit # / Visits authorized: 6/20 (+1)   FOTO: 3/3      Precautions: Standard; Prior Bilateral Menisectomy    Time In: 10:00 am  Time Out: 10:45 am  Total Billable Time: 45 minutes    SUBJECTIVE     Patient reports: she is still doing great and is ready for discharge today.    She was compliant with home exercise program.  Response to previous treatment: decreased pain intensity  Functional change:  decreased pain at work, able to bend right knee further, increasing strength    Pain: 0/10  Location:  [x] Right   [] Left:  Hamstring from attachment site, muscle belly, and insertion points     OBJECTIVE     Objective Measures updated at progress report unless specified.     *Denotes change since initial evaluation, re-tested on 09/20/2024     Gait Analysis: The patient ambulated with the following assistive device: none; the patient presents with the following gait abnormalities: WITHIN FUNCTIONAL LIMITS *       RANGE OF MOTION:     Knee AROM/PROM Right Left Pain/Dysfunction with Movement Goal   Knee Flexion (135) 125* 125 Pain at end range in right hamstring 125 right    Knee Extension (0) 0* -3 Pain at end range in right hamstring 0 right       STRENGTH:     L/E MMT Right    Left Pain/Dysfunction with Movement  Goal   Hip Flexion  4+/5* 4+/5  4+/5 Bilateral   Knee Extension 5/5* 5/5*  5/5 Bilateral   Knee Flexion 4+/5* 5/5*  5/5 Bilateral   Ankle Dorsiflexion 5/5 5/5   5/5 Bilateral   Ankle Plantarflexion 5/5 5/5   5/5 Bilateral         FUNCTION:      CMS Impairment/Limitation/Restriction for FOTO Lower Leg Survey     Therapist reviewed FOTO scores for Karla on 8/7/2024.   FOTO documents entered into [employer] - see Media section.     Intake Score: 72*           TREATMENT       CPT Intervention Performed  9/20/2024  Duration / Intensity     MT  hamstring manual    10 min               TE  Bike x  6 min      heel slides (supine)   3 x 10 right ACTIVE on sliding board with sock      Heel slides sitting -      Objectives re-assessed and HOME EXERCISE PROGRAM reviewed x 5 min         NMR  active hamstring stretch -  10sec/ 5x      SAQ   10x/ 3 sets (bilateral)      LAQ   10x/ 3 sets (bilateral) 2lbs     standing heel raises   10x/ 3 sets     bridge x 10x/ 3 sets    Supine Heel Digs  2 x 10    PPT with march -     Isometric adduction  3 min, 3 sec hold    Supine clamshell  Black 3 min, 3 sec hold    side stepping  4 laps    Sidelying clamshell x 3 x 10 bilateral yellow band      prone hamstring curl x 3 x 10 bilateral 2lbs      Straight Leg Raise x 2 x 10 bilateral 2lbs                TA  matrix hamstring x 3 x 10 15lbs at half range      matrix knee extension  x 3 x 10 15lbs      leg press x  3 x 10 95 lbs                     PLAN              CPT Codes available for Billing:   (00) minutes of Manual therapy (MT) to improve pain and ROM.  (11) minutes of Therapeutic Exercise (TE) to develop strength, endurance, range of motion, and flexibility.  (23) minutes of Neuromuscular Re-Education (NMR)  to improve: Balance, Coordination, Kinesthetic Sense, Proprioception, and Posture.  (11) minutes of Therapeutic Activities (TA) to improve functional performance.  Unattended Electrical Stimulation (ES) for muscle performance or pain  modulation.  BFR: Blood flow restriction applied during exercise  NP or (-): Not Performed    PATIENT EDUCATION AND HOME EXERCISES     Home Exercises Provided and Patient Education Provided     Education provided:   - home exercise program     Written Home Exercises Provided: Patient instructed to cont prior HEP. Exercises were reviewed and Karla was able to demonstrate them prior to the end of the session.  Karla demonstrated good  understanding of the education provided. See EMR under Patient Instructions for exercises provided during therapy sessions      ASSESSMENT   Since initial evaluation patient demonstrates significant improvements in right hip flexion, right knee extension, and right knee flexion strength as well as significant improvements in right knee ACTIVE RANGE OF MOTION. Patient also has decreased overall functional limitations per FOTO. Patient is ready to transition to independent phase of rehabilitation.    Karla Is progressing well towards her goals.   Pt prognosis is Good.     Pt's spiritual, cultural and educational needs considered and pt agreeable to plan of care and goals.     Anticipated barriers to physical therapy: co-morbidities and occupation     Goals:   Reviewed: 9/20/2024      Short Term Goals:  6 weeks Status  Date Met   PAIN: Patient will report worst pain of 5/10 in order to progress toward max functional ability and improve quality of life. [] Progressing  [x] Met  [] Not Met 09/17/2024    FUNCTION: Patient will demonstrate improved function as indicated by a functional intake score of more than or equal to 46 out of 100 on FOTO. [] Progressing  [x] Met  [] Not Met 09/17/2024     MOBILITY: Patient will improve Range of Motion to 50% of stated goals, listed in objective measures above, in order to progress towards independence with functional activities.  [] Progressing  [x] Met  [] Not Met  09/17/2024    STRENGTH: Patient will improve strength to 50% of stated goals, listed in  objective measures above, in order to progress towards independence with functional activities.  [] Progressing  [x] Met  [] Not Met 09/17/2024     GAIT: Patient will demonstrate improved gait mechanics in order to improve functional mobility, improve quality of life, and decrease risk of further injury or fall.  [] Progressing  [x] Met  [] Not Met  09/17/2024    HEP: Patient will demonstrate independence with HEP in order to progress toward functional independence. [] Progressing  [x] Met  [] Not Met 09/17/2024        Long Term Goals:  12 weeks Status Date Met   PAIN: Patient will report worst pain of 2/10 in order to progress toward max functional ability and improve quality of life [] Progressing  [x] Met  [] Not Met 09/17/2024     FUNCTION: Patient will demonstrate improved function as indicated by a functional intake score of more than or equal to 62 out of 100 on FOTO. [] Progressing  [x] Met  [] Not Met  09/17/2024    MOBILITY: Patient will improve Range of Motion to stated goals, listed in objective measures above, in order to return to maximal functional potential and improve quality of life. [] Progressing  [x] Met  [] Not Met  09/17/2024    STRENGTH: Patient will improve strength to stated goals, listed in objective measures above, in order to improve functional independence and quality of life. [] Progressing  [] Met  [x] Not Met  09/20/2024   GAIT: Patient will demonstrate normalized gait mechanics with minimal compensation in order to return to prior level of function. [] Progressing  [x] Met  [] Not Met 09/17/2024     Patient will return to normal ADL's, IADL's, community involvement, recreational activities, and work-related activities with less than or equal to 2/10 pain and maximal function.  [] Progressing  [x] Met  [] Not Met 09/17/2024          PLAN     Patient is discharged from PHYSICAL THERAPY.    Chrissy Galicia, PT

## 2024-09-20 NOTE — PROGRESS NOTES
[facility]   Physical Therapy Treatment Note + Discharge Summary     Name: Karla Arzola  Clinic Number: [id number]    Therapy Diagnosis:   Encounter Diagnoses   Name Primary?    Decreased range of motion of right knee Yes    Decreased strength involving knee joint     Decreased functional mobility and endurance      Physician: Nydia Dockery,*    Visit Date: 9/20/2024    Physician Orders: Physical Therapy Evaluation and Treat  Medical Diagnosis from Referral: S76.301D (ICD-10-CM) - Right hamstring injury, subsequent encounter   Evaluation Date: 8/7/2024  Authorization Period Expiration: 12/31/2024  Plan of Care Expiration: 10/30/2024  Progress Note Due: 10/17/2024  Visit # / Visits authorized: 6/20 (+1)   FOTO: 3/3      Precautions: Standard; Prior Bilateral Menisectomy    Time In: 10:00 am  Time Out: 10:45 am  Total Billable Time: 45 minutes    SUBJECTIVE     Patient reports: she is still doing great and is ready for discharge today.    She was compliant with home exercise program.  Response to previous treatment: decreased pain intensity  Functional change:  decreased pain at work, able to bend right knee further, increasing strength    Pain: 0/10  Location:  [x] Right   [] Left:  Hamstring from attachment site, muscle belly, and insertion points     OBJECTIVE     Objective Measures updated at progress report unless specified.     *Denotes change since initial evaluation, re-tested on 09/20/2024     Gait Analysis: The patient ambulated with the following assistive device: none; the patient presents with the following gait abnormalities: WITHIN FUNCTIONAL LIMITS *       RANGE OF MOTION:     Knee AROM/PROM Right Left Pain/Dysfunction with Movement Goal   Knee Flexion (135) 125* 125 Pain at end range in right hamstring 125 right    Knee Extension (0) 0* -3 Pain at end range in right hamstring 0 right       STRENGTH:     L/E MMT Right    Left Pain/Dysfunction with Movement  Goal   Hip Flexion  4+/5* 4+/5  4+/5 Bilateral   Knee Extension 5/5* 5/5*  5/5 Bilateral   Knee Flexion 4+/5* 5/5*  5/5 Bilateral   Ankle Dorsiflexion 5/5 5/5   5/5 Bilateral   Ankle Plantarflexion 5/5 5/5   5/5 Bilateral         FUNCTION:      CMS Impairment/Limitation/Restriction for FOTO Lower Leg Survey     Therapist reviewed FOTO scores for Karla on 8/7/2024.   FOTO documents entered into [employer] - see Media section.     Intake Score: 72*           TREATMENT       CPT Intervention Performed  9/20/2024  Duration / Intensity     MT  hamstring manual    10 min               TE  Bike x  6 min      heel slides (supine)   3 x 10 right ACTIVE on sliding board with sock      Heel slides sitting -      Objectives re-assessed and HOME EXERCISE PROGRAM reviewed x 5 min         NMR  active hamstring stretch -  10sec/ 5x      SAQ   10x/ 3 sets (bilateral)      LAQ   10x/ 3 sets (bilateral) 2lbs     standing heel raises   10x/ 3 sets     bridge x 10x/ 3 sets    Supine Heel Digs  2 x 10    PPT with march -     Isometric adduction  3 min, 3 sec hold    Supine clamshell  Black 3 min, 3 sec hold    side stepping  4 laps    Sidelying clamshell x 3 x 10 bilateral yellow band      prone hamstring curl x 3 x 10 bilateral 2lbs      Straight Leg Raise x 2 x 10 bilateral 2lbs                TA  matrix hamstring x 3 x 10 15lbs at half range      matrix knee extension  x 3 x 10 15lbs      leg press x  3 x 10 95 lbs                     PLAN              CPT Codes available for Billing:   (00) minutes of Manual therapy (MT) to improve pain and ROM.  (11) minutes of Therapeutic Exercise (TE) to develop strength, endurance, range of motion, and flexibility.  (23) minutes of Neuromuscular Re-Education (NMR)  to improve: Balance, Coordination, Kinesthetic Sense, Proprioception, and Posture.  (11) minutes of Therapeutic Activities (TA) to improve functional performance.  Unattended Electrical Stimulation (ES) for muscle performance or pain  modulation.  BFR: Blood flow restriction applied during exercise  NP or (-): Not Performed    PATIENT EDUCATION AND HOME EXERCISES     Home Exercises Provided and Patient Education Provided     Education provided:   - home exercise program     Written Home Exercises Provided: Patient instructed to cont prior HEP. Exercises were reviewed and Karla was able to demonstrate them prior to the end of the session.  Karla demonstrated good  understanding of the education provided. See EMR under Patient Instructions for exercises provided during therapy sessions      ASSESSMENT   Since initial evaluation patient demonstrates significant improvements in right hip flexion, right knee extension, and right knee flexion strength as well as significant improvements in right knee ACTIVE RANGE OF MOTION. Patient also has decreased overall functional limitations per FOTO. Patient is ready to transition to independent phase of rehabilitation.    Karla Is progressing well towards her goals.   Pt prognosis is Good.     Pt's spiritual, cultural and educational needs considered and pt agreeable to plan of care and goals.     Anticipated barriers to physical therapy: co-morbidities and occupation     Goals:   Reviewed: 9/20/2024      Short Term Goals:  6 weeks Status  Date Met   PAIN: Patient will report worst pain of 5/10 in order to progress toward max functional ability and improve quality of life. [] Progressing  [x] Met  [] Not Met 09/17/2024    FUNCTION: Patient will demonstrate improved function as indicated by a functional intake score of more than or equal to 46 out of 100 on FOTO. [] Progressing  [x] Met  [] Not Met 09/17/2024     MOBILITY: Patient will improve Range of Motion to 50% of stated goals, listed in objective measures above, in order to progress towards independence with functional activities.  [] Progressing  [x] Met  [] Not Met  09/17/2024    STRENGTH: Patient will improve strength to 50% of stated goals, listed in  objective measures above, in order to progress towards independence with functional activities.  [] Progressing  [x] Met  [] Not Met 09/17/2024     GAIT: Patient will demonstrate improved gait mechanics in order to improve functional mobility, improve quality of life, and decrease risk of further injury or fall.  [] Progressing  [x] Met  [] Not Met  09/17/2024    HEP: Patient will demonstrate independence with HEP in order to progress toward functional independence. [] Progressing  [x] Met  [] Not Met 09/17/2024        Long Term Goals:  12 weeks Status Date Met   PAIN: Patient will report worst pain of 2/10 in order to progress toward max functional ability and improve quality of life [] Progressing  [x] Met  [] Not Met 09/17/2024     FUNCTION: Patient will demonstrate improved function as indicated by a functional intake score of more than or equal to 62 out of 100 on FOTO. [] Progressing  [x] Met  [] Not Met  09/17/2024    MOBILITY: Patient will improve Range of Motion to stated goals, listed in objective measures above, in order to return to maximal functional potential and improve quality of life. [] Progressing  [x] Met  [] Not Met  09/17/2024    STRENGTH: Patient will improve strength to stated goals, listed in objective measures above, in order to improve functional independence and quality of life. [] Progressing  [] Met  [x] Not Met  09/20/2024   GAIT: Patient will demonstrate normalized gait mechanics with minimal compensation in order to return to prior level of function. [] Progressing  [x] Met  [] Not Met 09/17/2024     Patient will return to normal ADL's, IADL's, community involvement, recreational activities, and work-related activities with less than or equal to 2/10 pain and maximal function.  [] Progressing  [x] Met  [] Not Met 09/17/2024          PLAN     Patient is discharged from PHYSICAL THERAPY.    Chrissy Galicia, PT

## 2024-09-23 ENCOUNTER — TELEPHONE (OUTPATIENT)
Dept: BARIATRICS | Facility: CLINIC | Age: 64
End: 2024-09-23
Payer: COMMERCIAL

## 2024-09-23 NOTE — TELEPHONE ENCOUNTER
----- Message from Adore Paulino sent at 9/23/2024  8:19 AM CDT -----  Regarding: Appt Access  Contact: [phone]  Patient is calling stating that she has been waiting for a call to set up pre op per pt. Please give pt a call to get her scheduled.

## 2024-09-25 ENCOUNTER — TELEPHONE (OUTPATIENT)
Dept: RHEUMATOLOGY | Facility: CLINIC | Age: 64
End: 2024-09-25
Payer: COMMERCIAL

## 2024-09-25 DIAGNOSIS — Z79.899 HIGH RISK MEDICATION USE: Primary | ICD-10-CM

## 2024-09-25 NOTE — TELEPHONE ENCOUNTER
I called patient  to let her know her appointment  with Dr. Herrera on 10-1 being because he is no longer here. She said someone made her an appointment for 10-31 at 2;45.

## 2024-09-26 ENCOUNTER — LAB VISIT (OUTPATIENT)
Dept: LAB | Facility: HOSPITAL | Age: 64
End: 2024-09-26
Attending: INTERNAL MEDICINE
Payer: COMMERCIAL

## 2024-09-26 DIAGNOSIS — Z79.899 HIGH RISK MEDICATION USE: ICD-10-CM

## 2024-09-26 LAB
ALBUMIN SERPL BCP-MCNC: 3.5 G/DL (ref 3.5–5.2)
ALP SERPL-CCNC: 95 U/L (ref 55–135)
ALT SERPL W/O P-5'-P-CCNC: 10 U/L (ref 10–44)
ANION GAP SERPL CALC-SCNC: 10 MMOL/L (ref 8–16)
AST SERPL-CCNC: 17 U/L (ref 10–40)
BASOPHILS # BLD AUTO: 0.01 K/UL (ref 0–0.2)
BASOPHILS NFR BLD: 0.1 % (ref 0–1.9)
BILIRUB SERPL-MCNC: 0.6 MG/DL (ref 0.1–1)
BUN SERPL-MCNC: 15 MG/DL (ref 8–23)
C3 SERPL-MCNC: 114 MG/DL (ref 50–180)
C4 SERPL-MCNC: 46 MG/DL (ref 11–44)
CALCIUM SERPL-MCNC: 9 MG/DL (ref 8.7–10.5)
CHLORIDE SERPL-SCNC: 106 MMOL/L (ref 95–110)
CO2 SERPL-SCNC: 27 MMOL/L (ref 23–29)
CREAT SERPL-MCNC: 1.9 MG/DL (ref 0.5–1.4)
CREAT UR-MCNC: 264 MG/DL (ref 15–325)
CRP SERPL-MCNC: 12.8 MG/L (ref 0–8.2)
DIFFERENTIAL METHOD BLD: ABNORMAL
EOSINOPHIL # BLD AUTO: 0.1 K/UL (ref 0–0.5)
EOSINOPHIL NFR BLD: 1.7 % (ref 0–8)
ERYTHROCYTE [DISTWIDTH] IN BLOOD BY AUTOMATED COUNT: 15.5 % (ref 11.5–14.5)
ERYTHROCYTE [SEDIMENTATION RATE] IN BLOOD BY WESTERGREN METHOD: 20 MM/HR (ref 0–20)
EST. GFR  (NO RACE VARIABLE): 29 ML/MIN/1.73 M^2
GLUCOSE SERPL-MCNC: 85 MG/DL (ref 70–110)
HCT VFR BLD AUTO: 43.7 % (ref 37–48.5)
HGB BLD-MCNC: 13.7 G/DL (ref 12–16)
IMM GRANULOCYTES # BLD AUTO: 0.03 K/UL (ref 0–0.04)
IMM GRANULOCYTES NFR BLD AUTO: 0.4 % (ref 0–0.5)
LYMPHOCYTES # BLD AUTO: 2 K/UL (ref 1–4.8)
LYMPHOCYTES NFR BLD: 26.7 % (ref 18–48)
MCH RBC QN AUTO: 29 PG (ref 27–31)
MCHC RBC AUTO-ENTMCNC: 31.4 G/DL (ref 32–36)
MCV RBC AUTO: 92 FL (ref 82–98)
MONOCYTES # BLD AUTO: 0.6 K/UL (ref 0.3–1)
MONOCYTES NFR BLD: 7.8 % (ref 4–15)
NEUTROPHILS # BLD AUTO: 4.8 K/UL (ref 1.8–7.7)
NEUTROPHILS NFR BLD: 63.3 % (ref 38–73)
NRBC BLD-RTO: 0 /100 WBC
PLATELET # BLD AUTO: 288 K/UL (ref 150–450)
PMV BLD AUTO: 10.7 FL (ref 9.2–12.9)
POTASSIUM SERPL-SCNC: 4.2 MMOL/L (ref 3.5–5.1)
PROT SERPL-MCNC: 7 G/DL (ref 6–8.4)
PROT UR-MCNC: 9 MG/DL (ref 0–15)
PROT/CREAT UR: 0.03 MG/G{CREAT} (ref 0–0.2)
RBC # BLD AUTO: 4.73 M/UL (ref 4–5.4)
SODIUM SERPL-SCNC: 143 MMOL/L (ref 136–145)
WBC # BLD AUTO: 7.53 K/UL (ref 3.9–12.7)

## 2024-09-26 PROCEDURE — 85025 COMPLETE CBC W/AUTO DIFF WBC: CPT | Performed by: INTERNAL MEDICINE

## 2024-09-26 PROCEDURE — 36415 COLL VENOUS BLD VENIPUNCTURE: CPT | Performed by: INTERNAL MEDICINE

## 2024-09-26 PROCEDURE — 86160 COMPLEMENT ANTIGEN: CPT | Mod: 59 | Performed by: INTERNAL MEDICINE

## 2024-09-26 PROCEDURE — 85651 RBC SED RATE NONAUTOMATED: CPT | Performed by: INTERNAL MEDICINE

## 2024-09-26 PROCEDURE — 86140 C-REACTIVE PROTEIN: CPT | Performed by: INTERNAL MEDICINE

## 2024-09-26 PROCEDURE — 86160 COMPLEMENT ANTIGEN: CPT | Performed by: INTERNAL MEDICINE

## 2024-09-26 PROCEDURE — 80053 COMPREHEN METABOLIC PANEL: CPT | Performed by: INTERNAL MEDICINE

## 2024-09-26 PROCEDURE — 86225 DNA ANTIBODY NATIVE: CPT | Performed by: INTERNAL MEDICINE

## 2024-09-26 PROCEDURE — 84156 ASSAY OF PROTEIN URINE: CPT | Performed by: INTERNAL MEDICINE

## 2024-09-27 ENCOUNTER — TELEPHONE (OUTPATIENT)
Dept: RHEUMATOLOGY | Facility: CLINIC | Age: 64
End: 2024-09-27
Payer: COMMERCIAL

## 2024-09-27 ENCOUNTER — OFFICE VISIT (OUTPATIENT)
Dept: ORTHOPEDICS | Facility: CLINIC | Age: 64
End: 2024-09-27
Payer: COMMERCIAL

## 2024-09-27 VITALS — BODY MASS INDEX: 39.22 KG/M2 | HEIGHT: 66 IN | WEIGHT: 244.06 LBS

## 2024-09-27 DIAGNOSIS — M35.9 UNDIFFERENTIATED CONNECTIVE TISSUE DISEASE: ICD-10-CM

## 2024-09-27 DIAGNOSIS — Z79.899 HIGH RISK MEDICATION USE: Primary | ICD-10-CM

## 2024-09-27 DIAGNOSIS — D84.821 IMMUNOCOMPROMISED STATE DUE TO DRUG THERAPY: ICD-10-CM

## 2024-09-27 DIAGNOSIS — M65.4 RADIAL STYLOID TENOSYNOVITIS (DE QUERVAIN): Primary | ICD-10-CM

## 2024-09-27 DIAGNOSIS — Z79.899 IMMUNOCOMPROMISED STATE DUE TO DRUG THERAPY: ICD-10-CM

## 2024-09-27 LAB — DSDNA AB SER-ACNC: NORMAL [IU]/ML

## 2024-09-27 PROCEDURE — 99999 PR PBB SHADOW E&M-EST. PATIENT-LVL III: CPT | Mod: PBBFAC,,, | Performed by: ORTHOPAEDIC SURGERY

## 2024-09-27 RX ORDER — TRIAMCINOLONE ACETONIDE 40 MG/ML
40 INJECTION, SUSPENSION INTRA-ARTICULAR; INTRAMUSCULAR
Status: DISCONTINUED | OUTPATIENT
Start: 2024-09-27 | End: 2024-09-27 | Stop reason: HOSPADM

## 2024-09-27 RX ADMIN — TRIAMCINOLONE ACETONIDE 40 MG: 40 INJECTION, SUSPENSION INTRA-ARTICULAR; INTRAMUSCULAR at 01:09

## 2024-09-27 NOTE — TELEPHONE ENCOUNTER
----- Message from Júnior Zimmerman MD sent at 9/26/2024  4:24 PM CDT -----  Sudden drop in kidney function.  Encourage hydration repeat CMP with [facility] in 1 week.

## 2024-09-27 NOTE — PROGRESS NOTES
Subjective:     Patient ID: Karla Arzola is a 64 y.o. female.    Chief Complaint: Pain and Numbness of the Right Wrist      HPI:  The patient is a 64-year-old female status post right de Quervain injection and right carpal tunnel injection 11/28/2023 with good results until recently.  Her main problem is the de Quervain and not so much the carpal tunnel.  She requests reinjection today    Past Medical History:   Diagnosis Date    Allergy     Anxiety     GERD (gastroesophageal reflux disease)     Hypertension     Obesity     Vitamin B 12 deficiency     Vitamin D deficiency disease      Past Surgical History:   Procedure Laterality Date    BREAST BIOPSY Right 10/08/2021    KNEE ARTHROSCOPY W/ MENISCECTOMY Left 01/04/2024    Procedure: ARTHROSCOPY, KNEE, WITH MENISCECTOMY;  Surgeon: Larry Adams MD;  Location: [facility];  Service: Orthopedics;  Laterality: Left;    MOUTH SURGERY      right knee scope      TUBAL LIGATION       Family History   Problem Relation Name Age of Onset    Hypertension Maternal Grandmother      Heart disease Maternal Grandmother      Lung cancer Maternal Grandfather      Hypertension Father      Hyperlipidemia Father      Kidney disease Father      Diabetes Mother      Hypertension Mother      Stroke Mother      Hypertension Brother E     Heart failure Brother E     No Known Problems Brother J     No Known Problems Brother C     Hypertension Sister      Thyroid cancer Neg Hx      Other Neg Hx          MEN2     Social History     Socioeconomic History    Marital status: Single   Occupational History     Employer: [employer]   Tobacco Use    Smoking status: Never     Passive exposure: Never    Smokeless tobacco: Never   Substance and Sexual Activity    Alcohol use: Yes     Comment: socially 1x weekly    Drug use: No    Sexual activity: Yes     Partners: Male   Other Topics Concern    Are you pregnant or think you may be? No    Breast-feeding No     Social  Determinants of Health     Financial Resource Strain: Medium Risk (2/24/2023)    Overall Financial Resource Strain (CARDIA)     Difficulty of Paying Living Expenses: Somewhat hard   Food Insecurity: Food Insecurity Present (2/24/2023)    Hunger Vital Sign     Worried About Running Out of Food in the Last Year: Sometimes true     Ran Out of Food in the Last Year: Never true   Transportation Needs: No Transportation Needs (2/24/2023)    PRAPARE - Transportation     Lack of Transportation (Medical): No     Lack of Transportation (Non-Medical): No   Recent Concern: Transportation Needs - Unmet Transportation Needs (12/2/2022)    PRAPARE - Transportation     Lack of Transportation (Medical): No     Lack of Transportation (Non-Medical): Yes   Physical Activity: Inactive (4/26/2024)    Exercise Vital Sign     Days of Exercise per Week: 0 days     Minutes of Exercise per Session: 0 min   Stress: Stress Concern Present (2/24/2023)    [employer] - Occupational Stress Questionnaire     Feeling of Stress : To some extent   Housing Stability: High Risk (2/24/2023)    Housing Stability Vital Sign     Unable to Pay for Housing in the Last Year: Yes     Number of Places Lived in the Last Year: 1     Unstable Housing in the Last Year: No     Medication List with Changes/Refills   Current Medications    ACYCLOVIR (ZOVIRAX) 200 MG CAPSULE    Take 1 capsule (200 mg total) by mouth once daily.    ALBUTEROL (VENTOLIN HFA) 90 MCG/ACTUATION INHALER    Inhale 2 puffs into the lungs every 6 (six) hours as needed for Wheezing. Rescue    AMLODIPINE (NORVASC) 10 MG TABLET    Take 1 tablet (10 mg total) by mouth once daily.    BENZONATATE (TESSALON) 200 MG CAPSULE    Take 1 capsule (200 mg total) by mouth 3 (three) times daily as needed for Cough.    BUPROPION (WELLBUTRIN XL) 150 MG TB24 TABLET    Take 1 tablet (150 mg total) by mouth once daily. (At noon)    CICLOPIROX (PENLAC) 8 % SOLN    Apply to nails once daily     CITALOPRAM (CELEXA) 20 MG TABLET    Take 1 tablet (20 mg total) by mouth once daily.    ERGOCALCIFEROL (VITAMIN D2) 50,000 UNIT CAP    Take 1 capsule (50,000 Units total) by mouth every 7 days.    FLUTICASONE PROPIONATE (FLONASE) 50 MCG/ACTUATION NASAL SPRAY    1 spray (50 mcg total) by Each Nostril route once daily.    FUROSEMIDE (LASIX) 20 MG TABLET    Take 1 tablet (20 mg total) by mouth daily as needed (swelling).    GUAIFENESIN (HUMIBID E) 400 MG TAB    Take 1 tablet (400 mg total) by mouth every 6 (six) hours as needed (for chest congestion).    HYDROXYCHLOROQUINE (PLAQUENIL) 200 MG TABLET    Take 1 tablet (200 mg total) by mouth 2 (two) times daily.    INHALATION SPACING DEVICE    Use as directed when taking inhaled medicine    IPRATROPIUM-ALBUTEROL (COMBIVENT)  MCG/ACTUATION INHALER    Inhale 1 puff into the lungs every 6 (six) hours as needed for Wheezing or Shortness of Breath. Rescue    KETOROLAC (TORADOL) 10 MG TABLET    Take 1 tablet (10 mg total) by mouth every 6 (six) hours as needed for Pain.    METHOCARBAMOL (ROBAXIN) 500 MG TAB    Take 1 tablet (500 mg total) by mouth nightly as needed (pain).    MULTIVITAMIN CAPSULE    Take 1 capsule by mouth once daily.    MUPIROCIN (BACTROBAN) 2 % OINTMENT    apply to affected area(s) with Q-tip twice a day    TRAMADOL (ULTRAM) 50 MG TABLET    Take 1 tablet (50 mg total) by mouth every 6 (six) hours.    TRETINOIN (RETIN-A) 0.05 % CREAM    Apply small amount to affected areas every other night     Review of patient's allergies indicates:  No Known Allergies  Review of Systems   Constitutional: Negative for malaise/fatigue.   HENT:  Negative for hearing loss.    Eyes:  Positive for visual disturbance. Negative for double vision.   Cardiovascular:  Negative for chest pain.   Respiratory:  Negative for shortness of breath.    Endocrine: Negative for cold intolerance.   Hematologic/Lymphatic: Does not bruise/bleed easily.   Skin:  Negative for poor wound  healing and suspicious lesions.   Musculoskeletal:  Negative for gout, joint pain and joint swelling.   Gastrointestinal:  Negative for nausea and vomiting.   Genitourinary:  Negative for dysuria.   Neurological:  Positive for numbness, paresthesias and sensory change.   Psychiatric/Behavioral:  Positive for depression. Negative for memory loss and substance abuse. The patient is nervous/anxious.    Allergic/Immunologic: Negative for persistent infections.       Objective:   Body mass index is 39.39 kg/m².  There were no vitals filed for this visit.             General    Constitutional: She is oriented to person, place, and time. She appears well-developed and well-nourished. No distress.   HENT:   Head: Normocephalic.   Eyes: EOM are normal.   Pulmonary/Chest: Effort normal.   Neurological: She is oriented to person, place, and time.   Psychiatric: She has a normal mood and affect.             Right Hand/Wrist Exam     Inspection   Scars: Wrist - absent Hand -  absent  Effusion: Wrist - absent Hand -  absent    Pain   Wrist - The patient exhibits pain of the extensory musculature.    Tests   Finkelstein's test: positive      Other     Neuorologic Exam    Median Distribution: normal  Ulnar Distribution: normal  Radial Distribution: normal    Comments:  The patient has tenderness 1st dorsal extensor tendon compartment with a positive Finkelstein test          Vascular Exam       Capillary Refill  Right Hand: normal capillary refill       radiographs were not obtained today  Assessment:     Encounter Diagnosis   Name Primary?    Radial styloid tenosynovitis (de quervain) Yes    Right wrist    Plan:       The patient was injected right wrist 1st dorsal extensor tendon compartment under sterile technique with 0.5 cc of Kenalog and 0.5 cc of 2% plain lidocaine under sterile technique.  She will wait at least 3 months between injections.              Disclaimer: This note was prepared using a voice recognition system and  is likely to have sound alike errors within the text.

## 2024-09-27 NOTE — PROCEDURES
Tendon Sheath    Date/Time: 9/27/2024 1:30 PM    Performed by: Stan Alexis MD  Authorized by: Stan Alexis MD    Consent Done?:  Yes (Verbal)  Indications:  Pain  Local anesthesia used?: Yes    Local anesthetic:  Lidocaine 2% without epinephrine  Anesthetic total (ml):  0.5    Location:  Wrist  Site:  R first doral compartment  Ultrasonic guidance for needle placement?: No    Needle size:  25 G  Approach:  Radial  Medications:  40 mg triamcinolone acetonide 40 mg/mL

## 2024-10-04 ENCOUNTER — LAB VISIT (OUTPATIENT)
Dept: LAB | Facility: HOSPITAL | Age: 64
End: 2024-10-04
Attending: INTERNAL MEDICINE
Payer: COMMERCIAL

## 2024-10-04 DIAGNOSIS — Z79.899 HIGH RISK MEDICATION USE: ICD-10-CM

## 2024-10-04 DIAGNOSIS — M35.9 UNDIFFERENTIATED CONNECTIVE TISSUE DISEASE: ICD-10-CM

## 2024-10-04 DIAGNOSIS — D84.821 IMMUNOCOMPROMISED STATE DUE TO DRUG THERAPY: ICD-10-CM

## 2024-10-04 DIAGNOSIS — Z79.899 IMMUNOCOMPROMISED STATE DUE TO DRUG THERAPY: ICD-10-CM

## 2024-10-04 LAB
ALBUMIN SERPL BCP-MCNC: 3.6 G/DL (ref 3.5–5.2)
ALP SERPL-CCNC: 82 U/L (ref 55–135)
ALT SERPL W/O P-5'-P-CCNC: 12 U/L (ref 10–44)
ANION GAP SERPL CALC-SCNC: 10 MMOL/L (ref 8–16)
AST SERPL-CCNC: 13 U/L (ref 10–40)
BILIRUB SERPL-MCNC: 0.7 MG/DL (ref 0.1–1)
BILIRUB UR QL STRIP: NEGATIVE
BUN SERPL-MCNC: 14 MG/DL (ref 8–23)
CALCIUM SERPL-MCNC: 9.5 MG/DL (ref 8.7–10.5)
CHLORIDE SERPL-SCNC: 104 MMOL/L (ref 95–110)
CLARITY UR: CLEAR
CO2 SERPL-SCNC: 26 MMOL/L (ref 23–29)
COLOR UR: YELLOW
CREAT SERPL-MCNC: 1 MG/DL (ref 0.5–1.4)
EST. GFR  (NO RACE VARIABLE): >60 ML/MIN/1.73 M^2
GLUCOSE SERPL-MCNC: 85 MG/DL (ref 70–110)
GLUCOSE UR QL STRIP: NEGATIVE
HGB UR QL STRIP: NEGATIVE
KETONES UR QL STRIP: NEGATIVE
LEUKOCYTE ESTERASE UR QL STRIP: NEGATIVE
NITRITE UR QL STRIP: NEGATIVE
PH UR STRIP: 6 [PH] (ref 5–8)
POTASSIUM SERPL-SCNC: 4.3 MMOL/L (ref 3.5–5.1)
PROT SERPL-MCNC: 6.7 G/DL (ref 6–8.4)
PROT UR QL STRIP: NEGATIVE
SODIUM SERPL-SCNC: 140 MMOL/L (ref 136–145)
SP GR UR STRIP: 1.02 (ref 1–1.03)
URN SPEC COLLECT METH UR: NORMAL

## 2024-10-04 PROCEDURE — 36415 COLL VENOUS BLD VENIPUNCTURE: CPT | Performed by: INTERNAL MEDICINE

## 2024-10-04 PROCEDURE — 80053 COMPREHEN METABOLIC PANEL: CPT | Performed by: INTERNAL MEDICINE

## 2024-10-04 PROCEDURE — 81003 URINALYSIS AUTO W/O SCOPE: CPT | Performed by: INTERNAL MEDICINE

## 2024-10-07 ENCOUNTER — OFFICE VISIT (OUTPATIENT)
Dept: OPHTHALMOLOGY | Facility: CLINIC | Age: 64
End: 2024-10-07
Payer: COMMERCIAL

## 2024-10-07 DIAGNOSIS — H52.4 MYOPIA WITH PRESBYOPIA, BILATERAL: ICD-10-CM

## 2024-10-07 DIAGNOSIS — M35.9 CONNECTIVE TISSUE DISORDER: ICD-10-CM

## 2024-10-07 DIAGNOSIS — Z79.899 LONG-TERM USE OF PLAQUENIL: ICD-10-CM

## 2024-10-07 DIAGNOSIS — H52.13 MYOPIA WITH PRESBYOPIA, BILATERAL: ICD-10-CM

## 2024-10-07 DIAGNOSIS — H25.13 NUCLEAR SCLEROSIS, BILATERAL: Primary | ICD-10-CM

## 2024-10-07 DIAGNOSIS — H18.513 CORNEAL GUTTATA OF BOTH EYES: ICD-10-CM

## 2024-10-07 PROCEDURE — 99999 PR PBB SHADOW E&M-EST. PATIENT-LVL III: CPT | Mod: PBBFAC,,, | Performed by: OPTOMETRIST

## 2024-10-07 NOTE — PROGRESS NOTES
HPI     Annual Exam            Comments: Vision changes since last eye exam?: yes, worse since last   visit.    Any eye pain today: no    Other ocular symptoms: no    Interested in contact lens fitting today? no                    Last edited by Analy Gonzales on 10/7/2024  8:37 AM.            Assessment /Plan     For exam results, see Encounter Report.    Nuclear sclerosis, bilateral  Cataracts not significantly affecting activities of daily living and therefore surgery is not indicated at this time.   Will continue to monitor over the next 12 months. Pt to call or RTC with any significant change in vision prior to next visit.     Corneal guttata of both eyes  Stable OU. Will continue to monitor    Long-term use of Plaquenil  Connective tissue disorder  No maculopathy present today.   Stressed importance of follow up visits with pt.    Myopia with presbyopia, bilateral  Eyeglass Final Rx       Eyeglass Final Rx         Sphere Cylinder Axis Dist VA Add    Right -4.75 +0.50 080 20/30 +2.25    Left -5.00 Sphere  20/30 +2.25      Type: PAL    Expiration Date: 10/7/2025                    RTC 12 months for dilation,10-2 VF, and mOCT.   PRN sooner if any va changes.

## 2024-10-10 ENCOUNTER — TELEPHONE (OUTPATIENT)
Dept: BARIATRICS | Facility: CLINIC | Age: 64
End: 2024-10-10
Payer: COMMERCIAL

## 2024-10-12 ENCOUNTER — PATIENT MESSAGE (OUTPATIENT)
Dept: OPTOMETRY | Facility: CLINIC | Age: 64
End: 2024-10-12
Payer: COMMERCIAL

## 2024-10-14 ENCOUNTER — TELEPHONE (OUTPATIENT)
Dept: BARIATRICS | Facility: CLINIC | Age: 64
End: 2024-10-14
Payer: COMMERCIAL

## 2024-10-14 ENCOUNTER — PATIENT MESSAGE (OUTPATIENT)
Dept: BARIATRICS | Facility: CLINIC | Age: 64
End: 2024-10-14
Payer: COMMERCIAL

## 2024-10-14 DIAGNOSIS — I10 ESSENTIAL HYPERTENSION: Chronic | ICD-10-CM

## 2024-10-14 DIAGNOSIS — E66.01 MORBID OBESITY: Primary | ICD-10-CM

## 2024-10-14 NOTE — TELEPHONE ENCOUNTER
Dayami,     This is the pt we just discussed.  -Labs today  -Stress test tomorrow  -No FL Upper GI needed    Thanks,  Ruby ALEXANDER

## 2024-10-14 NOTE — TELEPHONE ENCOUNTER
"Spoke with patient and confirmed the surgical procedure of Laparoscopic Sleeve Gastrectomy with Dr Oneill on 11/21/24.  Scheduled preop appts/surgery date/2 week and 8 week post op appts. All dates and times agreed upon. Pt aware that if they are required to have PCP clearance, it must be within 6 months of surgery, unless their medical history has changed, it should be dated, signed and in chart for preop appointment. All medications have been reviewed regarding the necessity to be crushed or broken into pieces smaller that the tip of a pencil eraser for 2 weeks following gastric sleeve surgery and 4 weeks following gastric bypass surgery. Pt instructed to stop taking all NSAIDS 1 week before surgery and for life after surgery. Pt aware that protein liquid diet start date is 11/14/24. Patient is doing well with their diet. Patient was instructed about the progression of the diet phases. The patient's current weight is 244 lbs, height is 5'6", and BMI is 39.39. Refer to medical letter of necessity from Surgeon. Discussed the importance of increased physical activity and dieting lifestyle changes to improve weight loss and meet goals. Screened patient for history of UTI per protocol. Discussed with patient to avoid antibiotics and elective procedures involving sedation/anesthesia within 30 days of surgery unless cleared by the bariatric department. Patient instructed to call the bariatric clinic post op for any s/s of UTI. Patient's mailing address confirmed and informed to expect a manilla envelop containing bariatric surgery pre op booklet, appointment reminders, protein and fluid log sheets, and liquid diet and vitamin information sheets. Pt aware that all appts can be seen in my [facility] patient portal at this time. Confirmed email address and informed patient that they will be enrolled in the Patient Reported Outcomes program to track their progress and successes. The first email will be sent 2-3 weeks before " surgery and then every year on your surgery anniversary date. Office phone and fax number given to patient for any future questions/concerns. Discussed the pre-surgery complex carbohydrate beverage to purchase in [facility] pharmacy to drink 30 minutes before the surgery arrival time. Reviewed the policy of scheduling a covid test 72 hours prior to surgery if necessary.

## 2024-10-14 NOTE — LETTER
"  [facility] B[facility] Surg 2nd Fl  [address]  [facility] [address]  Phone: [phone]  Fax: [phone] 2024       Attn: Pre-determination Dept.    RE:  Karla Arzola          OC #: [id number]          : 1960    To Whom It May Concern:  I am sending this letter on behalf of Karla Arzola (a 64 y.o. female) for pre-approval for Bariatric Surgery; specifically the Laparoscopic Sleeve Gastrectomy. Karla has a past medical history of Morbid Obesity and Hypertension. She has made numerous attempts at dieting and exercise programs, and has failed to maintain any sustained weight loss.  Weight/Height/BMI  Estimated body mass index is 39.39 kg/m² as calculated from the following:    Height as of 24: 5' 6" (1.676 m).    Weight as of 24: 110.7 kg (244 lb 0.8 oz).   Karla Arzola has been evaluated in our bariatric program by myself, the , and the program dietician and is felt to be an excellent candidate for surgery.  In addition, she has undergone a psychological evaluation, from which a letter is enclosed.  Karla Arzola has undergone extensive pre-operative education and understands all the risks, benefits, and possible complications of surgery.  She has also undergone dietary education and thorough nutritional evaluation via a registered dietician.  Our program provides long term nutritional counseling with unlimited consults with the dietician.    Our team is sending this letter to receive pre-approval for the indicated procedure.  Please let us know if you have any questions or require any further information.  Sincerely,       Elayne Cardenas MD  General, Laparoscopic, and Bariatric Surgery  [facility] - [place], [place]                "

## 2024-10-15 ENCOUNTER — HOSPITAL ENCOUNTER (OUTPATIENT)
Dept: CARDIOLOGY | Facility: HOSPITAL | Age: 64
Discharge: HOME | End: 2024-10-15
Attending: NURSE PRACTITIONER
Payer: COMMERCIAL

## 2024-10-15 VITALS
BODY MASS INDEX: 39.21 KG/M2 | DIASTOLIC BLOOD PRESSURE: 88 MMHG | WEIGHT: 244 LBS | SYSTOLIC BLOOD PRESSURE: 118 MMHG | HEIGHT: 66 IN

## 2024-10-15 DIAGNOSIS — I10 ESSENTIAL HYPERTENSION: Chronic | ICD-10-CM

## 2024-10-15 DIAGNOSIS — E66.01 MORBID OBESITY WITH BMI OF 40.0-44.9, ADULT: ICD-10-CM

## 2024-10-15 DIAGNOSIS — F41.9 ANXIETY: ICD-10-CM

## 2024-10-15 DIAGNOSIS — E55.9 VITAMIN D DEFICIENCY DISEASE: ICD-10-CM

## 2024-10-15 LAB
AORTIC ROOT ANNULUS: 3.17 CM
AV INDEX (PROSTH): 0.77
AV MEAN GRADIENT: 4.1 MMHG
AV PEAK GRADIENT: 9 MMHG
AV VALVE AREA BY VELOCITY RATIO: 3 CM²
AV VALVE AREA: 3.2 CM²
AV VELOCITY RATIO: 0.73
BSA FOR ECHO PROCEDURE: 2.27 M2
CV ECHO LV RWT: 0.51 CM
CV STRESS BASE HR: 62 BPM
DIASTOLIC BLOOD PRESSURE: 88 MMHG
DOP CALC AO PEAK VEL: 1.5 M/S
DOP CALC AO VTI: 33.3 CM
DOP CALC LVOT AREA: 4.2 CM2
DOP CALC LVOT DIAMETER: 2.3 CM
DOP CALC LVOT PEAK VEL: 1.1 M/S
DOP CALC LVOT STROKE VOLUME: 106.3 CM3
DOP CALC RVOT PEAK VEL: 0.48 M/S
DOP CALC RVOT VTI: 12.8 CM
DOP CALCLVOT PEAK VEL VTI: 25.6 CM
E WAVE DECELERATION TIME: 175.14 MSEC
E/A RATIO: 1.11
E/E' RATIO: 8.9 M/S
ECHO LV POSTERIOR WALL: 1.1 CM (ref 0.6–1.1)
FRACTIONAL SHORTENING: 25.6 % (ref 28–44)
INTERVENTRICULAR SEPTUM: 1.5 CM (ref 0.6–1.1)
IVC DIAMETER: 1.33 CM
IVRT: 65.65 MSEC
LA MAJOR: 6.75 CM
LA MINOR: 6.22 CM
LA WIDTH: 3.9 CM
LEFT ATRIUM AREA SYSTOLIC (APICAL 2 CHAMBER): 22.83 CM2
LEFT ATRIUM AREA SYSTOLIC (APICAL 4 CHAMBER): 22.56 CM2
LEFT ATRIUM SIZE: 3.83 CM
LEFT ATRIUM VOLUME INDEX MOD: 30.9 ML/M2
LEFT ATRIUM VOLUME INDEX: 37.7 ML/M2
LEFT ATRIUM VOLUME MOD: 67.34 ML
LEFT ATRIUM VOLUME: 82.2 CM3
LEFT INTERNAL DIMENSION IN SYSTOLE: 3.2 CM (ref 2.1–4)
LEFT VENTRICLE DIASTOLIC VOLUME INDEX: 38.14 ML/M2
LEFT VENTRICLE DIASTOLIC VOLUME: 83.15 ML
LEFT VENTRICLE END SYSTOLIC VOLUME APICAL 2 CHAMBER: 67.93 ML
LEFT VENTRICLE END SYSTOLIC VOLUME APICAL 4 CHAMBER: 64.77 ML
LEFT VENTRICLE MASS INDEX: 95.3 G/M2
LEFT VENTRICLE SYSTOLIC VOLUME INDEX: 18.4 ML/M2
LEFT VENTRICLE SYSTOLIC VOLUME: 40.1 ML
LEFT VENTRICULAR INTERNAL DIMENSION IN DIASTOLE: 4.3 CM (ref 3.5–6)
LEFT VENTRICULAR MASS: 207.8 G
LV LATERAL E/E' RATIO: 8.9 M/S
LV SEPTAL E/E' RATIO: 8.9 M/S
LVED V (TEICH): 83.15 ML
LVES V (TEICH): 40.1 ML
LVOT MG: 2.5 MMHG
LVOT MV: 0.72 CM/S
MV PEAK A VEL: 0.8 M/S
MV PEAK E VEL: 0.89 M/S
MV STENOSIS PRESSURE HALF TIME: 50.79 MS
MV VALVE AREA P 1/2 METHOD: 4.33 CM2
OHS CV CPX 1 MINUTE RECOVERY HEART RATE: 130 BPM
OHS CV CPX 85 PERCENT MAX PREDICTED HEART RATE MALE: 134
OHS CV CPX ESTIMATED METS: 5
OHS CV CPX MAX PREDICTED HEART RATE: 158
OHS CV CPX PATIENT IS FEMALE: 1
OHS CV CPX PATIENT IS MALE: 0
OHS CV CPX PEAK DIASTOLIC BLOOD PRESSURE: 66 MMHG
OHS CV CPX PEAK HEAR RATE: 164 BPM
OHS CV CPX PEAK RATE PRESSURE PRODUCT: ABNORMAL
OHS CV CPX PEAK SYSTOLIC BLOOD PRESSURE: 219 MMHG
OHS CV CPX PERCENT MAX PREDICTED HEART RATE ACHIEVED: 108
OHS CV CPX RATE PRESSURE PRODUCT PRESENTING: 7316
PISA TR MAX VEL: 2.5 M/S
PULM VEIN S/D RATIO: 1.83
PV MEAN GRADIENT: 1 MMHG
PV MV: 0.6 M/S
PV PEAK D VEL: 0.42 M/S
PV PEAK GRADIENT: 3 MMHG
PV PEAK S VEL: 0.77 M/S
PV PEAK VELOCITY: 0.8 M/S
RA MAJOR: 5.65 CM
RA PRESSURE ESTIMATED: 3 MMHG
RA WIDTH: 4 CM
RIGHT VENTRICULAR END-DIASTOLIC DIMENSION: 4.42 CM
RV TB RVSP: 6 MMHG
RV TISSUE DOPPLER FREE WALL SYSTOLIC VELOCITY 1 (APICAL 4 CHAMBER VIEW): 11.81 CM/S
SINUS: 2.72 CM
STJ: 3.1 CM
STRESS ECHO POST EXERCISE DUR MIN: 3 MINUTES
STRESS ECHO POST EXERCISE DUR SEC: 44 SECONDS
STRESS ST DEPRESSION: 1 MM
SYSTOLIC BLOOD PRESSURE: 118 MMHG
TDI LATERAL: 0.1 M/S
TDI SEPTAL: 0.1 M/S
TDI: 0.1 M/S
TR MAX PG: 25 MMHG
TRICUSPID ANNULAR PLANE SYSTOLIC EXCURSION: 2.33 CM
TV REST PULMONARY ARTERY PRESSURE: 28 MMHG
Z-SCORE OF LEFT VENTRICULAR DIMENSION IN END DIASTOLE: -5.28
Z-SCORE OF LEFT VENTRICULAR DIMENSION IN END SYSTOLE: -2.58

## 2024-10-15 PROCEDURE — 93351 STRESS TTE COMPLETE: CPT | Mod: 26,,, | Performed by: INTERNAL MEDICINE

## 2024-10-15 PROCEDURE — 93351 STRESS TTE COMPLETE: CPT

## 2024-10-16 DIAGNOSIS — E55.9 VITAMIN D DEFICIENCY DISEASE: ICD-10-CM

## 2024-10-16 RX ORDER — ERGOCALCIFEROL 1.25 MG/1
50000 CAPSULE ORAL
Qty: 12 CAPSULE | Refills: 0 | Status: SHIPPED | OUTPATIENT
Start: 2024-10-16

## 2024-10-30 RX ORDER — FUROSEMIDE 20 MG/1
20 TABLET ORAL DAILY PRN
Qty: 60 TABLET | Refills: 1 | Status: SHIPPED | OUTPATIENT
Start: 2024-10-30 | End: 2025-10-30

## 2024-10-31 ENCOUNTER — OFFICE VISIT (OUTPATIENT)
Dept: RHEUMATOLOGY | Facility: CLINIC | Age: 64
End: 2024-10-31
Payer: COMMERCIAL

## 2024-10-31 VITALS
BODY MASS INDEX: 39.76 KG/M2 | HEIGHT: 66 IN | SYSTOLIC BLOOD PRESSURE: 125 MMHG | WEIGHT: 247.38 LBS | HEART RATE: 83 BPM | DIASTOLIC BLOOD PRESSURE: 80 MMHG

## 2024-10-31 DIAGNOSIS — Q82.8 POROKERATOSIS: ICD-10-CM

## 2024-10-31 DIAGNOSIS — Z79.899 HIGH RISK MEDICATION USE: ICD-10-CM

## 2024-10-31 DIAGNOSIS — M35.9 UNDIFFERENTIATED CONNECTIVE TISSUE DISEASE: Primary | ICD-10-CM

## 2024-10-31 DIAGNOSIS — M25.50 POLYARTHRALGIA: ICD-10-CM

## 2024-10-31 DIAGNOSIS — M94.262 CHONDROMALACIA, LEFT KNEE: ICD-10-CM

## 2024-10-31 DIAGNOSIS — Z79.899 IMMUNOCOMPROMISED STATE DUE TO DRUG THERAPY: ICD-10-CM

## 2024-10-31 DIAGNOSIS — H20.9 UVEITIS: ICD-10-CM

## 2024-10-31 DIAGNOSIS — Z79.899 LONG-TERM USE OF PLAQUENIL: ICD-10-CM

## 2024-10-31 DIAGNOSIS — D84.821 IMMUNOCOMPROMISED STATE DUE TO DRUG THERAPY: ICD-10-CM

## 2024-10-31 PROCEDURE — 3074F SYST BP LT 130 MM HG: CPT | Mod: CPTII,S$GLB,, | Performed by: INTERNAL MEDICINE

## 2024-10-31 PROCEDURE — 1160F RVW MEDS BY RX/DR IN RCRD: CPT | Mod: CPTII,S$GLB,, | Performed by: INTERNAL MEDICINE

## 2024-10-31 PROCEDURE — 3008F BODY MASS INDEX DOCD: CPT | Mod: CPTII,S$GLB,, | Performed by: INTERNAL MEDICINE

## 2024-10-31 PROCEDURE — 3079F DIAST BP 80-89 MM HG: CPT | Mod: CPTII,S$GLB,, | Performed by: INTERNAL MEDICINE

## 2024-10-31 PROCEDURE — 99214 OFFICE O/P EST MOD 30 MIN: CPT | Mod: S$GLB,,, | Performed by: INTERNAL MEDICINE

## 2024-10-31 PROCEDURE — 99999 PR PBB SHADOW E&M-EST. PATIENT-LVL V: CPT | Mod: PBBFAC,,, | Performed by: INTERNAL MEDICINE

## 2024-10-31 PROCEDURE — 1159F MED LIST DOCD IN RCRD: CPT | Mod: CPTII,S$GLB,, | Performed by: INTERNAL MEDICINE

## 2024-10-31 PROCEDURE — G2211 COMPLEX E/M VISIT ADD ON: HCPCS | Mod: S$GLB,,, | Performed by: INTERNAL MEDICINE

## 2024-10-31 PROCEDURE — 3044F HG A1C LEVEL LT 7.0%: CPT | Mod: CPTII,S$GLB,, | Performed by: INTERNAL MEDICINE

## 2024-10-31 RX ORDER — HYDROXYCHLOROQUINE SULFATE 200 MG/1
200 TABLET, FILM COATED ORAL DAILY
Qty: 90 TABLET | Refills: 1 | Status: SHIPPED | OUTPATIENT
Start: 2024-10-31

## 2024-11-12 ENCOUNTER — PATIENT MESSAGE (OUTPATIENT)
Dept: BARIATRICS | Facility: CLINIC | Age: 64
End: 2024-11-12
Payer: COMMERCIAL

## 2024-11-12 ENCOUNTER — TELEPHONE (OUTPATIENT)
Dept: BARIATRICS | Facility: CLINIC | Age: 64
End: 2024-11-12
Payer: COMMERCIAL

## 2024-11-12 NOTE — TELEPHONE ENCOUNTER
Patient is rescheduling due to the difference in cost after the new year for [facility] having a Sleeve Gastrectomy. Patient wishes to reschedule on January 9th 2025.

## 2024-11-13 ENCOUNTER — TELEPHONE (OUTPATIENT)
Dept: FAMILY MEDICINE | Facility: CLINIC | Age: 64
End: 2024-11-13
Payer: COMMERCIAL

## 2024-11-13 ENCOUNTER — PATIENT MESSAGE (OUTPATIENT)
Dept: FAMILY MEDICINE | Facility: CLINIC | Age: 64
End: 2024-11-13
Payer: COMMERCIAL

## 2024-11-18 ENCOUNTER — PATIENT MESSAGE (OUTPATIENT)
Dept: RHEUMATOLOGY | Facility: CLINIC | Age: 64
End: 2024-11-18
Payer: COMMERCIAL

## 2024-11-18 ENCOUNTER — TELEPHONE (OUTPATIENT)
Dept: FAMILY MEDICINE | Facility: CLINIC | Age: 64
End: 2024-11-18
Payer: COMMERCIAL

## 2024-11-18 DIAGNOSIS — R20.2 PARESTHESIA AND PAIN OF EXTREMITY: Primary | ICD-10-CM

## 2024-11-18 DIAGNOSIS — M79.609 PARESTHESIA AND PAIN OF EXTREMITY: Primary | ICD-10-CM

## 2024-11-19 ENCOUNTER — TELEPHONE (OUTPATIENT)
Dept: PHYSICAL MEDICINE AND REHAB | Facility: CLINIC | Age: 64
End: 2024-11-19
Payer: COMMERCIAL

## 2024-11-19 NOTE — ADDENDUM NOTE
Addended by: RICHARD WOODALL on: 11/18/2024 09:48 PM     Modules accepted: Orders     Valtrex Counseling: I discussed with the patient the risks of valacyclovir including but not limited to kidney damage, nausea, vomiting and severe allergy.  The patient understands that if the infection seems to be worsening or is not improving, they are to call.

## 2024-11-19 NOTE — TELEPHONE ENCOUNTER
----- Message from Amanuel Lei sent at 11/19/2024  8:10 AM CST -----  We are on a roll again LOL    Please schedule EMG of bilateral lower extremities.     Thank you!!    -Do

## 2024-12-02 ENCOUNTER — PATIENT MESSAGE (OUTPATIENT)
Dept: FAMILY MEDICINE | Facility: CLINIC | Age: 64
End: 2024-12-02
Payer: COMMERCIAL

## 2024-12-03 ENCOUNTER — OFFICE VISIT (OUTPATIENT)
Dept: PHYSICAL MEDICINE AND REHAB | Facility: CLINIC | Age: 64
End: 2024-12-03
Payer: COMMERCIAL

## 2024-12-03 ENCOUNTER — OFFICE VISIT (OUTPATIENT)
Dept: FAMILY MEDICINE | Facility: CLINIC | Age: 64
End: 2024-12-03
Attending: FAMILY MEDICINE
Payer: COMMERCIAL

## 2024-12-03 VITALS — WEIGHT: 247.38 LBS | BODY MASS INDEX: 39.76 KG/M2 | HEIGHT: 66 IN | RESPIRATION RATE: 13 BRPM

## 2024-12-03 DIAGNOSIS — Z12.11 COLON CANCER SCREENING: ICD-10-CM

## 2024-12-03 DIAGNOSIS — E55.9 VITAMIN D DEFICIENCY DISEASE: ICD-10-CM

## 2024-12-03 DIAGNOSIS — I10 ESSENTIAL HYPERTENSION: ICD-10-CM

## 2024-12-03 DIAGNOSIS — Z00.00 ANNUAL PHYSICAL EXAM: Primary | ICD-10-CM

## 2024-12-03 DIAGNOSIS — R39.15 URGENCY OF URINATION: ICD-10-CM

## 2024-12-03 DIAGNOSIS — Z12.31 OTHER SCREENING MAMMOGRAM: ICD-10-CM

## 2024-12-03 DIAGNOSIS — M54.16 LUMBAR RADICULOPATHY: ICD-10-CM

## 2024-12-03 DIAGNOSIS — A60.00 RECURRENT GENITAL HSV (HERPES SIMPLEX VIRUS) INFECTION: ICD-10-CM

## 2024-12-03 PROCEDURE — 95908 NRV CNDJ TST 3-4 STUDIES: CPT | Mod: S$GLB,,, | Performed by: PHYSICAL MEDICINE & REHABILITATION

## 2024-12-03 PROCEDURE — 95885 MUSC TST DONE W/NERV TST LIM: CPT | Mod: S$GLB,,, | Performed by: PHYSICAL MEDICINE & REHABILITATION

## 2024-12-03 PROCEDURE — 99499 UNLISTED E&M SERVICE: CPT | Mod: S$GLB,,, | Performed by: PHYSICAL MEDICINE & REHABILITATION

## 2024-12-03 PROCEDURE — 99396 PREV VISIT EST AGE 40-64: CPT | Mod: 95,,, | Performed by: FAMILY MEDICINE

## 2024-12-03 PROCEDURE — 3044F HG A1C LEVEL LT 7.0%: CPT | Mod: CPTII,95,, | Performed by: FAMILY MEDICINE

## 2024-12-03 PROCEDURE — 99999 PR PBB SHADOW E&M-EST. PATIENT-LVL III: CPT | Mod: PBBFAC,,, | Performed by: PHYSICAL MEDICINE & REHABILITATION

## 2024-12-03 RX ORDER — ACYCLOVIR 200 MG/1
200 CAPSULE ORAL DAILY
Qty: 90 CAPSULE | Refills: 3 | Status: SHIPPED | OUTPATIENT
Start: 2024-12-03

## 2024-12-03 RX ORDER — ERGOCALCIFEROL 1.25 MG/1
50000 CAPSULE ORAL
Qty: 12 CAPSULE | Refills: 3 | Status: SHIPPED | OUTPATIENT
Start: 2024-12-03

## 2024-12-03 RX ORDER — AMLODIPINE BESYLATE 10 MG/1
10 TABLET ORAL DAILY
Qty: 90 TABLET | Refills: 3 | Status: SHIPPED | OUTPATIENT
Start: 2024-12-03

## 2024-12-03 NOTE — PROGRESS NOTES
[facility]  Department of Physiatry-EMG  [facility]   [address] [place] [address]           Full Name: Karla Arzola YOB: 1962  Patient ID: [id number]      Visit Date: 12/3/2024 15:17  Age: 62 Years  Examining Physician:   Referring Physician:   Conclusion: lower extr  Chief Complaint   Patient presents with    Leg Pain       HPI: This is a 64 y.o.  female being seen in clinic today for chronic evaluation of burning,numbness/tingling in her feet and lower legs (worse on the left) that can occur with activity or rest.  Her symptoms can calm on their own.  She denies major weakness but feels some mild changes in the left leg.      Past family, medical, social, and surgical history reviewed in chart    Review of Systems:     General- denies lethargy, weight change, fever, chills  Head/neck- denies swallowing difficulties  ENT- denies hearing changes  Cardiovascular-denies chest pain  Pulmonary- denies shortness of breath  GI- denies constipation or bowel incontinence  - denies bladder incontinence  Skin- denies wounds or rashes  Musculoskeletal- denies weakness, + pain  Neurologic- + numbness and tingling  Psychiatric- denies depressive or psychotic features, denies anxiety  Lymphatic-denies swelling  Endocrine- denies hypoglycemic symptoms/DM history  All other pertinent systems negative     Physical Examination:  General: Well developed, well nourished female, NAD  HEENT:NCAT EOMI bilaterally   Pulmonary:Normal respirations    Spinal Examination: LUMBAR  Active ROM is within normal limits.  Inspection: No deformity of spinal alignment.    Bilateral Upper and Lower Extremities:  Pulses are 2+ at radial bilaterally.  Shoulder/Elbow/Wrist/Hand ROM wnl  Hip/Knee/Ankle ROM wnl  Bilateral Extremities show normal capillary refill.  No signs of cyanosis, rubor, edema, skin changes, or dysvascular changes of appendages.   Nails appear intact.    Neurological Exam:  Cranial Nerves:  II-XII grossly intact    Manual Muscle Testing: (Motor 5=normal)  5/5 strength bilateral lower extremities    No focal atrophy is noted of either lower extremity.    Bilateral Reflexes:  Clonus neg at bilateral ankle  Sensation: tested to light touch  - intact in legs    Gait: Narrow base and good arm swing.      Entire procedure explained to patient prior to proceeding.  Verbal consent obtained          Sensory NCS      Nerve / Sites Rec. Site Onset Lat Peak Lat NP Amp PP Amp Segments Distance Velocity     ms ms µV µV  mm m/s   L Sural - Ankle (Calf)      Calf Ankle 2.38 3.17 20.8 9.5 Calf - Ankle 140 59   L Superficial peroneal - Ankle      Lat leg Ankle 2.00 2.29 24.9 16.3 Lat leg - Ankle 140 70           Motor NCS      Nerve / Sites Muscle Latency Amplitude Amp % Duration Segments Distance Lat Diff Velocity     ms mV % ms  mm ms m/s   L Peroneal - EDB      Ankle EDB 3.75 7.1 100 5.96 Ankle - EDB 80        Fib head EDB 9.73 6.8 95 7.56 Fib head - Ankle 350 5.98 59      Pop fossa EDB 10.94 5.5 76.8 6.90 Pop fossa - Fib head 70 1.21 58   L Tibial - AH      Ankle AH 4.50 9.0 100 5.04 Ankle - AH 80        Pop fossa AH 14.10 5.2 57.6 5.58 Pop fossa - Ankle 420 9.60 44           EMG Summary Table     Spontaneous MUAP Recruitment   Muscle Nerve Roots IA Fib PSW Fasc H.F. Amp Dur. PPP Pattern   L. Rectus femoris Femoral L2-L4 N None None None None N N N N   L. Extensor digitorum brevis Tibial L5-S1 N None None None None N N N N   L. Abductor hallucis Tibial S1-S2 1+ None None None None N N 1+ 1+         INTERPRETATION  -Left superficial peroneal sensory nerve conduction study showed normal peak latency and amplitude  -Left sural sensory nerve conduction study showed normal peak latency and amplitude  -Left peroneal motor nerve conduction study showed normal latency, amplitude, and conduction velocity  -Left tibial motor nerve conduction study showed normal  latency, amplitude, and conduction velocity  -Needle EMG examination performed to above mentioned muscles       IMPRESSION  ABNORMAL study  2. There is electrodiagnostic evidence of a mild subacute on chronic radiculopathy of the S1 nerve root  PLAN  Discussed in detail for greater than 30 minutes about diagnosis and treatment plan    1. Follow up with referring provider: Dr. Júnior Jacobo*  2. Handouts on lumbar radic, exercises provided. Consider referral to IPM  3. This study is good for one year. If symptoms worsen or do not improve, please re-consult.    Nara Lynn M.D.  Physical Medicine and Rehab

## 2024-12-03 NOTE — PROGRESS NOTES
Karla Arzola    Chief Complaint   Patient presents with    Annual Exam     The patient location is: work (in [place])  The chief complaint leading to consultation is: Annual wellness examination    Visit type: audiovisual    Face to Face time with patient: 30 minutes with 30 minutes of total time spent on the encounter, which includes face to face time and non-face to face time preparing to see the patient (eg, review of tests), Obtaining and/or reviewing separately obtained history, Documenting clinical information in the electronic or other health record, Independently interpreting results (not separately reported) and communicating results to the patient/family/caregiver, or Care coordination (not separately reported).         Each patient to whom he or she provides medical services by telemedicine is:  (1) informed of the relationship between the physician and patient and the respective role of any other health care provider with respect to management of the patient; and (2) notified that he or she may decline to receive medical services by telemedicine and may withdraw from such care at any time.    Notes:      History of Present Illness:   HPI    HISTORY OF PRESENT ILLNESS: Karla Arzola is a 64 y.o. female who visits with me today for annual wellness examination.    She states she exercises 6 times per week 20 minutes each time until her knee gave her problems.  She saw Dr. Zimmerman, rheumatologist, on October 31, 2024 for undifferentiated connective tissue disease, long-term use of Plaquenil, chondromalacia, left knee, porokeratosis.    She states she monitors her diet.  She states she occasionally performs monthly BSE.    She states she performs home blood pressure checks 2 to 3 times per week with levels ranging 120-140/70's.    She states she is scheduled to have bariatric surgery in January 2025. She states she used Adipex in the past for weight management without problems and requests  refill.    She complains of having urine urgency x 1 month.       END OF LIFE DECISION: She does not have a living will. She does not desire life support.    SCREENINGS:  Cholesterol: 10/15/2024.  FFS/Colonoscopy: Never. She desires.  Mammogram: 2/20/2024 - okay.  GYN Exam: 6/22/2023 pap smear, HPV - okay but 9/2/2024 with GYN at [facility].  Dexa Scan: Never.  Eye Exam: 10/7/2024 per patient.  Dental Exam:  2022 per patient. She wears top and bottom dentures.  PPD: Negative in the past per patient.  HCVAb: In the past per patient.  HIVAb:  In the past per patient.    Immunization History   Administered Date(s) Administered    COVID-19, MRNA, LN-S, PF (Pfizer) (Purple Cap) 01/11/2021, 02/01/2021, 01/14/2022    Influenza 10/12/2007, 10/10/2008, 09/24/2009, 10/29/2010    Influenza (FLUAD) - Quadrivalent - Adjuvanted - PF *Preferred* (65+) 09/26/2022    Influenza - Quadrivalent 10/07/2015, 09/30/2016    Influenza - Quadrivalent - PF *Preferred* (6 months and older) 09/28/2017, 09/27/2018, 09/25/2019, 11/17/2020, 09/23/2021, 10/18/2023    Influenza - Trivalent - Afluria, Fluzone MDV 10/12/2007, 10/10/2008, 09/24/2009, 10/29/2010    Influenza - Trivalent - Fluarix, Flulaval, Fluzone, Afluria - PF 11/19/2024    Influenza A (H1N1) 2009 Monovalent - IM 10/27/2009    Tdap 11/01/2007, 04/14/2011    Zoster Recombinant 12/01/2021   RSV: Never. Advised patient available at local pharmacy.  Prevnar-20: Never.      Current Outpatient Medications   Medication Sig    acyclovir (ZOVIRAX) 200 MG capsule Take 1 capsule (200 mg total) by mouth once daily.    albuterol (VENTOLIN HFA) 90 mcg/actuation inhaler Inhale 2 puffs into the lungs every 6 (six) hours as needed for Wheezing. Rescue    amLODIPine (NORVASC) 10 MG tablet Take 1 tablet (10 mg total) by mouth once daily.    benzonatate (TESSALON) 200 MG capsule Take 1 capsule (200 mg total) by mouth 3 (three) times daily as needed for Cough.    buPROPion (WELLBUTRIN XL) 150 MG  TB24 tablet Take 1 tablet (150 mg total) by mouth once daily. (At noon)    ciclopirox (PENLAC) 8 % Soln Apply to nails once daily    citalopram (CELEXA) 20 MG tablet Take 1 tablet (20 mg total) by mouth once daily.    ergocalciferol (VITAMIN D2) 50,000 unit Cap Take 1 capsule (50,000 Units total) by mouth every 7 days.    fluticasone propionate (FLONASE) 50 mcg/actuation nasal spray 1 spray (50 mcg total) by Each Nostril route once daily.    furosemide (LASIX) 20 MG tablet Take 1 tablet (20 mg total) by mouth daily as needed (swelling).    guaiFENesin (HUMIBID E) 400 mg Tab Take 1 tablet (400 mg total) by mouth every 6 (six) hours as needed (for chest congestion).    hydroxychloroquine (PLAQUENIL) 200 mg tablet Take 1 tablet (200 mg total) by mouth once daily.    inhalation spacing device Use as directed when taking inhaled medicine    ipratropium-albuteroL (COMBIVENT)  mcg/actuation inhaler Inhale 1 puff into the lungs every 6 (six) hours as needed for Wheezing or Shortness of Breath. Rescue    multivitamin capsule Take 1 capsule by mouth once daily.    traMADoL (ULTRAM) 50 mg tablet Take 1 tablet (50 mg total) by mouth every 6 (six) hours.       Review of Systems   Constitutional:  Negative for activity change, appetite change, chills, fatigue, fever and unexpected weight change.   HENT:  Negative for congestion, ear discharge, ear pain, hearing loss, mouth sores, nosebleeds, postnasal drip, rhinorrhea, sinus pressure, sneezing, sore throat, trouble swallowing and voice change.    Eyes:  Negative for photophobia, pain, discharge, redness, itching and visual disturbance.   Respiratory:  Negative for cough, chest tightness, shortness of breath and wheezing.    Cardiovascular:  Negative for chest pain, palpitations and leg swelling.   Gastrointestinal:  Negative for abdominal pain, blood in stool, constipation, diarrhea, nausea and vomiting.   Endocrine: Negative for cold intolerance, heat intolerance,  polydipsia, polyphagia and polyuria.   Genitourinary:  Positive for urgency. Negative for difficulty urinating, dysuria, flank pain, frequency, hematuria, menstrual problem, vaginal bleeding and vaginal discharge.   Musculoskeletal:  Negative for arthralgias, back pain, gait problem, joint swelling, myalgias and neck pain.   Skin:  Negative for color change, pallor and rash.   Neurological:  Negative for dizziness, tremors, seizures, weakness, numbness and headaches.   Hematological:  Negative for adenopathy. Does not bruise/bleed easily.   Psychiatric/Behavioral:  Negative for confusion, dysphoric mood, sleep disturbance and suicidal ideas. The patient is not nervous/anxious.         Negative for homicidal ideas.       Objective:  Physical Exam  Vitals reviewed.   Constitutional:       General: She is not in acute distress.     Appearance: Normal appearance. She is not ill-appearing, toxic-appearing or diaphoretic.      Comments: Pleasant.   Pulmonary:      Effort: Pulmonary effort is normal. No respiratory distress.   Musculoskeletal:         General: Normal range of motion.      Comments: She moves around her space without problems during virtual visit today.   Neurological:      General: No focal deficit present.      Mental Status: She is alert and oriented to person, place, and time.   Psychiatric:         Mood and Affect: Mood normal.         Behavior: Behavior normal.         Thought Content: Thought content normal.         Judgment: Judgment normal.         ASSESSMENT:  1. Annual physical exam    2. Essential hypertension    3. Vitamin D deficiency disease    4. Recurrent genital HSV (herpes simplex virus) infection    5. Urgency of urination    6. Other screening mammogram    7. Colon cancer screening        PLAN:  Karla was seen today for annual exam.    Diagnoses and all orders for this visit:    Annual physical exam  -     Urine culture; Future    Essential hypertension  -     amLODIPine (NORVASC) 10 MG  tablet; Take 1 tablet (10 mg total) by mouth once daily.    Vitamin D deficiency disease  -     ergocalciferol (VITAMIN D2) 50,000 unit Cap; Take 1 capsule (50,000 Units total) by mouth every 7 days.    Recurrent genital HSV (herpes simplex virus) infection  -     acyclovir (ZOVIRAX) 200 MG capsule; Take 1 capsule (200 mg total) by mouth once daily.    Urgency of urination  -     Urine culture; Future    Other screening mammogram  -     Mammo Digital Screening Bilat w/ Kong; Future    Colon cancer screening  -     Ambulatory referral/consult to Endo Procedure ; Future    Patient advised to call for results.  Continue current medications, follow low sodium, low cholesterol, low carb diet, daily walks, monthly BSE.  If urine culture okay, consider medication for OAB.  Prescription refills as noted above.  Keep follow up with specialists.  Follow up in about 6 months (around 6/3/2025) for hypertension follow up.

## 2024-12-05 ENCOUNTER — LAB VISIT (OUTPATIENT)
Dept: LAB | Facility: HOSPITAL | Age: 64
End: 2024-12-05
Payer: COMMERCIAL

## 2024-12-05 DIAGNOSIS — Z00.00 ANNUAL PHYSICAL EXAM: ICD-10-CM

## 2024-12-05 PROCEDURE — 87086 URINE CULTURE/COLONY COUNT: CPT | Performed by: FAMILY MEDICINE

## 2024-12-06 LAB
BACTERIA UR CULT: NORMAL
BACTERIA UR CULT: NORMAL

## 2024-12-10 ENCOUNTER — PATIENT MESSAGE (OUTPATIENT)
Dept: FAMILY MEDICINE | Facility: CLINIC | Age: 64
End: 2024-12-10
Payer: COMMERCIAL

## 2024-12-10 ENCOUNTER — TELEPHONE (OUTPATIENT)
Dept: FAMILY MEDICINE | Facility: CLINIC | Age: 64
End: 2024-12-10
Payer: COMMERCIAL

## 2024-12-10 DIAGNOSIS — F41.8 DEPRESSION WITH ANXIETY: ICD-10-CM

## 2024-12-10 RX ORDER — CITALOPRAM 20 MG/1
20 TABLET, FILM COATED ORAL DAILY
Qty: 90 TABLET | Refills: 3 | Status: SHIPPED | OUTPATIENT
Start: 2024-12-10 | End: 2025-12-05

## 2024-12-10 NOTE — TELEPHONE ENCOUNTER
Pt is requesting a refill on her Celexa 20 mg. Current Rx is . Please advise. Thank you    LV: 12/3/24 (annual)

## 2024-12-10 NOTE — TELEPHONE ENCOUNTER
I have put the following orders and/or medications to this note.  Please advise pt    No orders of the defined types were placed in this encounter.      Medications Ordered This Encounter   Medications    citalopram (CELEXA) 20 MG tablet     Sig: Take 1 tablet (20 mg total) by mouth once daily.     Dispense:  90 tablet     Refill:  3

## 2024-12-17 ENCOUNTER — HOSPITAL ENCOUNTER (OUTPATIENT)
Dept: PREADMISSION TESTING | Facility: HOSPITAL | Age: 64
Discharge: HOME | End: 2024-12-17
Attending: INTERNAL MEDICINE
Payer: COMMERCIAL

## 2024-12-17 DIAGNOSIS — Z12.11 COLON CANCER SCREENING: ICD-10-CM

## 2024-12-17 DIAGNOSIS — E66.01 MORBID OBESITY WITH BMI OF 40.0-44.9, ADULT: Primary | ICD-10-CM

## 2024-12-18 ENCOUNTER — TELEPHONE (OUTPATIENT)
Dept: BARIATRICS | Facility: CLINIC | Age: 64
End: 2024-12-18
Payer: COMMERCIAL

## 2024-12-18 ENCOUNTER — OFFICE VISIT (OUTPATIENT)
Dept: BARIATRICS | Facility: CLINIC | Age: 64
End: 2024-12-18
Payer: COMMERCIAL

## 2024-12-18 ENCOUNTER — DOCUMENTATION ONLY (OUTPATIENT)
Dept: BARIATRICS | Facility: CLINIC | Age: 64
End: 2024-12-18
Payer: COMMERCIAL

## 2024-12-18 VITALS
OXYGEN SATURATION: 100 % | WEIGHT: 245.81 LBS | HEART RATE: 73 BPM | DIASTOLIC BLOOD PRESSURE: 72 MMHG | SYSTOLIC BLOOD PRESSURE: 145 MMHG | BODY MASS INDEX: 39.68 KG/M2

## 2024-12-18 DIAGNOSIS — M35.9 UNDIFFERENTIATED CONNECTIVE TISSUE DISEASE: ICD-10-CM

## 2024-12-18 DIAGNOSIS — I10 ESSENTIAL HYPERTENSION: ICD-10-CM

## 2024-12-18 DIAGNOSIS — Z98.84 S/P LAPAROSCOPIC SLEEVE GASTRECTOMY: ICD-10-CM

## 2024-12-18 DIAGNOSIS — Z98.84 S/P BARIATRIC SURGERY: ICD-10-CM

## 2024-12-18 DIAGNOSIS — Z79.899 LONG-TERM USE OF PLAQUENIL: ICD-10-CM

## 2024-12-18 DIAGNOSIS — K21.9 GASTROESOPHAGEAL REFLUX DISEASE, UNSPECIFIED WHETHER ESOPHAGITIS PRESENT: Primary | ICD-10-CM

## 2024-12-18 DIAGNOSIS — E66.01 CLASS 2 SEVERE OBESITY DUE TO EXCESS CALORIES WITH SERIOUS COMORBIDITY AND BODY MASS INDEX (BMI) OF 38.0 TO 38.9 IN ADULT: Primary | ICD-10-CM

## 2024-12-18 DIAGNOSIS — E66.812 CLASS 2 SEVERE OBESITY DUE TO EXCESS CALORIES WITH SERIOUS COMORBIDITY AND BODY MASS INDEX (BMI) OF 38.0 TO 38.9 IN ADULT: Primary | ICD-10-CM

## 2024-12-18 PROBLEM — Z01.818 PRE-OP EXAM: Status: RESOLVED | Noted: 2023-12-27 | Resolved: 2024-12-18

## 2024-12-18 PROCEDURE — 99999 PR PBB SHADOW E&M-EST. PATIENT-LVL IV: CPT | Mod: PBBFAC,,, | Performed by: SURGERY

## 2024-12-18 RX ORDER — GABAPENTIN 300 MG/1
300 CAPSULE ORAL 2 TIMES DAILY
Qty: 10 CAPSULE | Refills: 0 | Status: SHIPPED | OUTPATIENT
Start: 2024-12-18

## 2024-12-18 RX ORDER — OMEPRAZOLE 20 MG/1
20 CAPSULE, DELAYED RELEASE ORAL DAILY
Status: ON HOLD | COMMUNITY
End: 2025-01-10 | Stop reason: HOSPADM

## 2024-12-18 NOTE — H&P (VIEW-ONLY)
History & Physical    SUBJECTIVE:     History of Present Illness:  Karla Arzola is a 64 year-old morbidly obese female with BMI 39.68 kg/m² and multiple associated comorbidities including HTN, GERD on daily PPI, and depression/anxiety, who presents for pre-operative exam for weight loss surgery. She has failed multiple attempts at non-surgical methods of weight loss and has completed the [facility] program which she started on 5/15/24 at which time her BMI was 39.38 kg/m². She meets NIH consensus criteria for bariatric surgery and is interested in undergoing laparoscopic sleeve gastrectomy. Her medical history is also significant for undifferentiated connective tissue disease treated with Plaquenil.     Stress echo 10/15/24: Mild basal septal thickening, EF 60-65%, mild MVR and TVR, PASP 28mmHg, below average exercise capacity, test stopped because patient experienced SOB, no arrhythmia, negative for ischemia     Clearances:  Psych: Ally 4/3/24    Chief Complaint   Patient presents with    Pre-op Exam     LSG 1/9/25     Review of patient's allergies indicates:  No Known Allergies    Current Outpatient Medications   Medication Sig    acyclovir (ZOVIRAX) 200 MG capsule Take 1 capsule (200 mg total) by mouth once daily.    albuterol (VENTOLIN HFA) 90 mcg/actuation inhaler Inhale 2 puffs into the lungs every 6 (six) hours as needed for Wheezing. Rescue    amLODIPine (NORVASC) 10 MG tablet Take 1 tablet (10 mg total) by mouth once daily.    benzonatate (TESSALON) 200 MG capsule Take 1 capsule (200 mg total) by mouth 3 (three) times daily as needed for Cough.    buPROPion (WELLBUTRIN XL) 150 MG TB24 tablet Take 1 tablet (150 mg total) by mouth once daily. (At noon)    citalopram (CELEXA) 20 MG tablet Take 1 tablet (20 mg total) by mouth once daily.    ergocalciferol (VITAMIN D2) 50,000 unit Cap Take 1 capsule (50,000 Units total) by mouth every 7 days.    fluticasone propionate (FLONASE) 50  mcg/actuation nasal spray 1 spray (50 mcg total) by Each Nostril route once daily.    furosemide (LASIX) 20 MG tablet Take 1 tablet (20 mg total) by mouth daily as needed (swelling).    guaiFENesin (HUMIBID E) 400 mg Tab Take 1 tablet (400 mg total) by mouth every 6 (six) hours as needed (for chest congestion).    hydroxychloroquine (PLAQUENIL) 200 mg tablet Take 1 tablet (200 mg total) by mouth once daily.    inhalation spacing device Use as directed when taking inhaled medicine    ipratropium-albuteroL (COMBIVENT)  mcg/actuation inhaler Inhale 1 puff into the lungs every 6 (six) hours as needed for Wheezing or Shortness of Breath. Rescue    multivitamin capsule Take 1 capsule by mouth once daily.    omeprazole (PRILOSEC) 20 MG capsule Take 20 mg by mouth once daily.    ciclopirox (PENLAC) 8 % Soln Apply to nails once daily (Patient not taking: Reported on 12/18/2024)     No current facility-administered medications for this visit.     Past Medical History:   Diagnosis Date    Allergy     Anxiety     GERD (gastroesophageal reflux disease)     Hypertension     Obesity     Vitamin B 12 deficiency     Vitamin D deficiency disease      Past Surgical History:   Procedure Laterality Date    BREAST BIOPSY Right 10/08/2021    ESOPHAGOGASTRODUODENOSCOPY N/A 12/20/2024    Procedure: EGD (ESOPHAGOGASTRODUODENOSCOPY);  Surgeon: Kassi Mckeon MD;  Location: [facility];  Service: Gastroenterology;  Laterality: N/A;    KNEE ARTHROSCOPY Right     KNEE ARTHROSCOPY W/ MENISCECTOMY Left 01/04/2024    Procedure: ARTHROSCOPY, KNEE, WITH MENISCECTOMY;  Surgeon: Larry Adams MD;  Location: [facility];  Service: Orthopedics;  Laterality: Left;    MOUTH SURGERY      TUBAL LIGATION       Review of Systems   Constitutional:  Positive for fever. Negative for chills, diaphoresis and malaise/fatigue.   HENT:  Negative for congestion, hearing loss and sore throat.    Eyes:  Negative for blurred vision and double vision.    Respiratory:  Positive for cough. Negative for shortness of breath.    Cardiovascular:  Negative for chest pain, palpitations, orthopnea, claudication, leg swelling and PND.   Gastrointestinal:  Positive for heartburn. Negative for abdominal pain, blood in stool, constipation, diarrhea, nausea and vomiting.   Genitourinary:  Negative for dysuria and urgency.   Musculoskeletal:  Negative for back pain, falls, joint pain, myalgias and neck pain.   Skin:  Negative for itching and rash.   Neurological:  Negative for weakness and headaches.   Endo/Heme/Allergies:  Does not bruise/bleed easily.   Psychiatric/Behavioral:  Negative for depression and substance abuse.      OBJECTIVE:     Vitals:    12/18/24 1045   BP: (!) 145/72   Pulse: 73   SpO2: 100%   Weight: 111.5 kg (245 lb 13 oz)     Physical Exam  Vitals reviewed.   Constitutional:       General: She is not in acute distress.     Appearance: Normal appearance. She is obese.   HENT:      Head: Normocephalic and atraumatic.   Eyes:      Conjunctiva/sclera: Conjunctivae normal.   Neck:      Thyroid: No thyromegaly.   Cardiovascular:      Rate and Rhythm: Normal rate and regular rhythm.      Heart sounds: Normal heart sounds.   Pulmonary:      Effort: Pulmonary effort is normal. No respiratory distress.      Breath sounds: Normal breath sounds.   Abdominal:      General: There is no distension.      Palpations: Abdomen is soft.      Tenderness: There is no abdominal tenderness. There is no guarding or rebound.   Musculoskeletal:         General: Normal range of motion.      Cervical back: Normal range of motion and neck supple.      Right lower leg: No edema.      Left lower leg: No edema.   Skin:     General: Skin is warm and dry.      Findings: No rash.   Neurological:      General: No focal deficit present.      Mental Status: She is alert and oriented to person, place, and time.      Gait: Gait is intact.   Psychiatric:         Mood and Affect: Mood and affect  normal.         Behavior: Behavior normal.         Cognition and Memory: Memory normal.        Laboratory  CBC: Reviewed  BMP: Reviewed  LFTs: Reviewed  Bariatric Labs: Reviewed    Diagnostic Results:  Labs: Reviewed  ECG: Reviewed  X-Ray: Reviewed  Echo: Reviewed     Dietitian: Patient has participated in pre-operative nutritional program with understanding of necessary lifelong dietary changes required with surgery.    Psych: No overt contraindications to bariatric surgery, patient has completed psychological evaluation and is able to give informed consent.    PCP: Medically cleared for surgery.     ASSESSMENT/PLAN:     Morbid obesity with failure of medical conservative therapy.    Co Morbid Conditions:   Patient Active Problem List   Diagnosis    Essential hypertension    Morbid obesity with BMI of 40.0-44.9, adult    Recurrent genital HSV (herpes simplex virus) infection    Internal derangement of right knee    Chondromalacia of right knee    Complex tear of lateral meniscus of left knee    Undifferentiated connective tissue disease    Uveitis    Vitamin D deficiency disease    Depression with anxiety    Long-term use of Plaquenil    Chondromalacia, left knee    Porokeratosis    Paresthesia and pain of extremity     Patient wishes to undergo laparoscopic sleeve gastrectomy. She is scheduled for 1/9/25. She will start the pre-op liquid diet on Thursday 1/2/25. Given her long-standing history of GERD on daily PPI, she will need pre-operative EGD which we will schedule today.     The patient was informed that risks of LSG include, but are not limited to: bleeding, infection, injury to intraabdominal structures such as bowel, stomach, esophagus, liver, and spleen; DVT/PE, cardiopulmonary complications such as MI, stroke or PNA; stricture requiring dilations, incisional hernia, gallstones, exacerbation of mood disorders, vitamin/mineral deficiencies, worsening GERD, failure of weight loss or weight regain, possible  conversion to an open operation, and staple-line leak which may require surgical or endoscopic interventions, drains, antibiotics and NPO status with IV nutrition.     We discussed that our goal is to ameliorate her medical problems and not to obtain a specific body mass index. All questions were answered to her satisfaction and she has elected to proceed with surgery. Consent for surgery and blood transfusion were signed. Prescriptions for ondansetron, omeprazole, ursodiol and acetaminophen were sent to the pharmacy for bedside delivery. Patient discussed perioperative instructions with the Bariatric RN.     Elayne Oneill  12/18/24

## 2024-12-18 NOTE — TELEPHONE ENCOUNTER
----- Message from Tia sent at 12/18/2024 10:20 AM CST -----  Who Called: Pt    What is the request in detail: Requesting call back to discuss being late for appt, wants to know if she can come later. Please advise    Can the clinic reply by [facility]? No    Best Call Back Number: [phone]      Additional Information:

## 2024-12-18 NOTE — TELEPHONE ENCOUNTER
Called and spoke with the pt.  She was lost on the [facility] area.  I asked if she had gone to any visits in the Bariatric clinic before and she said she had not. Stayed on the phone with her and provided directions until she was at the bariatric clinic check in.

## 2024-12-18 NOTE — PROGRESS NOTES
History & Physical    SUBJECTIVE:     History of Present Illness:  Karla Arzola is a 64 year-old morbidly obese female with BMI 39.68 kg/m² and multiple associated comorbidities including HTN, GERD on daily PPI, and depression/anxiety, who presents for pre-operative exam for weight loss surgery. She has failed multiple attempts at non-surgical methods of weight loss and has completed the [facility] Bariatric Surgery program which she started on 5/15/24 at which time her BMI was 39.38 kg/m². She meets NIH consensus criteria for bariatric surgery and is interested in undergoing laparoscopic sleeve gastrectomy. Her medical history is also significant for undifferentiated connective tissue disease treated with Plaquenil.     Stress echo 10/15/24: Mild basal septal thickening, EF 60-65%, mild MVR and TVR, PASP 28mmHg, below average exercise capacity, test stopped because patient experienced SOB, no arrhythmia, negative for ischemia     Clearances:  Psych: Ally 4/3/24    Chief Complaint   Patient presents with    Pre-op Exam     LSG 1/9/25     Review of patient's allergies indicates:  No Known Allergies    Current Outpatient Medications   Medication Sig    acyclovir (ZOVIRAX) 200 MG capsule Take 1 capsule (200 mg total) by mouth once daily.    albuterol (VENTOLIN HFA) 90 mcg/actuation inhaler Inhale 2 puffs into the lungs every 6 (six) hours as needed for Wheezing. Rescue    amLODIPine (NORVASC) 10 MG tablet Take 1 tablet (10 mg total) by mouth once daily.    benzonatate (TESSALON) 200 MG capsule Take 1 capsule (200 mg total) by mouth 3 (three) times daily as needed for Cough.    buPROPion (WELLBUTRIN XL) 150 MG TB24 tablet Take 1 tablet (150 mg total) by mouth once daily. (At noon)    citalopram (CELEXA) 20 MG tablet Take 1 tablet (20 mg total) by mouth once daily.    ergocalciferol (VITAMIN D2) 50,000 unit Cap Take 1 capsule (50,000 Units total) by mouth every 7 days.    fluticasone propionate (FLONASE) 50  mcg/actuation nasal spray 1 spray (50 mcg total) by Each Nostril route once daily.    furosemide (LASIX) 20 MG tablet Take 1 tablet (20 mg total) by mouth daily as needed (swelling).    guaiFENesin (HUMIBID E) 400 mg Tab Take 1 tablet (400 mg total) by mouth every 6 (six) hours as needed (for chest congestion).    hydroxychloroquine (PLAQUENIL) 200 mg tablet Take 1 tablet (200 mg total) by mouth once daily.    inhalation spacing device Use as directed when taking inhaled medicine    ipratropium-albuteroL (COMBIVENT)  mcg/actuation inhaler Inhale 1 puff into the lungs every 6 (six) hours as needed for Wheezing or Shortness of Breath. Rescue    multivitamin capsule Take 1 capsule by mouth once daily.    omeprazole (PRILOSEC) 20 MG capsule Take 20 mg by mouth once daily.    ciclopirox (PENLAC) 8 % Soln Apply to nails once daily (Patient not taking: Reported on 12/18/2024)     No current facility-administered medications for this visit.     Past Medical History:   Diagnosis Date    Allergy     Anxiety     GERD (gastroesophageal reflux disease)     Hypertension     Obesity     Vitamin B 12 deficiency     Vitamin D deficiency disease      Past Surgical History:   Procedure Laterality Date    BREAST BIOPSY Right 10/08/2021    ESOPHAGOGASTRODUODENOSCOPY N/A 12/20/2024    Procedure: EGD (ESOPHAGOGASTRODUODENOSCOPY);  Surgeon: Kassi Mckeon MD;  Location: [facility];  Service: Gastroenterology;  Laterality: N/A;    KNEE ARTHROSCOPY Right     KNEE ARTHROSCOPY W/ MENISCECTOMY Left 01/04/2024    Procedure: ARTHROSCOPY, KNEE, WITH MENISCECTOMY;  Surgeon: Larry Adams MD;  Location: [facility];  Service: Orthopedics;  Laterality: Left;    MOUTH SURGERY      TUBAL LIGATION       Review of Systems   Constitutional:  Positive for fever. Negative for chills, diaphoresis and malaise/fatigue.   HENT:  Negative for congestion, hearing loss and sore throat.    Eyes:  Negative for blurred vision and double vision.    Respiratory:  Positive for cough. Negative for shortness of breath.    Cardiovascular:  Negative for chest pain, palpitations, orthopnea, claudication, leg swelling and PND.   Gastrointestinal:  Positive for heartburn. Negative for abdominal pain, blood in stool, constipation, diarrhea, nausea and vomiting.   Genitourinary:  Negative for dysuria and urgency.   Musculoskeletal:  Negative for back pain, falls, joint pain, myalgias and neck pain.   Skin:  Negative for itching and rash.   Neurological:  Negative for weakness and headaches.   Endo/Heme/Allergies:  Does not bruise/bleed easily.   Psychiatric/Behavioral:  Negative for depression and substance abuse.      OBJECTIVE:     Vitals:    12/18/24 1045   BP: (!) 145/72   Pulse: 73   SpO2: 100%   Weight: 111.5 kg (245 lb 13 oz)     Physical Exam  Vitals reviewed.   Constitutional:       General: She is not in acute distress.     Appearance: Normal appearance. She is obese.   HENT:      Head: Normocephalic and atraumatic.   Eyes:      Conjunctiva/sclera: Conjunctivae normal.   Neck:      Thyroid: No thyromegaly.   Cardiovascular:      Rate and Rhythm: Normal rate and regular rhythm.      Heart sounds: Normal heart sounds.   Pulmonary:      Effort: Pulmonary effort is normal. No respiratory distress.      Breath sounds: Normal breath sounds.   Abdominal:      General: There is no distension.      Palpations: Abdomen is soft.      Tenderness: There is no abdominal tenderness. There is no guarding or rebound.   Musculoskeletal:         General: Normal range of motion.      Cervical back: Normal range of motion and neck supple.      Right lower leg: No edema.      Left lower leg: No edema.   Skin:     General: Skin is warm and dry.      Findings: No rash.   Neurological:      General: No focal deficit present.      Mental Status: She is alert and oriented to person, place, and time.      Gait: Gait is intact.   Psychiatric:         Mood and Affect: Mood and affect  normal.         Behavior: Behavior normal.         Cognition and Memory: Memory normal.        Laboratory  CBC: Reviewed  BMP: Reviewed  LFTs: Reviewed  Bariatric Labs: Reviewed    Diagnostic Results:  Labs: Reviewed  ECG: Reviewed  X-Ray: Reviewed  Echo: Reviewed     Dietitian: Patient has participated in pre-operative nutritional program with understanding of necessary lifelong dietary changes required with surgery.    Psych: No overt contraindications to bariatric surgery, patient has completed psychological evaluation and is able to give informed consent.    PCP: Medically cleared for surgery.     ASSESSMENT/PLAN:     Morbid obesity with failure of medical conservative therapy.    Co Morbid Conditions:   Patient Active Problem List   Diagnosis    Essential hypertension    Morbid obesity with BMI of 40.0-44.9, adult    Recurrent genital HSV (herpes simplex virus) infection    Internal derangement of right knee    Chondromalacia of right knee    Complex tear of lateral meniscus of left knee    Undifferentiated connective tissue disease    Uveitis    Vitamin D deficiency disease    Depression with anxiety    Long-term use of Plaquenil    Chondromalacia, left knee    Porokeratosis    Paresthesia and pain of extremity     Patient wishes to undergo laparoscopic sleeve gastrectomy. She is scheduled for 1/9/25. She will start the pre-op liquid diet on Thursday 1/2/25. Given her long-standing history of GERD on daily PPI, she will need pre-operative EGD which we will schedule today.     The patient was informed that risks of LSG include, but are not limited to: bleeding, infection, injury to intraabdominal structures such as bowel, stomach, esophagus, liver, and spleen; DVT/PE, cardiopulmonary complications such as MI, stroke or PNA; stricture requiring dilations, incisional hernia, gallstones, exacerbation of mood disorders, vitamin/mineral deficiencies, worsening GERD, failure of weight loss or weight regain, possible  conversion to an open operation, and staple-line leak which may require surgical or endoscopic interventions, drains, antibiotics and NPO status with IV nutrition.     We discussed that our goal is to ameliorate her medical problems and not to obtain a specific body mass index. All questions were answered to her satisfaction and she has elected to proceed with surgery. Consent for surgery and blood transfusion were signed. Prescriptions for ondansetron, omeprazole, ursodiol and acetaminophen were sent to the pharmacy for bedside delivery. Patient discussed perioperative instructions with the Bariatric RN.     Elayne Oneill  12/18/24     No

## 2024-12-18 NOTE — PROGRESS NOTES
Spoke to Umesh Nicole in Endo Surgery Scheduling at [facility]. She stated that she will have Rocío Gan add the EGD on to her Colonoscopy this Friday.     Patient is currently in clinic and being taught by REBEKAH Angel RN. Discussed this with patient who stated that she just got a call from the Endoscopy Department at [facility] and is just doing the EGD instead of the Colonoscopy and EGD together. She said that they called her while doing her pre-op teaching and officially scheduled her EGD for Friday 12/20/24.

## 2024-12-18 NOTE — TELEPHONE ENCOUNTER
Called pt and requested ETA.  She stated she expected to arrive at 10:13am from [place] (which does not include parking).  Sent update to nurses in clinic.

## 2024-12-18 NOTE — TELEPHONE ENCOUNTER
----- Message from Layne sent at 12/18/2024  9:44 AM CST -----  Regarding: appt access  Contact: [phone]  Patient calling to advise she will be running late due to heavy traffic. Pls call

## 2024-12-18 NOTE — TELEPHONE ENCOUNTER
Per Dr. Oneill: patient needs an EGD prior to surgery- 1/9/25. She had previously cancelled her last one scheduled.     Orders placed for both [place] and [place] locations. Urgent Staff Message sent.

## 2024-12-19 ENCOUNTER — TELEPHONE (OUTPATIENT)
Dept: BARIATRICS | Facility: CLINIC | Age: 64
End: 2024-12-19
Payer: COMMERCIAL

## 2024-12-19 NOTE — TELEPHONE ENCOUNTER
----- Message from Elayne Oneill MD sent at 12/18/2024 11:14 AM CST -----  Regarding: Scheduling EGD  Hi team,    This patient is an employee of [employer] and is scheduled for laparoscopic sleeve gastrectomy at [facility] on 1/9/25. She has a history of GERD on daily PPI but has never had an EGD. She says she had an EGD/C-scope scheduled recently but was cancelled by the schedulers, who recommended she wait until post-op. She needs to have an EGD done (with or without colonoscopy) prior to her sleeve gastrectomy to ensure her GERD isn't so severe that sleeve is contraindicated.    Please assist with rescheduling her EGD prior to 1/9/25 if at all possible. Otherwise her surgery will be delayed. She lives and works in [place] but is open to having this done at whatever site is necessarily.    Thank you so very much. Happy holidays.    LOULOU

## 2024-12-19 NOTE — TELEPHONE ENCOUNTER
"Dayami Lee RN Documentation note on 12/19/2024:  "Spoke to Umesh Corey in Endo Surgery Scheduling at [facility]. She stated that she will have Rocío Gan add the EGD on to her Colonoscopy this Friday.      Patient is currently in clinic and being taught by REBEKAH Angel RN. Discussed this with patient who stated that she just got a call from the Endoscopy Department at [facility] and is just doing the EGD instead of the Colonoscopy and EGD together. She said that they called her while doing her pre-op teaching and officially scheduled her EGD for Friday 12/20/24."     "

## 2024-12-20 ENCOUNTER — HOSPITAL ENCOUNTER (OUTPATIENT)
Facility: HOSPITAL | Age: 64
Discharge: HOME | End: 2024-12-20
Attending: INTERNAL MEDICINE | Admitting: INTERNAL MEDICINE
Payer: COMMERCIAL

## 2024-12-20 ENCOUNTER — ANESTHESIA (OUTPATIENT)
Dept: ENDOSCOPY | Facility: HOSPITAL | Age: 64
End: 2024-12-20
Payer: COMMERCIAL

## 2024-12-20 ENCOUNTER — ANESTHESIA EVENT (OUTPATIENT)
Dept: ENDOSCOPY | Facility: HOSPITAL | Age: 64
End: 2024-12-20
Payer: COMMERCIAL

## 2024-12-20 DIAGNOSIS — E66.01 MORBID OBESITY WITH BMI OF 40.0-44.9, ADULT: Primary | ICD-10-CM

## 2024-12-20 DIAGNOSIS — E66.01 MORBID OBESITY: ICD-10-CM

## 2024-12-20 PROCEDURE — 27201012 HC FORCEPS, HOT/COLD, DISP: Performed by: INTERNAL MEDICINE

## 2024-12-20 PROCEDURE — 37000008 HC ANESTHESIA 1ST 15 MINUTES: Performed by: INTERNAL MEDICINE

## 2024-12-20 PROCEDURE — 88305 TISSUE EXAM BY PATHOLOGIST: CPT | Performed by: PATHOLOGY

## 2024-12-20 PROCEDURE — 43239 EGD BIOPSY SINGLE/MULTIPLE: CPT | Performed by: INTERNAL MEDICINE

## 2024-12-20 PROCEDURE — 43239 EGD BIOPSY SINGLE/MULTIPLE: CPT | Mod: ,,, | Performed by: INTERNAL MEDICINE

## 2024-12-20 PROCEDURE — 63600175 PHARM REV CODE 636 W HCPCS: Performed by: NURSE ANESTHETIST, CERTIFIED REGISTERED

## 2024-12-20 RX ORDER — LIDOCAINE HYDROCHLORIDE 10 MG/ML
INJECTION, SOLUTION EPIDURAL; INFILTRATION; INTRACAUDAL; PERINEURAL
Status: DISCONTINUED | OUTPATIENT
Start: 2024-12-20 | End: 2024-12-20

## 2024-12-20 RX ORDER — PROPOFOL 10 MG/ML
VIAL (ML) INTRAVENOUS
Status: DISCONTINUED | OUTPATIENT
Start: 2024-12-20 | End: 2024-12-20

## 2024-12-20 RX ADMIN — LIDOCAINE HYDROCHLORIDE 50 MG: 10 SOLUTION INTRAVENOUS at 11:12

## 2024-12-20 RX ADMIN — PROPOFOL 40 MG: 10 INJECTION, EMULSION INTRAVENOUS at 11:12

## 2024-12-20 RX ADMIN — PROPOFOL 80 MG: 10 INJECTION, EMULSION INTRAVENOUS at 11:12

## 2024-12-20 NOTE — H&P
PRE PROCEDURE H&P    Patient Name: Karla Arzola  MRN: [id number]  : 1960  Date of Procedure:  2024  Referring Physician: Kassi Mckeon MD  Primary Physician: Ninfa Frost MD  Procedure Physician: Kassi Mckeon MD       Planned Procedure: EGD  Diagnosis: Morbid obesity   Chief Complaint: Same as above    HPI: Patient is an 64 y.o. female is here for the above.       Anticoagulation: None    Past Medical History:   Past Medical History:   Diagnosis Date    Allergy     Anxiety     GERD (gastroesophageal reflux disease)     Hypertension     Obesity     Vitamin B 12 deficiency     Vitamin D deficiency disease         Past Surgical History:  Past Surgical History:   Procedure Laterality Date    BREAST BIOPSY Right 10/08/2021    KNEE ARTHROSCOPY W/ MENISCECTOMY Left 2024    Procedure: ARTHROSCOPY, KNEE, WITH MENISCECTOMY;  Surgeon: Larry Adams MD;  [facility]: [facility];  Service: Orthopedics;  Laterality: Left;    MOUTH SURGERY      right knee scope      TUBAL LIGATION          Home Medications:  Prior to Admission medications    Medication Sig Start Date End Date Taking? Authorizing Provider   acyclovir (ZOVIRAX) 200 MG capsule Take 1 capsule (200 mg total) by mouth once daily. 12/3/24  Yes Ninfa Frost MD   albuterol (VENTOLIN HFA) 90 mcg/actuation inhaler Inhale 2 puffs into the lungs every 6 (six) hours as needed for Wheezing. Rescue 24 Yes RICO Alvarado MD   amLODIPine (NORVASC) 10 MG tablet Take 1 tablet (10 mg total) by mouth once daily. 12/3/24  Yes Ninfa Frost MD   benzonatate (TESSALON) 200 MG capsule Take 1 capsule (200 mg total) by mouth 3 (three) times daily as needed for Cough. 24  Yes RICO Alvarado MD   buPROPion (WELLBUTRIN XL) 150 MG TB24 tablet Take 1 tablet (150 mg total) by mouth once daily. (At noon) 24  Yes Ninfa Frost MD   citalopram (CELEXA) 20 MG tablet Take 1 tablet (20 mg total) by mouth once  daily. 12/10/24 12/5/25 Yes Ninfa Frost MD   ergocalciferol (VITAMIN D2) 50,000 unit Cap Take 1 capsule (50,000 Units total) by mouth every 7 days. 12/3/24  Yes Ninfa Frost MD   furosemide (LASIX) 20 MG tablet Take 1 tablet (20 mg total) by mouth daily as needed (swelling). 10/30/24 10/30/25 Yes Ninfa Frost MD   hydroxychloroquine (PLAQUENIL) 200 mg tablet Take 1 tablet (200 mg total) by mouth once daily. 10/31/24  Yes Júnior Zimmerman MD   multivitamin capsule Take 1 capsule by mouth once daily.   Yes Provider, Historical   omeprazole (PRILOSEC) 20 MG capsule Take 20 mg by mouth once daily.   Yes Provider, Historical   ciclopirox (PENLAC) 8 % Soln Apply to nails once daily  Patient not taking: Reported on 12/18/2024 3/16/22   Jimbo Montes DPM   fluticasone propionate (FLONASE) 50 mcg/actuation nasal spray 1 spray (50 mcg total) by Each Nostril route once daily. 6/22/23   Robina Momin MD   gabapentin (NEURONTIN) 300 MG capsule Take 1 capsule (300 mg total) by mouth 2 (two) times daily. Open capsule and empty prior to taking. Take one dose on morning of surgery prior to arrival. Take 1 capsule (300 mg) twice a day for 3 days post-op. Continue for an additional 2 days as needed. 12/18/24   Elayne Arnold MD   guaiFENesin (HUMIBID E) 400 mg Tab Take 1 tablet (400 mg total) by mouth every 6 (six) hours as needed (for chest congestion). 2/5/24   RICO Alvarado MD   inhalation spacing device Use as directed when taking inhaled medicine 2/5/24   RICO Alvarado MD   ipratropium-albuteroL (COMBIVENT)  mcg/actuation inhaler Inhale 1 puff into the lungs every 6 (six) hours as needed for Wheezing or Shortness of Breath. Rescue 6/22/23   Robina Momin MD        Allergies:  Review of patient's allergies indicates:  No Known Allergies     Social History:   Social History     Socioeconomic History    Marital status: Single   Occupational History     Employer:  [employer]   Tobacco Use    Smoking status: Never     Passive exposure: Never    Smokeless tobacco: Never   Substance and Sexual Activity    Alcohol use: Yes     Comment: socially 1x weekly    Drug use: No    Sexual activity: Yes     Partners: Male   Other Topics Concern    Are you pregnant or think you may be? No    Breast-feeding No   Social History Narrative    She wears seatbelt.     Social Drivers of Health     Financial Resource Strain: Medium Risk (2/24/2023)    Overall Financial Resource Strain (CARDIA)     Difficulty of Paying Living Expenses: Somewhat hard   Food Insecurity: Food Insecurity Present (2/24/2023)    Hunger Vital Sign     Worried About Running Out of Food in the Last Year: Sometimes true     Ran Out of Food in the Last Year: Never true   Transportation Needs: No Transportation Needs (2/24/2023)    PRAPARE - Transportation     Lack of Transportation (Medical): No     Lack of Transportation (Non-Medical): No   Recent Concern: Transportation Needs - Unmet Transportation Needs (12/2/2022)    PRAPARE - Transportation     Lack of Transportation (Medical): No     Lack of Transportation (Non-Medical): Yes   Physical Activity: Insufficiently Active (12/3/2024)    Exercise Vital Sign     Days of Exercise per Week: 6 days     Minutes of Exercise per Session: 20 min   Stress: Stress Concern Present (2/24/2023)    [place] [employer] - Occupational Stress Questionnaire     Feeling of Stress : To some extent   Housing Stability: High Risk (2/24/2023)    Housing Stability Vital Sign     Unable to Pay for Housing in the Last Year: Yes     Number of Places Lived in the Last Year: 1     Unstable Housing in the Last Year: No       Family History:  Family History   Problem Relation Name Age of Onset    Hypertension Maternal Grandmother      Heart disease Maternal Grandmother      Lung cancer Maternal Grandfather      Hypertension Father      Hyperlipidemia Father      Kidney  "disease Father      Diabetes Mother      Hypertension Mother      Stroke Mother      Hypertension Brother E     Heart failure Brother E     No Known Problems Brother J     No Known Problems Brother C     Hypertension Sister      Thyroid cancer Neg Hx      Other Neg Hx          MEN2       ROS: No acute cardiac events, no acute respiratory complaints.     Physical Exam (all patients):    BP (!) 165/75 (BP Location: Left arm, Patient Position: Sitting)   Pulse 95   Temp 97.7 °F (36.5 °C) (Temporal)   Resp 17   Ht 5' 6" (1.676 m)   Wt 108.9 kg (240 lb)   LMP 06/11/2015   SpO2 98%   Breastfeeding No   BMI 38.74 kg/m²   Lungs: Clear to auscultation bilaterally, respirations unlabored  Heart: Regular rate and rhythm, S1 and S2 normal, no obvious murmurs  Abdomen:         Soft, non-tender, bowel sounds normal, no masses, no organomegaly    Lab Results   Component Value Date    WBC 9.56 10/15/2024    MCV 92 10/15/2024    RDW 15.0 (H) 10/15/2024     10/15/2024    GLU 83 10/15/2024    HGBA1C 5.1 05/30/2024    BUN 13 10/15/2024     10/15/2024    K 3.6 10/15/2024     10/15/2024        SEDATION PLAN: per anesthesia      History reviewed, vital signs satisfactory, cardiopulmonary status satisfactory, sedation options, risks and plans have been discussed with the patient  All their questions were answered and the patient agrees to the sedation procedures as planned and the patient is deemed an appropriate candidate for the sedation as planned.    Procedure explained to patient, informed consent obtained and placed in chart.    Kassi Mckeon  12/20/2024  11:15 AM     "

## 2024-12-20 NOTE — ANESTHESIA PREPROCEDURE EVALUATION
12/20/2024  Karla Arzola is a 64 y.o., female.      Pre-op Assessment    I have reviewed the Patient Summary Reports.     I have reviewed the Nursing Notes. I have reviewed the NPO Status.   I have reviewed the Medications.     Review of Systems  Anesthesia Hx:  No problems with previous Anesthesia             Denies Family Hx of Anesthesia complications.    Denies Personal Hx of Anesthesia complications.                    Social:  Non-Smoker       Hematology/Oncology:  Hematology Normal   Oncology Normal                                   EENT/Dental:  EENT/Dental Normal           Cardiovascular:     Hypertension                                          Pulmonary:  Pulmonary Normal                       Hepatic/GI:     GERD                Musculoskeletal:  Arthritis               Neurological:  Neurology Normal                                      Endocrine:        Morbid Obesity / BMI > 40  Dermatological:  Skin Normal    Psych:  Psychiatric History                  Physical Exam  General: Cooperative, Alert and Oriented    Airway:  Mallampati: II   Mouth Opening: Normal  TM Distance: Normal  Tongue: Normal  Neck ROM: Normal ROM    Dental:  Intact    Chest/Lungs:  Clear to auscultation, Normal Respiratory Rate    Heart:  Rate: Normal  Rhythm: Regular Rhythm        Anesthesia Plan  Type of Anesthesia, risks & benefits discussed:    Anesthesia Type: MAC  Intra-op Monitoring Plan: Standard ASA Monitors  Induction:  IV  Informed Consent: Informed consent signed with the Patient and all parties understand the risks and agree with anesthesia plan.  All questions answered.   ASA Score: 2  Day of Surgery Review of History & Physical: H&P Update referred to the surgeon/provider.I have interviewed and examined the patient. I have reviewed the patient's H&P dated: There are no significant changes.     Ready For  Surgery From Anesthesia Perspective.     .

## 2024-12-20 NOTE — ANESTHESIA POSTPROCEDURE EVALUATION
[facility]                                     Progress Note    Patient Name: Dinorah Thompson  Date: 2023         Service Type: Individual      Session Start Time: 100 Session End Time: 104     Session Length: 38-52    Session #: 8    Attendees: Client    Service Modality:  Video Visit:      Provider verified identity through the following two step process.  Patient provided: Patient  and Patient previous known to provider     Telemedicine Visit: The patient's condition can be safely assessed and treated via synchronous audio and visual telemedicine encounter.       Reason for Telemedicine Visit: Patient has requested telehealth visit     Originating Site (Patient Location): Patient's home     Distant Site (Provider Location): Provider Remote Setting- Home Office     Consent:  The patient/guardian has verbally consented to: the potential risks and benefits of telemedicine (video visit) versus in person care; bill my insurance or make self-payment for services provided; and responsibility for payment of non-covered services.      Patient would like the video invitation sent by: email/text     Mode of Communication: Video Conference via Amwell     Distant Location (Provider): Off-site     As the provider I attest to compliance with applicable laws and regulations related to telemedicine.    DATA  Interactive Complexity: No  Crisis: No        Progress Since Last Session (Related to Symptoms / Goals / Homework):   Symptoms: Improving per patient    Homework: Achieved / completed to satisfaction      Episode of Care Goals: Satisfactory progress - ACTION (Actively working towards change); Intervened by reinforcing change plan / affirming steps taken     Current / Ongoing Stressors and Concerns:     Grief loss, taking care of , transitions     Treatment Objective(s) Addressed in This Session:     Patient will demonstrate/report improved stages of grief that can be safely managed in  Anesthesia Post Evaluation    Patient: Karla Arzola    Procedure(s) Performed: Procedure(s) (LRB):  EGD (ESOPHAGOGASTRODUODENOSCOPY) (N/A)    Final Anesthesia Type: MAC      Patient location during evaluation: PACU  Patient participation: Yes- Able to Participate  Level of consciousness: awake and alert  Post-procedure vital signs: reviewed and stable  Pain management: adequate  Airway patency: patent    PONV status at discharge: No PONV  Anesthetic complications: no      Cardiovascular status: blood pressure returned to baseline  Respiratory status: unassisted and room air  Hydration status: euvolemic  Follow-up not needed.              Vitals Value Taken Time   /87 12/20/24 1137   Temp 36.7 °C (98.1 °F) 12/20/24 1137   Pulse 102 12/20/24 1137   Resp 17 12/20/24 1137   SpO2 99 % 12/20/24 1137         No case tracking events are documented in the log.      Pain/Marina Score: Marina Score: 9 (12/20/2024 11:37 AM)           a lower level of care.  Patient report of able to express feelings of grief and loss regarding loss of family member and behavior indicating progression of grief process towards acceptance and coping with reality of loss with acknowledgement of permanent change within 6 months.  Patient will demonstrate and report a level of depression that can be managed at a lower level of care.  Absence of persistent depression mood and report of reduced frequency and intensity of low mood and absence of persistent low energy and motivation to acceptable levels, report subjective improved motivation and increased energy for a period of 90 days, within 6 months as clinically observed and by patient self-report     Intervention:   Motivational Interviewing  Target Behavior: continue self care and moving forward    Stage of Change: ACTION (Actively working towards change)    MI Intervention: Expressed Empathy/Understanding, Supported Autonomy, Collaboration, Evocation, Permission to raise concern or advise, Open-ended questions, Reflections: simple and complex and Change talk (evoked)     Change Talk Expressed by the Patient: Committment to change Activation Taking steps    Provider Response to Change Talk: S - Summarized patient's change talk statements        Assessments completed prior to visit:  PHQ9:   PHQ-9 SCORE 8/24/2022 10/20/2022 11/16/2022 11/30/2022 11/30/2022 12/14/2022 1/24/2023   PHQ-9 Total Score MyChart 2 (Minimal depression) 8 (Mild depression) 4 (Minimal depression) - 11 (Moderate depression) 12 (Moderate depression) 9 (Mild depression)   PHQ-9 Total Score 2 8 4 11 11 12 9   PHQ-9 External Data - - - - - - -     GAD7:   ELDON-7 SCORE 5/24/2022 8/24/2022 10/5/2022 10/20/2022 11/30/2022 11/30/2022 1/24/2023   Total Score - 2 (minimal anxiety) 3 (minimal anxiety) 4 (minimal anxiety) - 2 (minimal anxiety) 3 (minimal anxiety)   Total Score 2 2 3 4 2 2 3         ASSESSMENT: Current Emotional / Mental Status (status of  significant symptoms):   Risk status (Self / Other harm or suicidal ideation)   Patient denies current fears or concerns for personal safety.   Patient denies current or recent suicidal ideation or behaviors.   Patient denies current or recent homicidal ideation or behaviors.   Patient denies current or recent self injurious behavior or ideation.   Patient denies other safety concerns.   Patient reports there has been no change in risk factors since their last session.     Patient reports there has been no change in protective factors since their last session.     Recommended that patient call 911 or go to the local ED should there be a change in any of these risk factors.     Appearance:   Appropriate    Eye Contact:   Fair    Psychomotor Behavior: Restless    Attitude:   Cooperative    Orientation:   All   Speech    Rate / Production: Emotional Talkative    Volume:  Normal    Mood:    Anxious  Depressed    Affect:    Worrisome    Thought Content:  Clear    Thought Form:  Coherent    Insight:    Fair      Medication Review:   Changes to psychiatric medications, see updated Medication List in EPIC.      Medication Compliance:   Yes     Changes in Health Issues:   None reported     Chemical Use Review:   Substance Use: Chemical use reviewed, no active concerns identified      Tobacco Use: No current tobacco use.      Diagnosis:  1. Moderate episode of recurrent major depressive disorder (H)        Collateral Reports Completed:   Not Applicable    PLAN: (Patient Tasks / Therapist Tasks / Other)        Continue TMS   continue witting down what needs to be done for the move.   Self care and continue moving forward     Kaley Tan, LICSW  1/31/2023                                                           ______________________________________________________________________    Individual Treatment Plan    Patient's Name: Dinorah Thompson  YOB: 1948    Date of Creation: 11/17/2022    Date Treatment  Plan Last Reviewed/Revised: 11/17/2022 due 2/17/2023      DSM5 Diagnoses: 296.32 (F33.1) Major Depressive Disorder, Recurrent Episode, Moderate With anxious distress  Psychosocial / Contextual Factors: Grief loss, taking care of , transitions  PROMIS (reviewed every 90 days): assigned     Referral / Collaboration:  The following referral(s) will be initiated: TMS referral.    Anticipated number of session for this episode of care: 12  Anticipation frequency of session: Biweekly  Anticipated Duration of each session: 38-52 minutes  Treatment plan will be reviewed in 90 days or when goals have been changed.       MeasurableTreatment Goal(s) related to diagnosis / functional impairment(s)  Goal 1: Patient will able to express feelings of grief and loss regarding loss of family member and behavior indicating progression of grief process towards acceptance and coping with reality of loss with acknowledgement of permanent change within 6 months    I will know I've met my goal when I remember her and it's laughing and not sad.      Objective #A (Patient Action)    Patient will demonstrate/report improved stages of grief that can be safely managed in a lower level of care.  Patient report of able to express feelings of grief and loss regarding loss of family member and behavior indicating progression of grief process towards acceptance and coping with reality of loss with acknowledgement of permanent change within 6 months.  Status: restarted 11/17/2022      Intervention(s)  Therapist will provide individual therapy to process through grief and loss and to gain effective coping skills. Tx to discuss current stressors and interpersonal conflicts and how to cope with these, coaching, diagnostic testing, referral for medication as indicated, use prescribed medication, cognitive restructuring, interpersonal family therapy, supportive therapy services.        Goal 2: Patient will report absence of persistent depression  "mood and report of reduced frequency and intensity of low mood and absence of persistent low energy and motivation to acceptable levels, report subjective improved motivation and increased energy for a period of 90 days, within 6 months as clinically observed and by patient self-report    I will know I've met my goal when I can measure my fatigue vs depression \".    Objective #A (Patient Action)    Patient will demonstrate and report a level of depression that can be managed at a lower level of care.  Absence of persistent depression mood and report of reduced frequency and intensity of low mood and absence of persistent low energy and motivation to acceptable levels, report subjective improved motivation and increased energy for a period of 90 days, within 6 months as clinically observed and by patient self-report  Status: restarted- date 11/17/2022      Intervention(s)  Therapist will provide individual therapy to identify triggers to depression, gain feedback on helpful coping tools and thought-reframing techniques, and identify preferred way of being.  Tx to include discussion of current stressors and interpersonal conflicts and how to cope with these, coaching, diagnostic testing, referral for medication as indicated, use prescribed medication, cognitive restructuring, interpersonal, family therapy, supportive therapy services      Patient has reviewed and agreed to the above plan.      Kaley Tan, [facility]  11/17/2022  "

## 2024-12-20 NOTE — PROVATION PATIENT INSTRUCTIONS
Discharge Summary/Instructions after an Endoscopic Procedure  Patient Name: Karla Arzola  Patient MRN: [id number]  Patient YOB: 1960 Friday, December 20, 2024 Kassi Mckeon MD  Dear patient,  As a result of recent federal legislation (The Federal Cures Act), you may   receive lab or pathology results from your procedure in your [facility]   account before your physician is able to contact you. Your physician or   their representative will relay the results to you with their   recommendations at their soonest availability.  Thank you,  RESTRICTIONS:  During your procedure today, you received medications for sedation.  These   medications may affect your judgment, balance and coordination.  Therefore,   for 24 hours, you have the following restrictions:   - DO NOT drive a car, operate machinery, make legal/financial decisions,   sign important papers or drink alcohol.    ACTIVITY:  Today: no heavy lifting, straining or running due to procedural   sedation/anesthesia.  The following day: return to full activity including work.  DIET:  Eat and drink normally unless instructed otherwise.     TREATMENT FOR COMMON SIDE EFFECTS:  - Mild abdominal pain, nausea, belching, bloating or excessive gas:  rest,   eat lightly and use a heating pad.  - Sore Throat: treat with throat lozenges and/or gargle with warm salt   water.  - Because air was used during the procedure, expelling large amounts of air   from your rectum or belching is normal.  - If a bowel prep was taken, you may not have a bowel movement for 1-3 days.    This is normal.  SYMPTOMS TO WATCH FOR AND REPORT TO YOUR PHYSICIAN:  1. Abdominal pain or bloating, other than gas cramps.  2. Chest pain.  3. Back pain.  4. Signs of infection such as: chills or fever occurring within 24 hours   after the procedure.  5. Rectal bleeding, which would show as bright red, maroon, or black stools.   (A tablespoon of blood from the rectum is not serious, especially  if   hemorrhoids are present.)  6. Vomiting.  7. Weakness or dizziness.  GO DIRECTLY TO THE NEAREST EMERGENCY ROOM IF YOU HAVE ANY OF THE FOLLOWING:      Difficulty breathing              Chills and/or fever over 101 F   Persistent vomiting and/or vomiting blood   Severe abdominal pain   Severe chest pain   Black, tarry stools   Bleeding- more than one tablespoon   Any other symptom or condition that you feel may need urgent attention  Your doctor recommends these additional instructions:  If any biopsies were taken, your doctors clinic will contact you in 1 to 2   weeks with any results.  - Discharge patient to home.   - Resume previous diet.   - Continue present medications.   - Await pathology results.   - Return to referring physician.  For questions, problems or results please call your physician Kassi Mckeon MD at Work:  [phone]  If you have any questions about the above instructions, call the GI   department at [phone] or call the endoscopy unit at [phone]   from 7am until 3 pm.  [facility] [place], EMERGENCY ROOM PHONE NUMBER:   [phone]  IF A COMPLICATION OR EMERGENCY SITUATION ARISES AND YOU ARE UNABLE TO REACH   YOUR PHYSICIAN - GO DIRECTLY TO THE EMERGENCY ROOM.  I have read or have had read to me these discharge instructions for my   procedure and have received a written copy.  I understand these   instructions and will follow-up with my physician if I have any questions.     __________________________________       _____________________________________  Nurse Signature                                          Patient/Designated   Responsible Party Signature  Kassi Mckeon MD  12/20/2024 11:35:21 AM  This report has been verified and signed electronically.  Dear patient,  As a result of recent federal legislation (The Federal Cures Act), you may   receive lab or pathology results from your procedure in your [facility]   account before your  physician is able to contact you. Your physician or   their representative will relay the results to you with their   recommendations at their soonest availability.  Thank you,  PROVATION

## 2024-12-20 NOTE — TRANSFER OF CARE
"Anesthesia Transfer of Care Note    Patient: Karla Arzola    Procedure(s) Performed: Procedure(s) (LRB):  EGD (ESOPHAGOGASTRODUODENOSCOPY) (N/A)    Patient location: PACU    Anesthesia Type: MAC    Transport from OR: Transported from OR on room air with adequate spontaneous ventilation    Post pain: adequate analgesia    Post assessment: no apparent anesthetic complications    Post vital signs: stable    Level of consciousness: responds to stimulation    Nausea/Vomiting: no nausea/vomiting    Complications: none    Transfer of care protocol was followed      Last vitals: Visit Vitals  BP (!) 165/75 (BP Location: Left arm, Patient Position: Sitting)   Pulse 95   Temp 36.5 °C (97.7 °F) (Temporal)   Resp 17   Ht 5' 6" (1.676 m)   Wt 108.9 kg (240 lb)   LMP 06/11/2015   SpO2 98%   Breastfeeding No   BMI 38.74 kg/m²     "

## 2024-12-20 NOTE — PLAN OF CARE
Dr. Mckeon  at bedside discussing findings. VSS. Patient alert. No N/V. No bleeding, no pain. Patient released from unit.

## 2024-12-23 VITALS
TEMPERATURE: 98 F | HEART RATE: 98 BPM | WEIGHT: 240 LBS | HEIGHT: 66 IN | OXYGEN SATURATION: 100 % | SYSTOLIC BLOOD PRESSURE: 159 MMHG | BODY MASS INDEX: 38.57 KG/M2 | DIASTOLIC BLOOD PRESSURE: 85 MMHG | RESPIRATION RATE: 17 BRPM

## 2024-12-24 LAB
FINAL PATHOLOGIC DIAGNOSIS: NORMAL
GROSS: NORMAL
Lab: NORMAL

## 2025-01-02 ENCOUNTER — CLINICAL SUPPORT (OUTPATIENT)
Dept: BARIATRICS | Facility: CLINIC | Age: 65
End: 2025-01-02
Payer: COMMERCIAL

## 2025-01-02 DIAGNOSIS — E66.01 MORBID OBESITY: ICD-10-CM

## 2025-01-02 DIAGNOSIS — I10 ESSENTIAL HYPERTENSION: ICD-10-CM

## 2025-01-02 DIAGNOSIS — Z71.3 DIETARY COUNSELING AND SURVEILLANCE: Primary | ICD-10-CM

## 2025-01-02 NOTE — PROGRESS NOTES
Established Patient - Audio Only Telehealth Visit     The patient location is: home [place] in home    The chief complaint leading to consultation is: morbid obesity in work up bariatric surgery  Visit type: Virtual visit with audio only (telephone)  Total time spent with patient: 15 min       The reason for the audio only service rather than synchronous audio and video virtual visit was related to technical difficulties or patient preference/necessity.     Each patient to whom I provide medical services by telemedicine is:  (1) informed of the relationship between the physician and patient and the respective role of any other health care provider with respect to management of the patient; and (2) notified that they may decline to receive medical services by telemedicine and may withdraw from such care at any time. Patient verbally consented to receive this service via voice-only telephone call.    NUTRITION NOTE    Bariatric Surgeon: Elayne Oneill M.D.  Reason for MNT Referral: Pre-op liquid diet and nutrition instructions  Sleeve  Date of Surgery 1/9/25    Pre-op liquid diet  Pt using Core  26 gm protein and Premier  for preop liquid diet    Discussion:  -  gms of protein per day  - 600-800 calories per day   - Less than 4gm sugar per shake  - SF, Decaf, non-carbonated Fluids  - No Fruits, juices, yogurt or pudding on liquid diet  - No vitamins for 1 week prior to surgery  - No herbal supplements including green tea and fish oils for 2 weeks prior to surgery    Remind pt per nursing and medical team to inform our department if taking antibiotics within the 30 days post bariatric surgery or it any other surgeries/procedures are scheduled within 30 days after bariatric surgery.    2 week post-op instructions  Reviewed nutritional guidelines for protein and fluid requirements for week 1 and week 2 post-surgery.  Handout provided to log protein and fluid daily.  1 ounce medicine cups provided for  patient to measure fluid intake after surgery.    Pt has the following vitamins and minerals to start taking 1 week after surgery  Multivitamin with 18 mg iron take one tablet or chewable twice a day  B-complex with 50 mg thiamin taken once daily  Calcium citrate 500 mg with vitamin D three times per day  Vitamin B-12 500 mcg  Sublingually daily  Reviewed dosage and timing of vitamin/mineral guidelines.    Reviewed common nutritional concerns and prevention tips after bariatric surgery.  Reminded not to lift anything greater than 10 lbs for 6 week post-surgery  Pt verbalized understanding of information provided with appropriate questions and comments.         SESSION TIME: 15 minutes                         This service was not originating from a related E/M service provided within the previous 7 days nor will  to an E/M service or procedure within the next 24 hours or my soonest available appointment.  Prevailing standard of care was able to be met in this audio-only visit.

## 2025-01-07 PROBLEM — F41.8 DEPRESSION WITH ANXIETY: Status: ACTIVE | Noted: 2023-12-27

## 2025-01-07 PROBLEM — Z12.11 SCREEN FOR COLON CANCER: Status: RESOLVED | Noted: 2017-10-01 | Resolved: 2025-01-07

## 2025-01-07 RX ORDER — MUPIROCIN 20 MG/G
OINTMENT TOPICAL
Status: CANCELLED | OUTPATIENT
Start: 2025-01-07

## 2025-01-07 RX ORDER — ONDANSETRON HYDROCHLORIDE 2 MG/ML
8 INJECTION, SOLUTION INTRAVENOUS EVERY 6 HOURS
Status: CANCELLED | OUTPATIENT
Start: 2025-01-07

## 2025-01-07 RX ORDER — KETOROLAC TROMETHAMINE 15 MG/ML
15 INJECTION, SOLUTION INTRAMUSCULAR; INTRAVENOUS ONCE
Status: CANCELLED | OUTPATIENT
Start: 2025-01-07 | End: 2025-01-07

## 2025-01-07 RX ORDER — ACETAMINOPHEN 650 MG/20.3ML
500 LIQUID ORAL
Status: CANCELLED | OUTPATIENT
Start: 2025-01-07

## 2025-01-07 RX ORDER — SODIUM CHLORIDE 9 MG/ML
INJECTION, SOLUTION INTRAVENOUS CONTINUOUS
Status: CANCELLED | OUTPATIENT
Start: 2025-01-07

## 2025-01-07 RX ORDER — ACETAMINOPHEN 10 MG/ML
1000 INJECTION, SOLUTION INTRAVENOUS ONCE
Status: CANCELLED | OUTPATIENT
Start: 2025-01-07 | End: 2025-01-07

## 2025-01-07 RX ORDER — HEPARIN SODIUM 5000 [USP'U]/ML
5000 INJECTION, SOLUTION INTRAVENOUS; SUBCUTANEOUS ONCE
Status: CANCELLED | OUTPATIENT
Start: 2025-01-07 | End: 2025-01-07

## 2025-01-07 RX ORDER — SCOLOPAMINE TRANSDERMAL SYSTEM 1 MG/1
1 PATCH, EXTENDED RELEASE TRANSDERMAL ONCE
Status: CANCELLED | OUTPATIENT
Start: 2025-01-07 | End: 2025-01-07

## 2025-01-07 RX ORDER — OMEPRAZOLE 40 MG/1
40 CAPSULE, DELAYED RELEASE ORAL EVERY MORNING
Qty: 30 CAPSULE | Refills: 2 | Status: SHIPPED | OUTPATIENT
Start: 2025-01-07

## 2025-01-07 RX ORDER — OXYCODONE HCL 5 MG/5 ML
5 SOLUTION, ORAL ORAL EVERY 6 HOURS PRN
Status: CANCELLED | OUTPATIENT
Start: 2025-01-07

## 2025-01-07 RX ORDER — LIDOCAINE HYDROCHLORIDE 10 MG/ML
1 INJECTION, SOLUTION EPIDURAL; INFILTRATION; INTRACAUDAL; PERINEURAL ONCE
Status: CANCELLED | OUTPATIENT
Start: 2025-01-07 | End: 2025-01-07

## 2025-01-07 RX ORDER — ENOXAPARIN SODIUM 100 MG/ML
40 INJECTION SUBCUTANEOUS EVERY 24 HOURS
Status: CANCELLED | OUTPATIENT
Start: 2025-01-07

## 2025-01-07 RX ORDER — FAMOTIDINE 10 MG/ML
20 INJECTION INTRAVENOUS ONCE
Status: CANCELLED | OUTPATIENT
Start: 2025-01-07 | End: 2025-01-07

## 2025-01-07 RX ORDER — URSODIOL 500 MG/1
500 TABLET, FILM COATED ORAL DAILY
Qty: 30 TABLET | Refills: 5 | Status: SHIPPED | OUTPATIENT
Start: 2025-01-07 | End: 2025-07-06

## 2025-01-07 RX ORDER — ONDANSETRON 8 MG/1
8 TABLET, ORALLY DISINTEGRATING ORAL EVERY 6 HOURS PRN
Qty: 30 TABLET | Refills: 0 | Status: SHIPPED | OUTPATIENT
Start: 2025-01-07

## 2025-01-07 RX ORDER — DEXTROMETHORPHAN/PSEUDOEPHED 2.5-7.5/.8
40 DROPS ORAL 4 TIMES DAILY PRN
Status: CANCELLED | OUTPATIENT
Start: 2025-01-07

## 2025-01-07 RX ORDER — GABAPENTIN 250 MG/5ML
300 SOLUTION ORAL 2 TIMES DAILY
Status: CANCELLED | OUTPATIENT
Start: 2025-01-07

## 2025-01-07 RX ORDER — PANTOPRAZOLE SODIUM 40 MG/10ML
40 INJECTION, POWDER, LYOPHILIZED, FOR SOLUTION INTRAVENOUS 2 TIMES DAILY
Status: CANCELLED | OUTPATIENT
Start: 2025-01-07

## 2025-01-07 RX ORDER — SODIUM CHLORIDE, SODIUM LACTATE, POTASSIUM CHLORIDE, CALCIUM CHLORIDE 600; 310; 30; 20 MG/100ML; MG/100ML; MG/100ML; MG/100ML
INJECTION, SOLUTION INTRAVENOUS CONTINUOUS
Status: CANCELLED | OUTPATIENT
Start: 2025-01-07

## 2025-01-07 RX ORDER — ACETAMINOPHEN 650 MG/20.3ML
1000 LIQUID ORAL EVERY 8 HOURS
Status: CANCELLED | OUTPATIENT
Start: 2025-01-07

## 2025-01-07 RX ORDER — PROCHLORPERAZINE EDISYLATE 5 MG/ML
5 INJECTION INTRAMUSCULAR; INTRAVENOUS EVERY 6 HOURS PRN
Status: CANCELLED | OUTPATIENT
Start: 2025-01-07

## 2025-01-08 ENCOUNTER — TELEPHONE (OUTPATIENT)
Dept: BARIATRICS | Facility: CLINIC | Age: 65
End: 2025-01-08
Payer: COMMERCIAL

## 2025-01-08 ENCOUNTER — ANESTHESIA EVENT (OUTPATIENT)
Dept: SURGERY | Facility: HOSPITAL | Age: 65
End: 2025-01-08
Payer: COMMERCIAL

## 2025-01-08 NOTE — ANESTHESIA PREPROCEDURE EVALUATION
[facility]  Anesthesia Pre-Operative Evaluation     Patient Name: Karla Arzola  YOB: 1960  MRN: [id number]  [facility]: [id number]       Admit Date: (Not on file)   Admit Team: Networked reference to record PCT      Date of Procedure: 1/9/2025  Anesthesia: General Procedure: Procedure(s) (LRB):  GASTRECTOMY, SLEEVE, LAPAROSCOPIC with intraop EGD, Outpatient, 23 hour observation (N/A)  Pre-Operative Diagnosis: Essential hypertension [I10]  Morbid obesity [E66.01]  Proceduralist:Surgeons and Role:     * Elayne Arnold MD - Primary  Code Status: Prior   Advanced Directive: <no information>  Isolation Precautions: No active isolations  Capacity: Full capacity     SUBJECTIVE:   Karla Arzola is a 64 y.o. female with below PMH presenting for above procedure(s).  Past Medical History:   Diagnosis Date    Allergy     Anxiety     GERD (gastroesophageal reflux disease)     Hypertension     Obesity     Vitamin B 12 deficiency     Vitamin D deficiency disease       Hospital LOS: 0 days  ICU LOS: Patient does not have an ICU stay during this admission.    ALLERGIES:   Review of patient's allergies indicates:  No Known Allergies  LDA:     Lines/Drains/Airways       None                  Anesthesia Evaluation     Patient summary reviewed    No history of anesthetic complications   Airway   Mallampati: III  TM distance: Normal  Neck ROM: Normal ROM  Dental    (+) Dentures        Pulmonary    (-) COPD, asthma  Cardiovascular   (+) hypertension  (-) past MI, CAD, CABG/stent, CHF    Neuro/Psych    (+) psychiatric history (anxiety)  (-) TIA, CVA    GI/Hepatic/Renal    (+) GERD well controlled  (-) liver disease, renal disease    Endo/Other    (+) arthritis  (-) diabetes mellitus    Comments: Connective tissue disorder (?lupus) on Plaquenil  Abdominal                    MEDICATIONS:     Current Outpatient Medications on File Prior to Encounter   Medication Sig Dispense Refill Last  Dose/Taking    albuterol (VENTOLIN HFA) 90 mcg/actuation inhaler Inhale 2 puffs into the lungs every 6 (six) hours as needed for Wheezing. Rescue 8.5 g 1     benzonatate (TESSALON) 200 MG capsule Take 1 capsule (200 mg total) by mouth 3 (three) times daily as needed for Cough. (Patient not taking: Reported on 1/8/2025) 30 capsule 1 Not Taking    buPROPion (WELLBUTRIN XL) 150 MG TB24 tablet Take 1 tablet (150 mg total) by mouth once daily. (At noon) 90 tablet 1     ciclopirox (PENLAC) 8 % Soln Apply to nails once daily (Patient not taking: Reported on 12/18/2024) 6.6 mL 2     fluticasone propionate (FLONASE) 50 mcg/actuation nasal spray 1 spray (50 mcg total) by Each Nostril route once daily. (Patient not taking: Reported on 1/8/2025) 16 g 0 Not Taking    guaiFENesin (HUMIBID E) 400 mg Tab Take 1 tablet (400 mg total) by mouth every 6 (six) hours as needed (for chest congestion). (Patient not taking: Reported on 1/8/2025) 30 tablet 1 Not Taking    inhalation spacing device Use as directed when taking inhaled medicine 1 each 0     ipratropium-albuteroL (COMBIVENT)  mcg/actuation inhaler Inhale 1 puff into the lungs every 6 (six) hours as needed for Wheezing or Shortness of Breath. Rescue 4 g 0     multivitamin capsule Take 1 capsule by mouth once daily.         Inpatient Medications:  Antibiotics (From admission, onward)      None          VTE Risk Mitigation (From admission, onward)      None              No current facility-administered medications for this encounter.     Current Outpatient Medications   Medication Sig Dispense Refill    acetaminophen 500 mg PwPk Take 1g (2 packets) by mouth every 8 hours for at least 3 days and up to 5 days as needed. May take one additional 500mg dose (1 packet) per day for breakthrough pain. Do not exceed 4g in 24 hours. 32 packet 0    acyclovir (ZOVIRAX) 200 MG capsule Take 1 capsule (200 mg total) by mouth once daily. 90 capsule 3    albuterol (VENTOLIN HFA) 90  mcg/actuation inhaler Inhale 2 puffs into the lungs every 6 (six) hours as needed for Wheezing. Rescue 8.5 g 1    amLODIPine (NORVASC) 10 MG tablet Take 1 tablet (10 mg total) by mouth once daily. 90 tablet 3    benzonatate (TESSALON) 200 MG capsule Take 1 capsule (200 mg total) by mouth 3 (three) times daily as needed for Cough. (Patient not taking: Reported on 1/8/2025) 30 capsule 1    buPROPion (WELLBUTRIN XL) 150 MG TB24 tablet Take 1 tablet (150 mg total) by mouth once daily. (At noon) 90 tablet 1    ciclopirox (PENLAC) 8 % Soln Apply to nails once daily (Patient not taking: Reported on 12/18/2024) 6.6 mL 2    citalopram (CELEXA) 20 MG tablet Take 1 tablet (20 mg total) by mouth once daily. 90 tablet 3    ergocalciferol (VITAMIN D2) 50,000 unit Cap Take 1 capsule (50,000 Units total) by mouth every 7 days. 12 capsule 3    fluticasone propionate (FLONASE) 50 mcg/actuation nasal spray 1 spray (50 mcg total) by Each Nostril route once daily. (Patient not taking: Reported on 1/8/2025) 16 g 0    furosemide (LASIX) 20 MG tablet Take 1 tablet (20 mg total) by mouth daily as needed (swelling). 60 tablet 1    gabapentin (NEURONTIN) 300 MG capsule Take 1 capsule (300 mg total) by mouth 2 (two) times daily. Open capsule and empty prior to taking. Take one dose on morning of surgery prior to arrival. Take 1 capsule (300 mg) twice a day for 3 days post-op. Continue for an additional 2 days as needed. 10 capsule 0    guaiFENesin (HUMIBID E) 400 mg Tab Take 1 tablet (400 mg total) by mouth every 6 (six) hours as needed (for chest congestion). (Patient not taking: Reported on 1/8/2025) 30 tablet 1    hydroxychloroquine (PLAQUENIL) 200 mg tablet Take 1 tablet (200 mg total) by mouth once daily. 90 tablet 1    inhalation spacing device Use as directed when taking inhaled medicine 1 each 0    ipratropium-albuteroL (COMBIVENT)  mcg/actuation inhaler Inhale 1 puff into the lungs every 6 (six) hours as needed for Wheezing or  Shortness of Breath. Rescue 4 g 0    multivitamin capsule Take 1 capsule by mouth once daily.      omeprazole (PRILOSEC) 20 MG capsule Take 20 mg by mouth once daily.      omeprazole (PRILOSEC) 40 MG capsule Take 1 capsule (40 mg total) by mouth every morning. Open capsule and sprinkle granules for 2 weeks post-op 30 capsule 2    ondansetron (ZOFRAN-ODT) 8 MG TbDL Dissolve 1 tablet (8 mg total) by mouth every 6 (six) hours as needed (nausea). 30 tablet 0    ursodioL (ACTIGALL) 500 MG tablet Take 1 tablet (500 mg total) by mouth once daily. 30 tablet 5          History:   There are no hospital problems to display for this patient.    Surgical History:    has a past surgical history that includes Tubal ligation; Knee arthroscopy (Right); Mouth surgery; Breast biopsy (Right, 10/08/2021); Knee arthroscopy w/ meniscectomy (Left, 01/04/2024); and Esophagogastroduodenoscopy (N/A, 12/20/2024).   Social History:    reports being sexually active and has had partner(s) who are male.  reports that she has never smoked. She has never been exposed to tobacco smoke. She has never used smokeless tobacco. She reports current alcohol use. She reports that she does not use drugs.    There were no vitals filed for this visit.  Vital Signs Range (Last 24H):       There is no height or weight on file to calculate BMI.  Wt Readings from Last 4 Encounters:   12/20/24 108.9 kg (240 lb)   12/18/24 111.5 kg (245 lb 13 oz)   12/03/24 112.2 kg (247 lb 5.7 oz)   10/31/24 112.2 kg (247 lb 5.7 oz)        Intake/Output - Last 3 Shifts       None          Lab Results   Component Value Date    WBC 9.56 10/15/2024    HGB 13.9 10/15/2024    HCT 44.8 10/15/2024     10/15/2024     10/15/2024    K 3.6 10/15/2024     10/15/2024    CREATININE 1.1 10/15/2024    BUN 13 10/15/2024    CO2 25 10/15/2024    GLU 83 10/15/2024    CALCIUM 9.0 10/15/2024    MG 1.8 10/15/2024    PHOS 2.9 10/15/2024    ALKPHOS 99 10/15/2024    ALT 14 10/15/2024     "AST 15 10/15/2024    ALBUMIN 3.5 10/15/2024    GLUF 99 08/08/2008    HGBA1C 5.1 05/30/2024     No results found for this or any previous visit (from the past 12 hours).  No results for input(s): "WBC", "HGB", "HCT", "PLT", "NA", "K", "CREATININE", "GLU", "INR", "LACTATE", "5HIAAPLASMA", "5HIAAURINT", "5HIAA", "4NCME83FV" in the last 168 hours.  Patient's last menstrual period was 06/11/2015.    EKG:   Results for orders placed or performed during the hospital encounter of 12/05/23   EKG 12-lead    Collection Time: 12/05/23  1:34 PM    Narrative    Test Reason : Z01.818,    Vent. Rate : 088 BPM     Atrial Rate : 088 BPM     P-R Int : 144 ms          QRS Dur : 078 ms      QT Int : 376 ms       P-R-T Axes : 058 -06 057 degrees     QTc Int : 454 ms    Normal sinus rhythm  Normal ECG  When compared with ECG of 08-JUL-2013 10:15,  No significant change was found  Confirmed by ARMINDA HILLIARD MD ([id number]) on 12/5/2023 2:29:29 PM    Referred By: ESTEFANIA BOB           Confirmed By:ARMINDA HILLIARD MD     Stress Test:  No results found for this or any previous visit.    Results for orders placed during the hospital encounter of 10/15/24    Stress Echo Which stress agent will be used? Treadmill Exercise; Color Flow Doppler? No    Interpretation Summary    Left Ventricle: The left ventricle is normal in size. Mild basal septal thickening. There is normal systolic function with a visually estimated ejection fraction of 60 - 65%. There is normal diastolic function.    Right Ventricle: Normal right ventricular cavity size. Wall thickness is normal. Systolic function is normal.    Left Atrium: Left atrium is mildly dilated.    Mitral Valve: There is mild regurgitation.    Tricuspid Valve: There is mild regurgitation.    Pulmonary Artery: The estimated pulmonary artery systolic pressure is 28 mmHg.    IVC/SVC: Normal venous pressure at 3 mmHg.    Stress Protocol: The patient exercised for 3 minutes 44 seconds on a Jesus " protocol, corresponding to a functional capacity of 5METS, achieving a peak heart rate of 164 bpm, which is 108% of the age predicted maximum heart rate. The patient experienced no angina during the test. Their exercise capacity was below average. The patient reported shortness of breath during the stress test. The test was stopped because the patient requested it and they experienced shortness of breath.    Baseline ECG: The Baseline ECG reveals sinus rhythm. The axis is normal. The ST segments are normal.    Stress ECG: There is 1.0 mm of upward-sloping ST segment depression in the inferolateral leads (II, III, aVF, V5 and V6) noted during stress. There are no arrhythmias during stress.    ECG Conclusion: The ECG portion of the study is negative for ischemia.    Post-stress Conclusion: The study is negative with no echocardiographic evidence of stress induced ischemia.    Dyspnea disproportionate to exertion resolved prior to the end of the recovery period.  Magalie Man RN    Pre-op Assessment    I have reviewed the Patient Summary Reports.    I have reviewed the NPO Status.   I have reviewed the Medications.     Review of Systems  Anesthesia Hx:   Denies Hx of Anesthetic complications              Denies Personal Hx of Anesthesia complications.                    Social:  Non-Smoker, Social Alcohol Use       Cardiovascular:     Hypertension   Denies MI.  Denies CAD.    Denies CABG/stent.     Denies CHF.                                   Pulmonary:    Denies COPD.  Denies Asthma.                    Renal/:   Denies Chronic Renal Disease.                Hepatic/GI:     GERD, well controlled Denies Liver Disease.               Musculoskeletal:  Arthritis               Neurological:  Denies TIA.  Denies CVA.                                    Endocrine:  Denies Diabetes.         Obesity / BMI > 30  Psych:  Psychiatric History (anxiety)                  Physical Exam  General: Well nourished, Cooperative, Alert and  Oriented    Airway:  Mallampati: III   Mouth Opening: Normal  TM Distance: Normal  Tongue: Normal  Neck ROM: Normal ROM        Intubation     Date/Time: 1/4/2024 7:02 AM     Performed by: Albania Keating CRNA  Authorized by: Matt Bishop MD    Intubation:     Induction:  Intravenous    Intubated:  Postinduction    Mask Ventilation:  Moderately difficult with oral airway    Attempts:  1    Attempted By:  CRNA    Method of Intubation:  Video laryngoscopy    Blade:  Nicole 3    Laryngeal View Grade: Grade I - full view of cords      Difficult Airway Encountered?: No      Complications:  None    Airway Device:  Oral endotracheal tube    Airway Device Size:  7.0    Style/Cuff Inflation:  Cuffed (inflated to minimal occlusive pressure)    Inflation Amount (mL):  6    Tube secured:  22    Placement Verified By:  Capnometry    Complicating Factors:  Obesity    Findings Post-Intubation:  BS equal bilateral and atraumatic/condition of teeth unchanged  Dental:  Dentures        Anesthesia Plan  Type of Anesthesia, risks & benefits discussed:    Anesthesia Type: Gen ETT  Intra-op Monitoring Plan: Standard ASA Monitors  Post Op Pain Control Plan: multimodal analgesia  Induction:  IV  Airway Plan: Direct and Video, Post-Induction  Informed Consent: Informed consent signed with the Patient and all parties understand the risks and agree with anesthesia plan.  All questions answered.   ASA Score: 3  Day of Surgery Review of History & Physical: H&P Update referred to the surgeon/provider.  Anesthesia Plan Notes: Chart reviewed, patient interviewed and examined.  The anesthetic plan was explained.  Gastric sleeve ERAS protocol. Risks, benefits, and alternatives were discussed. Questions were answered and the consent was signed.      Shilpa Long MD      Ready For Surgery From Anesthesia Perspective.     .

## 2025-01-08 NOTE — TELEPHONE ENCOUNTER
Notified patient of [place] time [place] [facility] at 0500 with expected surgery start time 0700 on 1/9/2025. Instructed patient regarding pre-op instructions including no protein shakes or sugar free clear liquids after midnight but can have a rare sip of water for comfort, showering and preop medications to hold/take per anesthesia/preop. Reminded pt to drink pre-surgery beverage or 8 oz water and take one dose of Gabapentin at 0430. Instructed patient on the s/s of dehydration and for patient to call at the first sign of dehydration. Informed patient that someone from bariatrics will call them 1 week post op to review diet/fluid intake and to ensure adequate hydration. Reminded patient not to take antibiotics for 30 days following surgery or schedule elective procedures involving anesthesia/sedation for 30 days following surgery unless checking with the bariatric clinic first. Pt verbalized understanding. Pt given office phone number for any additional questions/concerns.

## 2025-01-09 ENCOUNTER — ANESTHESIA (OUTPATIENT)
Dept: SURGERY | Facility: HOSPITAL | Age: 65
End: 2025-01-09
Payer: COMMERCIAL

## 2025-01-09 ENCOUNTER — HOSPITAL ENCOUNTER (OUTPATIENT)
Facility: HOSPITAL | Age: 65
Discharge: HOME | End: 2025-01-10
Attending: SURGERY | Admitting: SURGERY
Payer: COMMERCIAL

## 2025-01-09 DIAGNOSIS — E66.01 CLASS 2 SEVERE OBESITY DUE TO EXCESS CALORIES WITH SERIOUS COMORBIDITY AND BODY MASS INDEX (BMI) OF 38.0 TO 38.9 IN ADULT: ICD-10-CM

## 2025-01-09 DIAGNOSIS — I10 ESSENTIAL HYPERTENSION: ICD-10-CM

## 2025-01-09 DIAGNOSIS — M35.9 UNDIFFERENTIATED CONNECTIVE TISSUE DISEASE: ICD-10-CM

## 2025-01-09 DIAGNOSIS — E66.9 OBESITY, UNSPECIFIED CLASS, UNSPECIFIED OBESITY TYPE, UNSPECIFIED WHETHER SERIOUS COMORBIDITY PRESENT: Primary | ICD-10-CM

## 2025-01-09 DIAGNOSIS — E66.812 CLASS 2 SEVERE OBESITY DUE TO EXCESS CALORIES WITH SERIOUS COMORBIDITY AND BODY MASS INDEX (BMI) OF 38.0 TO 38.9 IN ADULT: ICD-10-CM

## 2025-01-09 LAB
ABO + RH BLD: NORMAL
BLD GP AB SCN CELLS X3 SERPL QL: NORMAL
POCT GLUCOSE: 101 MG/DL (ref 70–110)
SPECIMEN OUTDATE: NORMAL

## 2025-01-09 PROCEDURE — G0378 HOSPITAL OBSERVATION PER HR: HCPCS

## 2025-01-09 PROCEDURE — 63600175 PHARM REV CODE 636 W HCPCS: Performed by: SURGERY

## 2025-01-09 PROCEDURE — 99900035 HC TECH TIME PER 15 MIN (STAT)

## 2025-01-09 PROCEDURE — 63600175 PHARM REV CODE 636 W HCPCS: Performed by: STUDENT IN AN ORGANIZED HEALTH CARE EDUCATION/TRAINING PROGRAM

## 2025-01-09 PROCEDURE — 25000003 PHARM REV CODE 250: Performed by: SURGERY

## 2025-01-09 PROCEDURE — 25000003 PHARM REV CODE 250: Performed by: STUDENT IN AN ORGANIZED HEALTH CARE EDUCATION/TRAINING PROGRAM

## 2025-01-09 PROCEDURE — 37000009 HC ANESTHESIA EA ADD 15 MINS: Performed by: SURGERY

## 2025-01-09 PROCEDURE — 43775 LAP SLEEVE GASTRECTOMY: CPT | Mod: ,,, | Performed by: SURGERY

## 2025-01-09 PROCEDURE — 88307 TISSUE EXAM BY PATHOLOGIST: CPT | Performed by: PATHOLOGY

## 2025-01-09 PROCEDURE — 86900 BLOOD TYPING SEROLOGIC ABO: CPT | Performed by: SURGERY

## 2025-01-09 PROCEDURE — 96372 THER/PROPH/DIAG INJ SC/IM: CPT | Performed by: SURGERY

## 2025-01-09 PROCEDURE — 27201423 OPTIME MED/SURG SUP & DEVICES STERILE SUPPLY: Performed by: SURGERY

## 2025-01-09 PROCEDURE — 96376 TX/PRO/DX INJ SAME DRUG ADON: CPT

## 2025-01-09 PROCEDURE — 36000711: Performed by: SURGERY

## 2025-01-09 PROCEDURE — 96375 TX/PRO/DX INJ NEW DRUG ADDON: CPT

## 2025-01-09 PROCEDURE — 82962 GLUCOSE BLOOD TEST: CPT | Performed by: SURGERY

## 2025-01-09 PROCEDURE — C1781 MESH (IMPLANTABLE): HCPCS | Performed by: SURGERY

## 2025-01-09 PROCEDURE — 71000033 HC RECOVERY, INTIAL HOUR: Performed by: SURGERY

## 2025-01-09 PROCEDURE — 25000242 PHARM REV CODE 250 ALT 637 W/ HCPCS: Performed by: SURGERY

## 2025-01-09 PROCEDURE — 94799 UNLISTED PULMONARY SVC/PX: CPT

## 2025-01-09 PROCEDURE — 71000015 HC POSTOP RECOV 1ST HR: Performed by: SURGERY

## 2025-01-09 PROCEDURE — 88307 TISSUE EXAM BY PATHOLOGIST: CPT | Mod: 26,,, | Performed by: PATHOLOGY

## 2025-01-09 PROCEDURE — 36000710: Performed by: SURGERY

## 2025-01-09 PROCEDURE — 94761 N-INVAS EAR/PLS OXIMETRY MLT: CPT

## 2025-01-09 PROCEDURE — 71000016 HC POSTOP RECOV ADDL HR: Performed by: SURGERY

## 2025-01-09 PROCEDURE — 36415 COLL VENOUS BLD VENIPUNCTURE: CPT | Performed by: SURGERY

## 2025-01-09 PROCEDURE — 43281 LAP PARAESOPHAG HERN REPAIR: CPT | Mod: 51,,, | Performed by: SURGERY

## 2025-01-09 PROCEDURE — 96374 THER/PROPH/DIAG INJ IV PUSH: CPT

## 2025-01-09 PROCEDURE — 37000008 HC ANESTHESIA 1ST 15 MINUTES: Performed by: SURGERY

## 2025-01-09 DEVICE — SEAMGUARD ESCHELON 60 MM.: Type: IMPLANTABLE DEVICE | Site: ABDOMEN | Status: FUNCTIONAL

## 2025-01-09 RX ORDER — OXYCODONE HYDROCHLORIDE 5 MG/1
5 TABLET ORAL
Status: DISCONTINUED | OUTPATIENT
Start: 2025-01-09 | End: 2025-01-09

## 2025-01-09 RX ORDER — KETOROLAC TROMETHAMINE 15 MG/ML
15 INJECTION, SOLUTION INTRAMUSCULAR; INTRAVENOUS ONCE
Status: COMPLETED | OUTPATIENT
Start: 2025-01-09 | End: 2025-01-09

## 2025-01-09 RX ORDER — DEXMEDETOMIDINE HYDROCHLORIDE 100 UG/ML
INJECTION, SOLUTION INTRAVENOUS
Status: DISCONTINUED | OUTPATIENT
Start: 2025-01-09 | End: 2025-01-09

## 2025-01-09 RX ORDER — ENOXAPARIN SODIUM 100 MG/ML
40 INJECTION SUBCUTANEOUS EVERY 24 HOURS
Status: DISCONTINUED | OUTPATIENT
Start: 2025-01-09 | End: 2025-01-10 | Stop reason: HOSPADM

## 2025-01-09 RX ORDER — OXYCODONE HCL 5 MG/5 ML
5 SOLUTION, ORAL ORAL EVERY 6 HOURS PRN
Status: DISCONTINUED | OUTPATIENT
Start: 2025-01-09 | End: 2025-01-10 | Stop reason: HOSPADM

## 2025-01-09 RX ORDER — LIDOCAINE HYDROCHLORIDE 20 MG/ML
INJECTION INTRAVENOUS
Status: DISCONTINUED | OUTPATIENT
Start: 2025-01-09 | End: 2025-01-09

## 2025-01-09 RX ORDER — GLUCAGON 1 MG
1 KIT INJECTION
Status: DISCONTINUED | OUTPATIENT
Start: 2025-01-09 | End: 2025-01-09

## 2025-01-09 RX ORDER — PROPOFOL 10 MG/ML
INJECTION, EMULSION INTRAVENOUS
Status: DISCONTINUED | OUTPATIENT
Start: 2025-01-09 | End: 2025-01-09

## 2025-01-09 RX ORDER — SODIUM CHLORIDE, SODIUM LACTATE, POTASSIUM CHLORIDE, CALCIUM CHLORIDE 600; 310; 30; 20 MG/100ML; MG/100ML; MG/100ML; MG/100ML
INJECTION, SOLUTION INTRAVENOUS CONTINUOUS
Status: DISCONTINUED | OUTPATIENT
Start: 2025-01-09 | End: 2025-01-10 | Stop reason: HOSPADM

## 2025-01-09 RX ORDER — DEXAMETHASONE SODIUM PHOSPHATE 4 MG/ML
INJECTION, SOLUTION INTRA-ARTICULAR; INTRALESIONAL; INTRAMUSCULAR; INTRAVENOUS; SOFT TISSUE
Status: DISCONTINUED | OUTPATIENT
Start: 2025-01-09 | End: 2025-01-09

## 2025-01-09 RX ORDER — FAMOTIDINE 10 MG/ML
20 INJECTION INTRAVENOUS ONCE
Status: DISCONTINUED | OUTPATIENT
Start: 2025-01-09 | End: 2025-01-09

## 2025-01-09 RX ORDER — METHOCARBAMOL 100 MG/ML
500 INJECTION, SOLUTION INTRAMUSCULAR; INTRAVENOUS EVERY 8 HOURS
Status: DISCONTINUED | OUTPATIENT
Start: 2025-01-09 | End: 2025-01-10 | Stop reason: HOSPADM

## 2025-01-09 RX ORDER — MIDAZOLAM HYDROCHLORIDE 1 MG/ML
INJECTION INTRAMUSCULAR; INTRAVENOUS
Status: DISCONTINUED | OUTPATIENT
Start: 2025-01-09 | End: 2025-01-09

## 2025-01-09 RX ORDER — HYDROMORPHONE HYDROCHLORIDE 1 MG/ML
0.2 INJECTION, SOLUTION INTRAMUSCULAR; INTRAVENOUS; SUBCUTANEOUS EVERY 5 MIN PRN
Status: DISCONTINUED | OUTPATIENT
Start: 2025-01-09 | End: 2025-01-09

## 2025-01-09 RX ORDER — MUPIROCIN 20 MG/G
OINTMENT TOPICAL
Status: DISCONTINUED | OUTPATIENT
Start: 2025-01-09 | End: 2025-01-09 | Stop reason: HOSPADM

## 2025-01-09 RX ORDER — ACETAMINOPHEN 650 MG/20.3ML
500 LIQUID ORAL
Status: DISCONTINUED | OUTPATIENT
Start: 2025-01-09 | End: 2025-01-10 | Stop reason: HOSPADM

## 2025-01-09 RX ORDER — PROCHLORPERAZINE EDISYLATE 5 MG/ML
5 INJECTION INTRAMUSCULAR; INTRAVENOUS EVERY 30 MIN PRN
Status: DISCONTINUED | OUTPATIENT
Start: 2025-01-09 | End: 2025-01-09

## 2025-01-09 RX ORDER — HALOPERIDOL 5 MG/ML
0.5 INJECTION INTRAMUSCULAR EVERY 10 MIN PRN
Status: DISCONTINUED | OUTPATIENT
Start: 2025-01-09 | End: 2025-01-09 | Stop reason: HOSPADM

## 2025-01-09 RX ORDER — ONDANSETRON HYDROCHLORIDE 2 MG/ML
INJECTION, SOLUTION INTRAVENOUS
Status: DISCONTINUED | OUTPATIENT
Start: 2025-01-09 | End: 2025-01-09

## 2025-01-09 RX ORDER — SODIUM CHLORIDE 0.9 % (FLUSH) 0.9 %
10 SYRINGE (ML) INJECTION
Status: DISCONTINUED | OUTPATIENT
Start: 2025-01-09 | End: 2025-01-09 | Stop reason: HOSPADM

## 2025-01-09 RX ORDER — LIDOCAINE HYDROCHLORIDE 10 MG/ML
1 INJECTION, SOLUTION EPIDURAL; INFILTRATION; INTRACAUDAL; PERINEURAL ONCE
Status: COMPLETED | OUTPATIENT
Start: 2025-01-09 | End: 2025-01-09

## 2025-01-09 RX ORDER — BUPIVACAINE HYDROCHLORIDE 2.5 MG/ML
INJECTION, SOLUTION EPIDURAL; INFILTRATION; INTRACAUDAL
Status: DISCONTINUED | OUTPATIENT
Start: 2025-01-09 | End: 2025-01-09 | Stop reason: HOSPADM

## 2025-01-09 RX ORDER — ONDANSETRON HYDROCHLORIDE 2 MG/ML
8 INJECTION, SOLUTION INTRAVENOUS EVERY 6 HOURS
Status: DISCONTINUED | OUTPATIENT
Start: 2025-01-09 | End: 2025-01-10 | Stop reason: HOSPADM

## 2025-01-09 RX ORDER — PROCHLORPERAZINE EDISYLATE 5 MG/ML
5 INJECTION INTRAMUSCULAR; INTRAVENOUS EVERY 6 HOURS PRN
Status: DISCONTINUED | OUTPATIENT
Start: 2025-01-09 | End: 2025-01-10 | Stop reason: HOSPADM

## 2025-01-09 RX ORDER — HEPARIN SODIUM 5000 [USP'U]/ML
5000 INJECTION, SOLUTION INTRAVENOUS; SUBCUTANEOUS ONCE
Status: COMPLETED | OUTPATIENT
Start: 2025-01-09 | End: 2025-01-09

## 2025-01-09 RX ORDER — ACETAMINOPHEN 650 MG/20.3ML
500 LIQUID ORAL
Status: DISCONTINUED | OUTPATIENT
Start: 2025-01-09 | End: 2025-01-09

## 2025-01-09 RX ORDER — SODIUM CHLORIDE 9 MG/ML
INJECTION, SOLUTION INTRAVENOUS CONTINUOUS
Status: DISCONTINUED | OUTPATIENT
Start: 2025-01-09 | End: 2025-01-09

## 2025-01-09 RX ORDER — GLUCAGON 1 MG
1 KIT INJECTION
Status: DISCONTINUED | OUTPATIENT
Start: 2025-01-09 | End: 2025-01-09 | Stop reason: HOSPADM

## 2025-01-09 RX ORDER — SCOLOPAMINE TRANSDERMAL SYSTEM 1 MG/1
1 PATCH, EXTENDED RELEASE TRANSDERMAL ONCE
Status: DISCONTINUED | OUTPATIENT
Start: 2025-01-09 | End: 2025-01-10 | Stop reason: HOSPADM

## 2025-01-09 RX ORDER — CEFAZOLIN SODIUM 1 G/3ML
INJECTION, POWDER, FOR SOLUTION INTRAMUSCULAR; INTRAVENOUS
Status: DISCONTINUED | OUTPATIENT
Start: 2025-01-09 | End: 2025-01-09

## 2025-01-09 RX ORDER — ACETAMINOPHEN 10 MG/ML
1000 INJECTION, SOLUTION INTRAVENOUS ONCE
Status: COMPLETED | OUTPATIENT
Start: 2025-01-09 | End: 2025-01-09

## 2025-01-09 RX ORDER — PHENYLEPHRINE HYDROCHLORIDE 10 MG/ML
INJECTION INTRAVENOUS
Status: DISCONTINUED | OUTPATIENT
Start: 2025-01-09 | End: 2025-01-09

## 2025-01-09 RX ORDER — SCOLOPAMINE TRANSDERMAL SYSTEM 1 MG/1
1 PATCH, EXTENDED RELEASE TRANSDERMAL
Status: DISCONTINUED | OUTPATIENT
Start: 2025-01-09 | End: 2025-01-09

## 2025-01-09 RX ORDER — PANTOPRAZOLE SODIUM 40 MG/10ML
40 INJECTION, POWDER, LYOPHILIZED, FOR SOLUTION INTRAVENOUS 2 TIMES DAILY
Status: DISCONTINUED | OUTPATIENT
Start: 2025-01-09 | End: 2025-01-10 | Stop reason: HOSPADM

## 2025-01-09 RX ORDER — ACETAMINOPHEN 650 MG/20.3ML
1000 LIQUID ORAL EVERY 8 HOURS
Status: DISCONTINUED | OUTPATIENT
Start: 2025-01-09 | End: 2025-01-10 | Stop reason: HOSPADM

## 2025-01-09 RX ORDER — ROCURONIUM BROMIDE 10 MG/ML
INJECTION, SOLUTION INTRAVENOUS
Status: DISCONTINUED | OUTPATIENT
Start: 2025-01-09 | End: 2025-01-09

## 2025-01-09 RX ORDER — GABAPENTIN 250 MG/5ML
300 SOLUTION ORAL 2 TIMES DAILY
Status: DISCONTINUED | OUTPATIENT
Start: 2025-01-09 | End: 2025-01-10 | Stop reason: HOSPADM

## 2025-01-09 RX ORDER — AMLODIPINE BESYLATE 10 MG/1
10 TABLET ORAL DAILY
Status: DISCONTINUED | OUTPATIENT
Start: 2025-01-10 | End: 2025-01-10 | Stop reason: HOSPADM

## 2025-01-09 RX ORDER — KETAMINE HCL IN 0.9 % NACL 50 MG/5 ML
SYRINGE (ML) INTRAVENOUS
Status: DISCONTINUED | OUTPATIENT
Start: 2025-01-09 | End: 2025-01-09

## 2025-01-09 RX ORDER — BUPROPION HYDROCHLORIDE 75 MG/1
75 TABLET ORAL 2 TIMES DAILY
Status: DISCONTINUED | OUTPATIENT
Start: 2025-01-10 | End: 2025-01-10 | Stop reason: HOSPADM

## 2025-01-09 RX ORDER — KETOROLAC TROMETHAMINE 15 MG/ML
15 INJECTION, SOLUTION INTRAMUSCULAR; INTRAVENOUS EVERY 6 HOURS PRN
Status: DISCONTINUED | OUTPATIENT
Start: 2025-01-09 | End: 2025-01-09

## 2025-01-09 RX ORDER — SODIUM CHLORIDE 0.9 % (FLUSH) 0.9 %
10 SYRINGE (ML) INJECTION
Status: DISCONTINUED | OUTPATIENT
Start: 2025-01-09 | End: 2025-01-09

## 2025-01-09 RX ADMIN — DEXAMETHASONE SODIUM PHOSPHATE 8 MG: 4 INJECTION, SOLUTION INTRAMUSCULAR; INTRAVENOUS at 07:01

## 2025-01-09 RX ADMIN — LIDOCAINE HYDROCHLORIDE 100 MG: 20 INJECTION INTRAVENOUS at 07:01

## 2025-01-09 RX ADMIN — DEXMEDETOMIDINE 5 MCG: 100 INJECTION, SOLUTION, CONCENTRATE INTRAVENOUS at 09:01

## 2025-01-09 RX ADMIN — OXYCODONE HYDROCHLORIDE 5 MG: 5 SOLUTION ORAL at 10:01

## 2025-01-09 RX ADMIN — SODIUM CHLORIDE: 0.9 INJECTION, SOLUTION INTRAVENOUS at 07:01

## 2025-01-09 RX ADMIN — PHENYLEPHRINE HYDROCHLORIDE 150 MCG: 10 INJECTION INTRAVENOUS at 07:01

## 2025-01-09 RX ADMIN — Medication 15 MG: at 08:01

## 2025-01-09 RX ADMIN — HEPARIN SODIUM 5000 UNITS: 5000 INJECTION INTRAVENOUS; SUBCUTANEOUS at 05:01

## 2025-01-09 RX ADMIN — CEFAZOLIN 2 G: 330 INJECTION, POWDER, FOR SOLUTION INTRAMUSCULAR; INTRAVENOUS at 07:01

## 2025-01-09 RX ADMIN — ROCURONIUM BROMIDE 5 MG: 10 INJECTION, SOLUTION INTRAVENOUS at 07:01

## 2025-01-09 RX ADMIN — ACETAMINOPHEN 999.01 MG: 650 SOLUTION ORAL at 02:01

## 2025-01-09 RX ADMIN — PANTOPRAZOLE SODIUM 40 MG: 40 INJECTION, POWDER, FOR SOLUTION INTRAVENOUS at 10:01

## 2025-01-09 RX ADMIN — SUGAMMADEX 400 MG: 100 INJECTION, SOLUTION INTRAVENOUS at 09:01

## 2025-01-09 RX ADMIN — ROCURONIUM BROMIDE 20 MG: 10 INJECTION, SOLUTION INTRAVENOUS at 08:01

## 2025-01-09 RX ADMIN — PHENYLEPHRINE HYDROCHLORIDE 150 MCG: 10 INJECTION INTRAVENOUS at 08:01

## 2025-01-09 RX ADMIN — GABAPENTIN 300 MG: 250 SOLUTION ORAL at 10:01

## 2025-01-09 RX ADMIN — MIDAZOLAM HYDROCHLORIDE 2 MG: 2 INJECTION, SOLUTION INTRAMUSCULAR; INTRAVENOUS at 07:01

## 2025-01-09 RX ADMIN — ONDANSETRON 8 MG: 2 INJECTION INTRAMUSCULAR; INTRAVENOUS at 10:01

## 2025-01-09 RX ADMIN — PROPOFOL 25 MCG/KG/MIN: 10 INJECTION, EMULSION INTRAVENOUS at 08:01

## 2025-01-09 RX ADMIN — PROPOFOL 50 MG: 10 INJECTION, EMULSION INTRAVENOUS at 07:01

## 2025-01-09 RX ADMIN — DEXMEDETOMIDINE 5 MCG: 100 INJECTION, SOLUTION, CONCENTRATE INTRAVENOUS at 07:01

## 2025-01-09 RX ADMIN — SODIUM CHLORIDE, POTASSIUM CHLORIDE, SODIUM LACTATE AND CALCIUM CHLORIDE: 600; 310; 30; 20 INJECTION, SOLUTION INTRAVENOUS at 10:01

## 2025-01-09 RX ADMIN — SODIUM CHLORIDE, POTASSIUM CHLORIDE, SODIUM LACTATE AND CALCIUM CHLORIDE: 600; 310; 30; 20 INJECTION, SOLUTION INTRAVENOUS at 06:01

## 2025-01-09 RX ADMIN — METHOCARBAMOL 500 MG: 1000 INJECTION, SOLUTION INTRAMUSCULAR; INTRAVENOUS at 10:01

## 2025-01-09 RX ADMIN — ENOXAPARIN SODIUM 40 MG: 40 INJECTION SUBCUTANEOUS at 05:01

## 2025-01-09 RX ADMIN — PROPOFOL 200 MG: 10 INJECTION, EMULSION INTRAVENOUS at 07:01

## 2025-01-09 RX ADMIN — ROCURONIUM BROMIDE 45 MG: 10 INJECTION, SOLUTION INTRAVENOUS at 07:01

## 2025-01-09 RX ADMIN — SCOPOLAMINE 1 PATCH: 1.5 PATCH, EXTENDED RELEASE TRANSDERMAL at 05:01

## 2025-01-09 RX ADMIN — KETOROLAC TROMETHAMINE 15 MG: 15 INJECTION, SOLUTION INTRAMUSCULAR; INTRAVENOUS at 05:01

## 2025-01-09 RX ADMIN — GABAPENTIN 300 MG: 250 SOLUTION ORAL at 09:01

## 2025-01-09 RX ADMIN — ACETAMINOPHEN 1000 MG: 10 INJECTION, SOLUTION INTRAVENOUS at 05:01

## 2025-01-09 RX ADMIN — PROPOFOL 40 MG: 10 INJECTION, EMULSION INTRAVENOUS at 09:01

## 2025-01-09 RX ADMIN — Medication 25 MG: at 07:01

## 2025-01-09 RX ADMIN — ONDANSETRON 8 MG: 2 INJECTION INTRAMUSCULAR; INTRAVENOUS at 05:01

## 2025-01-09 RX ADMIN — DEXMEDETOMIDINE 5 MCG: 100 INJECTION, SOLUTION, CONCENTRATE INTRAVENOUS at 08:01

## 2025-01-09 RX ADMIN — ONDANSETRON 4 MG: 2 INJECTION INTRAMUSCULAR; INTRAVENOUS at 09:01

## 2025-01-09 RX ADMIN — LIDOCAINE HYDROCHLORIDE 10 MG: 10 INJECTION, SOLUTION EPIDURAL; INFILTRATION; INTRACAUDAL; PERINEURAL at 05:01

## 2025-01-09 RX ADMIN — ACETAMINOPHEN 999.01 MG: 650 SOLUTION ORAL at 09:01

## 2025-01-09 RX ADMIN — ONDANSETRON 8 MG: 2 INJECTION INTRAMUSCULAR; INTRAVENOUS at 11:01

## 2025-01-09 RX ADMIN — ROCURONIUM BROMIDE 20 MG: 10 INJECTION, SOLUTION INTRAVENOUS at 07:01

## 2025-01-09 RX ADMIN — MUPIROCIN: 20 OINTMENT TOPICAL at 06:01

## 2025-01-09 RX ADMIN — PROPOFOL 30 MG: 10 INJECTION, EMULSION INTRAVENOUS at 09:01

## 2025-01-09 NOTE — TRANSFER OF CARE
"Anesthesia Transfer of Care Note    Patient: Karla Arzola    Procedure(s) Performed: Procedure(s) (LRB):  GASTRECTOMY, SLEEVE, LAPAROSCOPIC with intraop EGD, Outpatient, 23 hour observation (N/A)  BLOCK, TRANSVERSUS ABDOMINIS PLANE  REPAIR, HERNIA, HIATAL, LAPAROSCOPIC    Patient location: PACU    Transport from OR: Transported from OR on room air with adequate spontaneous ventilation    Post pain: adequate analgesia    Post assessment: no apparent anesthetic complications    Post vital signs: stable    Level of consciousness: awake and sedated    Nausea/Vomiting: no nausea/vomiting    Complications: none    Transfer of care protocol was followed      Last vitals: Visit Vitals  /64 (BP Location: Left arm, Patient Position: Lying)   Pulse 96   Temp 36.5 °C (97.7 °F) (Skin)   Resp 20   Ht 5' 6" (1.676 m)   Wt 109 kg (240 lb 4.8 oz)   LMP 06/11/2015   SpO2 100%   Breastfeeding No   BMI 38.79 kg/m²     "

## 2025-01-09 NOTE — INTERVAL H&P NOTE
The patient has been examined and the H&P has been reviewed:    I concur with the findings and no changes have occurred since H&P was written.    Procedure risks, benefits and alternative options discussed and understood by patient/family.          Active Hospital Problems    Diagnosis  POA    *Morbid obesity with BMI of 40.0-44.9, adult [E66.01, Z68.41]  Not Applicable      Resolved Hospital Problems   No resolved problems to display.     Angeles Moise MD  General Surgery PGY-1  01/09/2025

## 2025-01-09 NOTE — ANESTHESIA POSTPROCEDURE EVALUATION
Anesthesia Post Evaluation    Patient: Karla Arzola    Procedure(s) Performed: Procedure(s) (LRB):  GASTRECTOMY, SLEEVE, LAPAROSCOPIC with intraop EGD, Outpatient, 23 hour observation (N/A)  BLOCK, TRANSVERSUS ABDOMINIS PLANE  REPAIR, HERNIA, HIATAL, LAPAROSCOPIC    Final Anesthesia Type: general      Patient location during evaluation: PACU  Patient participation: Yes- Able to Participate  Level of consciousness: awake and alert and oriented  Post-procedure vital signs: reviewed and stable  Pain management: adequate  Airway patency: patent    PONV status at discharge: No PONV  Anesthetic complications: no      Cardiovascular status: blood pressure returned to baseline  Respiratory status: unassisted and spontaneous ventilation  Hydration status: euvolemic  Follow-up not needed.              Vitals Value Taken Time   /78 01/09/25 1232   Temp 36.4 °C (97.5 °F) 01/09/25 1003   Pulse 86 01/09/25 1240   Resp 14 01/09/25 1240   SpO2 100 % 01/09/25 1240   Vitals shown include unfiled device data.      Event Time   Out of Recovery 10:15:00         Pain/Marina Score: Pain Rating Prior to Med Admin: 7 (1/9/2025 10:46 AM)  Marina Score: 9 (1/9/2025 10:15 AM)

## 2025-01-09 NOTE — NURSING
Patient arrived to room 530 via bed, VS taken and documented, admission documentation completed, SCD's applied, instructed on IS use. Educated pt on plan of care and starting water protocol, pt verbalized understanding. Oriented to room and equipment, allowed time for questions, all questions answered, no further concerns voiced at this time, safety measures in place, call light within reach, instructed to call with any needs, verbalized understanding, continue current plan of care.

## 2025-01-09 NOTE — OP NOTE
DATE OF PROCEDURE: 1/9/2025    PRE OP DIAGNOSIS: Morbid obesity [E66.01]  BMI 39.0 - 39.9, adult  Essential hypertension [I10]  Hiatal hernia with GERD  Depression and anxiety    POST OP DIAGNOSIS: Morbid obesity [E66.01]  BMI 39.0 - 39.9, adult  Essential hypertension [I10]  Hiatal hernia with GERD  Depression and anxiety    PROCEDURE: Procedure(s) (LRB):  GASTRECTOMY, SLEEVE, LAPAROSCOPIC with intraop egd (N/A)  REPAIR, HERNIA, HIATAL, PARAESOPHAGEAL, LAPAROSCOPIC  GASTROPEXY, LAPAROSCOPIC  BLOCK, TRANSVERSUS ABDOMINIS PLANE (Bilateral)     Surgeons and Role:     * Elayne Arnold MD - Primary     * Stan Husain MD - Resident - Chief     * Angeles Moise MD - Resident - Assisting    ANESTHESIA: General    INDICATION: Patient is a 64 year-old morbidly obese female with BMI 39.68 kg/m² and multiple associated comorbidities including HTN, GERD on daily PPI, and depression/anxiety. She meets NIH consensus criteria for bariatric surgery and [place] [facility] [place] Pre-operative EGD demonstrated a hiatal hernia. After discussing the risks, benefits and alternatives to bariatric surgery, she elected to proceed with laparoscopic sleeve gastrectomy with possible hiatal hernia repair. Informed consent was obtained.     FINDINGS:  1. Moderately sized hiatal hernia with paraesophageal component, reduced and repaired.  2. Sleeve gastrectomy performed over 40Fr Bougie.     DESCRIPTION OF OPERATION: The patient was met in the pre-op area and her identity and consent confirmed. She was then brought back to the Operating Room and placed in the supine position. General anesthesia was induced and she was intubated without complication. SCDs and a warming blanket were placed and pre-operative antibiotics were infused within 30 minutes of the incision. Her abdomen was prepped and draped in sterile fashion and a pre-procedural pause was performed by all members of the surgical and anesthesia teams. Access to  peritoneum was gained 15 cm below the xiphoid to the left of midline using an 11-mm Optiview trocar under direct vision. Pneumoperitoneum to 15 mmHg with CO2 gas was obtained. Inspection yielded no injury. Bilateral transversus abdominal plane blocks were performed under direct vision instilling 10 mL 0.25% bupivacaine on each side. Two RUQ 5-mm subcostal trocars and one lateral LUQ 5-mm trocar was placed as well as a 12-mm trocar 1 handbreadth to the right of the camera port all under direct vision. The liver retractor was placed via the right lateral trocar. A moderately-sized hiatal hernia with paraesophageal component was noted. The pars flaccida was opened and divided. Using judicial use of the Harmonic scalpel and blunt dissection the hernia sac was divided from the crura and brought into the abdomen. The mediastinal attachments to the esophagus were bluntly divided until the herniated perigastric fat was fully reduced and the esophagus straigtened. The anterior and posterior vagus nerves were identified and preserved with the esophagus throughout. After dissection 4 cm of esophagus laid freely within the abdominal cavity without tension. The greater curve was taken down starting 6 cm from the pylorus going all the way to the base of the left beth taking all posterior gastric attachments with the Harmonic scalpel. The crural opening was moderate in size and was reapproximated with two 0-Ticron sutures posteriorly and one anteriorly. A grasper could just be placed between the crural closure and esophagus. A 40-Vietnamese bougie was passed towards the pylorus and the stomach was resected along the bougie starting 6 cm from the pylorus and coming out just a little bit at the angle of His. This was done using one green staple cartridge at the antrum and the remainder blue staples, all reinforced with Seam Guard. The gastrectomy was removed through the 12-mm trocar site. The bougie was removed. Endoscopy was performed.  The sleeve appeared appropriate size and configuration and there were no air leaks seen. The air was aspirated from the sleeve and the endoscope was withdrawn. The proximal sleeve was pexied to the underside of the left diaphragm with two 2-0 Ticron sutures. The liver retractor was removed. The 12-mm incision was closed with 0 Vicryl using a Foreign Elissa and the wound irrigated with saline.The abdomen was inspected and hemostasis ensured. The remaining trocars were removed under direct vision. Prior to removing the last trocar, the pneumoperitoneum was allowed to escape. The skin incisions were closed with 4-0 Monocryl and reinforced with Dermabond. The patient was awoken from anesthesia and extubated. She was brought to the PACU in good condition having tolerated the operation well. Surgical sponge and needle counts were correct at the end of the case.     EBL: 2 mL  Specimen: Portion of stomach  Drains: None  Complications: None apparent  Dispo: Surgical floor     I was present and scrubbed for the entirety of this operation.      Elayne Oneill  1/9/2025

## 2025-01-09 NOTE — ANESTHESIA PROCEDURE NOTES
Intubation    Date/Time: 1/9/2025 7:27 AM    Performed by: Shilpa Long MD  Authorized by: Shlipa Long MD    Intubation:     Induction:  Intravenous    Intubated:  Postinduction    Mask Ventilation:  Not attempted    Attempts:  1    Attempted By:  Staff anesthesiologist    Method of Intubation:  Video laryngoscopy    Blade:  Nicole 3    Laryngeal View Grade: Grade I - full view of cords      Difficult Airway Encountered?: No      Complications:  None    Airway Device:  Oral endotracheal tube    Airway Device Size:  7.0    Style/Cuff Inflation:  Cuffed    Inflation Amount (mL):  7    Tube secured:  21    Secured at:  The lips    Placement Verified By:  Capnometry    Complicating Factors:  None    Findings Post-Intubation:  BS equal bilateral

## 2025-01-09 NOTE — BRIEF OP NOTE
[facility] - Surgery ([facility])  Brief Operative Note    SUMMARY     Surgery Date: 1/9/2025     Surgeons and Role:     * Elayne Arnold MD - Primary     * Angeles Moise MD - Resident - Assisting     * Stan Husain MD - Resident - Chief        Pre-op Diagnosis:  Essential hypertension [I10]  Morbid obesity [E66.01]    Post-op Diagnosis:  Post-Op Diagnosis Codes:     * Essential hypertension [I10]     * Morbid obesity [E66.01]    Procedure(s) (LRB):  GASTRECTOMY, SLEEVE, LAPAROSCOPIC with intraop EGD, Outpatient, 23 hour observation (N/A)  BLOCK, TRANSVERSUS ABDOMINIS PLANE  REPAIR, HERNIA, HIATAL, LAPAROSCOPIC    Anesthesia: General    Implants:  Implant Name Type Inv. Item Serial No.  Lot No. LRB No. Used Action   SEAMGUARD ESCHELON 60 MM. - [id number]  SEAMGUARD ESCHELON 60 MM.  [employer] [id number] N/A 1 Implanted   SEAMGUARD ESCHELON 60 MM. - [id number]  SEAMGUARD ESCHELON 60 MM.  [employer] [id number] N/A 1 Implanted   SEAMGUARD ESCHELON 60 MM. - [id number]  SEAMGUARD ESCHELON 60 MM.  [employer] [id number] N/A 1 Implanted   SEAMGUARD ESCHELON 60 MM. - [id number]  SEAMGUARD ESCHELON 60 MM.  [employer] [id number] N/A 1 Implanted   SEAMGUARD ESCHELON 60 MM. - [id number]  SEAMGUARD ESCHELON 60 MM.  [employer] [id number] N/A 1 Implanted       Operative Findings: See Op Note    Estimated Blood Loss: * No values recorded between 1/9/2025  7:46 AM and 1/9/2025  9:58 AM *    Estimated Blood Loss has been documented.         Specimens:   Specimen (24h ago, onward)       Start     Ordered    01/09/25 0918  Specimen to Pathology, Surgery General Surgery  Once        Comments: Pre-op Diagnosis: Essential hypertension [I10]Morbid obesity [E66.01]Procedure(s):GASTRECTOMY, SLEEVE, LAPAROSCOPIC with intraop EGD, Outpatient, 23 hour observationBLOCK, TRANSVERSUS ABDOMINIS PLANE Number of specimens: 1Name of specimens: 1. STOMACH-PERM     References:    Click here for ordering Quick Tip   Question Answer Comment    Procedure Type: General Surgery    Release to patient Immediate        01/09/25 0918                    [id number]

## 2025-01-09 NOTE — NURSING TRANSFER
Nursing Transfer Note      1/9/2025   11:21 AM    Nurse giving handoff: Prudencio ALEXANDER PACU  Nurse receiving handoff: Reinaldo ALEXANDER POST OP    Reason patient is being transferred: post surgery/anesthesia    Transfer To: 530    Transfer via bed    Transfer with IV pump/fluid LR at 125cc/hr    Transported by PCT x2    Transfer Vital Signs:  Please see flow sheet    Telemetry: Box Number N/A  Order for Tele Monitor? No    Additional Lines: purewick    Medicines sent: Simethicone oral syringe x2    Any special needs or follow-up needed: routine    Patient belongings transferred with patient: No    Chart send with patient: Yes    Notified: family via surgical texting system     Patient reassessed at: 1/9/2025 at 1415    Upon arrival to floor: cardiac monitor applied, patient oriented to room, and bed in lowest position

## 2025-01-10 ENCOUNTER — DOCUMENTATION ONLY (OUTPATIENT)
Dept: BARIATRICS | Facility: CLINIC | Age: 65
End: 2025-01-10
Payer: COMMERCIAL

## 2025-01-10 VITALS
OXYGEN SATURATION: 94 % | HEART RATE: 89 BPM | DIASTOLIC BLOOD PRESSURE: 59 MMHG | SYSTOLIC BLOOD PRESSURE: 124 MMHG | RESPIRATION RATE: 18 BRPM | BODY MASS INDEX: 38.62 KG/M2 | WEIGHT: 240.31 LBS | HEIGHT: 66 IN | TEMPERATURE: 99 F

## 2025-01-10 LAB
ANION GAP SERPL CALC-SCNC: 12 MMOL/L (ref 8–16)
BASOPHILS # BLD AUTO: 0.01 K/UL (ref 0–0.2)
BASOPHILS NFR BLD: 0.1 % (ref 0–1.9)
BUN SERPL-MCNC: 8 MG/DL (ref 8–23)
CALCIUM SERPL-MCNC: 8.7 MG/DL (ref 8.7–10.5)
CHLORIDE SERPL-SCNC: 107 MMOL/L (ref 95–110)
CO2 SERPL-SCNC: 21 MMOL/L (ref 23–29)
CREAT SERPL-MCNC: 0.8 MG/DL (ref 0.5–1.4)
DIFFERENTIAL METHOD BLD: ABNORMAL
EOSINOPHIL # BLD AUTO: 0 K/UL (ref 0–0.5)
EOSINOPHIL NFR BLD: 0 % (ref 0–8)
ERYTHROCYTE [DISTWIDTH] IN BLOOD BY AUTOMATED COUNT: 14.9 % (ref 11.5–14.5)
EST. GFR  (NO RACE VARIABLE): >60 ML/MIN/1.73 M^2
GLUCOSE SERPL-MCNC: 90 MG/DL (ref 70–110)
HCT VFR BLD AUTO: 39.6 % (ref 37–48.5)
HGB BLD-MCNC: 12.2 G/DL (ref 12–16)
IMM GRANULOCYTES # BLD AUTO: 0.03 K/UL (ref 0–0.04)
IMM GRANULOCYTES NFR BLD AUTO: 0.3 % (ref 0–0.5)
LYMPHOCYTES # BLD AUTO: 1 K/UL (ref 1–4.8)
LYMPHOCYTES NFR BLD: 10.9 % (ref 18–48)
MAGNESIUM SERPL-MCNC: 2 MG/DL (ref 1.6–2.6)
MCH RBC QN AUTO: 28.6 PG (ref 27–31)
MCHC RBC AUTO-ENTMCNC: 30.8 G/DL (ref 32–36)
MCV RBC AUTO: 93 FL (ref 82–98)
MONOCYTES # BLD AUTO: 0.6 K/UL (ref 0.3–1)
MONOCYTES NFR BLD: 6.8 % (ref 4–15)
NEUTROPHILS # BLD AUTO: 7.3 K/UL (ref 1.8–7.7)
NEUTROPHILS NFR BLD: 81.9 % (ref 38–73)
NRBC BLD-RTO: 0 /100 WBC
PHOSPHATE SERPL-MCNC: 2.9 MG/DL (ref 2.7–4.5)
PLATELET # BLD AUTO: 293 K/UL (ref 150–450)
PMV BLD AUTO: 11.5 FL (ref 9.2–12.9)
POTASSIUM SERPL-SCNC: 4 MMOL/L (ref 3.5–5.1)
RBC # BLD AUTO: 4.26 M/UL (ref 4–5.4)
SODIUM SERPL-SCNC: 140 MMOL/L (ref 136–145)
WBC # BLD AUTO: 8.97 K/UL (ref 3.9–12.7)

## 2025-01-10 PROCEDURE — 84100 ASSAY OF PHOSPHORUS: CPT | Performed by: SURGERY

## 2025-01-10 PROCEDURE — 80048 BASIC METABOLIC PNL TOTAL CA: CPT | Performed by: SURGERY

## 2025-01-10 PROCEDURE — 85025 COMPLETE CBC W/AUTO DIFF WBC: CPT | Performed by: SURGERY

## 2025-01-10 PROCEDURE — 25000003 PHARM REV CODE 250: Performed by: STUDENT IN AN ORGANIZED HEALTH CARE EDUCATION/TRAINING PROGRAM

## 2025-01-10 PROCEDURE — 63600175 PHARM REV CODE 636 W HCPCS: Performed by: STUDENT IN AN ORGANIZED HEALTH CARE EDUCATION/TRAINING PROGRAM

## 2025-01-10 PROCEDURE — 25000003 PHARM REV CODE 250: Performed by: SURGERY

## 2025-01-10 PROCEDURE — 36415 COLL VENOUS BLD VENIPUNCTURE: CPT | Performed by: SURGERY

## 2025-01-10 PROCEDURE — 83735 ASSAY OF MAGNESIUM: CPT | Performed by: SURGERY

## 2025-01-10 PROCEDURE — 63600175 PHARM REV CODE 636 W HCPCS: Performed by: SURGERY

## 2025-01-10 PROCEDURE — 94761 N-INVAS EAR/PLS OXIMETRY MLT: CPT

## 2025-01-10 RX ADMIN — SIMETHICONE 40 MG: 20 SUSPENSION/ DROPS ORAL at 08:01

## 2025-01-10 RX ADMIN — GABAPENTIN 300 MG: 250 SOLUTION ORAL at 08:01

## 2025-01-10 RX ADMIN — METHOCARBAMOL 500 MG: 1000 INJECTION, SOLUTION INTRAMUSCULAR; INTRAVENOUS at 06:01

## 2025-01-10 RX ADMIN — ACETAMINOPHEN 999.01 MG: 650 SOLUTION ORAL at 02:01

## 2025-01-10 RX ADMIN — SODIUM CHLORIDE, POTASSIUM CHLORIDE, SODIUM LACTATE AND CALCIUM CHLORIDE: 600; 310; 30; 20 INJECTION, SOLUTION INTRAVENOUS at 10:01

## 2025-01-10 RX ADMIN — PANTOPRAZOLE SODIUM 40 MG: 40 INJECTION, POWDER, FOR SOLUTION INTRAVENOUS at 09:01

## 2025-01-10 RX ADMIN — ACETAMINOPHEN 999.01 MG: 650 SOLUTION ORAL at 05:01

## 2025-01-10 RX ADMIN — METHOCARBAMOL 500 MG: 1000 INJECTION, SOLUTION INTRAMUSCULAR; INTRAVENOUS at 02:01

## 2025-01-10 RX ADMIN — ONDANSETRON 8 MG: 2 INJECTION INTRAMUSCULAR; INTRAVENOUS at 11:01

## 2025-01-10 RX ADMIN — BUPROPION HYDROCHLORIDE 75 MG: 75 TABLET, FILM COATED ORAL at 08:01

## 2025-01-10 RX ADMIN — ONDANSETRON 8 MG: 2 INJECTION INTRAMUSCULAR; INTRAVENOUS at 06:01

## 2025-01-10 RX ADMIN — AMLODIPINE BESYLATE 10 MG: 10 TABLET ORAL at 08:01

## 2025-01-10 NOTE — PROGRESS NOTES
Rounded on pt s/p bariatric surgery. Provided pt postooperative review material, Immediate Post Bariatric Surgery Highlights. All information reviewed and questions answered.  Pt displayed underatanding.

## 2025-01-10 NOTE — PROGRESS NOTES
[facility] - Surgery  General Surgery  Progress Note    Subjective:     History of Present Illness:  No notes on file    Post-Op Info:  Procedure(s) (LRB):  GASTRECTOMY, SLEEVE, LAPAROSCOPIC with intraop EGD, Outpatient, 23 hour observation (N/A)  BLOCK, TRANSVERSUS ABDOMINIS PLANE  REPAIR, HERNIA, HIATAL, LAPAROSCOPIC   1 Day Post-Op     Interval History: NAEON. Tolerating water protocol with some nausea, no vomiting. Afebrile, HDS. Abdominal pain well controlled.    Medications:  Continuous Infusions:   lactated ringers   Intravenous Continuous 125 mL/hr at 01/09/25 1809 New Bag at 01/09/25 1809     Scheduled Meds:   acetaminophen  999.0148 mg Oral Q8H    amLODIPine  10 mg Oral Daily    buPROPion  75 mg Oral BID    enoxparin  40 mg Subcutaneous Q24H (treatment, non-standard time)    gabapentin  300 mg Oral BID    methocarbamol injection  500 mg Intravenous Q8H    ondansetron  8 mg Intravenous Q6H    pantoprazole  40 mg Intravenous BID    scopolamine  1 patch Transdermal Once     PRN Meds:  Current Facility-Administered Medications:     acetaminophen, 499.5074 mg, Oral, Q24H PRN    oxyCODONE, 5 mg, Oral, Q6H PRN    prochlorperazine, 5 mg, Intravenous, Q6H PRN    simethicone, 40 mg, Oral, QID PRN     Review of patient's allergies indicates:  No Known Allergies  Objective:     Vital Signs (Most Recent):  Temp: 99.3 °F (37.4 °C) (01/10/25 0512)  Pulse: 95 (01/10/25 0512)  Resp: 18 (01/10/25 0512)  BP: 132/63 (01/10/25 0512)  SpO2: 96 % (01/10/25 0512) Vital Signs (24h Range):  Temp:  [97.5 °F (36.4 °C)-99.3 °F (37.4 °C)] 99.3 °F (37.4 °C)  Pulse:  [] 95  Resp:  [14-22] 18  SpO2:  [92 %-100 %] 96 %  BP: (121-157)/(62-86) 132/63     Weight: 109 kg (240 lb 4.8 oz)  Body mass index is 38.79 kg/m².    Intake/Output - Last 3 Shifts         01/08 0700 01/09 0659 01/09 0700  01/10 0659 01/10 0700  01/11 0659    IV Piggyback  1000     Total Intake(mL/kg)  1000 (9.2)     Urine (mL/kg/hr)  100 (0)     Total Output  100      Net  +900            Urine Occurrence  6 x              Physical Exam  Constitutional:       General: She is not in acute distress.     Appearance: Normal appearance.   HENT:      Head: Normocephalic and atraumatic.   Eyes:      General: No scleral icterus.     Conjunctiva/sclera: Conjunctivae normal.      Pupils: Pupils are equal, round, and reactive to light.   Neck:      Trachea: No tracheal deviation.   Cardiovascular:      Rate and Rhythm: Normal rate and regular rhythm.   Pulmonary:      Effort: Pulmonary effort is normal. No respiratory distress.      Breath sounds: No stridor.   Abdominal:      General: Abdomen is flat. There is no distension.      Palpations: Abdomen is soft.      Tenderness: There is no abdominal tenderness. There is no guarding.      Comments: Soft, appropriately tender to palpation  Port site incisions with Dermabond, c/d/i   Musculoskeletal:         General: No deformity or signs of injury. Normal range of motion.      Cervical back: No edema.   Skin:     General: Skin is warm and dry.      Coloration: Skin is not jaundiced.   Neurological:      General: No focal deficit present.      Mental Status: She is alert and oriented to person, place, and time.   Psychiatric:         Mood and Affect: Mood normal.         Behavior: Behavior normal.          Significant Labs:  I have reviewed all pertinent lab results within the past 24 hours.  CBC:   Recent Labs   Lab 01/10/25  0244   WBC 8.97   RBC 4.26   HGB 12.2   HCT 39.6      MCV 93   MCH 28.6   MCHC 30.8*     BMP:   Recent Labs   Lab 01/10/25  0244   GLU 90      K 4.0      CO2 21*   BUN 8   CREATININE 0.8   CALCIUM 8.7   MG 2.0       Significant Diagnostics:  I have reviewed all pertinent imaging results/findings within the past 24 hours.  Assessment/Plan:     * Morbid obesity with BMI of 40.0-44.9, adult  Karla Arzola is a 64 y.o. female with PMHx of obesity and hiatal hernia now s/p lap sleeve gastrectomy and  hiatal hernia repair with gastropexy on 1/9/25.      - tolerating water protocol, continue on pathway   - advance to bariatric CLD when water protocol concluded  - CRUSH or open all meds. Liquid or crushed meds only. No pills  - scheduled anti-emetics with PRN breakthrough  - MMPC  - home meds started as appropriate  - OOB, ambulate in halls  - encourage IS  - DVT ppx    Dispo: possible PM discharge pending tolerating liquid diet          Greta Landis MD  General Surgery  [facility] - Surgery

## 2025-01-10 NOTE — SUBJECTIVE & OBJECTIVE
Interval History: NAEON. Tolerating water protocol with some nausea, no vomiting. Afebrile, HDS. Abdominal pain well controlled.    Medications:  Continuous Infusions:   lactated ringers   Intravenous Continuous 125 mL/hr at 01/09/25 1809 New Bag at 01/09/25 1809     Scheduled Meds:   acetaminophen  999.0148 mg Oral Q8H    amLODIPine  10 mg Oral Daily    buPROPion  75 mg Oral BID    enoxparin  40 mg Subcutaneous Q24H (treatment, non-standard time)    gabapentin  300 mg Oral BID    methocarbamol injection  500 mg Intravenous Q8H    ondansetron  8 mg Intravenous Q6H    pantoprazole  40 mg Intravenous BID    scopolamine  1 patch Transdermal Once     PRN Meds:  Current Facility-Administered Medications:     acetaminophen, 499.5074 mg, Oral, Q24H PRN    oxyCODONE, 5 mg, Oral, Q6H PRN    prochlorperazine, 5 mg, Intravenous, Q6H PRN    simethicone, 40 mg, Oral, QID PRN     Review of patient's allergies indicates:  No Known Allergies  Objective:     Vital Signs (Most Recent):  Temp: 99.3 °F (37.4 °C) (01/10/25 0512)  Pulse: 95 (01/10/25 0512)  Resp: 18 (01/10/25 0512)  BP: 132/63 (01/10/25 0512)  SpO2: 96 % (01/10/25 0512) Vital Signs (24h Range):  Temp:  [97.5 °F (36.4 °C)-99.3 °F (37.4 °C)] 99.3 °F (37.4 °C)  Pulse:  [] 95  Resp:  [14-22] 18  SpO2:  [92 %-100 %] 96 %  BP: (121-157)/(62-86) 132/63     Weight: 109 kg (240 lb 4.8 oz)  Body mass index is 38.79 kg/m².    Intake/Output - Last 3 Shifts         01/08 0700 01/09 0659 01/09 0700  01/10 0659 01/10 0700  01/11 0659    IV Piggyback  1000     Total Intake(mL/kg)  1000 (9.2)     Urine (mL/kg/hr)  100 (0)     Total Output  100     Net  +900            Urine Occurrence  6 x              Physical Exam  Constitutional:       General: She is not in acute distress.     Appearance: Normal appearance.   HENT:      Head: Normocephalic and atraumatic.   Eyes:      General: No scleral icterus.     Conjunctiva/sclera: Conjunctivae normal.      Pupils: Pupils are equal,  round, and reactive to light.   Neck:      Trachea: No tracheal deviation.   Cardiovascular:      Rate and Rhythm: Normal rate and regular rhythm.   Pulmonary:      Effort: Pulmonary effort is normal. No respiratory distress.      Breath sounds: No stridor.   Abdominal:      General: Abdomen is flat. There is no distension.      Palpations: Abdomen is soft.      Tenderness: There is no abdominal tenderness. There is no guarding.      Comments: Soft, appropriately tender to palpation  Port site incisions with Dermabond, c/d/i   Musculoskeletal:         General: No deformity or signs of injury. Normal range of motion.      Cervical back: No edema.   Skin:     General: Skin is warm and dry.      Coloration: Skin is not jaundiced.   Neurological:      General: No focal deficit present.      Mental Status: She is alert and oriented to person, place, and time.   Psychiatric:         Mood and Affect: Mood normal.         Behavior: Behavior normal.          Significant Labs:  I have reviewed all pertinent lab results within the past 24 hours.  CBC:   Recent Labs   Lab 01/10/25  0244   WBC 8.97   RBC 4.26   HGB 12.2   HCT 39.6      MCV 93   MCH 28.6   MCHC 30.8*     BMP:   Recent Labs   Lab 01/10/25  0244   GLU 90      K 4.0      CO2 21*   BUN 8   CREATININE 0.8   CALCIUM 8.7   MG 2.0       Significant Diagnostics:  I have reviewed all pertinent imaging results/findings within the past 24 hours.

## 2025-01-10 NOTE — PLAN OF CARE
Problem: Adult Inpatient Plan of Care  Goal: Plan of Care Review  Outcome: Progressing  Goal: Patient-Specific Goal (Individualized)  Outcome: Progressing  Goal: Absence of Hospital-Acquired Illness or Injury  Outcome: Progressing  Goal: Optimal Comfort and Wellbeing  Outcome: Progressing  Goal: Readiness for Transition of Care  Outcome: Progressing     Problem: Wound  Goal: Optimal Coping  Outcome: Progressing  Goal: Optimal Functional Ability  Outcome: Progressing  Goal: Absence of Infection Signs and Symptoms  Outcome: Progressing  Goal: Improved Oral Intake  Outcome: Progressing  Goal: Optimal Pain Control and Function  Outcome: Progressing  Goal: Skin Health and Integrity  Outcome: Progressing  Goal: Optimal Wound Healing  Outcome: Progressing     Problem: Bariatric Surgery  Goal: Optimal Coping with Surgery  Outcome: Progressing  Goal: Absence of Bleeding  Outcome: Progressing  Goal: Fluid and Electrolyte Balance  Outcome: Progressing  Goal: Effective Gastrointestinal Motility and Elimination  Outcome: Progressing  Goal: Blood Glucose Level Within Desired Range  Outcome: Progressing  Goal: Absence of Infection Signs and Symptoms  Outcome: Progressing  Goal: Anesthesia/Sedation Recovery  Outcome: Progressing  Goal: Optimal Pain Control and Function  Outcome: Progressing  Goal: Nausea and Vomiting Relief  Outcome: Progressing  Goal: Effective Urinary Elimination  Outcome: Progressing  Goal: Effective Oxygenation and Ventilation  Outcome: Progressing     Problem: Fall Injury Risk  Goal: Absence of Fall and Fall-Related Injury  Outcome: Progressing

## 2025-01-10 NOTE — PLAN OF CARE
Problem: Adult Inpatient Plan of Care  Goal: Plan of Care Review  Reactivated  Goal: Patient-Specific Goal (Individualized)  Reactivated  Goal: Absence of Hospital-Acquired Illness or Injury  Reactivated  Goal: Optimal Comfort and Wellbeing  Reactivated  Goal: Readiness for Transition of Care  Reactivated     Problem: Wound  Goal: Optimal Coping  Reactivated  Goal: Optimal Functional Ability  Reactivated  Goal: Absence of Infection Signs and Symptoms  Reactivated  Goal: Improved Oral Intake  Reactivated  Goal: Optimal Pain Control and Function  Reactivated  Goal: Skin Health and Integrity  Reactivated  Goal: Optimal Wound Healing  Reactivated     Problem: Bariatric Surgery  Goal: Optimal Coping with Surgery  Reactivated  Goal: Absence of Bleeding  Reactivated  Goal: Fluid and Electrolyte Balance  Reactivated  Goal: Effective Gastrointestinal Motility and Elimination  Reactivated  Goal: Blood Glucose Level Within Desired Range  Reactivated  Goal: Absence of Infection Signs and Symptoms  Reactivated  Goal: Anesthesia/Sedation Recovery  Reactivated  Goal: Optimal Pain Control and Function  Reactivated  Goal: Nausea and Vomiting Relief  Reactivated  Goal: Effective Urinary Elimination  Reactivated  Goal: Effective Oxygenation and Ventilation  Reactivated     Problem: Fall Injury Risk  Goal: Absence of Fall and Fall-Related Injury  Reactivated   Pt is aaox4, currently resting in bed with no signs of acute distress. No reports of nausea or vomiting. Pt has ambulated twice to bathroom since arriving to unit. DVT prophylaxis administered per orders, scds on and functional. Surgical lap sites clean, intact, and approximated. Pt educated on water protocol upon arrival to unit, tolerating the protocol well so far. Safety measures are in place, bed is in the lowest position with wheels locked, side rails up x2, call light and personal belongings within reach. Daughter is present at the bedside. Continue plan of care.

## 2025-01-10 NOTE — DISCHARGE INSTRUCTIONS
Post-op instructions:    - No dressing needed  - Post-op bariatric clear liquid diet  - No driving while taking narcotic medication  - No lifting more than 10 pounds for 4 weeks  - OK to shower with warm soap and water. No soaking in any water  - Clinic visit to be scheduled in two weeks

## 2025-01-10 NOTE — DISCHARGE SUMMARY
[facility] - Surgery  General Surgery  Discharge Summary      Patient Name: Karla Arzola  MRN: [id number]  Admission Date: 1/9/2025  Hospital Length of Stay: 0 days  Discharge Date and Time:  01/10/2025 2:24 PM  Attending Physician: Elayne Arnold   Discharging Provider: Stan Husain MD  Primary Care Provider: Ninfa Frost MD     HPI: Karla Arzola is a 64 year-old morbidly obese female with BMI 39.68 kg/m² and multiple associated comorbidities including HTN, GERD on daily PPI, and depression/anxiety, who presents for pre-operative exam for weight loss surgery. She has failed multiple attempts at non-surgical methods of weight loss and has completed the [facility] program which she started on 5/15/24 at which time her BMI was 39.38 kg/m². She meets NIH consensus criteria for bariatric surgery and is interested in undergoing laparoscopic sleeve gastrectomy. Her medical history is also significant for undifferentiated connective tissue disease treated with Plaquenil.     Stress echo 10/15/24: Mild basal septal thickening, EF 60-65%, mild MVR and TVR, PASP 28mmHg, below average exercise capacity, test stopped because patient experienced SOB, no arrhythmia, negative for ischemia    Procedure(s) (LRB):  GASTRECTOMY, SLEEVE, LAPAROSCOPIC with intraop EGD, Outpatient, 23 hour observation (N/A)  BLOCK, TRANSVERSUS ABDOMINIS PLANE  REPAIR, HERNIA, HIATAL, LAPAROSCOPIC     Hospital Course: Karla Arzola presented to [facility] on the morning of 1/9/2025 for the procedure listed above. The procedure was performed without complication and she was transferred to the floor for further post op care and monitoring. Their postoperative course was uneventful and she progressed according to plan.  Her pain was controlled with a combination of IV and PO narcotic pain medications. Her diet has been advanced throughout her hospital stay. She is now ambulating without assistance, tolerating her  ordered clear liquid diet, pain is well controlled with PO pain medication, and she is voiding appropriately. She now meets all criteria for discharge.     Significant Diagnostic Studies: N/A    Pending Diagnostic Studies:       Procedure Component Value Units Date/Time    Specimen to Pathology, Surgery General Surgery [[id number]] Collected: 01/09/25 0918    Order Status: Sent Lab Status: In process Updated: 01/09/25 1428    Specimen: Tissue           Final Active Diagnoses:    Diagnosis Date Noted POA    PRINCIPAL PROBLEM:  Morbid obesity with BMI of 40.0-44.9, adult [E66.01, Z68.41]  Not Applicable    Obesity [E66.9] 01/09/2025 Yes      Problems Resolved During this Admission:      Discharged Condition: good    Disposition: Home or Self Care    Follow Up:   Follow-up Information       Elayne Arnold MD Follow up in 2 week(s).    Specialties: General Surgery, Bariatrics  Contact information:  [address]  [facility] [address]  [phone]                           Patient Instructions:      Diet Bariatric High Protein Clear Liquid   Order Comments: No soft drinks     Lifting restrictions (nothing greater than 10lbs)   Scheduling Instructions: Lifting restrictions:  nothing greater than 10lbs     No driving until:   Order Comments: No driving until off narcotic pain medication.  Can turn around without pain off narcotics.  At least 1 week.     Notify your health care provider if you experience any of the following:   Order Comments: temperature > 100.4, persistent nausea and vomiting or diarrhea, severe uncontrolled pain, redness, tenderness, or signs of infection (pain, swelling, redness, odor or green/yellow discharge around incision site), difficulty breathing or increased cough, severe persistent headache, worsening rash, persistent dizziness, light-headedness, or visual disturbances, increased confusion or weakness     No driving until:   Order Comments: No Driving while taking narcotic  pain medication     Notify your health care provider if you experience any of the following:  temperature >100.4     Notify your health care provider if you experience any of the following:  persistent nausea and vomiting or diarrhea     Notify your health care provider if you experience any of the following:  severe uncontrolled pain     Notify your health care provider if you experience any of the following:  redness, tenderness, or signs of infection (pain, swelling, redness, odor or green/yellow discharge around incision site)     No dressing needed     Activity as tolerated     Shower on day two and no bath     Medications:  Reconciled Home Medications:      Medication List        CHANGE how you take these medications      omeprazole 40 MG capsule  Commonly known as: PRILOSEC  Take 1 capsule (40 mg total) by mouth every morning. Open capsule and sprinkle granules for 2 weeks post-op  What changed: Another medication with the same name was removed. Continue taking this medication, and follow the directions you see here.            CONTINUE taking these medications      acetaminophen 500 mg Pwpk  Take 1g (2 packets) by mouth every 8 hours for at least 3 days and up to 5 days as needed. May take one additional 500mg dose (1 packet) per day for breakthrough pain. Do not exceed 4g in 24 hours.     acyclovir 200 MG capsule  Commonly known as: ZOVIRAX  Take 1 capsule (200 mg total) by mouth once daily.     AEROCHAMBER PLUS FLOW-VU  Generic drug: inhalation spacing device  Use as directed when taking inhaled medicine     albuterol 90 mcg/actuation inhaler  Commonly known as: VENTOLIN HFA  Inhale 2 puffs into the lungs every 6 (six) hours as needed for Wheezing. Rescue     amLODIPine 10 MG tablet  Commonly known as: NORVASC  Take 1 tablet (10 mg total) by mouth once daily.     benzonatate 200 MG capsule  Commonly known as: TESSALON  Take 1 capsule (200 mg total) by mouth 3 (three) times daily as needed for Cough.      buPROPion 150 MG TB24 tablet  Commonly known as: WELLBUTRIN XL  Take 1 tablet (150 mg total) by mouth once daily. (At noon)     citalopram 20 MG tablet  Commonly known as: CeleXA  Take 1 tablet (20 mg total) by mouth once daily.     CombiVENT RESPIMAT  mcg/actuation inhaler  Generic drug: ipratropium-albuteroL  Inhale 1 puff into the lungs every 6 (six) hours as needed for Wheezing or Shortness of Breath. Rescue     furosemide 20 MG tablet  Commonly known as: LASIX  Take 1 tablet (20 mg total) by mouth daily as needed (swelling).     gabapentin 300 MG capsule  Commonly known as: NEURONTIN  Take 1 capsule (300 mg total) by mouth 2 (two) times daily. Open capsule and empty prior to taking. Take one dose on morning of surgery prior to arrival. Take 1 capsule (300 mg) twice a day for 3 days post-op. Continue for an additional 2 days as needed.     hydroxychloroquine 200 mg tablet  Commonly known as: PLAQUENIL  Take 1 tablet (200 mg total) by mouth once daily.     multivitamin capsule  Take 1 capsule by mouth once daily.     ondansetron 8 MG Tbdl  Commonly known as: ZOFRAN-ODT  Dissolve 1 tablet (8 mg total) by mouth every 6 (six) hours as needed (nausea).     ursodioL 500 MG tablet  Commonly known as: ACTIGALL  Take 1 tablet (500 mg total) by mouth once daily.     VITAMIN D2 1,250 mcg (50,000 unit) capsule  Generic drug: ergocalciferol  Take 1 capsule (50,000 Units total) by mouth every 7 days.            ASK your doctor about these medications      ciclopirox 8 % Soln  Commonly known as: PENLAC  Apply to nails once daily     fluticasone propionate 50 mcg/actuation nasal spray  Commonly known as: FLONASE  1 spray (50 mcg total) by Each Nostril route once daily.     guaiFENesin 400 mg Tab  Commonly known as: HUMIBID E  Take 1 tablet (400 mg total) by mouth every 6 (six) hours as needed (for chest congestion).              Stan Husain MD  General Surgery  [facility] - Surgery

## 2025-01-10 NOTE — NURSING
30ml water protocol initiated     Subjective:      Patient ID: Brunilda England is a 89 y.o. female.    Chief Complaint:  Hypoglycemia; Initial visit     Patient Active Problem List   Diagnosis    History of lung cancer    Essential hypertension    Neuropathy    Osteoporosis    History of rectal or anal cancer    PMB (postmenopausal bleeding)    Pure hypercholesterolemia    Anemia    Malignant neoplasm of lung    Chronic back pain    Lumbar radiculopathy    Neuropathy of left lower extremity    PVD (peripheral vascular disease) with claudication    Radiation vaginitis    Bilateral exudative age-related macular degeneration    Hypertensive retinopathy of both eyes    Posterior vitreous detachment, both eyes    Macular hemorrhage of left eye    Atrophy of macula lutea    Pernicious anemia    Coronary artery disease involving native coronary artery without angina pectoris    Type 2 diabetes mellitus with circulatory disorder, without long-term current use of insulin    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 Pt today reports feels well and denies any complaints. Pt is in company of her daughter who provided most of the history. Per patients daughter Ms. Hardin has been having episodes of shakiness and irritability in the morning which her daughter thinks it may be due to low blood sugars since she gives her juice and has noted improvement of her symptoms.  They have not checked FS glucose during episodes because patient does not want to prick her fingers.  Denies blurry vision, diaphoresis or  "confusion during episodes.        With regards to Hypoglycemia:   -Whipple's triad: NOT FULL FILLED AS THERE ARE NO DOCUMENTED LOW BLOOD SUGARS   -Glucose levels:  WNL   No evidence of hypoglycemias on EMR    Latest Reference Range & Units 08/05/20 13:16 09/08/20 11:24 04/12/21 15:49 04/13/21 11:38 08/03/21 14:28 09/29/21 13:06 12/07/21 07:52   Glucose 70 - 110 mg/dL 214 (H) 154 (H) 153 (H) 145 (H) 184 (H) 134 (H) 99     NO EVIDENCE OF HYPOGLYCEMIAS ON EMR     Pt is taking Actos 15mg daily     -Fasting or Post prandial:   Episodes are fasting before breakfast     -Previous history of hypoglycemias?  DENIES   -Gastric surgeries?  DENIES     -Steroids?  DENIES     -No signs of AI     -ETOH? DENIES     -Liver Disease? DENIES   -Severe Heart Failure? DENIES   -Cortisol levels? Non available       ROS:   As above    Objective:     /60 (BP Location: Left arm, Patient Position: Sitting, BP Method: Medium (Manual))   Ht 5' 7" (1.702 m)   Wt 76.2 kg (168 lb 1.6 oz)   BMI 26.33 kg/m²   BP Readings from Last 3 Encounters:   04/06/22 130/60   03/03/22 (!) 159/76   02/07/22 (!) 169/77     Wt Readings from Last 1 Encounters:   04/06/22 1539 76.2 kg (168 lb 1.6 oz)     Body mass index is 26.33 kg/m².      Physical Exam  Vitals reviewed.   Constitutional:       General: She is not in acute distress.     Appearance: Normal appearance. She is well-developed. She is not ill-appearing.   HENT:      Nose: Nose normal. No rhinorrhea.      Mouth/Throat:      Mouth: Mucous membranes are moist.   Eyes:      Extraocular Movements: Extraocular movements intact.      Pupils: Pupils are equal, round, and reactive to light.      Comments: No proptosis, No lid lag, No conjunctival erythema    Neck:      Thyroid: No thyromegaly.      Trachea: No tracheal deviation.   Cardiovascular:      Rate and Rhythm: Normal rate and regular rhythm.      Pulses: Normal pulses.   Pulmonary:      Effort: Pulmonary effort is normal.      Breath sounds: " Normal breath sounds.   Abdominal:      Palpations: Abdomen is soft. There is no mass.      Tenderness: There is no abdominal tenderness.      Hernia: No hernia is present.   Musculoskeletal:         General: No swelling.      Cervical back: Neck supple. No tenderness.      Right lower leg: No edema.      Left lower leg: No edema.   Skin:     General: Skin is warm.      Findings: No rash.   Neurological:      General: No focal deficit present.      Mental Status: She is alert and oriented to person, place, and time.   Psychiatric:         Mood and Affect: Mood normal.         Judgment: Judgment normal.                Lab Review:   Lab Results   Component Value Date    HGBA1C 6.1 (H) 12/07/2021     Lab Results   Component Value Date    CHOL 160 04/13/2021    HDL 53 04/13/2021    LDLCALC 72.0 04/13/2021    TRIG 175 (H) 04/13/2021    CHOLHDL 33.1 04/13/2021     Lab Results   Component Value Date     12/07/2021    K 4.1 12/07/2021     12/07/2021    CO2 26 12/07/2021    GLU 99 12/07/2021    BUN 16 12/07/2021    CREATININE 0.9 12/07/2021    CALCIUM 10.3 12/07/2021    PROT 7.3 12/07/2021    ALBUMIN 3.4 (L) 12/07/2021    BILITOT 0.3 12/07/2021    ALKPHOS 49 (L) 12/07/2021    AST 11 12/07/2021    ALT 6 (L) 12/07/2021    ANIONGAP 10 12/07/2021    ESTGFRAFRICA >60.0 12/07/2021    EGFRNONAA 56.5 (A) 12/07/2021    TSH 2.923 04/13/2021     Vit D, 25-Hydroxy   Date Value Ref Range Status   04/13/2021 24 (L) 30 - 96 ng/mL Final     Comment:     Vitamin D deficiency.........<10 ng/mL                              Vitamin D insufficiency......10-29 ng/mL       Vitamin D sufficiency........> or equal to 30 ng/mL  Vitamin D toxicity............>100 ng/mL         Assessment and Plan     Hypoglycemia  -     Glucose Monitoring Continuous Min 72 Hours; Future  -Avoid prolonged fasting periods   -Small frequent meals with protein and fat and low on simple carbohydrates   -Will monitor and treat accordingly     Type 2 diabetes  mellitus with diabetic peripheral angiopathy without gangrene, without long-term current use of insulin    C/w Actos 15mg for now since A1c is at gaol     Pending on CGM results, If patient indeed is having low blood sugars will stop Actos and consider starting Tradjenta 5mg daily if she needs medications to control her glucose levels      Will monitor and treat accordingly

## 2025-01-10 NOTE — ASSESSMENT & PLAN NOTE
Karla Arzola is a 64 y.o. female with PMHx of obesity and hiatal hernia now s/p lap sleeve gastrectomy and hiatal hernia repair with gastropexy on 1/9/25.      - tolerating water protocol, continue on pathway   - advance to bariatric CLD when water protocol concluded  - CRUSH or open all meds. Liquid or crushed meds only. No pills  - scheduled anti-emetics with PRN breakthrough  - MMPC  - home meds started as appropriate  - OOB, ambulate in halls  - encourage IS  - DVT ppx    Dispo: possible PM discharge pending tolerating liquid diet

## 2025-01-10 NOTE — NURSING
Discharge instructions given to patient.Allow time for questions, all questions answered. PIV removed with catheter intact. No apparent distress noted. Awaiting transport via wheel chair to bring patient down.

## 2025-01-13 LAB
FINAL PATHOLOGIC DIAGNOSIS: NORMAL
GROSS: NORMAL
Lab: NORMAL

## 2025-01-13 NOTE — PLAN OF CARE
[facility] - Surgery  Discharge Final Note    Primary Care Provider: Ninfa Frost MD    Expected Discharge Date: 1/10/2025    Final Discharge Note (most recent)       Final Note - 01/10/25 1840          Final Note    Assessment Type Final Discharge Note     Anticipated Discharge Disposition Home or Self Care     Hospital Resources/Appts/Education Provided Provided patient/caregiver with written discharge plan information;Provided education on problems/symptoms using teachback                   Future Appointments   Date Time Provider Department Center   1/16/2025  9:00 AM Mally Jimenez RD [facility]   1/27/2025  9:00 AM LAB, APPOINTMENT [facility] LAB [facility]   1/27/2025  9:30 AM Nichole Soria, NP [facility]   1/27/2025 10:00 AM Yvonne Woodward, EUGENIA [facility]   1/28/2025  9:20 AM PRE-ADMIT, ENDO [facility] [place]   2/25/2025  8:00 AM [facility] MAMMO1-SCR [facility] M[facility]MO [facility]   3/7/2025  8:00 AM Darlyn Dejesus [facility]   3/10/2025  9:00 AM LAB, APPOINTMENT [facility] LAB [facility]   3/10/2025  9:30 AM Nichole Soria, NP [facility]   3/10/2025 10:00 AM Yvonne Woodward, RD [facility]   4/22/2025  9:00 AM Renny Sullivan, PharmD [facility]   4/29/2025  1:00 PM LABORATORY, [facility] LAB [facility]   5/6/2025  2:45 PM Júnior Zimmerman MD [facility]   6/10/2025  9:20 AM Ninfa Frost MD [facility]   10/6/2025  8:30 AM FIELDS, VISUAL-ONE [facility] OPHTHAL [facility]   10/6/2025  9:00 AM Bandar Jackson, OD [facility]       Contact Info       Elayne Arnold MD   Specialty: General Surgery, Bariatrics    [address] [address]   Phone: [phone]       Next Steps: Follow up in 2 week(s)

## 2025-01-14 ENCOUNTER — HOSPITAL ENCOUNTER (EMERGENCY)
Facility: HOSPITAL | Age: 65
Discharge: HOME | End: 2025-01-14
Attending: EMERGENCY MEDICINE
Payer: COMMERCIAL

## 2025-01-14 VITALS
BODY MASS INDEX: 37.38 KG/M2 | RESPIRATION RATE: 16 BRPM | DIASTOLIC BLOOD PRESSURE: 77 MMHG | WEIGHT: 231.63 LBS | SYSTOLIC BLOOD PRESSURE: 142 MMHG | TEMPERATURE: 98 F | HEART RATE: 85 BPM | OXYGEN SATURATION: 100 %

## 2025-01-14 DIAGNOSIS — M62.838 MUSCLE SPASM OF BOTH LOWER LEGS: Primary | ICD-10-CM

## 2025-01-14 DIAGNOSIS — Z90.3 H/O GASTRIC SLEEVE: ICD-10-CM

## 2025-01-14 DIAGNOSIS — S40.022A SUPERFICIAL BRUISING OF ARM, LEFT, INITIAL ENCOUNTER: ICD-10-CM

## 2025-01-14 DIAGNOSIS — M79.669 CALF PAIN: ICD-10-CM

## 2025-01-14 DIAGNOSIS — I80.8: ICD-10-CM

## 2025-01-14 LAB
ALBUMIN SERPL BCP-MCNC: 3.6 G/DL (ref 3.5–5.2)
ALP SERPL-CCNC: 92 U/L (ref 40–150)
ALT SERPL W/O P-5'-P-CCNC: 295 U/L (ref 10–44)
ANION GAP SERPL CALC-SCNC: 12 MMOL/L (ref 8–16)
AST SERPL-CCNC: 92 U/L (ref 10–40)
BASOPHILS # BLD AUTO: 0.01 K/UL (ref 0–0.2)
BASOPHILS NFR BLD: 0.1 % (ref 0–1.9)
BILIRUB SERPL-MCNC: 0.6 MG/DL (ref 0.1–1)
BNP SERPL-MCNC: <10 PG/ML (ref 0–99)
BUN SERPL-MCNC: 15 MG/DL (ref 8–23)
CALCIUM SERPL-MCNC: 9.6 MG/DL (ref 8.7–10.5)
CHLORIDE SERPL-SCNC: 101 MMOL/L (ref 95–110)
CO2 SERPL-SCNC: 27 MMOL/L (ref 23–29)
CREAT SERPL-MCNC: 0.9 MG/DL (ref 0.5–1.4)
DIFFERENTIAL METHOD BLD: ABNORMAL
EOSINOPHIL # BLD AUTO: 0.1 K/UL (ref 0–0.5)
EOSINOPHIL NFR BLD: 1.6 % (ref 0–8)
ERYTHROCYTE [DISTWIDTH] IN BLOOD BY AUTOMATED COUNT: 14.2 % (ref 11.5–14.5)
EST. GFR  (NO RACE VARIABLE): >60 ML/MIN/1.73 M^2
GLUCOSE SERPL-MCNC: 89 MG/DL (ref 70–110)
HCT VFR BLD AUTO: 44.6 % (ref 37–48.5)
HGB BLD-MCNC: 13.8 G/DL (ref 12–16)
IMM GRANULOCYTES # BLD AUTO: 0.01 K/UL (ref 0–0.04)
IMM GRANULOCYTES NFR BLD AUTO: 0.1 % (ref 0–0.5)
LYMPHOCYTES # BLD AUTO: 1.8 K/UL (ref 1–4.8)
LYMPHOCYTES NFR BLD: 21.2 % (ref 18–48)
MAGNESIUM SERPL-MCNC: 2.1 MG/DL (ref 1.6–2.6)
MCH RBC QN AUTO: 28.2 PG (ref 27–31)
MCHC RBC AUTO-ENTMCNC: 30.9 G/DL (ref 32–36)
MCV RBC AUTO: 91 FL (ref 82–98)
MONOCYTES # BLD AUTO: 0.7 K/UL (ref 0.3–1)
MONOCYTES NFR BLD: 8.2 % (ref 4–15)
NEUTROPHILS # BLD AUTO: 5.9 K/UL (ref 1.8–7.7)
NEUTROPHILS NFR BLD: 68.8 % (ref 38–73)
NRBC BLD-RTO: 0 /100 WBC
PLATELET # BLD AUTO: 301 K/UL (ref 150–450)
PMV BLD AUTO: 10.5 FL (ref 9.2–12.9)
POTASSIUM SERPL-SCNC: 3.6 MMOL/L (ref 3.5–5.1)
PROT SERPL-MCNC: 7.4 G/DL (ref 6–8.4)
RBC # BLD AUTO: 4.89 M/UL (ref 4–5.4)
SODIUM SERPL-SCNC: 140 MMOL/L (ref 136–145)
WBC # BLD AUTO: 8.55 K/UL (ref 3.9–12.7)

## 2025-01-14 PROCEDURE — 83735 ASSAY OF MAGNESIUM: CPT

## 2025-01-14 PROCEDURE — 99284 EMERGENCY DEPT VISIT MOD MDM: CPT | Mod: 25

## 2025-01-14 PROCEDURE — 85025 COMPLETE CBC W/AUTO DIFF WBC: CPT

## 2025-01-14 PROCEDURE — 80053 COMPREHEN METABOLIC PANEL: CPT

## 2025-01-14 PROCEDURE — 83880 ASSAY OF NATRIURETIC PEPTIDE: CPT

## 2025-01-14 NOTE — FIRST PROVIDER EVALUATION
Medical screening examination initiated.  I have conducted a focused provider triage encounter, findings are as follows:    Brief history of present illness:  64-year-old female just had gastric sleeve surgery and hiatal hernia repair on Thursday.  Reports bilateral posterior calf pain.  Denies any swelling or erythema.    Vitals:    01/14/25 1317   BP: 124/69   BP Location: Right arm   Pulse: 91   Resp: 16   Temp: 97.9 °F (36.6 °C)   TempSrc: Oral   SpO2: 100%   Weight: 105.1 kg (231 lb 9.6 oz)       Pertinent physical exam:  Bilateral leg pain, no swelling, no erythema    Brief workup plan:  Workup    Preliminary workup initiated; this workup will be continued and followed by the physician or advanced practice provider that is assigned to the patient when roomed.

## 2025-01-14 NOTE — ED PROVIDER NOTES
SCRIBE #1 NOTE: I, Lorin Lockett and Makenna Singh, am scribing for, and in the presence of, Whitney Hernández MD. I have scribed the entire note.       History     Chief Complaint   Patient presents with    Post-op Problem     Pt reports that she had abdominal surgery at [facility] last week.  She c/o bilateral calf pain that began yesterday; she denies swelling.     Review of patient's allergies indicates:  No Known Allergies      History of Present Illness     HPI    1/14/2025, 3:43 PM  History obtained from the patient and medical records      History of Present Illness: Karla Arzola is a 64 y.o. female patient with a PMHx of obesity  who presents to the Emergency Department for evaluation of bilateral calf pain which began yesterday. Pt recently underwent gastric sleeve surgery Symptoms are constant and moderate in severity. No mitigating or exacerbating factors reported. Associated sxs include muscle spasms  Also c/o left upper arm pain where prior IV was. Patient denies any shortness of breath, no chest pain or lower ext swelling . No prior Tx specified.. No further complaints or concerns at this time.       Arrival mode: Personal Transportation    PCP: Ninfa Frost MD        Past Medical History:  Past Medical History:   Diagnosis Date    Allergy     Anxiety     GERD (gastroesophageal reflux disease)     Hypertension     Obesity     Vitamin B 12 deficiency     Vitamin D deficiency disease        Past Surgical History:  Past Surgical History:   Procedure Laterality Date    ABDOMINAL SURGERY      BREAST BIOPSY Right 10/08/2021    ESOPHAGOGASTRODUODENOSCOPY N/A 12/20/2024    Procedure: EGD (ESOPHAGOGASTRODUODENOSCOPY);  Surgeon: Kassi Mckeon MD;  Location: [facility];  Service: Gastroenterology;  Laterality: N/A;    INJECTION OF ANESTHETIC AGENT INTO TISSUE PLANE DEFINED BY TRANSVERSUS ABDOMINIS MUSCLE  01/09/2025    Procedure: BLOCK, TRANSVERSUS ABDOMINIS PLANE;  Surgeon: Michelle Oneill  Elayne VIEIRA MD;  Location: [address];  Service: General;;    KNEE ARTHROSCOPY Right     KNEE ARTHROSCOPY W/ MENISCECTOMY Left 01/04/2024    Procedure: ARTHROSCOPY, KNEE, WITH MENISCECTOMY;  Surgeon: Larry Adams MD;  Location: [facility];  Service: Orthopedics;  Laterality: Left;    LAPAROSCOPIC SLEEVE GASTRECTOMY N/A 01/09/2025    Procedure: GASTRECTOMY, SLEEVE, LAPAROSCOPIC with intraop EGD, Outpatient, 23 hour observation;  Surgeon: Elayne Arnold MD;  Location: [address];  Service: General;  Laterality: N/A;    MOUTH SURGERY      REPAIR, HERNIA, HIATAL, LAPAROSCOPIC  01/09/2025    Procedure: REPAIR, HERNIA, HIATAL, LAPAROSCOPIC;  Surgeon: Elayne Arnold MD;  Location: [facility] OR [address] [place] [address];  Service: General;;    TUBAL LIGATION           Family History:  Family History   Problem Relation Name Age of Onset    Hypertension Maternal Grandmother      Heart disease Maternal Grandmother      Lung cancer Maternal Grandfather      Hypertension Father      Hyperlipidemia Father      Kidney disease Father      Diabetes Mother      Hypertension Mother      Stroke Mother      Hypertension Brother E     Heart failure Brother E     No Known Problems Brother J     No Known Problems Brother C     Hypertension Sister      Thyroid cancer Neg Hx      Other Neg Hx          MEN2       Social History:  Social History     Tobacco Use    Smoking status: Never     Passive exposure: Never    Smokeless tobacco: Never   Substance and Sexual Activity    Alcohol use: Yes     Comment: socially 1x weekly    Drug use: No    Sexual activity: Yes     Partners: Male        Review of Systems     Review of Systems   Neurological:  Positive for weakness.   All other systems reviewed and are negative.       Physical Exam     Initial Vitals [01/14/25 1317]   BP Pulse Resp Temp SpO2   124/69 91 16 97.9 °F (36.6 °C) 100 %      MAP       --          Physical Exam  Nursing Notes and Vital Signs  Reviewed.  Constitutional: Patient is in no apparent distress. Well-developed and well-nourished.  Head: Atraumatic. Normocephalic.  yes: PERRL. EOM intact. Conjunctivae are not pale. No scleral icterus.  ENT: Mucous membranes are moist. Oropharynx is clear and symmetric.    Neck: Supple.Full ROM. No lymphadenopathy.  Cardiovascular: Regular rate. Regular rhythm. No murmurs, rubs, or gallops. Distal pulses are 2+ and symmetric.  Pulmonary/Chest: No respiratory distress. Clear to auscultation bilaterally. No wheezing or rales.  Abdominal: Soft and non-distended.  There is no tenderness.  No rebound, guarding, or rigidity. Good bowel sounds.  Genitourinary: No CVA tenderness  Musculoskeletal: Moves all extremities. No obvious deformities. No edema. No calf tenderness.Neurovascular status is intact, bilaterally. No concerns for infection. There is mild soft tissue swelling bruising to the left AC no overlying infection.     Skin: Warm and dry.  Neurological:  Alert, awake, and appropriate.  Normal speech.  No acute focal neurological deficits are appreciated.  Psychiatric: Normal affect. Good eye contact. Appropriate in content.     ED Course   Procedures  ED Vital Signs:  Vitals:    01/14/25 1317 01/14/25 1550   BP: 124/69 (!) 142/77   Pulse: 91 85   Resp: 16 16   Temp: 97.9 °F (36.6 °C) 97.7 °F (36.5 °C)   TempSrc: Oral Oral   SpO2: 100% 100%   Weight: 105.1 kg (231 lb 9.6 oz)        Abnormal Lab Results:  Labs Reviewed   CBC W/ AUTO DIFFERENTIAL - Abnormal       Result Value    WBC 8.55      RBC 4.89      Hemoglobin 13.8      Hematocrit 44.6      MCV 91      MCH 28.2      MCHC 30.9 (*)     RDW 14.2      Platelets 301      MPV 10.5      Immature Granulocytes 0.1      Gran # (ANC) 5.9      Immature Grans (Abs) 0.01      Lymph # 1.8      Mono # 0.7      Eos # 0.1      Baso # 0.01      nRBC 0      Gran % 68.8      Lymph % 21.2      Mono % 8.2      Eosinophil % 1.6      Basophil % 0.1      Differential Method Automated      COMPREHENSIVE METABOLIC PANEL - Abnormal    Sodium 140      Potassium 3.6      Chloride 101      CO2 27      Glucose 89      BUN 15      Creatinine 0.9      Calcium 9.6      Total Protein 7.4      Albumin 3.6      Total Bilirubin 0.6      Alkaline Phosphatase 92      AST 92 (*)      (*)     eGFR >60      Anion Gap 12     B-TYPE NATRIURETIC PEPTIDE    BNP <10     MAGNESIUM   MAGNESIUM    Magnesium 2.1          All Lab Results:  Results for orders placed or performed during the hospital encounter of 01/14/25   CBC auto differential    Collection Time: 01/14/25  2:29 PM   Result Value Ref Range    WBC 8.55 3.90 - 12.70 K/uL    RBC 4.89 4.00 - 5.40 M/uL    Hemoglobin 13.8 12.0 - 16.0 g/dL    Hematocrit 44.6 37.0 - 48.5 %    MCV 91 82 - 98 fL    MCH 28.2 27.0 - 31.0 pg    MCHC 30.9 (L) 32.0 - 36.0 g/dL    RDW 14.2 11.5 - 14.5 %    Platelets 301 150 - 450 K/uL    MPV 10.5 9.2 - 12.9 fL    Immature Granulocytes 0.1 0.0 - 0.5 %    Gran # (ANC) 5.9 1.8 - 7.7 K/uL    Immature Grans (Abs) 0.01 0.00 - 0.04 K/uL    Lymph # 1.8 1.0 - 4.8 K/uL    Mono # 0.7 0.3 - 1.0 K/uL    Eos # 0.1 0.0 - 0.5 K/uL    Baso # 0.01 0.00 - 0.20 K/uL    nRBC 0 0 /100 WBC    Gran % 68.8 38.0 - 73.0 %    Lymph % 21.2 18.0 - 48.0 %    Mono % 8.2 4.0 - 15.0 %    Eosinophil % 1.6 0.0 - 8.0 %    Basophil % 0.1 0.0 - 1.9 %    Differential Method Automated    Comprehensive metabolic panel    Collection Time: 01/14/25  2:29 PM   Result Value Ref Range    Sodium 140 136 - 145 mmol/L    Potassium 3.6 3.5 - 5.1 mmol/L    Chloride 101 95 - 110 mmol/L    CO2 27 23 - 29 mmol/L    Glucose 89 70 - 110 mg/dL    BUN 15 8 - 23 mg/dL    Creatinine 0.9 0.5 - 1.4 mg/dL    Calcium 9.6 8.7 - 10.5 mg/dL    Total Protein 7.4 6.0 - 8.4 g/dL    Albumin 3.6 3.5 - 5.2 g/dL    Total Bilirubin 0.6 0.1 - 1.0 mg/dL    Alkaline Phosphatase 92 40 - 150 U/L    AST 92 (H) 10 - 40 U/L     (H) 10 - 44 U/L    eGFR >60 >60 mL/min/1.73 m^2    Anion Gap 12 8 - 16 mmol/L   Brain  natriuretic peptide    Collection Time: 01/14/25  2:29 PM   Result Value Ref Range    BNP <10 0 - 99 pg/mL   Magnesium    Collection Time: 01/14/25  2:29 PM   Result Value Ref Range    Magnesium 2.1 1.6 - 2.6 mg/dL     *Note: Due to a large number of results and/or encounters for the requested time period, some results have not been displayed. A complete set of results can be found in Results Review.       Imaging Results:  Imaging Results              US Lower Extremity Veins Bilateral (Final result)  Result time 01/14/25 15:21:43      Final result by Rolando Meadows MD (01/14/25 15:21:43)                   Impression:      No evidence of deep venous thrombosis in either lower extremity.      Electronically signed by: Rolando Meadows  Date:    01/14/2025  Time:    15:21               Narrative:    EXAMINATION:  US LOWER EXTREMITY VEINS BILATERAL    CLINICAL HISTORY:  Pain in unspecified lower leg    TECHNIQUE:  Duplex and color flow Doppler and dynamic compression was performed of the bilateral lower extremity veins was performed.    COMPARISON:  April 8, 2014.    FINDINGS:  Right thigh veins: The common femoral, femoral, popliteal, upper greater saphenous, and deep femoral veins are patent and free of thrombus. The veins are normally compressible and have normal phasic flow and augmentation response.    Right calf veins: The visualized calf veins are patent.    Left thigh veins: The common femoral, femoral, popliteal, upper greater saphenous, and deep femoral veins are patent and free of thrombus. The veins are normally compressible and have normal phasic flow and augmentation response.    Left calf veins: The visualized calf veins are patent.    Miscellaneous: None                                       T         The Emergency Provider reviewed the vital signs and test results, which are outlined above.     ED Discussion     15:45 PM: Reassessed pt at this time. Discussed with patient and/or family/caretaker all  pertinent ED information and results. Discussed pt dx and plan of tx. Gave patient all f/u and return to the ED instructions. All questions and concerns were addressed at this time. Patient and/or family/caretaker expresses understanding of information and instructions, and is comfortable with plan to discharge. Pt is stable for discharge.    I discussed with patient and/or family/caretaker that evaluation in the ED does not suggest any emergent or life threatening medical conditions requiring immediate intervention beyond what was provided in the ED, and I believe patient is safe for discharge.  Regardless, an unremarkable evaluation in the ED does not preclude the development or presence of a serious of life threatening condition. As such, I instructed that the patient is to return immediately for any worsening or change in current symptoms.       Medical Decision Making  DDX: 1. Electrolyte dysufnction 2. DVT 3. Phlebitis of arm     Lab work reviewed and otherwise normal, US negative for DVT. Advised to put warm compresses and elevated left arm phlebitis, follow up and reasons to return given.       Amount and/or Complexity of Data Reviewed  Labs: ordered. Decision-making details documented in ED Course.  Radiology: ordered. Decision-making details documented in ED Course.                ED Medication(s):  Medications - No data to display    Discharge Medication List as of 1/14/2025  3:55 PM           Follow-up Information       Ninfa Frost MD. Schedule an appointment as soon as possible for a visit in 2 days.    Specialty: Family Medicine  Why: Return to the Emergency room, If symptoms worsen  Contact information:  [address] [place] [address]  [phone]                                 Scribe Attestation:   Scribe #1: I performed the above scribed service and the documentation accurately describes the services I performed. I attest to the accuracy of the note.     Attending:   Physician  Attestation Statement for Scribe #1: I, Whitney Hernández MD, personally performed the services described in this documentation, as scribed by Makenna Singh & Lorin Lockett, in my presence, and it is both accurate and complete.           Clinical Impression       ICD-10-CM ICD-9-CM   1. Muscle spasm of both lower legs  M62.838 728.85   2. Calf pain  M79.669 729.5   3. Calf pain  M79.669 729.5   4. H/O gastric sleeve  Z90.3 V15.29   5. Superficial bruising of arm, left, initial encounter  S40.022A 923.9   6. Phlebitis of arm  I80.8 451.84       Disposition:   Disposition: Discharged  Condition: Stable       Whitney Hernández MD  01/18/25 1652

## 2025-01-15 NOTE — PROGRESS NOTES
Audio Only Telehealth Visit     The patient location is: home ([place])  The chief complaint leading to consultation is: Follow-up 1 Week s/p laproscopic sleeve gastrectomy  Visit type: Virtual visit with audio only (telephone)  Total time spent with patient: 20 mins     The reason for the audio only service rather than synchronous audio and video virtual visit was related to technical difficulties or patient preference/necessity.     Each patient to whom I provide medical services by telemedicine is:  (1) informed of the relationship between the physician and patient and the respective role of any other health care provider with respect to management of the patient; and (2) notified that they may decline to receive medical services by telemedicine and may withdraw from such care at any time. Patient verbally consented to receive this service via voice-only telephone call.    This service was not originating from a related E/M service provided within the previous 7 days nor will  to an E/M service or procedure within the next 24 hours or my soonest available appointment.  Prevailing standard of care was able to be met in this audio-only visit.      NUTRITION NOTE    Referring Physician: Elayne Oneill M.D.   Reason for MNT Referral: Follow-up 1 Week s/p laproscopic sleeve gastrectomy    CURRENT DIET:  Bariatric Liquid Diet    Dehydration assessment:  Urine output/color: yellow, a little dark  Chest pain: N  Persistent increased heart rate: N  Fatigue: A little fatigue, when she gets up and starts doing stuff  Dizzy/weak: N  N/V: A little nausea, vomited twice since surgery (in car ride home)  BM: Y, loose    Protein and fluid intake assessment: (food diary)    Fluids include: Water, Crystal Light, diet cranberry juice, [id number], SF Jell-O  Fluid intake yesterday: 40 oz. PT reports feeling full after drinking fluids  Protein supplements: [id number] 1-1.5 per day, unflavored protein powder mixed with diet  cranberry juice or water with Crystal Light (18 g per serving)  Protein intake yesterday: 56 g    Vitamins  Flinstones Multivitamin with 18 mg iron, 2 x day   Barimelt B-1, 4 per day  Barimelt Calcium citrate 500 mg with vitamin D, 2 melts, three times per day  Barimelt B-12 2500 mcg, 1 per week  Rx Vit D     Reviewed dosage and timing of vitamin/mineral guidelines    Medications  Omeprazole: Y  How are you tolerating pain at this time? (rate on a scale from 1 to 10; >7 notify PA/MD): 2  Did you take your acetaminophen and gabapentin for 3 days? Y  Did you have to take additional acetaminophen for break through pain (pain scale 4-6)? Y  Did you have any severe pain that required oxycodone? If yes, how much did you use and do you have left? N    How is the support system at home? Great  Exercise reminder (light exercise at this time, no lifting above 10 lbs)     Questions for nurse/MA/PA:     BARIATRIC DIET DISCUSSION:  Reinforced post-op nutrition guidelines.  Continue to work on fluid and protein intake.    PLAN/RECOMMONDATIONS:  Continue bariatric high protein liquid diet.  Maintain protein intake or increase gradually to goal of 60 g prot/day.  Maintain fluid intake or increase gradually to goal of 64 floz/day.  Begin appropriate vitamins & minerals.  Begin or continue light exercise.     Confirmed date and time for 2 week po labs and clinic visit     SESSION TIME: 20 minutes

## 2025-01-16 ENCOUNTER — TELEPHONE (OUTPATIENT)
Dept: BARIATRICS | Facility: CLINIC | Age: 65
End: 2025-01-16

## 2025-01-16 ENCOUNTER — CLINICAL SUPPORT (OUTPATIENT)
Dept: BARIATRICS | Facility: CLINIC | Age: 65
End: 2025-01-16
Payer: COMMERCIAL

## 2025-01-16 DIAGNOSIS — Z71.3 DIETARY COUNSELING AND SURVEILLANCE: Primary | ICD-10-CM

## 2025-01-16 DIAGNOSIS — E66.9 OBESITY (BMI 30-39.9): ICD-10-CM

## 2025-01-16 DIAGNOSIS — I10 ESSENTIAL HYPERTENSION: ICD-10-CM

## 2025-01-16 DIAGNOSIS — Z98.84 S/P BARIATRIC SURGERY: ICD-10-CM

## 2025-01-16 NOTE — TELEPHONE ENCOUNTER
----- Message from Dietitididi Bal sent at 1/16/2025 10:36 AM CST -----  Regarding: Reschedule post op visits  Hello ladies    PT says she is going back to work on the day of the 2 week post op. Please call to reschedule.    Thank you,  Mally

## 2025-01-22 ENCOUNTER — PATIENT MESSAGE (OUTPATIENT)
Dept: BARIATRICS | Facility: CLINIC | Age: 65
End: 2025-01-22
Payer: COMMERCIAL

## 2025-01-22 NOTE — TELEPHONE ENCOUNTER
Spoke to patient and rescheduled her 2 Week Post op appointments and labs due to inclement weather.

## 2025-01-24 ENCOUNTER — CLINICAL SUPPORT (OUTPATIENT)
Dept: BARIATRICS | Facility: CLINIC | Age: 65
End: 2025-01-24
Payer: COMMERCIAL

## 2025-01-24 ENCOUNTER — PATIENT MESSAGE (OUTPATIENT)
Dept: BARIATRICS | Facility: CLINIC | Age: 65
End: 2025-01-24

## 2025-01-24 ENCOUNTER — OFFICE VISIT (OUTPATIENT)
Dept: BARIATRICS | Facility: CLINIC | Age: 65
End: 2025-01-24
Payer: COMMERCIAL

## 2025-01-24 VITALS — WEIGHT: 226 LBS | BODY MASS INDEX: 36.48 KG/M2

## 2025-01-24 DIAGNOSIS — I10 ESSENTIAL HYPERTENSION: ICD-10-CM

## 2025-01-24 DIAGNOSIS — Z98.84 S/P LAPAROSCOPIC SLEEVE GASTRECTOMY: Primary | ICD-10-CM

## 2025-01-24 DIAGNOSIS — Z71.3 DIETARY COUNSELING AND SURVEILLANCE: Primary | ICD-10-CM

## 2025-01-24 DIAGNOSIS — Z98.84 S/P LAPAROSCOPIC SLEEVE GASTRECTOMY: ICD-10-CM

## 2025-01-24 DIAGNOSIS — E66.9 OBESITY (BMI 30-39.9): ICD-10-CM

## 2025-01-24 PROCEDURE — 99024 POSTOP FOLLOW-UP VISIT: CPT | Mod: 95,,, | Performed by: NURSE PRACTITIONER

## 2025-01-24 PROCEDURE — 1160F RVW MEDS BY RX/DR IN RCRD: CPT | Mod: CPTII,95,, | Performed by: NURSE PRACTITIONER

## 2025-01-24 PROCEDURE — 1159F MED LIST DOCD IN RCRD: CPT | Mod: CPTII,95,, | Performed by: NURSE PRACTITIONER

## 2025-01-24 NOTE — PROGRESS NOTES
The patient location is: [place]  The chief complaint leading to consultation is: 2 week post op    Visit type: audiovisual    Face to Face time with patient: 10  15 minutes of total time spent on the encounter, which includes face to face time and non-face to face time preparing to see the patient (eg, review of tests), Obtaining and/or reviewing separately obtained history, Documenting clinical information in the electronic or other health record, Independently interpreting results (not separately reported) and communicating results to the patient/family/caregiver, or Care coordination (not separately reported).         Each patient to whom he or she provides medical services by telemedicine is:  (1) informed of the relationship between the physician and patient and the respective role of any other health care provider with respect to management of the patient; and (2) notified that he or she may decline to receive medical services by telemedicine and may withdraw from such care at any time.    Notes:       BARIATRIC POST-OPERATIVE VISIT:    HPI:  Karla Arzola is a 64 y.o. year old female presents for 2 week post op visit following sleeve.  she is doing well and tolerating the diet without difficulty.  she has no complaints.    Denies: nausea, vomiting, abdominal pain, changes in bowel movement pattern, fever, chills, dysphagia, chest pain, and shortness of breath.    Review of Systems   Constitutional:  Negative for activity change and fatigue.   Respiratory:  Negative for cough and shortness of breath.    Cardiovascular:  Negative for chest pain, palpitations and leg swelling.   Gastrointestinal:  Negative for abdominal pain, nausea and vomiting.   Endocrine: Negative for polydipsia, polyphagia and polyuria.   Genitourinary:  Negative for dysuria.   Musculoskeletal:  Negative for gait problem.   Skin:  Negative for rash.   Allergic/Immunologic: Negative for immunocompromised state.   Neurological:  Negative  for dizziness, syncope and weakness.   Hematological:  Does not bruise/bleed easily.   Psychiatric/Behavioral:  Negative for behavioral problems.        EXERCISE & VITAMINS:  See Bariatric Assessment    MEDICATIONS/ALLERGIES:  Have been reviewed.    DIET: Liquid Bariatric Diet.  2 protein shakes daily, ~65 grams protein.  50+ fl oz SF clear beverage.    See Dietician note from today for a more detailed assessment.          ASSESSMENT:  - Morbid obesity s/p sleeve gastrectomy on 1/9/25.  - Co-morbidities: GERD and hypertension  -  Weight loss, 19#'s and 19% EWL  -  Exercise routine walking daily 35 mins   - Good Diet  - Good Vitamin regimen    PLAN:  - Ursodiol 500 mg daily for 6 months  - Anti-Acid medication,  daily for 3 months  - No lifting more than 10 lbs for 6 weeks  - Miralax daily for constipation  - Emphasized the importance of regular exercise and adherence to bariatric diet to achieve maximum weight loss.  - Encouraged patient to start regular exercise.  - Follow-up with dietician to advance diet.  - Continue daily vitamins and medications.  - RTC in 6 weeks or sooner if needed.  - Call the office for any issues.  - Check labs today.    Post Discharge     2 Weeks     Total Opioids Used (Outpatient): not rx at d/c

## 2025-01-24 NOTE — PROGRESS NOTES
The patient location is: [place]   The chief complaint leading to consultation is: Follow-up 2 Weeks s/p laparoscopic sleeve gastrectomy  Visit type: audiovisual     Face to Face time with patient: 20 min    25 minutes of total time spent on the encounter, which includes face to face time and non-face to face time preparing to see the patient (eg, review of tests), Obtaining and/or reviewing separately obtained history, Documenting clinical information in the electronic or other health record, Independently interpreting results (not separately reported) and communicating results to the patient/family/caregiver, or Care coordination (not separately reported).       Each patient to whom he or she provides medical services by telemedicine is:  (1) informed of the relationship between the physician and patient and the respective role of any other health care provider with respect to management of the patient; and (2) notified that he or she may decline to receive medical services by telemedicine and may withdraw from such care at any time.    NUTRITION NOTE    Referring Physician: Elayne Oneill M.D.   Reason for MNT Referral: Follow-up 2 Weeks s/p laparoscopic sleeve gastrectomy    PAST MEDICAL HISTORY:  Denies nausea, vomiting, constipation, and diarrhea.  Reports doing well.    Past Medical History:   Diagnosis Date    Allergy     Anxiety     GERD (gastroesophageal reflux disease)     Hypertension     Obesity     Vitamin B 12 deficiency     Vitamin D deficiency disease      CLINICAL DATA:  64 y.o.-year-old Black or  female.    Current Weight: NA lbs  BMI: NA     CURRENT DIET:  Bariatric Liquid Diet    Diet Recall: 65-80 grams of protein/day; 56 oz of fluids/day    Fluids include: Water, Crystal Light, diet cranberry juice, Hisriam2R, SF Jell-O  Protein supplements: [id number] 1 per day, Premier Protein 1.5-2 per day    EXERCISE:  Walking on treadmill daily 30 mins     Restrictions to  exercise:    LABS:  None available at time of visit    VITAMINS/MINERALS:  Flinstones Multivitamin with 18 mg iron, 2 x day   Barimelt B-1, 4 per day  Barimelt Calcium citrate 500 mg with vitamin D, 2 melts, three times per day  Barimelt B-12 2500 mcg, 1 per week  Rx Vit D     ASSESSMENT:  Doing well overall.  Adequate protein intake.  Adequate fluid intake.    BARIATRIC DIET DISCUSSION:  Instructed and provided written materials on bariatric puree diet plan   Bariatric soft diet plan to start in 2 weeks as daquan  Pt may swallow tablets and pills at 2 week post op   Reinforced post-op nutrition guidelines.    PLAN/RECOMMONDATIONS:  Advance to bariatric puree diet  Increase protein intake.  Increase fluid intake.  Continue light exercise.  Continue appropriate vitamins & minerals.    Return to clinic in 6 weeks.    SESSION TIME: 25 minutes

## 2025-01-24 NOTE — PATIENT INSTRUCTIONS
High Protein Pureed Diet    2 weeks after gastric bypass and sleeve you may be ready to add pureed food to your diet.  All food should be the consistency of baby food, or thinner.  Follow pureed diet for the next 2 weeks.    Protein - It is very important to pay attention to protein intake during this time.      Inadequate protein intake can cause:  Delayed Wound Healing  Hair Loss  Muscle Breakdown    Meal Plan - Eat 3-4 meals per day (2-4 tbsp each), with protein supplements in between to meet protein needs.  Meeting protein needs daily will help increase healing, decrease muscle loss, and increase weight loss.  Your goal is  grams of protein a day.    Protein First - Always eat the foods with the highest protein first.  Foods high in protein include milk, yogurt, cheese, egg whites, and blenderized meat, seafood, and beans.    Fluids - Keep track in your journal of how much you are drinking; you should try to drink at least 64oz of fluids every day.      Foods allowed: Portion size Protein (g)   Sugar-free clear liquids As desired 0   Skim or 1% milk ½ cup 4   Sugar free pudding, light yogurt, custard (use skim or 1% milk in preparation) 3 oz 2.5   Strained baby food meats, or home-made pureed lean meats and shrimp 1 oz 7   Beans (red, white, black, lima, barker, fat free refried, hummus) and lentils ¼ cup 4   Low-fat/fat free cheese.(cottage cheese, mozzarella string cheese, ricotta cheese, Laughing Cow, Baby Bell, cheddar, etc) ¼ cup 7-8   Scrambled eggs or Egg Beaters 1 or ¼ cup 6   Edamame or Tofu, mashed ¼ cup 5   Unflavored protein powder (add to 1 scoop to  98% fat free soups or SF pudding) 3 Tbsp 9   *PB2: peanut powder (45 calories) 2 Tbsp 5     *PB2 powdered peanut butter: 45 calories vs. 190 calories in 2 tbsp of regular peanut butter. Purchase online at [employer], or  at various [employer], [employer], [employer], [employer] and [employer].        Bariatric Liquid/Pureed Sample Menu    3-4 small meals  plus 2-3 protein drinks per day.    8am 1 egg or ¼ cup Egg Beaters   9am 1 cup water, or decaf coffee or tea   10am Protein drink, 30g protein   11am 2 tbsp low-fat cottage cheese, and 1 tbsp pureed peaches   12pm 1 cup water, or sugar-free lemonade    1pm 2 tbsp pureed chicken, and 1 tbsp pureed carrots    2pm 1 cup water, or sugar-free lemonade   3pm Protein drink, 30g protein   5pm 1 cup water    6pm 1 cup hi-protein creamy chicken soup 14g protein (see Recipe below)   7pm 1 cup water, or sugar-free fruit punch    8pm 1 cup water     This sample menu provides approx. 80g protein and 64oz fluids.  Liquid protein supplements should contain 20-30g protein and less than 4 grams of sugar each.    Sip fluids continuously in between meals.  Drink at least ¼ cup every 15 minutes.  For fluids: ¼ cup = 2 oz = 4 tbsp       RECIPE IDEAS for Bariatric Pureed Diet:    Hi-Protein Creamy Chicken Soup: (10g protein per 1 cup serving)  Empty 1 can of 98% fat free cream of chicken soup into saucepan. Then  blend 1 scoop of unflavored protein powder with 1 can of skim milk until smooth.  Add protein milk to saucepan and heat to warm. (Note: Do NOT boil. Protein powder may clump if heated too hot).     Hi-Protein Pudding: (14g protein per ½ cup serving)  Add 2 scoops protein powder to 2 cups cold skim milk and mix well.  Stir in dry Jell-O Sugar-Free Instant Pudding mix.  Chill and Enjoy!    Tuna Mousse (12g protein per ¼ cup serving) Page 135 in book Eating Well After Weight Loss Surgery.  In a  or , combine all ingredients and pulse until smooth.  2 6-ounce cans tuna packed in water, drained  2 tbsp low-fat mayonnaise  2 tbsp fat-free sour cream  2 tbsp fat-free cream cheese, softened  ½ cup shallots, finely chopped  1 tbsp lemon juice  ¼ tsp ground pepper  ½ tsp celery seed    Chocolate Peanut Butter Mousse  (28g protein total)  6oz plain Greek yogurt  4 tbsp chocolate PB2

## 2025-01-28 ENCOUNTER — HOSPITAL ENCOUNTER (OUTPATIENT)
Dept: PREADMISSION TESTING | Facility: HOSPITAL | Age: 65
Discharge: HOME | End: 2025-01-28
Attending: FAMILY MEDICINE
Payer: COMMERCIAL

## 2025-03-04 ENCOUNTER — HOSPITAL ENCOUNTER (OUTPATIENT)
Dept: PREADMISSION TESTING | Facility: HOSPITAL | Age: 65
Discharge: HOME | End: 2025-03-04
Attending: COLON & RECTAL SURGERY
Payer: COMMERCIAL

## 2025-03-04 DIAGNOSIS — Z12.11 COLON CANCER SCREENING: Primary | ICD-10-CM

## 2025-03-04 RX ORDER — POLYETHYLENE GLYCOL 3350, SODIUM SULFATE ANHYDROUS, SODIUM BICARBONATE, SODIUM CHLORIDE, POTASSIUM CHLORIDE 236; 22.74; 6.74; 5.86; 2.97 G/4L; G/4L; G/4L; G/4L; G/4L
4 POWDER, FOR SOLUTION ORAL ONCE
Qty: 4000 ML | Refills: 0 | Status: SHIPPED | OUTPATIENT
Start: 2025-03-04 | End: 2025-03-06

## 2025-03-07 ENCOUNTER — NUTRITION (OUTPATIENT)
Dept: INTERNAL MEDICINE | Facility: CLINIC | Age: 65
End: 2025-03-07
Payer: COMMERCIAL

## 2025-03-07 DIAGNOSIS — I10 PRIMARY HYPERTENSION: ICD-10-CM

## 2025-03-07 DIAGNOSIS — M19.90 OSTEOARTHRITIS, UNSPECIFIED OSTEOARTHRITIS TYPE, UNSPECIFIED SITE: ICD-10-CM

## 2025-03-07 DIAGNOSIS — Z98.84 STATUS POST BARIATRIC SURGERY: Primary | ICD-10-CM

## 2025-03-07 DIAGNOSIS — Z71.3 DIETARY COUNSELING: ICD-10-CM

## 2025-03-07 NOTE — PROGRESS NOTES
NUTRITION NOTE    Referring Physician: Dr. Lezama  Reason for MNT Referral: Follow-up 2 months s/p Gastric Sleeve 1-9-2025    PAST MEDICAL HISTORY:    Denies vomiting, constipation, and diarrhea.  Reports doing well.    Struggling to consume enough protein each day.  Typically eats 2 meals per day with maybe 1 snack.  Bulk of protein intake is through food sources.  May drink at most 1/2 premier protein shake (does not tolerate more than that).  May include unflavored isopure added to some foods.  At times also drinks [id number].    Food choices:  Chicken  Seafood  Vegetables  1/2 sandwich recently made with Wheat bread.  Pudding with protein powder added  Celery or other vegetables + ranch   Boiled egg  Greek Yogurt (20 grams protein)    Drinks: Water, coffee (makes nauseated), crystal light, diet cran apple juice, [id number]      Exercise:  Treadmill. 6 days per week. 30 minute sessions.     Past Medical History:   Diagnosis Date    Allergy     Anxiety     GERD (gastroesophageal reflux disease)     Hypertension     Obesity     Vitamin B 12 deficiency     Vitamin D deficiency disease        CLINICAL DATA:  64 y.o. female.      Current Weight: 218 lbs   Surgery weight 1-9-2025: 240 lbs  BMI: 35.3  Total Weight Loss: -22 lbs since surgery      LABS:  Not available     CURRENT DIET:  Bariatric Diet.  Diet Recall: <80 grams of protein/day; 64 oz of fluids/day    Meal Pattern: 2 meal(s) + 0-1 snack(s) + 0-1 protein supplement(s)      EXERCISE:  Adequate light exercise.    Restrictions to Exercise: None.    VITAMINS / MINERALS:  No change.  Flinstones Multivitamin with 18 mg iron, 2 x day   Barimelt B-1, 4 per day  Barimelt Calcium citrate 500 mg with vitamin D, 2 melts, three times per day  Barimelt B-12 2500 mcg, 1 per week  Rx Vit D     ASSESSMENT:  Doing well overall.  Weight loss.  Adequate calorie intake.  Inadequate protein intake.  Adequate fluid intake.  Following diet appropriately.  Exercising.  Adequate vitamins  & minerals.    BARIATRIC DIET DISCUSSION:  Instructed and provided written materials on bariatric diet plan.  Reinforced post-op nutrition guidelines.    PLAN / RECOMMENDATIONS:  May begin to incorporate raw vegetables, lettuce, unsalted nuts, and light popcorn as tolerated.  May begin to swallow whole pills as tolerated.  Continue excellent diet plan.    Increase protein intake using suggestions provided  Maintain fluid intake.  Increase exercise.  Continue appropriate vitamins & minerals.      Return to clinic in 3 months.    SESSION TIME: 25 minutes

## 2025-03-10 ENCOUNTER — OFFICE VISIT (OUTPATIENT)
Dept: INTERNAL MEDICINE | Facility: CLINIC | Age: 65
End: 2025-03-10
Payer: COMMERCIAL

## 2025-03-10 DIAGNOSIS — E66.812 OBESITY, CLASS II, BMI 35-39.9: Primary | ICD-10-CM

## 2025-03-10 PROCEDURE — 99499 UNLISTED E&M SERVICE: CPT | Mod: 95,,,

## 2025-03-10 NOTE — PROGRESS NOTES
Patient ID: Karla Arzola is a 64 y.o. Black or  female    Subjective  Chief Complaint: patient presents for medical weight loss management.    Contraindications to GLP-1 receptor agonist therapy:   Denies personal or family history of MTC and personal history of MEN2     Co-morbidities: HTN    History of weight loss therapy:  Pt reports prior use with Adipex but denies use with GLP-1.       Weight loss history:  Starting weight: 218 lbs - pt reported (BMI 35.2)    Objective  Lab Results   Component Value Date     01/28/2025     01/14/2025     01/10/2025     Lab Results   Component Value Date    K 4.4 01/28/2025    K 3.6 01/14/2025    K 4.0 01/10/2025     Lab Results   Component Value Date     01/28/2025     01/14/2025     01/10/2025     Lab Results   Component Value Date    CO2 23 01/28/2025    CO2 27 01/14/2025    CO2 21 (L) 01/10/2025     Lab Results   Component Value Date    BUN 16 01/28/2025    BUN 15 01/14/2025    BUN 8 01/10/2025     Lab Results   Component Value Date    GLU 80 01/28/2025    GLU 89 01/14/2025    GLU 90 01/10/2025     Lab Results   Component Value Date    CALCIUM 9.6 01/28/2025    CALCIUM 9.6 01/14/2025    CALCIUM 8.7 01/10/2025     Lab Results   Component Value Date    PROT 7.0 01/28/2025    PROT 7.4 01/14/2025    PROT 6.7 10/15/2024     Lab Results   Component Value Date    ALBUMIN 3.7 01/28/2025    ALBUMIN 3.6 01/14/2025    ALBUMIN 3.5 10/15/2024     Lab Results   Component Value Date    BILITOT 0.7 01/28/2025    BILITOT 0.6 01/14/2025    BILITOT 0.6 10/15/2024     Lab Results   Component Value Date    AST 26 01/28/2025    AST 92 (H) 01/14/2025    AST 15 10/15/2024     Lab Results   Component Value Date    ALT 41 01/28/2025     (H) 01/14/2025    ALT 14 10/15/2024     Lab Results   Component Value Date    ANIONGAP 11 01/28/2025    ANIONGAP 12 01/14/2025    ANIONGAP 12 01/10/2025     Lab Results   Component Value Date     CREATININE 0.9 01/28/2025    CREATININE 0.9 01/14/2025    CREATININE 0.8 01/10/2025     Lab Results   Component Value Date    EGFRNORACEVR >60.0 01/28/2025    EGFRNORACEVR >60 01/14/2025    EGFRNORACEVR >60.0 01/10/2025     Assessment/Plan  -Pt qualifies for GLP-1 RA therapy based on BMI greater than or equal to 30 kg/m2  -Pt declines to begin therapy at this time due to financial burden    Patient consented to pharmacist management via collaborative practice.

## 2025-03-12 ENCOUNTER — PATIENT MESSAGE (OUTPATIENT)
Dept: BARIATRICS | Facility: CLINIC | Age: 65
End: 2025-03-12
Payer: COMMERCIAL

## 2025-03-14 ENCOUNTER — PATIENT MESSAGE (OUTPATIENT)
Dept: DERMATOLOGY | Facility: CLINIC | Age: 65
End: 2025-03-14
Payer: COMMERCIAL

## 2025-03-21 ENCOUNTER — TELEPHONE (OUTPATIENT)
Dept: BARIATRICS | Facility: CLINIC | Age: 65
End: 2025-03-21
Payer: COMMERCIAL

## 2025-03-21 NOTE — TELEPHONE ENCOUNTER
Called pt to reschedule her missed 8 week postop visits. No answer.  Will follow up with a portal message.

## 2025-04-17 ENCOUNTER — HOSPITAL ENCOUNTER (OUTPATIENT)
Dept: ENDOSCOPY | Facility: HOSPITAL | Age: 65
Discharge: HOME | End: 2025-04-17
Attending: FAMILY MEDICINE
Payer: COMMERCIAL

## 2025-04-29 ENCOUNTER — LAB VISIT (OUTPATIENT)
Dept: LAB | Facility: HOSPITAL | Age: 65
End: 2025-04-29
Attending: INTERNAL MEDICINE
Payer: COMMERCIAL

## 2025-04-29 DIAGNOSIS — Z79.899 IMMUNOCOMPROMISED STATE DUE TO DRUG THERAPY: ICD-10-CM

## 2025-04-29 DIAGNOSIS — H20.9 UVEITIS: ICD-10-CM

## 2025-04-29 DIAGNOSIS — D84.821 IMMUNOCOMPROMISED STATE DUE TO DRUG THERAPY: ICD-10-CM

## 2025-04-29 DIAGNOSIS — M35.9 UNDIFFERENTIATED CONNECTIVE TISSUE DISEASE: ICD-10-CM

## 2025-04-29 DIAGNOSIS — M25.50 POLYARTHRALGIA: ICD-10-CM

## 2025-04-29 DIAGNOSIS — Z79.899 HIGH RISK MEDICATION USE: ICD-10-CM

## 2025-04-29 LAB
ABSOLUTE EOSINOPHIL (OHS): 0.1 K/UL
ABSOLUTE MONOCYTE (OHS): 0.47 K/UL (ref 0.3–1)
ABSOLUTE NEUTROPHIL COUNT (OHS): 4.1 K/UL (ref 1.8–7.7)
ALBUMIN SERPL BCP-MCNC: 3.6 G/DL (ref 3.5–5.2)
ALP SERPL-CCNC: 99 UNIT/L (ref 40–150)
ALT SERPL W/O P-5'-P-CCNC: 13 UNIT/L (ref 10–44)
ANION GAP (OHS): 9 MMOL/L (ref 8–16)
AST SERPL-CCNC: 18 UNIT/L (ref 11–45)
BASOPHILS # BLD AUTO: 0.02 K/UL
BASOPHILS NFR BLD AUTO: 0.3 %
BILIRUB SERPL-MCNC: 0.8 MG/DL (ref 0.1–1)
BUN SERPL-MCNC: 11 MG/DL (ref 8–23)
C3 SERPL-MCNC: 123 MG/DL (ref 50–180)
C4 COMPLEMENT (OHS): 49 MG/DL (ref 11–44)
CALCIUM SERPL-MCNC: 8.8 MG/DL (ref 8.7–10.5)
CHLORIDE SERPL-SCNC: 110 MMOL/L (ref 95–110)
CO2 SERPL-SCNC: 24 MMOL/L (ref 23–29)
CREAT SERPL-MCNC: 1 MG/DL (ref 0.5–1.4)
CRP SERPL-MCNC: 18 MG/L
ERYTHROCYTE [DISTWIDTH] IN BLOOD BY AUTOMATED COUNT: 16.1 % (ref 11.5–14.5)
ERYTHROCYTE [SEDIMENTATION RATE] IN BLOOD: 4 MM/HR
GFR SERPLBLD CREATININE-BSD FMLA CKD-EPI: >60 ML/MIN/1.73/M2
GLUCOSE SERPL-MCNC: 100 MG/DL (ref 70–110)
HCT VFR BLD AUTO: 44 % (ref 37–48.5)
HGB BLD-MCNC: 13.5 GM/DL (ref 12–16)
IMM GRANULOCYTES # BLD AUTO: 0.01 K/UL (ref 0–0.04)
IMM GRANULOCYTES NFR BLD AUTO: 0.1 % (ref 0–0.5)
LYMPHOCYTES # BLD AUTO: 2.12 K/UL (ref 1–4.8)
MCH RBC QN AUTO: 27.7 PG (ref 27–31)
MCHC RBC AUTO-ENTMCNC: 30.7 G/DL (ref 32–36)
MCV RBC AUTO: 90 FL (ref 82–98)
NUCLEATED RBC (/100WBC) (OHS): 0 /100 WBC
PLATELET # BLD AUTO: 275 K/UL (ref 150–450)
PMV BLD AUTO: 11.1 FL (ref 9.2–12.9)
POTASSIUM SERPL-SCNC: 3.6 MMOL/L (ref 3.5–5.1)
PROT SERPL-MCNC: 6.9 GM/DL (ref 6–8.4)
RBC # BLD AUTO: 4.88 M/UL (ref 4–5.4)
RELATIVE EOSINOPHIL (OHS): 1.5 %
RELATIVE LYMPHOCYTE (OHS): 31.1 % (ref 18–48)
RELATIVE MONOCYTE (OHS): 6.9 % (ref 4–15)
RELATIVE NEUTROPHIL (OHS): 60.1 % (ref 38–73)
SODIUM SERPL-SCNC: 143 MMOL/L (ref 136–145)
WBC # BLD AUTO: 6.82 K/UL (ref 3.9–12.7)

## 2025-04-29 PROCEDURE — 80053 COMPREHEN METABOLIC PANEL: CPT

## 2025-04-29 PROCEDURE — 86235 NUCLEAR ANTIGEN ANTIBODY: CPT | Mod: 59

## 2025-04-29 PROCEDURE — 86140 C-REACTIVE PROTEIN: CPT

## 2025-04-29 PROCEDURE — 85025 COMPLETE CBC W/AUTO DIFF WBC: CPT

## 2025-04-29 PROCEDURE — 36415 COLL VENOUS BLD VENIPUNCTURE: CPT

## 2025-04-29 PROCEDURE — 86160 COMPLEMENT ANTIGEN: CPT | Mod: 59

## 2025-04-29 PROCEDURE — 85652 RBC SED RATE AUTOMATED: CPT

## 2025-04-29 PROCEDURE — 86038 ANTINUCLEAR ANTIBODIES: CPT

## 2025-04-29 PROCEDURE — 86235 NUCLEAR ANTIGEN ANTIBODY: CPT

## 2025-04-29 PROCEDURE — 81003 URINALYSIS AUTO W/O SCOPE: CPT

## 2025-04-29 PROCEDURE — 86225 DNA ANTIBODY NATIVE: CPT

## 2025-04-29 PROCEDURE — 86160 COMPLEMENT ANTIGEN: CPT

## 2025-04-30 LAB
ANA (OHS): POSITIVE
ANA PATTERN 1 (OHS): ABNORMAL
ANA PATTERN 2 (OHS): ABNORMAL
ANA TITER 1 (OHS): ABNORMAL
ANA TITER 2 (OHS): ABNORMAL
BILIRUB UR QL STRIP.AUTO: NEGATIVE
CLARITY UR: CLEAR
COLOR UR AUTO: NORMAL
CREAT UR-MCNC: 117 MG/DL (ref 15–325)
DSDNA ANTIBODY (OHS): NORMAL
DSDNA ANTIBODY TITER (OHS): NORMAL
GLUCOSE UR QL STRIP: NEGATIVE
HGB UR QL STRIP: NEGATIVE
KETONES UR QL STRIP: NEGATIVE
LEUKOCYTE ESTERASE UR QL STRIP: NEGATIVE
NITRITE UR QL STRIP: NEGATIVE
PH UR STRIP: 6 [PH]
PROT UR QL STRIP: NEGATIVE
PROT UR-MCNC: <7 MG/DL
PROT/CREAT UR: NORMAL MG/G{CREAT}
SP GR UR STRIP: 1.02

## 2025-04-30 PROCEDURE — 84156 ASSAY OF PROTEIN URINE: CPT | Performed by: INTERNAL MEDICINE

## 2025-05-02 LAB
SM  ANTIBODY (OHS): 0.1 RATIO
SM INTERPRETATION (OHS): NEGATIVE
SM/RNP ANTIBODY (OHS): 0.06 RATIO
SM/RNP INTERPRETATION (OHS): NEGATIVE
SSA  ANTIBODY (OHS): 0.06 RATIO (ref 0–0.99)
SSA INTERPRETATION (OHS): NEGATIVE
SSB  ANTIBODY (OHS): 0.06 RATIO
SSB INTERPRETATION (OHS): NEGATIVE

## 2025-05-19 ENCOUNTER — PATIENT MESSAGE (OUTPATIENT)
Dept: RHEUMATOLOGY | Facility: CLINIC | Age: 65
End: 2025-05-19

## 2025-05-19 ENCOUNTER — TELEPHONE (OUTPATIENT)
Dept: RHEUMATOLOGY | Facility: CLINIC | Age: 65
End: 2025-05-19
Payer: COMMERCIAL

## 2025-05-19 ENCOUNTER — OFFICE VISIT (OUTPATIENT)
Dept: RHEUMATOLOGY | Facility: CLINIC | Age: 65
End: 2025-05-19
Payer: COMMERCIAL

## 2025-05-19 ENCOUNTER — OFFICE VISIT (OUTPATIENT)
Dept: ORTHOPEDICS | Facility: CLINIC | Age: 65
End: 2025-05-19
Payer: COMMERCIAL

## 2025-05-19 ENCOUNTER — PATIENT MESSAGE (OUTPATIENT)
Dept: RHEUMATOLOGY | Facility: CLINIC | Age: 65
End: 2025-05-19
Payer: COMMERCIAL

## 2025-05-19 VITALS — BODY MASS INDEX: 34.22 KG/M2 | HEIGHT: 66 IN | WEIGHT: 212.94 LBS

## 2025-05-19 DIAGNOSIS — Q82.8 POROKERATOSIS: ICD-10-CM

## 2025-05-19 DIAGNOSIS — H20.9 UVEITIS: ICD-10-CM

## 2025-05-19 DIAGNOSIS — M65.4 RADIAL STYLOID TENOSYNOVITIS (DE QUERVAIN): Primary | ICD-10-CM

## 2025-05-19 DIAGNOSIS — G56.01 CARPAL TUNNEL SYNDROME OF RIGHT WRIST: ICD-10-CM

## 2025-05-19 DIAGNOSIS — M35.9 UNDIFFERENTIATED CONNECTIVE TISSUE DISEASE: Primary | ICD-10-CM

## 2025-05-19 DIAGNOSIS — M25.50 POLYARTHRALGIA: ICD-10-CM

## 2025-05-19 PROCEDURE — 1159F MED LIST DOCD IN RCRD: CPT | Mod: CPTII,S$GLB,, | Performed by: ORTHOPAEDIC SURGERY

## 2025-05-19 PROCEDURE — 99999 PR PBB SHADOW E&M-EST. PATIENT-LVL III: CPT | Mod: PBBFAC,,, | Performed by: ORTHOPAEDIC SURGERY

## 2025-05-19 PROCEDURE — 98006 SYNCH AUDIO-VIDEO EST MOD 30: CPT | Mod: 95,,, | Performed by: INTERNAL MEDICINE

## 2025-05-19 PROCEDURE — 1160F RVW MEDS BY RX/DR IN RCRD: CPT | Mod: CPTII,95,, | Performed by: INTERNAL MEDICINE

## 2025-05-19 PROCEDURE — 1159F MED LIST DOCD IN RCRD: CPT | Mod: CPTII,95,, | Performed by: INTERNAL MEDICINE

## 2025-05-19 PROCEDURE — 3008F BODY MASS INDEX DOCD: CPT | Mod: CPTII,S$GLB,, | Performed by: ORTHOPAEDIC SURGERY

## 2025-05-19 PROCEDURE — 20526 THER INJECTION CARP TUNNEL: CPT | Mod: RT,S$GLB,, | Performed by: ORTHOPAEDIC SURGERY

## 2025-05-19 PROCEDURE — 1160F RVW MEDS BY RX/DR IN RCRD: CPT | Mod: CPTII,S$GLB,, | Performed by: ORTHOPAEDIC SURGERY

## 2025-05-19 PROCEDURE — 99213 OFFICE O/P EST LOW 20 MIN: CPT | Mod: 25,S$GLB,, | Performed by: ORTHOPAEDIC SURGERY

## 2025-05-19 PROCEDURE — G2211 COMPLEX E/M VISIT ADD ON: HCPCS | Mod: 95,,, | Performed by: INTERNAL MEDICINE

## 2025-05-19 RX ORDER — TRIAMCINOLONE ACETONIDE 40 MG/ML
40 INJECTION, SUSPENSION INTRA-ARTICULAR; INTRAMUSCULAR
Status: DISCONTINUED | OUTPATIENT
Start: 2025-05-19 | End: 2025-05-19 | Stop reason: HOSPADM

## 2025-05-19 RX ADMIN — TRIAMCINOLONE ACETONIDE 40 MG: 40 INJECTION, SUSPENSION INTRA-ARTICULAR; INTRAMUSCULAR at 11:05

## 2025-05-19 NOTE — Clinical Note
Advised to call radiology to have NM joint scan scheduled at the earliest. All 4 and follow up in 6 months.

## 2025-05-19 NOTE — PROGRESS NOTES
RHEUMATOLOGY FOLLOW UP - TELE VISIT     The patient location is: [place]  The chief complaint leading to consultation is:  Follow-up for autoimmune disease  Visit type: Virtual visit with synchronous audio and video  Total time spent with patient:  10 minutes  Each patient to whom he or she provides medical services by telemedicine is:  (1) informed of the relationship between the physician and patient and the respective role of any other health care provider with respect to management of the patient; and (2) notified that he or she may decline to receive medical services by telemedicine and may withdraw from such care at any time.    Chief complaints, HPI, ROS, EXAM, Assessment & Plans:-  Karla Nicholas Burt a 65 y.o. pleasant female seen today for follow-up visit.  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  No recurrence of uveitis.  Treated with prednisone taper in the past.  On Plaquenil once daily for undifferentiated connective tissue disease with inflammatory arthralgia high titer positive DALI.  Dose reduced last visit for porokeratosis.  Denies any improvement or worsening rash.  Mild worsening morning stiffness and joint pain around large joints and lower extremity joints.  No pain of bilateral hands..  No rash.  Stable porokeratosis rash bilateral lower extremities.  Rheumatological review of system negative otherwise.  100% fist formation bilateral hands.  1. Undifferentiated connective tissue disease    2. Polyarthralgia    3. Porokeratosis    4. Uveitis      Problem List Items Addressed This Visit       Porokeratosis    Undifferentiated connective tissue disease - Primary    Relevant Orders    NM Joint Scan Whole Body    Uveitis     Other Visit Diagnoses         Polyarthralgia        Relevant Orders    NM Joint Scan Whole Body           Latest Reference Range & Units 04/29/25 13:38   WBC  3.90 - 12.70 K/uL 6.82   RBC 4.00 - 5.40 M/uL 4.88   Hemoglobin 12.0 - 16.0 gm/dL 13.5   Hematocrit 37.0 - 48.5 % 44.0   MCV 82 - 98 fL 90   MCH 27.0 - 31.0 pg 27.7   MCHC 32.0 - 36.0 g/dL 30.7 (L)   RDW 11.5 - 14.5 % 16.1 (H)   Platelet Count 150 - 450 K/uL 275   MPV 9.2 - 12.9 fL 11.1   Neut % 38 - 73 % 60.1   Lymph % 18 - 48 % 31.1   Mono % 4 - 15 % 6.9   Eos % <=8 % 1.5   Basophil % <=1.9 % 0.3   Immature Granulocytes 0.0 - 0.5 % 0.1   Gran # (ANC) 1.8 - 7.7 K/uL 4.10   Lymph # 1 - 4.8 K/uL 2.12   Mono # 0.3 - 1 K/uL 0.47   Eos # <=0.5 K/uL 0.10   Baso # <=0.2 K/uL 0.02   Immature Grans (Abs) 0.00 - 0.04 K/uL 0.01   nRBC <=0 /100 WBC 0   Sed Rate <=36 mm/hr 4   Sodium 136 - 145 mmol/L 143   Potassium 3.5 - 5.1 mmol/L 3.6   Chloride 95 - 110 mmol/L 110   CO2 23 - 29 mmol/L 24   Anion Gap 8 - 16 mmol/L 9   BUN 8 - 23 mg/dL 11   Creatinine 0.5 - 1.4 mg/dL 1.0   eGFR >60 mL/min/1.73/m2 >60   Glucose 70 - 110 mg/dL 100   Calcium 8.7 - 10.5 mg/dL 8.8   ALP 40 - 150 unit/L 99   PROTEIN TOTAL 6.0 - 8.4 gm/dL 6.9   Albumin 3.5 - 5.2 g/dL 3.6   BILIRUBIN TOTAL 0.1 - 1.0 mg/dL 0.8   AST 11 - 45 unit/L 18   ALT 10 - 44 unit/L 13   CRP <=8.2 mg/L 18.0 (H)   DALI Negative <1:80  Positive !   ds DNA Ab Negative 1:10  Negative 1:10   Anti-SSA Interpretation Negative  Negative   Anti-SSB Interpretation  Negative   Anti Sm Antibody Ratio 0.10   Anti-Sm/RNP Interpretation Negative  Negative   DALI Titer 2  1:640   DALI Pattern 2  Homogeneous   SM/RNP Antibody Ratio 0.06   SSA Antibody 0.00 - 0.99 Ratio 0.06   SSB Antibody Ratio 0.06   C3 Complement 50 - 180 mg/dL 123   C4 Complement 11 - 44 mg/dL 49 (H)   DALI Pattern 1   Speckled   DALI Titer 1  1:640   SM Interpretation  Negative   (L): Data is abnormally low  (H): Data is abnormally high  !: Data is abnormal      Worsening inflammatory arthralgia without significant signs or symptoms of inflammatory arthritis.  Check whole-body nuclear joint scan to look for any underlying synovitis  of large and intermediate joints.  Continue Plaquenil once daily.  If nuclear scan shows active inflammation, consider Imuran.  Stable porokeratosis without any worsening rash.  Monitor clinically.  No lupus rash.  I have explained all of the above in detail and the patient understands all of the current recommendation(s). I have answered all questions to the best of my ability and to their complete satisfaction.   # Follow up in about 6 months (around 11/19/2025).      Past Medical History:   Diagnosis Date    Allergy     Anxiety     GERD (gastroesophageal reflux disease)     Hypertension     Obesity     Vitamin B 12 deficiency     Vitamin D deficiency disease        Past Surgical History:   Procedure Laterality Date    ABDOMINAL SURGERY      BREAST BIOPSY Right 10/08/2021    ESOPHAGOGASTRODUODENOSCOPY N/A 12/20/2024    Procedure: EGD (ESOPHAGOGASTRODUODENOSCOPY);  Surgeon: Kassi Mckeon MD;  Location: [facility];  Service: Gastroenterology;  Laterality: N/A;    INJECTION OF ANESTHETIC AGENT INTO TISSUE PLANE DEFINED BY TRANSVERSUS ABDOMINIS MUSCLE  01/09/2025    Procedure: BLOCK, TRANSVERSUS ABDOMINIS PLANE;  Surgeon: Elayne Arnold MD;  Location: [address];  Service: General;;    KNEE ARTHROSCOPY Right     KNEE ARTHROSCOPY W/ MENISCECTOMY Left 01/04/2024    Procedure: ARTHROSCOPY, KNEE, WITH MENISCECTOMY;  Surgeon: Larry Adams MD;  Location: [facility];  Service: Orthopedics;  Laterality: Left;    LAPAROSCOPIC SLEEVE GASTRECTOMY N/A 01/09/2025    Procedure: GASTRECTOMY, SLEEVE, LAPAROSCOPIC with intraop EGD, Outpatient, 23 hour observation;  Surgeon: Elayne Arnold MD;  Location: [address];  Service: General;  Laterality: N/A;    MOUTH SURGERY      REPAIR, HERNIA, HIATAL, LAPAROSCOPIC  01/09/2025    Procedure: REPAIR, HERNIA, HIATAL, LAPAROSCOPIC;  Surgeon: Elayne Arnlod MD;  Location: [address];  Service: General;;    TUBAL LIGATION           Social History[1]    Family History   Problem Relation Name Age of Onset    Hypertension Maternal Grandmother      Heart disease Maternal Grandmother      Lung cancer Maternal Grandfather      Hypertension Father      Hyperlipidemia Father      Kidney disease Father      Diabetes Mother      Hypertension Mother      Stroke Mother      Hypertension Brother E     Heart failure Brother E     No Known Problems Brother J     No Known Problems Brother C     Hypertension Sister      Thyroid cancer Neg Hx      Other Neg Hx          MEN2       Review of patient's allergies indicates:  No Known Allergies    Medication List with Changes/Refills   Current Medications    ACETAMINOPHEN 500 MG PWPK    Take 1g (2 packets) by mouth every 8 hours for at least 3 days and up to 5 days as needed. May take one additional 500mg dose (1 packet) per day for breakthrough pain. Do not exceed 4g in 24 hours.    ACYCLOVIR (ZOVIRAX) 200 MG CAPSULE    Take 1 capsule (200 mg total) by mouth once daily.    ALBUTEROL (VENTOLIN HFA) 90 MCG/ACTUATION INHALER    Inhale 2 puffs into the lungs every 6 (six) hours as needed for Wheezing. Rescue    AMLODIPINE (NORVASC) 10 MG TABLET    Take 1 tablet (10 mg total) by mouth once daily.    BENZONATATE (TESSALON) 200 MG CAPSULE    Take 1 capsule (200 mg total) by mouth 3 (three) times daily as needed for Cough.    BUPROPION (WELLBUTRIN XL) 150 MG TB24 TABLET    Take 1 tablet (150 mg total) by mouth once daily. (At noon)    CICLOPIROX (PENLAC) 8 % SOLN    Apply to nails once daily    CITALOPRAM (CELEXA) 20 MG TABLET    Take 1 tablet (20 mg total) by mouth once daily.    ERGOCALCIFEROL (VITAMIN D2) 50,000 UNIT CAP    Take 1 capsule (50,000 Units total) by mouth every 7 days.    FLUTICASONE PROPIONATE (FLONASE) 50 MCG/ACTUATION NASAL SPRAY    1 spray (50 mcg total) by Each Nostril route once daily.    FUROSEMIDE (LASIX) 20 MG TABLET    Take 1 tablet (20 mg total) by mouth daily as needed (swelling).     GABAPENTIN (NEURONTIN) 300 MG CAPSULE    Take 1 capsule (300 mg total) by mouth 2 (two) times daily. Open capsule and empty prior to taking. Take one dose on morning of surgery prior to arrival. Take 1 capsule (300 mg) twice a day for 3 days post-op. Continue for an additional 2 days as needed.    GUAIFENESIN (HUMIBID E) 400 MG TAB    Take 1 tablet (400 mg total) by mouth every 6 (six) hours as needed (for chest congestion).    HYDROXYCHLOROQUINE (PLAQUENIL) 200 MG TABLET    Take 1 tablet (200 mg total) by mouth once daily.    INHALATION SPACING DEVICE    Use as directed when taking inhaled medicine    IPRATROPIUM-ALBUTEROL (COMBIVENT)  MCG/ACTUATION INHALER    Inhale 1 puff into the lungs every 6 (six) hours as needed for Wheezing or Shortness of Breath. Rescue    MULTIVITAMIN CAPSULE    Take 1 capsule by mouth once daily.    OMEPRAZOLE (PRILOSEC) 40 MG CAPSULE    Take 1 capsule (40 mg total) by mouth every morning. Open capsule and sprinkle granules for 2 weeks post-op    ONDANSETRON (ZOFRAN-ODT) 8 MG TBDL    Dissolve 1 tablet (8 mg total) by mouth every 6 (six) hours as needed (nausea).    URSODIOL (ACTIGALL) 500 MG TABLET    Take 1 tablet (500 mg total) by mouth once daily.       Disclaimer: This note was prepared using voice recognition system and is likely to have sound alike errors and is not proof read.  Please call me with any questions.  Answers submitted by the patient for this visit:  Rheumatology Questionnaire (Submitted on 5/19/2025)  fever: No  eye redness: No  mouth sores: No  headaches: No  shortness of breath: No  chest pain: No  trouble swallowing: No  diarrhea: No  constipation: No  unexpected weight change: No  genital sore: No  dysuria: No  During the last 3 days, have you had a skin rash?: No  Bruises or bleeds easily: No  cough: No         [1]   Social History  Tobacco Use    Smoking status: Never     Passive exposure: Never    Smokeless tobacco: Never   Substance Use Topics    Alcohol  use: Yes     Comment: socially 1x weekly    Drug use: No

## 2025-05-19 NOTE — PROCEDURES
Tendon Sheath    Date/Time: 5/19/2025 11:00 AM    Performed by: Stan Alexis MD  Authorized by: Stan Alexis MD    Consent Done?:  Yes (Verbal)  Indications:  Pain  Local anesthesia used?: Yes    Local anesthetic:  Lidocaine 2% without epinephrine  Anesthetic total (ml):  0.5    Location:  Wrist  Site:  R first doral compartment  Ultrasonic guidance for needle placement?: No    Needle size:  25 G  Approach:  Radial  Medications:  40 mg triamcinolone acetonide 40 mg/mL

## 2025-05-19 NOTE — PROCEDURES
Carpal Tunnel: R carpal tunnel    Date/Time: 5/19/2025 11:00 AM    Performed by: Stan Alexis MD  Authorized by: Stan Alexis MD    Consent Done?:  Yes (Verbal)  Indications:  Pain  Timeout: prior to procedure the correct patient, procedure, and site was verified    Prep: patient was prepped and draped in usual sterile fashion      Local anesthesia used?: Yes    Local anesthetic:  Lidocaine 2% without epinephrine  Anesthetic total (ml):  1    Location:  Wrist  Site:  R carpal tunnel  Ultrasonic Guidance for Needle Placement?: No    Needle size:  25 G  Approach:  Volar  Medications:  40 mg triamcinolone acetonide 40 mg/mL

## 2025-05-19 NOTE — PROGRESS NOTES
Subjective:     Patient ID: Karla Arzola is a 65 y.o. female.    Chief Complaint: Pain of the Right Wrist (Pain 10/10/)      HPI:  The patient is a 65-year-old female with bilateral carpal tunnel syndrome documented by nerve conduction studies 05/16/2023.  She was last injected 09/27/2024 with good results in till recently.  She also had a right de Quervain injection as well as on that date.  She does not have a thumb spica splint.  She requests reinjection today.    Past Medical History:   Diagnosis Date    Allergy     Anxiety     GERD (gastroesophageal reflux disease)     Hypertension     Obesity     Vitamin B 12 deficiency     Vitamin D deficiency disease      Past Surgical History:   Procedure Laterality Date    ABDOMINAL SURGERY      BREAST BIOPSY Right 10/08/2021    ESOPHAGOGASTRODUODENOSCOPY N/A 12/20/2024    Procedure: EGD (ESOPHAGOGASTRODUODENOSCOPY);  Surgeon: Kassi Mckeon MD;  Location: [facility];  Service: Gastroenterology;  Laterality: N/A;    INJECTION OF ANESTHETIC AGENT INTO TISSUE PLANE DEFINED BY TRANSVERSUS ABDOMINIS MUSCLE  01/09/2025    Procedure: BLOCK, TRANSVERSUS ABDOMINIS PLANE;  Surgeon: Elayne Arnold MD;  Location: [address];  Service: General;;    KNEE ARTHROSCOPY Right     KNEE ARTHROSCOPY W/ MENISCECTOMY Left 01/04/2024    Procedure: ARTHROSCOPY, KNEE, WITH MENISCECTOMY;  Surgeon: Larry Adams MD;  Location: [facility];  Service: Orthopedics;  Laterality: Left;    LAPAROSCOPIC SLEEVE GASTRECTOMY N/A 01/09/2025    Procedure: GASTRECTOMY, SLEEVE, LAPAROSCOPIC with intraop EGD, Outpatient, 23 hour observation;  Surgeon: Elayne Arnold MD;  Location: [address];  Service: General;  Laterality: N/A;    MOUTH SURGERY      REPAIR, HERNIA, HIATAL, LAPAROSCOPIC  01/09/2025    Procedure: REPAIR, HERNIA, HIATAL, LAPAROSCOPIC;  Surgeon: Elayne Arnold MD;  Location: [address];  Service: General;;    TUBAL LIGATION        Family History   Problem Relation Name Age of Onset    Hypertension Maternal Grandmother      Heart disease Maternal Grandmother      Lung cancer Maternal Grandfather      Hypertension Father      Hyperlipidemia Father      Kidney disease Father      Diabetes Mother      Hypertension Mother      Stroke Mother      Hypertension Brother E     Heart failure Brother E     No Known Problems Brother J     No Known Problems Brother C     Hypertension Sister      Thyroid cancer Neg Hx      Other Neg Hx          MEN2     Social History[1]  Medication List with Changes/Refills   Current Medications    ACETAMINOPHEN 500 MG PWPK    Take 1g (2 packets) by mouth every 8 hours for at least 3 days and up to 5 days as needed. May take one additional 500mg dose (1 packet) per day for breakthrough pain. Do not exceed 4g in 24 hours.    ACYCLOVIR (ZOVIRAX) 200 MG CAPSULE    Take 1 capsule (200 mg total) by mouth once daily.    ALBUTEROL (VENTOLIN HFA) 90 MCG/ACTUATION INHALER    Inhale 2 puffs into the lungs every 6 (six) hours as needed for Wheezing. Rescue    AMLODIPINE (NORVASC) 10 MG TABLET    Take 1 tablet (10 mg total) by mouth once daily.    BENZONATATE (TESSALON) 200 MG CAPSULE    Take 1 capsule (200 mg total) by mouth 3 (three) times daily as needed for Cough.    BUPROPION (WELLBUTRIN XL) 150 MG TB24 TABLET    Take 1 tablet (150 mg total) by mouth once daily. (At noon)    CICLOPIROX (PENLAC) 8 % SOLN    Apply to nails once daily    CITALOPRAM (CELEXA) 20 MG TABLET    Take 1 tablet (20 mg total) by mouth once daily.    ERGOCALCIFEROL (VITAMIN D2) 50,000 UNIT CAP    Take 1 capsule (50,000 Units total) by mouth every 7 days.    FLUTICASONE PROPIONATE (FLONASE) 50 MCG/ACTUATION NASAL SPRAY    1 spray (50 mcg total) by Each Nostril route once daily.    FUROSEMIDE (LASIX) 20 MG TABLET    Take 1 tablet (20 mg total) by mouth daily as needed (swelling).    GABAPENTIN (NEURONTIN) 300 MG CAPSULE    Take 1 capsule (300 mg total) by  mouth 2 (two) times daily. Open capsule and empty prior to taking. Take one dose on morning of surgery prior to arrival. Take 1 capsule (300 mg) twice a day for 3 days post-op. Continue for an additional 2 days as needed.    GUAIFENESIN (HUMIBID E) 400 MG TAB    Take 1 tablet (400 mg total) by mouth every 6 (six) hours as needed (for chest congestion).    HYDROXYCHLOROQUINE (PLAQUENIL) 200 MG TABLET    Take 1 tablet (200 mg total) by mouth once daily.    INHALATION SPACING DEVICE    Use as directed when taking inhaled medicine    IPRATROPIUM-ALBUTEROL (COMBIVENT)  MCG/ACTUATION INHALER    Inhale 1 puff into the lungs every 6 (six) hours as needed for Wheezing or Shortness of Breath. Rescue    MULTIVITAMIN CAPSULE    Take 1 capsule by mouth once daily.    OMEPRAZOLE (PRILOSEC) 40 MG CAPSULE    Take 1 capsule (40 mg total) by mouth every morning. Open capsule and sprinkle granules for 2 weeks post-op    ONDANSETRON (ZOFRAN-ODT) 8 MG TBDL    Dissolve 1 tablet (8 mg total) by mouth every 6 (six) hours as needed (nausea).    URSODIOL (ACTIGALL) 500 MG TABLET    Take 1 tablet (500 mg total) by mouth once daily.     Review of patient's allergies indicates:  No Known Allergies  Review of Systems   Constitutional: Negative for malaise/fatigue.   HENT:  Negative for hearing loss.    Eyes:  Positive for visual disturbance. Negative for double vision.   Cardiovascular:  Negative for chest pain.   Respiratory:  Negative for shortness of breath.    Endocrine: Negative for cold intolerance.   Hematologic/Lymphatic: Does not bruise/bleed easily.   Skin:  Negative for poor wound healing and suspicious lesions.   Musculoskeletal:  Positive for arthritis, joint pain and joint swelling. Negative for gout.   Gastrointestinal:  Negative for nausea and vomiting.   Genitourinary:  Negative for dysuria.   Neurological:  Positive for numbness, paresthesias and sensory change.   Psychiatric/Behavioral:  Positive for depression. Negative  for memory loss and substance abuse. The patient is nervous/anxious.    Allergic/Immunologic: Negative for persistent infections.       Objective:   Body mass index is 34.37 kg/m².  There were no vitals filed for this visit.             General    Constitutional: She is oriented to person, place, and time. She appears well-developed and well-nourished. No distress.   HENT:   Head: Normocephalic.   Eyes: EOM are normal.   Pulmonary/Chest: Effort normal.   Neurological: She is oriented to person, place, and time.   Psychiatric: She has a normal mood and affect.             Right Hand/Wrist Exam     Inspection   Scars: Wrist - absent Hand -  absent  Effusion: Wrist - absent Hand -  absent    Pain   Wrist - The patient exhibits pain of the flexor/pronator group and extensory musculature.    Tests   Phalens sign: positive  Tinel's sign (median nerve): positive  Finkelstein's test: positive  Carpal Tunnel Compression Test: positive    Atrophy   Thenar:  negative  Intrinsic:  negative    Other     Neuorologic Exam    Median Distribution: abnormal  Ulnar Distribution: normal  Radial Distribution: normal    Comments:  The patient has tenderness right wrist 1st dorsal extensor tendon compartment with a positive Finkelstein test.  She has a positive Tinel and positive Phalen sign.  There is no thenar atrophy noted.      Left Hand/Wrist Exam     Inspection   Effusion:  Hand -  absent          Vascular Exam       Capillary Refill  Right Hand: normal capillary refill         radiographs were not obtained today  Assessment:     Encounter Diagnoses   Name Primary?    Radial styloid tenosynovitis (de quervain) Yes    Carpal tunnel syndrome of right wrist         Plan:     The patient is injected right carpal tunnel with 1 cc Kenalog and 1 cc 2% plain lidocaine under sterile technique.  She was injected right 1st dorsal extensor tendon compartment with 0.5 cc Kenalog and 0.5 cc 2% plain lidocaine.  She was given a thumb spica  splint.  She will wait at least 3 months between injections.                Disclaimer: This note was prepared using a voice recognition system and is likely to have sound alike errors within the text.          [1]   Social History  Socioeconomic History    Marital status: Single   Occupational History     Employer: [employer]   Tobacco Use    Smoking status: Never     Passive exposure: Never    Smokeless tobacco: Never   Substance and Sexual Activity    Alcohol use: Yes     Comment: socially 1x weekly    Drug use: No    Sexual activity: Yes     Partners: Male   Other Topics Concern    Are you pregnant or think you may be? No    Breast-feeding No   Social History Narrative    She wears seatbelt.     Social Drivers of Health     Financial Resource Strain: Medium Risk (1/9/2025)    Overall Financial Resource Strain (CARDIA)     Difficulty of Paying Living Expenses: Somewhat hard   Food Insecurity: No Food Insecurity (1/9/2025)    Hunger Vital Sign     Worried About Running Out of Food in the Last Year: Never true     Ran Out of Food in the Last Year: Never true   Transportation Needs: No Transportation Needs (1/9/2025)    TRANSPORTATION NEEDS     Transportation : No   Physical Activity: Insufficiently Active (12/3/2024)    Exercise Vital Sign     Days of Exercise per Week: 6 days     Minutes of Exercise per Session: 20 min   Stress: No Stress Concern Present (1/9/2025)    [place] [place] [employer] - Occupational Stress Questionnaire     Feeling of Stress : Not at all   Housing Stability: High Risk (1/9/2025)    Housing Stability Vital Sign     Unable to Pay for Housing in the Last Year: Yes     Homeless in the Last Year: No

## 2025-05-29 ENCOUNTER — PATIENT MESSAGE (OUTPATIENT)
Dept: BARIATRICS | Facility: CLINIC | Age: 65
End: 2025-05-29
Payer: COMMERCIAL

## 2025-05-30 DIAGNOSIS — Z98.84 S/P LAPAROSCOPIC SLEEVE GASTRECTOMY: ICD-10-CM

## 2025-05-30 RX ORDER — OMEPRAZOLE 40 MG/1
40 CAPSULE, DELAYED RELEASE ORAL EVERY MORNING
Qty: 90 CAPSULE | Refills: 2 | Status: SHIPPED | OUTPATIENT
Start: 2025-05-30

## 2025-06-06 ENCOUNTER — HOSPITAL ENCOUNTER (OUTPATIENT)
Dept: RADIOLOGY | Facility: HOSPITAL | Age: 65
Discharge: HOME | End: 2025-06-06
Attending: INTERNAL MEDICINE
Payer: COMMERCIAL

## 2025-06-06 DIAGNOSIS — M35.9 UNDIFFERENTIATED CONNECTIVE TISSUE DISEASE: ICD-10-CM

## 2025-06-06 DIAGNOSIS — M25.50 POLYARTHRALGIA: ICD-10-CM

## 2025-06-06 PROCEDURE — 78306 BONE IMAGING WHOLE BODY: CPT | Mod: 26,,, | Performed by: RADIOLOGY

## 2025-06-06 PROCEDURE — 78306 BONE IMAGING WHOLE BODY: CPT | Mod: TC

## 2025-06-06 PROCEDURE — A9512 TC99M PERTECHNETATE: HCPCS | Performed by: INTERNAL MEDICINE

## 2025-06-06 RX ADMIN — TECHNETIUM TC-99M 15.1 MILLICURIE: 14 INJECTION, SOLUTION INTRAVENOUS at 10:06

## 2025-06-09 ENCOUNTER — RESULTS FOLLOW-UP (OUTPATIENT)
Dept: RHEUMATOLOGY | Facility: CLINIC | Age: 65
End: 2025-06-09

## 2025-06-10 ENCOUNTER — OFFICE VISIT (OUTPATIENT)
Dept: OPHTHALMOLOGY | Facility: CLINIC | Age: 65
End: 2025-06-10
Payer: COMMERCIAL

## 2025-06-10 DIAGNOSIS — H20.9 UVEITIS OF BOTH EYES: Primary | ICD-10-CM

## 2025-06-10 PROCEDURE — 99213 OFFICE O/P EST LOW 20 MIN: CPT | Mod: S$GLB,,, | Performed by: OPTOMETRIST

## 2025-06-10 PROCEDURE — 1159F MED LIST DOCD IN RCRD: CPT | Mod: CPTII,S$GLB,, | Performed by: OPTOMETRIST

## 2025-06-10 PROCEDURE — 1160F RVW MEDS BY RX/DR IN RCRD: CPT | Mod: CPTII,S$GLB,, | Performed by: OPTOMETRIST

## 2025-06-10 PROCEDURE — 99999 PR PBB SHADOW E&M-EST. PATIENT-LVL III: CPT | Mod: PBBFAC,,, | Performed by: OPTOMETRIST

## 2025-06-10 RX ORDER — PREDNISOLONE ACETATE 10 MG/ML
1 SUSPENSION/ DROPS OPHTHALMIC 4 TIMES DAILY
Qty: 5 ML | Refills: 0 | Status: SHIPPED | OUTPATIENT
Start: 2025-06-10 | End: 2026-06-10

## 2025-06-10 NOTE — PROGRESS NOTES
HPI     Eye Problem            Comments: C/o OU red since Sunday  Yesterday morning woke to:   watering  swelling  blurred VA  Pain OS>OD  Slight light sensitivity   Has not used any gtts.           Comments    Last [facility] Visit 2/8/13  RE  Plaquenil x 2023  Connective tissues disease           Last edited by Digna Jimenez on 6/10/2025 10:30 AM.            Assessment /Plan     For exam results, see Encounter Report.    Uveitis of both eyes    Other orders  -     prednisoLONE acetate (PRED FORTE) 1 % DrpS; Place 1 drop into both eyes 4 (four) times daily.  Dispense: 5 mL; Refill: 0    2+ limbal flush with trace cell OU today  Start Pred qid OU until next visit  Normal IOP today OD, OS  PT instructed to call/send message on Friday if symptoms have not improved   Monitor 1 week      RTC 1 week for follow up and IOP check or PRN  Discussed above and all questions were answered.

## 2025-06-17 ENCOUNTER — OFFICE VISIT (OUTPATIENT)
Dept: OPHTHALMOLOGY | Facility: CLINIC | Age: 65
End: 2025-06-17
Payer: COMMERCIAL

## 2025-06-17 DIAGNOSIS — H20.9 UVEITIS OF BOTH EYES: Primary | ICD-10-CM

## 2025-06-17 PROCEDURE — 99213 OFFICE O/P EST LOW 20 MIN: CPT | Mod: S$GLB,,, | Performed by: OPTOMETRIST

## 2025-06-17 PROCEDURE — 1159F MED LIST DOCD IN RCRD: CPT | Mod: CPTII,S$GLB,, | Performed by: OPTOMETRIST

## 2025-06-17 PROCEDURE — 99999 PR PBB SHADOW E&M-EST. PATIENT-LVL III: CPT | Mod: PBBFAC,,, | Performed by: OPTOMETRIST

## 2025-06-17 PROCEDURE — 1160F RVW MEDS BY RX/DR IN RCRD: CPT | Mod: CPTII,S$GLB,, | Performed by: OPTOMETRIST

## 2025-06-17 NOTE — PROGRESS NOTES
HPI     Follow-up            Comments: Pt presents for a 1 weeks follow up for OU Uveitis and IOP   check  Started on Pred qid OU- pt compliant last used this am ~6  VA stable  Slight burning in OU    No other concerns at this time.             Comments    Plaquenil x 2023  Connective tissues disease             Last edited by Digna Jimenez on 6/17/2025 10:56 AM.            Assessment /Plan     For exam results, see Encounter Report.    Uveitis of both eyes    Resolving nicely  Clear a/c today OD, OS  Mild limbal flush remains  Continue Pred qid OU x 1 week, then tid OU x 1 week, then bid OU x 1 week, then qd OU x 1 week  OK to stop if symptoms have resolved completely  Normal IOP today OD, OS  Discussed switching steroids in the future if symptoms persist      RTC PRN with any new or worsening symptoms  Otherwise, RTC as scheduled for 10-2 VF, mOCT and dilated exam  Discussed above and all questions were answered.                    
Wheelchair/Stroller

## 2025-06-20 ENCOUNTER — HOSPITAL ENCOUNTER (OUTPATIENT)
Dept: RADIOLOGY | Facility: HOSPITAL | Age: 65
Discharge: HOME | End: 2025-06-20
Attending: FAMILY MEDICINE
Payer: COMMERCIAL

## 2025-06-20 DIAGNOSIS — Z12.31 OTHER SCREENING MAMMOGRAM: ICD-10-CM

## 2025-06-20 PROCEDURE — 77067 SCR MAMMO BI INCL CAD: CPT | Mod: 26,,, | Performed by: STUDENT IN AN ORGANIZED HEALTH CARE EDUCATION/TRAINING PROGRAM

## 2025-06-20 PROCEDURE — 77063 BREAST TOMOSYNTHESIS BI: CPT | Mod: 26,,, | Performed by: STUDENT IN AN ORGANIZED HEALTH CARE EDUCATION/TRAINING PROGRAM

## 2025-06-20 PROCEDURE — 77063 BREAST TOMOSYNTHESIS BI: CPT | Mod: TC

## 2025-06-30 ENCOUNTER — OFFICE VISIT (OUTPATIENT)
Dept: FAMILY MEDICINE | Facility: CLINIC | Age: 65
End: 2025-06-30
Attending: FAMILY MEDICINE
Payer: COMMERCIAL

## 2025-06-30 VITALS
TEMPERATURE: 97 F | WEIGHT: 219.81 LBS | HEIGHT: 66 IN | RESPIRATION RATE: 18 BRPM | HEART RATE: 78 BPM | DIASTOLIC BLOOD PRESSURE: 76 MMHG | OXYGEN SATURATION: 99 % | BODY MASS INDEX: 35.32 KG/M2 | SYSTOLIC BLOOD PRESSURE: 138 MMHG

## 2025-06-30 DIAGNOSIS — M35.9 UNDIFFERENTIATED CONNECTIVE TISSUE DISEASE: ICD-10-CM

## 2025-06-30 DIAGNOSIS — E66.01 SEVERE OBESITY (BMI 35.0-35.9 WITH COMORBIDITY): ICD-10-CM

## 2025-06-30 DIAGNOSIS — I10 ESSENTIAL HYPERTENSION: Primary | ICD-10-CM

## 2025-06-30 DIAGNOSIS — F41.8 DEPRESSION WITH ANXIETY: ICD-10-CM

## 2025-06-30 PROCEDURE — 99214 OFFICE O/P EST MOD 30 MIN: CPT | Mod: S$GLB,,, | Performed by: FAMILY MEDICINE

## 2025-06-30 PROCEDURE — 99999 PR PBB SHADOW E&M-EST. PATIENT-LVL V: CPT | Mod: PBBFAC,,, | Performed by: FAMILY MEDICINE

## 2025-06-30 PROCEDURE — 3008F BODY MASS INDEX DOCD: CPT | Mod: CPTII,S$GLB,, | Performed by: FAMILY MEDICINE

## 2025-06-30 PROCEDURE — 3075F SYST BP GE 130 - 139MM HG: CPT | Mod: CPTII,S$GLB,, | Performed by: FAMILY MEDICINE

## 2025-06-30 PROCEDURE — 3078F DIAST BP <80 MM HG: CPT | Mod: CPTII,S$GLB,, | Performed by: FAMILY MEDICINE

## 2025-06-30 PROCEDURE — G2211 COMPLEX E/M VISIT ADD ON: HCPCS | Mod: S$GLB,,, | Performed by: FAMILY MEDICINE

## 2025-06-30 PROCEDURE — 1159F MED LIST DOCD IN RCRD: CPT | Mod: CPTII,S$GLB,, | Performed by: FAMILY MEDICINE

## 2025-06-30 PROCEDURE — 1160F RVW MEDS BY RX/DR IN RCRD: CPT | Mod: CPTII,S$GLB,, | Performed by: FAMILY MEDICINE

## 2025-06-30 NOTE — PROGRESS NOTES
Karla Arzola    Chief Complaint   Patient presents with    Follow-up    Hypertension       History of Present Illness:   Ms. Arzola comes in today for hypertension follow-up.  She states she is not fasting but has taken medications today.  She states she continues to take amlodipine 10 mg daily for blood pressure control.  She states she also takes Lasix 20 mg daily prn swelling; vitamin-D 10479 units weekly for vitamin-D deficiency; Wellbutrin  mg daily, Celexa 20 mg daily for depression and anxiety; Plaquenil 200 mg daily for undifferentiated connective tissue disease.  She states she had gastric sleeve procedure performed in January 2025.  She states she occasionally performs home blood pressure checks (3-4 times per week) with levels ranging 138/70 6-80.  She states she exercises 3-4 times per week, 30-60 minutes each time with stair master, treadmill, squats, line dancing.  She states she has not been monitoring her diet.    She reports no acute problems today. She denies having fever, chills, fatigue, appetite changes; shortness of breath, cough, wheezing; chest pain, palpitations, leg swelling; abdominal pain, nausea, vomiting, diarrhea, constipation; unusual urinary symptoms; polydipsia, polyphagia, polyuria, hot or cold intolerance; back pain; acute visual changes, numbness, headache; anxiety, depression, homicidal or suicidal thoughts.     He saw Dr. Zimmerman, rheumatologist, on May 19, 2025 for undifferentiated connective tissue disease, polyarthralgia, porokeratosis, uveitis.        Labs:              WBC                      6.82                04/29/2025                 HGB                      13.5                04/29/2025                 HCT                      44.0                04/29/2025                 PLT                      275                 04/29/2025                 CHOL                     174                 10/15/2024                 TRIG                     92                   10/15/2024                 HDL                      74                  10/15/2024                 ALT                      13                  04/29/2025                 AST                      18                  04/29/2025                 NA                       143                 04/29/2025                 K                        3.6                 04/29/2025                 CL                       110                 04/29/2025                 CREATININE               1.0                 04/29/2025                 BUN                      11                  04/29/2025                 CO2                      24                  04/29/2025                 TSH                      1.145               10/15/2024                 GLUF                     99                  08/08/2008                 HGBA1C                   5.1                 05/30/2024           CALCIUM                  8.8                 04/29/2025                 ANIONGAP                 9                   04/29/2025                 EGFRNORACEVR             >60                 04/29/2025                   Current Outpatient Medications   Medication Sig    acyclovir (ZOVIRAX) 200 MG capsule Take 1 capsule (200 mg total) by mouth once daily.    albuterol (VENTOLIN HFA) 90 mcg/actuation inhaler Inhale 2 puffs into the lungs every 6 (six) hours as needed for Wheezing. Rescue    amLODIPine (NORVASC) 10 MG tablet Take 1 tablet (10 mg total) by mouth once daily.    benzonatate (TESSALON) 200 MG capsule Take 1 capsule (200 mg total) by mouth 3 (three) times daily as needed for Cough.    buPROPion (WELLBUTRIN XL) 150 MG TB24 tablet Take 1 tablet (150 mg total) by mouth once daily. (At noon)    citalopram (CELEXA) 20 MG tablet Take 1 tablet (20 mg total) by mouth once daily.    ergocalciferol (VITAMIN D2) 50,000 unit Cap Take 1 capsule (50,000 Units total) by mouth every 7 days.    fluticasone propionate (FLONASE) 50 mcg/actuation nasal  spray 1 spray (50 mcg total) by Each Nostril route once daily.    furosemide (LASIX) 20 MG tablet Take 1 tablet (20 mg total) by mouth daily as needed (swelling).    guaiFENesin (HUMIBID E) 400 mg Tab Take 1 tablet (400 mg total) by mouth every 6 (six) hours as needed (for chest congestion).    hydroxychloroquine (PLAQUENIL) 200 mg tablet Take 1 tablet (200 mg total) by mouth once daily.    inhalation spacing device Use as directed when taking inhaled medicine    ipratropium-albuteroL (COMBIVENT)  mcg/actuation inhaler Inhale 1 puff into the lungs every 6 (six) hours as needed for Wheezing or Shortness of Breath. Rescue    multivitamin capsule Take 1 capsule by mouth once daily.    omeprazole (PRILOSEC) 40 MG capsule Take 1 capsule (40 mg total) by mouth every morning.    ondansetron (ZOFRAN-ODT) 8 MG TbDL Dissolve 1 tablet (8 mg total) by mouth every 6 (six) hours as needed (nausea).    prednisoLONE acetate (PRED FORTE) 1 % DrpS Place 1 drop into both eyes 4 (four) times daily.    ursodioL (ACTIGALL) 500 MG tablet Take 1 tablet (500 mg total) by mouth once daily.       Review of Systems   Constitutional:  Negative for activity change, appetite change, chills, fatigue and fever.   Eyes:  Negative for visual disturbance.   Respiratory:  Negative for cough, shortness of breath and wheezing.    Cardiovascular:  Negative for chest pain, palpitations and leg swelling.   Gastrointestinal:  Negative for abdominal pain, constipation, diarrhea, nausea and vomiting.   Endocrine: Negative for cold intolerance, heat intolerance, polydipsia, polyphagia and polyuria.   Genitourinary:  Negative for difficulty urinating.   Musculoskeletal:  Negative for arthralgias, back pain and myalgias.   Neurological:  Negative for numbness and headaches.   Psychiatric/Behavioral:  Negative for dysphoric mood and suicidal ideas. The patient is not nervous/anxious.         Negative for homicidal ideas.       Objective:  Physical  Exam  Vitals reviewed.   Constitutional:       General: She is not in acute distress.     Appearance: Normal appearance. She is obese. She is not ill-appearing, toxic-appearing or diaphoretic.      Comments: Pleasant.   Cardiovascular:      Rate and Rhythm: Normal rate and regular rhythm.      Pulses: Normal pulses.      Heart sounds: No murmur heard.  Pulmonary:      Effort: Pulmonary effort is normal. No respiratory distress.      Breath sounds: Normal breath sounds. No wheezing.   Abdominal:      General: Bowel sounds are normal. There is no distension.      Palpations: Abdomen is soft. There is no mass.      Tenderness: There is no abdominal tenderness. There is no guarding or rebound.   Musculoskeletal:         General: No swelling or tenderness. Normal range of motion.      Cervical back: Normal range of motion and neck supple. No tenderness.      Comments: She is ambulatory without problems. Non tender knees with full range of motion noted.   Lymphadenopathy:      Cervical: No cervical adenopathy.   Skin:     General: Skin is warm.   Neurological:      General: No focal deficit present.      Mental Status: She is alert and oriented to person, place, and time.   Psychiatric:         Mood and Affect: Mood normal.         Behavior: Behavior normal.         Thought Content: Thought content normal.         Judgment: Judgment normal.         ASSESSMENT:  1. Essential hypertension    2. Undifferentiated connective tissue disease    3. Depression with anxiety    4. Severe obesity (BMI 35.0-35.9 with comorbidity)        PLAN:  Karla was seen today for follow-up and hypertension.    Diagnoses and all orders for this visit:    Essential hypertension - stable on medication  -     Hypertension Digital Medicine (HDMP) Enrollment Order    Undifferentiated connective tissue disease - on medication, stable and managed by rheumatology    Depression with anxiety - stable on medications    Severe obesity (BMI 35.0-35.9 with  comorbidity) - managed with exercise    No labs today.  Continue current medications, follow low sodium, low cholesterol, low carb diet, daily walks.  Keep follow up with specialists.  Flu shot this fall.  Follow up in about 5 months (around 12/5/2025) for physical.

## 2025-07-06 PROBLEM — E66.01 SEVERE OBESITY (BMI 35.0-35.9 WITH COMORBIDITY): Status: ACTIVE | Noted: 2025-01-09

## 2025-07-14 ENCOUNTER — LAB VISIT (OUTPATIENT)
Dept: LAB | Facility: HOSPITAL | Age: 65
End: 2025-07-14
Attending: SURGERY
Payer: COMMERCIAL

## 2025-07-14 DIAGNOSIS — E66.9 OBESITY, UNSPECIFIED CLASS, UNSPECIFIED OBESITY TYPE, UNSPECIFIED WHETHER SERIOUS COMORBIDITY PRESENT: ICD-10-CM

## 2025-07-14 DIAGNOSIS — I10 ESSENTIAL HYPERTENSION: ICD-10-CM

## 2025-07-14 DIAGNOSIS — E66.9 OBESITY, UNSPECIFIED CLASS, UNSPECIFIED OBESITY TYPE, UNSPECIFIED WHETHER SERIOUS COMORBIDITY PRESENT: Primary | ICD-10-CM

## 2025-07-14 LAB
25(OH)D3+25(OH)D2 SERPL-MCNC: 25 NG/ML (ref 30–96)
ABSOLUTE EOSINOPHIL (OHS): 0.04 K/UL
ABSOLUTE MONOCYTE (OHS): 0.55 K/UL (ref 0.3–1)
ABSOLUTE NEUTROPHIL COUNT (OHS): 5.44 K/UL (ref 1.8–7.7)
ALBUMIN SERPL BCP-MCNC: 3.7 G/DL (ref 3.5–5.2)
ALP SERPL-CCNC: 110 UNIT/L (ref 40–150)
ALT SERPL W/O P-5'-P-CCNC: 13 UNIT/L (ref 10–44)
ANION GAP (OHS): 8 MMOL/L (ref 8–16)
AST SERPL-CCNC: 19 UNIT/L (ref 11–45)
BASOPHILS # BLD AUTO: 0.01 K/UL
BASOPHILS NFR BLD AUTO: 0.1 %
BILIRUB SERPL-MCNC: 0.8 MG/DL (ref 0.1–1)
BUN SERPL-MCNC: 15 MG/DL (ref 8–23)
CALCIUM SERPL-MCNC: 8.6 MG/DL (ref 8.7–10.5)
CHLORIDE SERPL-SCNC: 104 MMOL/L (ref 95–110)
CO2 SERPL-SCNC: 27 MMOL/L (ref 23–29)
CREAT SERPL-MCNC: 1.1 MG/DL (ref 0.5–1.4)
ERYTHROCYTE [DISTWIDTH] IN BLOOD BY AUTOMATED COUNT: 18.1 % (ref 11.5–14.5)
GFR SERPLBLD CREATININE-BSD FMLA CKD-EPI: 57 ML/MIN/1.73/M2
GLUCOSE SERPL-MCNC: 85 MG/DL (ref 70–110)
HCT VFR BLD AUTO: 44.5 % (ref 37–48.5)
HGB BLD-MCNC: 13.9 GM/DL (ref 12–16)
IMM GRANULOCYTES # BLD AUTO: 0.02 K/UL (ref 0–0.04)
IMM GRANULOCYTES NFR BLD AUTO: 0.2 % (ref 0–0.5)
IRON SATN MFR SERPL: 33 % (ref 20–50)
IRON SERPL-MCNC: 159 UG/DL (ref 30–160)
LYMPHOCYTES # BLD AUTO: 2.25 K/UL (ref 1–4.8)
MCH RBC QN AUTO: 29.7 PG (ref 27–31)
MCHC RBC AUTO-ENTMCNC: 31.2 G/DL (ref 32–36)
MCV RBC AUTO: 95 FL (ref 82–98)
NUCLEATED RBC (/100WBC) (OHS): 0 /100 WBC
PLATELET # BLD AUTO: 287 K/UL (ref 150–450)
PMV BLD AUTO: 11.5 FL (ref 9.2–12.9)
POTASSIUM SERPL-SCNC: 3.8 MMOL/L (ref 3.5–5.1)
PREALB SERPL-MCNC: 19 MG/DL (ref 20–43)
PROT SERPL-MCNC: 6.8 GM/DL (ref 6–8.4)
RBC # BLD AUTO: 4.68 M/UL (ref 4–5.4)
RELATIVE EOSINOPHIL (OHS): 0.5 %
RELATIVE LYMPHOCYTE (OHS): 27.1 % (ref 18–48)
RELATIVE MONOCYTE (OHS): 6.6 % (ref 4–15)
RELATIVE NEUTROPHIL (OHS): 65.5 % (ref 38–73)
SODIUM SERPL-SCNC: 139 MMOL/L (ref 136–145)
TIBC SERPL-MCNC: 478 UG/DL (ref 250–450)
TRANSFERRIN SERPL-MCNC: 323 MG/DL (ref 200–375)
VIT B12 SERPL-MCNC: 276 PG/ML (ref 210–950)
WBC # BLD AUTO: 8.31 K/UL (ref 3.9–12.7)

## 2025-07-14 PROCEDURE — 84443 ASSAY THYROID STIM HORMONE: CPT

## 2025-07-14 PROCEDURE — 84134 ASSAY OF PREALBUMIN: CPT

## 2025-07-14 PROCEDURE — 36415 COLL VENOUS BLD VENIPUNCTURE: CPT

## 2025-07-14 PROCEDURE — 82607 VITAMIN B-12: CPT

## 2025-07-14 PROCEDURE — 85025 COMPLETE CBC W/AUTO DIFF WBC: CPT

## 2025-07-14 PROCEDURE — 83036 HEMOGLOBIN GLYCOSYLATED A1C: CPT

## 2025-07-14 PROCEDURE — 84439 ASSAY OF FREE THYROXINE: CPT

## 2025-07-14 PROCEDURE — 84466 ASSAY OF TRANSFERRIN: CPT

## 2025-07-14 PROCEDURE — 84425 ASSAY OF VITAMIN B-1: CPT

## 2025-07-14 PROCEDURE — 82306 VITAMIN D 25 HYDROXY: CPT

## 2025-07-14 PROCEDURE — 80053 COMPREHEN METABOLIC PANEL: CPT

## 2025-07-15 ENCOUNTER — PATIENT MESSAGE (OUTPATIENT)
Dept: SURGERY | Facility: HOSPITAL | Age: 65
End: 2025-07-15
Payer: COMMERCIAL

## 2025-07-15 LAB
EAG (OHS): 88 MG/DL (ref 68–131)
HBA1C MFR BLD: 4.7 % (ref 4–5.6)
T4 FREE SERPL-MCNC: 1.03 NG/DL (ref 0.71–1.51)
TSH SERPL-ACNC: 1.16 UIU/ML (ref 0.4–4)

## 2025-07-18 LAB — W VITAMIN B1: 49 UG/L

## 2025-07-23 ENCOUNTER — OFFICE VISIT (OUTPATIENT)
Dept: FAMILY MEDICINE | Facility: CLINIC | Age: 65
End: 2025-07-23
Payer: COMMERCIAL

## 2025-07-23 DIAGNOSIS — J06.9 URI WITH COUGH AND CONGESTION: Primary | ICD-10-CM

## 2025-07-23 DIAGNOSIS — I10 ESSENTIAL HYPERTENSION: Chronic | ICD-10-CM

## 2025-07-23 DIAGNOSIS — E66.01 SEVERE OBESITY (BMI 35.0-35.9 WITH COMORBIDITY): ICD-10-CM

## 2025-07-23 PROCEDURE — 1160F RVW MEDS BY RX/DR IN RCRD: CPT | Mod: CPTII,95,,

## 2025-07-23 PROCEDURE — 3044F HG A1C LEVEL LT 7.0%: CPT | Mod: CPTII,95,,

## 2025-07-23 PROCEDURE — 1159F MED LIST DOCD IN RCRD: CPT | Mod: CPTII,95,,

## 2025-07-23 PROCEDURE — 98005 SYNCH AUDIO-VIDEO EST LOW 20: CPT | Mod: 95,,,

## 2025-07-23 RX ORDER — AMOXICILLIN 500 MG/1
500 CAPSULE ORAL EVERY 12 HOURS
Qty: 20 CAPSULE | Refills: 0 | Status: SHIPPED | OUTPATIENT
Start: 2025-07-23 | End: 2025-08-03

## 2025-07-23 RX ORDER — BENZONATATE 200 MG/1
200 CAPSULE ORAL 3 TIMES DAILY PRN
Qty: 30 CAPSULE | Refills: 1 | Status: CANCELLED | OUTPATIENT
Start: 2025-07-23

## 2025-07-23 RX ORDER — PROMETHAZINE HYDROCHLORIDE AND DEXTROMETHORPHAN HYDROBROMIDE 6.25; 15 MG/5ML; MG/5ML
5 SYRUP ORAL
Qty: 220 ML | Refills: 0 | Status: SHIPPED | OUTPATIENT
Start: 2025-07-23

## 2025-07-23 NOTE — PROGRESS NOTES
Karla Arzola  MRN:  [id number]  65 y.o. female      PRIMARY CARE TELEMEDICINE NOTE    Patient location:  [place]  Visit type: Virtual visit with synchronous audio and video  Each patient to whom he or she provides medical services by telemedicine is:  (1) informed of the relationship between the physician and patient and the respective role of any other health care provider with respect to management of the patient  (2) notified that he or she may decline to receive medical services by telemedicine and may withdraw from such care at any time      SUBJECTIVE:     CHIEF COMPLAINT:     cold symptoms     HPI:    Karla Azrola is seen virtually today for nasal congestion and associated symptoms.  She reports symptoms began Saturday with progressive worsening, including persistent sore throat, headache unresponsive to Tylenol, nasal congestion with rhinorrhea, significant night sweats, body aches, and occasional chills. She denies shortness of breath. Symptoms have been incrementally increasing in severity since onset, with most pronounced symptoms over the past night. She notes exposure to similar illness in family members, including her daughter, grandson, and granddaughter. She has tried OTC medications including audrey-seltzer, Mucinex, and Tylenol, as well as prescription Tessalon Perles. She also reports using home remedies including hot tea with cough drops. States that she has Flonase nasal spray but has not used it yet during her current illness.       REVIEW OF SYSTEMS:  Review of Systems   Constitutional:  Positive for chills, diaphoresis and fever.   HENT:  Positive for congestion, postnasal drip, rhinorrhea, sinus pressure, sinus pain and sore throat. Negative for ear pain.    Respiratory:  Positive for cough. Negative for shortness of breath and wheezing.    Cardiovascular:  Negative for chest pain.   Musculoskeletal:  Negative for myalgias.   Skin:  Negative for rash.   Allergic/Immunologic: Negative for  environmental allergies.   Neurological:  Positive for headaches.        Answers submitted by the patient for this visit:  Cough Questionnaire (Submitted on 7/23/2025)  Chief Complaint: Cough  Chronicity: recurrent  Onset: in the past 7 days  Progression since onset: rapidly worsening  Frequency: constantly  Cough characteristics: productive of sputum  ear congestion: No  heartburn: No  hemoptysis: No  nasal congestion: Yes  sweats: Yes  weight loss: No  Aggravated by: nothing  asthma: No  bronchiectasis: No  bronchitis: Yes  COPD: No  emphysema: No  pneumonia: No  Treatments tried: OTC cough suppressant    CURRENT ALLERGY LIST:  Review of patient's allergies indicates:  No Known Allergies    CURRENT PROBLEM LIST:  Problem List[1]    CURRENT MEDICATIONS:  Current Medications[2]      Past medical, surgical, family and social histories have been reviewed today.      OBJECTIVE:     EXAM:  Constitutional:  In NAD, pleasant, cooperative.  Appears well-developed and well-nourished.   Respiratory:  Unlabored, in no resp distress.  Neurological: Alert and oriented to person, place, and time.   Psychiatric: Normal mood and affect, behavior is normal. Judgment and thought content normal.     Complete PE deferred due to video visit        ASSESSMENT:     1. URI with cough and congestion    -     amoxicillin (AMOXIL) 500 MG capsule; Take 1 capsule (500 mg total) by mouth every 12 (twelve) hours. for 10 days  Dispense: 20 capsule; Refill: 0  -     promethazine-dextromethorphan (PROMETHAZINE-DM) 6.25-15 mg/5 mL Syrp; Take 5 mLs by mouth every 4 to 6 hours as needed (cough and congestion).  Dispense: 220 mL; Refill: 0    2. Essential hypertension   - chronic, stable on antihypertensives per PCP    3. Severe obesity (BMI 35.0-35.9 with comorbidity)              PLAN:     Abx, promethazine-DM PRN  Increase hydration and rest  Vitamin C advised along with gargling warm salt water and drinking warm tea with honey/lemon  Continue all  "other present management (be sure to start flonase BID)  Follow up in office for worsening symptoms     20 minutes of total time spent on the encounter, which includes "face to face" (virtual) time and non-face to face time preparing to see the patient .  This includes review of tests, obtaining and/or reviewing separately obtained history, and documenting clinical information in the electronic or other health record.  Also includes independent interpretation of results (not separately reported) and communicating results to the patient/family/caregiver, with care coordination (not separately reported).       Sean Connelly, MSN, APRN, FNP-C                         [1]   Patient Active Problem List  Diagnosis    Essential hypertension    Recurrent genital HSV (herpes simplex virus) infection    Internal derangement of right knee    Chondromalacia of right knee    Complex tear of lateral meniscus of left knee    Undifferentiated connective tissue disease    Uveitis    Vitamin D deficiency disease    Depression with anxiety    Long-term use of Plaquenil    Chondromalacia, left knee    Porokeratosis    Paresthesia and pain of extremity    Severe obesity (BMI 35.0-35.9 with comorbidity)   [2]   Current Outpatient Medications:     acyclovir (ZOVIRAX) 200 MG capsule, Take 1 capsule (200 mg total) by mouth once daily., Disp: 90 capsule, Rfl: 3    albuterol (VENTOLIN HFA) 90 mcg/actuation inhaler, Inhale 2 puffs into the lungs every 6 (six) hours as needed for Wheezing. Rescue, Disp: 8.5 g, Rfl: 1    amLODIPine (NORVASC) 10 MG tablet, Take 1 tablet (10 mg total) by mouth once daily., Disp: 90 tablet, Rfl: 3    amoxicillin (AMOXIL) 500 MG capsule, Take 1 capsule (500 mg total) by mouth every 12 (twelve) hours. for 10 days, Disp: 20 capsule, Rfl: 0    benzonatate (TESSALON) 200 MG capsule, Take 1 capsule (200 mg total) by mouth 3 (three) times daily as needed for Cough., Disp: 30 capsule, Rfl: 1    buPROPion (WELLBUTRIN XL) 150 " MG TB24 tablet, Take 1 tablet (150 mg total) by mouth once daily. (At noon), Disp: 90 tablet, Rfl: 1    citalopram (CELEXA) 20 MG tablet, Take 1 tablet (20 mg total) by mouth once daily., Disp: 90 tablet, Rfl: 3    ergocalciferol (VITAMIN D2) 50,000 unit Cap, Take 1 capsule (50,000 Units total) by mouth every 7 days., Disp: 12 capsule, Rfl: 3    fluticasone propionate (FLONASE) 50 mcg/actuation nasal spray, 1 spray (50 mcg total) by Each Nostril route once daily., Disp: 16 g, Rfl: 0    furosemide (LASIX) 20 MG tablet, Take 1 tablet (20 mg total) by mouth daily as needed (swelling)., Disp: 60 tablet, Rfl: 1    guaiFENesin (HUMIBID E) 400 mg Tab, Take 1 tablet (400 mg total) by mouth every 6 (six) hours as needed (for chest congestion)., Disp: 30 tablet, Rfl: 1    hydroxychloroquine (PLAQUENIL) 200 mg tablet, Take 1 tablet (200 mg total) by mouth once daily., Disp: 90 tablet, Rfl: 1    inhalation spacing device, Use as directed when taking inhaled medicine, Disp: 1 each, Rfl: 0    ipratropium-albuteroL (COMBIVENT)  mcg/actuation inhaler, Inhale 1 puff into the lungs every 6 (six) hours as needed for Wheezing or Shortness of Breath. Rescue, Disp: 4 g, Rfl: 0    multivitamin capsule, Take 1 capsule by mouth once daily., Disp: , Rfl:     omeprazole (PRILOSEC) 40 MG capsule, Take 1 capsule (40 mg total) by mouth every morning., Disp: 90 capsule, Rfl: 2    ondansetron (ZOFRAN-ODT) 8 MG TbDL, Dissolve 1 tablet (8 mg total) by mouth every 6 (six) hours as needed (nausea)., Disp: 30 tablet, Rfl: 0    prednisoLONE acetate (PRED FORTE) 1 % DrpS, Place 1 drop into both eyes 4 (four) times daily., Disp: 5 mL, Rfl: 0    promethazine-dextromethorphan (PROMETHAZINE-DM) 6.25-15 mg/5 mL Syrp, Take 5 mLs by mouth every 4 to 6 hours as needed (cough and congestion)., Disp: 220 mL, Rfl: 0    ursodioL (NEGRO FORTE) 500 MG tablet, Take 1 tablet (500 mg total) by mouth once daily., Disp: 30 tablet, Rfl: 5

## 2025-07-23 NOTE — LETTER
July 23, 2025    Karla Arzola  [address]  [place] [id number]             [facility]  Family Medicine  [address]  [place] [address]  Phone: [phone]  Fax: [phone]   July 23, 2025     Patient: Karla Arzola   YOB: 1960   Date of Visit: 7/23/2025       To Whom it May Concern:    Karla Arzola was seen virtually on 7/23/2025. She may return to work on 7/24/25 without restrictions.    Please excuse her from any classes or work missed during the period of 7/22/25-7/23/25.    If you have any questions or concerns, please don't hesitate to call.    Sincerely,          Sean Connelly, FNP-C

## 2025-07-24 ENCOUNTER — PATIENT MESSAGE (OUTPATIENT)
Dept: FAMILY MEDICINE | Facility: CLINIC | Age: 65
End: 2025-07-24
Payer: COMMERCIAL

## (undated) DEVICE — PAD ABD 8X10 STERILE

## (undated) DEVICE — SUT PROLENE 4-0 MONO 18IN

## (undated) DEVICE — TIP SUCTION YANKAUER

## (undated) DEVICE — SUT TICRON BLUE O SK

## (undated) DEVICE — GLOVE SENSICARE PI ORTHO 7.5

## (undated) DEVICE — NDL HYPODERMIC BLUNT 18G 1.5IN

## (undated) DEVICE — RELOAD ECHELON FLEX GRN 60MM

## (undated) DEVICE — STAPLER ECHELON FLEX 60MM 44CM

## (undated) DEVICE — DRAPE U SPLIT SHEET 54X76IN

## (undated) DEVICE — SET CASSETTE TUBE DW OUTFLOW

## (undated) DEVICE — TROCAR ENDOPATH XCEL 11MM 10CM

## (undated) DEVICE — MANIFOLD 4 PORT

## (undated) DEVICE — SUT TICRON BLUE 2-0 48 SK

## (undated) DEVICE — DRAPE STERI U-SHAPED 47X51IN

## (undated) DEVICE — SUT VICRYL+ 27 UR-6 VIOL

## (undated) DEVICE — NDL SAFETY 22G X 1.5 ECLIPSE

## (undated) DEVICE — SOL IRR NACL .9% 3000ML

## (undated) DEVICE — ELECTRODE REM PLYHSV RETURN 9

## (undated) DEVICE — PACK BASIC SETUP SC BR

## (undated) DEVICE — APPLICATOR CHLORAPREP ORN 26ML

## (undated) DEVICE — COVER LIGHT HANDLE 80/CA

## (undated) DEVICE — BLADE SHAVER TORPEDO 4MMX13CM

## (undated) DEVICE — SUT MCRYL PLUS 4-0 PS2 27IN

## (undated) DEVICE — TUBING PUMP ARTHROSCOPY STRL

## (undated) DEVICE — SYR ONLY LUER LOCK 20CC

## (undated) DEVICE — ADHESIVE DERMABOND ADVANCED

## (undated) DEVICE — SPONGE COTTON TRAY 4X4IN

## (undated) DEVICE — POSITIONER HEAD DONUT 9IN FOAM

## (undated) DEVICE — SCALPEL #11 BLADE STRL DISP

## (undated) DEVICE — COVER PROXIMA MAYO STAND

## (undated) DEVICE — TOWEL OR DISP STRL BLUE 4/PK

## (undated) DEVICE — BANDAGE MATRIX HK LOOP 6IN 5YD

## (undated) DEVICE — SHEARS HARMONIC ADV CRV 45CM

## (undated) DEVICE — DRAPE CORETEMP FLD WRM 56X62IN

## (undated) DEVICE — SOCKINETTE IMPERVIOUS 12X48IN

## (undated) DEVICE — PAD CAST SPECIALIST STRL 6

## (undated) DEVICE — DRAPE THREE-QTR REINF 53X77IN

## (undated) DEVICE — NDL HYPO REG 25G X 1 1/2

## (undated) DEVICE — TUBING SUCTION STRAIGHT .25X20

## (undated) DEVICE — DRAPE ABDOMINAL TIBURON 14X11

## (undated) DEVICE — SUT VICRYL 2-0 36 CT-1

## (undated) DEVICE — SUPPORT ULNA NERVE PROTECTOR

## (undated) DEVICE — TROCAR ENDOPATH XCEL 5X100MM

## (undated) DEVICE — DRAPE T EXTRM SURG 121X128X90

## (undated) DEVICE — DRAPE C-ARMOR EQUIPMENT COVER

## (undated) DEVICE — GOWN SMARTGOWN LVL4 X-LONG XL

## (undated) DEVICE — KIT PROCEDURE STER INLET CLOSU

## (undated) DEVICE — TROCAR ENDOPATH XCEL 12MM 10CM

## (undated) DEVICE — SUT VICRYL PLUS 0 CT1 36IN

## (undated) DEVICE — NDL ECLIPSE SAFETY 18GX1-1/2IN

## (undated) DEVICE — CANNULA ENDOPATH XCEL 5X100MM

## (undated) DEVICE — COVER CAMERA OPERATING ROOM

## (undated) DEVICE — TUBING HF INSUFFLATION W/ FLTR

## (undated) DEVICE — TRAY MINOR GEN SURG OMC

## (undated) DEVICE — RELOAD ECHELON FLEX BLU 60MM